# Patient Record
Sex: FEMALE | Race: WHITE | Employment: OTHER | URBAN - METROPOLITAN AREA
[De-identification: names, ages, dates, MRNs, and addresses within clinical notes are randomized per-mention and may not be internally consistent; named-entity substitution may affect disease eponyms.]

---

## 2017-02-06 ENCOUNTER — HOSPITAL ENCOUNTER (EMERGENCY)
Age: 32
Discharge: HOME | End: 2017-02-07
Attending: EMERGENCY MEDICINE
Payer: SELF-PAY

## 2017-02-06 ENCOUNTER — APPOINTMENT (OUTPATIENT)
Dept: GENERAL RADIOLOGY | Age: 32
End: 2017-02-06
Attending: PHYSICIAN ASSISTANT
Payer: SELF-PAY

## 2017-02-06 DIAGNOSIS — R73.9 HYPERGLYCEMIA: Primary | ICD-10-CM

## 2017-02-06 LAB
ALBUMIN SERPL BCP-MCNC: 3.6 G/DL (ref 3.5–5)
ALBUMIN/GLOB SERPL: 1 {RATIO} (ref 1.1–2.2)
ALP SERPL-CCNC: 81 U/L (ref 45–117)
ALT SERPL-CCNC: 39 U/L (ref 12–78)
ANION GAP BLD CALC-SCNC: 9 MMOL/L (ref 5–15)
APPEARANCE UR: CLEAR
AST SERPL W P-5'-P-CCNC: 13 U/L (ref 15–37)
BACTERIA URNS QL MICRO: NEGATIVE /HPF
BASOPHILS # BLD AUTO: 0 K/UL (ref 0–0.1)
BASOPHILS # BLD: 0 % (ref 0–1)
BILIRUB SERPL-MCNC: 0.5 MG/DL (ref 0.2–1)
BILIRUB UR QL: NEGATIVE
BUN SERPL-MCNC: 14 MG/DL (ref 6–20)
BUN/CREAT SERPL: 14 (ref 12–20)
CALCIUM SERPL-MCNC: 9 MG/DL (ref 8.5–10.1)
CHLORIDE SERPL-SCNC: 94 MMOL/L (ref 97–108)
CO2 SERPL-SCNC: 28 MMOL/L (ref 21–32)
COLOR UR: ABNORMAL
CREAT SERPL-MCNC: 0.98 MG/DL (ref 0.55–1.02)
EOSINOPHIL # BLD: 0.3 K/UL (ref 0–0.4)
EOSINOPHIL NFR BLD: 3 % (ref 0–7)
EPITH CASTS URNS QL MICRO: ABNORMAL /LPF
ERYTHROCYTE [DISTWIDTH] IN BLOOD BY AUTOMATED COUNT: 12.9 % (ref 11.5–14.5)
FLUAV AG NPH QL IA: NEGATIVE
FLUBV AG NOSE QL IA: NEGATIVE
GLOBULIN SER CALC-MCNC: 3.7 G/DL (ref 2–4)
GLUCOSE BLD STRIP.AUTO-MCNC: 458 MG/DL (ref 65–100)
GLUCOSE SERPL-MCNC: 564 MG/DL (ref 65–100)
GLUCOSE UR STRIP.AUTO-MCNC: >1000 MG/DL
HCG UR QL: NEGATIVE
HCT VFR BLD AUTO: 38.9 % (ref 35–47)
HGB BLD-MCNC: 13.4 G/DL (ref 11.5–16)
HGB UR QL STRIP: ABNORMAL
KETONES UR QL STRIP.AUTO: NEGATIVE MG/DL
LEUKOCYTE ESTERASE UR QL STRIP.AUTO: NEGATIVE
LIPASE SERPL-CCNC: 226 U/L (ref 73–393)
LYMPHOCYTES # BLD AUTO: 39 % (ref 12–49)
LYMPHOCYTES # BLD: 3.5 K/UL (ref 0.8–3.5)
MCH RBC QN AUTO: 29.3 PG (ref 26–34)
MCHC RBC AUTO-ENTMCNC: 34.4 G/DL (ref 30–36.5)
MCV RBC AUTO: 84.9 FL (ref 80–99)
MONOCYTES # BLD: 0.6 K/UL (ref 0–1)
MONOCYTES NFR BLD AUTO: 6 % (ref 5–13)
NEUTS SEG # BLD: 4.8 K/UL (ref 1.8–8)
NEUTS SEG NFR BLD AUTO: 52 % (ref 32–75)
NITRITE UR QL STRIP.AUTO: NEGATIVE
PH UR STRIP: 6 [PH] (ref 5–8)
PLATELET # BLD AUTO: 315 K/UL (ref 150–400)
POTASSIUM SERPL-SCNC: 4.2 MMOL/L (ref 3.5–5.1)
PROT SERPL-MCNC: 7.3 G/DL (ref 6.4–8.2)
PROT UR STRIP-MCNC: NEGATIVE MG/DL
RBC # BLD AUTO: 4.58 M/UL (ref 3.8–5.2)
RBC #/AREA URNS HPF: ABNORMAL /HPF (ref 0–5)
SERVICE CMNT-IMP: ABNORMAL
SODIUM SERPL-SCNC: 131 MMOL/L (ref 136–145)
SP GR UR REFRACTOMETRY: 1.03 (ref 1–1.03)
UA: UC IF INDICATED,UAUC: ABNORMAL
UROBILINOGEN UR QL STRIP.AUTO: 0.2 EU/DL (ref 0.2–1)
WBC # BLD AUTO: 9.2 K/UL (ref 3.6–11)
WBC URNS QL MICRO: ABNORMAL /HPF (ref 0–4)

## 2017-02-06 PROCEDURE — 74011636637 HC RX REV CODE- 636/637: Performed by: PHYSICIAN ASSISTANT

## 2017-02-06 PROCEDURE — 96375 TX/PRO/DX INJ NEW DRUG ADDON: CPT

## 2017-02-06 PROCEDURE — 85025 COMPLETE CBC W/AUTO DIFF WBC: CPT | Performed by: PHYSICIAN ASSISTANT

## 2017-02-06 PROCEDURE — 96361 HYDRATE IV INFUSION ADD-ON: CPT

## 2017-02-06 PROCEDURE — 81025 URINE PREGNANCY TEST: CPT

## 2017-02-06 PROCEDURE — 83690 ASSAY OF LIPASE: CPT | Performed by: PHYSICIAN ASSISTANT

## 2017-02-06 PROCEDURE — 87804 INFLUENZA ASSAY W/OPTIC: CPT | Performed by: PHYSICIAN ASSISTANT

## 2017-02-06 PROCEDURE — 51798 US URINE CAPACITY MEASURE: CPT

## 2017-02-06 PROCEDURE — 99284 EMERGENCY DEPT VISIT MOD MDM: CPT

## 2017-02-06 PROCEDURE — 81001 URINALYSIS AUTO W/SCOPE: CPT | Performed by: PHYSICIAN ASSISTANT

## 2017-02-06 PROCEDURE — 82962 GLUCOSE BLOOD TEST: CPT

## 2017-02-06 PROCEDURE — 96374 THER/PROPH/DIAG INJ IV PUSH: CPT

## 2017-02-06 PROCEDURE — 36415 COLL VENOUS BLD VENIPUNCTURE: CPT | Performed by: PHYSICIAN ASSISTANT

## 2017-02-06 PROCEDURE — 74011250636 HC RX REV CODE- 250/636: Performed by: PHYSICIAN ASSISTANT

## 2017-02-06 PROCEDURE — 80053 COMPREHEN METABOLIC PANEL: CPT | Performed by: PHYSICIAN ASSISTANT

## 2017-02-06 PROCEDURE — 71020 XR CHEST PA LAT: CPT

## 2017-02-06 RX ORDER — ONDANSETRON 2 MG/ML
4 INJECTION INTRAMUSCULAR; INTRAVENOUS
Status: COMPLETED | OUTPATIENT
Start: 2017-02-06 | End: 2017-02-06

## 2017-02-06 RX ADMIN — SODIUM CHLORIDE 1000 ML: 900 INJECTION, SOLUTION INTRAVENOUS at 22:17

## 2017-02-06 RX ADMIN — ONDANSETRON 4 MG: 2 INJECTION INTRAMUSCULAR; INTRAVENOUS at 23:12

## 2017-02-06 RX ADMIN — HUMAN INSULIN 10 UNITS: 100 INJECTION, SOLUTION SUBCUTANEOUS at 23:12

## 2017-02-06 NOTE — LETTER
[address] 
[facility] EMERGENCY DEPT 
[address] [facility] [place] [address] [id number] 
[phone] Work/School Note Date: 2/6/2017 To Whom It May concern: 
 
Marlon Altamirano was seen and treated today in the emergency room by the following provider(s): 
Attending Provider: Jarde Major DO Physician Assistant: VALENTINO Ponce.   
 
Marlon Altamirano may return to work on 2/8/17.  
 
Sincerely, 
 
 
 
 
VALENTINO Ponce

## 2017-02-07 VITALS
HEART RATE: 89 BPM | TEMPERATURE: 97.7 F | BODY MASS INDEX: 42.53 KG/M2 | OXYGEN SATURATION: 97 % | SYSTOLIC BLOOD PRESSURE: 165 MMHG | HEIGHT: 64 IN | WEIGHT: 249.12 LBS | RESPIRATION RATE: 18 BRPM | DIASTOLIC BLOOD PRESSURE: 99 MMHG

## 2017-02-07 LAB
GLUCOSE BLD STRIP.AUTO-MCNC: 312 MG/DL (ref 65–100)
SERVICE CMNT-IMP: ABNORMAL

## 2017-02-07 PROCEDURE — 82962 GLUCOSE BLOOD TEST: CPT

## 2017-02-07 NOTE — ED PROVIDER NOTES
HPI Comments: Pt states she checked her BS and found it to be 550 associated s/s included frequent urges to void (pt states she cannot void and has not voided since 9am this morning) and nausea/flank pain.     Pt states at 3pm she took 25units of humalog, drank 6 bottles of water  Recheck sugar at 4pm was 520 pt took another 25 units of humalog  Recheck sugar at 5pm was 530 pt took 25 more units of humalog for a Total of 75 units this afternoon/evening  Recheck BS at 6pm was 520     Patient denies fever, chills, vomiting, diarrhea. Denies chest pain, cough or shortness of breath. Patient is a 27 y.o. female presenting with hyperglycemia. The history is provided by the patient. High Blood Sugar    This is a new problem. The current episode started 6 to 12 hours ago. The problem occurs rarely. The problem has not changed since onset. The pain is associated with an unknown factor. The pain is located in the suprapubic region. The quality of the pain is pressure-like. The pain is at a severity of 7/10. The pain is severe. Associated symptoms include nausea and back pain. Pertinent negatives include no fever, no belching, no diarrhea, no flatus, no hematochezia, no melena, no vomiting, no constipation, no dysuria, no frequency, no hematuria, no headaches, no arthralgias, no myalgias, no trauma, no chest pain and no testicular pain. Nothing worsens the pain. The pain is relieved by nothing. Her past medical history is significant for UTI and DM. The patient's surgical history non-contributory. Past Medical History:   Diagnosis Date    Anemia     Diabetes ([address]     Hx MRSA infection     Hypertension     Irregular menstrual cycle     Obesity     Other ill-defined conditions(799.89) hypothyroidism       Past Surgical History:   Procedure Laterality Date    Hx heent      Hx cholecystectomy      Hx tympanostomy      Hx tonsil and adenoidectomy  1992         No family history on file.     Social History Social History    Marital status:      Spouse name: N/A    Number of children: N/A    Years of education: N/A     Occupational History    Not on file. Social History Main Topics    Smoking status: Never Smoker    Smokeless tobacco: Never Used    Alcohol use No    Drug use: No    Sexual activity: Not on file     Other Topics Concern    Not on file     Social History Narrative    No narrative on file         ALLERGIES: Pcn [penicillins]; Vancomycin; and Voltaren [diclofenac sodium]    Review of Systems   Constitutional: Negative. Negative for fever. HENT: Negative. Eyes: Negative. Respiratory: Negative. Cardiovascular: Negative. Negative for chest pain. Gastrointestinal: Positive for abdominal pain and nausea. Negative for constipation, diarrhea, flatus, hematochezia, melena and vomiting. Endocrine: Negative. Genitourinary: Positive for decreased urine volume and urgency. Negative for dysuria, frequency, hematuria and testicular pain. Musculoskeletal: Positive for back pain. Negative for arthralgias and myalgias. Skin: Negative. Allergic/Immunologic: Negative. Neurological: Negative. Negative for headaches. Hematological: Negative. All other systems reviewed and are negative. Vitals:    02/06/17 2019   BP: (!) 214/131   Pulse: 89   Resp: 18   Temp: 97.7 °F (36.5 °C)   SpO2: 98%   Weight: 113 kg (249 lb 1.9 oz)   Height: 5' 4\" (1.626 m)            Physical Exam   Constitutional: She is oriented to person, place, and time. She appears well-developed and well-nourished. HENT:   Head: Normocephalic and atraumatic. Right Ear: External ear normal.   Left Ear: External ear normal.   Mouth/Throat: Oropharynx is clear and moist. No oropharyngeal exudate. Eyes: Conjunctivae and EOM are normal. Pupils are equal, round, and reactive to light. Right eye exhibits no discharge. Left eye exhibits no discharge. No scleral icterus. Neck: Normal range of motion. No tracheal deviation present. No thyromegaly present. Cardiovascular: Normal rate, regular rhythm and normal heart sounds. No murmur heard. Pulmonary/Chest: Effort normal and breath sounds normal. No respiratory distress. She has no wheezes. She has no rales. She exhibits no tenderness. Abdominal: Soft. Bowel sounds are normal. She exhibits no distension. There is no tenderness. There is no rebound and no guarding. Musculoskeletal: Normal range of motion. She exhibits no edema or tenderness. Lymphadenopathy:     She has no cervical adenopathy. Neurological: She is alert and oriented to person, place, and time. No cranial nerve deficit. Coordination normal.   Skin: Skin is warm. No erythema. Psychiatric: She has a normal mood and affect. Her behavior is normal. Judgment and thought content normal.   Nursing note and vitals reviewed. MDM  Number of Diagnoses or Management Options  Hyperglycemia:   Diagnosis management comments: Assesment/Plan- no DKA or signs of infection. Blood sugar improved after fluids and insulin. Discharge with PCP follow up.        Amount and/or Complexity of Data Reviewed  Clinical lab tests: reviewed and ordered  Tests in the radiology section of CPT®: ordered and reviewed  Tests in the medicine section of CPT®: reviewed and ordered      ED Course       Procedures

## 2017-02-07 NOTE — ED TRIAGE NOTES
Pt states she checked her BS and found it to be 550 associated s/s included frequent urges to void (pt states she cannot void and has not voided since 9am this morning) and nausea/flank pain.     Pt states at 3pm she took 25units of humalog, drank 6 bottles of water  Recheck sugar at 4pm was 520 pt took another 25 units of humalog  Recheck sugar at 5pm was 530 pt took 25 more units of humalog for a  Total of 75 units this afternoon/evening  Recheck BS at 6pm was 520

## 2017-02-07 NOTE — DISCHARGE INSTRUCTIONS
Learning About High Blood Sugar  What is high blood sugar? Your body turns the food you eat into glucose (sugar), which it uses for energy. But if your body isn't able to use the sugar right away, it can build up in your blood and lead to high blood sugar. When the amount of sugar in your blood stays too high for too much of the time, you may have diabetes. Diabetes is a disease that can cause serious health problems. The good news is that lifestyle changes may help you get your blood sugar back to normal and avoid or delay diabetes. What causes high blood sugar? Sugar (glucose) can build up in your blood if you:  · Are overweight. · Have a family history of diabetes. · Take certain medicines, such as steroids. What are the symptoms? Having high blood sugar may not cause any symptoms at all. Or it may make you feel very thirsty or very hungry. You may also urinate more often than usual, have blurry vision, or lose weight without trying. How is high blood sugar treated? You can take steps to lower your blood sugar level if you understand what makes it get higher. Your doctor may want you to learn how to test your blood sugar level at home. Then you can see how illness, stress, or different kinds of food or medicine raise or lower your blood sugar level. Other tests may be needed to see if you have diabetes. How can you prevent high blood sugar? · Watch your weight. If you're overweight, losing just a small amount of weight may help. Reducing fat around your waist is most important. · Limit the amount of calories, sweets, and unhealthy fat you eat. Ask your doctor if a dietitian can help you. A registered dietitian can help you create meal plans that fit your lifestyle. · Get at least 30 minutes of exercise on most days of the week. Exercise helps control your blood sugar. It also helps you maintain a healthy weight. Walking is a good choice.  You also may want to do other activities, such as running, swimming, cycling, or playing tennis or team sports. · If your doctor prescribed medicines, take them exactly as prescribed. Call your doctor if you think you are having a problem with your medicine. You will get more details on the specific medicines your doctor prescribes. Follow-up care is a key part of your treatment and safety. Be sure to make and go to all appointments, and call your doctor if you are having problems. It's also a good idea to know your test results and keep a list of the medicines you take. Where can you learn more? Go to http://kongSwivldelma.info/. Enter O108 in the search box to learn more about \"Learning About High Blood Sugar. \"  Current as of: May 23, 2016  Content Version: 11.1  © 0221-6064 [employer]. Care instructions adapted under license by [employer] (which disclaims liability or warranty for this information). If you have questions about a medical condition or this instruction, always ask your healthcare professional. [address] any warranty or liability for your use of this information. We hope that we have addressed all of your medical concerns. The examination and treatment you received in the Emergency Department were for an emergent problem and were not intended as complete care. It is important that you follow up with your healthcare provider(s) for ongoing care. If your symptoms worsen or do not improve as expected, and you are unable to reach your usual health care provider(s), you should return to the Emergency Department. Today's healthcare is undergoing tremendous change, and patient satisfaction surveys are one of the many tools to assess the quality of medical care. You may receive a survey from the [employer] organization regarding your experience in the Emergency Department.   I hope that your experience has been completely positive, particularly the medical care that I provided. As such, please participate in the survey; anything less than excellent does not meet my expectations or intentions. [facility] and [facility] participate in nationally recognized quality of care measures. If your blood pressure is greater than 120/80, as reported below, we urge that you seek medical care to address the potential of high blood pressure, commonly known as hypertension. Hypertension can be hereditary or can be caused by certain medical conditions, pain, stress, or \"white coat syndrome. \"       Please make an appointment with your health care provider(s) for follow up of your Emergency Department visit. VITALS:   Patient Vitals for the past 8 hrs:   Temp Pulse Resp BP SpO2   02/06/17 2330 - - - - 97 %   02/06/17 2315 - - - - 96 %   02/06/17 2230 - - - (!) 165/99 96 %   02/06/17 2215 - - - (!) 171/96 98 %   02/06/17 2019 97.7 °F (36.5 °C) 89 18 (!) 214/131 98 %          Thank you for allowing us to provide you with medical care today. We realize that you have many choices for your emergency care needs. Please choose us in the future for any continued health care needs. [place], [employer].   Office: [phone]            Recent Results (from the past 24 hour(s))   GLUCOSE, POC    Collection Time: 02/06/17  8:24 PM   Result Value Ref Range    Glucose (POC) 458 (H) 65 - 100 mg/dL    Performed by Ferdie Apple    CBC WITH AUTOMATED DIFF    Collection Time: 02/06/17  9:35 PM   Result Value Ref Range    WBC 9.2 3.6 - 11.0 K/uL    RBC 4.58 3.80 - 5.20 M/uL    HGB 13.4 11.5 - 16.0 g/dL    HCT 38.9 35.0 - 47.0 %    MCV 84.9 80.0 - 99.0 FL    MCH 29.3 26.0 - 34.0 PG    MCHC 34.4 30.0 - 36.5 g/dL    RDW 12.9 11.5 - 14.5 %    PLATELET 057 781 - 192 K/uL    NEUTROPHILS 52 32 - 75 %    LYMPHOCYTES 39 12 - 49 %    MONOCYTES 6 5 - 13 %    EOSINOPHILS 3 0 - 7 %    BASOPHILS 0 0 - 1 %    ABS. NEUTROPHILS 4.8 1.8 - 8.0 K/UL    ABS. LYMPHOCYTES 3.5 0.8 - 3.5 K/UL    ABS. MONOCYTES 0.6 0.0 - 1.0 K/UL    ABS. EOSINOPHILS 0.3 0.0 - 0.4 K/UL    ABS. BASOPHILS 0.0 0.0 - 0.1 K/UL   METABOLIC PANEL, COMPREHENSIVE    Collection Time: 02/06/17  9:35 PM   Result Value Ref Range    Sodium 131 (L) 136 - 145 mmol/L    Potassium 4.2 3.5 - 5.1 mmol/L    Chloride 94 (L) 97 - 108 mmol/L    CO2 28 21 - 32 mmol/L    Anion gap 9 5 - 15 mmol/L    Glucose 564 (H) 65 - 100 mg/dL    BUN 14 6 - 20 MG/DL    Creatinine 0.98 0.55 - 1.02 MG/DL    BUN/Creatinine ratio 14 12 - 20      GFR est AA >60 >60 ml/min/1.73m2    GFR est non-AA >60 >60 ml/min/1.73m2    Calcium 9.0 8.5 - 10.1 MG/DL    Bilirubin, total 0.5 0.2 - 1.0 MG/DL    ALT (SGPT) 39 12 - 78 U/L    AST (SGOT) 13 (L) 15 - 37 U/L    Alk.  phosphatase 81 45 - 117 U/L    Protein, total 7.3 6.4 - 8.2 g/dL    Albumin 3.6 3.5 - 5.0 g/dL    Globulin 3.7 2.0 - 4.0 g/dL    A-G Ratio 1.0 (L) 1.1 - 2.2     LIPASE    Collection Time: 02/06/17  9:35 PM   Result Value Ref Range    Lipase 226 73 - 393 U/L   INFLUENZA A & B AG (RAPID TEST)    Collection Time: 02/06/17  9:35 PM   Result Value Ref Range    Influenza A Antigen NEGATIVE  NEG      Influenza B Antigen NEGATIVE  NEG     URINALYSIS W/ REFLEX CULTURE    Collection Time: 02/06/17  9:43 PM   Result Value Ref Range    Color YELLOW/STRAW      Appearance CLEAR CLEAR      Specific gravity 1.028 1.003 - 1.030      pH (UA) 6.0 5.0 - 8.0      Protein NEGATIVE  NEG mg/dL    Glucose >1000 (A) NEG mg/dL    Ketone NEGATIVE  NEG mg/dL    Bilirubin NEGATIVE  NEG      Blood MODERATE (A) NEG      Urobilinogen 0.2 0.2 - 1.0 EU/dL    Nitrites NEGATIVE  NEG      Leukocyte Esterase NEGATIVE  NEG      WBC 0-4 0 - 4 /hpf    RBC 0-5 0 - 5 /hpf    Epithelial cells FEW FEW /lpf    Bacteria NEGATIVE  NEG /hpf    UA:UC IF INDICATED CULTURE NOT INDICATED BY UA RESULT CNI     HCG URINE, QL. - POC    Collection Time: 02/06/17 10:20 PM   Result Value Ref Range Pregnancy test,urine (POC) NEGATIVE  NEG     GLUCOSE, POC    Collection Time: 02/07/17 12:12 AM   Result Value Ref Range    Glucose (POC) 312 (H) 65 - 100 mg/dL    Performed by Mary Stapleton        Xr Chest Pa Lat    Result Date: 2/6/2017  EXAM:  XR CHEST PA LAT INDICATION:   pneumonia COMPARISON: 2016. FINDINGS: PA and lateral radiographs of the chest demonstrate lungs clear of an acute process. There is minor atelectasis/scarring in the lingula. . The cardiac and mediastinal contours and pulmonary vascularity are normal.  The bones and soft tissues are within normal limits.      IMPRESSION: No acute abnormality identified

## 2017-02-20 ENCOUNTER — APPOINTMENT (OUTPATIENT)
Dept: ULTRASOUND IMAGING | Age: 32
End: 2017-02-20
Attending: EMERGENCY MEDICINE
Payer: SELF-PAY

## 2017-02-20 ENCOUNTER — HOSPITAL ENCOUNTER (EMERGENCY)
Age: 32
Discharge: HOME | End: 2017-02-20
Attending: EMERGENCY MEDICINE | Admitting: EMERGENCY MEDICINE
Payer: SELF-PAY

## 2017-02-20 VITALS
HEIGHT: 64 IN | HEART RATE: 79 BPM | SYSTOLIC BLOOD PRESSURE: 181 MMHG | BODY MASS INDEX: 40.97 KG/M2 | WEIGHT: 240 LBS | TEMPERATURE: 97.5 F | RESPIRATION RATE: 22 BRPM | DIASTOLIC BLOOD PRESSURE: 98 MMHG | OXYGEN SATURATION: 98 %

## 2017-02-20 DIAGNOSIS — I10 ESSENTIAL HYPERTENSION: ICD-10-CM

## 2017-02-20 DIAGNOSIS — D25.9 UTERINE LEIOMYOMA, UNSPECIFIED LOCATION: ICD-10-CM

## 2017-02-20 DIAGNOSIS — R73.9 HYPERGLYCEMIA: ICD-10-CM

## 2017-02-20 DIAGNOSIS — N93.9 VAGINAL BLEEDING: Primary | ICD-10-CM

## 2017-02-20 LAB
ALBUMIN SERPL BCP-MCNC: 3.8 G/DL (ref 3.5–5)
ALBUMIN/GLOB SERPL: 1 {RATIO} (ref 1.1–2.2)
ALP SERPL-CCNC: 81 U/L (ref 45–117)
ALT SERPL-CCNC: 41 U/L (ref 12–78)
ANION GAP BLD CALC-SCNC: 12 MMOL/L (ref 5–15)
AST SERPL W P-5'-P-CCNC: 14 U/L (ref 15–37)
BASOPHILS # BLD AUTO: 0 K/UL (ref 0–0.1)
BASOPHILS # BLD: 0 % (ref 0–1)
BILIRUB SERPL-MCNC: 0.6 MG/DL (ref 0.2–1)
BUN SERPL-MCNC: 13 MG/DL (ref 6–20)
BUN/CREAT SERPL: 16 (ref 12–20)
CALCIUM SERPL-MCNC: 9.3 MG/DL (ref 8.5–10.1)
CHLORIDE SERPL-SCNC: 95 MMOL/L (ref 97–108)
CLUE CELLS VAG QL WET PREP: NORMAL
CO2 SERPL-SCNC: 24 MMOL/L (ref 21–32)
CREAT SERPL-MCNC: 0.81 MG/DL (ref 0.55–1.02)
EOSINOPHIL # BLD: 0.3 K/UL (ref 0–0.4)
EOSINOPHIL NFR BLD: 3 % (ref 0–7)
ERYTHROCYTE [DISTWIDTH] IN BLOOD BY AUTOMATED COUNT: 13.2 % (ref 11.5–14.5)
GLOBULIN SER CALC-MCNC: 3.9 G/DL (ref 2–4)
GLUCOSE BLD STRIP.AUTO-MCNC: 271 MG/DL (ref 65–100)
GLUCOSE BLD STRIP.AUTO-MCNC: 468 MG/DL (ref 65–100)
GLUCOSE BLD STRIP.AUTO-MCNC: 481 MG/DL (ref 65–100)
GLUCOSE SERPL-MCNC: 445 MG/DL (ref 65–100)
HCG UR QL: NEGATIVE
HCT VFR BLD AUTO: 41.6 % (ref 35–47)
HGB BLD-MCNC: 14.7 G/DL (ref 11.5–16)
KOH PREP SPEC: NORMAL
LYMPHOCYTES # BLD AUTO: 38 % (ref 12–49)
LYMPHOCYTES # BLD: 3.4 K/UL (ref 0.8–3.5)
MCH RBC QN AUTO: 29.8 PG (ref 26–34)
MCHC RBC AUTO-ENTMCNC: 35.3 G/DL (ref 30–36.5)
MCV RBC AUTO: 84.4 FL (ref 80–99)
MONOCYTES # BLD: 0.5 K/UL (ref 0–1)
MONOCYTES NFR BLD AUTO: 6 % (ref 5–13)
NEUTS SEG # BLD: 4.7 K/UL (ref 1.8–8)
NEUTS SEG NFR BLD AUTO: 53 % (ref 32–75)
PLATELET # BLD AUTO: 321 K/UL (ref 150–400)
POTASSIUM SERPL-SCNC: 4 MMOL/L (ref 3.5–5.1)
PROT SERPL-MCNC: 7.7 G/DL (ref 6.4–8.2)
RBC # BLD AUTO: 4.93 M/UL (ref 3.8–5.2)
SERVICE CMNT-IMP: ABNORMAL
SERVICE CMNT-IMP: NORMAL
SODIUM SERPL-SCNC: 131 MMOL/L (ref 136–145)
T VAGINALIS VAG QL WET PREP: NORMAL
TROPONIN I SERPL-MCNC: <0.04 NG/ML
WBC # BLD AUTO: 9 K/UL (ref 3.6–11)

## 2017-02-20 PROCEDURE — 93005 ELECTROCARDIOGRAM TRACING: CPT

## 2017-02-20 PROCEDURE — 99285 EMERGENCY DEPT VISIT HI MDM: CPT

## 2017-02-20 PROCEDURE — 96361 HYDRATE IV INFUSION ADD-ON: CPT

## 2017-02-20 PROCEDURE — 81025 URINE PREGNANCY TEST: CPT

## 2017-02-20 PROCEDURE — 76856 US EXAM PELVIC COMPLETE: CPT

## 2017-02-20 PROCEDURE — 74011250636 HC RX REV CODE- 250/636: Performed by: EMERGENCY MEDICINE

## 2017-02-20 PROCEDURE — 87491 CHLMYD TRACH DNA AMP PROBE: CPT | Performed by: EMERGENCY MEDICINE

## 2017-02-20 PROCEDURE — 87210 SMEAR WET MOUNT SALINE/INK: CPT | Performed by: EMERGENCY MEDICINE

## 2017-02-20 PROCEDURE — 76830 TRANSVAGINAL US NON-OB: CPT

## 2017-02-20 PROCEDURE — 84484 ASSAY OF TROPONIN QUANT: CPT | Performed by: EMERGENCY MEDICINE

## 2017-02-20 PROCEDURE — 96374 THER/PROPH/DIAG INJ IV PUSH: CPT

## 2017-02-20 PROCEDURE — 36415 COLL VENOUS BLD VENIPUNCTURE: CPT | Performed by: EMERGENCY MEDICINE

## 2017-02-20 PROCEDURE — 82962 GLUCOSE BLOOD TEST: CPT

## 2017-02-20 PROCEDURE — 74011636637 HC RX REV CODE- 636/637: Performed by: EMERGENCY MEDICINE

## 2017-02-20 PROCEDURE — 80053 COMPREHEN METABOLIC PANEL: CPT | Performed by: EMERGENCY MEDICINE

## 2017-02-20 PROCEDURE — 85025 COMPLETE CBC W/AUTO DIFF WBC: CPT | Performed by: EMERGENCY MEDICINE

## 2017-02-20 RX ORDER — ONDANSETRON 2 MG/ML
4 INJECTION INTRAMUSCULAR; INTRAVENOUS
Status: COMPLETED | OUTPATIENT
Start: 2017-02-20 | End: 2017-02-20

## 2017-02-20 RX ORDER — SODIUM CHLORIDE 0.9 % (FLUSH) 0.9 %
5-10 SYRINGE (ML) INJECTION EVERY 8 HOURS
Status: DISCONTINUED | OUTPATIENT
Start: 2017-02-20 | End: 2017-02-20 | Stop reason: HOSPADM

## 2017-02-20 RX ORDER — ONDANSETRON 4 MG/1
4 TABLET, ORALLY DISINTEGRATING ORAL
Qty: 12 TAB | Refills: 0 | Status: SHIPPED | OUTPATIENT
Start: 2017-02-20 | End: 2017-05-25

## 2017-02-20 RX ORDER — SODIUM CHLORIDE 0.9 % (FLUSH) 0.9 %
5-10 SYRINGE (ML) INJECTION AS NEEDED
Status: DISCONTINUED | OUTPATIENT
Start: 2017-02-20 | End: 2017-02-20 | Stop reason: HOSPADM

## 2017-02-20 RX ORDER — HYDROCODONE BITARTRATE AND ACETAMINOPHEN 5; 325 MG/1; MG/1
1 TABLET ORAL
Qty: 12 TAB | Refills: 0 | Status: SHIPPED | OUTPATIENT
Start: 2017-02-20 | End: 2017-05-25

## 2017-02-20 RX ORDER — KETOROLAC TROMETHAMINE 30 MG/ML
30 INJECTION, SOLUTION INTRAMUSCULAR; INTRAVENOUS
Status: DISCONTINUED | OUTPATIENT
Start: 2017-02-20 | End: 2017-02-20

## 2017-02-20 RX ADMIN — Medication 10 ML: at 16:12

## 2017-02-20 RX ADMIN — HUMAN INSULIN 10 UNITS: 100 INJECTION, SOLUTION SUBCUTANEOUS at 18:24

## 2017-02-20 RX ADMIN — ONDANSETRON 4 MG: 2 INJECTION INTRAMUSCULAR; INTRAVENOUS at 19:56

## 2017-02-20 RX ADMIN — SODIUM CHLORIDE 1000 ML: 900 INJECTION, SOLUTION INTRAVENOUS at 18:15

## 2017-02-20 NOTE — ED PROVIDER NOTES
HPI Comments: 27 y.o. female with past medical history significant for irregular menstrual cycles, anemia, DM, HTN, hypothyroidism, MRSA and obesity who presents ambulatory from home with chief complaint of vaginal bleeding. Pt reports 16 days of heavy vaginal bleeding with associated continuous lower abdominal pain. Pt states she is soaking 1 super tampon every 1.5 hours. Pt states her periods are usually light and last 3 days. Pt denies a history of the symptoms. Pt also reports high blood pressure and high glucose readings for the last 2 weeks. Pt states she takes Losartan and insulin and has not missed doses. Pt states she \"doesn't feel right\". Pt lost her insurance at the beginning of the year and is not followed by an OBGYN or PCP. Pt was seen on 2/6/17 for hyperglycemia and HTN. Pt denies change in appetite and weight loss. There are no other acute medical concerns at this time. Social hx: denies tobacco use; denies illicit drug use; denies EtOH use;   Significant FMHx: cancer - mother & maternal aunts, grandmother  PCP: None    Note written by Dieter Ta, as dictated by Megan Cerna MD 5:51 PM       The history is provided by the patient. Past Medical History:   Diagnosis Date    Anemia     Diabetes ([facility] [address]     Hx MRSA infection     Hypertension     Irregular menstrual cycle     Obesity     Other ill-defined conditions(799.89) hypothyroidism       Past Surgical History:   Procedure Laterality Date    Hx heent      Hx cholecystectomy      Hx tympanostomy      Hx tonsil and adenoidectomy  1992         No family history on file. Social History     Social History    Marital status:      Spouse name: N/A    Number of children: N/A    Years of education: N/A     Occupational History    Not on file.      Social History Main Topics    Smoking status: Never Smoker    Smokeless tobacco: Never Used    Alcohol use No    Drug use: No    Sexual activity: Not on file     Other Topics Concern    Not on file     Social History Narrative    No narrative on file         ALLERGIES: Pcn [penicillins]; Vancomycin; and Voltaren [diclofenac sodium]    Review of Systems   Constitutional: Negative for appetite change and unexpected weight change. Gastrointestinal: Positive for abdominal pain. Genitourinary: Positive for vaginal bleeding. All other systems reviewed and are negative. Vitals:    02/20/17 1544 02/20/17 1549 02/20/17 1706   BP: (!) 222/115 (!) 214/116 (!) 166/104   Pulse: 90  95   Resp: 18  17   Temp: 97.2 °F (36.2 °C)     SpO2: 99%     Weight: 108.9 kg (240 lb)     Height: 5' 4\" (1.626 m)              Physical Exam   Constitutional: She is oriented to person, place, and time. She appears well-developed and well-nourished. No distress. Obese   HENT:   Head: Normocephalic and atraumatic. Eyes: Conjunctivae are normal. No scleral icterus. Neck: Neck supple. No tracheal deviation present. Cardiovascular: Normal rate, regular rhythm, normal heart sounds and intact distal pulses. Exam reveals no gallop and no friction rub. No murmur heard. Pulmonary/Chest: Effort normal and breath sounds normal. She has no wheezes. She has no rales. Abdominal: Soft. She exhibits no distension. There is no tenderness. There is no rebound and no guarding. Genitourinary: Right adnexum displays tenderness (mild). Genitourinary Comments: Mild amount of blood. Musculoskeletal: She exhibits no edema. Neurological: She is alert and oriented to person, place, and time. Skin: Skin is warm and dry. No rash noted. Psychiatric: She has a normal mood and affect. Nursing note and vitals reviewed. Note written by Dieter Tello, as dictated by Joi Newton MD 5:51 PM    [facility]  ED Course       Procedures    ED EKG interpretation:  Rhythm: normal sinus rhythm.  Rate (approx.): 80; Axis: normal; ST/T wave: normal.  Note written by Dieter Tello, as dictated by Daria Lee MD 5:51 PM      Progress Note:  Results, treatment, and follow up plan have been discussed with patient. Questions were answered. Daria Lee MD    A/P: persistent vaginal bleeding - VSS; scant bleeding on exam; Hgb stable; US shows fibroid; PCP/Gyn f/u. HTN despite antihypertensive medication - no signs end-organ damage; PCP f/u. Hyperglycemia despite reported compliance with diet/insulin - lowered in ED with insulin/IVF's; PCP f/u.   Daria Lee MD

## 2017-02-20 NOTE — ED TRIAGE NOTES
Pt. Arrives stating she has had a cycle for 16 days. Denies any history of the same. States she does not think she could be pregnant. States has used 9 tampons today. Has not contacted her OBGYN or her PCP. Also reports HTN today. States history of the same but typically runs OK. Also reports \"High\" on her glucometer over the past few weeks. States she was seen here 2 weeks ago for the same.

## 2017-02-20 NOTE — ED NOTES
Unsuccessful attempt for IV access. Dr. Mahendra Rashid made aware. Edgardo Arevalo, ERT in to attempt US guided IV access.

## 2017-02-21 LAB
ATRIAL RATE: 80 BPM
CALCULATED P AXIS, ECG09: 22 DEGREES
CALCULATED R AXIS, ECG10: 15 DEGREES
CALCULATED T AXIS, ECG11: 17 DEGREES
DIAGNOSIS, 93000: NORMAL
P-R INTERVAL, ECG05: 176 MS
Q-T INTERVAL, ECG07: 400 MS
QRS DURATION, ECG06: 96 MS
QTC CALCULATION (BEZET), ECG08: 461 MS
VENTRICULAR RATE, ECG03: 80 BPM

## 2017-02-21 NOTE — ED NOTES
Dr. Vivi Lindsay notified of patients complaints of pain and nausea after ultrasound. Awaiting orders.

## 2017-02-21 NOTE — DISCHARGE INSTRUCTIONS
Abnormal Uterine Bleeding: Care Instructions  Your Care Instructions  Abnormal uterine bleeding (AUB) is irregular bleeding from the uterus that is longer or heavier than usual or does not occur at your regular time. Sometimes it is caused by changes in hormone levels. It can also be caused by growths in the uterus, such as fibroids or polyps. Sometimes a cause cannot be found. You may have heavy bleeding when you are not expecting your period. Your doctor may suggest a pregnancy test, if you think you are pregnant. Follow-up care is a key part of your treatment and safety. Be sure to make and go to all appointments, and call your doctor if you are having problems. It's also a good idea to know your test results and keep a list of the medicines you take. How can you care for yourself at home? · Be safe with medicines. Take pain medicines exactly as directed. ¨ If the doctor gave you a prescription medicine for pain, take it as prescribed. ¨ If you are not taking a prescription pain medicine, ask your doctor if you can take an over-the-counter medicine. · You may be low in iron because of blood loss. Eat a balanced diet that is high in iron and vitamin C. Foods rich in iron include red meat, shellfish, eggs, beans, and leafy green vegetables. Talk to your doctor about whether you need to take iron pills or a multivitamin. When should you call for help? Call 911 anytime you think you may need emergency care. For example, call if:  · You passed out (lost consciousness). Call your doctor now or seek immediate medical care if:  · You have sudden, severe pain in your belly or pelvis. · You have severe vaginal bleeding. You are soaking through your usual pads or tampons every hour for 2 or more hours. · You feel dizzy or lightheaded, or you feel like you may faint. Watch closely for changes in your health, and be sure to contact your doctor if:  · You have new belly or pelvic pain.   · You have a fever.  · Your bleeding gets worse or lasts longer than 1 week. · You think you may be pregnant. Where can you learn more? Go to http://kong-delma.info/. Enter K475 in the search box to learn more about \"Abnormal Uterine Bleeding: Care Instructions. \"  Current as of: February 25, 2016  Content Version: 11.1  © 5658-3795 [employer]. Care instructions adapted under license by [employer] (which disclaims liability or warranty for this information). If you have questions about a medical condition or this instruction, always ask your healthcare professional. [place] [address] any warranty or liability for your use of this information. High Blood Pressure: Care Instructions  Your Care Instructions  If your blood pressure is usually above 140/90, you have high blood pressure, or hypertension. That means the top number is 140 or higher or the bottom number is 90 or higher, or both. Despite what a lot of people think, high blood pressure usually doesn't cause headaches or make you feel dizzy or lightheaded. It usually has no symptoms. But it does increase your risk for heart attack, stroke, and kidney or eye damage. The higher your blood pressure, the more your risk increases. Your doctor will give you a goal for your blood pressure. Your goal will be based on your health and your age. An example of a goal is to keep your blood pressure below 140/90. Lifestyle changes, such as eating healthy and being active, are always important to help lower blood pressure. You might also take medicine to reach your blood pressure goal.  Follow-up care is a key part of your treatment and safety. Be sure to make and go to all appointments, and call your doctor if you are having problems. It's also a good idea to know your test results and keep a list of the medicines you take. How can you care for yourself at home?   Medical treatment  · If you stop taking your medicine, your blood pressure will go back up. You may take one or more types of medicine to lower your blood pressure. Be safe with medicines. Take your medicine exactly as prescribed. Call your doctor if you think you are having a problem with your medicine. · Talk to your doctor before you start taking aspirin every day. Aspirin can help certain people lower their risk of a heart attack or stroke. But taking aspirin isn't right for everyone, because it can cause serious bleeding. · See your doctor regularly. You may need to see the doctor more often at first or until your blood pressure comes down. · If you are taking blood pressure medicine, talk to your doctor before you take decongestants or anti-inflammatory medicine, such as ibuprofen. Some of these medicines can raise blood pressure. · Learn how to check your blood pressure at home. Lifestyle changes  · Stay at a healthy weight. This is especially important if you put on weight around the waist. Losing even 10 pounds can help you lower your blood pressure. · If your doctor recommends it, get more exercise. Walking is a good choice. Bit by bit, increase the amount you walk every day. Try for at least 30 minutes on most days of the week. You also may want to swim, bike, or do other activities. · Avoid or limit alcohol. Talk to your doctor about whether you can drink any alcohol. · Try to limit how much sodium you eat to less than 2,300 milligrams (mg) a day. Your doctor may ask you to try to eat less than 1,500 mg a day. · Eat plenty of fruits (such as bananas and oranges), vegetables, legumes, whole grains, and low-fat dairy products. · Lower the amount of saturated fat in your diet. Saturated fat is found in animal products such as milk, cheese, and meat. Limiting these foods may help you lose weight and also lower your risk for heart disease. · Do not smoke. Smoking increases your risk for heart attack and stroke.  If you need help quitting, talk to your doctor about stop-smoking programs and medicines. These can increase your chances of quitting for good. When should you call for help? Call 911 anytime you think you may need emergency care. This may mean having symptoms that suggest that your blood pressure is causing a serious heart or blood vessel problem. Your blood pressure may be over 180/110. For example, call 911 if:  · You have symptoms of a heart attack. These may include:  ¨ Chest pain or pressure, or a strange feeling in the chest.  ¨ Sweating. ¨ Shortness of breath. ¨ Nausea or vomiting. ¨ Pain, pressure, or a strange feeling in the back, neck, jaw, or upper belly or in one or both shoulders or arms. ¨ Lightheadedness or sudden weakness. ¨ A fast or irregular heartbeat. · You have symptoms of a stroke. These may include:  ¨ Sudden numbness, tingling, weakness, or loss of movement in your face, arm, or leg, especially on only one side of your body. ¨ Sudden vision changes. ¨ Sudden trouble speaking. ¨ Sudden confusion or trouble understanding simple statements. ¨ Sudden problems with walking or balance. ¨ A sudden, severe headache that is different from past headaches. · You have severe back or belly pain. Do not wait until your blood pressure comes down on its own. Get help right away. Call your doctor now or seek immediate care if:  · Your blood pressure is much higher than normal (such as 180/110 or higher), but you don't have symptoms. · You think high blood pressure is causing symptoms, such as:  ¨ Severe headache. ¨ Blurry vision. Watch closely for changes in your health, and be sure to contact your doctor if:  · Your blood pressure measures 140/90 or higher at least 2 times. That means the top number is 140 or higher or the bottom number is 90 or higher, or both. · You think you may be having side effects from your blood pressure medicine.   · Your blood pressure is usually normal, but it goes above normal at least 2 times.  Where can you learn more? Go to http://kong-delma.info/. Enter M735 in the search box to learn more about \"High Blood Pressure: Care Instructions. \"  Current as of: August 8, 2016  Content Version: 11.1  © 0994-3057 [employer]. Care instructions adapted under license by [employer] (which disclaims liability or warranty for this information). If you have questions about a medical condition or this instruction, always ask your healthcare professional. [address] any warranty or liability for your use of this information. Uterine Fibroids: Care Instructions  Your Care Instructions    Uterine fibroids are growths in the uterus. Fibroids aren't cancer. Doctors don't know what causes fibroids. Fibroids are very common in women during their childbearing years. Fibroids can grow on the inside of the uterus, in the muscle wall of the uterus, or near the outside wall of the uterus. In some women, fibroids cause painful cramps and heavy periods. In these cases, taking anti-inflammatory medicines, birth control pills, or using an intrauterine device (IUD) often helps decrease symptoms. Sometimes surgery is needed to treat fibroids. But if you are near menopause, you may want to wait and see if your symptoms get better. Most fibroids shrink and go away after menopause, when your menstrual periods stop completely. Follow-up care is a key part of your treatment and safety. Be sure to make and go to all appointments, and call your doctor if you are having problems. It's also a good idea to know your test results and keep a list of the medicines you take. How can you care for yourself at home? · If your doctor gave you medicine, take it as exactly as prescribed. Call your doctor if you think you are having a problem with your medicine. · Take anti-inflammatory medicines for pain. These include ibuprofen (Advil, Motrin) and naproxen (Aleve). Read and follow all instructions on the label. · Use heat, such as a hot water bottle or a heating pad set on low, or a warm bath to relax tense muscles and relieve cramping. Put a thin cloth between the heating pad and your skin. Never go to sleep with a heating pad on. · Lie down and put a pillow under your knees. Or, lie on your side and bring your knees up to your chest. These positions may help relieve belly pain or pressure. · Keep track of how many sanitary pads or tampons you use each day. · Get at least 30 minutes of exercise on most days of the week. Walking is a good choice. You also may want to do other activities, such as running, swimming, cycling, or playing tennis or team sports. · If you bleed longer than usual or have heavy bleeding, take a daily multivitamin with iron. When should you call for help? Call 911 anytime you think you may need emergency care. For example, call if:  · You passed out (lost consciousness). · You have sudden, severe pain in your belly or pelvis. Call your doctor now or seek immediate medical care if:  · You have severe vaginal bleeding. This means that you are soaking through your usual pads each hour for 2 or more hours. · You have new belly or pelvic pain. · You are dizzy or lightheaded, or you feel like you may faint. · You have new or unexpected vaginal bleeding. Watch closely for changes in your health, and be sure to contact your doctor if:  · You have new vaginal discharge. · You have ongoing heavy or irregular vaginal bleeding. · You have pelvic pain or a heavy feeling in your lower belly that doesn't go away. · You have to urinate often. · You are more constipated than usual.  Where can you learn more? Go to http://kong-delma.info/. Enter B121 in the search box to learn more about \"Uterine Fibroids: Care Instructions. \"  Current as of: February 25, 2016  Content Version: 11.1  © 6847-4218 [employer], [facility].  Care instructions adapted under license by [employer] (which disclaims liability or warranty for this information). If you have questions about a medical condition or this instruction, always ask your healthcare professional. [address] any warranty or liability for your use of this information. We hope that we have addressed all of your medical concerns. The examination and treatment you received in the Emergency Department were for an emergent problem and were not intended as complete care. It is important that you follow up with your healthcare provider(s) for ongoing care. If your symptoms worsen or do not improve as expected, and you are unable to reach your usual health care provider(s), you should return to the Emergency Department. Today's healthcare is undergoing tremendous change, and patient satisfaction surveys are one of the many tools to assess the quality of medical care. You may receive a survey from the [employer] regarding your experience in the Emergency Department. I hope that your experience has been completely positive, particularly the medical care that I provided. As such, please participate in the survey; anything less than excellent does not meet my expectations or intentions. [address] and [address] [place] [address] participate in nationally recognized quality of care measures. If your blood pressure is greater than 120/80, as reported below, we urge that you seek medical care to address the potential of high blood pressure, commonly known as hypertension. Hypertension can be hereditary or can be caused by certain medical conditions, pain, stress, or \"white coat syndrome. \"       Please make an appointment with your health care provider(s) for follow up of your Emergency Department visit.        VITALS:   Patient Vitals for the past 8 hrs:   Temp Pulse Resp BP SpO2   02/20/17 1920 - - - - 98 % 02/20/17 1919 - 91 21 (!) 180/113 98 %   02/20/17 1830 - 91 (!) 34 (!) 178/104 98 %   02/20/17 1819 - 84 18 (!) 187/121 96 %   02/20/17 1706 - 95 17 (!) 166/104 -   02/20/17 1549 - - - (!) 214/116 -   02/20/17 1544 97.2 °F (36.2 °C) 90 18 (!) 222/115 99 %          Thank you for allowing us to provide you with medical care today. We realize that you have many choices for your emergency care needs. Please choose us in the future for any continued health care needs.       Erum Sweet MD    [address] [place] [address].   Office: [phone]            Recent Results (from the past 24 hour(s))   GLUCOSE, POC    Collection Time: 02/20/17  3:51 PM   Result Value Ref Range    Glucose (POC) 468 (H) 65 - 100 mg/dL    Performed by [facility]    EKG, 12 LEAD, INITIAL    Collection Time: 02/20/17  4:04 PM   Result Value Ref Range    Ventricular Rate 80 BPM    Atrial Rate 80 BPM    P-R Interval 176 ms    QRS Duration 96 ms    Q-T Interval 400 ms    QTC Calculation (Bezet) 461 ms    Calculated P Axis 22 degrees    Calculated R Axis 15 degrees    Calculated T Axis 17 degrees    Diagnosis       Normal sinus rhythm  Normal ECG  When compared with ECG of 25-FEB-2016 18:13,  No significant change was found     GLUCOSE, POC    Collection Time: 02/20/17  5:05 PM   Result Value Ref Range    Glucose (POC) 481 (H) 65 - 100 mg/dL    Performed by [facility]    WET PREP    Collection Time: 02/20/17  6:00 PM   Result Value Ref Range    Clue cells CLUE CELLS ABSENT      Wet prep NO TRICHOMONAS SEEN     KOH, OTHER SOURCES    Collection Time: 02/20/17  6:00 PM   Result Value Ref Range    Special Requests: NO SPECIAL REQUESTS      KOH NO YEAST SEEN     HCG URINE, QL. - POC    Collection Time: 02/20/17  6:00 PM   Result Value Ref Range    Pregnancy test,urine (POC) NEGATIVE  NEG     CBC WITH AUTOMATED DIFF    Collection Time: 02/20/17  6:14 PM   Result Value Ref Range    WBC 9.0 3.6 - 11.0 K/uL    RBC 4. 93 3.80 - 5.20 M/uL    HGB 14.7 11.5 - 16.0 g/dL    HCT 41.6 35.0 - 47.0 %    MCV 84.4 80.0 - 99.0 FL    MCH 29.8 26.0 - 34.0 PG    MCHC 35.3 30.0 - 36.5 g/dL    RDW 13.2 11.5 - 14.5 %    PLATELET 985 660 - 977 K/uL    NEUTROPHILS 53 32 - 75 %    LYMPHOCYTES 38 12 - 49 %    MONOCYTES 6 5 - 13 %    EOSINOPHILS 3 0 - 7 %    BASOPHILS 0 0 - 1 %    ABS. NEUTROPHILS 4.7 1.8 - 8.0 K/UL    ABS. LYMPHOCYTES 3.4 0.8 - 3.5 K/UL    ABS. MONOCYTES 0.5 0.0 - 1.0 K/UL    ABS. EOSINOPHILS 0.3 0.0 - 0.4 K/UL    ABS. BASOPHILS 0.0 0.0 - 0.1 K/UL   METABOLIC PANEL, COMPREHENSIVE    Collection Time: 02/20/17  6:14 PM   Result Value Ref Range    Sodium 131 (L) 136 - 145 mmol/L    Potassium 4.0 3.5 - 5.1 mmol/L    Chloride 95 (L) 97 - 108 mmol/L    CO2 24 21 - 32 mmol/L    Anion gap 12 5 - 15 mmol/L    Glucose 445 (H) 65 - 100 mg/dL    BUN 13 6 - 20 MG/DL    Creatinine 0.81 0.55 - 1.02 MG/DL    BUN/Creatinine ratio 16 12 - 20      GFR est AA >60 >60 ml/min/1.73m2    GFR est non-AA >60 >60 ml/min/1.73m2    Calcium 9.3 8.5 - 10.1 MG/DL    Bilirubin, total 0.6 0.2 - 1.0 MG/DL    ALT (SGPT) 41 12 - 78 U/L    AST (SGOT) 14 (L) 15 - 37 U/L    Alk. phosphatase 81 45 - 117 U/L    Protein, total 7.7 6.4 - 8.2 g/dL    Albumin 3.8 3.5 - 5.0 g/dL    Globulin 3.9 2.0 - 4.0 g/dL    A-G Ratio 1.0 (L) 1.1 - 2.2     TROPONIN I    Collection Time: 02/20/17  6:14 PM   Result Value Ref Range    Troponin-I, Qt. <0.04 <0.05 ng/mL   GLUCOSE, POC    Collection Time: 02/20/17  7:18 PM   Result Value Ref Range    Glucose (POC) 271 (H) 65 - 100 mg/dL    Performed by Mirella Pulido Transvaginal    Result Date: 2/20/2017  INDICATION: low abd pain/persistent vaginal bleeding Exam: Transabdominal and transvaginal ultrasound of the pelvis. Transvaginal ultrasound is performed to better evaluate the adnexa and endometrium. Transabdominal: The uterus measures 8.4 cm x 4.2 cm x 5.4 cm.  Echotexture of the uterus is normal. Endometrial stripe is within normal limits for premenopausal female measuring 7 mm in thickness. The ovaries are not visualized secondary to overlying bowel gas. No adnexal mass is seen. There is no free fluid within the pelvis. Transvaginal: The uterus measures 7.8 cm x 4.2 cm x 5.3 cm. There is a 1.6 cm x 1.1 cm x 1.7 cm intramural fibroid within the uterine fundus. Endometrial stripe is within normal limits for premenopausal female measuring 8 mm in thickness. Ovaries are normal in size and echotexture. Right ovary measures 4 cm x 2.7 cm x 3.3 cm. Left ovary measures 2.4 cm x 1.4 cm x 1.4 cm. Vascular flow is present within both ovaries. No adnexal mass is seen. There is no free fluid within the pelvis. Nabothian cysts are noted within the cervix. IMPRESSION: 1.  1.6 cm x 1.1 cm x 1.7 cm fundal fibroid. 2. Nabothian cysts. Us Pelv Non Obs    Result Date: 2/20/2017  INDICATION: low abd pain/persistent vaginal bleeding Exam: Transabdominal and transvaginal ultrasound of the pelvis. Transvaginal ultrasound is performed to better evaluate the adnexa and endometrium. Transabdominal: The uterus measures 8.4 cm x 4.2 cm x 5.4 cm. Echotexture of the uterus is normal. Endometrial stripe is within normal limits for premenopausal female measuring 7 mm in thickness. The ovaries are not visualized secondary to overlying bowel gas. No adnexal mass is seen. There is no free fluid within the pelvis. Transvaginal: The uterus measures 7.8 cm x 4.2 cm x 5.3 cm. There is a 1.6 cm x 1.1 cm x 1.7 cm intramural fibroid within the uterine fundus. Endometrial stripe is within normal limits for premenopausal female measuring 8 mm in thickness. Ovaries are normal in size and echotexture. Right ovary measures 4 cm x 2.7 cm x 3.3 cm. Left ovary measures 2.4 cm x 1.4 cm x 1.4 cm. Vascular flow is present within both ovaries. No adnexal mass is seen. There is no free fluid within the pelvis. Nabothian cysts are noted within the cervix.      IMPRESSION: 1.  1.6 cm x 1.1 cm x 1.7 cm fundal fibroid. 2. Nabothian cysts.

## 2017-02-21 NOTE — ED NOTES
Bedside shift change report given to Courtney Whitfield RN (oncoming nurse) by Rehan Taylor RN (offgoing nurse). Report included the following information SBAR, ED Summary, Intake/Output, MAR and Recent Results.

## 2017-02-21 NOTE — ED NOTES
Waiting for medication verification by pharmacist.  Patient wishes to be discharged at this time. Does not want to wait. Provider at bedside and aware. Discharged by provider at this time.

## 2017-02-22 LAB
C TRACH DNA SPEC QL NAA+PROBE: NEGATIVE
N GONORRHOEA DNA SPEC QL NAA+PROBE: NEGATIVE
SAMPLE TYPE: NORMAL
SERVICE CMNT-IMP: NORMAL
SPECIMEN SOURCE: NORMAL

## 2017-05-25 ENCOUNTER — APPOINTMENT (OUTPATIENT)
Dept: CT IMAGING | Age: 32
End: 2017-05-25
Attending: EMERGENCY MEDICINE
Payer: SELF-PAY

## 2017-05-25 ENCOUNTER — HOSPITAL ENCOUNTER (OUTPATIENT)
Age: 32
Setting detail: OBSERVATION
Discharge: HOME | End: 2017-05-27
Attending: EMERGENCY MEDICINE | Admitting: INTERNAL MEDICINE
Payer: SELF-PAY

## 2017-05-25 ENCOUNTER — APPOINTMENT (OUTPATIENT)
Dept: MRI IMAGING | Age: 32
End: 2017-05-25
Attending: PSYCHIATRY & NEUROLOGY
Payer: SELF-PAY

## 2017-05-25 ENCOUNTER — APPOINTMENT (OUTPATIENT)
Dept: GENERAL RADIOLOGY | Age: 32
End: 2017-05-25
Attending: EMERGENCY MEDICINE
Payer: SELF-PAY

## 2017-05-25 DIAGNOSIS — R20.0 NUMBNESS: Primary | ICD-10-CM

## 2017-05-25 DIAGNOSIS — R51.9 ACUTE NONINTRACTABLE HEADACHE, UNSPECIFIED HEADACHE TYPE: ICD-10-CM

## 2017-05-25 PROBLEM — G43.909 MIGRAINE: Chronic | Status: ACTIVE | Noted: 2017-05-25

## 2017-05-25 PROBLEM — E66.9 OBESITY: Status: ACTIVE | Noted: 2017-05-25

## 2017-05-25 PROBLEM — E11.9 TYPE 2 DIABETES MELLITUS WITHOUT COMPLICATION (HCC): Chronic | Status: ACTIVE | Noted: 2017-05-25

## 2017-05-25 PROBLEM — E11.9 TYPE 2 DIABETES MELLITUS WITHOUT COMPLICATION (HCC): Status: ACTIVE | Noted: 2017-05-25

## 2017-05-25 PROBLEM — I10 HTN (HYPERTENSION): Chronic | Status: ACTIVE | Noted: 2017-05-25

## 2017-05-25 LAB
ALBUMIN SERPL BCP-MCNC: 3.8 G/DL (ref 3.5–5)
ALBUMIN/GLOB SERPL: 1 {RATIO} (ref 1.1–2.2)
ALP SERPL-CCNC: 88 U/L (ref 45–117)
ALT SERPL-CCNC: 41 U/L (ref 12–78)
ANION GAP BLD CALC-SCNC: 10 MMOL/L (ref 5–15)
AST SERPL W P-5'-P-CCNC: 24 U/L (ref 15–37)
ATRIAL RATE: 97 BPM
BASOPHILS # BLD AUTO: 0 K/UL (ref 0–0.1)
BASOPHILS # BLD: 0 % (ref 0–1)
BILIRUB SERPL-MCNC: 0.9 MG/DL (ref 0.2–1)
BUN SERPL-MCNC: 18 MG/DL (ref 6–20)
BUN/CREAT SERPL: 24 (ref 12–20)
CALCIUM SERPL-MCNC: 8.8 MG/DL (ref 8.5–10.1)
CALCULATED P AXIS, ECG09: 18 DEGREES
CALCULATED T AXIS, ECG11: 0 DEGREES
CHLORIDE SERPL-SCNC: 97 MMOL/L (ref 97–108)
CO2 SERPL-SCNC: 24 MMOL/L (ref 21–32)
CREAT SERPL-MCNC: 0.75 MG/DL (ref 0.55–1.02)
DIAGNOSIS, 93000: NORMAL
EOSINOPHIL # BLD: 0.2 K/UL (ref 0–0.4)
EOSINOPHIL NFR BLD: 2 % (ref 0–7)
ERYTHROCYTE [DISTWIDTH] IN BLOOD BY AUTOMATED COUNT: 13 % (ref 11.5–14.5)
EST. AVERAGE GLUCOSE BLD GHB EST-MCNC: 237 MG/DL
GLOBULIN SER CALC-MCNC: 4 G/DL (ref 2–4)
GLUCOSE BLD STRIP.AUTO-MCNC: 318 MG/DL (ref 65–100)
GLUCOSE BLD STRIP.AUTO-MCNC: 342 MG/DL (ref 65–100)
GLUCOSE BLD STRIP.AUTO-MCNC: 361 MG/DL (ref 65–100)
GLUCOSE BLD STRIP.AUTO-MCNC: 396 MG/DL (ref 65–100)
GLUCOSE SERPL-MCNC: 390 MG/DL (ref 65–100)
HBA1C MFR BLD: 9.9 % (ref 4.2–6.3)
HCT VFR BLD AUTO: 42 % (ref 35–47)
HGB BLD-MCNC: 15.3 G/DL (ref 11.5–16)
INR BLD: 0.9 (ref 0.9–1.2)
LYMPHOCYTES # BLD AUTO: 33 % (ref 12–49)
LYMPHOCYTES # BLD: 3.2 K/UL (ref 0.8–3.5)
MCH RBC QN AUTO: 30.5 PG (ref 26–34)
MCHC RBC AUTO-ENTMCNC: 36.4 G/DL (ref 30–36.5)
MCV RBC AUTO: 83.8 FL (ref 80–99)
MONOCYTES # BLD: 0.6 K/UL (ref 0–1)
MONOCYTES NFR BLD AUTO: 6 % (ref 5–13)
NEUTS SEG # BLD: 5.4 K/UL (ref 1.8–8)
NEUTS SEG NFR BLD AUTO: 59 % (ref 32–75)
P-R INTERVAL, ECG05: 166 MS
PLATELET # BLD AUTO: 311 K/UL (ref 150–400)
POTASSIUM SERPL-SCNC: 4.2 MMOL/L (ref 3.5–5.1)
PROT SERPL-MCNC: 7.8 G/DL (ref 6.4–8.2)
Q-T INTERVAL, ECG07: 386 MS
QRS DURATION, ECG06: 90 MS
QTC CALCULATION (BEZET), ECG08: 490 MS
RBC # BLD AUTO: 5.01 M/UL (ref 3.8–5.2)
SERVICE CMNT-IMP: ABNORMAL
SODIUM SERPL-SCNC: 131 MMOL/L (ref 136–145)
VENTRICULAR RATE, ECG03: 97 BPM
WBC # BLD AUTO: 9.5 K/UL (ref 3.6–11)

## 2017-05-25 PROCEDURE — 70450 CT HEAD/BRAIN W/O DYE: CPT

## 2017-05-25 PROCEDURE — 96374 THER/PROPH/DIAG INJ IV PUSH: CPT

## 2017-05-25 PROCEDURE — 85027 COMPLETE CBC AUTOMATED: CPT | Performed by: INTERNAL MEDICINE

## 2017-05-25 PROCEDURE — 71010 XR CHEST PORT: CPT

## 2017-05-25 PROCEDURE — 70551 MRI BRAIN STEM W/O DYE: CPT

## 2017-05-25 PROCEDURE — 85025 COMPLETE CBC W/AUTO DIFF WBC: CPT | Performed by: EMERGENCY MEDICINE

## 2017-05-25 PROCEDURE — 93005 ELECTROCARDIOGRAM TRACING: CPT

## 2017-05-25 PROCEDURE — 84100 ASSAY OF PHOSPHORUS: CPT | Performed by: INTERNAL MEDICINE

## 2017-05-25 PROCEDURE — 99218 HC RM OBSERVATION: CPT

## 2017-05-25 PROCEDURE — 82962 GLUCOSE BLOOD TEST: CPT

## 2017-05-25 PROCEDURE — 65660000000 HC RM CCU STEPDOWN

## 2017-05-25 PROCEDURE — 74011000250 HC RX REV CODE- 250: Performed by: INTERNAL MEDICINE

## 2017-05-25 PROCEDURE — 36415 COLL VENOUS BLD VENIPUNCTURE: CPT | Performed by: INTERNAL MEDICINE

## 2017-05-25 PROCEDURE — 74011250636 HC RX REV CODE- 250/636: Performed by: PSYCHIATRY & NEUROLOGY

## 2017-05-25 PROCEDURE — 96375 TX/PRO/DX INJ NEW DRUG ADDON: CPT

## 2017-05-25 PROCEDURE — 74011636637 HC RX REV CODE- 636/637: Performed by: EMERGENCY MEDICINE

## 2017-05-25 PROCEDURE — 80061 LIPID PANEL: CPT | Performed by: PSYCHIATRY & NEUROLOGY

## 2017-05-25 PROCEDURE — 74011250636 HC RX REV CODE- 250/636: Performed by: INTERNAL MEDICINE

## 2017-05-25 PROCEDURE — 80053 COMPREHEN METABOLIC PANEL: CPT | Performed by: EMERGENCY MEDICINE

## 2017-05-25 PROCEDURE — 80048 BASIC METABOLIC PNL TOTAL CA: CPT | Performed by: INTERNAL MEDICINE

## 2017-05-25 PROCEDURE — 83735 ASSAY OF MAGNESIUM: CPT | Performed by: INTERNAL MEDICINE

## 2017-05-25 PROCEDURE — 74011636637 HC RX REV CODE- 636/637: Performed by: INTERNAL MEDICINE

## 2017-05-25 PROCEDURE — 83036 HEMOGLOBIN GLYCOSYLATED A1C: CPT | Performed by: INTERNAL MEDICINE

## 2017-05-25 PROCEDURE — 74011250636 HC RX REV CODE- 250/636: Performed by: EMERGENCY MEDICINE

## 2017-05-25 PROCEDURE — 99285 EMERGENCY DEPT VISIT HI MDM: CPT

## 2017-05-25 PROCEDURE — 85610 PROTHROMBIN TIME: CPT

## 2017-05-25 RX ORDER — DEXTROSE 50 % IN WATER (D50W) INTRAVENOUS SYRINGE
12.5-25 AS NEEDED
Status: DISCONTINUED | OUTPATIENT
Start: 2017-05-25 | End: 2017-05-27 | Stop reason: HOSPADM

## 2017-05-25 RX ORDER — LABETALOL HYDROCHLORIDE 5 MG/ML
20 INJECTION, SOLUTION INTRAVENOUS
Status: DISCONTINUED | OUTPATIENT
Start: 2017-05-25 | End: 2017-05-27 | Stop reason: HOSPADM

## 2017-05-25 RX ORDER — INSULIN LISPRO 100 [IU]/ML
26 INJECTION, SOLUTION INTRAVENOUS; SUBCUTANEOUS
COMMUNITY
End: 2017-12-25 | Stop reason: ALTCHOICE

## 2017-05-25 RX ORDER — ENOXAPARIN SODIUM 100 MG/ML
40 INJECTION SUBCUTANEOUS EVERY 12 HOURS
Status: DISCONTINUED | OUTPATIENT
Start: 2017-05-25 | End: 2017-05-27 | Stop reason: HOSPADM

## 2017-05-25 RX ORDER — ACETAMINOPHEN 325 MG/1
650 TABLET ORAL
Status: DISCONTINUED | OUTPATIENT
Start: 2017-05-25 | End: 2017-05-27 | Stop reason: HOSPADM

## 2017-05-25 RX ORDER — LANOLIN ALCOHOL/MO/W.PET/CERES
325 CREAM (GRAM) TOPICAL
Status: ON HOLD | COMMUNITY
End: 2017-09-16

## 2017-05-25 RX ORDER — PROCHLORPERAZINE EDISYLATE 5 MG/ML
10 INJECTION INTRAMUSCULAR; INTRAVENOUS
Status: COMPLETED | OUTPATIENT
Start: 2017-05-25 | End: 2017-05-25

## 2017-05-25 RX ORDER — LORAZEPAM 2 MG/ML
2 INJECTION INTRAMUSCULAR ONCE
Status: COMPLETED | OUTPATIENT
Start: 2017-05-25 | End: 2017-05-25

## 2017-05-25 RX ORDER — DIPHENHYDRAMINE HYDROCHLORIDE 50 MG/ML
25 INJECTION, SOLUTION INTRAMUSCULAR; INTRAVENOUS ONCE
Status: COMPLETED | OUTPATIENT
Start: 2017-05-25 | End: 2017-05-25

## 2017-05-25 RX ORDER — MAGNESIUM SULFATE 100 %
4 CRYSTALS MISCELLANEOUS AS NEEDED
Status: DISCONTINUED | OUTPATIENT
Start: 2017-05-25 | End: 2017-05-27 | Stop reason: HOSPADM

## 2017-05-25 RX ORDER — PROCHLORPERAZINE EDISYLATE 5 MG/ML
5 INJECTION INTRAMUSCULAR; INTRAVENOUS
Status: DISCONTINUED | OUTPATIENT
Start: 2017-05-25 | End: 2017-05-26

## 2017-05-25 RX ORDER — SODIUM CHLORIDE 0.9 % (FLUSH) 0.9 %
5-10 SYRINGE (ML) INJECTION AS NEEDED
Status: DISCONTINUED | OUTPATIENT
Start: 2017-05-25 | End: 2017-05-27 | Stop reason: HOSPADM

## 2017-05-25 RX ORDER — HYDRALAZINE HYDROCHLORIDE 25 MG/1
25 TABLET, FILM COATED ORAL 3 TIMES DAILY
COMMUNITY
End: 2017-05-25

## 2017-05-25 RX ORDER — INSULIN GLARGINE 100 [IU]/ML
50 INJECTION, SOLUTION SUBCUTANEOUS
Status: ON HOLD | COMMUNITY
End: 2017-05-26

## 2017-05-25 RX ORDER — HYDRALAZINE HYDROCHLORIDE 25 MG/1
25 TABLET, FILM COATED ORAL
Status: ON HOLD | COMMUNITY
End: 2017-05-26

## 2017-05-25 RX ORDER — HYDROMORPHONE HYDROCHLORIDE 1 MG/ML
0.5 INJECTION, SOLUTION INTRAMUSCULAR; INTRAVENOUS; SUBCUTANEOUS
Status: DISCONTINUED | OUTPATIENT
Start: 2017-05-25 | End: 2017-05-27 | Stop reason: HOSPADM

## 2017-05-25 RX ORDER — LOSARTAN POTASSIUM 50 MG/1
50 TABLET ORAL DAILY
Status: ON HOLD | COMMUNITY
End: 2017-09-14

## 2017-05-25 RX ORDER — SODIUM CHLORIDE 0.9 % (FLUSH) 0.9 %
5-10 SYRINGE (ML) INJECTION EVERY 8 HOURS
Status: DISCONTINUED | OUTPATIENT
Start: 2017-05-25 | End: 2017-05-27 | Stop reason: HOSPADM

## 2017-05-25 RX ORDER — ZOLPIDEM TARTRATE 5 MG/1
5 TABLET ORAL
Status: DISCONTINUED | OUTPATIENT
Start: 2017-05-25 | End: 2017-05-27 | Stop reason: HOSPADM

## 2017-05-25 RX ORDER — OXYCODONE AND ACETAMINOPHEN 5; 325 MG/1; MG/1
1 TABLET ORAL
Status: DISCONTINUED | OUTPATIENT
Start: 2017-05-25 | End: 2017-05-27 | Stop reason: HOSPADM

## 2017-05-25 RX ORDER — SODIUM CHLORIDE 9 MG/ML
75 INJECTION, SOLUTION INTRAVENOUS CONTINUOUS
Status: DISCONTINUED | OUTPATIENT
Start: 2017-05-25 | End: 2017-05-27 | Stop reason: HOSPADM

## 2017-05-25 RX ORDER — INSULIN LISPRO 100 [IU]/ML
INJECTION, SOLUTION INTRAVENOUS; SUBCUTANEOUS
Status: DISCONTINUED | OUTPATIENT
Start: 2017-05-25 | End: 2017-05-27 | Stop reason: HOSPADM

## 2017-05-25 RX ADMIN — DIPHENHYDRAMINE HYDROCHLORIDE 25 MG: 50 INJECTION, SOLUTION INTRAMUSCULAR; INTRAVENOUS at 13:00

## 2017-05-25 RX ADMIN — ENOXAPARIN SODIUM 40 MG: 40 INJECTION SUBCUTANEOUS at 22:49

## 2017-05-25 RX ADMIN — LABETALOL HYDROCHLORIDE 20 MG: 5 INJECTION, SOLUTION INTRAVENOUS at 15:03

## 2017-05-25 RX ADMIN — HUMAN INSULIN 12 UNITS: 100 INJECTION, SOLUTION SUBCUTANEOUS at 13:53

## 2017-05-25 RX ADMIN — LABETALOL HYDROCHLORIDE 20 MG: 5 INJECTION, SOLUTION INTRAVENOUS at 17:55

## 2017-05-25 RX ADMIN — LORAZEPAM 2 MG: 2 INJECTION, SOLUTION INTRAMUSCULAR; INTRAVENOUS at 18:39

## 2017-05-25 RX ADMIN — Medication 10 ML: at 22:50

## 2017-05-25 RX ADMIN — SODIUM CHLORIDE 75 ML/HR: 900 INJECTION, SOLUTION INTRAVENOUS at 15:03

## 2017-05-25 RX ADMIN — INSULIN LISPRO 10 UNITS: 100 INJECTION, SOLUTION INTRAVENOUS; SUBCUTANEOUS at 22:48

## 2017-05-25 RX ADMIN — INSULIN LISPRO 10 UNITS: 100 INJECTION, SOLUTION INTRAVENOUS; SUBCUTANEOUS at 17:03

## 2017-05-25 RX ADMIN — PROCHLORPERAZINE EDISYLATE 10 MG: 5 INJECTION INTRAMUSCULAR; INTRAVENOUS at 13:00

## 2017-05-25 RX ADMIN — HYDROMORPHONE HYDROCHLORIDE 0.5 MG: 1 INJECTION, SOLUTION INTRAMUSCULAR; INTRAVENOUS; SUBCUTANEOUS at 22:48

## 2017-05-25 NOTE — IP AVS SNAPSHOT
Summary of Care Report The Summary of Care report has been created to help improve care coordination. Users with access to [employer]Class or [address] (Web-based application) may access additional patient information including the Discharge Summary. If you are not currently a [address] user and need more information, please call the number listed below in the [place] [address] section and ask to be connected with Medical Records. Facility Information Name Address Phone [address] [id number] [phone] Patient Information Patient Name Sex CARISSA Rosales ([id number]) Female 1985 Discharge Information Admitting Provider Service Area Unit Arminda Graham MD / [facility] [address] [facility]  [phone] Discharge Provider Discharge Date/Time Discharge Disposition Destination (none) 2017 (Pending) AHR (none) Patient Language Language ENGLISH [13] Hospital Problems as of 2017  Never Reviewed Class Noted - Resolved Last Modified POA Active Problems * (Principal)Migraine (Chronic)  2017 - Present 2017 by Arminda Graham MD Yes Entered by Arminda Graham MD  
  Obesity  2017 - Present 2017 by Arminda Graham MD Yes Entered by Arminda Graham MD  
  Type 2 diabetes mellitus without complication ([facility] [address] (Chronic)  2017 - Present 2017 by Arminda Graham MD Yes Entered by Arminda Graham MD  
  HTN (hypertension) (Chronic)  2017 - Present 2017 by Arminda Graham MD Yes Entered by Arminda Graham MD  
  
You are allergic to the following Allergen Reactions Pcn (Penicillins) Rash Vancomycin Other (comments) Kidneys shut down Voltaren (Diclofenac Sodium) Myalgia Other (comments) \"muscle spasms\" Current Discharge Medication List  
  
CONTINUE these medications which have CHANGED Dose & Instructions Dispensing Information Comments  
 hydrALAZINE 25 mg tablet Commonly known as:  APRESOLINE What changed:   
- when to take this 
- reasons to take this Dose:  25 mg Take 1 Tab by mouth three (3) times daily. Quantity:  90 Tab Refills:  0  
   
 insulin glargine 100 unit/mL injection Commonly known as:  LANTUS What changed:  how much to take Dose:  60 Units 60 Units by SubCUTAneous route nightly. Quantity:  1 Vial  
Refills:  0 CONTINUE these medications which have NOT CHANGED Dose & Instructions Dispensing Information Comments  
 ferrous sulfate 325 mg (65 mg iron) tablet Dose:  325 mg Take 325 mg by mouth two (2) times daily (with meals). Refills:  0 HumaLOG 100 unit/mL injection Generic drug:  insulin lispro Dose:  30 Units 30 Units by SubCUTAneous route three (3) times daily (with meals). Refills:  0  
   
 losartan 50 mg tablet Commonly known as:  COZAAR Dose:  50 mg Take 50 mg by mouth daily. Refills:  0 Follow-up Information Follow up With Details Comments Contact Info None   None (395) Patient stated that they have no PCP Discharge Instructions HOSPITALIST DISCHARGE INSTRUCTIONS 
NAME: Timothy Azar :  1985 MRN:  [id number] Date/Time:  2017 1:15 PM 
 
ADMIT DATE: 2017 DISCHARGE DATE: 2017 DISCHARGE DIAGNOSIS: 
Complicated migraine MEDICATIONS: 
· It is important that you take the medication exactly as they are prescribed. · Keep your medication in the bottles provided by the pharmacist and keep a list of the medication names, dosages, and times to be taken in your wallet. · Do not take other medications without consulting your doctor. Pain Management: per above medications What to do at [facility] Recommended diet:  Cardiac Diet and Diabetic Diet Recommended activity: Activity as tolerated If you experience any of the following symptoms then please call your primary care physician or return to the emergency room if you cannot get hold of your doctor: 
Fever, chills, nausea, vomiting, diarrhea, change in mentation, falling, bleeding, shortness of breath Follow Up: You may call [id number] [address] for primary care follow up if you do not have a PCP - [phone] Information obtained by : 
I understand that if any problems occur once I am at home I am to contact my physician. I understand and acknowledge receipt of the instructions indicated above. Physician's or R.N.'s Signature                                                                  Date/Time Patient or Representative Signature                                                          Date/Time Chart Review Routing History No Routing History on File

## 2017-05-25 NOTE — CONSULTS
NEUROLOGY IN-PATIENT NEW CONSULTATION      5/25/2017    RE: Gordy Thompson         1985      REFERRED BY:  None      CHIEF COMPLAINT:  This is Gordy Thompson is a 27 y.o. female right handed  who comes in for severe headache with numbness of the R arm and R leg    HPI:     For the past 4 days, patient has note BP to be high with SBP going up to 200s. Yesterday, patient noted severe occipital headache 10/10, pulsating radiating to the nape area. (+) nausea (-) vomiting (-) photophobia (-) phonophobia    Today, patient noted persistent numbness of the R arm and R leg (but not the face)    Patient does have history of migraine occurring 6/ month usually frontal associated with nausea, photophobia and phonophobia but no sensory symptoms    Review of Systems   Constitutional: Positive for weight loss. Negative for chills and fever. Skin: Negative for rash. Neurological: Negative for weakness.      All other systems reviewed and are negative    Past Medical Hx  Past Medical History:   Diagnosis Date    Anemia     Diabetes ([facility] [address]     Hx MRSA infection     Hypertension     Irregular menstrual cycle     Obesity     Other ill-defined conditions hypothyroidism    Stroke ([facility] 04/2017    tia       Social Hx  Social History     Social History    Marital status:      Spouse name: N/A    Number of children: N/A    Years of education: N/A     Social History Main Topics    Smoking status: Never Smoker    Smokeless tobacco: Never Used    Alcohol use No    Drug use: No    Sexual activity: Not Asked     Other Topics Concern    None     Social History Narrative       Family Hx  Family History   Problem Relation Age of Onset    Hypertension Other      \"all her family\"    Diabetes Other      \"all her family\"       ALLERGIES  Allergies   Allergen Reactions    Pcn [Penicillins] Rash    Vancomycin Other (comments)     Kidneys shut down    Voltaren [Diclofenac Sodium] Myalgia and Other (comments) \"muscle spasms\"       CURRENT MEDS  Current Facility-Administered Medications   Medication Dose Route Frequency Provider Last Rate Last Dose    0.9% sodium chloride infusion  75 mL/hr IntraVENous CONTINUOUS Wiley Sanchez MD 75 mL/hr at 05/25/17 1503 75 mL/hr at 05/25/17 1503    sodium chloride (NS) flush 5-10 mL  5-10 mL IntraVENous Q8H Wiley Sanchez MD        sodium chloride (NS) flush 5-10 mL  5-10 mL IntraVENous PRN Wiley Sanchez MD        acetaminophen (TYLENOL) tablet 650 mg  650 mg Oral Q4H PRN Wiley Sanchez MD        oxyCODONE-acetaminophen (PERCOCET) 5-325 mg per tablet 1 Tab  1 Tab Oral Q4H PRN Wiley Sanchez MD        HYDROmorphone (PF) (DILAUDID) injection 0.5 mg  0.5 mg IntraVENous Q4H PRN Wiley Sanchez MD        zolpidem [facility]) tablet 5 mg  5 mg Oral QHS PRN Wiley Sanchez MD        enoxaparin (LOVENOX) injection 40 mg  40 mg SubCUTAneous Q12H Wiley Sanchez MD        insulin lispro (HUMALOG) injection   SubCUTAneous AC&HS Wiley Sanchez MD   10 Units at 05/25/17 1703    glucose chewable tablet 16 g  4 Tab Oral PRN Wiley Sanchez MD        dextrose (D50W) injection syrg 12.5-25 g  12.5-25 g IntraVENous PRN Wiley Sanchez MD        glucagon [facility]) injection 1 mg  1 mg IntraMUSCular PRN Wiley Sanchez MD        labetalol (NORMODYNE;TRANDATE) injection 20 mg  20 mg IntraVENous Q4H PRN Wiley Sanchez MD   20 mg at 05/25/17 1503    prochlorperazine (COMPAZINE) injection 5 mg  5 mg IntraVENous Q6H PRN Wiley Sanchez MD               PREVIOUS WORKUP: (reviewed)  IMAGING:    CT Results (recent):    Results from [place] encounter on 05/25/17   CT CODE NEURO HEAD WO CONTRAST   Narrative EXAM:  CT CODE NEURO HEAD WO CONTRAST    INDICATION: Right-sided numbness. COMPARISON: CT head 2/25/2016    TECHNIQUE: Noncontrast head CT. Coronal and sagittal reformats.  CT dose  reduction was achieved through use of a standardized protocol tailored for this  examination and automatic exposure control for dose modulation. FINDINGS: The ventricles and sulci are age-appropriate without hydrocephalus. There is no mass effect or midline shift. There is no intracranial hemorrhage or  extra-axial fluid collection. There is no abnormal area of decreased density to  suggest infarct. The gray-white matter differentiation is maintained. The basal  cisterns are patent. The osseous structures are intact. There is mild mucosal thickening in the  posterior right maxillary sinus. The remaining paranasal sinuses and mastoid air  cells are clear. Impression IMPRESSION:     There is no acute intracranial abnormality. MRI Results (recent):  No results found for this or any previous visit. IR Results (recent):  No results found for this or any previous visit. VAS/US Results (recent):  No results found for this or any previous visit. LABS (reviewed)  Results for orders placed or performed during the hospital encounter of 05/25/17   CBC WITH AUTOMATED DIFF   Result Value Ref Range    WBC 9.5 3.6 - 11.0 K/uL    RBC 5.01 3.80 - 5.20 M/uL    HGB 15.3 11.5 - 16.0 g/dL    HCT 42.0 35.0 - 47.0 %    MCV 83.8 80.0 - 99.0 FL    MCH 30.5 26.0 - 34.0 PG    MCHC 36.4 30.0 - 36.5 g/dL    RDW 13.0 11.5 - 14.5 %    PLATELET 403 093 - 030 K/uL    NEUTROPHILS 59 32 - 75 %    LYMPHOCYTES 33 12 - 49 %    MONOCYTES 6 5 - 13 %    EOSINOPHILS 2 0 - 7 %    BASOPHILS 0 0 - 1 %    ABS. NEUTROPHILS 5.4 1.8 - 8.0 K/UL    ABS. LYMPHOCYTES 3.2 0.8 - 3.5 K/UL    ABS. MONOCYTES 0.6 0.0 - 1.0 K/UL    ABS. EOSINOPHILS 0.2 0.0 - 0.4 K/UL    ABS.  BASOPHILS 0.0 0.0 - 0.1 K/UL   METABOLIC PANEL, COMPREHENSIVE   Result Value Ref Range    Sodium 131 (L) 136 - 145 mmol/L    Potassium 4.2 3.5 - 5.1 mmol/L    Chloride 97 97 - 108 mmol/L    CO2 24 21 - 32 mmol/L    Anion gap 10 5 - 15 mmol/L    Glucose 390 (H) 65 - 100 mg/dL    BUN 18 6 - 20 MG/DL    Creatinine 0.75 0.55 - 1.02 MG/DL    BUN/Creatinine ratio 24 (H) 12 - 20      GFR est AA >60 >60 ml/min/1.73m2    GFR est non-AA >60 >60 ml/min/1.73m2    Calcium 8.8 8.5 - 10.1 MG/DL    Bilirubin, total 0.9 0.2 - 1.0 MG/DL    ALT (SGPT) 41 12 - 78 U/L    AST (SGOT) 24 15 - 37 U/L    Alk. phosphatase 88 45 - 117 U/L    Protein, total 7.8 6.4 - 8.2 g/dL    Albumin 3.8 3.5 - 5.0 g/dL    Globulin 4.0 2.0 - 4.0 g/dL    A-G Ratio 1.0 (L) 1.1 - 2.2     GLUCOSE, POC   Result Value Ref Range    Glucose (POC) 396 (H) 65 - 100 mg/dL    Performed by Yaritza Shelley    POC INR   Result Value Ref Range    INR (POC) 0.9 <1.2     GLUCOSE, POC   Result Value Ref Range    Glucose (POC) 318 (H) 65 - 100 mg/dL    Performed by Howard Cervantes    GLUCOSE, POC   Result Value Ref Range    Glucose (POC) 342 (H) 65 - 100 mg/dL    Performed by Sylwia Mendez (PCT)    EKG, 12 LEAD, INITIAL   Result Value Ref Range    Ventricular Rate 97 BPM    Atrial Rate 97 BPM    P-R Interval 166 ms    QRS Duration 90 ms    Q-T Interval 386 ms    QTC Calculation (Bezet) 490 ms    Calculated P Axis 18 degrees    Calculated T Axis 0 degrees    Diagnosis       Normal sinus rhythm  Borderline QT interval  Possible Anterior infarct , age undetermined  Abnormal ECG  When compared with ECG of 20-FEB-2017 16:04,  Slight loss of anterior R wave, possibly from lead placement variation. QT has lengthened  Confirmed by Tomma Rubinstein, M.D., Walter Cruz ([id number]) on 5/25/2017 4:32:01 PM         Physical Exam:     Visit Vitals    BP (!) 166/101 (BP 1 Location: Left arm, BP Patient Position: At rest;Supine)    Pulse 79    Temp 98 °F (36.7 °C)    Resp 26    Wt 106.5 kg (234 lb 12.6 oz)    SpO2 92%    BMI 40.3 kg/m2     General:  Alert, cooperative, no distress. Head:  Normocephalic, without obvious abnormality, atraumatic. Eyes:  Conjunctivae/corneas clear.     Lungs:  Heart:   Non labored breathing  Regular rate and rhythm, no carotid bruits   Abdomen:   Soft, non-distended   Extremities: Extremities normal, atraumatic, no cyanosis or edema.   Pulses: 2+ and symmetric all extremities. Skin: Skin color, texture, turgor normal. No rashes or lesions. Neurologic Exam     Gen: Attention normal             Language: naming, repetition, fluency normal             Memory: intact recent and remote memory  Cranial Nerves:  I: smell Not tested   II: visual fields Full to confrontation   II: pupils Equal, round, reactive to light   II: optic disc No papilledema   III,VII: ptosis none   III,IV,VI: extraocular muscles  Full ROM   V: mastication normal   V: facial light touch sensation  normal   VII: facial muscle function   symmetric   VIII: hearing symmetric   IX: soft palate elevation  normal   XI: trapezius strength  5/5   XI: sternocleidomastoid strength 5/5   XI: neck flexion strength  5/5   XII: tongue  midline     Motor: normal bulk and tone, no tremor              Strength: 5/5 all four extremities  Sensory: decrease LT R arm and R leg but not face  Reflexes: 1+ throughout; Down going toes  Coordination: Good FTN and HTS, Romberg negative  Gait: deferred           Impression:     Sophie Box is a 27 y.o. female who  has a past medical history of Anemia; Diabetes ([facility] [address] MRSA infection; Hypertension; Irregular menstrual cycle; Obesity; Other ill-defined conditions (hypothyroidism); and Stroke ([facility] [address] (04/2017). Migraine who for the past 4 days,  Noted elevated SBP going up to 200s. Patient then developed severe occipital headache 10/10, pulsating radiating to the nape area associated with nausea and persistent numbness of the R arm and R leg (but not the face). Consideration includes complicated migraine. However, because of her vascular risk factors (elevated BP, uncontrolled DM, obesity), patient should be evaluated for stroke. RECOMMENDATIONS  1. MRI brain looking for stroke  2. Will check lipid profile  3.  Optimize medical management of DM and HTN    Please call for questions        Thank you for the consultation      Gwendolyn Uriostegui MD  Diplomate, American Board of Psychiatry and Neurology  Diplomate, Neuromuscular Medicine  Diplomate, American Board of Electrodiagnostic Medicine    Greater than 50% of time spent counseling patient        CC: None  Fax: None

## 2017-05-25 NOTE — PROGRESS NOTES
[facility] Dosing Services: 5/25/17    Automatic dose adjustment of Enoxaparin  Wt Readings from Last 1 Encounters:   05/25/17 106.5 kg (234 lb 12.6 oz)       Ht Readings from Last 1 Encounters:   02/20/17 162.6 cm (64\")      Pharmacist made change to enoxaparin therapy based on:  [    ] Renal function: dose changed to:  [ X ] BMI: dose changed to: 40 mg SQ q12h due to BMI of 40.3       Previous Dose 40 mg SQ daily   Creatinine Clearance CrCl cannot be calculated (Unknown ideal weight.). Creatinine Lab Results   Component Value Date/Time    Creatinine 0.75 05/25/2017 12:59 PM    Creatinine (POC) 0.8 09/11/2012 08:36 PM       Platelet Lab Results   Component Value Date/Time    PLATELET 609 04/00/0166 12:59 PM      H/H Lab Results   Component Value Date/Time    HGB 15.3 05/25/2017 12:59 PM            - Pharmacy to automatically make dose adjustment for renal dysfunction (creatinine clearance less than 30 mL/min)  - Pharmacy to automatically make dose adjustment for obesity (BMI greater than 40)  - Pharmacy to make dose rounding adjustments per [facility] dose adjustment scale. Pharmacy to monitor patients progress. Will make dose adjustment as needed per changing renal function. Will communicate further recommendations regarding patients anticoagulation therapy with prescriber. Signed Redd Medel .  Contact information: [phone]

## 2017-05-25 NOTE — ED PROVIDER NOTES
HPI Comments: 27 y.o. female with past medical history significant for stroke, HTN, diabetes, irregular menstrual cycles, obesity, and anemia who presents with chief complaint of numbness. This morning (10:20), patient developed onset of decreased sensation to right upper and lower extremity, along with right facial numbness. Symptoms lasted ~2 hours. Patient additionally developed HA, which has since then continued to worsen -- \"feels like there's a pounding to the back of my head. \"  Patient was recently seen at [facility] last month for similar presentation of symptoms and dx with TIA. Patient mentions having HA then, but \"not nearly as bad as it is today. \"  Patient mentions h/o migraines, but denies any sensitivity to sound or light. Patient denies any noncompliance with her migraine medications. Patient states that current HA is \"not normal for her migraines. \"  Patient reports elevated BP and BG (499) this morning. She proceeded to administer 4 units of insulin. Patient denies any changes in vision or speech deficits. There are no other acute medical concerns at this time. Social hx: denies tobacco smoking; denies EtOH; denies illicit drug use    Note written by Dieter Bowers, as dictated by Gómez Doan MD 12:36 PM       Past Medical History:   Diagnosis Date    Anemia     Diabetes ([facility] [address]     Hx MRSA infection     Hypertension     Irregular menstrual cycle     Obesity     Other ill-defined conditions(799.89) hypothyroidism       Past Surgical History:   Procedure Laterality Date    HX CHOLECYSTECTOMY      HX HEENT      HX TONSIL AND ADENOIDECTOMY  1992    HX TYMPANOSTOMY           No family history on file. Social History     Social History    Marital status:      Spouse name: N/A    Number of children: N/A    Years of education: N/A     Occupational History    Not on file.      Social History Main Topics    Smoking status: Never Smoker    Smokeless tobacco: Never Used    Alcohol use No    Drug use: No    Sexual activity: Not on file     Other Topics Concern    Not on file     Social History Narrative    No narrative on file         ALLERGIES: Pcn [penicillins]; Vancomycin; and Voltaren [diclofenac sodium]    Review of Systems   Constitutional: Negative for chills and fever. Eyes: Negative for photophobia and visual disturbance. Respiratory: Negative for cough and shortness of breath. Cardiovascular: Negative for chest pain. Gastrointestinal: Negative for nausea and vomiting. Neurological: Positive for numbness and headaches. Negative for facial asymmetry and speech difficulty. All other systems reviewed and are negative. Vitals:    05/25/17 1400 05/25/17 1415 05/25/17 1501 05/25/17 1503   BP: (!) 164/111 (!) 165/106 (!) 156/106 (!) 156/106   Pulse: 88 100 96    Resp: 26 27 25    SpO2: 96% 97% 97%    Weight:                Physical Exam   Constitutional: She is oriented to person, place, and time. She appears well-developed and well-nourished. No distress. Overweight. HENT:   Head: Normocephalic and atraumatic. Eyes: Conjunctivae are normal. No scleral icterus. Neck: Neck supple. No tracheal deviation present. Cardiovascular: Normal rate, regular rhythm, normal heart sounds and intact distal pulses. Exam reveals no gallop and no friction rub. No murmur heard. Pulmonary/Chest: Effort normal and breath sounds normal. She has no wheezes. She has no rales. Abdominal: Soft. She exhibits no distension. There is no tenderness. There is no rebound and no guarding. Musculoskeletal: She exhibits no edema. Neurological: She is alert and oriented to person, place, and time. Normal finger-to-nose test.  Decreased sensation to light touch in RUE, RLE, and right side of face. Mild pronator drift. Skin: Skin is warm and dry. No rash noted. Psychiatric: She has a normal mood and affect. Nursing note and vitals reviewed.      Note written by Dieter Wislon, as dictated by Gonsalo Trujillo MD 12:36 PM    [facility]  ED Course       Procedures    EKG: NSR; rate - 97; prolonged QT; normal ST,T.  Gonsalo Trujillo MD  1:15 PM      CONSULT NOTE:  12:16 PM Gonsalo Trujillo MD spoke with Dr. Ericka Britt, Consult for Teleneurology. Discussed available diagnostic tests and clinical findings. Dr. Ericka Britt will evaluate the patient. CONSULT NOTE:  12:36 PM Gonsalo Trujillo MD spoke with Dr. Ericka Britt, Consult for Teleneurology. Discussed available diagnostic tests and clinical findings. Dr. Ericka Britt has evaluated the patient. He recommends admission to Hospitalist service for stroke w/u.    1:46 PM  Patient is being admitted to the hospital.  The results of their tests and reasons for their admission have been discussed with them and/or available family. They convey agreement and understanding for the need to be admitted and for their admission diagnosis. Consultation will be made now with the inpatient physician for hospitalization. CONSULT NOTE:  1:46 PM Gonsalo Trujillo MD spoke with Dr. Tejas Freire, Consult for Hospitalist.  Discussed available diagnostic tests and clinical findings. She is in agreement with care plans as outlined. Dr. Tejas Freire will see the patient. A/P: TIA vs complex migraine - CT neg; admit for MRI and further evaluation.   Gonsalo Trujillo MD

## 2017-05-25 NOTE — ED NOTES
TRANSFER - OUT REPORT:    Verbal report given to Rocio Rn(name) on [place]  being transferred to 5th floor(unit) for routine progression of care       Report consisted of patients Situation, Background, Assessment and   Recommendations(SBAR). Information from the following report(s) SBAR, ED Summary, [facility] and Recent Results was reviewed with the receiving nurse. Lines:   Peripheral IV 05/25/17 Right;Upper Arm (Active)   Site Assessment Clean, dry, & intact 5/25/2017 12:50 PM   Phlebitis Assessment 0 5/25/2017 12:50 PM   Infiltration Assessment 0 5/25/2017 12:50 PM   Dressing Status Clean, dry, & intact 5/25/2017 12:50 PM   Dressing Type Tape;Transparent 5/25/2017 12:50 PM   Hub Color/Line Status Pink;Flushed;Patent 5/25/2017 12:50 PM        Opportunity for questions and clarification was provided.       Patient transported with:   Monitor  Registered Nurse

## 2017-05-25 NOTE — IP AVS SNAPSHOT
Current Discharge Medication List  
  
CONTINUE these medications which have CHANGED Dose & Instructions Dispensing Information Comments Morning Noon Evening Bedtime  
 hydrALAZINE 25 mg tablet Commonly known as:  APRESOLINE What changed:   
- when to take this 
- reasons to take this Your last dose was: Your next dose is:    
   
   
 Dose:  25 mg Take 1 Tab by mouth three (3) times daily. Quantity:  90 Tab Refills:  0  
     
   
   
   
  
 insulin glargine 100 unit/mL injection Commonly known as:  LANTUS What changed:  how much to take Your last dose was: Your next dose is:    
   
   
 Dose:  60 Units 60 Units by SubCUTAneous route nightly. Quantity:  1 Vial  
Refills:  0 CONTINUE these medications which have NOT CHANGED Dose & Instructions Dispensing Information Comments Morning Noon Evening Bedtime  
 ferrous sulfate 325 mg (65 mg iron) tablet Your last dose was: Your next dose is:    
   
   
 Dose:  325 mg Take 325 mg by mouth two (2) times daily (with meals). Refills:  0 HumaLOG 100 unit/mL injection Generic drug:  insulin lispro Your last dose was: Your next dose is:    
   
   
 Dose:  30 Units 30 Units by SubCUTAneous route three (3) times daily (with meals). Refills:  0  
     
   
   
   
  
 losartan 50 mg tablet Commonly known as:  COZAAR Your last dose was: Your next dose is:    
   
   
 Dose:  50 mg Take 50 mg by mouth daily. Refills:  0 Where to Get Your Medications Information on where to get these meds will be given to you by the nurse or doctor. ! Ask your nurse or doctor about these medications  
  hydrALAZINE 25 mg tablet  
 insulin glargine 100 unit/mL injection

## 2017-05-25 NOTE — IP AVS SNAPSHOT
Lm Demian 
 
 
 [address] [facility] 
[phone] Patient: Jovon Orozco MRN: [id number] :1985 You are allergic to the following Allergen Reactions Pcn (Penicillins) Rash Vancomycin Other (comments) Kidneys shut down Voltaren (Diclofenac Sodium) Myalgia Other (comments) \"muscle spasms\" Recent Documentation Weight BMI OB Status Smoking Status 106.5 kg 40.3 kg/m2 Having regular periods Never Smoker Emergency Contacts Name Discharge Info Relation Home Work Mobile Michael Ascencio  Spouse [3] [phone] About your hospitalization You were admitted on:  May 25, 2017 You last received care in the:  [facility] 3 PRO CARE TELE 1 You were discharged on:  May 27, 2017 Unit phone number:  [phone] Why you were hospitalized Your primary diagnosis was:  Migraine Your diagnoses also included:  Obesity, Type 2 Diabetes Mellitus Without Complication (Hcc), Htn (Hypertension) Providers Seen During Your Hospitalizations Provider Role Specialty Primary office phone Ursula Brush MD Attending Provider Emergency Medicine [phone] Pina Lezama MD Attending Provider Internal Medicine [phone] Your Primary Care Physician (PCP) Primary Care Physician Office Phone Office Fax NONE ** None ** ** None ** Follow-up Information Follow up With Details Comments Contact Info None   None (395) Patient stated that they have no PCP Current Discharge Medication List  
  
CONTINUE these medications which have CHANGED Dose & Instructions Dispensing Information Comments Morning Noon Evening Bedtime  
 hydrALAZINE 25 mg tablet Commonly known as:  APRESOLINE What changed:   
- when to take this 
- reasons to take this Your last dose was: Your next dose is: Dose:  25 mg Take 1 Tab by mouth three (3) times daily. Quantity:  90 Tab Refills:  0  
     
   
   
   
  
 insulin glargine 100 unit/mL injection Commonly known as:  LANTUS What changed:  how much to take Your last dose was: Your next dose is:    
   
   
 Dose:  60 Units 60 Units by SubCUTAneous route nightly. Quantity:  1 Vial  
Refills:  0 CONTINUE these medications which have NOT CHANGED Dose & Instructions Dispensing Information Comments Morning Noon Evening Bedtime  
 ferrous sulfate 325 mg (65 mg iron) tablet Your last dose was: Your next dose is:    
   
   
 Dose:  325 mg Take 325 mg by mouth two (2) times daily (with meals). Refills:  0 HumaLOG 100 unit/mL injection Generic drug:  insulin lispro Your last dose was: Your next dose is:    
   
   
 Dose:  30 Units 30 Units by SubCUTAneous route three (3) times daily (with meals). Refills:  0  
     
   
   
   
  
 losartan 50 mg tablet Commonly known as:  COZAAR Your last dose was: Your next dose is:    
   
   
 Dose:  50 mg Take 50 mg by mouth daily. Refills:  0 Where to Get Your Medications Information on where to get these meds will be given to you by the nurse or doctor. ! Ask your nurse or doctor about these medications  
  hydrALAZINE 25 mg tablet  
 insulin glargine 100 unit/mL injection Discharge Instructions HOSPITALIST DISCHARGE INSTRUCTIONS 
NAME: Will Martines :  1985 MRN:  [id number] Date/Time:  2017 1:15 PM 
 
ADMIT DATE: 2017 DISCHARGE DATE: 2017 DISCHARGE DIAGNOSIS: 
Complicated migraine MEDICATIONS: 
· It is important that you take the medication exactly as they are prescribed.   
· Keep your medication in the bottles provided by the pharmacist and keep a list of the medication names, dosages, and times to be taken in your wallet. · Do not take other medications without consulting your doctor. Pain Management: per above medications What to do at [facility] Recommended diet:  Cardiac Diet and Diabetic Diet Recommended activity: Activity as tolerated If you experience any of the following symptoms then please call your primary care physician or return to the emergency room if you cannot get hold of your doctor: 
Fever, chills, nausea, vomiting, diarrhea, change in mentation, falling, bleeding, shortness of breath Follow Up: You may call [address] [place] [address] for primary care follow up if you do not have a PCP - [phone] Information obtained by : 
I understand that if any problems occur once I am at home I am to contact my physician. I understand and acknowledge receipt of the instructions indicated above. Physician's or R.N.'s Signature                                                                  Date/Time Patient or Representative Signature                                                          Date/Time Discharge Orders None Content [employer] Announcement We are excited to announce that we are making your provider's discharge notes available to you in [employer]. You will see these notes when they are completed and signed by the physician that discharged you from your recent hospital stay. If you have any questions or concerns about any information you see in [employer], please call the Health Information Department where you were seen or reach out to your Primary Care Provider for more information about your plan of care. Introducing [facility]! Merline Richter introduces [employer] patient portal. Now you can access parts of your medical record, email your doctor's office, and request medication refills online. 1. In your internet browser, go to [email or web address] 2. Click on the First Time User? Click Here link in the Sign In box. You will see the New Member Sign Up page. 3. Enter your [employer] Access Code exactly as it appears below. You will not need to use this code after youve completed the sign-up process. If you do not sign up before the expiration date, you must request a new code. · [employer] Access Code: [email or web address] Expires: 8/23/2017 12:42 PM 
 
4. Enter the last four digits of your Social Security Number (xxxx) and Date of Birth (mm/dd/yyyy) as indicated and click Submit. You will be taken to the next sign-up page. 5. Create a [employer] ID. This will be your [employer] login ID and cannot be changed, so think of one that is secure and easy to remember. 6. Create a [employer] password. You can change your password at any time. 7. Enter your Password Reset Question and Answer. This can be used at a later time if you forget your password. 8. Enter your e-mail address. You will receive e-mail notification when new information is available in [address] 9. Click Sign Up. You can now view and download portions of your medical record. 10. Click the Download Summary menu link to download a portable copy of your medical information. If you have questions, please visit the Frequently Asked Questions section of the [employer] website. Remember, [employer] is NOT to be used for urgent needs. For medical emergencies, dial 911. Now available from your iPhone and Android! General Information Please provide this summary of care documentation to your next provider. Patient Signature:  ____________________________________________________________ Date:  ____________________________________________________________  
  
Rody Hettinger Provider Signature:  ____________________________________________________________ Date:  ____________________________________________________________

## 2017-05-25 NOTE — PROGRESS NOTES
[facility]: MED RECONCILIATION    Comments: Talked to patient. Allergies: Pcn [penicillins]; Vancomycin; and Voltaren [diclofenac sodium]    Prior to Admission Medications:     Medication Documentation Review Audit       Reviewed by Lu Jean PHARMD (Pharmacist) on 05/25/17 at 1534         Medication Sig Documenting Provider Last Dose Status Taking?      ferrous sulfate 325 mg (65 mg iron) tablet Take 325 mg by mouth two (2) times daily (with meals). Historical Provider 5/25/2017 AM Active Yes    hydrALAZINE (APRESOLINE) 25 mg tablet Take 25 mg by mouth two (2) times daily as needed (DBP > 115). Historical Provider  Active Yes    insulin glargine (LANTUS) 100 unit/mL injection 50 Units by SubCUTAneous route nightly. Historical Provider  Active Yes    insulin lispro (HUMALOG) 100 unit/mL injection 30 Units by SubCUTAneous route three (3) times daily (with meals). Historical Provider 5/25/2017 AM Active Yes    losartan (COZAAR) 50 mg tablet Take 50 mg by mouth daily.  Historical Provider 5/25/2017 AM Active Yes                  Lu Jean PHARMD   Contact: 1964

## 2017-05-25 NOTE — H&P
[address]  [phone]    Admission History and Physical      NAME:  Joseph Sam   :   1985   MRN:  [id number]     PCP:  None     Date/Time:  2017         Subjective:     CHIEF COMPLAINT: numbness     HISTORY OF PRESENT ILLNESS:     Ms. Tang Mchugh is a 27 y.o.  female who is admitted with migraine. Ms. Tang Mchugh presented to the Emergency Department this PM complaining of numbness: right arm and leg, started several hours ago, constant, moderate, associated with HA which preceded the numbness. She was seen at [facility] in April for headache and numbness and diagnosed with a TIA with a negative neurologic work up. History obtained from chart review and the patient. Previous records reviewed    Past Medical History:   Diagnosis Date    Anemia     Diabetes ([facility]     Hx MRSA infection     Hypertension     Irregular menstrual cycle     Obesity     Other ill-defined conditions hypothyroidism    Stroke ([facility] 2017    tia        Past Surgical History:   Procedure Laterality Date    HX CHOLECYSTECTOMY      HX HEENT      HX TONSIL AND ADENOIDECTOMY      HX TYMPANOSTOMY         Social History   Substance Use Topics    Smoking status: Never Smoker    Smokeless tobacco: Never Used    Alcohol use No        Family History   Problem Relation Age of Onset    Hypertension Other      \"all her family\"    Diabetes Other      \"all her family\"        Allergies   Allergen Reactions    Pcn [Penicillins] Rash    Vancomycin Other (comments)     Kidneys shut down    Voltaren [Diclofenac Sodium] Myalgia and Other (comments)     \"muscle spasms\"        Prior to Admission medications    Medication Sig Start Date End Date Taking? Authorizing Provider   hydrALAZINE (APRESOLINE) 25 mg tablet Take 25 mg by mouth three (3) times daily. Yes Phys Other, MD STOCKTON/ACETAMINOPHEN/CAFFEINE (FIORICET PO) Take  by mouth. Yes Riccardo Mckinney MD   insulin lispro (HUMALOG) 100 unit/mL injection 14 Units by SubCUTAneous route three (3) times daily (with meals). Indications: sliding scale if over blood glucose over 300; 5 units for every 50 over 300   Yes Riccardo Mckinney MD   losartan (COZAAR) 50 mg tablet Take 50 mg by mouth daily. Yes Riccardo Mckinney MD   insulin glargine (LANTUS) 100 unit/mL injection 40 Units by SubCUTAneous route daily. Yes Riccardo Mckinney MD   FERROUS FUMARATE (IRON PO) Take 325 mg by mouth daily.      Yes Riccardo Mckinney MD         Review of Systems:  (bold if positive, if negative)    Gen:  Eyes:  ENT:  CVS:  Pulm:  GI:    :    MS:  Skin:  Psych:  Endo:    Hem:  Renal:    Neuro:  Numbness          Objective:      VITALS:    Vital signs reviewed; most recent are:    Visit Vitals    BP (!) 164/111    Pulse 88    Resp 26    Wt 106.5 kg (234 lb 12.6 oz)    SpO2 96%    BMI 40.3 kg/m2     SpO2 Readings from Last 6 Encounters:   05/25/17 96%   02/20/17 98%   02/07/17 97%   02/25/16 99%   06/16/14 99%   03/18/14 100%        No intake or output data in the 24 hours ending 05/25/17 1418         Exam:     Physical Exam:    Gen: Well-developed, morbidly obese, in no acute distress  HEENT:  Pink conjunctivae, PERRL, hearing intact to voice, moist mucous membranes  Neck: Supple, without masses, thyroid non-tender  Resp: No accessory muscle use, clear breath sounds without wheezes rales or rhonchi  Card: No murmurs, normal S1, S2 without thrills, bruits or peripheral edema, 2+ LE peripheral pulses  Abd:  Soft, non-tender, non-distended, normoactive bowel sounds are present, no palpable organomegaly and no detectable hernias  Lymph:  No cervical or inguinal adenopathy  Musc: No cyanosis or clubbing  Skin: No rashes or ulcers, skin turgor is good, cap refill <2 sec  Neuro:  Cranial nerves are grossly intact, no focal motor weakness, follows commands appropriately  Psych:  Good insight, oriented to person, place and time, alert Labs:    Recent Labs      05/25/17   1259   WBC  9.5   HGB  15.3   HCT  42.0   PLT  311     Recent Labs      05/25/17   1259   NA  131*   K  4.2   CL  97   CO2  24   GLU  390*   BUN  18   CREA  0.75   CA  8.8   ALB  3.8   TBILI  0.9   SGOT  24   ALT  41     Lab Results   Component Value Date/Time    Glucose (POC) 396 05/25/2017 12:09 PM    Glucose (POC) 271 02/20/2017 07:18 PM     No results for input(s): PH, PCO2, PO2, HCO3, FIO2 in the last 72 hours. Recent Labs      05/25/17   1224   INR  0.9       Additional testing:  Chest X-ray: no acute process, visualized by me.   Results not reviewed with Radiologist.       Assessment/Plan:       Principal Problem:    Migraine (5/25/2017)   - I think her paresthesias are due to complicated migraine   - consult Neurology for management   - defer any TIA imaging work up to them    Active Problems:    Obesity (5/25/2017)   - morbidly obese   - counseled on weight loss       Type 2 diabetes mellitus without complication ([facility] (9/09/3188)   - follow BS on home meds with [facility]SI   - check A1c   - BS highly elevated here, suspect poor compliance      HTN (hypertension) (5/25/2017)   - BP elevated here   - PRN labetalol ordered       Code status:   - patient is FULL CODE      Total time spent with patient: [address] discussed with: Patient    Discussed:  Code Status, Care Plan and D/C Planning    Prophylaxis:  Lovenox and SCD's    Probable Disposition:  Home w/Family           ___________________________________________________    Attending Physician: Nat Boas, MD

## 2017-05-25 NOTE — ED NOTES
Quick triage assessment, pt experiencing right arm and right leg change in sensation, symptoms started 2 hours ago. +HTN x 4 days with HA.  strong. Smile symmetrical. FSBS 400s PTA per patient. Pt recently seen at [facility] for TIA. CODE S called.

## 2017-05-25 NOTE — PROGRESS NOTES
Patient Blood pressure 190/108 Md notified and ordered 20 Mg labetalol. Stated if labetalol does not stabilize blood pressure patient is to be transferred to telemetry unit.

## 2017-05-25 NOTE — PROGRESS NOTES
Patient Blood pressure remained elevated after labetalol transfer order placed. Bedside and Verbal shift change report given to Kaye[facility] (oncoming nurse) by Sadie Villegas RN (offgoing nurse). Report included the following information SBAR, Kardex, ED Summary, Intake/Output, MAR and Accordion. TRANSFER - OUT REPORT:    Verbal report given to Lola Cespedes RN (name) on Claudell Level  being transferred to [facility] (unit) for change in patient condition(uncontrolled HTN)       Report consisted of patients Situation, Background, Assessment and   Recommendations(SBAR). Information from the following report(s) SBAR, Kardex, ED Summary, Intake/Output, MAR and Recent Results was reviewed with the receiving nurse. Lines:   Peripheral IV 05/25/17 Right;Upper Arm (Active)   Site Assessment Clean, dry, & intact 5/25/2017  4:42 PM   Phlebitis Assessment 0 5/25/2017  4:42 PM   Infiltration Assessment 0 5/25/2017  4:42 PM   Dressing Status Clean, dry, & intact 5/25/2017  4:42 PM   Dressing Type Transparent;Tape 5/25/2017  4:42 PM   Hub Color/Line Status Pink; Infusing 5/25/2017  4:42 PM   Alcohol Cap Used Yes 5/25/2017  4:42 PM        Opportunity for questions and clarification was provided.       Patient transported with:   Monitor  Tech

## 2017-05-25 NOTE — PROGRESS NOTES
TRANSFER - IN REPORT:    Verbal report received from Margarita Thakur (name) on Brandon Sims  being received from ED (unit) for routine progression of care      Report consisted of patients Situation, Background, Assessment and   Recommendations(SBAR). Information from the following report(s) SBAR, Kardex, ED Summary, Intake/Output, MAR and Recent Results was reviewed with the receiving nurse. Opportunity for questions and clarification was provided. Assessment completed upon patients arrival to unit and care assumed.          Primary Nurse Chris Durbin RN and [facility], RN performed a dual skin assessment on this patient No impairment noted  Kendall score is 23

## 2017-05-25 NOTE — ED NOTES
Dr Keyanna La at the bedside. Patients symptoms started at about 1020 today. Decreased sensation to right arm, and severe headache. She stated she took her migraine medication today but it is not helping.

## 2017-05-26 LAB
ANION GAP BLD CALC-SCNC: 9 MMOL/L (ref 5–15)
BACTERIA SPEC CULT: NORMAL
BACTERIA SPEC CULT: NORMAL
BUN SERPL-MCNC: 15 MG/DL (ref 6–20)
BUN/CREAT SERPL: 19 (ref 12–20)
CALCIUM SERPL-MCNC: 8.3 MG/DL (ref 8.5–10.1)
CHLORIDE SERPL-SCNC: 98 MMOL/L (ref 97–108)
CHOLEST SERPL-MCNC: 171 MG/DL
CO2 SERPL-SCNC: 27 MMOL/L (ref 21–32)
CREAT SERPL-MCNC: 0.81 MG/DL (ref 0.55–1.02)
ERYTHROCYTE [DISTWIDTH] IN BLOOD BY AUTOMATED COUNT: 13.2 % (ref 11.5–14.5)
GLUCOSE BLD STRIP.AUTO-MCNC: 249 MG/DL (ref 65–100)
GLUCOSE BLD STRIP.AUTO-MCNC: 259 MG/DL (ref 65–100)
GLUCOSE BLD STRIP.AUTO-MCNC: 323 MG/DL (ref 65–100)
GLUCOSE BLD STRIP.AUTO-MCNC: 85 MG/DL (ref 65–100)
GLUCOSE SERPL-MCNC: 381 MG/DL (ref 65–100)
HCT VFR BLD AUTO: 38.6 % (ref 35–47)
HDLC SERPL-MCNC: 35 MG/DL
HDLC SERPL: 4.9 {RATIO} (ref 0–5)
HGB BLD-MCNC: 14 G/DL (ref 11.5–16)
LDLC SERPL CALC-MCNC: 70.4 MG/DL (ref 0–100)
LIPID PROFILE,FLP: ABNORMAL
MAGNESIUM SERPL-MCNC: 1.9 MG/DL (ref 1.6–2.4)
MCH RBC QN AUTO: 30 PG (ref 26–34)
MCHC RBC AUTO-ENTMCNC: 36.3 G/DL (ref 30–36.5)
MCV RBC AUTO: 82.8 FL (ref 80–99)
PHOSPHATE SERPL-MCNC: 3 MG/DL (ref 2.6–4.7)
PLATELET # BLD AUTO: 345 K/UL (ref 150–400)
POTASSIUM SERPL-SCNC: 3.8 MMOL/L (ref 3.5–5.1)
RBC # BLD AUTO: 4.66 M/UL (ref 3.8–5.2)
SERVICE CMNT-IMP: ABNORMAL
SERVICE CMNT-IMP: NORMAL
SERVICE CMNT-IMP: NORMAL
SODIUM SERPL-SCNC: 134 MMOL/L (ref 136–145)
TRIGL SERPL-MCNC: 328 MG/DL (ref ?–150)
VLDLC SERPL CALC-MCNC: 65.6 MG/DL
WBC # BLD AUTO: 7.6 K/UL (ref 3.6–11)

## 2017-05-26 PROCEDURE — 74011636637 HC RX REV CODE- 636/637: Performed by: INTERNAL MEDICINE

## 2017-05-26 PROCEDURE — 99218 HC RM OBSERVATION: CPT

## 2017-05-26 PROCEDURE — 74011250637 HC RX REV CODE- 250/637: Performed by: INTERNAL MEDICINE

## 2017-05-26 PROCEDURE — 82962 GLUCOSE BLOOD TEST: CPT

## 2017-05-26 PROCEDURE — 74011250637 HC RX REV CODE- 250/637: Performed by: PSYCHIATRY & NEUROLOGY

## 2017-05-26 PROCEDURE — 74011250636 HC RX REV CODE- 250/636: Performed by: PSYCHIATRY & NEUROLOGY

## 2017-05-26 PROCEDURE — 74011250636 HC RX REV CODE- 250/636: Performed by: INTERNAL MEDICINE

## 2017-05-26 RX ORDER — HYDRALAZINE HYDROCHLORIDE 25 MG/1
25 TABLET, FILM COATED ORAL 3 TIMES DAILY
Qty: 90 TAB | Refills: 0 | Status: ON HOLD | OUTPATIENT
Start: 2017-05-26 | End: 2017-09-14

## 2017-05-26 RX ORDER — HYDRALAZINE HYDROCHLORIDE 25 MG/1
25 TABLET, FILM COATED ORAL
Status: DISCONTINUED | OUTPATIENT
Start: 2017-05-26 | End: 2017-05-26

## 2017-05-26 RX ORDER — INSULIN LISPRO 100 [IU]/ML
30 INJECTION, SOLUTION INTRAVENOUS; SUBCUTANEOUS
Status: DISCONTINUED | OUTPATIENT
Start: 2017-05-26 | End: 2017-05-27 | Stop reason: HOSPADM

## 2017-05-26 RX ORDER — HYDRALAZINE HYDROCHLORIDE 25 MG/1
25 TABLET, FILM COATED ORAL 3 TIMES DAILY
Status: DISCONTINUED | OUTPATIENT
Start: 2017-05-26 | End: 2017-05-27 | Stop reason: HOSPADM

## 2017-05-26 RX ORDER — LANOLIN ALCOHOL/MO/W.PET/CERES
325 CREAM (GRAM) TOPICAL 2 TIMES DAILY WITH MEALS
Status: DISCONTINUED | OUTPATIENT
Start: 2017-05-26 | End: 2017-05-27 | Stop reason: HOSPADM

## 2017-05-26 RX ORDER — HYDRALAZINE HYDROCHLORIDE 25 MG/1
50 TABLET, FILM COATED ORAL 3 TIMES DAILY
Status: DISCONTINUED | OUTPATIENT
Start: 2017-05-26 | End: 2017-05-26

## 2017-05-26 RX ORDER — SUMATRIPTAN 25 MG/1
50 TABLET, FILM COATED ORAL
Status: COMPLETED | OUTPATIENT
Start: 2017-05-26 | End: 2017-05-27

## 2017-05-26 RX ORDER — INSULIN GLARGINE 100 [IU]/ML
30 INJECTION, SOLUTION SUBCUTANEOUS
Status: COMPLETED | OUTPATIENT
Start: 2017-05-26 | End: 2017-05-26

## 2017-05-26 RX ORDER — INSULIN GLARGINE 100 [IU]/ML
60 INJECTION, SOLUTION SUBCUTANEOUS
Qty: 1 VIAL | Refills: 0 | Status: ON HOLD
Start: 2017-05-26 | End: 2017-09-14

## 2017-05-26 RX ORDER — INSULIN GLARGINE 100 [IU]/ML
50 INJECTION, SOLUTION SUBCUTANEOUS
Status: DISCONTINUED | OUTPATIENT
Start: 2017-05-26 | End: 2017-05-27 | Stop reason: HOSPADM

## 2017-05-26 RX ORDER — KETOROLAC TROMETHAMINE 30 MG/ML
30 INJECTION, SOLUTION INTRAMUSCULAR; INTRAVENOUS
Status: COMPLETED | OUTPATIENT
Start: 2017-05-26 | End: 2017-05-27

## 2017-05-26 RX ORDER — LOSARTAN POTASSIUM 50 MG/1
50 TABLET ORAL DAILY
Status: DISCONTINUED | OUTPATIENT
Start: 2017-05-26 | End: 2017-05-27 | Stop reason: HOSPADM

## 2017-05-26 RX ORDER — CHLORPROMAZINE HYDROCHLORIDE 25 MG/ML
25 INJECTION INTRAMUSCULAR ONCE
Status: DISCONTINUED | OUTPATIENT
Start: 2017-05-26 | End: 2017-05-27

## 2017-05-26 RX ADMIN — FERROUS SULFATE TAB 325 MG (65 MG ELEMENTAL FE) 325 MG: 325 (65 FE) TAB at 07:48

## 2017-05-26 RX ADMIN — Medication 10 ML: at 21:56

## 2017-05-26 RX ADMIN — HYDRALAZINE HYDROCHLORIDE 25 MG: 25 TABLET, FILM COATED ORAL at 21:29

## 2017-05-26 RX ADMIN — INSULIN LISPRO 30 UNITS: 100 INJECTION, SOLUTION INTRAVENOUS; SUBCUTANEOUS at 18:30

## 2017-05-26 RX ADMIN — HYDRALAZINE HYDROCHLORIDE 25 MG: 25 TABLET, FILM COATED ORAL at 16:20

## 2017-05-26 RX ADMIN — INSULIN LISPRO 30 UNITS: 100 INJECTION, SOLUTION INTRAVENOUS; SUBCUTANEOUS at 11:51

## 2017-05-26 RX ADMIN — HYDROMORPHONE HYDROCHLORIDE 0.5 MG: 1 INJECTION, SOLUTION INTRAMUSCULAR; INTRAVENOUS; SUBCUTANEOUS at 11:51

## 2017-05-26 RX ADMIN — ENOXAPARIN SODIUM 40 MG: 40 INJECTION SUBCUTANEOUS at 21:55

## 2017-05-26 RX ADMIN — HYDRALAZINE HYDROCHLORIDE 25 MG: 25 TABLET, FILM COATED ORAL at 09:27

## 2017-05-26 RX ADMIN — INSULIN GLARGINE 50 UNITS: 100 INJECTION, SOLUTION SUBCUTANEOUS at 21:28

## 2017-05-26 RX ADMIN — INSULIN LISPRO 7 UNITS: 100 INJECTION, SOLUTION INTRAVENOUS; SUBCUTANEOUS at 11:50

## 2017-05-26 RX ADMIN — LOSARTAN POTASSIUM 50 MG: 50 TABLET, FILM COATED ORAL at 09:28

## 2017-05-26 RX ADMIN — HYDROMORPHONE HYDROCHLORIDE 0.5 MG: 1 INJECTION, SOLUTION INTRAMUSCULAR; INTRAVENOUS; SUBCUTANEOUS at 16:20

## 2017-05-26 RX ADMIN — INSULIN LISPRO 10 UNITS: 100 INJECTION, SOLUTION INTRAVENOUS; SUBCUTANEOUS at 07:49

## 2017-05-26 RX ADMIN — FERROUS SULFATE TAB 325 MG (65 MG ELEMENTAL FE) 325 MG: 325 (65 FE) TAB at 16:20

## 2017-05-26 RX ADMIN — HYDROMORPHONE HYDROCHLORIDE 0.5 MG: 1 INJECTION, SOLUTION INTRAMUSCULAR; INTRAVENOUS; SUBCUTANEOUS at 07:48

## 2017-05-26 RX ADMIN — INSULIN LISPRO 30 UNITS: 100 INJECTION, SOLUTION INTRAVENOUS; SUBCUTANEOUS at 07:49

## 2017-05-26 RX ADMIN — Medication 10 ML: at 15:44

## 2017-05-26 RX ADMIN — HYDROMORPHONE HYDROCHLORIDE 0.5 MG: 1 INJECTION, SOLUTION INTRAMUSCULAR; INTRAVENOUS; SUBCUTANEOUS at 21:46

## 2017-05-26 RX ADMIN — HYDROMORPHONE HYDROCHLORIDE 0.5 MG: 1 INJECTION, SOLUTION INTRAMUSCULAR; INTRAVENOUS; SUBCUTANEOUS at 03:09

## 2017-05-26 RX ADMIN — INSULIN GLARGINE 30 UNITS: 100 INJECTION, SOLUTION SUBCUTANEOUS at 09:28

## 2017-05-26 RX ADMIN — Medication 5 ML: at 06:00

## 2017-05-26 RX ADMIN — ENOXAPARIN SODIUM 40 MG: 40 INJECTION SUBCUTANEOUS at 09:27

## 2017-05-26 RX ADMIN — INSULIN LISPRO 2 UNITS: 100 INJECTION, SOLUTION INTRAVENOUS; SUBCUTANEOUS at 21:46

## 2017-05-26 NOTE — PROGRESS NOTES
TRANSFER - IN REPORT:    Verbal report received from [facility], RN(name) on Tate Lopez  being received from Medical (unit) for change in patient condition(Hypertensive)      Report consisted of patients Situation, Background, Assessment and   Recommendations(SBAR). Information from the following report(s) SBAR, Kardex, ED Summary, Intake/Output, Recent Results, Med Rec Status and Cardiac Rhythm NSR was reviewed with the receiving nurse. Opportunity for questions and clarification was provided. Assessment completed upon patients arrival to unit and care assumed. 2130: Pt arrived vie bed accompanied by 2 nursing techs to room. Pt Alert, oriented c/o headache pain 6/10, baseline 5/10; refusing pain medication at this time. Denies numbness, tingling. Extremities strong bilaterally, able to stand, ambulates without assistance. 2210: pt blood glucose 361 at 2105: notified Dr. Cornel Weaver of pt status. Dr. Zulma Hyatt 10 units of Humalog to be given. Bedside and Verbal shift change report given to Koffi Patel RN (oncoming nurse) by Elidia Treviño RN (offgoing nurse). Report included the following information SBAR, Kardex, ED Summary, Intake/Output, Recent Results, Med Rec Status and Cardiac Rhythm NSR.

## 2017-05-26 NOTE — PROGRESS NOTES
Blood sugar dropped dramatically between lunch and dinner number (259 to 84). Will hold dinner scheduled 30 units humalog until after patient has eaten as she has had a poor appetite so far today. Snack provided. 1853:  Neurologist has ordered for a cocktail of medications to break migraine cycle; this medications are to be given at bedtime, even though they are listed for 1900. Will inform oncoming RN of timing error. 1930:  Bedside and Verbal shift change report given to Otilia Gunter RN (oncoming nurse) by Eliseo Diaz RN (offgoing nurse). Report included the following information SBAR, Kardex, Intake/Output, MAR, Recent Results and Cardiac Rhythm SR. Pt has not received any IV fluids this shift. Pt ambulates in room frequently and has good p.o. Intake. Pt has utilized prn dilaudid for adequate, albeit temporary, lower headache. Pt remains on contact for r/o MRSA with no positive result to date.

## 2017-05-26 NOTE — DISCHARGE INSTRUCTIONS
HOSPITALIST DISCHARGE INSTRUCTIONS  NAME: Ralph Aguirre   :  1985   MRN:  [id number]     Date/Time:  2017 1:15 PM    ADMIT DATE: 2017     DISCHARGE DATE: 2017     DISCHARGE DIAGNOSIS:  Complicated migraine    MEDICATIONS:  · It is important that you take the medication exactly as they are prescribed. · Keep your medication in the bottles provided by the pharmacist and keep a list of the medication names, dosages, and times to be taken in your wallet. · Do not take other medications without consulting your doctor. Pain Management: per above medications    What to do at Home    Recommended diet:  Cardiac Diet and Diabetic Diet    Recommended activity: Activity as tolerated    If you experience any of the following symptoms then please call your primary care physician or return to the emergency room if you cannot get hold of your doctor:  Fever, chills, nausea, vomiting, diarrhea, change in mentation, falling, bleeding, shortness of breath    Follow Up: You may call [address] [place] [address] for primary care follow up if you do not have a PCP - [phone]       Information obtained by :  I understand that if any problems occur once I am at home I am to contact my physician. I understand and acknowledge receipt of the instructions indicated above.                                                                                                                                            Physician's or R.N.'s Signature                                                                  Date/Time                                                                                                                                              Patient or Representative Signature                                                          Date/Time

## 2017-05-26 NOTE — PROGRESS NOTES
Primary Nurse Manasa Wagner RN and Dave Scott RN performed a dual skin assessment on this patient No impairment noted. Pt has many tattoos. No skin breakdown noted.   Kendall score is 21

## 2017-05-26 NOTE — DISCHARGE SUMMARY
Physician Discharge Summary     Patient ID:  Will Martines  [id number]  48 y.o.  1985    Admit date: 5/25/2017    Discharge date: 5/27/2017    Admission Diagnoses: migraine    Discharge Diagnoses:  Principal Diagnosis Migraine                                            Principal Problem:    Migraine (5/25/2017)    Active Problems:    Obesity (5/25/2017)      Type 2 diabetes mellitus without complication ([facility] (9/58/3499)      HTN (hypertension) (5/25/2017)         Resolved Problems:  Problem List as of 5/27/2017  Never Reviewed          Codes Class Noted - Resolved    * (Principal)Migraine (Chronic) ICD-10-CM: G43.909  ICD-9-CM: 346.90  5/25/2017 - Present        Obesity ICD-10-CM: E66.9  ICD-9-CM: 278.00  5/25/2017 - Present        Type 2 diabetes mellitus without complication ([facility] (Chronic) ICD-10-CM: E11.9  ICD-9-CM: 250.00  5/25/2017 - Present        HTN (hypertension) (Chronic) ICD-10-CM: I10  ICD-9-CM: 401.9  5/25/2017 - Present                Hospital Course:   Ms. Carmelita Galo was admitted to the Hospitalist Service on the 3rd floor for treatment of complicated migraine and to rule out TIA. She was evaluated by Neurology and underwent MRI which was normal.  Her paresthesia was attributed to complex migraine. Her BP was persistently elevated and her regimen was adjusted. Her BS were uncontrolled and her insulin regimen was adjusted. She was discharged home on 5/27/2017 in improved condition. PCP: None    Consults: Neurology    Discharge Exam:  See my Progress Note from today. Disposition: home    Patient Instructions:   Current Discharge Medication List      CONTINUE these medications which have CHANGED    Details   hydrALAZINE (APRESOLINE) 25 mg tablet Take 1 Tab by mouth three (3) times daily. Qty: 90 Tab, Refills: 0      insulin glargine (LANTUS) 100 unit/mL injection 60 Units by SubCUTAneous route nightly.   Qty: 1 Vial, Refills: 0         CONTINUE these medications which have NOT CHANGED Details   losartan (COZAAR) 50 mg tablet Take 50 mg by mouth daily. insulin lispro (HUMALOG) 100 unit/mL injection 30 Units by SubCUTAneous route three (3) times daily (with meals). ferrous sulfate 325 mg (65 mg iron) tablet Take 325 mg by mouth two (2) times daily (with meals). Activity: Activity as tolerated  Diet: Cardiac Diet and Diabetic Diet  Wound Care: None needed    Follow-up Information     Follow up With Details Comments Contact Info    None   None (395) Patient stated that they have no PCP            35 minutes were spend on this discharge.     Signed:  Pablo Macedo MD  5/27/2017  1:16 PM

## 2017-05-26 NOTE — PROGRESS NOTES
5- CASE MANAGEMENT NOTE:  I met with the pt to determine potential discharge needs. The pt states she lives with her , Maria Elena Gregory ([phone]), brother, sister-in-law and their 2 young children and her parents in a one level house. She is independent with her ADL's, works full-time and drives. She uses [facility] as her PCP and I gave her a list of Fadia Gordon MD's. I also left a message for [facility] to send her a Care Card application. She uses [facility] in [place] to obtain medications. She does not anticipate any discharge needs and someone in her family will transport her home. Care Management Interventions  PCP Verified by CM:  Yes ([facility]-gave list of Fadia Gordon MD's)  Discharge Durable Medical Equipment: No  Physical Therapy Consult: No  Occupational Therapy Consult: No  Current Support Network:  (Lives with  and extended family in a one level house)  Confirm Follow Up Transport: Family  Plan discussed with Pt/Family/Caregiver: Yes  Discharge Location  Discharge Placement:  (Home with family)    Cade Roman CM

## 2017-05-26 NOTE — PROGRESS NOTES
Neurology Hospital Progress Note    Patient ID:  Caroline Brown  [id number]  33 y.o.  1985      Subjective:   History:  Caroline Brown is a 27 y.o. female who  has a past medical history of Anemia; Diabetes ([facility] [address] MRSA infection; Hypertension; Irregular menstrual cycle; Obesity; Other ill-defined conditions (hypothyroidism); and Stroke ([facility] [address] (04/2017). Migraine who for the past 4 days, Noted elevated SBP going up to 200s. Patient then developed severe occipital headache 10/10, pulsating radiating to the nape area associated with nausea and persistent numbness of the R arm and R leg (but not the face). Patient blood pressure is better (150s) with decrease headache to 6/10. Still has persistent change in sensation of the R arm (\"feels weird\") but MRI brain is negative for stroke.         Objective:   ROS:  Per HPI-  Otherwise 12 point ROS was negative    Meds:  No current facility-administered medications on file prior to encounter. No current outpatient prescriptions on file prior to encounter. Imaging:    CT Results (recent):    Results from Hospital Encounter encounter on 05/25/17   CT CODE NEURO HEAD WO CONTRAST   Narrative EXAM:  CT CODE NEURO HEAD WO CONTRAST    INDICATION: Right-sided numbness. COMPARISON: CT head 2/25/2016    TECHNIQUE: Noncontrast head CT. Coronal and sagittal reformats. CT dose  reduction was achieved through use of a standardized protocol tailored for this  examination and automatic exposure control for dose modulation. FINDINGS: The ventricles and sulci are age-appropriate without hydrocephalus. There is no mass effect or midline shift. There is no intracranial hemorrhage or  extra-axial fluid collection. There is no abnormal area of decreased density to  suggest infarct. The gray-white matter differentiation is maintained. The basal  cisterns are patent. The osseous structures are intact.  There is mild mucosal thickening in the  posterior right maxillary sinus. The remaining paranasal sinuses and mastoid air  cells are clear. Impression IMPRESSION:     There is no acute intracranial abnormality. MRI Results (recent):    Results from Hospital Encounter encounter on 05/25/17   MRI BRAIN WO CONT   Narrative INDICATION:   Headache, acute, severe, thunderclap, worst HA of life; Neuro  deficit, acute single, stable or partly resolved; Stroke     EXAMINATION:  MRI BRAIN WO CONTRAST    COMPARISON:  CT earlier today    TECHNIQUE:  MR imaging of the brain was performed with sagittal T1, axial T1,  T2, FLAIR, GRE, DWI/ADC, coronal T2.    FINDINGS:      Ventricles:  Midline, no hydrocephalus. Intracranial Hemorrhage:  None. Brain Parenchyma/Brainstem:  Few small foci of T2 hyperintense signal in the  subcutaneous cortical white matter of the supratentorial brain, frontal lobes  bilaterally. No acute infarction. Basal Cisterns:  Normal.   Flow Voids:  Normal.  Additional Comments:  N/A. Impression IMPRESSION:  No acute process. IR Results (recent):  No results found for this or any previous visit. VAS/US Results (recent):  No results found for this or any previous visit. Reviewed records in [employer] tab today    Lab Review     Admission on 05/25/2017   Component Date Value Ref Range Status    Glucose (POC) 05/25/2017 396* 65 - 100 mg/dL Final    Comment: (NOTE)  The Accu-Chek Inform II glucometer is not FDA cleared for critically   ill patient use. A study was performed validating the equivalence of   glucometer and clinical laboratory results on this patient   population. Despite the study, use of glucometers with capillary   specimens from critically ill patients, regardless of their location,   makes the test high complexity and requires the performing individual   to comply with CLIA requirements more stringent than those for waived   testing in the hospital setting.  Critical thinking skills are   necessary to determine a potentially critically ill patients status   prior to using a glucometer.  Performed by 05/25/2017 Camilobenjie Rama   Final    WBC 05/25/2017 9.5  3.6 - 11.0 K/uL Final    RBC 05/25/2017 5.01  3.80 - 5.20 M/uL Final    HGB 05/25/2017 15.3  11.5 - 16.0 g/dL Final    HCT 05/25/2017 42.0  35.0 - 47.0 % Final    MCV 05/25/2017 83.8  80.0 - 99.0 FL Final    MCH 05/25/2017 30.5  26.0 - 34.0 PG Final    MCHC 05/25/2017 36.4  30.0 - 36.5 g/dL Final    RDW 05/25/2017 13.0  11.5 - 14.5 % Final    PLATELET 14/86/0533 261  150 - 400 K/uL Final    NEUTROPHILS 05/25/2017 59  32 - 75 % Final    LYMPHOCYTES 05/25/2017 33  12 - 49 % Final    MONOCYTES 05/25/2017 6  5 - 13 % Final    EOSINOPHILS 05/25/2017 2  0 - 7 % Final    BASOPHILS 05/25/2017 0  0 - 1 % Final    ABS. NEUTROPHILS 05/25/2017 5.4  1.8 - 8.0 K/UL Final    ABS. LYMPHOCYTES 05/25/2017 3.2  0.8 - 3.5 K/UL Final    ABS. MONOCYTES 05/25/2017 0.6  0.0 - 1.0 K/UL Final    ABS. EOSINOPHILS 05/25/2017 0.2  0.0 - 0.4 K/UL Final    ABS. BASOPHILS 05/25/2017 0.0  0.0 - 0.1 K/UL Final    Sodium 05/25/2017 131* 136 - 145 mmol/L Final    Potassium 05/25/2017 4.2  3.5 - 5.1 mmol/L Final    Chloride 05/25/2017 97  97 - 108 mmol/L Final    CO2 05/25/2017 24  21 - 32 mmol/L Final    Anion gap 05/25/2017 10  5 - 15 mmol/L Final    Glucose 05/25/2017 390* 65 - 100 mg/dL Final    BUN 05/25/2017 18  6 - 20 MG/DL Final    Creatinine 05/25/2017 0.75  0.55 - 1.02 MG/DL Final    BUN/Creatinine ratio 05/25/2017 24* 12 - 20   Final    GFR est AA 05/25/2017 >60  >60 ml/min/1.73m2 Final    GFR est non-AA 05/25/2017 >60  >60 ml/min/1.73m2 Final    Comment: Estimated GFR is calculated using the IDMS-traceable Modification of Diet in Renal Disease (MDRD) Study equation, reported for both  Americans (GFRAA) and non- Americans (GFRNA), and normalized to 1.73m2 body surface area.  The physician must decide which value applies to the patient. The MDRD study equation should only be used in individuals age 25 or older. It has not been validated for the following: pregnant women, patients with serious comorbid conditions, or on certain medications, or persons with extremes of body size, muscle mass, or nutritional status.  Calcium 05/25/2017 8.8  8.5 - 10.1 MG/DL Final    Bilirubin, total 05/25/2017 0.9  0.2 - 1.0 MG/DL Final    ALT (SGPT) 05/25/2017 41  12 - 78 U/L Final    AST (SGOT) 05/25/2017 24  15 - 37 U/L Final    Alk. phosphatase 05/25/2017 88  45 - 117 U/L Final    Protein, total 05/25/2017 7.8  6.4 - 8.2 g/dL Final    Albumin 05/25/2017 3.8  3.5 - 5.0 g/dL Final    Globulin 05/25/2017 4.0  2.0 - 4.0 g/dL Final    A-G Ratio 05/25/2017 1.0* 1.1 - 2.2   Final    INR (POC) 05/25/2017 0.9  <1.2   Final    The intended use of the i-STAT PT/INR is for monitoring oral anticoagulant therapy and not for evaluating individual factor deficiencies.  Ventricular Rate 05/25/2017 97  BPM Final    Atrial Rate 05/25/2017 97  BPM Final    P-R Interval 05/25/2017 166  ms Final    QRS Duration 05/25/2017 90  ms Final    Q-T Interval 05/25/2017 386  ms Final    QTC Calculation (Bezet) 05/25/2017 490  ms Final    Calculated P Axis 05/25/2017 18  degrees Final    Calculated T Axis 05/25/2017 0  degrees Final    Diagnosis 05/25/2017    Final                    Value:Normal sinus rhythm  Borderline QT interval  Possible Anterior infarct , age undetermined  Abnormal ECG  When compared with ECG of 20-FEB-2017 16:04,  Slight loss of anterior R wave, possibly from lead placement variation. QT has lengthened  Confirmed by Tomma Rubinstein, M.D., Walter Cruz ([id number]) on 5/25/2017 4:32:01 PM      Glucose (POC) 05/25/2017 318* 65 - 100 mg/dL Final    Comment: (NOTE)  The Accu-Chek Inform II glucometer is not FDA cleared for critically   ill patient use.  A study was performed validating the equivalence of   glucometer and clinical laboratory results on this patient   population. Despite the study, use of glucometers with capillary   specimens from critically ill patients, regardless of their location,   makes the test high complexity and requires the performing individual   to comply with CLIA requirements more stringent than those for waived   testing in the hospital setting. Critical thinking skills are   necessary to determine a potentially critically ill patients status   prior to using a glucometer.  Performed by 05/25/2017 Bravo Romannadege   Final    Hemoglobin A1c 05/25/2017 9.9* 4.2 - 6.3 % Final    Est. average glucose 05/25/2017 237  mg/dL Final    Comment: (NOTE)  The eAG should be interpreted with patient characteristics in mind   since ethnicity, interindividual differences, red cell lifespan,   variation in rates of glycation, etc. may affect the validity of the   calculation.  Special Requests: 05/25/2017 NO SPECIAL REQUESTS    Preliminary    Culture result: 05/25/2017     Preliminary                    Value:MRSA NOT PRESENT  AT 19 HOURS      Glucose (POC) 05/25/2017 342* 65 - 100 mg/dL Final    Comment: (NOTE)  The Accu-Chek Inform II glucometer is not FDA cleared for critically   ill patient use. A study was performed validating the equivalence of   glucometer and clinical laboratory results on this patient   population. Despite the study, use of glucometers with capillary   specimens from critically ill patients, regardless of their location,   makes the test high complexity and requires the performing individual   to comply with CLIA requirements more stringent than those for waived   testing in the hospital setting. Critical thinking skills are   necessary to determine a potentially critically ill patients status   prior to using a glucometer.       Performed by 05/25/2017 Darren Malik (PCT)   Final    Glucose (POC) 05/25/2017 361* 65 - 100 mg/dL Final    Comment: (NOTE)  The Accu-Chek Inform II glucometer is not FDA cleared for critically   ill patient use. A study was performed validating the equivalence of   glucometer and clinical laboratory results on this patient   population. Despite the study, use of glucometers with capillary   specimens from critically ill patients, regardless of their location,   makes the test high complexity and requires the performing individual   to comply with CLIA requirements more stringent than those for waived   testing in the hospital setting. Critical thinking skills are   necessary to determine a potentially critically ill patients status   prior to using a glucometer.       Performed by 05/25/2017 Michael Johnson   Final    WBC 05/25/2017 7.6  3.6 - 11.0 K/uL Final    RBC 05/25/2017 4.66  3.80 - 5.20 M/uL Final    HGB 05/25/2017 14.0  11.5 - 16.0 g/dL Final    HCT 05/25/2017 38.6  35.0 - 47.0 % Final    MCV 05/25/2017 82.8  80.0 - 99.0 FL Final    MCH 05/25/2017 30.0  26.0 - 34.0 PG Final    MCHC 05/25/2017 36.3  30.0 - 36.5 g/dL Final    RDW 05/25/2017 13.2  11.5 - 14.5 % Final    PLATELET 00/73/0813 264  150 - 400 K/uL Final    Magnesium 05/25/2017 1.9  1.6 - 2.4 mg/dL Final    Phosphorus 05/25/2017 3.0  2.6 - 4.7 MG/DL Final    Sodium 05/25/2017 134* 136 - 145 mmol/L Final    Potassium 05/25/2017 3.8  3.5 - 5.1 mmol/L Final    Chloride 05/25/2017 98  97 - 108 mmol/L Final    CO2 05/25/2017 27  21 - 32 mmol/L Final    Anion gap 05/25/2017 9  5 - 15 mmol/L Final    Glucose 05/25/2017 381* 65 - 100 mg/dL Final    BUN 05/25/2017 15  6 - 20 MG/DL Final    Creatinine 05/25/2017 0.81  0.55 - 1.02 MG/DL Final    BUN/Creatinine ratio 05/25/2017 19  12 - 20   Final    GFR est AA 05/25/2017 >60  >60 ml/min/1.73m2 Final    GFR est non-AA 05/25/2017 >60  >60 ml/min/1.73m2 Final    Calcium 05/25/2017 8.3* 8.5 - 10.1 MG/DL Final    LIPID PROFILE 05/25/2017        Final    Cholesterol, total 05/25/2017 171  <200 MG/DL Final    Triglyceride 05/25/2017 328* <150 MG/DL Final    Based on NCEP-ATP III:  Triglycerides <150 mg/dL  is considered normal, 150-199 mg/dL  borderline high,  200-499 mg/dL high and  greater than or equal to 500 mg/dL very high.  HDL Cholesterol 05/25/2017 35  MG/DL Final    Based on NCEP ATP III, HDL Cholesterol <40 mg/dL is considered low and >60 mg/dL is elevated.  LDL, calculated 05/25/2017 70.4  0 - 100 MG/DL Final    Comment: Based on the NCEP-ATP: LDL-C concentrations are considered  optimal <100 mg/dL,  near optimal/above Normal 100-129 mg/dL  Borderline High: 130-159, High: 160-189 mg/dL  Very High: Greater than or equal to 190 mg/dL      VLDL, calculated 05/25/2017 65.6  MG/DL Final    CHOL/HDL Ratio 05/25/2017 4.9  0 - 5.0   Final    Glucose (POC) 05/26/2017 323* 65 - 100 mg/dL Final    Comment: (NOTE)  The Accu-Chek Inform II glucometer is not FDA cleared for critically   ill patient use. A study was performed validating the equivalence of   glucometer and clinical laboratory results on this patient   population. Despite the study, use of glucometers with capillary   specimens from critically ill patients, regardless of their location,   makes the test high complexity and requires the performing individual   to comply with CLIA requirements more stringent than those for waived   testing in the hospital setting. Critical thinking skills are   necessary to determine a potentially critically ill patients status   prior to using a glucometer.  Performed by 05/26/2017 Lisette GodinezPCT)   Final    Glucose (POC) 05/26/2017 259* 65 - 100 mg/dL Final    Comment: (NOTE)  The Accu-Chek Inform II glucometer is not FDA cleared for critically   ill patient use. A study was performed validating the equivalence of   glucometer and clinical laboratory results on this patient   population.  Despite the study, use of glucometers with capillary   specimens from critically ill patients, regardless of their location,   makes the test high complexity and requires the performing individual   to comply with CLIA requirements more stringent than those for waived   testing in the hospital setting. Critical thinking skills are   necessary to determine a potentially critically ill patients status   prior to using a glucometer.  Performed by 05/26/2017 Eleazar Mendez (PCT)   Final    Glucose (POC) 05/26/2017 85  65 - 100 mg/dL Final    Comment: (NOTE)  The Accu-Chek Inform II glucometer is not FDA cleared for critically   ill patient use. A study was performed validating the equivalence of   glucometer and clinical laboratory results on this patient   population. Despite the study, use of glucometers with capillary   specimens from critically ill patients, regardless of their location,   makes the test high complexity and requires the performing individual   to comply with CLIA requirements more stringent than those for waived   testing in the hospital setting. Critical thinking skills are   necessary to determine a potentially critically ill patients status   prior to using a glucometer.  Performed by 05/26/2017 Sandra Salcedo   Final         Exam:  Visit Vitals    /88 (BP 1 Location: Left arm, BP Patient Position: At rest)    Pulse 74    Temp 97.5 °F (36.4 °C)    Resp 20    Wt 106.5 kg (234 lb 12.6 oz)    SpO2 94%    BMI 40.3 kg/m2     Gen: Awake, alert, follows commands  Appropriate appearance, normal speech. Oriented to all spheres. No visual field defect on confrontation exam.  Full eyes movement, with no nystagmus, no diplopia, no ptosis. Normal gag and swallow. All remaining cranial nerves were normal  Motor function: 5/5 in all extremities  Sensroy: intact to LT, PP and JPS  Good FTN and HTS   Gait: Normal    Assessment:     1. Numbness    2. Acute nonintractable headache, unspecified headache type          Plan: 1. Discussed with patient MRI brain is negative and headache is probably migraine  2.  Will give combination of Chlorpromazine 25 mg IM, Sumatriptan 50 mg 1 tab and Ketorolac 30 mg IM to break headache cycle  3. Optimize medical management of HTN  4.  Can be discharged to home tomorrow if headache is better    Please call for questions        Andrez Rodríguez MD  Diplomate, American Board of Psychiatry and Neurology  Diplomate, Neuromuscular Medicine  Diplomate, American Board of Electrodiagnostic Medicine

## 2017-05-27 VITALS
SYSTOLIC BLOOD PRESSURE: 148 MMHG | BODY MASS INDEX: 40.3 KG/M2 | OXYGEN SATURATION: 98 % | WEIGHT: 234.79 LBS | RESPIRATION RATE: 16 BRPM | TEMPERATURE: 97.8 F | HEART RATE: 78 BPM | DIASTOLIC BLOOD PRESSURE: 85 MMHG

## 2017-05-27 LAB
GLUCOSE BLD STRIP.AUTO-MCNC: 183 MG/DL (ref 65–100)
SERVICE CMNT-IMP: ABNORMAL

## 2017-05-27 PROCEDURE — 74011636637 HC RX REV CODE- 636/637: Performed by: INTERNAL MEDICINE

## 2017-05-27 PROCEDURE — 74011250637 HC RX REV CODE- 250/637: Performed by: PSYCHIATRY & NEUROLOGY

## 2017-05-27 PROCEDURE — 74011250636 HC RX REV CODE- 250/636: Performed by: PSYCHIATRY & NEUROLOGY

## 2017-05-27 PROCEDURE — 99218 HC RM OBSERVATION: CPT

## 2017-05-27 PROCEDURE — 74011250637 HC RX REV CODE- 250/637: Performed by: INTERNAL MEDICINE

## 2017-05-27 PROCEDURE — 82962 GLUCOSE BLOOD TEST: CPT

## 2017-05-27 PROCEDURE — 74011250636 HC RX REV CODE- 250/636: Performed by: INTERNAL MEDICINE

## 2017-05-27 RX ORDER — PROCHLORPERAZINE EDISYLATE 5 MG/ML
5 INJECTION INTRAMUSCULAR; INTRAVENOUS
Status: COMPLETED | OUTPATIENT
Start: 2017-05-27 | End: 2017-05-27

## 2017-05-27 RX ADMIN — Medication 10 ML: at 05:38

## 2017-05-27 RX ADMIN — INSULIN LISPRO 3 UNITS: 100 INJECTION, SOLUTION INTRAVENOUS; SUBCUTANEOUS at 09:08

## 2017-05-27 RX ADMIN — KETOROLAC TROMETHAMINE 30 MG: 30 INJECTION, SOLUTION INTRAMUSCULAR at 00:52

## 2017-05-27 RX ADMIN — SUMATRIPTAN SUCCINATE 50 MG: 25 TABLET ORAL at 00:52

## 2017-05-27 RX ADMIN — INSULIN LISPRO 30 UNITS: 100 INJECTION, SOLUTION INTRAVENOUS; SUBCUTANEOUS at 09:08

## 2017-05-27 RX ADMIN — FERROUS SULFATE TAB 325 MG (65 MG ELEMENTAL FE) 325 MG: 325 (65 FE) TAB at 09:07

## 2017-05-27 RX ADMIN — ENOXAPARIN SODIUM 40 MG: 40 INJECTION SUBCUTANEOUS at 09:08

## 2017-05-27 RX ADMIN — HYDROMORPHONE HYDROCHLORIDE 0.5 MG: 1 INJECTION, SOLUTION INTRAMUSCULAR; INTRAVENOUS; SUBCUTANEOUS at 09:14

## 2017-05-27 RX ADMIN — PROCHLORPERAZINE EDISYLATE 5 MG: 5 INJECTION INTRAMUSCULAR; INTRAVENOUS at 00:49

## 2017-05-27 RX ADMIN — HYDRALAZINE HYDROCHLORIDE 25 MG: 25 TABLET, FILM COATED ORAL at 09:07

## 2017-05-27 RX ADMIN — LOSARTAN POTASSIUM 50 MG: 50 TABLET, FILM COATED ORAL at 09:07

## 2017-05-27 NOTE — PROGRESS NOTES
Bedside and Verbal shift change report given to Jessica Buchanan RN (oncoming nurse) by Robin Jose RN (offgoing nurse). Report included the following information SBAR, Kardex, MAR and Cardiac Rhythm NSR.     1000 - Completed discharge instruction with patient. IVs removed. Telemetry box removed. Prescription given to patient. Volunteer service called to take patient to discharge lobby.

## 2017-05-27 NOTE — PROGRESS NOTES
[address]  [address]  [phone]      Medical Progress Note      NAME: Yasmeen Vaughan   :  1985  MRM:  [id number]    Date/Time: 2017  7:44 AM          Subjective:     Chief Complaint:  Headache: much better after Neurology cocktail last night. Wants to go home    ROS:  (bold if positive, if negative)                        Tolerating Diet          Objective:       Vitals:          Last 24hrs VS reviewed since prior progress note. Most recent are:    Visit Vitals    BP (!) 151/94 (BP 1 Location: Left arm, BP Patient Position: At rest;Supine; Head of bed elevated (Comment degrees))    Pulse 76    Temp 98.1 °F (36.7 °C)    Resp 16    Wt 106.5 kg (234 lb 12.6 oz)    SpO2 93%    BMI 40.3 kg/m2     SpO2 Readings from Last 6 Encounters:   17 93%   17 98%   17 97%   16 99%   14 99%   14 100%        No intake or output data in the 24 hours ending 17 0744       Exam:     Physical Exam:    Gen:  Well-developed, obese, in no acute distress  HEENT:  Pink conjunctivae, PERRL, hearing intact to voice, moist mucous membranes  Neck:  Supple, without masses, thyroid non-tender  Resp:  No accessory muscle use, clear breath sounds without wheezes rales or rhonchi  Card:  No murmurs, normal S1, S2 without thrills, bruits or peripheral edema, 2+ pedal pulses  Abd:  Soft, non-tender, non-distended, normoactive bowel sounds are present, no palpable organomegaly and no detectable hernias  Lymph:  No cervical or inguinal adenopathy  Musc:  No cyanosis or clubbing  Skin:  No rashes or ulcers, skin turgor is good, cap refill <2 sec  Neuro:  Cranial nerves are grossly intact, no focal motor weakness, follows commands appropriately  Psych:  Good insight, oriented to person, place and time, alert       Telemetry reviewed:   normal sinus rhythm    Medications Reviewed: (see below)    Lab Data Reviewed: (see below)    ______________________________________________________________________    Medications:     Current Facility-Administered Medications   Medication Dose Route Frequency    insulin glargine (LANTUS) injection 50 Units  50 Units SubCUTAneous QHS    insulin lispro (HUMALOG) injection 30 Units  30 Units SubCUTAneous TID WITH MEALS    losartan (COZAAR) tablet 50 mg  50 mg Oral DAILY    ferrous sulfate tablet 325 mg  325 mg Oral BID WITH MEALS    hydrALAZINE (APRESOLINE) tablet 25 mg  25 mg Oral TID    0.9% sodium chloride infusion  75 mL/hr IntraVENous CONTINUOUS    sodium chloride (NS) flush 5-10 mL  5-10 mL IntraVENous Q8H    sodium chloride (NS) flush 5-10 mL  5-10 mL IntraVENous PRN    acetaminophen (TYLENOL) tablet 650 mg  650 mg Oral Q4H PRN    oxyCODONE-acetaminophen (PERCOCET) 5-325 mg per tablet 1 Tab  1 Tab Oral Q4H PRN    HYDROmorphone (PF) (DILAUDID) injection 0.5 mg  0.5 mg IntraVENous Q4H PRN    zolpidem (AMBIEN) tablet 5 mg  5 mg Oral QHS PRN    enoxaparin (LOVENOX) injection 40 mg  40 mg SubCUTAneous Q12H    insulin lispro (HUMALOG) injection   SubCUTAneous AC&HS    glucose chewable tablet 16 g  4 Tab Oral PRN    dextrose (D50W) injection syrg 12.5-25 g  12.5-25 g IntraVENous PRN    glucagon (GLUCAGEN) injection 1 mg  1 mg IntraMUSCular PRN    labetalol (NORMODYNE;TRANDATE) injection 20 mg  20 mg IntraVENous Q4H PRN            Lab Review:     Recent Labs      05/25/17   2331  05/25/17   1259   WBC  7.6  9.5   HGB  14.0  15.3   HCT  38.6  42.0   PLT  345  311     Recent Labs      05/25/17   2331  05/25/17   1259  05/25/17   1224   NA  134*  131*   --    K  3.8  4.2   --    CL  98  97   --    CO2  27  24   --    GLU  381*  390*   --    BUN  15  18   --    CREA  0.81  0.75   --    CA  8.3*  8.8   --    MG  1.9   --    --    PHOS  3.0   --    --    ALB   --   3.8   --    TBILI   --   0.9   --    SGOT   --   24   --    ALT   --   41   --    INR   --    --   0.9     Lab Results Component Value Date/Time    Glucose (POC) 249 05/26/2017 08:56 PM    Glucose (POC) 85 05/26/2017 03:47 PM    Glucose (POC) 259 05/26/2017 10:59 AM    Glucose (POC) 323 05/26/2017 06:51 AM    Glucose (POC) 361 05/25/2017 09:05 PM     No results for input(s): PH, PCO2, PO2, HCO3, FIO2 in the last 72 hours. Recent Labs      05/25/17   1224   INR  0.9     Lab Results   Component Value Date/Time    Specimen Description: URINE 03/18/2014 04:46 PM    Specimen Description: URINE 03/04/2014 06:35 PM     Lab Results   Component Value Date/Time    Culture result: MRSA NOT PRESENT 05/25/2017 05:06 PM    Culture result:  05/25/2017 05:06 PM         Screening of patient nares for MRSA is for surveillance purposes and, if positive, to facilitate isolation considerations in high risk settings. It is not intended for automatic decolonization interventions per se as regimens are not sufficiently effective to warrant routine use.     Culture result: ESCHERICHIA COLI 03/18/2014 04:46 PM            Assessment:     Principal Problem:    Migraine (5/25/2017)    Active Problems:    Obesity (5/25/2017)      Type 2 diabetes mellitus without complication ([facility] [address] (9/00/9138)      HTN (hypertension) (5/25/2017)           Plan:     Principal Problem:    Migraine (5/25/2017)   - Neurology input appreciated   - headache much better   - home today    Active Problems:    Obesity (5/25/2017)   - counseled on weight loss       Type 2 diabetes mellitus without complication ([facility] [address] (8/85/0081)   - A1c elevated at 9.9, poor control at home   - BS persistently >300 here, did not order Lantus last night, will give now and follow BS      HTN (hypertension) (5/25/2017)   - BP better overall   - home on Losartan and scheduled hydralazine      Total time spent with patient: 35 minutes                  Care Plan discussed with: Patient, Care Manager and Nursing Staff    Discussed:  Code Status, Care Plan and D/C Planning    Prophylaxis:  Lovenox and SCD's    Disposition:  Home w/Family           ___________________________________________________    Attending Physician: Carter Saenz MD

## 2017-05-27 NOTE — PROGRESS NOTES
Bedside and Verbal shift change report given to Sherin Lilly, RN (oncoming nurse) by Ale Aguilera, JOANNE (offgoing nurse). Report included the following information SBAR, Kardex, Intake/Output, Recent Results, Med Rec Status and Cardiac Rhythm NSR.     2115: Pt alert, oriented. Patient Vitals for the past 4 hrs:   Temp Pulse Resp BP SpO2   05/26/17 2346 97.8 °F (36.6 °C) 88 18 113/73 99 %   05/26/17 2253 - 83 - - -   05/26/17 2045 98.1 °F (36.7 °C) 87 20 (!) 174/103 97 %       Held Migraine cocktail of Thorazine, Ketorolac, and Imitrex as pt B/P was high. Administered scheduled Apresoline and 0.5 mg dilaudid prn for pain. 2358: Notified Dr. Raphael Ennis of pt status. Alerted Dr. Ralph Don pts refusal for IM injection of Thorazine. Dr. Latoya Yadav to discontinue Thorazine, and instead ordered Compazine 5 mg IV to be given in conjunction with Ketorolac and Imitrex. 0115: Pt sleeping soundly. Patient Vitals for the past 12 hrs:   Temp Pulse Resp BP SpO2   05/27/17 0652 - 76 - - -   05/27/17 0448 98.1 °F (36.7 °C) 77 16 (!) 151/94 93 %   05/26/17 2346 97.8 °F (36.6 °C) 88 18 113/73 99 %   05/26/17 2253 - 83 - - -   05/26/17 2045 98.1 °F (36.7 °C) 87 20 (!) 174/103 97 %     Bedside and Verbal shift change report given to Fabiola Garcia RN (oncoming nurse) by Sherin Lilly RN (offgoing nurse). Report included the following information SBAR, Kardex, ED Summary, Intake/Output, Recent Results, Med Rec Status and Cardiac Rhythm NSR.

## 2017-06-06 ENCOUNTER — HOSPITAL ENCOUNTER (EMERGENCY)
Age: 32
Discharge: HOME | End: 2017-06-06
Attending: EMERGENCY MEDICINE
Payer: SELF-PAY

## 2017-06-06 VITALS
TEMPERATURE: 98.1 F | WEIGHT: 240 LBS | BODY MASS INDEX: 40.97 KG/M2 | HEIGHT: 64 IN | RESPIRATION RATE: 16 BRPM | OXYGEN SATURATION: 98 % | DIASTOLIC BLOOD PRESSURE: 88 MMHG | HEART RATE: 89 BPM | SYSTOLIC BLOOD PRESSURE: 152 MMHG

## 2017-06-06 DIAGNOSIS — G43.009 NONINTRACTABLE MIGRAINE, UNSPECIFIED MIGRAINE TYPE: Primary | ICD-10-CM

## 2017-06-06 PROCEDURE — 99282 EMERGENCY DEPT VISIT SF MDM: CPT

## 2017-06-06 PROCEDURE — 74011250636 HC RX REV CODE- 250/636: Performed by: EMERGENCY MEDICINE

## 2017-06-06 PROCEDURE — 96375 TX/PRO/DX INJ NEW DRUG ADDON: CPT

## 2017-06-06 PROCEDURE — 96361 HYDRATE IV INFUSION ADD-ON: CPT

## 2017-06-06 PROCEDURE — 96374 THER/PROPH/DIAG INJ IV PUSH: CPT

## 2017-06-06 RX ORDER — KETOROLAC TROMETHAMINE 30 MG/ML
30 INJECTION, SOLUTION INTRAMUSCULAR; INTRAVENOUS
Status: COMPLETED | OUTPATIENT
Start: 2017-06-06 | End: 2017-06-06

## 2017-06-06 RX ORDER — DIPHENHYDRAMINE HYDROCHLORIDE 50 MG/ML
50 INJECTION, SOLUTION INTRAMUSCULAR; INTRAVENOUS
Status: DISCONTINUED | OUTPATIENT
Start: 2017-06-06 | End: 2017-06-06 | Stop reason: HOSPADM

## 2017-06-06 RX ORDER — PROCHLORPERAZINE EDISYLATE 5 MG/ML
10 INJECTION INTRAMUSCULAR; INTRAVENOUS
Status: COMPLETED | OUTPATIENT
Start: 2017-06-06 | End: 2017-06-06

## 2017-06-06 RX ORDER — SODIUM CHLORIDE 0.9 % (FLUSH) 0.9 %
5-10 SYRINGE (ML) INJECTION AS NEEDED
Status: DISCONTINUED | OUTPATIENT
Start: 2017-06-06 | End: 2017-06-06 | Stop reason: HOSPADM

## 2017-06-06 RX ORDER — SODIUM CHLORIDE 0.9 % (FLUSH) 0.9 %
5-10 SYRINGE (ML) INJECTION EVERY 8 HOURS
Status: DISCONTINUED | OUTPATIENT
Start: 2017-06-06 | End: 2017-06-06 | Stop reason: HOSPADM

## 2017-06-06 RX ADMIN — PROCHLORPERAZINE EDISYLATE 10 MG: 5 INJECTION INTRAMUSCULAR; INTRAVENOUS at 19:51

## 2017-06-06 RX ADMIN — SODIUM CHLORIDE 1000 ML: 900 INJECTION, SOLUTION INTRAVENOUS at 20:03

## 2017-06-06 RX ADMIN — KETOROLAC TROMETHAMINE 30 MG: 30 INJECTION, SOLUTION INTRAMUSCULAR at 19:51

## 2017-06-06 RX ADMIN — METHYLPREDNISOLONE SODIUM SUCCINATE 125 MG: 125 INJECTION, POWDER, FOR SOLUTION INTRAMUSCULAR; INTRAVENOUS at 19:51

## 2017-06-06 NOTE — ED NOTES
Bedside and Verbal shift change report given to Taz Kapoor (oncoming nurse) by Manasa Paiz (offgoing nurse). Report included the following information SBAR, ED Summary, Intake/Output, MAR and Recent Results.

## 2017-06-06 NOTE — ED PROVIDER NOTES
HPI Comments: 27 y.o. female with past medical history significant for HTN, DM, complicated migraines, hypothyroidism, MRSA, anemia, and obesity who presents to the ED with chief complaint of headache. Pt reports a posterior and left sided headache onset a couple days ago accompanied by nausea, 3 episodes of vomiting, decreased appetite, and leg swelling. Pt states she has hx of complicated migraines with associated intermittent numbness and hypertension and says her current headache is similar to her typical migraines. Pt states she has taken 4 Imitrex for her headache in the past two days without relief. Pt states she also takes hydralazine PRN (when diastolic BP is >843) which she took at about 1430 because her BP was elevated at 220/132. Pt states she was recently admitted for similar sx about 2 weeks ago, says she had an MRI to rule out stroke that was negative and she was told her sx were due to complicated migraines. Pt denies any recent head injury. Pt states she has hx of DM, says her blood sugar was 384 today so she adjusted her dosage of insulin appropriately about 2 hours ago. Pt denies photophobia, sensitivity to sound, urinary frequency, leg pain, numbness, weakness, or visual changes. There are no other acute medical complaints voiced at this time. Chart review: Last imaging on May 25 when pt had a negative CT scan of her head. Pt also had an MRI of her brain during her hospitalization that was negative. Social Hx: Denies smoking tobacco, but says her  smokes inside the house. PCP: None    Note written by Dieter Barba, as dictated by Yesenia Vee MD 5:26 PM     The history is provided by the patient and medical records.         Past Medical History:   Diagnosis Date    Anemia     Complicated migraine     with extremity numbness    Diabetes (HCC)     Hx MRSA infection     Hypertension     Irregular menstrual cycle     Obesity     Other ill-defined conditions hypothyroidism       Past Surgical History:   Procedure Laterality Date    HX CHOLECYSTECTOMY      HX HEENT      HX TONSIL AND ADENOIDECTOMY  11    HX TYMPANOSTOMY           Family History:   Problem Relation Age of Onset    Hypertension Other      \"all her family\"    Diabetes Other      \"all her family\"       Social History     Social History    Marital status:      Spouse name: N/A    Number of children: N/A    Years of education: N/A     Occupational History    Not on file. Social History Main Topics    Smoking status: Never Smoker    Smokeless tobacco: Never Used    Alcohol use No    Drug use: No    Sexual activity: Not on file     Other Topics Concern    Not on file     Social History Narrative         ALLERGIES: Pcn [penicillins]; Vancomycin; and Voltaren [diclofenac sodium]    Review of Systems   Constitutional: Positive for appetite change (decreased). Negative for fever and unexpected weight change. HENT: Negative for ear pain, hearing loss, nosebleeds, rhinorrhea, sore throat and trouble swallowing. Eyes: Negative for photophobia and visual disturbance. Respiratory: Negative. Negative for cough, chest tightness and shortness of breath. Cardiovascular: Positive for leg swelling. Negative for chest pain and palpitations. Gastrointestinal: Positive for nausea and vomiting. Negative for abdominal distention, abdominal pain and blood in stool. Endocrine: Negative. Genitourinary: Negative for dysuria, frequency and hematuria. Musculoskeletal: Negative. Negative for back pain and myalgias. Skin: Negative. Negative for rash. Allergic/Immunologic: Negative. Neurological: Positive for headaches. Negative for dizziness, syncope, weakness and numbness. Hematological: Negative. Psychiatric/Behavioral: Negative. All other systems reviewed and are negative.       Vitals:    06/06/17 1659   BP: (!) 178/114   Pulse: 90   Resp: 15   Temp: 98.1 °F (36.7 °C) SpO2: 98%   Weight: 108.9 kg (240 lb)   Height: 5' 4\" (1.626 m)            Physical Exam   Constitutional: She is oriented to person, place, and time. She appears well-developed and well-nourished. No distress. HENT:   Head: Normocephalic and atraumatic. Right Ear: External ear normal.   Left Ear: External ear normal.   Nose: Nose normal.   Mouth/Throat: Oropharynx is clear and moist.   Eyes: Conjunctivae and EOM are normal. Pupils are equal, round, and reactive to light. Neck: Normal range of motion. Neck supple. No JVD present. No thyromegaly present. Cardiovascular: Normal rate, regular rhythm, normal heart sounds and intact distal pulses. No murmur heard. Pulmonary/Chest: Effort normal and breath sounds normal. No respiratory distress. She has no wheezes. She has no rales. Abdominal: Soft. Bowel sounds are normal. She exhibits no distension. There is no tenderness. Musculoskeletal: Normal range of motion. She exhibits no edema. Neurological: She is alert and oriented to person, place, and time. No cranial nerve deficit. Skin: Skin is warm and dry. No rash noted. Psychiatric: She has a normal mood and affect. Her behavior is normal. Thought content normal.   Nursing note and vitals reviewed. Note written by Dieter Barba, as dictated by Yesenia Vee MD 5:27 PM    [facility]  ED Course       Procedures    PROGRESS NOTE:  8:31 PM   Pt's headache is improved. Neuro exam remains normal. Will discharge. Pt already has prescriptions for her headaches at home.

## 2017-06-07 NOTE — DISCHARGE INSTRUCTIONS
Migraine Headache: Care Instructions  Your Care Instructions  Migraines are painful, throbbing headaches that often start on one side of the head. They may cause nausea and vomiting and make you sensitive to light, sound, or smell. Without treatment, migraines can last from 4 hours to a few days. Medicines can help prevent migraines or stop them after they have started. Your doctor can help you find which ones work best for you. Follow-up care is a key part of your treatment and safety. Be sure to make and go to all appointments, and call your doctor if you are having problems. It's also a good idea to know your test results and keep a list of the medicines you take. How can you care for yourself at home? · Do not drive if you have taken a prescription pain medicine. · Rest in a quiet, dark room until your headache is gone. Close your eyes, and try to relax or go to sleep. Don't watch TV or read. · Put a cold, moist cloth or cold pack on the painful area for 10 to 20 minutes at a time. Put a thin cloth between the cold pack and your skin. · Use a warm, moist towel or a heating pad set on low to relax tight shoulder and neck muscles. · Have someone gently massage your neck and shoulders. · Take your medicines exactly as prescribed. Call your doctor if you think you are having a problem with your medicine. You will get more details on the specific medicines your doctor prescribes. · Be careful not to take pain medicine more often than the instructions allow. You could get worse or more frequent headaches when the medicine wears off. To prevent migraines  · Keep a headache diary so you can figure out what triggers your headaches. Avoiding triggers may help you prevent headaches. Record when each headache began, how long it lasted, and what the pain was like.  (Was it throbbing, aching, stabbing, or dull?) Write down any other symptoms you had with the headache, such as nausea, flashing lights or dark spots, or sensitivity to bright light or loud noise. Note if the headache occurred near your period. List anything that might have triggered the headache. Triggers may include certain foods (chocolate, cheese, wine) or odors, smoke, bright light, stress, or lack of sleep. · If your doctor has prescribed medicine for your migraines, take it as directed. You may have medicine that you take only when you get a migraine and medicine that you take all the time to help prevent migraines. ¨ If your doctor has prescribed medicine for when you get a headache, take it at the first sign of a migraine, unless your doctor has given you other instructions. ¨ If your doctor has prescribed medicine to prevent migraines, take it exactly as prescribed. Call your doctor if you think you are having a problem with your medicine. · Find healthy ways to deal with stress. Migraines are most common during or right after stressful times. Take time to relax before and after you do something that has caused a migraine in the past.  · Try to keep your muscles relaxed by keeping good posture. Check your jaw, face, neck, and shoulder muscles for tension. Try to relax them. When you sit at a desk, change positions often. And make sure to stretch for 30 seconds each hour. · Get plenty of sleep and exercise. · Eat meals on a regular schedule. Avoid foods and drinks that often trigger migraines. These include chocolate, alcohol (especially red wine and port), aspartame, monosodium glutamate (MSG), and some additives found in foods (such as hot dogs, scruggs, cold cuts, aged cheeses, and pickled foods). · Limit caffeine. Don't drink too much coffee, tea, or soda. But don't quit caffeine suddenly. That can also give you migraines. · Do not smoke or allow others to smoke around you. If you need help quitting, talk to your doctor about stop-smoking programs and medicines. These can increase your chances of quitting for good.   · If you are taking birth control pills or hormone therapy, talk to your doctor about whether they are triggering your migraines. When should you call for help? Call 911 anytime you think you may need emergency care. For example, call if:  · You have signs of a stroke. These may include:  ¨ Sudden numbness, paralysis, or weakness in your face, arm, or leg, especially on only one side of your body. ¨ Sudden vision changes. ¨ Sudden trouble speaking. ¨ Sudden confusion or trouble understanding simple statements. ¨ Sudden problems with walking or balance. ¨ A sudden, severe headache that is different from past headaches. Call your doctor now or seek immediate medical care if:  · You have new or worse nausea and vomiting. · You have a new or higher fever. · Your headache gets much worse. Watch closely for changes in your health, and be sure to contact your doctor if:  · You are not getting better after 2 days (48 hours). Where can you learn more? Go to http://kong-delma.info/. Enter R515 in the search box to learn more about \"Migraine Headache: Care Instructions. \"  Current as of: October 14, 2016  Content Version: 11.2  © 9237-8457 [employer]. Care instructions adapted under license by [employer] (which disclaims liability or warranty for this information). If you have questions about a medical condition or this instruction, always ask your healthcare professional. [address] any warranty or liability for your use of this information.

## 2017-06-22 ENCOUNTER — APPOINTMENT (OUTPATIENT)
Dept: CT IMAGING | Age: 32
End: 2017-06-22
Attending: EMERGENCY MEDICINE
Payer: SELF-PAY

## 2017-06-22 ENCOUNTER — HOSPITAL ENCOUNTER (EMERGENCY)
Age: 32
Discharge: HOME | End: 2017-06-22
Attending: EMERGENCY MEDICINE
Payer: SELF-PAY

## 2017-06-22 VITALS
TEMPERATURE: 97.8 F | SYSTOLIC BLOOD PRESSURE: 179 MMHG | OXYGEN SATURATION: 97 % | HEART RATE: 86 BPM | BODY MASS INDEX: 40.97 KG/M2 | HEIGHT: 64 IN | RESPIRATION RATE: 20 BRPM | WEIGHT: 240 LBS | DIASTOLIC BLOOD PRESSURE: 109 MMHG

## 2017-06-22 DIAGNOSIS — G43.009 NONINTRACTABLE MIGRAINE, UNSPECIFIED MIGRAINE TYPE: Primary | ICD-10-CM

## 2017-06-22 LAB — HCG UR QL: NEGATIVE

## 2017-06-22 PROCEDURE — 74011000258 HC RX REV CODE- 258: Performed by: EMERGENCY MEDICINE

## 2017-06-22 PROCEDURE — 96361 HYDRATE IV INFUSION ADD-ON: CPT

## 2017-06-22 PROCEDURE — 96374 THER/PROPH/DIAG INJ IV PUSH: CPT

## 2017-06-22 PROCEDURE — 74011250636 HC RX REV CODE- 250/636: Performed by: EMERGENCY MEDICINE

## 2017-06-22 PROCEDURE — 99283 EMERGENCY DEPT VISIT LOW MDM: CPT

## 2017-06-22 PROCEDURE — 81025 URINE PREGNANCY TEST: CPT

## 2017-06-22 PROCEDURE — 70450 CT HEAD/BRAIN W/O DYE: CPT

## 2017-06-22 PROCEDURE — 96375 TX/PRO/DX INJ NEW DRUG ADDON: CPT

## 2017-06-22 RX ORDER — SODIUM CHLORIDE 0.9 % (FLUSH) 0.9 %
5-10 SYRINGE (ML) INJECTION EVERY 8 HOURS
Status: DISCONTINUED | OUTPATIENT
Start: 2017-06-22 | End: 2017-06-22 | Stop reason: HOSPADM

## 2017-06-22 RX ORDER — SODIUM CHLORIDE 0.9 % (FLUSH) 0.9 %
5-10 SYRINGE (ML) INJECTION AS NEEDED
Status: DISCONTINUED | OUTPATIENT
Start: 2017-06-22 | End: 2017-06-22 | Stop reason: HOSPADM

## 2017-06-22 RX ORDER — PROCHLORPERAZINE EDISYLATE 5 MG/ML
10 INJECTION INTRAMUSCULAR; INTRAVENOUS
Status: COMPLETED | OUTPATIENT
Start: 2017-06-22 | End: 2017-06-22

## 2017-06-22 RX ORDER — DIPHENHYDRAMINE HYDROCHLORIDE 50 MG/ML
50 INJECTION, SOLUTION INTRAMUSCULAR; INTRAVENOUS
Status: DISCONTINUED | OUTPATIENT
Start: 2017-06-22 | End: 2017-06-22 | Stop reason: HOSPADM

## 2017-06-22 RX ORDER — KETOROLAC TROMETHAMINE 30 MG/ML
30 INJECTION, SOLUTION INTRAMUSCULAR; INTRAVENOUS
Status: COMPLETED | OUTPATIENT
Start: 2017-06-22 | End: 2017-06-22

## 2017-06-22 RX ADMIN — KETOROLAC TROMETHAMINE 30 MG: 30 INJECTION, SOLUTION INTRAMUSCULAR at 11:58

## 2017-06-22 RX ADMIN — DIPHENHYDRAMINE HYDROCHLORIDE 50 MG: 50 INJECTION, SOLUTION INTRAMUSCULAR; INTRAVENOUS at 11:58

## 2017-06-22 RX ADMIN — PROCHLORPERAZINE EDISYLATE 10 MG: 5 INJECTION INTRAMUSCULAR; INTRAVENOUS at 11:58

## 2017-06-22 RX ADMIN — SODIUM CHLORIDE 1000 ML: 900 INJECTION, SOLUTION INTRAVENOUS at 11:58

## 2017-06-22 RX ADMIN — METHYLPREDNISOLONE SODIUM SUCCINATE 125 MG: 125 INJECTION, POWDER, FOR SOLUTION INTRAMUSCULAR; INTRAVENOUS at 11:58

## 2017-06-22 RX ADMIN — DIHYDROERGOTAMINE MESYLATE 1 MG: 1 INJECTION, SOLUTION INTRAMUSCULAR; INTRAVENOUS; SUBCUTANEOUS at 13:15

## 2017-06-22 NOTE — ED PROVIDER NOTES
HPI Comments: 27 y.o. female with past medical history significant for complicated migraine, DM, HTN, anemia, obesity, hypothyroidism, irregular menstrual cycle, and MRSA who presents from home with chief complaint of HA. Earlier this morning (08:00), patient awoke to onset of HA with associated nausea and numbness to the right lower extremity. Since then, patient has also complained of her \"right leg giving out. \"  Patient chronically experiences HA's due to h/o migraines, but notes that HA today \"feels very different. \"  Pain presents to the back of the head and radiates toward the sides -- \"it's usually more of a drilling pain, and never goes to the sides. \"  Patient was recently switched from Imitrex to Fioricet with noted improvement. Patient last took Fioricet sometime this morning. Patient also reports elevated BP at 224 today, and proceeded to take Hydralazine. Patient was recently admitted to [facility] on 5/25/17 for acute nonintractable HA. Head CT and MRI of brain revealed no acute findings. Patient's paresthesia was attributed to complex migraine. Patient was also recently seen in the ED on 6/06/17 for similar HA. Patient denies having been seen or followed by Neurology. Patient denies any vomiting. There are no other acute medical concerns at this time. Social hx: +; denies tobacco smoking  PCP: Dr. Cam Pringle MD    Note written by Dieter Wilson, as dictated by Vipin Goel MD 11:36 AM    The history is provided by the patient. No  was used.         Past Medical History:   Diagnosis Date    Anemia     Complicated migraine     with extremity numbness    Diabetes (HCC)     Hx MRSA infection     Hypertension     Irregular menstrual cycle     Obesity     Other ill-defined conditions hypothyroidism       Past Surgical History:   Procedure Laterality Date    HX CHOLECYSTECTOMY      HX HEENT      HX TONSIL AND ADENOIDECTOMY  1992    HX TYMPANOSTOMY Family History:   Problem Relation Age of Onset    Hypertension Other      \"all her family\"    Diabetes Other      \"all her family\"       Social History     Social History    Marital status:      Spouse name: N/A    Number of children: N/A    Years of education: N/A     Occupational History    Not on file. Social History Main Topics    Smoking status: Never Smoker    Smokeless tobacco: Never Used    Alcohol use No    Drug use: No    Sexual activity: Not on file     Other Topics Concern    Not on file     Social History Narrative         ALLERGIES: Pcn [penicillins]; Vancomycin; and Voltaren [diclofenac sodium]    Review of Systems   Constitutional: Negative for chills and fever. HENT: Negative for ear pain and sore throat. Eyes: Negative for pain. Respiratory: Negative for chest tightness and shortness of breath. Cardiovascular: Negative for chest pain and leg swelling. Gastrointestinal: Positive for nausea. Negative for abdominal pain and vomiting. Genitourinary: Negative for dysuria and flank pain. Musculoskeletal: Negative for back pain. Skin: Negative for rash. Neurological: Positive for weakness, numbness (R-leg) and headaches. All other systems reviewed and are negative. Vitals:    06/22/17 1131   BP: (!) 179/109   Pulse: 86   Resp: 20   Temp: 97.8 °F (36.6 °C)   SpO2: 97%   Weight: 108.9 kg (240 lb)   Height: 5' 4\" (1.626 m)            Physical Exam   Constitutional: She is oriented to person, place, and time. She appears well-developed and well-nourished. No distress. HENT:   Head: Normocephalic and atraumatic. Right Ear: External ear normal.   Left Ear: External ear normal.   Nose: Nose normal.   Mouth/Throat: Oropharynx is clear and moist.   Eyes: Conjunctivae and EOM are normal. Pupils are equal, round, and reactive to light. Neck: Normal range of motion. Neck supple. No JVD present. No rigidity. No thyromegaly present. No nuchal rigidity. Cardiovascular: Normal rate, regular rhythm, normal heart sounds and intact distal pulses. No murmur heard. Pulmonary/Chest: Effort normal and breath sounds normal. No respiratory distress. She has no wheezes. She has no rales. Abdominal: Soft. Bowel sounds are normal. She exhibits no distension. There is no tenderness. Musculoskeletal: Normal range of motion. She exhibits no edema. Neurological: She is alert and oriented to person, place, and time. She has normal strength. No cranial nerve deficit or sensory deficit. Subjective paresthesia in the right lower extremity with no weakness. Skin: Skin is warm and dry. No rash noted. Psychiatric: She has a normal mood and affect. Her behavior is normal. Thought content normal.   Nursing note and vitals reviewed. Note written by Dieter German, as dictated by Nabila Jesus MD 11:41 AM    [facility]  ED Course       Procedures      12:50 PM  Patient has hidradenitis suppurativa with multiple abscesses at various stages in both armpits. Each armpit has an abscess that is actively draining after spontaneous rupture. There are no other abscesses that are currently fluctuant or amenable with I&D at this time. 2:29 PM  Head CT negative. HA has substantially improved after DHE. Paresthesia in the right leg has almost resolved completely. At this time, HA is 1/10. No complaint of nausea. Assessment & Plan: migraine HA, will discharge home without prescriptions. Patient has been instructed to continue with home medication regimen. She will f/u with PCP in a few days.

## 2017-06-22 NOTE — ED TRIAGE NOTES
Migraine with hypertension and right leg numbness with nausea that was present when she woke up this am.  History of complicated migraines.

## 2017-06-22 NOTE — DISCHARGE INSTRUCTIONS
Migraine Headache: Care Instructions  Your Care Instructions  Migraines are painful, throbbing headaches that often start on one side of the head. They may cause nausea and vomiting and make you sensitive to light, sound, or smell. Without treatment, migraines can last from 4 hours to a few days. Medicines can help prevent migraines or stop them after they have started. Your doctor can help you find which ones work best for you. Follow-up care is a key part of your treatment and safety. Be sure to make and go to all appointments, and call your doctor if you are having problems. It's also a good idea to know your test results and keep a list of the medicines you take. How can you care for yourself at home? · Do not drive if you have taken a prescription pain medicine. · Rest in a quiet, dark room until your headache is gone. Close your eyes, and try to relax or go to sleep. Don't watch TV or read. · Put a cold, moist cloth or cold pack on the painful area for 10 to 20 minutes at a time. Put a thin cloth between the cold pack and your skin. · Use a warm, moist towel or a heating pad set on low to relax tight shoulder and neck muscles. · Have someone gently massage your neck and shoulders. · Take your medicines exactly as prescribed. Call your doctor if you think you are having a problem with your medicine. You will get more details on the specific medicines your doctor prescribes. · Be careful not to take pain medicine more often than the instructions allow. You could get worse or more frequent headaches when the medicine wears off. To prevent migraines  · Keep a headache diary so you can figure out what triggers your headaches. Avoiding triggers may help you prevent headaches. Record when each headache began, how long it lasted, and what the pain was like.  (Was it throbbing, aching, stabbing, or dull?) Write down any other symptoms you had with the headache, such as nausea, flashing lights or dark spots, or sensitivity to bright light or loud noise. Note if the headache occurred near your period. List anything that might have triggered the headache. Triggers may include certain foods (chocolate, cheese, wine) or odors, smoke, bright light, stress, or lack of sleep. · If your doctor has prescribed medicine for your migraines, take it as directed. You may have medicine that you take only when you get a migraine and medicine that you take all the time to help prevent migraines. ¨ If your doctor has prescribed medicine for when you get a headache, take it at the first sign of a migraine, unless your doctor has given you other instructions. ¨ If your doctor has prescribed medicine to prevent migraines, take it exactly as prescribed. Call your doctor if you think you are having a problem with your medicine. · Find healthy ways to deal with stress. Migraines are most common during or right after stressful times. Take time to relax before and after you do something that has caused a migraine in the past.  · Try to keep your muscles relaxed by keeping good posture. Check your jaw, face, neck, and shoulder muscles for tension. Try to relax them. When you sit at a desk, change positions often. And make sure to stretch for 30 seconds each hour. · Get plenty of sleep and exercise. · Eat meals on a regular schedule. Avoid foods and drinks that often trigger migraines. These include chocolate, alcohol (especially red wine and port), aspartame, monosodium glutamate (MSG), and some additives found in foods (such as hot dogs, scruggs, cold cuts, aged cheeses, and pickled foods). · Limit caffeine. Don't drink too much coffee, tea, or soda. But don't quit caffeine suddenly. That can also give you migraines. · Do not smoke or allow others to smoke around you. If you need help quitting, talk to your doctor about stop-smoking programs and medicines. These can increase your chances of quitting for good.   · If you are taking birth control pills or hormone therapy, talk to your doctor about whether they are triggering your migraines. When should you call for help? Call 911 anytime you think you may need emergency care. For example, call if:  · You have signs of a stroke. These may include:  ¨ Sudden numbness, paralysis, or weakness in your face, arm, or leg, especially on only one side of your body. ¨ Sudden vision changes. ¨ Sudden trouble speaking. ¨ Sudden confusion or trouble understanding simple statements. ¨ Sudden problems with walking or balance. ¨ A sudden, severe headache that is different from past headaches. Call your doctor now or seek immediate medical care if:  · You have new or worse nausea and vomiting. · You have a new or higher fever. · Your headache gets much worse. Watch closely for changes in your health, and be sure to contact your doctor if:  · You are not getting better after 2 days (48 hours). Where can you learn more? Go to http://kong-delma.info/. Enter Y407 in the search box to learn more about \"Migraine Headache: Care Instructions. \"  Current as of: October 14, 2016  Content Version: 11.3  © 3262-2817 [employer]. Care instructions adapted under license by [employer] (which disclaims liability or warranty for this information). If you have questions about a medical condition or this instruction, always ask your healthcare professional. [address] any warranty or liability for your use of this information.

## 2017-09-12 ENCOUNTER — HOSPITAL ENCOUNTER (EMERGENCY)
Age: 32
Discharge: HOME | End: 2017-09-12
Attending: EMERGENCY MEDICINE
Payer: SELF-PAY

## 2017-09-12 VITALS
TEMPERATURE: 98 F | OXYGEN SATURATION: 99 % | HEIGHT: 64 IN | BODY MASS INDEX: 40.97 KG/M2 | SYSTOLIC BLOOD PRESSURE: 160 MMHG | WEIGHT: 240 LBS | HEART RATE: 92 BPM | DIASTOLIC BLOOD PRESSURE: 98 MMHG | RESPIRATION RATE: 15 BRPM

## 2017-09-12 DIAGNOSIS — L02.91 ABSCESS: Primary | ICD-10-CM

## 2017-09-12 LAB
ALBUMIN SERPL-MCNC: 3.4 G/DL (ref 3.5–5)
ALBUMIN/GLOB SERPL: 0.8 {RATIO} (ref 1.1–2.2)
ALP SERPL-CCNC: 94 U/L (ref 45–117)
ALT SERPL-CCNC: 27 U/L (ref 12–78)
ANION GAP SERPL CALC-SCNC: 8 MMOL/L (ref 5–15)
AST SERPL-CCNC: 14 U/L (ref 15–37)
BASOPHILS # BLD: 0 K/UL (ref 0–0.1)
BASOPHILS NFR BLD: 0 % (ref 0–1)
BILIRUB SERPL-MCNC: 0.7 MG/DL (ref 0.2–1)
BUN SERPL-MCNC: 11 MG/DL (ref 6–20)
BUN/CREAT SERPL: 16 (ref 12–20)
CALCIUM SERPL-MCNC: 9.2 MG/DL (ref 8.5–10.1)
CHLORIDE SERPL-SCNC: 99 MMOL/L (ref 97–108)
CO2 SERPL-SCNC: 26 MMOL/L (ref 21–32)
CREAT SERPL-MCNC: 0.7 MG/DL (ref 0.55–1.02)
EOSINOPHIL # BLD: 0.3 K/UL (ref 0–0.4)
EOSINOPHIL NFR BLD: 3 % (ref 0–7)
ERYTHROCYTE [DISTWIDTH] IN BLOOD BY AUTOMATED COUNT: 12.8 % (ref 11.5–14.5)
GLOBULIN SER CALC-MCNC: 4.3 G/DL (ref 2–4)
GLUCOSE BLD STRIP.AUTO-MCNC: 313 MG/DL (ref 65–100)
GLUCOSE SERPL-MCNC: 312 MG/DL (ref 65–100)
HCT VFR BLD AUTO: 37.9 % (ref 35–47)
HGB BLD-MCNC: 13.4 G/DL (ref 11.5–16)
LACTATE SERPL-SCNC: 1.6 MMOL/L (ref 0.4–2)
LYMPHOCYTES # BLD: 3.1 K/UL (ref 0.8–3.5)
LYMPHOCYTES NFR BLD: 27 % (ref 12–49)
MCH RBC QN AUTO: 29.5 PG (ref 26–34)
MCHC RBC AUTO-ENTMCNC: 35.4 G/DL (ref 30–36.5)
MCV RBC AUTO: 83.5 FL (ref 80–99)
MONOCYTES # BLD: 0.7 K/UL (ref 0–1)
MONOCYTES NFR BLD: 6 % (ref 5–13)
NEUTS SEG # BLD: 7.2 K/UL (ref 1.8–8)
NEUTS SEG NFR BLD: 64 % (ref 32–75)
PLATELET # BLD AUTO: 340 K/UL (ref 150–400)
POTASSIUM SERPL-SCNC: 4.2 MMOL/L (ref 3.5–5.1)
PROT SERPL-MCNC: 7.7 G/DL (ref 6.4–8.2)
RBC # BLD AUTO: 4.54 M/UL (ref 3.8–5.2)
SERVICE CMNT-IMP: ABNORMAL
SODIUM SERPL-SCNC: 133 MMOL/L (ref 136–145)
WBC # BLD AUTO: 11.3 K/UL (ref 3.6–11)

## 2017-09-12 PROCEDURE — 85025 COMPLETE CBC W/AUTO DIFF WBC: CPT | Performed by: EMERGENCY MEDICINE

## 2017-09-12 PROCEDURE — 74011000250 HC RX REV CODE- 250: Performed by: EMERGENCY MEDICINE

## 2017-09-12 PROCEDURE — 74011250637 HC RX REV CODE- 250/637: Performed by: EMERGENCY MEDICINE

## 2017-09-12 PROCEDURE — 80053 COMPREHEN METABOLIC PANEL: CPT | Performed by: EMERGENCY MEDICINE

## 2017-09-12 PROCEDURE — 36415 COLL VENOUS BLD VENIPUNCTURE: CPT | Performed by: EMERGENCY MEDICINE

## 2017-09-12 PROCEDURE — 83605 ASSAY OF LACTIC ACID: CPT | Performed by: EMERGENCY MEDICINE

## 2017-09-12 PROCEDURE — 75810000289 HC I&D ABSCESS SIMP/COMP/MULT

## 2017-09-12 PROCEDURE — 99283 EMERGENCY DEPT VISIT LOW MDM: CPT

## 2017-09-12 PROCEDURE — 82962 GLUCOSE BLOOD TEST: CPT

## 2017-09-12 RX ORDER — CLINDAMYCIN HYDROCHLORIDE 300 MG/1
300 CAPSULE ORAL 4 TIMES DAILY
Qty: 40 CAP | Refills: 0 | Status: ON HOLD | OUTPATIENT
Start: 2017-09-12 | End: 2017-09-14

## 2017-09-12 RX ORDER — LIDOCAINE HYDROCHLORIDE 20 MG/ML
10 INJECTION, SOLUTION EPIDURAL; INFILTRATION; INTRACAUDAL; PERINEURAL ONCE
Status: COMPLETED | OUTPATIENT
Start: 2017-09-12 | End: 2017-09-12

## 2017-09-12 RX ORDER — CLINDAMYCIN HYDROCHLORIDE 150 MG/1
300 CAPSULE ORAL
Status: COMPLETED | OUTPATIENT
Start: 2017-09-12 | End: 2017-09-12

## 2017-09-12 RX ADMIN — LIDOCAINE HYDROCHLORIDE 200 MG: 20 INJECTION, SOLUTION EPIDURAL; INFILTRATION; INTRACAUDAL; PERINEURAL at 15:28

## 2017-09-12 RX ADMIN — CLINDAMYCIN HYDROCHLORIDE 300 MG: 150 CAPSULE ORAL at 15:28

## 2017-09-12 NOTE — ED PROVIDER NOTES
HPI Comments: 27 y.o. female with past medical history significant for irregular menstrual cycle, DM, HTN, hypothyroidism, obesity, MRSA infection, anemia, and migraine who presents from home via private vehicle with chief complaint of skin problem. Pt reports an abscess on her right breast that started 2 days ago but \"tripled in size\" over night last night. She notes that the area is tender to the touch and that there was a minimal amount of drainage from the area last night. Pt has a h/o hidradenitis and currently has 4 - 5 lumps. She has been on bactrim for the last 5 days for the other abscesses and states that this is her typical course of treatment when she gets infections. Pt additionally notes having a fever of 102.4 F but that she took tylenol 1 hr prior to arrival. Pt has had MRSA with one of the abscesses. Pt does not have a surgeon. Pt has not been taking medications at home for pain. Lastly, pt expresses concern for her blood sugar levels, stating that she is compliant with her insulin but her BGL has been elevated for the last 3 days - 480 earlier today. There are no other acute medical concerns at this time. Social hx: Never smoker. No alcohol use. Note written by Dieter Ahmadi, as dictated by Alyssa Castellon MD 1:11 PM     The history is provided by the patient. No  was used.         Past Medical History:   Diagnosis Date    Anemia     Complicated migraine     with extremity numbness    Diabetes (HCC)     Hx MRSA infection     Hypertension     Irregular menstrual cycle     Obesity     Other ill-defined conditions hypothyroidism       Past Surgical History:   Procedure Laterality Date    HX CHOLECYSTECTOMY      HX HEENT      HX TONSIL AND ADENOIDECTOMY  1992    HX TYMPANOSTOMY           Family History:   Problem Relation Age of Onset    Hypertension Other      \"all her family\"    Diabetes Other      \"all her family\"       Social History Social History    Marital status:      Spouse name: N/A    Number of children: N/A    Years of education: N/A     Occupational History    Not on file. Social History Main Topics    Smoking status: Never Smoker    Smokeless tobacco: Never Used    Alcohol use No    Drug use: No    Sexual activity: Not on file     Other Topics Concern    Not on file     Social History Narrative         ALLERGIES: Pcn [penicillins]; Vancomycin; and Voltaren [diclofenac sodium]    Review of Systems   Constitutional: Positive for fever. Negative for activity change and chills. HENT: Negative for nosebleeds, sore throat, trouble swallowing and voice change. Eyes: Negative for visual disturbance. Respiratory: Negative for shortness of breath. Cardiovascular: Negative for chest pain and palpitations. Gastrointestinal: Negative for abdominal pain, constipation, diarrhea and nausea. Genitourinary: Negative for difficulty urinating, dysuria, hematuria and urgency. Musculoskeletal: Negative for back pain, neck pain and neck stiffness. Skin: Negative for color change. +Abscess right breast   Allergic/Immunologic: Negative for immunocompromised state. Neurological: Negative for dizziness, seizures, syncope, weakness, light-headedness, numbness and headaches. Psychiatric/Behavioral: Negative for behavioral problems, confusion, hallucinations, self-injury and suicidal ideas. All other systems reviewed and are negative. Vitals:    09/12/17 1242   BP: (!) 163/98   Pulse: 89   Resp: 18   Temp: 98 °F (36.7 °C)   SpO2: 99%   Weight: 108.9 kg (240 lb)   Height: 5' 4\" (1.626 m)            Physical Exam   Constitutional: She is oriented to person, place, and time. She appears well-developed and well-nourished. No distress. HENT:   Head: Normocephalic and atraumatic. Eyes: Pupils are equal, round, and reactive to light. Neck: Normal range of motion. Neck supple.    Cardiovascular: Normal rate, regular rhythm and normal heart sounds. Exam reveals no gallop and no friction rub. No murmur heard. Pulmonary/Chest: Effort normal and breath sounds normal. No respiratory distress. She has no wheezes. Abdominal: Soft. Bowel sounds are normal. She exhibits no distension. There is no tenderness. There is no rebound and no guarding. Musculoskeletal: Normal range of motion. Neurological: She is alert and oriented to person, place, and time. Skin: Skin is warm. No rash noted. She is not diaphoretic. Right breast: medial of nipple line with 4 cm circular indurated hot erythematous wound currently without discharge. Psychiatric: She has a normal mood and affect. Her behavior is normal. Judgment and thought content normal.   Nursing note and vitals reviewed. Note written by Cynthea Goldberg, Scribe, as dictated by Avril King MD 1:12 PM      [facility]  ED Course   This is a 25-year-old female with past medical history of hidradenitis, presenting with complaints of right breast abscess. Patient states abscess increased in size overnight, associated with nausea, and fevers unresponsive to Tylenol Motrin. Physical exam remarkable for approximately 3-4 cm right breast abscess, with 1 cm of death by bedside ultrasound. We'll obtain CMP, CBC, and likely drained with a needle. Patient states she is currently taking Bactrim for frequent abscesses, will consider alternative antibiotic therapy. We will make a disposition based on the patient's diagnostics and response to therapy.     [facility]  Date/Time: 9/12/2017 3:15 PM  Performed by: Pham Dennis  Authorized by: Pham Dennis     Consent:     Consent obtained:  Verbal    Consent given by:  Patient    Risks discussed:  Bleeding, incomplete drainage and pain    Alternatives discussed:  Alternative treatment  Location:     Type:  Abscess  Pre-procedure details:     Skin preparation:  Betadine  Anesthesia (see MAR for exact dosages): Anesthesia method:  Local infiltration    Local anesthetic:  Lidocaine 2% w/o epi  Procedure type:     Complexity:  Simple  Procedure details:     Needle aspiration: yes      Needle size:  18 G    Incision types:  Single straight    Incision depth:  Dermal    Drainage:  Purulent    Drainage amount: Moderate    Packing materials:  None  Post-procedure details:     Patient tolerance of procedure: Tolerated well, no immediate complications         0:68 PM  Patient with mildly elevated WBC count, not septic, tolerated needle drainage well. Will DC with clida for hx or MRSA, PCP follow up and return precautions. 1612: Spoke with pharmacy. Patient states she can not afford clindamycin. After review of chart, clinda switched to doxycycline.  Lois España, KETAN

## 2017-09-12 NOTE — DISCHARGE INSTRUCTIONS
Skin Abscess: Care Instructions  Your Care Instructions    A skin abscess is a bacterial infection that forms a pocket of pus. A boil is a kind of skin abscess. The doctor may have cut an opening in the abscess so that the pus can drain out. You may have gauze in the cut so that the abscess will stay open and keep draining. You may need antibiotics. You will need to follow up with your doctor to make sure the infection has gone away. The doctor has checked you carefully, but problems can develop later. If you notice any problems or new symptoms, get medical treatment right away. Follow-up care is a key part of your treatment and safety. Be sure to make and go to all appointments, and call your doctor if you are having problems. It's also a good idea to know your test results and keep a list of the medicines you take. How can you care for yourself at home? · Apply warm and dry compresses, a heating pad set on low, or a hot water bottle 3 or 4 times a day for pain. Keep a cloth between the heat source and your skin. · If your doctor prescribed antibiotics, take them as directed. Do not stop taking them just because you feel better. You need to take the full course of antibiotics. · Take pain medicines exactly as directed. ¨ If the doctor gave you a prescription medicine for pain, take it as prescribed. ¨ If you are not taking a prescription pain medicine, ask your doctor if you can take an over-the-counter medicine. · Keep your bandage clean and dry. Change the bandage whenever it gets wet or dirty, or at least one time a day. · If the abscess was packed with gauze:  ¨ Keep follow-up appointments to have the gauze changed or removed. If the doctor instructed you to remove the gauze, gently pull out all of the gauze when your doctor tells you to. ¨ After the gauze is removed, soak the area in warm water for 15 to 20 minutes 2 times a day, until the wound closes. When should you call for help?   Call your doctor now or seek immediate medical care if:  · You have signs of worsening infection, such as:  ¨ Increased pain, swelling, warmth, or redness. ¨ Red streaks leading from the infected skin. ¨ Pus draining from the wound. ¨ A fever. Watch closely for changes in your health, and be sure to contact your doctor if:  · You do not get better as expected. Where can you learn more? Go to http://kong-delma.info/. Enter F518 in the search box to learn more about \"Skin Abscess: Care Instructions. \"  Current as of: October 13, 2016  Content Version: 11.3  © 1187-5955 [employer]. Care instructions adapted under license by [employer] (which disclaims liability or warranty for this information). If you have questions about a medical condition or this instruction, always ask your healthcare professional. [place] [address] any warranty or liability for your use of this information. We hope that we have addressed all of your medical concerns. The examination and treatment you received in the Emergency Department were for an emergent problem and were not intended as complete care. It is important that you follow up with your healthcare provider(s) for ongoing care. If your symptoms worsen or do not improve as expected, and you are unable to reach your usual health care provider(s), you should return to the Emergency Department. Today's healthcare is undergoing tremendous change, and patient satisfaction surveys are one of the many tools to assess the quality of medical care. You may receive a survey from the [employer] organization regarding your experience in the Emergency Department. I hope that your experience has been completely positive, particularly the medical care that I provided. As such, please participate in the survey; anything less than excellent does not meet my expectations or intentions.         [facility] and [employer] participate in nationally recognized quality of care measures. If your blood pressure is greater than 120/80, as reported below, we urge that you seek medical care to address the potential of high blood pressure, commonly known as hypertension. Hypertension can be hereditary or can be caused by certain medical conditions, pain, stress, or \"white coat syndrome. \"       Please make an appointment with your health care provider(s) for follow up of your Emergency Department visit. VITALS:   Patient Vitals for the past 8 hrs:   Temp Pulse Resp BP SpO2   09/12/17 1242 98 °F (36.7 °C) 89 18 (!) 163/98 99 %          Thank you for allowing us to provide you with medical care today. We realize that you have many choices for your emergency care needs. Please choose us in the future for any continued health care needs. Latanya Parry [address], [address].   Office: [phone]            Recent Results (from the past 24 hour(s))   GLUCOSE, POC    Collection Time: 09/12/17  1:16 PM   Result Value Ref Range    Glucose (POC) 313 (H) 65 - 100 mg/dL    Performed by Naga 18, COMPREHENSIVE    Collection Time: 09/12/17  1:54 PM   Result Value Ref Range    Sodium 133 (L) 136 - 145 mmol/L    Potassium 4.2 3.5 - 5.1 mmol/L    Chloride 99 97 - 108 mmol/L    CO2 26 21 - 32 mmol/L    Anion gap 8 5 - 15 mmol/L    Glucose 312 (H) 65 - 100 mg/dL    BUN 11 6 - 20 MG/DL    Creatinine 0.70 0.55 - 1.02 MG/DL    BUN/Creatinine ratio 16 12 - 20      GFR est AA >60 >60 ml/min/1.73m2    GFR est non-AA >60 >60 ml/min/1.73m2    Calcium 9.2 8.5 - 10.1 MG/DL    Bilirubin, total 0.7 0.2 - 1.0 MG/DL    ALT (SGPT) 27 12 - 78 U/L    AST (SGOT) 14 (L) 15 - 37 U/L    Alk.  phosphatase 94 45 - 117 U/L    Protein, total 7.7 6.4 - 8.2 g/dL    Albumin 3.4 (L) 3.5 - 5.0 g/dL    Globulin 4.3 (H) 2.0 - 4.0 g/dL    A-G Ratio 0.8 (L) 1.1 - 2.2 CBC WITH AUTOMATED DIFF    Collection Time: 09/12/17  2:33 PM   Result Value Ref Range    WBC 11.3 (H) 3.6 - 11.0 K/uL    RBC 4.54 3.80 - 5.20 M/uL    HGB 13.4 11.5 - 16.0 g/dL    HCT 37.9 35.0 - 47.0 %    MCV 83.5 80.0 - 99.0 FL    MCH 29.5 26.0 - 34.0 PG    MCHC 35.4 30.0 - 36.5 g/dL    RDW 12.8 11.5 - 14.5 %    PLATELET 193 975 - 586 K/uL    NEUTROPHILS 64 32 - 75 %    LYMPHOCYTES 27 12 - 49 %    MONOCYTES 6 5 - 13 %    EOSINOPHILS 3 0 - 7 %    BASOPHILS 0 0 - 1 %    ABS. NEUTROPHILS 7.2 1.8 - 8.0 K/UL    ABS. LYMPHOCYTES 3.1 0.8 - 3.5 K/UL    ABS. MONOCYTES 0.7 0.0 - 1.0 K/UL    ABS. EOSINOPHILS 0.3 0.0 - 0.4 K/UL    ABS. BASOPHILS 0.0 0.0 - 0.1 K/UL   LACTIC ACID    Collection Time: 09/12/17  2:33 PM   Result Value Ref Range    Lactic acid 1.6 0.4 - 2.0 MMOL/L       No results found.

## 2017-09-12 NOTE — ED TRIAGE NOTES
No complaints Pt c/o pain to right breast and states she has an abcess x 2 days. C/o fever and also concerned about hypergycemia.

## 2017-09-14 ENCOUNTER — HOSPITAL ENCOUNTER (INPATIENT)
Age: 32
LOS: 2 days | Discharge: HOME | DRG: 872 | End: 2017-09-16
Attending: EMERGENCY MEDICINE | Admitting: INTERNAL MEDICINE
Payer: SELF-PAY

## 2017-09-14 DIAGNOSIS — E11.9 TYPE 2 DIABETES MELLITUS WITHOUT COMPLICATION, UNSPECIFIED LONG TERM INSULIN USE STATUS: Chronic | ICD-10-CM

## 2017-09-14 DIAGNOSIS — N64.4 BREAST PAIN, RIGHT: ICD-10-CM

## 2017-09-14 DIAGNOSIS — R11.0 NAUSEA: ICD-10-CM

## 2017-09-14 DIAGNOSIS — M54.50 CHRONIC MIDLINE LOW BACK PAIN WITHOUT SCIATICA: ICD-10-CM

## 2017-09-14 DIAGNOSIS — I10 ESSENTIAL HYPERTENSION: ICD-10-CM

## 2017-09-14 DIAGNOSIS — G89.29 CHRONIC MIDLINE LOW BACK PAIN WITHOUT SCIATICA: ICD-10-CM

## 2017-09-14 DIAGNOSIS — N61.1 ABSCESS OF RIGHT BREAST: Primary | ICD-10-CM

## 2017-09-14 DIAGNOSIS — N61.0 CELLULITIS OF RIGHT BREAST: ICD-10-CM

## 2017-09-14 PROBLEM — G43.909 MIGRAINE: Chronic | Status: RESOLVED | Noted: 2017-05-25 | Resolved: 2017-09-14

## 2017-09-14 PROBLEM — A41.9 SEPSIS (HCC): Status: ACTIVE | Noted: 2017-09-14

## 2017-09-14 PROBLEM — R50.9 FEVER CHILLS: Status: ACTIVE | Noted: 2017-09-14

## 2017-09-14 PROBLEM — R00.0 SINUS TACHYCARDIA: Status: ACTIVE | Noted: 2017-09-14

## 2017-09-14 PROBLEM — E87.20 LACTIC ACIDEMIA: Status: ACTIVE | Noted: 2017-09-14

## 2017-09-14 LAB
ALBUMIN SERPL-MCNC: 3.4 G/DL (ref 3.5–5)
ALBUMIN/GLOB SERPL: 0.8 {RATIO} (ref 1.1–2.2)
ALP SERPL-CCNC: 109 U/L (ref 45–117)
ALT SERPL-CCNC: 38 U/L (ref 12–78)
ANION GAP SERPL CALC-SCNC: 9 MMOL/L (ref 5–15)
APPEARANCE UR: ABNORMAL
AST SERPL-CCNC: 17 U/L (ref 15–37)
BACTERIA URNS QL MICRO: ABNORMAL /HPF
BASOPHILS # BLD: 0 K/UL (ref 0–0.1)
BASOPHILS NFR BLD: 0 % (ref 0–1)
BILIRUB SERPL-MCNC: 1 MG/DL (ref 0.2–1)
BILIRUB UR QL: NEGATIVE
BUN SERPL-MCNC: 11 MG/DL (ref 6–20)
BUN/CREAT SERPL: 12 (ref 12–20)
CALCIUM SERPL-MCNC: 8.6 MG/DL (ref 8.5–10.1)
CHLORIDE SERPL-SCNC: 97 MMOL/L (ref 97–108)
CO2 SERPL-SCNC: 28 MMOL/L (ref 21–32)
COLOR UR: ABNORMAL
CREAT SERPL-MCNC: 0.92 MG/DL (ref 0.55–1.02)
EOSINOPHIL # BLD: 0.2 K/UL (ref 0–0.4)
EOSINOPHIL NFR BLD: 2 % (ref 0–7)
EPITH CASTS URNS QL MICRO: ABNORMAL /LPF
ERYTHROCYTE [DISTWIDTH] IN BLOOD BY AUTOMATED COUNT: 12.7 % (ref 11.5–14.5)
EST. AVERAGE GLUCOSE BLD GHB EST-MCNC: 214 MG/DL
GLOBULIN SER CALC-MCNC: 4.5 G/DL (ref 2–4)
GLUCOSE BLD STRIP.AUTO-MCNC: 219 MG/DL (ref 65–100)
GLUCOSE BLD STRIP.AUTO-MCNC: 288 MG/DL (ref 65–100)
GLUCOSE SERPL-MCNC: 403 MG/DL (ref 65–100)
GLUCOSE UR STRIP.AUTO-MCNC: >1000 MG/DL
HBA1C MFR BLD: 9.1 % (ref 4.2–6.3)
HCG UR QL: NEGATIVE
HCT VFR BLD AUTO: 38.7 % (ref 35–47)
HGB BLD-MCNC: 13.6 G/DL (ref 11.5–16)
HGB UR QL STRIP: ABNORMAL
KETONES UR QL STRIP.AUTO: NEGATIVE MG/DL
LACTATE SERPL-SCNC: 2.3 MMOL/L (ref 0.4–2)
LEUKOCYTE ESTERASE UR QL STRIP.AUTO: NEGATIVE
LYMPHOCYTES # BLD: 2.1 K/UL (ref 0.8–3.5)
LYMPHOCYTES NFR BLD: 23 % (ref 12–49)
MCH RBC QN AUTO: 29.6 PG (ref 26–34)
MCHC RBC AUTO-ENTMCNC: 35.1 G/DL (ref 30–36.5)
MCV RBC AUTO: 84.3 FL (ref 80–99)
MONOCYTES # BLD: 0.6 K/UL (ref 0–1)
MONOCYTES NFR BLD: 6 % (ref 5–13)
NEUTS SEG # BLD: 6.5 K/UL (ref 1.8–8)
NEUTS SEG NFR BLD: 69 % (ref 32–75)
NITRITE UR QL STRIP.AUTO: NEGATIVE
PH UR STRIP: 6 [PH] (ref 5–8)
PLATELET # BLD AUTO: 347 K/UL (ref 150–400)
POTASSIUM SERPL-SCNC: 4.2 MMOL/L (ref 3.5–5.1)
PROT SERPL-MCNC: 7.9 G/DL (ref 6.4–8.2)
PROT UR STRIP-MCNC: NEGATIVE MG/DL
RBC # BLD AUTO: 4.59 M/UL (ref 3.8–5.2)
RBC #/AREA URNS HPF: ABNORMAL /HPF (ref 0–5)
SERVICE CMNT-IMP: ABNORMAL
SERVICE CMNT-IMP: ABNORMAL
SODIUM SERPL-SCNC: 134 MMOL/L (ref 136–145)
SP GR UR REFRACTOMETRY: 1.03 (ref 1–1.03)
UROBILINOGEN UR QL STRIP.AUTO: 2 EU/DL (ref 0.2–1)
WBC # BLD AUTO: 9.5 K/UL (ref 3.6–11)
WBC URNS QL MICRO: ABNORMAL /HPF (ref 0–4)

## 2017-09-14 PROCEDURE — 83605 ASSAY OF LACTIC ACID: CPT | Performed by: PHYSICIAN ASSISTANT

## 2017-09-14 PROCEDURE — 81001 URINALYSIS AUTO W/SCOPE: CPT | Performed by: PHYSICIAN ASSISTANT

## 2017-09-14 PROCEDURE — 96365 THER/PROPH/DIAG IV INF INIT: CPT

## 2017-09-14 PROCEDURE — 74011250636 HC RX REV CODE- 250/636: Performed by: INTERNAL MEDICINE

## 2017-09-14 PROCEDURE — 74011000258 HC RX REV CODE- 258: Performed by: INTERNAL MEDICINE

## 2017-09-14 PROCEDURE — 80053 COMPREHEN METABOLIC PANEL: CPT | Performed by: PHYSICIAN ASSISTANT

## 2017-09-14 PROCEDURE — 87040 BLOOD CULTURE FOR BACTERIA: CPT | Performed by: PHYSICIAN ASSISTANT

## 2017-09-14 PROCEDURE — 82962 GLUCOSE BLOOD TEST: CPT

## 2017-09-14 PROCEDURE — 85025 COMPLETE CBC W/AUTO DIFF WBC: CPT | Performed by: PHYSICIAN ASSISTANT

## 2017-09-14 PROCEDURE — 74011000250 HC RX REV CODE- 250: Performed by: INTERNAL MEDICINE

## 2017-09-14 PROCEDURE — 74011636637 HC RX REV CODE- 636/637: Performed by: INTERNAL MEDICINE

## 2017-09-14 PROCEDURE — 96375 TX/PRO/DX INJ NEW DRUG ADDON: CPT

## 2017-09-14 PROCEDURE — 65660000000 HC RM CCU STEPDOWN

## 2017-09-14 PROCEDURE — 83036 HEMOGLOBIN GLYCOSYLATED A1C: CPT | Performed by: INTERNAL MEDICINE

## 2017-09-14 PROCEDURE — 36415 COLL VENOUS BLD VENIPUNCTURE: CPT | Performed by: PHYSICIAN ASSISTANT

## 2017-09-14 PROCEDURE — 99283 EMERGENCY DEPT VISIT LOW MDM: CPT

## 2017-09-14 PROCEDURE — 74011250636 HC RX REV CODE- 250/636: Performed by: PHYSICIAN ASSISTANT

## 2017-09-14 PROCEDURE — 74011250637 HC RX REV CODE- 250/637: Performed by: PHYSICIAN ASSISTANT

## 2017-09-14 PROCEDURE — 74011250637 HC RX REV CODE- 250/637: Performed by: INTERNAL MEDICINE

## 2017-09-14 PROCEDURE — 81025 URINE PREGNANCY TEST: CPT

## 2017-09-14 RX ORDER — INSULIN LISPRO 100 [IU]/ML
30 INJECTION, SOLUTION INTRAVENOUS; SUBCUTANEOUS
Status: DISCONTINUED | OUTPATIENT
Start: 2017-09-14 | End: 2017-09-16 | Stop reason: HOSPADM

## 2017-09-14 RX ORDER — PROCHLORPERAZINE EDISYLATE 5 MG/ML
10 INJECTION INTRAMUSCULAR; INTRAVENOUS
Status: DISCONTINUED | OUTPATIENT
Start: 2017-09-14 | End: 2017-09-14

## 2017-09-14 RX ORDER — MORPHINE SULFATE 2 MG/ML
1 INJECTION, SOLUTION INTRAMUSCULAR; INTRAVENOUS
Status: DISCONTINUED | OUTPATIENT
Start: 2017-09-14 | End: 2017-09-16 | Stop reason: HOSPADM

## 2017-09-14 RX ORDER — SODIUM CHLORIDE AND POTASSIUM CHLORIDE .9; .15 G/100ML; G/100ML
SOLUTION INTRAVENOUS CONTINUOUS
Status: DISCONTINUED | OUTPATIENT
Start: 2017-09-14 | End: 2017-09-15

## 2017-09-14 RX ORDER — INSULIN GLARGINE 100 [IU]/ML
60 INJECTION, SOLUTION SUBCUTANEOUS
Status: DISCONTINUED | OUTPATIENT
Start: 2017-09-14 | End: 2017-09-16 | Stop reason: HOSPADM

## 2017-09-14 RX ORDER — HYDRALAZINE HYDROCHLORIDE 25 MG/1
25 TABLET, FILM COATED ORAL
Status: COMPLETED | OUTPATIENT
Start: 2017-09-14 | End: 2017-09-14

## 2017-09-14 RX ORDER — MORPHINE SULFATE 2 MG/ML
4 INJECTION, SOLUTION INTRAMUSCULAR; INTRAVENOUS
Status: COMPLETED | OUTPATIENT
Start: 2017-09-14 | End: 2017-09-14

## 2017-09-14 RX ORDER — INSULIN LISPRO 100 [IU]/ML
INJECTION, SOLUTION INTRAVENOUS; SUBCUTANEOUS
Status: DISCONTINUED | OUTPATIENT
Start: 2017-09-14 | End: 2017-09-16 | Stop reason: HOSPADM

## 2017-09-14 RX ORDER — LOSARTAN POTASSIUM 100 MG/1
100 TABLET ORAL 2 TIMES DAILY
Status: ON HOLD | COMMUNITY
End: 2017-09-14

## 2017-09-14 RX ORDER — LOSARTAN POTASSIUM 50 MG/1
100 TABLET ORAL 2 TIMES DAILY
Status: ON HOLD | COMMUNITY
End: 2017-09-16

## 2017-09-14 RX ORDER — LOSARTAN POTASSIUM 50 MG/1
50 TABLET ORAL DAILY
Status: DISCONTINUED | OUTPATIENT
Start: 2017-09-15 | End: 2017-09-14

## 2017-09-14 RX ORDER — BISMUTH SUBSALICYLATE 262 MG
1 TABLET,CHEWABLE ORAL DAILY
COMMUNITY
End: 2022-02-03

## 2017-09-14 RX ORDER — MAGNESIUM SULFATE 100 %
4 CRYSTALS MISCELLANEOUS AS NEEDED
Status: DISCONTINUED | OUTPATIENT
Start: 2017-09-14 | End: 2017-09-16 | Stop reason: HOSPADM

## 2017-09-14 RX ORDER — CLINDAMYCIN PHOSPHATE 900 MG/50ML
900 INJECTION INTRAVENOUS
Status: COMPLETED | OUTPATIENT
Start: 2017-09-14 | End: 2017-09-14

## 2017-09-14 RX ORDER — LOSARTAN POTASSIUM 50 MG/1
50 TABLET ORAL
Status: COMPLETED | OUTPATIENT
Start: 2017-09-14 | End: 2017-09-14

## 2017-09-14 RX ORDER — NALOXONE HYDROCHLORIDE 0.4 MG/ML
0.4 INJECTION, SOLUTION INTRAMUSCULAR; INTRAVENOUS; SUBCUTANEOUS AS NEEDED
Status: DISCONTINUED | OUTPATIENT
Start: 2017-09-14 | End: 2017-09-16 | Stop reason: HOSPADM

## 2017-09-14 RX ORDER — LABETALOL HYDROCHLORIDE 5 MG/ML
20 INJECTION, SOLUTION INTRAVENOUS
Status: COMPLETED | OUTPATIENT
Start: 2017-09-14 | End: 2017-09-14

## 2017-09-14 RX ORDER — HYDRALAZINE HYDROCHLORIDE 25 MG/1
25 TABLET, FILM COATED ORAL 3 TIMES DAILY
Status: DISCONTINUED | OUTPATIENT
Start: 2017-09-14 | End: 2017-09-16 | Stop reason: HOSPADM

## 2017-09-14 RX ORDER — METOPROLOL TARTRATE 25 MG/1
25 TABLET, FILM COATED ORAL EVERY 12 HOURS
Status: DISCONTINUED | OUTPATIENT
Start: 2017-09-14 | End: 2017-09-15

## 2017-09-14 RX ORDER — MORPHINE SULFATE 2 MG/ML
2 INJECTION, SOLUTION INTRAMUSCULAR; INTRAVENOUS ONCE
Status: COMPLETED | OUTPATIENT
Start: 2017-09-15 | End: 2017-09-14

## 2017-09-14 RX ORDER — CLONIDINE HYDROCHLORIDE 0.1 MG/1
0.1 TABLET ORAL 2 TIMES DAILY
Status: ON HOLD | COMMUNITY
End: 2017-09-16

## 2017-09-14 RX ORDER — ENOXAPARIN SODIUM 100 MG/ML
40 INJECTION SUBCUTANEOUS EVERY 12 HOURS
Status: DISCONTINUED | OUTPATIENT
Start: 2017-09-14 | End: 2017-09-16 | Stop reason: HOSPADM

## 2017-09-14 RX ORDER — DIPHENHYDRAMINE HCL 25 MG
25 CAPSULE ORAL
Status: DISCONTINUED | OUTPATIENT
Start: 2017-09-14 | End: 2017-09-16 | Stop reason: HOSPADM

## 2017-09-14 RX ORDER — ASPIRIN 81 MG/1
81 TABLET ORAL DAILY
COMMUNITY
End: 2017-12-22 | Stop reason: SDUPTHER

## 2017-09-14 RX ORDER — DOXYCYCLINE 100 MG/1
100 CAPSULE ORAL 2 TIMES DAILY
Status: ON HOLD | COMMUNITY
Start: 2017-09-12 | End: 2017-09-16

## 2017-09-14 RX ORDER — ACETAMINOPHEN 325 MG/1
650 TABLET ORAL
Status: DISCONTINUED | OUTPATIENT
Start: 2017-09-14 | End: 2017-09-15

## 2017-09-14 RX ORDER — HYDRALAZINE HYDROCHLORIDE 25 MG/1
25 TABLET, FILM COATED ORAL
COMMUNITY
End: 2017-12-25 | Stop reason: ALTCHOICE

## 2017-09-14 RX ORDER — ONDANSETRON 2 MG/ML
4 INJECTION INTRAMUSCULAR; INTRAVENOUS
Status: DISCONTINUED | OUTPATIENT
Start: 2017-09-14 | End: 2017-09-16 | Stop reason: HOSPADM

## 2017-09-14 RX ORDER — HYDROCODONE BITARTRATE AND ACETAMINOPHEN 5; 325 MG/1; MG/1
1 TABLET ORAL
Status: DISCONTINUED | OUTPATIENT
Start: 2017-09-14 | End: 2017-09-15

## 2017-09-14 RX ORDER — LOSARTAN POTASSIUM 50 MG/1
100 TABLET ORAL DAILY
Status: DISCONTINUED | OUTPATIENT
Start: 2017-09-15 | End: 2017-09-16 | Stop reason: HOSPADM

## 2017-09-14 RX ORDER — DEXTROSE 50 % IN WATER (D50W) INTRAVENOUS SYRINGE
12.5-25 AS NEEDED
Status: DISCONTINUED | OUTPATIENT
Start: 2017-09-14 | End: 2017-09-16 | Stop reason: HOSPADM

## 2017-09-14 RX ORDER — ONDANSETRON 2 MG/ML
4 INJECTION INTRAMUSCULAR; INTRAVENOUS
Status: COMPLETED | OUTPATIENT
Start: 2017-09-14 | End: 2017-09-14

## 2017-09-14 RX ORDER — CLONIDINE HYDROCHLORIDE 0.1 MG/1
0.1 TABLET ORAL 3 TIMES DAILY
Status: DISCONTINUED | OUTPATIENT
Start: 2017-09-14 | End: 2017-09-16 | Stop reason: HOSPADM

## 2017-09-14 RX ADMIN — CLINDAMYCIN PHOSPHATE 900 MG: 18 INJECTION, SOLUTION INTRAMUSCULAR; INTRAVENOUS at 11:37

## 2017-09-14 RX ADMIN — ONDANSETRON 4 MG: 2 INJECTION INTRAMUSCULAR; INTRAVENOUS at 23:42

## 2017-09-14 RX ADMIN — MORPHINE SULFATE 1 MG: 2 INJECTION, SOLUTION INTRAMUSCULAR; INTRAVENOUS at 13:33

## 2017-09-14 RX ADMIN — SODIUM CHLORIDE 1000 ML: 900 INJECTION, SOLUTION INTRAVENOUS at 11:31

## 2017-09-14 RX ADMIN — METOPROLOL TARTRATE 25 MG: 25 TABLET ORAL at 20:04

## 2017-09-14 RX ADMIN — HYDROCODONE BITARTRATE AND ACETAMINOPHEN 1 TABLET: 5; 325 TABLET ORAL at 20:04

## 2017-09-14 RX ADMIN — LABETALOL HYDROCHLORIDE 20 MG: 5 INJECTION INTRAVENOUS at 12:48

## 2017-09-14 RX ADMIN — HYDRALAZINE HYDROCHLORIDE 25 MG: 25 TABLET, FILM COATED ORAL at 21:40

## 2017-09-14 RX ADMIN — SODIUM CHLORIDE AND POTASSIUM CHLORIDE: 9; 1.49 INJECTION, SOLUTION INTRAVENOUS at 15:11

## 2017-09-14 RX ADMIN — CLONIDINE HYDROCHLORIDE 0.1 MG: 0.1 TABLET ORAL at 21:40

## 2017-09-14 RX ADMIN — INSULIN GLARGINE 60 UNITS: 100 INJECTION, SOLUTION SUBCUTANEOUS at 21:41

## 2017-09-14 RX ADMIN — INSULIN LISPRO 2 UNITS: 100 INJECTION, SOLUTION INTRAVENOUS; SUBCUTANEOUS at 21:39

## 2017-09-14 RX ADMIN — HYDRALAZINE HYDROCHLORIDE 25 MG: 25 TABLET, FILM COATED ORAL at 15:11

## 2017-09-14 RX ADMIN — MORPHINE SULFATE 2 MG: 2 INJECTION, SOLUTION INTRAMUSCULAR; INTRAVENOUS at 23:42

## 2017-09-14 RX ADMIN — MORPHINE SULFATE 1 MG: 2 INJECTION, SOLUTION INTRAMUSCULAR; INTRAVENOUS at 21:38

## 2017-09-14 RX ADMIN — HYDROCODONE BITARTRATE AND ACETAMINOPHEN 1 TABLET: 5; 325 TABLET ORAL at 16:02

## 2017-09-14 RX ADMIN — ONDANSETRON 4 MG: 2 INJECTION INTRAMUSCULAR; INTRAVENOUS at 16:02

## 2017-09-14 RX ADMIN — SODIUM CHLORIDE 600 MG: 900 INJECTION, SOLUTION INTRAVENOUS at 19:41

## 2017-09-14 RX ADMIN — MORPHINE SULFATE 4 MG: 2 INJECTION, SOLUTION INTRAMUSCULAR; INTRAVENOUS at 11:33

## 2017-09-14 RX ADMIN — ONDANSETRON 4 MG: 2 INJECTION INTRAMUSCULAR; INTRAVENOUS at 11:33

## 2017-09-14 RX ADMIN — METOPROLOL TARTRATE 25 MG: 25 TABLET ORAL at 12:49

## 2017-09-14 RX ADMIN — LOSARTAN POTASSIUM 50 MG: 50 TABLET, FILM COATED ORAL at 12:58

## 2017-09-14 RX ADMIN — INSULIN LISPRO 18 UNITS: 100 INJECTION, SOLUTION INTRAVENOUS; SUBCUTANEOUS at 17:48

## 2017-09-14 RX ADMIN — HYDRALAZINE HYDROCHLORIDE 25 MG: 25 TABLET, FILM COATED ORAL at 12:49

## 2017-09-14 RX ADMIN — DOXYCYCLINE 100 MG: 100 INJECTION, POWDER, LYOPHILIZED, FOR SOLUTION INTRAVENOUS at 21:38

## 2017-09-14 RX ADMIN — MORPHINE SULFATE 1 MG: 2 INJECTION, SOLUTION INTRAMUSCULAR; INTRAVENOUS at 17:56

## 2017-09-14 RX ADMIN — ENOXAPARIN SODIUM 40 MG: 40 INJECTION SUBCUTANEOUS at 20:04

## 2017-09-14 RX ADMIN — INSULIN LISPRO 7 UNITS: 100 INJECTION, SOLUTION INTRAVENOUS; SUBCUTANEOUS at 17:48

## 2017-09-14 NOTE — IP AVS SNAPSHOT
[address] 
[phone] Patient: Regla Man MRN: [id number] :1985 Current Discharge Medication List  
  
START taking these medications Dose & Instructions Dispensing Information Comments Morning Noon Evening Bedtime  
 clindamycin 300 mg capsule Commonly known as:  CLEOCIN Your last dose was: Your next dose is:    
   
   
 Dose:  300 mg Take 1 Cap by mouth every six (6) hours for 7 days. Quantity:  28 Cap Refills:  0  
     
   
   
   
  
 oxyCODONE IR 5 mg immediate release tablet Commonly known as:  Holley Scott Your last dose was: Your next dose is:    
   
   
 Dose:  5 mg Take 1 Tab by mouth every three (3) hours as needed for up to 30 days. Max Daily Amount: 40 mg.  
 Quantity:  20 Tab Refills:  0 CONTINUE these medications which have CHANGED Dose & Instructions Dispensing Information Comments Morning Noon Evening Bedtime  
 cloNIDine HCl 0.1 mg tablet Commonly known as:  CATAPRES What changed:  when to take this Your last dose was: Your next dose is:    
   
   
 Dose:  0.1 mg Take 1 Tab by mouth three (3) times daily for 30 days. Hold if SBP < or equal to 130 mm Hg Quantity:  90 Tab Refills:  0  
     
   
   
   
  
 ferrous sulfate 325 mg (65 mg iron) tablet What changed:  additional instructions Your last dose was: Your next dose is:    
   
   
 Dose:  325 mg Take 1 Tab by mouth daily (with lunch). DO NOT RESUME until AFTER COMPLETING DOXYCYCLINE Quantity:  10 Tab Refills:  0  
     
   
   
   
  
 losartan 100 mg tablet Commonly known as:  COZAAR What changed:   
- medication strength - when to take this 
- additional instructions Your last dose was:     
   
Your next dose is:    
   
   
 Dose:  100 mg  
 Take 1 Tab by mouth daily for 30 days. Change to once daily pill  Indications: hypertension Quantity:  30 Tab Refills:  0 CONTINUE these medications which have NOT CHANGED Dose & Instructions Dispensing Information Comments Morning Noon Evening Bedtime  
 aspirin delayed-release 81 mg tablet Your last dose was: Your next dose is:    
   
   
 Dose:  81 mg Take 81 mg by mouth daily. Refills:  0  
     
   
   
   
  
 doxycycline 100 mg capsule Commonly known as:  [place] Em Your last dose was: Your next dose is:    
   
   
 Dose:  100 mg Take 1 Cap by mouth two (2) times a day for 7 days. Started 9/12 for 10 days  Indications: infection in breast  
 Quantity:  14 Cap Refills:  0 HumaLOG 100 unit/mL injection Generic drug:  insulin lispro Your last dose was: Your next dose is:    
   
   
 Dose:  30 Units 30 Units by SubCUTAneous route three (3) times daily (with meals). Refills:  0  
     
   
   
   
  
 hydrALAZINE 25 mg tablet Commonly known as:  APRESOLINE Your last dose was: Your next dose is:    
   
   
 Dose:  25 mg Take 25 mg by mouth two (2) times daily as needed (only if DBP >100). Refills:  0 LEVEMIR 100 unit/mL injection Generic drug:  insulin detemir Your last dose was: Your next dose is:    
   
   
 Dose:  85 Units 85 Units by SubCUTAneous route nightly. Refills:  0  
     
   
   
   
  
 multivitamin tablet Commonly known as:  ONE A DAY Your last dose was: Your next dose is:    
   
   
 Dose:  1 Tab Take 1 Tab by mouth daily. Refills:  0 Where to Get Your Medications Information on where to get these meds will be given to you by the nurse or doctor. ! Ask your nurse or doctor about these medications  
  clindamycin 300 mg capsule cloNIDine HCl 0.1 mg tablet  
 doxycycline 100 mg capsule  
 ferrous sulfate 325 mg (65 mg iron) tablet  
 losartan 100 mg tablet  
 oxyCODONE IR 5 mg immediate release tablet

## 2017-09-14 NOTE — CONSULTS
Consulted for RIGHT breast abscess. Pt was seen in the ED 2 days ago with a RIGHT breast abscess. The abscess was aspirated (5cc) and a bedside ultrasound showed a superficial abscess.  (this is according to the patient who is an excellent historian). She was given Bactrim  She had a fever of 103 last night, and 101 this am and she returned to the ED. WBC 2 days ago was 11.5, now 9.5. She was hypertensive in the ED and blood glucose was 400. She is afebrile. /85  PE   RIGHT breast 3:00 - 2 cm superificial slightly raised erythematous skin lesion with a 6 mm eschar in the center. The surrounding skin is thick, and breast discoloration extends to the upper inner quadrant. No drainage from the lesion. No underlying mass. No axillary lymphadenopathy. LEFT breast scar LOQ from previous skin abscess  Bilateral axilla have scars from hidradenitis. Impr: superficial skin abscess and cellulitis. Pt was probably bacteremic, although fever and WBC are normal now  Plan:  If temperature remains normal and blood cultures are negative she may be discharged tomorrow. I will have her follow up in my office Tuesday Sept 19, 2017    Pt may eat. I ordered her a diabetic diet.

## 2017-09-14 NOTE — ED NOTES
Bedside shift change report given to denisha weinstein (oncoming nurse) by denisha valiente (offgoing nurse). Report included the following information SBAR, Kardex and ED Summary.

## 2017-09-14 NOTE — ED NOTES
Unsuccessful times 1 IV attempt to right forearm. Pt. States she is a difficult IV stick, and they had to obtain IV via U/S machine here 2 days ago. Thad Claudio RN  aware and will attempt.

## 2017-09-14 NOTE — ED NOTES
TRANSFER - OUT REPORT:    Verbal report given to Margarito rios RN (name) on Yesenia Szymanski  being transferred to 426(unit) for routine progression of care       Report consisted of patients Situation, Background, Assessment and   Recommendations(SBAR). Information from the following report(s) SBAR, ED Summary, [facility] and Recent Results was reviewed with the receiving nurse. Lines:   Peripheral IV 09/14/17 Right Antecubital (Active)   Site Assessment Clean, dry, & intact 9/14/2017 11:14 AM   Phlebitis Assessment 0 9/14/2017 11:14 AM   Infiltration Assessment 0 9/14/2017 11:14 AM   Dressing Status Clean, dry, & intact 9/14/2017 11:14 AM   Dressing Type Transparent 9/14/2017 11:14 AM   Hub Color/Line Status Pink;Flushed 9/14/2017 11:14 AM   Action Taken Blood drawn 9/14/2017 11:14 AM        Opportunity for questions and clarification was provided.       Patient transported with:   [employer]

## 2017-09-14 NOTE — PROGRESS NOTES
[facility] Dosing Services: 9/14/17    Consult for Enoxaparin by Dr. Anai Branham made for this 27 y.o. female, for prophylaxis of  DVT. Wt Readings from Last 1 Encounters:   09/14/17 108.9 kg (240 lb)       Ht Readings from Last 1 Encounters:   09/14/17 162.6 cm (64\")      Previous Dose Enoxaparin 40mg daily   Creatinine Clearance Estimated Creatinine Clearance: 105.9 mL/min (based on Cr of 0.92). Creatinine Lab Results   Component Value Date/Time    Creatinine 0.92 09/14/2017 11:13 AM    Creatinine (POC) 0.8 09/11/2012 08:36 PM       Platelet Lab Results   Component Value Date/Time    PLATELET 802 39/02/9744 11:13 AM      H/H Lab Results   Component Value Date/Time    HGB 13.6 09/14/2017 11:13 AM        Other anticoagulants:   Relevant drug interactions:     Pharmacist made change to enoxaparin therapy based on:  Marquise Elliott BMI: dose changed to: 40mg every 12 hours    - Pharmacy to automatically make dose adjustment for renal dysfunction (creatinine clearance less than 30 mL/min)  - Pharmacy to automatically make dose adjustment for obesity (BMI greater than 40)  - Pharmacy to make dose rounding adjustments per [facility] dose adjustment scale. Pharmacy to monitor patients progress. Will make dose adjustment as needed per changing renal function. Will communicate further recommendations regarding patients anticoagulation therapy with prescriber.     John Rios, [place], BCPS  Contact information: [phone]

## 2017-09-14 NOTE — PROGRESS NOTES
Placed a call to Dr. Cheri Watson concerning patients blood pressure of 164/115. Dr. Cheri Watson stated the patient's blood pressure is related to pain. Orders verified. Patient will receive pain medication and continue to be monitored. Patient given 1 mg of morphine for pain 1338  Patient stated she takes medication for blood pressure. She has only taken her schedule clonidine 0.1mg at 0830 9/14/17. Will continue monitor patient. 1427 Call placed to Dr. Cheri Watson concerning patient's blood pressure. Dr. Cheri Watson stated, \"I am not worried about the blood pressure. It will come down. \"  Will continue to monitor patient. Patient is asymptomatic.

## 2017-09-14 NOTE — PROGRESS NOTES
[facility]: MED RECONCILIATION    Comments/Recommendations:    Patient is alert, awake, and oriented   Verified allergies   Pharmacy intern returned to the patient's room for clarification after speaking with the pharmacist at [facility].  Pharmacist at [address] [place] [address] verified her doxycycline 100 mg BID prescription for 10 days (started 9/12/17). Pharmacist at [facility] stated different doses for her medications but these prescriptions were from last year. Patient stated she doesn't have insurance so she is using her stockpile of medications at the new prescribed dose. Patient takes 2 tablets of losartan 50 mg twice daily to make a total dose of 100 mg bid   Patient stated about 5 weeks ago she was at [facility] for 9 days and she stated all of her medications were changed at that visit    Patient monitors her BP at home twice a day and her average reading has been about 140/90 mm Hg   Patient monitors her BG at home before meals and her usual value is around 120-180 mg/dL   No Rx Query    Patient's losartan dose of 200 mg daily is higher than the recommended maximum dose of 100 mg daily and double checked this dosage with patient and she stated this is what she takes     Medications added:     · Multivitamin  · ASA 81 mg    Medications removed:    · Clindamycin 300 mg     Medications adjusted:    · Ferrous sulfate 325 mg 1 tablet daily from BID   · Hydralazine 25 mg BID from TID  · Levemir 85 units at bedtime from Lantus 60 units  · Losartan 100 mg BID from Losartan 50 mg daily     Information obtained from: Patient     Allergies: Pcn [penicillins]; Vancomycin; and Voltaren [diclofenac sodium]    Prior to Admission Medications:   Prior to Admission Medications   Prescriptions Last Dose Informant Patient Reported? Taking?   aspirin delayed-release 81 mg tablet 9/13/2017 at pm Self Yes Yes   Sig: Take 81 mg by mouth daily.    cloNIDine HCl (CATAPRES) 0.1 mg tablet 9/14/2017 at am Self Yes Yes   Sig: Take 0.1 mg by mouth two (2) times a day. Hold if SBP < or equal to 130 mm Hg   doxycycline (MONODOX) 100 mg capsule 2017 at am Self Yes Yes   Sig: Take 100 mg by mouth two (2) times a day. Indications: infection in breast   ferrous sulfate 325 mg (65 mg iron) tablet 2017 at pm Self Yes Yes   Sig: Take 325 mg by mouth daily (with lunch). hydrALAZINE (APRESOLINE) 25 mg tablet 2017 at pm Self Yes Yes   Sig: Take 25 mg by mouth two (2) times daily as needed (only if DBP >100). insulin detemir (LEVEMIR) 100 unit/mL injection 2017 at pm Self Yes Yes   Si Units by SubCUTAneous route nightly. insulin lispro (HUMALOG) 100 unit/mL injection 2017 at pm Self Yes Yes   Si Units by SubCUTAneous route three (3) times daily (with meals). losartan (COZAAR) 100 mg tablet 2017 at pm Self Yes Yes   Sig: Take 100 mg by mouth two (2) times a day. multivitamin (ONE A DAY) tablet 2017 at pm Self Yes Yes   Sig: Take 1 Tab by mouth daily.       Facility-Administered Medications: None     Thank you,  Christa Felix, PharmD Candidate, Class of 2018

## 2017-09-14 NOTE — DIABETES MGMT
DTC Consult Note         Recommendations/ Comments: If appropriate, please consider decreasing Humalog to 15 units before meals as pt is currently on a clear liquid diet. Estimated Creatinine Clearance: 105.9 mL/min (based on Cr of 0.92). POC Glucose last 24hrs:   Lab Results   Component Value Date/Time     (H) 09/14/2017 11:13 AM        Inpatient medications:  1. Correction Scale: Lispro (Humalog) 30 units with meals  2. Lantus 60 units at bedtime   3. Humalog Insulin Resistant Sensitivity scale to cover for glucose > 139 mg/dL before meals and for glucose >199 at bedtime        Consult received from Joana Martin MD for  [x]    Assessment of home management  []    Meal planning  []    Meter / Monitoring  []    New Diagnosis  []    Insulin education  []    Insulin pump notification  []    Medication recommendations  []    Hospital glucose management  []    Sophie Friedman  []    Outpatient Education - Information forwarded to [employer] who will follow-up with pt 1 week after discharge    Chart reviewed and initial evaluation complete on Mariposa Mcadams . Mariposa Mcadams is a 26 yo  female with a past medical history of  DM, per Dr. Joana Martin MD's H&P dated 9/14/2017. Ms. Marques Winter monitors blood glucose at home 4 (times) per day, and denies difficulty obtaining diabetes supplies. Patient reports glucose readings are:  Lately never less than 200, in the upper 400's this morning; otherwise pre-infection,  before meals and 200 1 hour after meals. Outpatient medications per patient   1. Levemir 85 units at bedtime   2.  Humalog 30 units with meals        Lab Results   Component Value Date/Time    Hemoglobin A1c 9.9 05/25/2017 05:16 PM         Assessed and provided patient verbal and/or written information on the following  · Diabetes: disease process   · Blood sugar goals   · Causes of high / low blood glucose and treatment  · Lab results: A1C - target   · Blood sugar monitoring  · Site rotation  · Use of insulin pen  · Self-injection of insulin   · Use of sliding scale / correction formula  · Use of insulin to carbohydrate ratio  · Sharps disposal  · Sick day guidelines  · Meal planning:   · Activity guidelines   · Foot care  · Chronic complications and Standards of Care  · Delayed healing      Encouraged the following:   · increased exercise  · dietary modifications   -Eat 3 meals a day   -Eat a protein at each meal    · regular blood sugar monitorin times daily      Provided patient with the following: [x]    Diabetes Self-Care Guide                [x]    Outpatient DTC contact number               []    Glucometer                []    Insulin Education Guide               []    Carbohydrate Counting Guide               []    Sample meal plan                 []    Chair exercises guide               []    [employer] Product Catalog        Thank you.     Xavier Coates, Certified Diabetes Educator    [phone]

## 2017-09-14 NOTE — PROGRESS NOTES
09/14/17 3:09 PM  CM spoke with patient to complete initial assessment and to begin discharge planning. Patient lives with her , Lana Mishra ([phone]), brother, sister-in-law and their 2 young children and her parents in a one level house. Patient is independent with her ADL's, works part-time and drives herself. She has not established herself with a PCP, but uses the [facility] and was provided a list of Carol Alvarado MD's during her last admission in May and declined a new list.  Patient noted that she applied for the Care Card in March; [facility] notified to follow up with patient on Care Card status. She uses [employer] [facility] in [place] to obtain medications and pays cash. No HH and no DME. She does not anticipate any discharge needs and someone in her family will transport her home.   Care Management Interventions  PCP Verified by CM: No (Has list from last admission, declined new list)  Transition of Care Consult (CM Consult): Discharge Planning  Current Support Network: Lives with Spouse  Confirm Follow Up Transport: Family  Plan discussed with Pt/Family/Caregiver: Yes  Discharge Location  Discharge Placement: [address] [place] [address]

## 2017-09-14 NOTE — H&P
[facility]  [address], [facility]  [phone]    Hospitalist Admission Note      NAME:  Cody Rogers   :   1985   MRN:  [id number]     PCP:  None     Date/Time:  2017 1:54 PM          Subjective:     CHIEF COMPLAINT: Breast infection     HISTORY OF PRESENT ILLNESS:     Ms. Dionte Lugo is a 27 y.o.  female who presented to the Emergency Department complaining of breast infection. Worsening over days. Was in our ER 2 days ago, and local I&D, and placed on doxya nd clinda. She got worse, with fevers, vomiting, spreading redness, more pain. She has a hx of MRSA and hospitalization in past for similar infection. We will admit her for management. Past Medical History:   Diagnosis Date    Anemia     Complicated migraine     with extremity numbness    Diabetes (HCC)     Hx MRSA infection     Hypertension     Hypothyroidism     Irregular menstrual cycle     Obesity         Past Surgical History:   Procedure Laterality Date    HX CHOLECYSTECTOMY      HX HEENT      HX TONSIL AND ADENOIDECTOMY      HX TYMPANOSTOMY         Social History   Substance Use Topics    Smoking status: Never Smoker    Smokeless tobacco: Never Used    Alcohol use No        Family History   Problem Relation Age of Onset    Hypertension Other      \"all her family\"    Diabetes Other      \"all her family\"        Allergies   Allergen Reactions    Pcn [Penicillins] Rash    Vancomycin Other (comments)     Kidneys shut down    Voltaren [Diclofenac Sodium] Myalgia and Other (comments)     \"muscle spasms\"        Prior to Admission medications    Medication Sig Start Date End Date Taking? Authorizing Provider   doxycycline (MONODOX) 100 mg capsule Take 100 mg by mouth two (2) times a day. Yes Phys MD Hank   clindamycin (CLEOCIN) 300 mg capsule Take 1 Cap by mouth four (4) times daily.  17   Shawnee Sanchez MD   hydrALAZINE (APRESOLINE) 25 mg tablet Take 1 Tab by mouth three (3) times daily. 5/26/17   Jennifer Chanel MD   insulin glargine (LANTUS) 100 unit/mL injection 60 Units by SubCUTAneous route nightly. 5/26/17   Jennifer Chanel MD   losartan (COZAAR) 50 mg tablet Take 50 mg by mouth daily. Historical Provider   insulin lispro (HUMALOG) 100 unit/mL injection 30 Units by SubCUTAneous route three (3) times daily (with meals). Historical Provider   ferrous sulfate 325 mg (65 mg iron) tablet Take 325 mg by mouth two (2) times daily (with meals).     Historical Provider       Review of Systems:  (bold if positive, if negative)    Gen:   fever, chills,Eyes:  ENT:  CVS:  Pulm:  GI:  nausea, emesis  :    MS:  Skin:  Rash, erythema, abscess, woundPsych:  Endo:    Hem:  Renal:    Neuro:        Objective:      VITALS:    Vital signs reviewed; most recent are:    Visit Vitals    BP (!) 162/102 (BP 1 Location: Left arm, BP Patient Position: At rest)    Pulse 82    Temp 98.6 °F (37 °C)    Resp 18    Ht 5' 4\" (1.626 m)    Wt 108.9 kg (240 lb)    SpO2 100%    BMI 41.2 kg/m2     SpO2 Readings from Last 6 Encounters:   09/14/17 100%   09/12/17 99%   06/22/17 97%   06/06/17 98%   05/27/17 98%   02/20/17 98%        No intake or output data in the 24 hours ending 09/14/17 1354     Exam:     Physical Exam:    Gen:  Obese, in no acute distress  HEENT:  Pink conjunctivae, PERRL, hearing intact to voice, moist mucous membranes  Neck:  Supple, without masses, thyroid non-tender  Resp:  No accessory muscle use, clear breath sounds without wheezes rales or rhonchi  Card:  No murmurs, tachycardic S1, S2 without thrills, bruits or peripheral edema  Abd:  Soft, non-tender, non-distended, normoactive bowel sounds are present, no mass  Lymph:  No cervical or inguinal adenopathy  Musc:  No cyanosis or clubbing  Skin: Red, swollen tender inner R breast with open wound, skin turgor is good  Neuro:  Cranial nerves are grossly intact, no focal motor weakness, follows commands appropriately  Psych:  Good insight, oriented to person, place and time, alert     Labs:    Recent Labs      09/14/17   1113   WBC  9.5   HGB  13.6   HCT  38.7   PLT  347     Recent Labs      09/14/17   1113   NA  134*   K  4.2   CL  97   CO2  28   GLU  403*   BUN  11   CREA  0.92   CA  8.6   ALB  3.4*   TBILI  1.0   SGOT  17   ALT  38     Lab Results   Component Value Date/Time    Glucose (POC) 313 09/12/2017 01:16 PM    Glucose (POC) 183 05/27/2017 07:19 AM     No results for input(s): PH, PCO2, PO2, HCO3, FIO2 in the last 72 hours. No results for input(s): INR in the last 72 hours. No lab exists for component: INREXT  All Micro Results     Procedure Component Value Units Date/Time    CULTURE, BLOOD [[id number]] Collected:  09/14/17 1113    Order Status:  Completed Specimen:  Blood Updated:  09/14/17 1131    CULTURE, BLOOD [[id number]]     Order Status:  Sent Specimen:  Blood           I have reviewed previous records       Assessment and Plan:      Breast abscess / Hx MRSA infection - POA, R inner breast.  Consult breast surgeon. Monitor cx. Clinda for now. Sepsis / Lactic acidemia / Fever chills / Sinus tachycardia - POA, due to breast infection. NOT severe sepsis. High BP and lack of ARF limited the use of full bolus IVF. Monitor. HTN (hypertension) - Highly elevatd on admit, due to vmoiting usual meds, and pain. I added metoprolol, and she will continue her usual hydralazine and losartan. Pain and nausea control will help. Type 2 diabetes mellitus without complication - Diabetic diet and counseling. SSI per protocol. Continue home Lantus. Check A1c. Obesity - Advise weight loss    Hypothyroidism - Continue synthroid.       Telemetry reviewed:   normal sinus rhythm    Risk of deterioration: high      Total time spent with patient: [phone] Avril discussed with: Patient, Nursing Staff and >50% of time spent in counseling and coordination of care    Discussed:  Care Plan       ___________________________________________________    Attending Physician: Judge Jefe MD

## 2017-09-14 NOTE — ED NOTES
BP stabilized post BP medication administration. 4th floor charge RN (April) accepting of the patient to arrive to their department.

## 2017-09-14 NOTE — ED PROVIDER NOTES
HPI Comments: Elio Pedraza is a 27 y.o. female who presents ambulatory to the ED with a c/o right breast pain, worsening since ER visit 2 days ago 9/12/17. Pt notes she had pus drained off with a needle and was started on clindamycin. She could not afford the clinda, so her rx was changed to doxy. Pt has a hx of hidradenitis and mrsa. She notes she was taking the doxy as directed, but her pain has worsened and the redness has spread. Pt notes she can not even touch her breast without significant pain. She states she had been taking left over hydrocodone from a back injury. Pt reports fevers of 101. She awakened with a fever this am and took tylenol without relief of her sx. Pt had an abscess on her left breast that she was admitted for in the past that was treated with aggressive IV abx. She denies having been seen by a surgeon for her hidradenitis. Pt notes she feels nauseated because of the pain. She specifically denies any vomiting, chest pain, abd pain, urinary sx, shortness of breath, headache, diarrhea, sweating or weight loss. PCP: None  PMHx significant for: Past Medical History:  No date: Anemia  No date: Complicated migraine      Comment: with extremity numbness  No date: Diabetes ([address]  No date: Hx MRSA infection  No date: Hypertension  No date: Irregular menstrual cycle  No date: Obesity  hypothyroidism: Other ill-defined conditions  PSHx significant for: Past Surgical History:  No date: HX CHOLECYSTECTOMY  No date: HX HEENT  1992: HX TONSIL AND ADENOIDECTOMY  No date: HX TYMPANOSTOMY  Social Hx: Tobacco: denies  EtOH: denies Illicit drug use: denies     There are no further complaints or symptoms at this time. The history is provided by the patient.         Past Medical History:   Diagnosis Date    Anemia     Complicated migraine     with extremity numbness    Diabetes (HCC)     Hx MRSA infection     Hypertension     Irregular menstrual cycle     Obesity     Other ill-defined conditions hypothyroidism       Past Surgical History:   Procedure Laterality Date    HX CHOLECYSTECTOMY      HX HEENT      HX TONSIL AND ADENOIDECTOMY  12    HX TYMPANOSTOMY           Family History:   Problem Relation Age of Onset    Hypertension Other      \"all her family\"    Diabetes Other      \"all her family\"       Social History     Social History    Marital status:      Spouse name: N/A    Number of children: N/A    Years of education: N/A     Occupational History    Not on file. Social History Main Topics    Smoking status: Never Smoker    Smokeless tobacco: Never Used    Alcohol use No    Drug use: No    Sexual activity: Not on file     Other Topics Concern    Not on file     Social History Narrative         ALLERGIES: Pcn [penicillins]; Vancomycin; and Voltaren [diclofenac sodium]    Review of Systems   Constitutional: Negative for chills and fever. HENT: Negative for congestion, rhinorrhea, sneezing and sore throat. Eyes: Negative for redness and visual disturbance. Respiratory: Negative for shortness of breath. Cardiovascular: Negative for chest pain and leg swelling. Gastrointestinal: Negative for abdominal pain, nausea and vomiting. Genitourinary: Negative for difficulty urinating and frequency. Musculoskeletal: Negative for back pain, myalgias and neck stiffness. Skin: Positive for color change and rash. Neurological: Negative for dizziness, syncope, weakness and headaches. Hematological: Negative for adenopathy. Vitals:    09/14/17 0938 09/14/17 1140   BP: (!) 185/98 (!) 177/109   Pulse: 93    Resp: 16    Temp: 97.9 °F (36.6 °C)    SpO2: 99% 96%   Weight: 108.9 kg (240 lb)    Height: 5' 4\" (1.626 m)             Physical Exam   Constitutional: She is oriented to person, place, and time. She appears well-developed and well-nourished. No distress. Above average bmi female   HENT:   Head: Normocephalic and atraumatic.    Right Ear: External ear normal. Left Ear: External ear normal.   Eyes: EOM are normal. Pupils are equal, round, and reactive to light. Neck: Neck supple. Cardiovascular: Normal rate, regular rhythm, normal heart sounds and intact distal pulses. Exam reveals no gallop and no friction rub. No murmur heard. Pulmonary/Chest: Effort normal and breath sounds normal. No stridor. No respiratory distress. She has no wheezes. She has no rales. She exhibits no tenderness. Abdominal: Soft. Bowel sounds are normal. She exhibits no distension and no mass. There is no tenderness. There is no rebound and no guarding. Musculoskeletal: Normal range of motion. She exhibits no edema, tenderness or deformity. Neurological: She is alert and oriented to person, place, and time. No cranial nerve deficit. Coordination normal.   Skin: Rash noted. There is erythema. No pallor. Golf ball sized abscess to right breast at the 5 o'clock position with erythema and swelling extending throughout breast. + warm to touch with sts   Psychiatric: She has a normal mood and affect. Her behavior is normal.   Nursing note and vitals reviewed. MDM  Number of Diagnoses or Management Options     Amount and/or Complexity of Data Reviewed  Clinical lab tests: reviewed and ordered  Tests in the medicine section of CPT®: ordered and reviewed  Review and summarize past medical records: yes  Independent visualization of images, tracings, or specimens: yes    Patient Progress  Patient progress: stable    ED Course       Procedures  10:08 AM  Discussed pt, sx, hx and current findings with Dr Jimmy oDminguez. He is in agreement with plan and after seeing the pt agrees that her abscess/ cellulitis is considerably worse. He recommends admitting pt for IV abx   Purvi Colby. DIDI Stauffer    12:00 PM  Discussed with patient at length the need for blood pressure re-evaluation and management with primary care.  Discussed the long term sequelae for elevated blood pressure including blindness, CVA, MI, and renal failure/ dialysis. Pt agrees to follow up as directed. Pt note she has not taken her meds yet today as she normally takes them at noon. The ER has been refilling her meds and she has an appt with a new pcp (as she currently has none) through [facility] 10/19/17  VALENTINO Martinez     12:07 PM  Herlinda Benson. DIDI Stauffer spoke with Dr. Dwight Rebolledo, Consult for Hospitalist. Discussed available diagnostic tests and clinical findings. He is in agreement with care plans as outlined. He will admit pt  VALENTINO Martinez    LABS COMPLETED AND REVIEWED:  Recent Results (from the past 12 hour(s))   URINALYSIS W/ RFLX MICROSCOPIC    Collection Time: 09/14/17 10:05 AM   Result Value Ref Range    Color YELLOW/STRAW      Appearance CLOUDY (A) CLEAR      Specific gravity 1.029 1.003 - 1.030      pH (UA) 6.0 5.0 - 8.0      Protein NEGATIVE  NEG mg/dL    Glucose >1000 (A) NEG mg/dL    Ketone NEGATIVE  NEG mg/dL    Bilirubin NEGATIVE  NEG      Blood SMALL (A) NEG      Urobilinogen 2.0 (H) 0.2 - 1.0 EU/dL    Nitrites NEGATIVE  NEG      Leukocyte Esterase NEGATIVE  NEG      WBC 5-10 0 - 4 /hpf    RBC 0-5 0 - 5 /hpf    Epithelial cells MANY (A) FEW /lpf    Bacteria 1+ (A) NEG /hpf   HCG URINE, QL. - POC    Collection Time: 09/14/17 10:09 AM   Result Value Ref Range    Pregnancy test,urine (POC) NEGATIVE  NEG     CBC WITH AUTOMATED DIFF    Collection Time: 09/14/17 11:13 AM   Result Value Ref Range    WBC 9.5 3.6 - 11.0 K/uL    RBC 4.59 3.80 - 5.20 M/uL    HGB 13.6 11.5 - 16.0 g/dL    HCT 38.7 35.0 - 47.0 %    MCV 84.3 80.0 - 99.0 FL    MCH 29.6 26.0 - 34.0 PG    MCHC 35.1 30.0 - 36.5 g/dL    RDW 12.7 11.5 - 14.5 %    PLATELET 045 004 - 740 K/uL    NEUTROPHILS 69 32 - 75 %    LYMPHOCYTES 23 12 - 49 %    MONOCYTES 6 5 - 13 %    EOSINOPHILS 2 0 - 7 %    BASOPHILS 0 0 - 1 %    ABS. NEUTROPHILS 6.5 1.8 - 8.0 K/UL    ABS. LYMPHOCYTES 2.1 0.8 - 3.5 K/UL    ABS. MONOCYTES 0.6 0.0 - 1.0 K/UL    ABS. EOSINOPHILS 0.2 0.0 - 0.4 K/UL    ABS. BASOPHILS 0.0 0.0 - 0.1 K/UL   METABOLIC PANEL, COMPREHENSIVE    Collection Time: 09/14/17 11:13 AM   Result Value Ref Range    Sodium 134 (L) 136 - 145 mmol/L    Potassium 4.2 3.5 - 5.1 mmol/L    Chloride 97 97 - 108 mmol/L    CO2 28 21 - 32 mmol/L    Anion gap 9 5 - 15 mmol/L    Glucose 403 (H) 65 - 100 mg/dL    BUN 11 6 - 20 MG/DL    Creatinine 0.92 0.55 - 1.02 MG/DL    BUN/Creatinine ratio 12 12 - 20      GFR est AA >60 >60 ml/min/1.73m2    GFR est non-AA >60 >60 ml/min/1.73m2    Calcium 8.6 8.5 - 10.1 MG/DL    Bilirubin, total 1.0 0.2 - 1.0 MG/DL    ALT (SGPT) 38 12 - 78 U/L    AST (SGOT) 17 15 - 37 U/L    Alk. phosphatase 109 45 - 117 U/L    Protein, total 7.9 6.4 - 8.2 g/dL    Albumin 3.4 (L) 3.5 - 5.0 g/dL    Globulin 4.5 (H) 2.0 - 4.0 g/dL    A-G Ratio 0.8 (L) 1.1 - 2.2     LACTIC ACID    Collection Time: 09/14/17 11:13 AM   Result Value Ref Range    Lactic acid 2.3 (HH) 0.4 - 2.0 MMOL/L       IMAGING COMPLETED AND REVIEWED:  The following have been ordered and reviewed:    No results found. MEDICATIONS GIVEN:  Medications   sodium chloride 0.9 % bolus infusion 1,000 mL (1,000 mL IntraVENous New Bag 9/14/17 1131)   losartan (COZAAR) tablet 50 mg (not administered)   hydrALAZINE (APRESOLINE) tablet 25 mg (not administered)   labetalol (NORMODYNE;TRANDATE) 20 mg/4 mL (5 mg/mL) injection 20 mg (not administered)   metoprolol tartrate (LOPRESSOR) tablet 25 mg (not administered)   clindamycin (CLEOCIN) 900mg D5W 50mL IVPB (premix) (900 mg IntraVENous New Bag 9/14/17 1137)   morphine injection 4 mg (4 mg IntraVENous Given 9/14/17 1133)   ondansetron (ZOFRAN) injection 4 mg (4 mg IntraVENous Given 9/14/17 1133)         CLINICAL IMPRESSION:  1. Abscess of right breast    2. Cellulitis of right breast    3. Type 2 diabetes mellitus without complication, unspecified long term insulin use status ([facility]    4. Essential hypertension          Plan  1.  Admission per  hospitalist      12:08 PM  The patient is being admitted to the hospital.  The results of their tests and reasons for their admission have been discussed with them and/or available family. The patient/family has conveyed agreement and understanding for the need to be admitted and for their admission diagnosis. Consultation has been made with the inpatient physician specialist for hospitalization.

## 2017-09-14 NOTE — PROGRESS NOTES
Shift change report given to Fiorella (oncoming nurse) by [facility] (offgoing nurse). Report included the following information SBAR, Kardex, ED Summary, Procedure Summary, Intake/Output, MAR and Recent Results.

## 2017-09-14 NOTE — IP AVS SNAPSHOT
[address] [place] [address] 104 1007 [facility] 
[phone] Patient: Dewey Hernandes MRN: [id number] :1985 You are allergic to the following Allergen Reactions Pcn (Penicillins) Rash Vancomycin Other (comments) Kidneys shut down Voltaren (Diclofenac Sodium) Myalgia Other (comments) \"muscle spasms\" Recent Documentation Height Weight Breastfeeding? BMI OB Status Smoking Status 1.626 m 108.9 kg No 41.2 kg/m2 Having regular periods Never Smoker Unresulted Labs Order Current Status CULTURE, MRSA In process CULTURE, BLOOD Preliminary result CULTURE, BLOOD Preliminary result Emergency Contacts Name Discharge Info Relation Home Work Mobile Michael Ascencio DISCHARGE CAREGIVER [3] Spouse [3] [phone] About your hospitalization You were admitted on:  2017 You last received care in the:  [facility] 4M POST SURG ORT 2 You were discharged on:  2017 Unit phone number:  [phone] Why you were hospitalized Your primary diagnosis was:  Breast Abscess Your diagnoses also included:  Lactic Acidemia, Type 2 Diabetes Mellitus Without Complication (Hcc), Htn (Hypertension), Obesity, Hypothyroidism, Hx Mrsa Infection, Fever Chills, Sinus Tachycardia, Sepsis (Hcc) Providers Seen During Your Hospitalizations Provider Role Specialty Primary office phone Avril King MD Attending Provider Emergency Medicine [phone] Rafaela Correa MD Attending Provider Internal Medicine [phone] Your Primary Care Physician (PCP) Primary Care Physician Office Phone Office Fax NONE ** None ** ** None ** Follow-up Information Follow up With Details Comments Contact Info Grabiel Gerard MD Schedule an appointment as soon as possible for a visit If symptoms worsen [facility] [address] 
[phone] None   None (395) Patient stated that they have no PCP Current Discharge Medication List  
  
START taking these medications Dose & Instructions Dispensing Information Comments Morning Noon Evening Bedtime  
 clindamycin 300 mg capsule Commonly known as:  CLEOCIN Your last dose was: Your next dose is:    
   
   
 Dose:  300 mg Take 1 Cap by mouth every six (6) hours for 7 days. Quantity:  28 Cap Refills:  0  
     
   
   
   
  
 oxyCODONE IR 5 mg immediate release tablet Commonly known as:  Rita Grijalva Your last dose was: Your next dose is:    
   
   
 Dose:  5 mg Take 1 Tab by mouth every three (3) hours as needed for up to 30 days. Max Daily Amount: 40 mg.  
 Quantity:  20 Tab Refills:  0 CONTINUE these medications which have CHANGED Dose & Instructions Dispensing Information Comments Morning Noon Evening Bedtime  
 cloNIDine HCl 0.1 mg tablet Commonly known as:  CATAPRES What changed:  when to take this Your last dose was: Your next dose is:    
   
   
 Dose:  0.1 mg Take 1 Tab by mouth three (3) times daily for 30 days. Hold if SBP < or equal to 130 mm Hg Quantity:  90 Tab Refills:  0  
     
   
   
   
  
 ferrous sulfate 325 mg (65 mg iron) tablet What changed:  additional instructions Your last dose was: Your next dose is:    
   
   
 Dose:  325 mg Take 1 Tab by mouth daily (with lunch). DO NOT RESUME until AFTER COMPLETING DOXYCYCLINE Quantity:  10 Tab Refills:  0  
     
   
   
   
  
 losartan 100 mg tablet Commonly known as:  COZAAR What changed:   
- medication strength - when to take this 
- additional instructions Your last dose was: Your next dose is:    
   
   
 Dose:  100 mg Take 1 Tab by mouth daily for 30 days.  Change to once daily pill Indications: hypertension Quantity:  30 Tab Refills:  0 CONTINUE these medications which have NOT CHANGED Dose & Instructions Dispensing Information Comments Morning Noon Evening Bedtime  
 aspirin delayed-release 81 mg tablet Your last dose was: Your next dose is:    
   
   
 Dose:  81 mg Take 81 mg by mouth daily. Refills:  0  
     
   
   
   
  
 doxycycline 100 mg capsule Commonly known as:  Franchesca Arce Your last dose was: Your next dose is:    
   
   
 Dose:  100 mg Take 1 Cap by mouth two (2) times a day for 7 days. Started 9/12 for 10 days  Indications: infection in breast  
 Quantity:  14 Cap Refills:  0 HumaLOG 100 unit/mL injection Generic drug:  insulin lispro Your last dose was: Your next dose is:    
   
   
 Dose:  30 Units 30 Units by SubCUTAneous route three (3) times daily (with meals). Refills:  0  
     
   
   
   
  
 hydrALAZINE 25 mg tablet Commonly known as:  APRESOLINE Your last dose was: Your next dose is:    
   
   
 Dose:  25 mg Take 25 mg by mouth two (2) times daily as needed (only if DBP >100). Refills:  0 LEVEMIR 100 unit/mL injection Generic drug:  insulin detemir Your last dose was: Your next dose is:    
   
   
 Dose:  85 Units 85 Units by SubCUTAneous route nightly. Refills:  0  
     
   
   
   
  
 multivitamin tablet Commonly known as:  ONE A DAY Your last dose was: Your next dose is:    
   
   
 Dose:  1 Tab Take 1 Tab by mouth daily. Refills:  0 Where to Get Your Medications Information on where to get these meds will be given to you by the nurse or doctor. ! Ask your nurse or doctor about these medications  
  clindamycin 300 mg capsule  
 cloNIDine HCl 0.1 mg tablet  
 doxycycline 100 mg capsule ferrous sulfate 325 mg (65 mg iron) tablet  
 losartan 100 mg tablet  
 oxyCODONE IR 5 mg immediate release tablet Discharge Instructions Patient Discharge Instructions [facility] / [id number] : 1985 Admitted 2017 Discharged: 2017 Primary Diagnoses Problem List as of 2017  Date Reviewed: 2017 Codes Class Noted - Resolved * (Principal)Breast abscess Hypothyroidism Hx MRSA infection Lactic acidemia Fever chills Sinus tachycardia Sepsis ([facility] Obesity Type 2 diabetes mellitus without complication (HCC) (Chronic) HTN (hypertension) (Chronic) Take Home Medications · It is important that you take the medication exactly as they are prescribed. · Keep your medication in the bottles provided by the pharmacist and keep a list of the medication names, dosages, and times to be taken in your wallet. · Do not take other medications without consulting your doctor. What to do at [facility] Recommended diet: Cardiac Diet and Diabetic Diet Recommended activity: Activity as tolerated If you experience worse symptoms, please follow up with Dr Sandy Pardo. Follow-up with your PCP in a few weeks Information obtained by : 
I understand that if any problems occur once I am at home I am to contact my physician. I understand and acknowledge receipt of the instructions indicated above. Physician's or R.N.'s Signature                                                                  Date/Time Patient or Representative Signature                                                          Date/Time Discharge Orders None [employer] Announcement We are excited to announce that we are making your provider's discharge notes available to you in [employer]. You will see these notes when they are completed and signed by the physician that discharged you from your recent hospital stay. If you have any questions or concerns about any information you see in [employer], please call the Health Information Department where you were seen or reach out to your Primary Care Provider for more information about your plan of care. Introducing [facility]! Maykel Reyes introduces [employer] patient portal. Now you can access parts of your medical record, email your doctor's office, and request medication refills online. 1. In your internet browser, go to [email or web address] 2. Click on the First Time User? Click Here link in the Sign In box. You will see the New Member Sign Up page. 3. Enter your [employer] Access Code exactly as it appears below. You will not need to use this code after youve completed the sign-up process. If you do not sign up before the expiration date, you must request a new code. · [employer] Access Code: [email or web address] Expires: 12/11/2017  1:17 PM 
 
4. Enter the last four digits of your Social Security Number (xxxx) and Date of Birth (mm/dd/yyyy) as indicated and click Submit. You will be taken to the next sign-up page. 5. Create a [employer] ID. This will be your [employer] login ID and cannot be changed, so think of one that is secure and easy to remember. 6. Create a [employer] password. You can change your password at any time. 7. Enter your Password Reset Question and Answer. This can be used at a later time if you forget your password. 8. Enter your e-mail address. You will receive e-mail notification when new information is available in [address] 9. Click Sign Up. You can now view and download portions of your medical record. 10. Click the Download Summary menu link to download a portable copy of your medical information. If you have questions, please visit the Frequently Asked Questions section of the [employer] website. Remember, [facility]ita [facility]it[facility] is NOT to be used for urgent needs. For medical emergencies, dial 911. Now available from your iPhone and Android! General Information Please provide this summary of care documentation to your next provider. Patient Signature:  ____________________________________________________________ Date:  ____________________________________________________________  
  
[facility] Provider Signature:  ____________________________________________________________ Date:  ____________________________________________________________

## 2017-09-15 LAB
ANION GAP SERPL CALC-SCNC: 6 MMOL/L (ref 5–15)
BUN SERPL-MCNC: 10 MG/DL (ref 6–20)
BUN/CREAT SERPL: 13 (ref 12–20)
CALCIUM SERPL-MCNC: 8.4 MG/DL (ref 8.5–10.1)
CHLORIDE SERPL-SCNC: 103 MMOL/L (ref 97–108)
CO2 SERPL-SCNC: 28 MMOL/L (ref 21–32)
CREAT SERPL-MCNC: 0.76 MG/DL (ref 0.55–1.02)
ERYTHROCYTE [DISTWIDTH] IN BLOOD BY AUTOMATED COUNT: 12.9 % (ref 11.5–14.5)
GLUCOSE BLD STRIP.AUTO-MCNC: 112 MG/DL (ref 65–100)
GLUCOSE BLD STRIP.AUTO-MCNC: 137 MG/DL (ref 65–100)
GLUCOSE BLD STRIP.AUTO-MCNC: 167 MG/DL (ref 65–100)
GLUCOSE BLD STRIP.AUTO-MCNC: 66 MG/DL (ref 65–100)
GLUCOSE BLD STRIP.AUTO-MCNC: 91 MG/DL (ref 65–100)
GLUCOSE SERPL-MCNC: 136 MG/DL (ref 65–100)
HCT VFR BLD AUTO: 34.9 % (ref 35–47)
HGB BLD-MCNC: 12.2 G/DL (ref 11.5–16)
MAGNESIUM SERPL-MCNC: 1.7 MG/DL (ref 1.6–2.4)
MCH RBC QN AUTO: 29.3 PG (ref 26–34)
MCHC RBC AUTO-ENTMCNC: 35 G/DL (ref 30–36.5)
MCV RBC AUTO: 83.9 FL (ref 80–99)
PLATELET # BLD AUTO: 357 K/UL (ref 150–400)
POTASSIUM SERPL-SCNC: 3.6 MMOL/L (ref 3.5–5.1)
RBC # BLD AUTO: 4.16 M/UL (ref 3.8–5.2)
SERVICE CMNT-IMP: ABNORMAL
SERVICE CMNT-IMP: NORMAL
SERVICE CMNT-IMP: NORMAL
SODIUM SERPL-SCNC: 137 MMOL/L (ref 136–145)
WBC # BLD AUTO: 9 K/UL (ref 3.6–11)

## 2017-09-15 PROCEDURE — 74011250637 HC RX REV CODE- 250/637: Performed by: EMERGENCY MEDICINE

## 2017-09-15 PROCEDURE — 74011250636 HC RX REV CODE- 250/636: Performed by: INTERNAL MEDICINE

## 2017-09-15 PROCEDURE — 36415 COLL VENOUS BLD VENIPUNCTURE: CPT | Performed by: INTERNAL MEDICINE

## 2017-09-15 PROCEDURE — 74011250637 HC RX REV CODE- 250/637: Performed by: INTERNAL MEDICINE

## 2017-09-15 PROCEDURE — 65660000000 HC RM CCU STEPDOWN

## 2017-09-15 PROCEDURE — 74011000250 HC RX REV CODE- 250: Performed by: INTERNAL MEDICINE

## 2017-09-15 PROCEDURE — 85027 COMPLETE CBC AUTOMATED: CPT | Performed by: INTERNAL MEDICINE

## 2017-09-15 PROCEDURE — 82962 GLUCOSE BLOOD TEST: CPT

## 2017-09-15 PROCEDURE — 74011000258 HC RX REV CODE- 258: Performed by: INTERNAL MEDICINE

## 2017-09-15 PROCEDURE — 83735 ASSAY OF MAGNESIUM: CPT | Performed by: INTERNAL MEDICINE

## 2017-09-15 PROCEDURE — 74011636637 HC RX REV CODE- 636/637: Performed by: INTERNAL MEDICINE

## 2017-09-15 PROCEDURE — 80048 BASIC METABOLIC PNL TOTAL CA: CPT | Performed by: INTERNAL MEDICINE

## 2017-09-15 RX ORDER — AMOXICILLIN 250 MG
1 CAPSULE ORAL DAILY
Status: DISCONTINUED | OUTPATIENT
Start: 2017-09-16 | End: 2017-09-16 | Stop reason: HOSPADM

## 2017-09-15 RX ORDER — ACETAMINOPHEN 325 MG/1
650 TABLET ORAL EVERY 6 HOURS
Status: DISCONTINUED | OUTPATIENT
Start: 2017-09-15 | End: 2017-09-16 | Stop reason: HOSPADM

## 2017-09-15 RX ORDER — OXYCODONE HYDROCHLORIDE 5 MG/1
5 TABLET ORAL
Status: DISCONTINUED | OUTPATIENT
Start: 2017-09-15 | End: 2017-09-16 | Stop reason: HOSPADM

## 2017-09-15 RX ORDER — OXYCODONE HYDROCHLORIDE 5 MG/1
5-10 TABLET ORAL
Status: DISCONTINUED | OUTPATIENT
Start: 2017-09-15 | End: 2017-09-15

## 2017-09-15 RX ORDER — NAPROXEN SODIUM 220 MG
220 TABLET ORAL 2 TIMES DAILY WITH MEALS
Status: DISCONTINUED | OUTPATIENT
Start: 2017-09-15 | End: 2017-09-16 | Stop reason: HOSPADM

## 2017-09-15 RX ORDER — OXYCODONE HYDROCHLORIDE 5 MG/1
10 TABLET ORAL
Status: DISCONTINUED | OUTPATIENT
Start: 2017-09-15 | End: 2017-09-16 | Stop reason: HOSPADM

## 2017-09-15 RX ORDER — CLINDAMYCIN HYDROCHLORIDE 150 MG/1
300 CAPSULE ORAL EVERY 6 HOURS
Status: DISCONTINUED | OUTPATIENT
Start: 2017-09-15 | End: 2017-09-16 | Stop reason: HOSPADM

## 2017-09-15 RX ORDER — DOXYCYCLINE HYCLATE 100 MG
100 TABLET ORAL EVERY 12 HOURS
Status: DISCONTINUED | OUTPATIENT
Start: 2017-09-15 | End: 2017-09-16 | Stop reason: HOSPADM

## 2017-09-15 RX ADMIN — OXYCODONE HYDROCHLORIDE 5 MG: 5 TABLET ORAL at 18:44

## 2017-09-15 RX ADMIN — ONDANSETRON 4 MG: 2 INJECTION INTRAMUSCULAR; INTRAVENOUS at 14:55

## 2017-09-15 RX ADMIN — ONDANSETRON 4 MG: 2 INJECTION INTRAMUSCULAR; INTRAVENOUS at 08:27

## 2017-09-15 RX ADMIN — MORPHINE SULFATE 1 MG: 2 INJECTION, SOLUTION INTRAMUSCULAR; INTRAVENOUS at 16:35

## 2017-09-15 RX ADMIN — CLINDAMYCIN HYDROCHLORIDE 300 MG: 150 CAPSULE ORAL at 12:03

## 2017-09-15 RX ADMIN — OXYCODONE HYDROCHLORIDE 5 MG: 5 TABLET ORAL at 12:21

## 2017-09-15 RX ADMIN — CLINDAMYCIN HYDROCHLORIDE 300 MG: 150 CAPSULE ORAL at 17:17

## 2017-09-15 RX ADMIN — INSULIN LISPRO 2 UNITS: 100 INJECTION, SOLUTION INTRAVENOUS; SUBCUTANEOUS at 17:17

## 2017-09-15 RX ADMIN — MORPHINE SULFATE 1 MG: 2 INJECTION, SOLUTION INTRAMUSCULAR; INTRAVENOUS at 08:26

## 2017-09-15 RX ADMIN — MORPHINE SULFATE 1 MG: 2 INJECTION, SOLUTION INTRAMUSCULAR; INTRAVENOUS at 03:48

## 2017-09-15 RX ADMIN — INSULIN LISPRO 30 UNITS: 100 INJECTION, SOLUTION INTRAVENOUS; SUBCUTANEOUS at 17:17

## 2017-09-15 RX ADMIN — ACETAMINOPHEN 650 MG: 325 TABLET ORAL at 17:17

## 2017-09-15 RX ADMIN — HYDRALAZINE HYDROCHLORIDE 25 MG: 25 TABLET, FILM COATED ORAL at 16:28

## 2017-09-15 RX ADMIN — CLONIDINE HYDROCHLORIDE 0.1 MG: 0.1 TABLET ORAL at 08:30

## 2017-09-15 RX ADMIN — LOSARTAN POTASSIUM 100 MG: 50 TABLET, FILM COATED ORAL at 08:30

## 2017-09-15 RX ADMIN — CLONIDINE HYDROCHLORIDE 0.1 MG: 0.1 TABLET ORAL at 22:08

## 2017-09-15 RX ADMIN — SODIUM CHLORIDE 600 MG: 900 INJECTION, SOLUTION INTRAVENOUS at 09:47

## 2017-09-15 RX ADMIN — HYDROCODONE BITARTRATE AND ACETAMINOPHEN 1 TABLET: 5; 325 TABLET ORAL at 06:47

## 2017-09-15 RX ADMIN — SODIUM CHLORIDE 600 MG: 900 INJECTION, SOLUTION INTRAVENOUS at 01:52

## 2017-09-15 RX ADMIN — ENOXAPARIN SODIUM 40 MG: 40 INJECTION SUBCUTANEOUS at 22:07

## 2017-09-15 RX ADMIN — SODIUM CHLORIDE AND POTASSIUM CHLORIDE: 9; 1.49 INJECTION, SOLUTION INTRAVENOUS at 03:47

## 2017-09-15 RX ADMIN — HYDRALAZINE HYDROCHLORIDE 25 MG: 25 TABLET, FILM COATED ORAL at 22:07

## 2017-09-15 RX ADMIN — METOPROLOL TARTRATE 25 MG: 25 TABLET ORAL at 08:30

## 2017-09-15 RX ADMIN — ENOXAPARIN SODIUM 40 MG: 40 INJECTION SUBCUTANEOUS at 08:30

## 2017-09-15 RX ADMIN — OXYCODONE HYDROCHLORIDE 10 MG: 5 TABLET ORAL at 22:06

## 2017-09-15 RX ADMIN — DOXYCYCLINE HYCLATE 100 MG: 100 TABLET, COATED ORAL at 22:07

## 2017-09-15 RX ADMIN — DOXYCYCLINE 100 MG: 100 INJECTION, POWDER, LYOPHILIZED, FOR SOLUTION INTRAVENOUS at 08:30

## 2017-09-15 RX ADMIN — HYDRALAZINE HYDROCHLORIDE 25 MG: 25 TABLET, FILM COATED ORAL at 08:30

## 2017-09-15 RX ADMIN — INSULIN LISPRO 30 UNITS: 100 INJECTION, SOLUTION INTRAVENOUS; SUBCUTANEOUS at 08:31

## 2017-09-15 RX ADMIN — ACETAMINOPHEN 650 MG: 325 TABLET ORAL at 12:21

## 2017-09-15 RX ADMIN — CLONIDINE HYDROCHLORIDE 0.1 MG: 0.1 TABLET ORAL at 16:28

## 2017-09-15 RX ADMIN — NAPROXEN SODIUM 220 MG: 220 TABLET, FILM COATED ORAL at 18:42

## 2017-09-15 RX ADMIN — HYDROCODONE BITARTRATE AND ACETAMINOPHEN 1 TABLET: 5; 325 TABLET ORAL at 01:52

## 2017-09-15 NOTE — CDMP QUERY
1.    The diagnosis of sepsis has been rendered for this patient with breast cellulitis/abscess. Currently the clinical indicators provided do not support this diagnosis. Please provide additional indicators to support this diagnosis or state that it has been ruled out. SIRS may be indicated by 2 or more of the following; however, consider the entire clinical scenario. .. Temp < 96.8°F/36°C or > 100.4°F/38°C   Resps > 20 breaths/min   Pulse > 90 bpm   PaCO2 <32 mmHg   WBC > 12K or < 4K or Bands > 10%         =>Sepsis presumed POA ruled out on further study  =>Sepsis POA treated and resolved  =>Sepsis POA ongoing and still being treated/resolving.   =>Other Explanation of clinical findings  =>Unable to Determine (no explanation of clinical findings)    The medical record reflects the following clinical findings, treatment, and risk factors:    26 y/o female admitted for R breast cellulitis and abscess. On admission the diagnosis of Sepsis is rendered. Current clinical indicators given are tachycardia and lactic acidemia. She does have one pulse of 93 documented, with additional readings in the 70's to 80's. Temp on admit is 97.9 and continues in that range, no tachypnea noted. WBC are 9.5 and 9. She is only bolused with 1 L NS (about 10ml/kg). Lactic acid is 2.3    Please clarify and document your clinical opinion in the progress notes and discharge summary including the definitive and/or presumptive diagnosis, (suspected or probable), related to the above clinical findings. Please include clinical findings supporting your diagnosis. Thanks for your time.     George Billingsley RN, BSN  [phone]

## 2017-09-15 NOTE — PROGRESS NOTES
Bedside and Verbal shift change report given to Ronnie Pierre RN (oncoming nurse) by Chandana Boyd RN (offgoing nurse). Report included the following information SBAR, Kardex, ED Summary, Intake/Output, MAR, Accordion and Recent Results.

## 2017-09-15 NOTE — PROGRESS NOTES
[facility]amil Arevalo [facility] [place] [address]  [address] [facility], [place], [address] [place] [address]  [phone]      Medical Progress Note      NAME: Alfreda Daniel   :  1985  MRM:  [id number]    Date/Time: 9/15/2017  9:56 AM       Assessment and Plan:     Breast abscess / Hx MRSA infection - POA, R inner breast.  Consulted breast surgeon, but no debridement needed. Monitor blood cx. NO wound cx sent from ER during initial drainage of wound. Clinda and Doxy PO, home on doxy alone if improved by AM.       Sepsis / Lactic acidemia / Fever chills / Sinus tachycardia - POA, due to breast infection. Fever recorded at home, and considered reliable. NOT severe sepsis. Improving. High BP and lack of ARF prevented full bolus IVF in ER, despite lactic acid. Monitor.     HTN (hypertension) - Highly elevatd on admit, due to vomiting her usual meds, and pain. I added metoprolol, but will convert to a higher dose of her usual clonidine, and she will continue her usual hydralazine and losartan (100). Pain and nausea control will help.     Type 2 diabetes mellitus without complication - Diabetic diet and counseling. SSI per protocol. Continue home Lantus, can increased dose if eating well. Checking A1c.     Obesity - Advise weight loss     Hypothyroidism - Continue synthroid. Hx anemia - None noted. Hold Iron and B12 due to Abx use. Subjective:     Chief Complaint:  Still severe pain, but otherwise improved. Eating    ROS:  (bold if positive, if negative)      Tolerating PT   Tolerating Diet        Objective:     Last 24hrs VS reviewed since prior progress note.  Most recent are:    Visit Vitals    /80 (BP 1 Location: Left arm, BP Patient Position: At rest)    Pulse 68    Temp 97.9 °F (36.6 °C)    Resp (!) 118    Ht 5' 4\" (1.626 m)    Wt 108.9 kg (240 lb)    SpO2 91%    BMI 41.2 kg/m2     SpO2 Readings from Last 6 Encounters:   09/15/17 91%   17 99%   17 97%   17 98%   17 98% 02/20/17 98%          Intake/Output Summary (Last 24 hours) at 09/15/17 0956  Last data filed at 09/15/17 0932   Gross per 24 hour   Intake             2075 ml   Output                0 ml   Net             2075 ml        Physical Exam:    Gen:  Morbid obese, in no acute distress  HEENT:  Pink conjunctivae, PERRL, hearing intact to voice, moist mucous membranes  Neck:  Supple, without masses, thyroid non-tender  Resp:  No accessory muscle use, clear breath sounds without wheezes rales or rhonchi  Card:  No murmurs, normal S1, S2 without thrills, bruits or peripheral edema  Abd:  Soft, non-tender, non-distended, normoactive bowel sounds are present, no mass  Lymph:  No cervical or inguinal adenopathy  Musc:  No cyanosis or clubbing  Skin:  Erythema and wound to upper inner breast, skin turgor is good  Neuro:  Cranial nerves are grossly intact, no focal motor weakness, follows commands appropriately  Psych:  Good insight, oriented to person, place and time, alert    Telemetry reviewed:   normal sinus rhythm  __________________________________________________________________  Medications Reviewed: (see below)  Medications:     Current Facility-Administered Medications   Medication Dose Route Frequency    clindamycin (CLEOCIN) capsule 300 mg  300 mg Oral Q6H    doxycycline (VIBRA-TABS) tablet 100 mg  100 mg Oral Q12H    insulin lispro (HUMALOG) injection 30 Units  30 Units SubCUTAneous TID WITH MEALS    insulin glargine (LANTUS) injection 60 Units  60 Units SubCUTAneous QHS    hydrALAZINE (APRESOLINE) tablet 25 mg  25 mg Oral TID    naloxone (NARCAN) injection 0.4 mg  0.4 mg IntraVENous PRN    glucose chewable tablet 16 g  4 Tab Oral PRN    dextrose (D50W) injection syrg 12.5-25 g  12.5-25 g IntraVENous PRN    glucagon (GLUCAGEN) injection 1 mg  1 mg IntraMUSCular PRN    acetaminophen (TYLENOL) tablet 650 mg  650 mg Oral Q4H PRN    HYDROcodone-acetaminophen (NORCO) 5-325 mg per tablet 1 Tab  1 Tab Oral Q4H PRN    morphine injection 1 mg  1 mg IntraVENous Q4H PRN    diphenhydrAMINE (BENADRYL) capsule 25 mg  25 mg Oral Q4H PRN    ondansetron (ZOFRAN) injection 4 mg  4 mg IntraVENous Q4H PRN    enoxaparin (LOVENOX) injection 40 mg  40 mg SubCUTAneous Q12H    insulin lispro (HUMALOG) injection   SubCUTAneous AC&HS    metoprolol tartrate (LOPRESSOR) tablet 25 mg  25 mg Oral Q12H    cloNIDine HCl (CATAPRES) tablet 0.1 mg  0.1 mg Oral TID    losartan (COZAAR) tablet 100 mg  100 mg Oral DAILY        Lab Data Reviewed: (see below)  Lab Review:     Recent Labs      09/15/17   0137  09/14/17   1113  09/12/17   1433   WBC  9.0  9.5  11.3*   HGB  12.2  13.6  13.4   HCT  34.9*  38.7  37.9   PLT  357  347  340     Recent Labs      09/15/17   0137  09/14/17   1113  09/12/17   1354   NA  137  134*  133*   K  3.6  4.2  4.2   CL  103  97  99   CO2  28  28  26   GLU  136*  403*  312*   BUN  10  11  11   CREA  0.76  0.92  0.70   CA  8.4*  8.6  9.2   MG  1.7   --    --    ALB   --   3.4*  3.4*   TBILI   --   1.0  0.7   SGOT   --   17  14*   ALT   --   38  27     Lab Results   Component Value Date/Time    Glucose (POC) 137 09/15/2017 08:06 AM    Glucose (POC) 219 09/14/2017 09:16 PM    Glucose (POC) 288 09/14/2017 04:55 PM    Glucose (POC) 313 09/12/2017 01:16 PM    Glucose (POC) 183 05/27/2017 07:19 AM     No results for input(s): PH, PCO2, PO2, HCO3, FIO2 in the last 72 hours. No results for input(s): INR in the last 72 hours.     No lab exists for component: INREXT  All Micro Results     Procedure Component Value Units Date/Time    CULTURE, BLOOD [[id number]] Collected:  09/14/17 1113    Order Status:  Completed Specimen:  Blood from Blood Updated:  09/15/17 0733     Special Requests: NO SPECIAL REQUESTS        Culture result: NO GROWTH AFTER 19 HOURS       CULTURE, BLOOD [[id number]] Collected:  09/14/17 1356    Order Status:  Completed Specimen:  Blood from Blood Updated:  09/15/17 0733     Special Requests: NO SPECIAL REQUESTS Culture result: NO GROWTH AFTER 16 HOURS             I have reviewed notes of prior 24hr.     Other pertinent lab: none    Total time spent with patient: [address] [facility] discussed with: Patient, Nursing Staff, Consultant/Specialist and >50% of time spent in counseling and coordination of care    Discussed:  Care Plan    Prophylaxis:  H2B/PPI    Disposition:  Home w/Family           ___________________________________________________    Attending Physician: Cj Hilliard MD

## 2017-09-15 NOTE — DIABETES MGMT
Progress Note     Chart reviewed for elevated blood glucose ( > 180 mg/dL x 2 in the past 24 hours) . Recommendations/ Comments: Diet advanced. Will continue to follow as needed. Fasting glucose today: 137 mg/dL (per am POCT). Required 9 units of correction since correction started at 1630 on 9/14/2017. Inpatient medications:  1. Correction Scale: Lispro (Humalog) Insulin Resistant Sensitivity scale to cover for glucose > 139 mg/dL before meals and for glucose >199 at bedtime      2. Lantus 60 units at bedtime     POC Glucose last 24hrs:   Lab Results   Component Value Date/Time     (H) 09/15/2017 01:37 AM     (H) 09/14/2017 11:13 AM    GLUCPOC 137 (H) 09/15/2017 08:06 AM    GLUCPOC 219 (H) 09/14/2017 09:16 PM    GLUCPOC 288 (H) 09/14/2017 04:55 PM        Estimated Creatinine Clearance: 128.2 mL/min (based on Cr of 0.76). Diet order:   Active Orders   Diet    DIET DIABETIC CONSISTENT CARB Regular      PO intake: Patient Vitals for the past 72 hrs:   % Diet Eaten   09/15/17 0932 25 %       HPI:   Agustina Marvin is a 26 yo  female with a past medical history of  DM, per Dr. Jihan Jameson MD's H&P dated 9/14/2017. Ms. Giuseppe Levi monitors blood glucose at home 4 (times) per day, and denies difficulty obtaining diabetes supplies. Patient reports glucose readings are:  Lately never less than 200, in the upper 400's this morning; otherwise pre-infection,  before meals and 200 1 hour after meals. Outpatient medications per patient   1. Levemir 85 units at bedtime   2. Humalog 30 units with meals                        A1C:   Lab Results   Component Value Date/Time    Hemoglobin A1c 9.1 09/14/2017 01:56 PM    Hemoglobin A1c 9.9 05/25/2017 05:16 PM       Reference range*:  Increased risk for diabetes: 5.7 - 6.4%  Diabetes: >6.4%  Glycemic control for adults with diabetes: <7.0 %    *PER PARKS (2014). Diagnosis and classification of diabetes mellitus.  Diabetes care, 37, S81.      Thank you. Humberto Villagomez. STEFANY HuiN, MPH  Diabetes [address] [place] [address]  [phone]    -For most hospitalized persons with hyperglycemia in the intensive care unit (ICU), a glucose range of 140 to 180 mg/dL is recommended, provided this target can be safely achieved. *  - For general medicine and surgery patients in non-ICU settings, a premeal glucose target <140 mg/dL and a random blood glucose <180 mg/dL are recommended. *    JING Rahman, Melody Roberto., Yash Mckeon., ... & Marine Wilcox ([id number]). AMERICAN ASSOCIATION OF CLINICAL ENDOCRINOLOGISTS AND AMERICAN COLLEGE OF ENDOCRINOLOGY-CLINICAL PRACTICE GUIDELINES FOR DEVELOPING A DIABETES MELLITUS COMPREHENSIVE CARE PLAN-2015-EXECUTIVE SUMMARY: Complete guidelines are available at https://[email or web address] aace. com/publications/guidelines. Endocrine Practice, 21(4), E8825507.

## 2017-09-15 NOTE — CONSULTS
Palliative Medicine Consult  [place]: [phone] ([phone])    Patient Name: Mariah Kline  YOB: 1985    Date of Initial Consult: 9/15/17  Reason for Consult: overwhelming symptoms  Requesting Provider: Thi Tao   Primary Care Physician: None      SUMMARY:   Mariah Kline is a 27 y.o. with a past history of DM, migraine, MRSA infection left breast, who was admitted on 9/14/2017 from home with a diagnosis of cellulitis / abscess right breast. Current medical issues leading to Palliative Medicine involvement include: right breast pain, chronic low back pain syndrome. PALLIATIVE DIAGNOSES:   1. Right breast pain  2. Chronic midline low back pain, no sciatica  3. Nausea       PLAN:   1. Non-opioid approaches: scheduled tylenol and naprosyn x 3-4 days; cool compress (no ice directly on skin, sling may be helpful, discussed with RN); tylenol and naprosyn may provide opioid-sparing effect, even if they do not completely obviate need for opioid; pt reports she can take naprosyn and does so at home, even with hx of voltaren intolerance  2. I do think she will likely need a few days of PRN opioid for pain control; suggest oxycodone 5-10mg q3h PRN  3. Reviewed pt's , she has had 3 short term Rx for opioid over last year, 1 for hydrocodone, 2 for tramadol; she reports chronic back pain, but does not use opioid for this on regular basis (maybe \"30 tabs per year\"), most weeks, no opioid; we discussed risks of opioids and the need to stop using for this acute pain when possible  4. Bowel regimen while on daily opioid  5. PRN antiemetic, nausea will probably improve with pain control  6. Initial consult note routed to primary continuity provider  7.  Communicated plan of care with: Palliative IDT       GOALS OF CARE / TREATMENT PREFERENCES:   [====Goals of Care====]  GOALS OF CARE:  Patient / health care proxy stated goals: recover from acute illness, resume prior level of function, all restorative measures      TREATMENT PREFERENCES:   Code Status: Full Code    Advance Care Planning:  Advance Care Planning 9/15/2017   Patient's Healthcare Decision Maker is: Legal Next of Moses Low   Primary Decision Maker Name Sheryl De Guzman   Primary Decision Maker Phone Number [phone]   Primary Decision Maker Relationship to Patient Spouse   Confirm Advance Directive None   Patient Would Like to Complete Advance Directive -       Other:    The palliative care team has discussed with patient / health care proxy about goals of care / treatment preferences for patient.  [====Goals of Care====]         HISTORY:     History obtained from: pt    CHIEF COMPLAINT: right breast pain    HPI/SUBJECTIVE:    The patient is:   [x] Verbal and participatory  [] Non-participatory due to:   27 yof with above history and current issues. On assessment, pt reports severe pain in right breast, inner / lower aspect. Stabbing and shooting. X few days. Opioids (hydrocodone / IV morphine) bring pain down to 7 / 10, baseline chronic low back pain is 4 / 10 x years and this is tolerable. Pain associated with nausea. Pt feeling fatigued, not much sleep last night.     Clinical Pain Assessment (nonverbal scale for severity on nonverbal patients):   [++++ Clinical Pain Assessment++++]  [++++Pain Severity++++]: Pain: 8  [++++Pain Character++++]: stabbing, shooting  [++++Pain Duration++++]: days  [++++Pain Effect++++]: reduced sleep  [++++Pain Factors++++]: opioid helped  [++++Pain Frequency++++]: intermittent, multiple times per day  [++++Pain Location++++]: right breast, radiates toward neck  [++++ Clinical Pain Assessment++++]  Duration: for how long has pt been experiencing pain (e.g., 2 days, 1 month, years)  Frequency: how often pain is an issue (e.g., several times per day, once every few days, constant)     FUNCTIONAL ASSESSMENT:     Palliative Performance Scale (PPS):  PPS: 70       PSYCHOSOCIAL/SPIRITUAL SCREENING:     Advance Care Planning:  Advance Care Planning 9/15/2017   Patient's Healthcare Decision Maker is: Legal Next of Moses Low   Primary Decision Maker Name Shazia Hurtado   Primary Decision Maker Phone Number [phone]   Primary Decision Maker Relationship to Patient Spouse   Confirm Advance Directive None   Patient Would Like to Complete Advance Directive -        Any spiritual / Temple concerns:  [] Yes /  [x] No    Caregiver Burnout:  [] Yes /  [] No /  [x] No Caregiver Present      Anticipatory grief assessment:   [x] Normal  / [] Maladaptive       ESAS Anxiety: Anxiety: 0    ESAS Depression: Depression: 0        REVIEW OF SYSTEMS:     Positive and pertinent negative findings in ROS are noted above in HPI. The following systems were [x] reviewed / [] unable to be reviewed as noted in HPI  Other findings are noted below. Systems: constitutional, ears/nose/mouth/throat, respiratory, gastrointestinal, genitourinary, musculoskeletal, integumentary, neurologic, psychiatric, endocrine. Positive findings noted below. Modified ESAS Completed by: provider   Fatigue: 5 Drowsiness: 0   Depression: 0 Pain: 8   Anxiety: 0 Nausea: 2   Anorexia: 0 Dyspnea: 0     Constipation: No              PHYSICAL EXAM:     From RN flowsheet:  Wt Readings from Last 3 Encounters:   09/14/17 240 lb (108.9 kg)   09/12/17 240 lb (108.9 kg)   06/22/17 240 lb (108.9 kg)     Blood pressure 144/89, pulse 73, temperature 97.8 °F (36.6 °C), resp. rate 18, height 5' 4\" (1.626 m), weight 240 lb (108.9 kg), last menstrual period 08/14/2017, SpO2 96 %.     Pain Scale 1: Numeric (0 - 10)  Pain Intensity 1: 5  Pain Onset 1: acute  Pain Location 1: Breast  Pain Orientation 1: Right  Pain Description 1: Shooting  Pain Intervention(s) 1: Medication (see MAR)  Last bowel movement, if known:     Constitutional: aa, nad  Eyes: pupils equal, anicteric  ENMT: no nasal discharge, dry mucous membranes  Cardiovascular: regular rhythm, distal pulses intact  Respiratory: breathing not labored, symmetric  Gastrointestinal: soft non-tender, +bowel sounds  Musculoskeletal: no deformity, +tenderness to palpation right breast  Skin: warm, dry; redness around medial / inferior right breast, some drainage noted  Neurologic: following commands, moving all extremities  Psychiatric: full affect, no hallucinations  Other:       HISTORY:     Principal Problem:    Breast abscess (9/14/2017)    Active Problems:    Obesity (5/25/2017)      Type 2 diabetes mellitus without complication ([address] (7/44/3633)      HTN (hypertension) (5/25/2017)      Hypothyroidism ()      Hx MRSA infection ()      Lactic acidemia (9/14/2017)      Fever chills (9/14/2017)      Sinus tachycardia (9/14/2017)      Sepsis ([address] (9/14/2017)      Past Medical History:   Diagnosis Date    Anemia     Complicated migraine     with extremity numbness    Diabetes ([address]     Hx MRSA infection     Hypertension     Hypothyroidism     Irregular menstrual cycle     Obesity       Past Surgical History:   Procedure Laterality Date    HX CHOLECYSTECTOMY      HX HEENT      HX TONSIL AND ADENOIDECTOMY  1992    HX TYMPANOSTOMY        Family History   Problem Relation Age of Onset    Hypertension Other      \"all her family\"    Diabetes Other      \"all her family\"      History reviewed, no pertinent family history.   Social History   Substance Use Topics    Smoking status: Never Smoker    Smokeless tobacco: Never Used    Alcohol use No     Allergies   Allergen Reactions    Pcn [Penicillins] Rash    Vancomycin Other (comments)     Kidneys shut down    Voltaren [Diclofenac Sodium] Myalgia and Other (comments)     \"muscle spasms\"      Current Facility-Administered Medications   Medication Dose Route Frequency    clindamycin (CLEOCIN) capsule 300 mg  300 mg Oral Q6H    doxycycline (VIBRA-TABS) tablet 100 mg  100 mg Oral Q12H    insulin lispro (HUMALOG) injection 30 Units  30 Units SubCUTAneous TID WITH MEALS    insulin glargine (LANTUS) injection 60 Units  60 Units SubCUTAneous QHS    hydrALAZINE (APRESOLINE) tablet 25 mg  25 mg Oral TID    naloxone (NARCAN) injection 0.4 mg  0.4 mg IntraVENous PRN    glucose chewable tablet 16 g  4 Tab Oral PRN    dextrose (D50W) injection syrg 12.5-25 g  12.5-25 g IntraVENous PRN    glucagon (GLUCAGEN) injection 1 mg  1 mg IntraMUSCular PRN    acetaminophen (TYLENOL) tablet 650 mg  650 mg Oral Q4H PRN    HYDROcodone-acetaminophen (NORCO) 5-325 mg per tablet 1 Tab  1 Tab Oral Q4H PRN    morphine injection 1 mg  1 mg IntraVENous Q4H PRN    diphenhydrAMINE (BENADRYL) capsule 25 mg  25 mg Oral Q4H PRN    ondansetron (ZOFRAN) injection 4 mg  4 mg IntraVENous Q4H PRN    enoxaparin (LOVENOX) injection 40 mg  40 mg SubCUTAneous Q12H    insulin lispro (HUMALOG) injection   SubCUTAneous AC&HS    cloNIDine HCl (CATAPRES) tablet 0.1 mg  0.1 mg Oral TID    losartan (COZAAR) tablet 100 mg  100 mg Oral DAILY          LAB AND IMAGING FINDINGS:     Lab Results   Component Value Date/Time    WBC 9.0 09/15/2017 01:37 AM    HGB 12.2 09/15/2017 01:37 AM    PLATELET 126 19/81/1867 01:37 AM     Lab Results   Component Value Date/Time    Sodium 137 09/15/2017 01:37 AM    Potassium 3.6 09/15/2017 01:37 AM    Chloride 103 09/15/2017 01:37 AM    CO2 28 09/15/2017 01:37 AM    BUN 10 09/15/2017 01:37 AM    Creatinine 0.76 09/15/2017 01:37 AM    Calcium 8.4 09/15/2017 01:37 AM    Magnesium 1.7 09/15/2017 01:37 AM    Phosphorus 3.0 05/25/2017 11:31 PM      Lab Results   Component Value Date/Time    AST (SGOT) 17 09/14/2017 11:13 AM    Alk.  phosphatase 109 09/14/2017 11:13 AM    Protein, total 7.9 09/14/2017 11:13 AM    Albumin 3.4 09/14/2017 11:13 AM    Globulin 4.5 09/14/2017 11:13 AM     Lab Results   Component Value Date/Time    INR (POC) 0.9 05/25/2017 12:24 PM      No results found for: IRON, FE, TIBC, IBCT, PSAT, FERR   No results found for: PH, PCO2, PO2  No components found for: GLPOC   No results found for: CPK, CKMB Total time:   Counseling / coordination time, spent as noted above:   > 50% counseling / coordination?:     Prolonged service was provided for  []30 min   []75 min in face to face time in the presence of the patient, spent as noted above. Time Start:   Time End:   Note: this can only be billed with 99324 (initial) or 00259 (follow up). If multiple start / stop times, list each separately.

## 2017-09-15 NOTE — PROGRESS NOTES
Problem: Falls - Risk of  Goal: *Absence of Falls  Document Herb Fall Risk and appropriate interventions in the flowsheet.    Outcome: Progressing Towards Goal  Fall Risk Interventions:              Medication Interventions: Bed/chair exit alarm, Patient to call before getting OOB, Teach patient to arise slowly

## 2017-09-15 NOTE — PROGRESS NOTES
Problem: Pain  Goal: *Control of Pain  Outcome: Not Progressing Towards Goal  Pain not being controlled by current pain medication. Physician notified.

## 2017-09-16 VITALS
OXYGEN SATURATION: 94 % | HEIGHT: 64 IN | DIASTOLIC BLOOD PRESSURE: 76 MMHG | RESPIRATION RATE: 16 BRPM | HEART RATE: 68 BPM | SYSTOLIC BLOOD PRESSURE: 112 MMHG | TEMPERATURE: 97.8 F | BODY MASS INDEX: 40.97 KG/M2 | WEIGHT: 240 LBS

## 2017-09-16 LAB
ANION GAP SERPL CALC-SCNC: 6 MMOL/L (ref 5–15)
BUN SERPL-MCNC: 11 MG/DL (ref 6–20)
BUN/CREAT SERPL: 14 (ref 12–20)
CALCIUM SERPL-MCNC: 9.4 MG/DL (ref 8.5–10.1)
CHLORIDE SERPL-SCNC: 102 MMOL/L (ref 97–108)
CO2 SERPL-SCNC: 29 MMOL/L (ref 21–32)
CREAT SERPL-MCNC: 0.8 MG/DL (ref 0.55–1.02)
ERYTHROCYTE [DISTWIDTH] IN BLOOD BY AUTOMATED COUNT: 12.8 % (ref 11.5–14.5)
GLUCOSE BLD STRIP.AUTO-MCNC: 133 MG/DL (ref 65–100)
GLUCOSE BLD STRIP.AUTO-MCNC: 72 MG/DL (ref 65–100)
GLUCOSE BLD STRIP.AUTO-MCNC: 89 MG/DL (ref 65–100)
GLUCOSE SERPL-MCNC: 119 MG/DL (ref 65–100)
HCT VFR BLD AUTO: 37.5 % (ref 35–47)
HGB BLD-MCNC: 12.9 G/DL (ref 11.5–16)
MAGNESIUM SERPL-MCNC: 1.7 MG/DL (ref 1.6–2.4)
MCH RBC QN AUTO: 29.5 PG (ref 26–34)
MCHC RBC AUTO-ENTMCNC: 34.4 G/DL (ref 30–36.5)
MCV RBC AUTO: 85.6 FL (ref 80–99)
PLATELET # BLD AUTO: 347 K/UL (ref 150–400)
POTASSIUM SERPL-SCNC: 4.1 MMOL/L (ref 3.5–5.1)
RBC # BLD AUTO: 4.38 M/UL (ref 3.8–5.2)
SERVICE CMNT-IMP: ABNORMAL
SERVICE CMNT-IMP: NORMAL
SERVICE CMNT-IMP: NORMAL
SODIUM SERPL-SCNC: 137 MMOL/L (ref 136–145)
WBC # BLD AUTO: 10.2 K/UL (ref 3.6–11)

## 2017-09-16 PROCEDURE — 74011250636 HC RX REV CODE- 250/636: Performed by: INTERNAL MEDICINE

## 2017-09-16 PROCEDURE — 85027 COMPLETE CBC AUTOMATED: CPT | Performed by: INTERNAL MEDICINE

## 2017-09-16 PROCEDURE — 83735 ASSAY OF MAGNESIUM: CPT | Performed by: INTERNAL MEDICINE

## 2017-09-16 PROCEDURE — 80048 BASIC METABOLIC PNL TOTAL CA: CPT | Performed by: INTERNAL MEDICINE

## 2017-09-16 PROCEDURE — 36415 COLL VENOUS BLD VENIPUNCTURE: CPT | Performed by: INTERNAL MEDICINE

## 2017-09-16 PROCEDURE — 82962 GLUCOSE BLOOD TEST: CPT

## 2017-09-16 PROCEDURE — 74011250637 HC RX REV CODE- 250/637: Performed by: EMERGENCY MEDICINE

## 2017-09-16 PROCEDURE — 74011636637 HC RX REV CODE- 636/637: Performed by: INTERNAL MEDICINE

## 2017-09-16 PROCEDURE — 74011250637 HC RX REV CODE- 250/637: Performed by: INTERNAL MEDICINE

## 2017-09-16 RX ORDER — CLINDAMYCIN HYDROCHLORIDE 300 MG/1
300 CAPSULE ORAL EVERY 6 HOURS
Qty: 28 CAP | Refills: 0 | Status: SHIPPED | OUTPATIENT
Start: 2017-09-16 | End: 2017-09-23

## 2017-09-16 RX ORDER — LOSARTAN POTASSIUM 100 MG/1
100 TABLET ORAL DAILY
Qty: 30 TAB | Refills: 0 | Status: SHIPPED | OUTPATIENT
Start: 2017-09-16 | End: 2017-10-16

## 2017-09-16 RX ORDER — DOXYCYCLINE 100 MG/1
100 CAPSULE ORAL 2 TIMES DAILY
Qty: 14 CAP | Refills: 0 | Status: SHIPPED | OUTPATIENT
Start: 2017-09-16 | End: 2017-09-23

## 2017-09-16 RX ORDER — OXYCODONE HYDROCHLORIDE 5 MG/1
5 TABLET ORAL
Qty: 20 TAB | Refills: 0 | Status: SHIPPED | OUTPATIENT
Start: 2017-09-16 | End: 2017-10-16

## 2017-09-16 RX ORDER — CLONIDINE HYDROCHLORIDE 0.1 MG/1
0.1 TABLET ORAL 3 TIMES DAILY
Qty: 90 TAB | Refills: 0 | Status: SHIPPED
Start: 2017-09-16 | End: 2017-10-16

## 2017-09-16 RX ORDER — LANOLIN ALCOHOL/MO/W.PET/CERES
325 CREAM (GRAM) TOPICAL
Qty: 10 TAB | Refills: 0 | Status: SHIPPED
Start: 2017-09-16 | End: 2017-12-22 | Stop reason: SDUPTHER

## 2017-09-16 RX ADMIN — HYDRALAZINE HYDROCHLORIDE 25 MG: 25 TABLET, FILM COATED ORAL at 10:44

## 2017-09-16 RX ADMIN — ENOXAPARIN SODIUM 40 MG: 40 INJECTION SUBCUTANEOUS at 08:22

## 2017-09-16 RX ADMIN — ACETAMINOPHEN 650 MG: 325 TABLET ORAL at 00:04

## 2017-09-16 RX ADMIN — ACETAMINOPHEN 650 MG: 325 TABLET ORAL at 05:15

## 2017-09-16 RX ADMIN — CLINDAMYCIN HYDROCHLORIDE 300 MG: 150 CAPSULE ORAL at 00:05

## 2017-09-16 RX ADMIN — ACETAMINOPHEN 650 MG: 325 TABLET ORAL at 12:31

## 2017-09-16 RX ADMIN — OXYCODONE HYDROCHLORIDE 10 MG: 5 TABLET ORAL at 12:31

## 2017-09-16 RX ADMIN — CLINDAMYCIN HYDROCHLORIDE 300 MG: 150 CAPSULE ORAL at 12:31

## 2017-09-16 RX ADMIN — INSULIN GLARGINE 60 UNITS: 100 INJECTION, SOLUTION SUBCUTANEOUS at 00:05

## 2017-09-16 RX ADMIN — OXYCODONE HYDROCHLORIDE 10 MG: 5 TABLET ORAL at 08:15

## 2017-09-16 RX ADMIN — CLINDAMYCIN HYDROCHLORIDE 300 MG: 150 CAPSULE ORAL at 05:15

## 2017-09-16 RX ADMIN — CLONIDINE HYDROCHLORIDE 0.1 MG: 0.1 TABLET ORAL at 10:44

## 2017-09-16 RX ADMIN — ONDANSETRON 4 MG: 2 INJECTION INTRAMUSCULAR; INTRAVENOUS at 08:16

## 2017-09-16 RX ADMIN — DOXYCYCLINE HYCLATE 100 MG: 100 TABLET, COATED ORAL at 10:44

## 2017-09-16 RX ADMIN — INSULIN LISPRO 10 UNITS: 100 INJECTION, SOLUTION INTRAVENOUS; SUBCUTANEOUS at 08:23

## 2017-09-16 RX ADMIN — OXYCODONE HYDROCHLORIDE 10 MG: 5 TABLET ORAL at 05:14

## 2017-09-16 RX ADMIN — LOSARTAN POTASSIUM 100 MG: 50 TABLET, FILM COATED ORAL at 10:44

## 2017-09-16 NOTE — DISCHARGE SUMMARY
Physician Discharge Summary     Patient ID:  Rachel Delgado  [id number]  26 y.o.  1985    Admit date: 9/14/2017    Discharge date and time: 9/16/2017    Admission Diagnoses: Breast abscess    Discharge Diagnoses:    Principal Diagnosis   Breast abscess                                             Other Diagnoses    Morbid Obesity (5/25/2017)    Type 2 diabetes mellitus without complication ([facility] (8/02/6013)    HTN (hypertension) (5/25/2017)    Hypothyroidism ()    Hx MRSA infection ()    Lactic acidemia (9/14/2017)    Fever chills (9/14/2017)    Sinus tachycardia (9/14/2017)    Sepsis ([facility] (9/14/2017)    Hospital Course:   Breast abscess / Hx MRSA infection - POA, R inner breast.  Consulted breast surgeon, but no debridement needed.  Negative blood cx. NO wound cx sent from ER during initial drainage of wound.  Clinda and Doxy PO, home on clinda alone, since the doxy caused too much nausea        Sepsis / Lactic acidemia / Fever chills / Sinus tachycardia - POA, due to breast infection, now resolved. Fever recorded at home, and considered reliable. NOT severe sepsis.  Improved. High BP and lack of ARF prevented full bolus IVF in ER, despite elevated lactic acid.  Monitor.      HTN (hypertension) - Highly elevatd on admit, due to vomiting her usual meds, and pain.  I added metoprolol, but then converted to a higher dose of her usual clonidine, and she will continue her usual hydralazine and losartan (100).  Pain and nausea control will help.      Type 2 diabetes mellitus without complication - Diabetic diet and counseling.  SSI per protocol.  Continue home Lantus, can increased dose if eating well. Checking A1c.      Morbid Obesity - Advise weight loss      Hypothyroidism - Continue synthroid.     Hx anemia - None noted. Hold Iron and B12 due to Abx use.     PCP: None    Consults: breast surgery    Significant Diagnostic Studies: See Hospital Course    Discharged home in improved condition.     Discharge Exam:   /83 (BP 1 Location: Left arm, BP Patient Position: Sitting)    Pulse 74    Temp 98.6 °F (37 °C)    Resp 16    Ht 5' 4\" (1.626 m)    Wt 108.9 kg (240 lb)    SpO2 95%    Breastfeeding No    BMI 41.2 kg/m2      Gen: Morbid obese, in no acute distress  HEENT:  Pink conjunctivae, PERRL, hearing intact to voice, moist mucous membranes  Neck:  Supple, without masses, thyroid non-tender  Resp:  No accessory muscle use, clear breath sounds without wheezes rales or rhonchi  Card:  No murmurs, normal S1, S2 without thrills, bruits or peripheral edema  Abd:  Soft, non-tender, non-distended, normoactive bowel sounds are present, no mass  Lymph:  No cervical or inguinal adenopathy  Musc:  No cyanosis or clubbing  Skin:  Erythema and wound to upper inner breast, skin turgor is good  Neuro:  Cranial nerves are grossly intact, no focal motor weakness, follows commands appropriately  Psych:  Good insight, oriented to person, place and time, alert    Patient Instructions:   Current Discharge Medication List      START taking these medications    Details   clindamycin (CLEOCIN) 300 mg capsule Take 1 Cap by mouth every six (6) hours for 7 days. Qty: 28 Cap, Refills: 0      oxyCODONE IR (ROXICODONE) 5 mg immediate release tablet Take 1 Tab by mouth every three (3) hours as needed for up to 30 days. Max Daily Amount: 40 mg.  Qty: 20 Tab, Refills: 0         CONTINUE these medications which have CHANGED    Details   cloNIDine HCl (CATAPRES) 0.1 mg tablet Take 1 Tab by mouth three (3) times daily for 30 days. Hold if SBP < or equal to 130 mm Hg  Qty: 90 Tab, Refills: 0      doxycycline (MONODOX) 100 mg capsule Take 1 Cap by mouth two (2) times a day for 7 days. Started 9/12 for 10 days  Indications: infection in breast  Qty: 14 Cap, Refills: 0      losartan (COZAAR) 100 mg tablet Take 1 Tab by mouth daily for 30 days.  Change to once daily pill  Indications: hypertension  Qty: 30 Tab, Refills: 0      ferrous sulfate 325 mg (65 mg iron) tablet Take 1 Tab by mouth daily (with lunch). DO NOT RESUME until AFTER COMPLETING DOXYCYCLINE  Qty: 10 Tab, Refills: 0         CONTINUE these medications which have NOT CHANGED    Details   hydrALAZINE (APRESOLINE) 25 mg tablet Take 25 mg by mouth two (2) times daily as needed (only if DBP >100). insulin detemir (LEVEMIR) 100 unit/mL injection 85 Units by SubCUTAneous route nightly. multivitamin (ONE A DAY) tablet Take 1 Tab by mouth daily. aspirin delayed-release 81 mg tablet Take 81 mg by mouth daily. insulin lispro (HUMALOG) 100 unit/mL injection 30 Units by SubCUTAneous route three (3) times daily (with meals). Activity: Activity as tolerated  Diet: Cardiac Diet and Diabetic Diet  Wound Care: None needed    Follow-up with your PCP in a few weeks.   Follow-up tests/labs - none    Signed:  Joana Martin MD  9/16/2017  9:32 AM

## 2017-09-16 NOTE — PROGRESS NOTES
Discussed discharge instructions with patient. She verbalized understanding. Copy given. Four prescriptions given. FANY Baez. Discharged ambulatory with Lesly Coughlin, MARK escorting patient to her vehicle.

## 2017-09-16 NOTE — PROGRESS NOTES
Jamil Arevalo [address]  [address] [facility], [address] [place] [address]  [phone]      Medical Progress Note      NAME: Briana Ashraf   :  1985  MRM:  [id number]    Date/Time: 2017  9:20 AM       Assessment and Plan:     Breast abscess / Hx MRSA infection - POA, R inner breast.  Consulted breast surgeon, but no debridement needed. Negative blood cx. NO wound cx sent from ER during initial drainage of wound. Clinda and Doxy PO, home on clinda alone, since the doxy caused too much nausea       Sepsis / Lactic acidemia / Fever chills / Sinus tachycardia - POA, due to breast infection, now resolved. Fever recorded at home, and considered reliable. NOT severe sepsis. Improving. High BP and lack of ARF prevented full bolus IVF in ER, despite lactic acid. Monitor.     HTN (hypertension) - Highly elevatd on admit, due to vomiting her usual meds, and pain. I added metoprolol, but then converted to a higher dose of her usual clonidine, and she will continue her usual hydralazine and losartan (100). Pain and nausea control will help.     Type 2 diabetes mellitus without complication - Diabetic diet and counseling. SSI per protocol. Continue home Lantus, can increased dose if eating well. Checking A1c.     Obesity - Advise weight loss     Hypothyroidism - Continue synthroid. Hx anemia - None noted. Hold Iron and B12 due to Abx use. Subjective:     Chief Complaint:  Less pain, and improved. Eating    ROS:  (bold if positive, if negative)      Tolerating PT  Tolerating Diet        Objective:     Last 24hrs VS reviewed since prior progress note.  Most recent are:    Visit Vitals    /83 (BP 1 Location: Left arm, BP Patient Position: Sitting)    Pulse 74    Temp 98.6 °F (37 °C)    Resp 16    Ht 5' 4\" (1.626 m)    Wt 108.9 kg (240 lb)    SpO2 95%    Breastfeeding No    BMI 41.2 kg/m2     SpO2 Readings from Last 6 Encounters:   17 95%   17 99%   17 97% 06/06/17 98%   05/27/17 98%   02/20/17 98%            Intake/Output Summary (Last 24 hours) at 09/16/17 0920  Last data filed at 09/15/17 0932   Gross per 24 hour   Intake             2075 ml   Output                0 ml   Net             2075 ml        Physical Exam:    Gen:  Morbid obese, in no acute distress  HEENT:  Pink conjunctivae, PERRL, hearing intact to voice, moist mucous membranes  Neck:  Supple, without masses, thyroid non-tender  Resp:  No accessory muscle use, clear breath sounds without wheezes rales or rhonchi  Card:  No murmurs, normal S1, S2 without thrills, bruits or peripheral edema  Abd:  Soft, non-tender, non-distended, normoactive bowel sounds are present, no mass  Lymph:  No cervical or inguinal adenopathy  Musc:  No cyanosis or clubbing  Skin:  Erythema and wound to upper inner breast, skin turgor is good  Neuro:  Cranial nerves are grossly intact, no focal motor weakness, follows commands appropriately  Psych:  Good insight, oriented to person, place and time, alert    Telemetry reviewed:   normal sinus rhythm  __________________________________________________________________  Medications Reviewed: (see below)  Medications:     Current Facility-Administered Medications   Medication Dose Route Frequency    clindamycin (CLEOCIN) capsule 300 mg  300 mg Oral Q6H    doxycycline (VIBRA-TABS) tablet 100 mg  100 mg Oral Q12H    acetaminophen (TYLENOL) tablet 650 mg  650 mg Oral Q6H    naproxen sodium (NAPROSYN) tablet 220 mg  220 mg Oral BID WITH MEALS    senna-docusate (PERICOLACE) 8.6-50 mg per tablet 1 Tab  1 Tab Oral DAILY    oxyCODONE IR (ROXICODONE) tablet 5 mg  5 mg Oral Q3H PRN    oxyCODONE IR (ROXICODONE) tablet 10 mg  10 mg Oral Q3H PRN    insulin lispro (HUMALOG) injection 30 Units  30 Units SubCUTAneous TID WITH MEALS    insulin glargine (LANTUS) injection 60 Units  60 Units SubCUTAneous QHS    hydrALAZINE (APRESOLINE) tablet 25 mg  25 mg Oral TID    naloxone (NARCAN) injection 0.4 mg  0.4 mg IntraVENous PRN    glucose chewable tablet 16 g  4 Tab Oral PRN    dextrose (D50W) injection syrg 12.5-25 g  12.5-25 g IntraVENous PRN    glucagon (GLUCAGEN) injection 1 mg  1 mg IntraMUSCular PRN    morphine injection 1 mg  1 mg IntraVENous Q4H PRN    diphenhydrAMINE (BENADRYL) capsule 25 mg  25 mg Oral Q4H PRN    ondansetron (ZOFRAN) injection 4 mg  4 mg IntraVENous Q4H PRN    enoxaparin (LOVENOX) injection 40 mg  40 mg SubCUTAneous Q12H    insulin lispro (HUMALOG) injection   SubCUTAneous AC&HS    cloNIDine HCl (CATAPRES) tablet 0.1 mg  0.1 mg Oral TID    losartan (COZAAR) tablet 100 mg  100 mg Oral DAILY        Lab Data Reviewed: (see below)  Lab Review:     Recent Labs      09/16/17   0146  09/15/17   0137  09/14/17   1113   WBC  10.2  9.0  9.5   HGB  12.9  12.2  13.6   HCT  37.5  34.9*  38.7   PLT  347  357  347     Recent Labs      09/16/17   0146  09/15/17   0137  09/14/17   1113   NA  137  137  134*   K  4.1  3.6  4.2   CL  102  103  97   CO2  29  28  28   GLU  119*  136*  403*   BUN  11  10  11   CREA  0.80  0.76  0.92   CA  9.4  8.4*  8.6   MG  1.7  1.7   --    ALB   --    --   3.4*   TBILI   --    --   1.0   SGOT   --    --   17   ALT   --    --   38     Lab Results   Component Value Date/Time    Glucose (POC) 133 09/16/2017 07:33 AM    Glucose (POC) 112 09/15/2017 09:34 PM    Glucose (POC) 167 09/15/2017 04:09 PM    Glucose (POC) 91 09/15/2017 11:34 AM    Glucose (POC) 66 09/15/2017 11:15 AM     No results for input(s): PH, PCO2, PO2, HCO3, FIO2 in the last 72 hours. No results for input(s): INR in the last 72 hours.     No lab exists for component: Elissa Curtis  All Micro Results     Procedure Component Value Units Date/Time    CULTURE, BLOOD [[id number]] Collected:  09/14/17 1356    Order Status:  Completed Specimen:  Blood from Blood Updated:  09/16/17 5095     Special Requests: NO SPECIAL REQUESTS        Culture result: NO GROWTH 2 DAYS       CULTURE, BLOOD [[id number]] Collected:  09/14/17 1113    Order Status:  Completed Specimen:  Blood from Blood Updated:  09/16/17 0822     Special Requests: NO SPECIAL REQUESTS        Culture result: NO GROWTH 2 DAYS       MRSA CULTURE [[id number]] Collected:  09/16/17 0755    Order Status:  Completed Specimen:  Nares Updated:  09/16/17 0802          I have reviewed notes of prior 24hr.     Other pertinent lab: none    Total time spent with patient: [address] [place] [address] [facility] discussed with: Patient, Nursing Staff, Consultant/Specialist and >50% of time spent in counseling and coordination of care    Discussed:  Care Plan and D/C Planning    Prophylaxis:  H2B/PPI    Disposition:  Home w/Family           ___________________________________________________    Attending Physician: Octavio Sheldon MD

## 2017-09-16 NOTE — DISCHARGE INSTRUCTIONS
Patient Discharge Instructions    Rachel Delgado / [id number] : 1985    Admitted 2017 Discharged: 2017     Primary Diagnoses  Problem List as of 2017  Date Reviewed: 2017          Codes Class Noted - Resolved   * (Principal)Breast abscess   Hypothyroidism   Hx MRSA infection   Lactic acidemia   Fever chills   Sinus tachycardia   Sepsis ([facility] [address]   Obesity   Type 2 diabetes mellitus without complication ([facility] [address] (Chronic)   HTN (hypertension) (Chronic)          Take Home Medications     · It is important that you take the medication exactly as they are prescribed. · Keep your medication in the bottles provided by the pharmacist and keep a list of the medication names, dosages, and times to be taken in your wallet. · Do not take other medications without consulting your doctor. What to do at Home    Recommended diet: Cardiac Diet and Diabetic Diet    Recommended activity: Activity as tolerated    If you experience worse symptoms, please follow up with Dr Jonnathan Ac. Follow-up with your PCP in a few weeks        Information obtained by :  I understand that if any problems occur once I am at home I am to contact my physician. I understand and acknowledge receipt of the instructions indicated above.                                                                                                                                            Physician's or R.N.'s Signature                                                                  Date/Time                                                                                                                                              Patient or Representative Signature                                                          Date/Time

## 2017-09-17 LAB
BACTERIA SPEC CULT: NORMAL
BACTERIA SPEC CULT: NORMAL
SERVICE CMNT-IMP: NORMAL

## 2017-09-20 LAB
BACTERIA SPEC CULT: NORMAL
BACTERIA SPEC CULT: NORMAL
SERVICE CMNT-IMP: NORMAL
SERVICE CMNT-IMP: NORMAL

## 2017-12-22 ENCOUNTER — TELEPHONE (OUTPATIENT)
Dept: FAMILY MEDICINE CLINIC | Age: 32
End: 2017-12-22

## 2017-12-22 ENCOUNTER — OFFICE VISIT (OUTPATIENT)
Dept: FAMILY MEDICINE CLINIC | Age: 32
End: 2017-12-22

## 2017-12-22 VITALS
RESPIRATION RATE: 20 BRPM | HEART RATE: 77 BPM | BODY MASS INDEX: 40.12 KG/M2 | WEIGHT: 235 LBS | HEIGHT: 64 IN | OXYGEN SATURATION: 98 % | DIASTOLIC BLOOD PRESSURE: 109 MMHG | SYSTOLIC BLOOD PRESSURE: 159 MMHG | TEMPERATURE: 98.8 F

## 2017-12-22 DIAGNOSIS — E66.01 OBESITY, MORBID (HCC): ICD-10-CM

## 2017-12-22 DIAGNOSIS — E11.65 TYPE 2 DIABETES MELLITUS WITH HYPERGLYCEMIA, WITH LONG-TERM CURRENT USE OF INSULIN (HCC): Primary | ICD-10-CM

## 2017-12-22 DIAGNOSIS — E03.9 ACQUIRED HYPOTHYROIDISM: ICD-10-CM

## 2017-12-22 DIAGNOSIS — I10 ESSENTIAL HYPERTENSION: Chronic | ICD-10-CM

## 2017-12-22 DIAGNOSIS — D64.9 ANEMIA, UNSPECIFIED TYPE: ICD-10-CM

## 2017-12-22 DIAGNOSIS — L73.2 HIDRADENITIS: ICD-10-CM

## 2017-12-22 DIAGNOSIS — Z79.4 TYPE 2 DIABETES MELLITUS WITH HYPERGLYCEMIA, WITH LONG-TERM CURRENT USE OF INSULIN (HCC): Primary | ICD-10-CM

## 2017-12-22 DIAGNOSIS — G43.009 MIGRAINE WITHOUT AURA AND WITHOUT STATUS MIGRAINOSUS, NOT INTRACTABLE: ICD-10-CM

## 2017-12-22 RX ORDER — CLONIDINE HYDROCHLORIDE 0.1 MG/1
0.1 TABLET ORAL AS NEEDED
COMMUNITY
End: 2017-12-25 | Stop reason: ALTCHOICE

## 2017-12-22 RX ORDER — METOPROLOL SUCCINATE 50 MG/1
50 TABLET, EXTENDED RELEASE ORAL DAILY
Qty: 30 TAB | Refills: 1 | Status: SHIPPED | OUTPATIENT
Start: 2017-12-22 | End: 2018-03-31

## 2017-12-22 RX ORDER — LANOLIN ALCOHOL/MO/W.PET/CERES
325 CREAM (GRAM) TOPICAL
Qty: 30 TAB | Refills: 3 | Status: SHIPPED | OUTPATIENT
Start: 2017-12-22 | End: 2021-10-12

## 2017-12-22 RX ORDER — HYDRALAZINE HYDROCHLORIDE 25 MG/1
25 TABLET, FILM COATED ORAL
Qty: 30 TAB | Refills: 3 | Status: CANCELLED | OUTPATIENT
Start: 2017-12-22

## 2017-12-22 RX ORDER — LEVOTHYROXINE SODIUM 100 UG/1
100 TABLET ORAL
Qty: 20 TAB | Refills: 3 | Status: SHIPPED | OUTPATIENT
Start: 2017-12-22 | End: 2022-06-26

## 2017-12-22 RX ORDER — INSULIN LISPRO 100 [IU]/ML
26 INJECTION, SOLUTION INTRAVENOUS; SUBCUTANEOUS
Qty: 1 VIAL | Status: CANCELLED
Start: 2017-12-22

## 2017-12-22 RX ORDER — ASPIRIN 81 MG/1
81 TABLET ORAL DAILY
Qty: 30 TAB | Refills: 3 | Status: SHIPPED | OUTPATIENT
Start: 2017-12-22

## 2017-12-22 RX ORDER — METFORMIN HYDROCHLORIDE 750 MG/1
750 TABLET, EXTENDED RELEASE ORAL
Qty: 30 TAB | Refills: 1 | Status: SHIPPED | OUTPATIENT
Start: 2017-12-22 | End: 2021-10-12

## 2017-12-22 RX ORDER — CLONIDINE HYDROCHLORIDE 0.1 MG/1
0.1 TABLET ORAL AS NEEDED
Qty: 30 TAB | Refills: 3 | Status: CANCELLED | OUTPATIENT
Start: 2017-12-22

## 2017-12-22 RX ORDER — LEVOTHYROXINE SODIUM 100 UG/1
TABLET ORAL
COMMUNITY
End: 2017-12-22 | Stop reason: SDUPTHER

## 2017-12-22 RX ORDER — CLINDAMYCIN PHOSPHATE 11.9 MG/ML
SOLUTION TOPICAL
Qty: 120 ML | Refills: 1 | Status: SHIPPED | OUTPATIENT
Start: 2017-12-22 | End: 2018-02-13

## 2017-12-22 RX ORDER — LOSARTAN POTASSIUM 50 MG/1
50 TABLET ORAL DAILY
Qty: 30 TAB | Refills: 1 | Status: SHIPPED | OUTPATIENT
Start: 2017-12-22 | End: 2018-07-02 | Stop reason: ALTCHOICE

## 2017-12-22 NOTE — MR AVS SNAPSHOT
Visit Information Date & Time Provider Department Dept. Phone Encounter #  
 12/22/2017  8:00 AM Vanda Nurse, NP [facility] [id number] Follow-up Instructions Return in about 4 weeks (around 1/19/2018). Upcoming Health Maintenance Date Due  
 FOOT EXAM Q1 10/20/1994 MICROALBUMIN Q1 10/20/1994 EYE EXAM RETINAL OR DILATED Q1 10/20/1994 Pneumococcal 19-64 Medium Risk (1 of 1 - PPSV23) 10/20/2003 PAP AKA CERVICAL CYTOLOGY 10/20/2005 Influenza Age 5 to Adult 8/1/2017 HEMOGLOBIN A1C Q6M 3/14/2018 LIPID PANEL Q1 5/25/2018 DTaP/Tdap/Td series (2 - Td) 1/1/2021 Allergies as of 12/22/2017  Review Complete On: 12/22/2017 By: Ferris Pallas Severity Noted Reaction Type Reactions Pcn [Penicillins]  08/20/2012    Rash Vancomycin  03/04/2014    Other (comments) Kidneys shut down Voltaren [Diclofenac Sodium]  08/20/2012    Myalgia, Other (comments) \"muscle spasms\" Current Immunizations  Reviewed on 12/22/2017 Name Date Tdap 1/1/2011 Reviewed by Ferris Pallas on 12/22/2017 at  8:49 AM  
You Were Diagnosed With   
  
 Codes Comments Type 2 diabetes mellitus with hyperglycemia, with long-term current use of insulin (HCC)    -  Primary ICD-10-CM: E11.65, Z79.4 ICD-9-CM: 250.00, 790.29, V58.67 Obesity, morbid ([facility] [address]     ICD-10-CM: E66.01 
ICD-9-CM: 278.01 Essential hypertension     ICD-10-CM: I10 
ICD-9-CM: 401.9 Migraine without aura and without status migrainosus, not intractable     ICD-10-CM: I09.594 ICD-9-CM: 346.10 Acquired hypothyroidism     ICD-10-CM: E03.9 ICD-9-CM: 244.9 Hidradenitis     ICD-10-CM: L73.2 ICD-9-CM: 705.83 Anemia, unspecified type     ICD-10-CM: D64.9 ICD-9-CM: 041. 9 Vitals BP Pulse Temp Resp Height(growth percentile) Weight(growth percentile)  (!) 159/109 (BP 1 Location: Right arm, BP Patient Position: Sitting) 77 98.8 °F (37.1 °C) (Oral) 20 5' 4\" (1.626 m) 235 lb (106.6 kg) LMP SpO2 BMI OB Status Smoking Status 11/24/2017 98% 40.34 kg/m2 Having regular periods Never Smoker Vitals History BMI and BSA Data Body Mass Index Body Surface Area  
 40.34 kg/m 2 2.19 m 2 Preferred Pharmacy Pharmacy Name Phone [facility] [address], [address] [phone] Your Updated Medication List  
  
   
This list is accurate as of: 12/22/17  8:58 AM.  Always use your most recent med list.  
  
  
  
  
 aspirin delayed-release 81 mg tablet Take 1 Tab by mouth daily. clindamycin 1 % external solution Commonly known as:  CLEOCIN T  
use thin film on affected area  
  
 cloNIDine HCl 0.1 mg tablet Commonly known as:  CATAPRES Take 0.1 mg by mouth as needed. Only if systolic is above 169  
  
 ferrous sulfate 325 mg (65 mg iron) tablet Take 1 Tab by mouth daily (with lunch). HumaLOG 100 unit/mL injection Generic drug:  insulin lispro 26 Units by SubCUTAneous route three (3) times daily (with meals). hydrALAZINE 25 mg tablet Commonly known as:  APRESOLINE Take 25 mg by mouth two (2) times daily as needed (only if DBP >100). insulin detemir 100 unit/mL (3 mL) Inpn Commonly known as:  LEVEMIR FLEXTOUCH  
85 Units by SubCUTAneous route nightly. levothyroxine 100 mcg tablet Commonly known as:  SYNTHROID Take 1 Tab by mouth Daily (before breakfast). losartan 50 mg tablet Commonly known as:  COZAAR Take 1 Tab by mouth daily. metFORMIN  mg tablet Commonly known as:  GLUCOPHAGE XR Take 1 Tab by mouth daily (with dinner). metoprolol succinate 50 mg XL tablet Commonly known as:  TOPROL-XL Take 1 Tab by mouth daily. multivitamin tablet Commonly known as:  ONE A DAY Take 1 Tab by mouth daily. topiramate ER 25 mg capsule Commonly known as:  TROKENDI XR  
 Week 1- 1 tab daily; week 2- 2 tab daily; week 3- 3 tab daily, week 4- 4 tab daily. Prescriptions Sent to Pharmacy Refills  
 levothyroxine (SYNTHROID) 100 mcg tablet 3 Sig: Take 1 Tab by mouth Daily (before breakfast). Class: Normal  
 Pharmacy: [facility] [address] [place] [address] Ph #: [phone] Route: Oral  
 ferrous sulfate 325 mg (65 mg iron) tablet 3 Sig: Take 1 Tab by mouth daily (with lunch). Class: Normal  
 Pharmacy: [facility] [address] [place] [address] Ph #: [phone] Route: Oral  
 aspirin delayed-release 81 mg tablet 3 Sig: Take 1 Tab by mouth daily. Class: Normal  
 Pharmacy: [facility] [address] [place] [address] Ph #: [phone] Route: Oral  
 insulin detemir (LEVEMIR FLEXTOUCH) 100 unit/mL (3 mL) inpn 1 Si Units by SubCUTAneous route nightly. Class: Normal  
 Pharmacy: [facility] [address] [place] [address] Ph #: [phone] Route: SubCUTAneous  
 metFORMIN ER (GLUCOPHAGE XR) 750 mg tablet 1 Sig: Take 1 Tab by mouth daily (with dinner). Class: Normal  
 Pharmacy: [facility] [address] [place] [address] Ph #: [phone] Route: Oral  
 clindamycin (CLEOCIN T) 1 % external solution 1 Sig: use thin film on affected area Class: Normal  
 Pharmacy: [facility] [address] [place] [address] Ph #: [phone]  
 metoprolol succinate (TOPROL-XL) 50 mg XL tablet 1 Sig: Take 1 Tab by mouth daily. Class: Normal  
 Pharmacy: [facility] [address] [place] [address] Ph #: [phone] Route: Oral  
 losartan (COZAAR) 50 mg tablet 1 Sig: Take 1 Tab by mouth daily. Class: Normal  
 Pharmacy: [facility] [address] [place] [address] Ph #: [phone]  Route: Oral  
 topiramate ER (TROKENDI XR) 25 mg capsule 0  
 Sig: Week 1- 1 tab daily; week 2- 2 tab daily; week 3- 3 tab daily, week 4- 4 tab daily. Class: Normal  
 Pharmacy: [facility], [address] [facility] [address] Ph #: [phone] We Performed the Following CBC WITH AUTOMATED DIFF [95209 CPT(R)] HEMOGLOBIN A1C WITH EAG [74974 CPT(R)]  DIABETES EYE EXAM [HM6 Custom]  DIABETES FOOT EXAM [HM7 Custom] LIPID PANEL [13664 CPT(R)] METABOLIC PANEL, COMPREHENSIVE [06901 CPT(R)] MICROALBUMIN, UR, RAND W/ MICROALBUMIN/CREA RATIO [id number] CPT(R)] REFERRAL TO ENDOCRINOLOGY [HRL45 Custom] Comments:  
 eval uncontrolled diabetes REFERRAL TO OPHTHALMOLOGY [REF57 Custom] Comments:  
 Diabetic eye exam  
 TSH 3RD GENERATION [52665 CPT(R)] Follow-up Instructions Return in about 4 weeks (around 1/19/2018). Referral Information Referral ID Referred By Referred To  
  
 [id number] [facility] [address] [place] [address] Visits Status Start Date End Date 1 New Request 12/22/17 12/22/18 If your referral has a status of pending review or denied, additional information will be sent to support the outcome of this decision. Referral ID Referred By Referred To  
 [id number] [facility] [address] Visits Status Start Date End Date 1 New Request 12/22/17 12/22/18 If your referral has a status of pending review or denied, additional information will be sent to support the outcome of this decision. Patient Instructions Anemia: Care Instructions Your Care Instructions Anemia is a low level of red blood cells, which carry oxygen throughout your body. Many things can cause anemia. Lack of iron is one of the most common causes.  Your body needs iron to make hemoglobin, a substance in red blood cells that carries oxygen from the lungs to your body's cells. Without enough iron, the body produces fewer and smaller red blood cells. As a result, your body's cells do not get enough oxygen, and you feel tired and weak. And you may have trouble concentrating. Bleeding is the most common cause of a lack of iron. You may have heavy menstrual bleeding or bleeding caused by conditions such as ulcers, hemorrhoids, or cancer. Regular use of aspirin or other anti-inflammatory medicines (such as ibuprofen) also can cause bleeding in some people. A lack of iron in your diet also can cause anemia, especially at times when the body needs more iron, such as during pregnancy, infancy, and the teen years. Your doctor may have prescribed iron pills. It may take several months of treatment for your iron levels to return to normal. Your doctor also may suggest that you eat foods that are rich in iron, such as meat and beans. There are many other causes of anemia. It is not always due to a lack of iron. Finding the specific cause of your anemia will help your doctor find the right treatment for you. Follow-up care is a key part of your treatment and safety. Be sure to make and go to all appointments, and call your doctor if you are having problems. It's also a good idea to know your test results and keep a list of the medicines you take. How can you care for yourself at home? · Take your medicines exactly as prescribed. Call your doctor if you think you are having a problem with your medicine. · If your doctor recommends iron pills, take them as directed: ¨ Try to take the pills on an empty stomach about 1 hour before or 2 hours after meals. But you may need to take iron with food to avoid an upset stomach.  
¨ Do not take antacids or drink milk or caffeine drinks (such as coffee, tea, or cola) at the same time or within 2 hours of the time that you take your iron. They can make it hard for your body to absorb the iron. ¨ Vitamin C (from food or supplements) helps your body absorb iron. Try taking iron pills with a glass of orange juice or some other food that is high in vitamin C, such as citrus fruits. ¨ Iron pills may cause stomach problems, such as heartburn, nausea, diarrhea, constipation, and cramps. Be sure to drink plenty of fluids, and include fruits, vegetables, and fiber in your diet each day. Iron pills often make your bowel movements dark or green. ¨ If you forget to take an iron pill, do not take a double dose of iron the next time you take a pill. ¨ Keep iron pills out of the reach of small children. An overdose of iron can be very dangerous. · Follow your doctor's advice about eating iron-rich foods. These include red meat, shellfish, poultry, eggs, beans, raisins, whole-grain bread, and leafy green vegetables. · Steam vegetables to help them keep their iron content. When should you call for help? Call 911 anytime you think you may need emergency care. For example, call if: 
? · You have symptoms of a heart attack. These may include: ¨ Chest pain or pressure, or a strange feeling in the chest. 
¨ Sweating. ¨ Shortness of breath. ¨ Nausea or vomiting. ¨ Pain, pressure, or a strange feeling in the back, neck, jaw, or upper belly or in one or both shoulders or arms. ¨ Lightheadedness or sudden weakness. ¨ A fast or irregular heartbeat. After you call 911, the  may tell you to chew 1 adult-strength or 2 to 4 low-dose aspirin. Wait for an ambulance. Do not try to drive yourself. ? · You passed out (lost consciousness). ?Call your doctor now or seek immediate medical care if: 
? · You have new or increased shortness of breath. ? · You are dizzy or lightheaded, or you feel like you may faint. ? · Your fatigue and weakness continue or get worse. ? · You have any abnormal bleeding, such as: 
¨ Nosebleeds. ¨ Vaginal bleeding that is different (heavier, more frequent, at a different time of the month) than what you are used to. ¨ Bloody or black stools, or rectal bleeding. ¨ Bloody or pink urine. ? Watch closely for changes in your health, and be sure to contact your doctor if: 
? · You do not get better as expected. Where can you learn more? Go to http://kong-delma.info/. Enter R301 in the search box to learn more about \"Anemia: Care Instructions. \" Current as of: October 13, 2016 Content Version: 11.4 © 5776-3892 [employer]. Care instructions adapted under license by [employer] (which disclaims liability or warranty for this information). If you have questions about a medical condition or this instruction, always ask your healthcare professional. [address] any warranty or liability for your use of this information. Nutrition Tips for Diabetes: After Your Visit Your Care Instructions A healthy diet is important to manage diabetes. It helps you lose weight (if you need to) and keep it off. It gives you the nutrition and energy your body needs and helps prevent heart disease. But a diet for diabetes does not mean that you have to eat special foods. You can eat what your family eats, including occasional sweets and other favorites. But you do have to pay attention to how often you eat and how much you eat of certain foods. The right plan for you will give you meals that help you keep your blood sugar at healthy levels. Try to eat a variety of foods and to spread carbohydrate throughout the day. Carbohydrate raises blood sugar higher and more quickly than any other nutrient does. Carbohydrate is found in sugar, breads and cereals, fruit, starchy vegetables such as potatoes and corn, and milk and yogurt.  
You may want to work with a dietitian or diabetes educator to help you plan meals and snacks. A dietitian or diabetes educator also can help you lose weight if that is one of your goals. The following tips can help you enjoy your meals and stay healthy. Follow-up care is a key part of your treatment and safety. Be sure to make and go to all appointments, and call your doctor if you are having problems. Its also a good idea to know your test results and keep a list of the medicines you take. How can you care for yourself at home? · Learn which foods have carbohydrate and how much carbohydrate to eat. A dietitian or diabetes educator can help you learn to keep track of how much carbohydrate you eat. · Spread carbohydrate throughout the day. Eat some carbohydrate at all meals, but do not eat too much at any one time. · Plan meals to include food from all the food groups. These are the food groups and some example portion sizes: ¨ Grains: 1 slice of bread (1 ounce), ½ cup of cooked cereal, and 1/3 cup of cooked pasta or rice. These have about 15 grams of carbohydrate in a serving. Choose whole grains such as whole wheat bread or crackers, oatmeal, and brown rice more often than refined grains. ¨ Fruit: 1 small fresh fruit, such as an apple or orange; ½ of a banana; ½ cup of chopped, cooked, or canned fruit; ½ cup of fruit juice; 1 cup of melon or raspberries; and 2 tablespoons of dried fruit. These have about 15 grams of carbohydrate in a serving. ¨ Dairy: 1 cup of nonfat or low-fat milk and 2/3 cup of plain yogurt. These have about 15 grams of carbohydrate in a serving. ¨ Protein foods: Beef, chicken, turkey, fish, eggs, tofu, cheese, cottage cheese, and peanut butter. A serving size of meat is 3 ounces, which is about the size of a deck of cards. Examples of meat substitute serving sizes (equal to 1 ounce of meat) are 1/4 cup of cottage cheese, 1 egg, 1 tablespoon of peanut butter, and ½ cup of tofu. These have very little or no carbohydrate per serving. ¨ Vegetables: Starchy vegetables such as ½ cup of cooked dried beans, peas, potatoes, or corn have about 15 grams of carbohydrate. Nonstarchy vegetables have very little carbohydrate, such as 1 cup of raw leafy vegetables (such as spinach), ½ cup of other vegetables (cooked or chopped), and 3/4 cup of vegetable juice. · Use the plate format to plan meals. It is a good, quick way to make sure that you have a balanced meal. It also helps you spread carbohydrate throughout the day. You divide your plate by types of foods. Put vegetables on half the plate, meat or meat substitutes on one-quarter of the plate, and a grain or starchy vegetable (such as brown rice or a potato) in the final quarter of the plate. To this you can add a small piece of fruit and 1 cup of milk or yogurt, depending on how much carbohydrate you are supposed to eat at a meal. 
· Talk to your dietitian or diabetes educator about ways to add limited amounts of sweets into your meal plan. You can eat these foods now and then, as long as you include the amount of carbohydrate they have in your daily carbohydrate allowance. · If you drink alcohol, limit it to no more than 1 drink a day for women and 2 drinks a day for men. If you are pregnant, no amount of alcohol is known to be safe. · Protein, fat, and fiber do not raise blood sugar as much as carbohydrate does. If you eat a lot of these nutrients in a meal, your blood sugar will rise more slowly than it would otherwise. · Limit saturated fats, such as those from meat and dairy products. Try to replace it with monounsaturated fat, such as olive oil. This is a healthier choice because people who have diabetes are at higher-than-average risk of heart disease. But use a modest amount of olive oil. A tablespoon of olive oil has 14 grams of fat and 120 calories. · Exercise lowers blood sugar.  If you take insulin by shots or pump, you can use less than you would if you were not exercising. Keep in mind that timing matters. If you exercise within 1 hour after a meal, your body may need less insulin for that meal than it would if you exercised 3 hours after the meal. Test your blood sugar to find out how exercise affects your need for insulin. · Exercise on most days of the week. Aim for at least 30 minutes. Exercise helps you stay at a healthy weight and helps your body use insulin. Walking is an easy way to get exercise. Gradually increase the amount you walk every day. You also may want to swim, bike, or do other activities. When you eat out · Learn to estimate the serving sizes of foods that have carbohydrate. If you measure food at home, it will be easier to estimate the amount in a serving of restaurant food. · If the meal you order has too much carbohydrate (such as potatoes, corn, or baked beans), ask to have a low-carbohydrate food instead. Ask for a salad or green vegetables. · If you use insulin, check your blood sugar before and after eating out to help you plan how much to eat in the future. · If you eat more carbohydrate at a meal than you had planned, take a walk or do other exercise. This will help lower your blood sugar. Where can you learn more? Go to [employer] Enter K610 in the search box to learn more about \"Nutrition Tips for Diabetes: After Your Visit. \"  
© 5593-4258 [employer]. Care instructions adapted under license by [employer] (which disclaims liability or warranty for this information). This care instruction is for use with your licensed healthcare professional. If you have questions about a medical condition or this instruction, always ask your healthcare professional. [place] [address] any warranty or liability for your use of this information. Content Version: 10.4.099776; Current as of: June 4, 2014 Migraine Headache: Care Instructions Your Care Instructions Migraines are painful, throbbing headaches that often start on one side of the head. They may cause nausea and vomiting and make you sensitive to light, sound, or smell. Without treatment, migraines can last from 4 hours to a few days. Medicines can help prevent migraines or stop them after they have started. Your doctor can help you find which ones work best for you. Follow-up care is a key part of your treatment and safety. Be sure to make and go to all appointments, and call your doctor if you are having problems. It's also a good idea to know your test results and keep a list of the medicines you take. How can you care for yourself at home? · Do not drive if you have taken a prescription pain medicine. · Rest in a quiet, dark room until your headache is gone. Close your eyes, and try to relax or go to sleep. Don't watch TV or read. · Put a cold, moist cloth or cold pack on the painful area for 10 to 20 minutes at a time. Put a thin cloth between the cold pack and your skin. · Use a warm, moist towel or a heating pad set on low to relax tight shoulder and neck muscles. · Have someone gently massage your neck and shoulders. · Take your medicines exactly as prescribed. Call your doctor if you think you are having a problem with your medicine. You will get more details on the specific medicines your doctor prescribes. · Be careful not to take pain medicine more often than the instructions allow. You could get worse or more frequent headaches when the medicine wears off. To prevent migraines · Keep a headache diary so you can figure out what triggers your headaches. Avoiding triggers may help you prevent headaches. Record when each headache began, how long it lasted, and what the pain was like.  (Was it throbbing, aching, stabbing, or dull?) Write down any other symptoms you had with the headache, such as nausea, flashing lights or dark spots, or sensitivity to bright light or loud noise. Note if the headache occurred near your period. List anything that might have triggered the headache. Triggers may include certain foods (chocolate, cheese, wine) or odors, smoke, bright light, stress, or lack of sleep. · If your doctor has prescribed medicine for your migraines, take it as directed. You may have medicine that you take only when you get a migraine and medicine that you take all the time to help prevent migraines. ¨ If your doctor has prescribed medicine for when you get a headache, take it at the first sign of a migraine, unless your doctor has given you other instructions. ¨ If your doctor has prescribed medicine to prevent migraines, take it exactly as prescribed. Call your doctor if you think you are having a problem with your medicine. · Find healthy ways to deal with stress. Migraines are most common during or right after stressful times. Take time to relax before and after you do something that has caused a migraine in the past. 
· Try to keep your muscles relaxed by keeping good posture. Check your jaw, face, neck, and shoulder muscles for tension. Try to relax them. When you sit at a desk, change positions often. And make sure to stretch for 30 seconds each hour. · Get plenty of sleep and exercise. · Eat meals on a regular schedule. Avoid foods and drinks that often trigger migraines. These include chocolate, alcohol (especially red wine and port), aspartame, monosodium glutamate (MSG), and some additives found in foods (such as hot dogs, scruggs, cold cuts, aged cheeses, and pickled foods). · Limit caffeine. Don't drink too much coffee, tea, or soda. But don't quit caffeine suddenly. That can also give you migraines. · Do not smoke or allow others to smoke around you. If you need help quitting, talk to your doctor about stop-smoking programs and medicines. These can increase your chances of quitting for good. · If you are taking birth control pills or hormone therapy, talk to your doctor about whether they are triggering your migraines. When should you call for help? Call 911 anytime you think you may need emergency care. For example, call if: 
? · You have signs of a stroke. These may include: 
¨ Sudden numbness, paralysis, or weakness in your face, arm, or leg, especially on only one side of your body. ¨ Sudden vision changes. ¨ Sudden trouble speaking. ¨ Sudden confusion or trouble understanding simple statements. ¨ Sudden problems with walking or balance. ¨ A sudden, severe headache that is different from past headaches. ?Call your doctor now or seek immediate medical care if: 
? · You have new or worse nausea and vomiting. ? · You have a new or higher fever. ? · Your headache gets much worse. ? Watch closely for changes in your health, and be sure to contact your doctor if: 
? · You are not getting better after 2 days (48 hours). Where can you learn more? Go to http://kong-delma.info/. Enter K190 in the search box to learn more about \"Migraine Headache: Care Instructions. \" Current as of: October 14, 2016 Content Version: 11.4 © 6601-4779 [employer]. Care instructions adapted under license by [employer] (which disclaims liability or warranty for this information). If you have questions about a medical condition or this instruction, always ask your healthcare professional. [place] [address] any warranty or liability for your use of this information. Hidradenitis Suppurativa: Care Instructions Your Care Instructions Hidradenitis suppurativa (say \"lwv-fwet-oa-NY-tus sup-yur-uh-TY-vuh\") is a skin condition that causes lumps on the skin that look like pimples or boils. The lumps are usually painful and can break open and drain blood and bad-smelling pus. The condition can come and go for many years. Treatment for this condition may includeantibiotics and other medicines. You may need surgeryto remove the lumps. Home care includes wearing loose-fitting clothes and washing the area gently. You can help prevent lumps from coming back by staying at a healthy weight and not smoking. Doctors don't know exactly how this condition starts. But they do know that something irritates and inflames the hair follicles, causing them to swell and form lumps. This skin condition can't be spread from person to person (isn't contagious). Follow-up care is a key part of your treatment and safety. Be sure to make and go to all appointments, and call your doctor if you are having problems. It's also a good idea to know your test results and keep a list of the medicines you take. How can you care for yourself at home? ?Skin care ? · Wash the area every day with mild soap. Use your hands rather than a washcloth or sponge when you wash that part of your body. ? · Leave the affected areas uncovered when you can. If you have lumps that are draining, you can cover them with a bandage or other dressing. Put petroleum jelly (such as Vaseline) on the dressing to help keep it from sticking. ? · Wear-loose fitting clothes that don't rub against the area. Avoid activities that cause skin to rub together. ? · If you have pain, try a warm compress. Soak a towel or washcloth in warm water, wring it out, and place it on the affected skin for about 10 minutes. Medicines ? · Be safe with medicines. Take your medicines exactly as prescribed. Call your doctor if you think you are having a problem with your medicine. You will get more details on the specific medicines your doctor prescribes. ? · If your doctor prescribed antibiotics, take them as directed. Do not stop taking them just because you feel better. You need to take the full course of antibiotics. ? Lifestyle choices ? · If you smoke, think about quitting. Smoking can make the condition worse. If you need help quitting, talk to your doctor about stop-smoking programs and medicines. These can increase your chances of quitting for good. ? · Stay at a healthy weight, or lose weight, by eating healthy foods and being physically active. Being overweight could make this condition worse. When should you call for help? Call your doctor now or seek immediate medical care if: 
? · You have symptoms of infection, such as: 
¨ Increased pain, swelling, warmth, or redness. ¨ Red streaks leading from the area. ¨ Pus draining from the area. ¨ A fever. ? Watch closely for changes in your health, and be sure to contact your doctor if: 
? · You do not get better as expected. Where can you learn more? Go to http://kong-delma.info/. Enter L139 in the search box to learn more about \"Hidradenitis Suppurativa: Care Instructions. \" Current as of: October 13, 2016 Content Version: 11.4 © 3769-4866 [employer]. Care instructions adapted under license by [employer] (which disclaims liability or warranty for this information). If you have questions about a medical condition or this instruction, always ask your healthcare professional. [place] [address] any warranty or liability for your use of this information. Introducing [facility]! Dear Carmelita Salomon: Thank you for requesting a [employer] account. Our records indicate that you already have an active [employer] account. You can access your account anytime at [email or web address] Did you know that you can access your hospital and ER discharge instructions at any time in [employer]? You can also review all of your test results from your hospital stay or ER visit. Additional Information If you have questions, please visit the Frequently Asked Questions section of the [employer] website at [email or web address] Remember, [employer] is NOT to be used for urgent needs. For medical emergencies, dial 911. Now available from your iPhone and Android! Please provide this summary of care documentation to your next provider. Your primary care clinician is listed as Ricky Zamora. If you have any questions after today's visit, please call [phone].

## 2017-12-22 NOTE — PATIENT INSTRUCTIONS
Anemia: Care Instructions  Your Care Instructions    Anemia is a low level of red blood cells, which carry oxygen throughout your body. Many things can cause anemia. Lack of iron is one of the most common causes. Your body needs iron to make hemoglobin, a substance in red blood cells that carries oxygen from the lungs to your body's cells. Without enough iron, the body produces fewer and smaller red blood cells. As a result, your body's cells do not get enough oxygen, and you feel tired and weak. And you may have trouble concentrating. Bleeding is the most common cause of a lack of iron. You may have heavy menstrual bleeding or bleeding caused by conditions such as ulcers, hemorrhoids, or cancer. Regular use of aspirin or other anti-inflammatory medicines (such as ibuprofen) also can cause bleeding in some people. A lack of iron in your diet also can cause anemia, especially at times when the body needs more iron, such as during pregnancy, infancy, and the teen years. Your doctor may have prescribed iron pills. It may take several months of treatment for your iron levels to return to normal. Your doctor also may suggest that you eat foods that are rich in iron, such as meat and beans. There are many other causes of anemia. It is not always due to a lack of iron. Finding the specific cause of your anemia will help your doctor find the right treatment for you. Follow-up care is a key part of your treatment and safety. Be sure to make and go to all appointments, and call your doctor if you are having problems. It's also a good idea to know your test results and keep a list of the medicines you take. How can you care for yourself at home? · Take your medicines exactly as prescribed. Call your doctor if you think you are having a problem with your medicine. · If your doctor recommends iron pills, take them as directed:  ¨ Try to take the pills on an empty stomach about 1 hour before or 2 hours after meals. But you may need to take iron with food to avoid an upset stomach. ¨ Do not take antacids or drink milk or caffeine drinks (such as coffee, tea, or cola) at the same time or within 2 hours of the time that you take your iron. They can make it hard for your body to absorb the iron. ¨ Vitamin C (from food or supplements) helps your body absorb iron. Try taking iron pills with a glass of orange juice or some other food that is high in vitamin C, such as citrus fruits. ¨ Iron pills may cause stomach problems, such as heartburn, nausea, diarrhea, constipation, and cramps. Be sure to drink plenty of fluids, and include fruits, vegetables, and fiber in your diet each day. Iron pills often make your bowel movements dark or green. ¨ If you forget to take an iron pill, do not take a double dose of iron the next time you take a pill. ¨ Keep iron pills out of the reach of small children. An overdose of iron can be very dangerous. · Follow your doctor's advice about eating iron-rich foods. These include red meat, shellfish, poultry, eggs, beans, raisins, whole-grain bread, and leafy green vegetables. · Steam vegetables to help them keep their iron content. When should you call for help? Call 911 anytime you think you may need emergency care. For example, call if:  ? · You have symptoms of a heart attack. These may include:  ¨ Chest pain or pressure, or a strange feeling in the chest.  ¨ Sweating. ¨ Shortness of breath. ¨ Nausea or vomiting. ¨ Pain, pressure, or a strange feeling in the back, neck, jaw, or upper belly or in one or both shoulders or arms. ¨ Lightheadedness or sudden weakness. ¨ A fast or irregular heartbeat. After you call 911, the  may tell you to chew 1 adult-strength or 2 to 4 low-dose aspirin. Wait for an ambulance. Do not try to drive yourself. ? · You passed out (lost consciousness).    ?Call your doctor now or seek immediate medical care if:  ? · You have new or increased shortness of breath. ? · You are dizzy or lightheaded, or you feel like you may faint. ? · Your fatigue and weakness continue or get worse. ? · You have any abnormal bleeding, such as:  ¨ Nosebleeds. ¨ Vaginal bleeding that is different (heavier, more frequent, at a different time of the month) than what you are used to. ¨ Bloody or black stools, or rectal bleeding. ¨ Bloody or pink urine. ? Watch closely for changes in your health, and be sure to contact your doctor if:  ? · You do not get better as expected. Where can you learn more? Go to http://kongEasyQasadelma.info/. Enter R301 in the search box to learn more about \"Anemia: Care Instructions. \"  Current as of: October 13, 2016  Content Version: 11.4  © 2390-2280 [employer]. Care instructions adapted under license by [email or web address]) (which disclaims liability or warranty for this information). If you have questions about a medical condition or this instruction, always ask your healthcare professional. [place] [address] any warranty or liability for your use of this information. Nutrition Tips for Diabetes: After Your Visit  Your Care Instructions  A healthy diet is important to manage diabetes. It helps you lose weight (if you need to) and keep it off. It gives you the nutrition and energy your body needs and helps prevent heart disease. But a diet for diabetes does not mean that you have to eat special foods. You can eat what your family eats, including occasional sweets and other favorites. But you do have to pay attention to how often you eat and how much you eat of certain foods. The right plan for you will give you meals that help you keep your blood sugar at healthy levels. Try to eat a variety of foods and to spread carbohydrate throughout the day. Carbohydrate raises blood sugar higher and more quickly than any other nutrient does.  Carbohydrate is found in sugar, breads and cereals, fruit, starchy vegetables such as potatoes and corn, and milk and yogurt. You may want to work with a dietitian or diabetes educator to help you plan meals and snacks. A dietitian or diabetes educator also can help you lose weight if that is one of your goals. The following tips can help you enjoy your meals and stay healthy. Follow-up care is a key part of your treatment and safety. Be sure to make and go to all appointments, and call your doctor if you are having problems. Its also a good idea to know your test results and keep a list of the medicines you take. How can you care for yourself at home? · Learn which foods have carbohydrate and how much carbohydrate to eat. A dietitian or diabetes educator can help you learn to keep track of how much carbohydrate you eat. · Spread carbohydrate throughout the day. Eat some carbohydrate at all meals, but do not eat too much at any one time. · Plan meals to include food from all the food groups. These are the food groups and some example portion sizes:  ¨ Grains: 1 slice of bread (1 ounce), ½ cup of cooked cereal, and 1/3 cup of cooked pasta or rice. These have about 15 grams of carbohydrate in a serving. Choose whole grains such as whole wheat bread or crackers, oatmeal, and brown rice more often than refined grains. ¨ Fruit: 1 small fresh fruit, such as an apple or orange; ½ of a banana; ½ cup of chopped, cooked, or canned fruit; ½ cup of fruit juice; 1 cup of melon or raspberries; and 2 tablespoons of dried fruit. These have about 15 grams of carbohydrate in a serving. ¨ Dairy: 1 cup of nonfat or low-fat milk and 2/3 cup of plain yogurt. These have about 15 grams of carbohydrate in a serving. ¨ Protein foods: Beef, chicken, turkey, fish, eggs, tofu, cheese, cottage cheese, and peanut butter. A serving size of meat is 3 ounces, which is about the size of a deck of cards.  Examples of meat substitute serving sizes (equal to 1 ounce of meat) are 1/4 cup of cottage cheese, 1 egg, 1 tablespoon of peanut butter, and ½ cup of tofu. These have very little or no carbohydrate per serving. ¨ Vegetables: Starchy vegetables such as ½ cup of cooked dried beans, peas, potatoes, or corn have about 15 grams of carbohydrate. Nonstarchy vegetables have very little carbohydrate, such as 1 cup of raw leafy vegetables (such as spinach), ½ cup of other vegetables (cooked or chopped), and 3/4 cup of vegetable juice. · Use the plate format to plan meals. It is a good, quick way to make sure that you have a balanced meal. It also helps you spread carbohydrate throughout the day. You divide your plate by types of foods. Put vegetables on half the plate, meat or meat substitutes on one-quarter of the plate, and a grain or starchy vegetable (such as brown rice or a potato) in the final quarter of the plate. To this you can add a small piece of fruit and 1 cup of milk or yogurt, depending on how much carbohydrate you are supposed to eat at a meal.  · Talk to your dietitian or diabetes educator about ways to add limited amounts of sweets into your meal plan. You can eat these foods now and then, as long as you include the amount of carbohydrate they have in your daily carbohydrate allowance. · If you drink alcohol, limit it to no more than 1 drink a day for women and 2 drinks a day for men. If you are pregnant, no amount of alcohol is known to be safe. · Protein, fat, and fiber do not raise blood sugar as much as carbohydrate does. If you eat a lot of these nutrients in a meal, your blood sugar will rise more slowly than it would otherwise. · Limit saturated fats, such as those from meat and dairy products. Try to replace it with monounsaturated fat, such as olive oil. This is a healthier choice because people who have diabetes are at higher-than-average risk of heart disease. But use a modest amount of olive oil. A tablespoon of olive oil has 14 grams of fat and 120 calories.   · Exercise lowers blood sugar. If you take insulin by shots or pump, you can use less than you would if you were not exercising. Keep in mind that timing matters. If you exercise within 1 hour after a meal, your body may need less insulin for that meal than it would if you exercised 3 hours after the meal. Test your blood sugar to find out how exercise affects your need for insulin. · Exercise on most days of the week. Aim for at least 30 minutes. Exercise helps you stay at a healthy weight and helps your body use insulin. Walking is an easy way to get exercise. Gradually increase the amount you walk every day. You also may want to swim, bike, or do other activities. When you eat out  · Learn to estimate the serving sizes of foods that have carbohydrate. If you measure food at home, it will be easier to estimate the amount in a serving of restaurant food. · If the meal you order has too much carbohydrate (such as potatoes, corn, or baked beans), ask to have a low-carbohydrate food instead. Ask for a salad or green vegetables. · If you use insulin, check your blood sugar before and after eating out to help you plan how much to eat in the future. · If you eat more carbohydrate at a meal than you had planned, take a walk or do other exercise. This will help lower your blood sugar. Where can you learn more? Go to Tune[employer]be  Enter D337 in the search box to learn more about \"Nutrition Tips for Diabetes: After Your Visit. \"   © 9445-4818 [employer]. Care instructions adapted under license by [employer] (which disclaims liability or warranty for this information). This care instruction is for use with your licensed healthcare professional. If you have questions about a medical condition or this instruction, always ask your healthcare professional. [place] [address] any warranty or liability for your use of this information.   Content Version: 01.8.025757; Current as of: June 4, 2014                 Migraine Headache: Care Instructions  Your Care Instructions  Migraines are painful, throbbing headaches that often start on one side of the head. They may cause nausea and vomiting and make you sensitive to light, sound, or smell. Without treatment, migraines can last from 4 hours to a few days. Medicines can help prevent migraines or stop them after they have started. Your doctor can help you find which ones work best for you. Follow-up care is a key part of your treatment and safety. Be sure to make and go to all appointments, and call your doctor if you are having problems. It's also a good idea to know your test results and keep a list of the medicines you take. How can you care for yourself at home? · Do not drive if you have taken a prescription pain medicine. · Rest in a quiet, dark room until your headache is gone. Close your eyes, and try to relax or go to sleep. Don't watch TV or read. · Put a cold, moist cloth or cold pack on the painful area for 10 to 20 minutes at a time. Put a thin cloth between the cold pack and your skin. · Use a warm, moist towel or a heating pad set on low to relax tight shoulder and neck muscles. · Have someone gently massage your neck and shoulders. · Take your medicines exactly as prescribed. Call your doctor if you think you are having a problem with your medicine. You will get more details on the specific medicines your doctor prescribes. · Be careful not to take pain medicine more often than the instructions allow. You could get worse or more frequent headaches when the medicine wears off. To prevent migraines  · Keep a headache diary so you can figure out what triggers your headaches. Avoiding triggers may help you prevent headaches. Record when each headache began, how long it lasted, and what the pain was like.  (Was it throbbing, aching, stabbing, or dull?) Write down any other symptoms you had with the headache, such as nausea, flashing lights or dark spots, or sensitivity to bright light or loud noise. Note if the headache occurred near your period. List anything that might have triggered the headache. Triggers may include certain foods (chocolate, cheese, wine) or odors, smoke, bright light, stress, or lack of sleep. · If your doctor has prescribed medicine for your migraines, take it as directed. You may have medicine that you take only when you get a migraine and medicine that you take all the time to help prevent migraines. ¨ If your doctor has prescribed medicine for when you get a headache, take it at the first sign of a migraine, unless your doctor has given you other instructions. ¨ If your doctor has prescribed medicine to prevent migraines, take it exactly as prescribed. Call your doctor if you think you are having a problem with your medicine. · Find healthy ways to deal with stress. Migraines are most common during or right after stressful times. Take time to relax before and after you do something that has caused a migraine in the past.  · Try to keep your muscles relaxed by keeping good posture. Check your jaw, face, neck, and shoulder muscles for tension. Try to relax them. When you sit at a desk, change positions often. And make sure to stretch for 30 seconds each hour. · Get plenty of sleep and exercise. · Eat meals on a regular schedule. Avoid foods and drinks that often trigger migraines. These include chocolate, alcohol (especially red wine and port), aspartame, monosodium glutamate (MSG), and some additives found in foods (such as hot dogs, scruggs, cold cuts, aged cheeses, and pickled foods). · Limit caffeine. Don't drink too much coffee, tea, or soda. But don't quit caffeine suddenly. That can also give you migraines. · Do not smoke or allow others to smoke around you. If you need help quitting, talk to your doctor about stop-smoking programs and medicines. These can increase your chances of quitting for good.   · If you are taking birth control pills or hormone therapy, talk to your doctor about whether they are triggering your migraines. When should you call for help? Call 911 anytime you think you may need emergency care. For example, call if:  ? · You have signs of a stroke. These may include:  ¨ Sudden numbness, paralysis, or weakness in your face, arm, or leg, especially on only one side of your body. ¨ Sudden vision changes. ¨ Sudden trouble speaking. ¨ Sudden confusion or trouble understanding simple statements. ¨ Sudden problems with walking or balance. ¨ A sudden, severe headache that is different from past headaches. ?Call your doctor now or seek immediate medical care if:  ? · You have new or worse nausea and vomiting. ? · You have a new or higher fever. ? · Your headache gets much worse. ? Watch closely for changes in your health, and be sure to contact your doctor if:  ? · You are not getting better after 2 days (48 hours). Where can you learn more? Go to http://kong-delma.info/. Enter L206 in the search box to learn more about \"Migraine Headache: Care Instructions. \"  Current as of: October 14, 2016  Content Version: 11.4  © 9600-3074 [employer]. Care instructions adapted under license by [employer] (which disclaims liability or warranty for this information). If you have questions about a medical condition or this instruction, always ask your healthcare professional. [place] [address] any warranty or liability for your use of this information. Hidradenitis Suppurativa: Care Instructions  Your Care Instructions    Hidradenitis suppurativa (say \"mgr-xaod-pn-NY-tus sup-yur-uh-TY-vuh\") is a skin condition that causes lumps on the skin that look like pimples or boils. The lumps are usually painful and can break open and drain blood and bad-smelling pus. The condition can come and go for many years.   Treatment for this condition may includeantibiotics and other medicines. You may need surgeryto remove the lumps. Home care includes wearing loose-fitting clothes and washing the area gently. You can help prevent lumps from coming back by staying at a healthy weight and not smoking. Doctors don't know exactly how this condition starts. But they do know that something irritates and inflames the hair follicles, causing them to swell and form lumps. This skin condition can't be spread from person to person (isn't contagious). Follow-up care is a key part of your treatment and safety. Be sure to make and go to all appointments, and call your doctor if you are having problems. It's also a good idea to know your test results and keep a list of the medicines you take. How can you care for yourself at home? ?Skin care  ? · Wash the area every day with mild soap. Use your hands rather than a washcloth or sponge when you wash that part of your body. ? · Leave the affected areas uncovered when you can. If you have lumps that are draining, you can cover them with a bandage or other dressing. Put petroleum jelly (such as Vaseline) on the dressing to help keep it from sticking. ? · Wear-loose fitting clothes that don't rub against the area. Avoid activities that cause skin to rub together. ? · If you have pain, try a warm compress. Soak a towel or washcloth in warm water, wring it out, and place it on the affected skin for about 10 minutes. Medicines  ? · Be safe with medicines. Take your medicines exactly as prescribed. Call your doctor if you think you are having a problem with your medicine. You will get more details on the specific medicines your doctor prescribes. ? · If your doctor prescribed antibiotics, take them as directed. Do not stop taking them just because you feel better. You need to take the full course of antibiotics. ? Lifestyle choices  ? · If you smoke, think about quitting. Smoking can make the condition worse.  If you need help quitting, talk to your doctor about stop-smoking programs and medicines. These can increase your chances of quitting for good. ? · Stay at a healthy weight, or lose weight, by eating healthy foods and being physically active. Being overweight could make this condition worse. When should you call for help? Call your doctor now or seek immediate medical care if:  ? · You have symptoms of infection, such as:  ¨ Increased pain, swelling, warmth, or redness. ¨ Red streaks leading from the area. ¨ Pus draining from the area. ¨ A fever. ? Watch closely for changes in your health, and be sure to contact your doctor if:  ? · You do not get better as expected. Where can you learn more? Go to http://kong-delma.info/. Enter S614 in the search box to learn more about \"Hidradenitis Suppurativa: Care Instructions. \"  Current as of: October 13, 2016  Content Version: 11.4  © 7657-7215 [employer]. Care instructions adapted under license by [employer] (which disclaims liability or warranty for this information). If you have questions about a medical condition or this instruction, always ask your healthcare professional. [place] [address] any warranty or liability for your use of this information.

## 2017-12-22 NOTE — TELEPHONE ENCOUNTER
Pharmacy is calling needing Clarification on a medication that was just sent over please advise thanks

## 2017-12-23 LAB
ALBUMIN SERPL-MCNC: 4.5 G/DL (ref 3.5–5.5)
ALBUMIN/CREAT UR: 96.6 MG/G CREAT (ref 0–30)
ALBUMIN/GLOB SERPL: 1.6 {RATIO} (ref 1.2–2.2)
ALP SERPL-CCNC: 90 IU/L (ref 39–117)
ALT SERPL-CCNC: 28 IU/L (ref 0–32)
AST SERPL-CCNC: 19 IU/L (ref 0–40)
BASOPHILS # BLD AUTO: 0 X10E3/UL (ref 0–0.2)
BASOPHILS NFR BLD AUTO: 0 %
BILIRUB SERPL-MCNC: 0.7 MG/DL (ref 0–1.2)
BUN SERPL-MCNC: 11 MG/DL (ref 6–20)
BUN/CREAT SERPL: 19 (ref 9–23)
CALCIUM SERPL-MCNC: 9.5 MG/DL (ref 8.7–10.2)
CHLORIDE SERPL-SCNC: 95 MMOL/L (ref 96–106)
CHOLEST SERPL-MCNC: 179 MG/DL (ref 100–199)
CO2 SERPL-SCNC: 24 MMOL/L (ref 18–29)
CREAT SERPL-MCNC: 0.57 MG/DL (ref 0.57–1)
CREAT UR-MCNC: 90.7 MG/DL
EOSINOPHIL # BLD AUTO: 0.3 X10E3/UL (ref 0–0.4)
EOSINOPHIL NFR BLD AUTO: 3 %
ERYTHROCYTE [DISTWIDTH] IN BLOOD BY AUTOMATED COUNT: 13.1 % (ref 12.3–15.4)
EST. AVERAGE GLUCOSE BLD GHB EST-MCNC: 240 MG/DL
GLOBULIN SER CALC-MCNC: 2.9 G/DL (ref 1.5–4.5)
GLUCOSE SERPL-MCNC: 233 MG/DL (ref 65–99)
HBA1C MFR BLD: 10 % (ref 4.8–5.6)
HCT VFR BLD AUTO: 45.1 % (ref 34–46.6)
HDLC SERPL-MCNC: 35 MG/DL
HGB BLD-MCNC: 15.3 G/DL (ref 11.1–15.9)
IMM GRANULOCYTES # BLD: 0 X10E3/UL (ref 0–0.1)
IMM GRANULOCYTES NFR BLD: 0 %
INTERPRETATION, 910389: NORMAL
LDLC SERPL CALC-MCNC: 100 MG/DL (ref 0–99)
LYMPHOCYTES # BLD AUTO: 2.8 X10E3/UL (ref 0.7–3.1)
LYMPHOCYTES NFR BLD AUTO: 29 %
Lab: NORMAL
Lab: NORMAL
MCH RBC QN AUTO: 29.3 PG (ref 26.6–33)
MCHC RBC AUTO-ENTMCNC: 33.9 G/DL (ref 31.5–35.7)
MCV RBC AUTO: 86 FL (ref 79–97)
MICROALBUMIN UR-MCNC: 87.6 UG/ML
MONOCYTES # BLD AUTO: 0.7 X10E3/UL (ref 0.1–0.9)
MONOCYTES NFR BLD AUTO: 7 %
NEUTROPHILS # BLD AUTO: 5.8 X10E3/UL (ref 1.4–7)
NEUTROPHILS NFR BLD AUTO: 61 %
PLATELET # BLD AUTO: 372 X10E3/UL (ref 150–379)
POTASSIUM SERPL-SCNC: 4.4 MMOL/L (ref 3.5–5.2)
PROT SERPL-MCNC: 7.4 G/DL (ref 6–8.5)
RBC # BLD AUTO: 5.23 X10E6/UL (ref 3.77–5.28)
SODIUM SERPL-SCNC: 136 MMOL/L (ref 134–144)
TRIGL SERPL-MCNC: 219 MG/DL (ref 0–149)
TSH SERPL DL<=0.005 MIU/L-ACNC: 27.97 UIU/ML (ref 0.45–4.5)
VLDLC SERPL CALC-MCNC: 44 MG/DL (ref 5–40)
WBC # BLD AUTO: 9.6 X10E3/UL (ref 3.4–10.8)

## 2017-12-25 PROBLEM — E11.21 TYPE 2 DIABETES MELLITUS WITH NEPHROPATHY (HCC): Status: ACTIVE | Noted: 2017-12-25

## 2017-12-26 NOTE — PROGRESS NOTES
Subjective:     Sarah Mon is a 34 y.o. female seen to establish care and for evaluation of diabetes. She reports 65 lb weight loss through improved diet and exercise habits. States she was taken off of losartan as it was suspected to be causing increased migraines, but did not note any improvement on clonidine and aldomet. Has been out of antihypertensives for about 2 weeks. BP has not been well controlled by her report even before running out of meds. Reports headaches had decreased dramatically in frequency after starting trokendi; ran out of medication and has noted return of symptoms since stopping. She also has hypertension, hyperlipidemia and obesity. Diabetic Review of Systems - medication compliance: compliant all of the time, diabetic diet compliance: compliant most of the time, home glucose monitoring: is performed regularly, Patient did not bring glucose log to this visit. , further diabetic ROS: no polyuria or polydipsia, no chest pain, dyspnea or TIA's, no numbness, tingling or pain in extremities, no unusual visual symptoms, no hypoglycemia, no medication side effects noted, last eye exam approximately 1 year ago, acute symptoms are none. Other symptoms and concerns: c/o frequent painful knots which often become abscesses in underarms, under breasts, and in groin area. Was prescribed hibiclens for daily use in the past but noted no improvement/decrease in frequency of symptoms. Currently manages by using antiperspirant under breasts and axilla, changing bra and underwear 2-3 times daily. Covers the lesions with clean gauze when they begin to drain. Currently has a tender/inflamed lesion over the pubic bone. It is tender and getting larger, but has not begun to drain. No fever or chills.      Patient Active Problem List   Diagnosis Code    Obesity E66.9    Type 2 diabetes mellitus without complication (HCC) S38.7    HTN (hypertension) I10    Breast abscess N61.1    Hypothyroidism E03.9  Hx MRSA infection Z86.14    Lactic acidemia E87.2    Fever chills R50.9    Sinus tachycardia R00.0    Sepsis (HCC) A41.9    Obesity, morbid (HCC) E66.01    Type 2 diabetes mellitus with nephropathy ([facility]) E11.21     Allergies   Allergen Reactions    Pcn [Penicillins] Rash    Vancomycin Other (comments)     Kidneys shut down    Voltaren [Diclofenac Sodium] Myalgia and Other (comments)     \"muscle spasms\"     Past Medical History:   Diagnosis Date    Anemia     Complicated migraine     with extremity numbness    Diabetes ([address]     Hx MRSA infection     Hypertension     Hypothyroidism     Irregular menstrual cycle     Obesity      Past Surgical History:   Procedure Laterality Date    HX CHOLECYSTECTOMY      HX HEENT      HX TONSIL AND ADENOIDECTOMY  1992    HX TYMPANOSTOMY       Family History   Problem Relation Age of Onset    Hypertension Other      \"all her family\"    Diabetes Other      \"all her family\"    Cancer Mother     Stroke Mother     Heart Disease Mother    Casey Sprang Bladder Disease Mother     Diabetes Mother     Stroke Father     Heart Disease Father     Diabetes Father    Charleston Sprang Bladder Disease Brother     Diabetes Brother     Migraines Maternal Aunt     Diabetes Paternal Aunt     Stroke Maternal Grandmother     Diabetes Maternal Grandmother     Stroke Maternal Grandfather     Diabetes Maternal Grandfather      Social History   Substance Use Topics    Smoking status: Never Smoker    Smokeless tobacco: Never Used    Alcohol use Yes      Comment: socially 1-2 times a year        Review of Systems  A comprehensive review of systems was negative except for that written in the HPI.     Objective:     Visit Vitals    BP (!) 159/109 (BP 1 Location: Right arm, BP Patient Position: Sitting)    Pulse 77    Temp 98.8 °F (37.1 °C) (Oral)    Resp 20    Ht 5' 4\" (1.626 m)    Wt 235 lb (106.6 kg)    LMP 11/24/2017    SpO2 98%    BMI 40.34 kg/m2     Appearance: alert, well appearing, and in no distress, oriented to person, place, and time and well hydrated. Exam: heart sounds normal rate, regular rhythm, normal S1, S2, no murmurs, rubs, clicks or gallops, normal bilateral carotid upstroke without bruits, no JVD, chest clear, no hepatosplenomegaly, no carotid bruits    Diabetic foot exam:     Left: Reflexes 2+     Filament test normal sensation with micro filament   Pulse DP: 2+ (normal)   Pulse PT: 2+ (normal)   Deformities: None  Right: Reflexes 2+   Filament test normal sensation with micro filament   Pulse DP: 2+ (normal)   Pulse PT: 2+ (normal)   Deformities: None    Assessment/Plan:     diabetes control uncertain, hypertension poorly controlled. Diabetic issues reviewed with her: low cholesterol diet, weight control and daily exercise discussed, home glucose monitoring emphasized, all medications, side effects and compliance discussed carefully, use and side effects of insulin is taught, foot care discussed and Podiatry visits discussed, annual eye examinations at Ophthalmology discussed, glycohemoglobin and other lab monitoring discussed and long term diabetic complications discussed. Diagnoses and all orders for this visit:    1. Type 2 diabetes mellitus with hyperglycemia, with long-term current use of insulin (HCC)  Add ASA  continue levemir  Add metformin   Trial discontiue of mealtime fast-acting insulin  Patient would like to get diabetes under better control in anticipation of trying to conceive- will refer to endocrine for further management  Refer for eye exam  Foot exam completed today  -     aspirin delayed-release 81 mg tablet; Take 1 Tab by mouth daily. -     insulin detemir (LEVEMIR FLEXTOUCH) 100 unit/mL (3 mL) inpn; 85 Units by SubCUTAneous route nightly. -     metFORMIN ER (GLUCOPHAGE XR) 750 mg tablet; Take 1 Tab by mouth daily (with dinner).   -     REFERRAL TO ENDOCRINOLOGY  -     LIPID PANEL  -     HEMOGLOBIN A1C WITH EAG  -      DIABETES EYE EXAM  - HM DIABETES FOOT EXAM  -     REFERRAL TO OPHTHALMOLOGY  -     MICROALBUMIN, UR, RAND W/ MICROALBUMIN/CREA RATIO    2. Obesity, morbid ([address]  I have reviewed/discussed the above normal BMI with the patient. I have recommended the following interventions: dietary management education, guidance, and counseling, encourage exercise and monitor weight . .      3. Essential hypertension  Above goal, but off meds  Discontinue clonidine, aldomet  Add toprol, cozaar  Weight loss  Daily walking  DASH diet  -     metoprolol succinate (TOPROL-XL) 50 mg XL tablet; Take 1 Tab by mouth daily. -     losartan (COZAAR) 50 mg tablet; Take 1 Tab by mouth daily.  -     METABOLIC PANEL, COMPREHENSIVE    4. Migraine without aura and without status migrainosus, not intractable  Restart trokendi, titrate up to therapeutic dose  -     topiramate ER (TROKENDI XR) 25 mg capsule; Week 1- 1 tab daily; week 2- 2 tab daily; week 3- 3 tab daily, week 4- 4 tab daily. 5. Acquired hypothyroidism  No new symptoms   Check TSH   -     levothyroxine (SYNTHROID) 100 mcg tablet; Take 1 Tab by mouth Daily (before breakfast). -     TSH 3RD GENERATION    6. Hidradenitis  Gentle skin care, avoid trauma to skin  Add cleocin solution for application to affected areas after cleansing  -     clindamycin (CLEOCIN T) 1 % external solution; use thin film on affected area    7. Anemia, unspecified type  -     ferrous sulfate 325 mg (65 mg iron) tablet; Take 1 Tab by mouth daily (with lunch). -     CBC WITH AUTOMATED DIFF    Other orders  -     CVD REPORT  -     DIABETES PATIENT EDUCATION  -     DIABETES PATIENT EDUCATION      Follow-up Disposition:  Return in about 4 weeks (around 1/19/2018). I have discussed the diagnosis with the patient and the intended plan as seen in the above orders. The patient has received an after-visit summary and questions were answered concerning future plans.  Patient conveyed understanding of the plan at the time of the visit.     Carol Booth NP

## 2017-12-27 PROBLEM — R80.9 MICROALBUMINURIA DUE TO TYPE 2 DIABETES MELLITUS (HCC): Status: ACTIVE | Noted: 2017-12-27

## 2017-12-27 PROBLEM — E11.29 MICROALBUMINURIA DUE TO TYPE 2 DIABETES MELLITUS (HCC): Status: ACTIVE | Noted: 2017-12-27

## 2017-12-27 NOTE — PROGRESS NOTES
TSH abnormal, but this was expected since you have been out of your medication. Recommend recheck after back on your medication for 8-12 weeks. A1C has worsened significantly to 10. (was 9.1 3 months ago). Recommend making appt with endocrinologist as discussed as soon as possible, follow new treatment plan as discussed in office until then. Please make appt to be seen in 2 weeks to go over your glucose log- we may need to restart your mealtime insulin while waiting to see endocrinologist.  Triglycerides are high and HDL is too low. Improving glucose control and cutting back on sugar, simple carbs, and alcohol will help as well. HDL will improve with weight loss and increased physical activity. Recheck fasting in 3 months.

## 2018-01-12 ENCOUNTER — HOSPITAL ENCOUNTER (EMERGENCY)
Age: 33
Discharge: HOME | End: 2018-01-12
Attending: EMERGENCY MEDICINE | Admitting: EMERGENCY MEDICINE
Payer: COMMERCIAL

## 2018-01-12 ENCOUNTER — TELEPHONE (OUTPATIENT)
Dept: FAMILY MEDICINE CLINIC | Age: 33
End: 2018-01-12

## 2018-01-12 VITALS
WEIGHT: 238.54 LBS | TEMPERATURE: 98.7 F | BODY MASS INDEX: 40.72 KG/M2 | SYSTOLIC BLOOD PRESSURE: 141 MMHG | RESPIRATION RATE: 16 BRPM | OXYGEN SATURATION: 98 % | DIASTOLIC BLOOD PRESSURE: 94 MMHG | HEART RATE: 86 BPM | HEIGHT: 64 IN

## 2018-01-12 DIAGNOSIS — R73.9 HYPERGLYCEMIA: Primary | ICD-10-CM

## 2018-01-12 DIAGNOSIS — L73.2 HYDRADENITIS: ICD-10-CM

## 2018-01-12 LAB
APPEARANCE UR: ABNORMAL
BACTERIA URNS QL MICRO: NEGATIVE /HPF
BILIRUB UR QL: NEGATIVE
COLOR UR: ABNORMAL
EPITH CASTS URNS QL MICRO: ABNORMAL /LPF
GLUCOSE BLD STRIP.AUTO-MCNC: 285 MG/DL (ref 65–100)
GLUCOSE BLD STRIP.AUTO-MCNC: 421 MG/DL (ref 65–100)
GLUCOSE UR STRIP.AUTO-MCNC: >1000 MG/DL
HCG UR QL: NEGATIVE
HGB UR QL STRIP: ABNORMAL
KETONES UR QL STRIP.AUTO: NEGATIVE MG/DL
LEUKOCYTE ESTERASE UR QL STRIP.AUTO: NEGATIVE
NITRITE UR QL STRIP.AUTO: NEGATIVE
PH UR STRIP: 7 [PH] (ref 5–8)
PROT UR STRIP-MCNC: NEGATIVE MG/DL
RBC #/AREA URNS HPF: ABNORMAL /HPF (ref 0–5)
SERVICE CMNT-IMP: ABNORMAL
SERVICE CMNT-IMP: ABNORMAL
SP GR UR REFRACTOMETRY: 1.01 (ref 1–1.03)
UA: UC IF INDICATED,UAUC: ABNORMAL
UROBILINOGEN UR QL STRIP.AUTO: 0.2 EU/DL (ref 0.2–1)
WBC URNS QL MICRO: ABNORMAL /HPF (ref 0–4)

## 2018-01-12 PROCEDURE — 81001 URINALYSIS AUTO W/SCOPE: CPT | Performed by: EMERGENCY MEDICINE

## 2018-01-12 PROCEDURE — 87086 URINE CULTURE/COLONY COUNT: CPT | Performed by: EMERGENCY MEDICINE

## 2018-01-12 PROCEDURE — 74011250636 HC RX REV CODE- 250/636: Performed by: EMERGENCY MEDICINE

## 2018-01-12 PROCEDURE — 74011250637 HC RX REV CODE- 250/637: Performed by: EMERGENCY MEDICINE

## 2018-01-12 PROCEDURE — 82962 GLUCOSE BLOOD TEST: CPT

## 2018-01-12 PROCEDURE — 81025 URINE PREGNANCY TEST: CPT

## 2018-01-12 PROCEDURE — 74011636637 HC RX REV CODE- 636/637: Performed by: EMERGENCY MEDICINE

## 2018-01-12 PROCEDURE — 99284 EMERGENCY DEPT VISIT MOD MDM: CPT

## 2018-01-12 PROCEDURE — 96360 HYDRATION IV INFUSION INIT: CPT

## 2018-01-12 RX ORDER — SULFAMETHOXAZOLE AND TRIMETHOPRIM 800; 160 MG/1; MG/1
1 TABLET ORAL 2 TIMES DAILY
Qty: 14 TAB | Refills: 0 | Status: SHIPPED | OUTPATIENT
Start: 2018-01-12 | End: 2018-01-19

## 2018-01-12 RX ORDER — SULFAMETHOXAZOLE AND TRIMETHOPRIM 800; 160 MG/1; MG/1
1 TABLET ORAL
Status: COMPLETED | OUTPATIENT
Start: 2018-01-12 | End: 2018-01-12

## 2018-01-12 RX ADMIN — SODIUM CHLORIDE 1000 ML: 900 INJECTION, SOLUTION INTRAVENOUS at 22:20

## 2018-01-12 RX ADMIN — HUMAN INSULIN 20 UNITS: 100 INJECTION, SOLUTION SUBCUTANEOUS at 22:23

## 2018-01-12 RX ADMIN — SULFAMETHOXAZOLE AND TRIMETHOPRIM 1 TABLET: 800; 160 TABLET ORAL at 23:45

## 2018-01-12 NOTE — TELEPHONE ENCOUNTER
If the lesion is now open, pain is minimal, and there is no fever, antibiotics are not needed. Recommend washing the area daily and applying vaseline-coated gauze until resolved. If the surrounding area is red or painful, recommend she go to urgent care for assessment.  Cannot call antibiotics in without assessing wound first.

## 2018-01-12 NOTE — TELEPHONE ENCOUNTER
----- Message from Angelbraeden Hudson sent at 1/12/2018  1:59 PM EST -----  Regarding: FW: Ciro DE LUNA / Telephone      ----- Message -----     From: Trey Mishra     Sent: 1/12/2018  12:32 PM       To: [facility]  Subject: Ciro DE LUNA / Telephone                          Pt stated she has a boil under her arm that is infected. Pt is requesting an antibiotic to be called into [facility] [phone]. Pt unsure of the fax.   Pt's best contact: [phone]

## 2018-01-12 NOTE — TELEPHONE ENCOUNTER
Call placed to patient, no answer. Left detailed message as instructed by patient if she did not answer.

## 2018-01-12 NOTE — TELEPHONE ENCOUNTER
Spoke with patient c/o boil that has burst under right arm. C/o some pain, minimal draining & denies fever. Patient indicates the NP is aware of her Hidradenitis condition. Patient is requesting an abx as she would like to initiate treatment. She indicates she will schedule an appt on Monday if need be.  Please advise     Patient is aware Np is out of the office and my not respond ing normal turnaround time

## 2018-01-13 NOTE — ED PROVIDER NOTES
HPI Comments: Rose Mary Russell is a 33 yo F with history of diabetes and hydradenitis with prior MRSA infections who presents to the ED with dysuria and frequency and pain underneath her bilateral arms. She states that she feels like she is getting a flare of her hydradenitis. She denies fever. She states that she drank a Dr. Caterina Estevez and ate cake before coming to the ED to be seen and did not bring her sliding scale insulin to work. She believes that she has a UTI because her urine is cloudy and she has had burning with urination and little urinary output for the past 2 days. Past Medical History:   Diagnosis Date    Anemia     Complicated migraine     with extremity numbness    Diabetes (HCC)     Hx MRSA infection     Hypertension     Hypothyroidism     Irregular menstrual cycle     Obesity        Past Surgical History:   Procedure Laterality Date    HX CHOLECYSTECTOMY      HX HEENT      HX TONSIL AND ADENOIDECTOMY  1992    HX TYMPANOSTOMY           Family History:   Problem Relation Age of Onset    Hypertension Other      \"all her family\"    Diabetes Other      \"all her family\"    Cancer Mother     Stroke Mother     Heart Disease Mother    Derek Pizza Bladder Disease Mother     Diabetes Mother     Stroke Father     Heart Disease Father     Diabetes Father    Derek Pizza Bladder Disease Brother     Diabetes Brother     Migraines Maternal Aunt     Diabetes Paternal Aunt     Stroke Maternal Grandmother     Diabetes Maternal Grandmother     Stroke Maternal Grandfather     Diabetes Maternal Grandfather        Social History     Social History    Marital status:      Spouse name: N/A    Number of children: N/A    Years of education: N/A     Occupational History    Not on file.      Social History Main Topics    Smoking status: Never Smoker    Smokeless tobacco: Never Used    Alcohol use Yes      Comment: socially 1-2 times a year     Drug use: No    Sexual activity: Yes     Other Topics Concern    Not on file     Social History Narrative         ALLERGIES: Pcn [penicillins]; Vancomycin; and Voltaren [diclofenac sodium]    Review of Systems   Constitutional: Negative for fever. HENT: Negative for sore throat. Eyes: Negative for visual disturbance. Respiratory: Negative for cough. Cardiovascular: Negative for chest pain. Gastrointestinal: Negative for abdominal pain. Genitourinary: Negative for dysuria. Musculoskeletal: Negative for back pain. Skin: Negative for rash. Neurological: Negative for headaches. Vitals:    01/12/18 2049 01/12/18 2347   BP: (!) 163/108 (!) 141/94   Pulse: 83 86   Resp: 18 16   Temp: 98.7 °F (37.1 °C)    SpO2: 94% 98%   Weight: 108.2 kg (238 lb 8.6 oz)    Height: 5' 4\" (1.626 m)             Physical Exam   Constitutional: She appears well-developed and well-nourished. No distress. HENT:   Head: Normocephalic and atraumatic. Mouth/Throat: Oropharynx is clear and moist.   Eyes: Conjunctivae and EOM are normal.   Neck: Normal range of motion and phonation normal.   Cardiovascular: Normal rate and intact distal pulses. Pulmonary/Chest: Effort normal. No respiratory distress. Abdominal: Soft. She exhibits no distension. There is no tenderness. Musculoskeletal: Normal range of motion. She exhibits no tenderness. Neurological: She is alert. She is not disoriented. She exhibits normal muscle tone. Skin: Skin is warm and dry. No erythema. Bilateral axilla examined. Mild tenderness but no erythema or flocculent collection to be drained. Nursing note and vitals reviewed.        MDM  ED Course       Procedures

## 2018-01-13 NOTE — DISCHARGE INSTRUCTIONS
Hidradenitis Suppurativa: Care Instructions  Your Care Instructions    Hidradenitis suppurativa (say \"lfj-xkdz-gd-NY-tus sup-guanaco-uh-TY-vuh\") is a skin condition that causes lumps on the skin that look like pimples or boils. The lumps are usually painful and can break open and drain blood and bad-smelling pus. The condition can come and go for many years. Treatment for this condition may includeantibiotics and other medicines. You may need surgeryto remove the lumps. Home care includes wearing loose-fitting clothes and washing the area gently. You can help prevent lumps from coming back by staying at a healthy weight and not smoking. Doctors don't know exactly how this condition starts. But they do know that something irritates and inflames the hair follicles, causing them to swell and form lumps. This skin condition can't be spread from person to person (isn't contagious). Follow-up care is a key part of your treatment and safety. Be sure to make and go to all appointments, and call your doctor if you are having problems. It's also a good idea to know your test results and keep a list of the medicines you take. How can you care for yourself at home? ?Skin care  ? · Wash the area every day with mild soap. Use your hands rather than a washcloth or sponge when you wash that part of your body. ? · Leave the affected areas uncovered when you can. If you have lumps that are draining, you can cover them with a bandage or other dressing. Put petroleum jelly (such as Vaseline) on the dressing to help keep it from sticking. ? · Wear-loose fitting clothes that don't rub against the area. Avoid activities that cause skin to rub together. ? · If you have pain, try a warm compress. Soak a towel or washcloth in warm water, wring it out, and place it on the affected skin for about 10 minutes. Medicines  ? · Be safe with medicines. Take your medicines exactly as prescribed.  Call your doctor if you think you are having a problem with your medicine. You will get more details on the specific medicines your doctor prescribes. ? · If your doctor prescribed antibiotics, take them as directed. Do not stop taking them just because you feel better. You need to take the full course of antibiotics. ? Lifestyle choices  ? · If you smoke, think about quitting. Smoking can make the condition worse. If you need help quitting, talk to your doctor about stop-smoking programs and medicines. These can increase your chances of quitting for good. ? · Stay at a healthy weight, or lose weight, by eating healthy foods and being physically active. Being overweight could make this condition worse. When should you call for help? Call your doctor now or seek immediate medical care if:  ? · You have symptoms of infection, such as:  ¨ Increased pain, swelling, warmth, or redness. ¨ Red streaks leading from the area. ¨ Pus draining from the area. ¨ A fever. ? Watch closely for changes in your health, and be sure to contact your doctor if:  ? · You do not get better as expected. Where can you learn more? Go to http://kong-delma.info/. Enter I048 in the search box to learn more about \"Hidradenitis Suppurativa: Care Instructions. \"  Current as of: October 13, 2016  Content Version: 11.4  © 4384-5672 [employer]. Care instructions adapted under license by [employer] (which disclaims liability or warranty for this information). If you have questions about a medical condition or this instruction, always ask your healthcare professional. [place] [address] any warranty or liability for your use of this information.

## 2018-01-13 NOTE — ED TRIAGE NOTES
I have boils under my arms and UTI symptoms like cloudy urine and it hurts when I pee for a couple days

## 2018-01-13 NOTE — ED NOTES
Pt would rather hold off on blood work d/t multiple sticks. Spoke with Dr. Karine Soni and she agrees with that plan.

## 2018-01-14 LAB
BACTERIA SPEC CULT: NORMAL
CC UR VC: NORMAL
SERVICE CMNT-IMP: NORMAL

## 2018-01-20 ENCOUNTER — HOSPITAL ENCOUNTER (EMERGENCY)
Age: 33
Discharge: HOME | End: 2018-01-20
Attending: EMERGENCY MEDICINE
Payer: COMMERCIAL

## 2018-01-20 VITALS
HEART RATE: 72 BPM | OXYGEN SATURATION: 98 % | BODY MASS INDEX: 50.02 KG/M2 | HEIGHT: 64 IN | DIASTOLIC BLOOD PRESSURE: 112 MMHG | WEIGHT: 293 LBS | TEMPERATURE: 97.9 F | SYSTOLIC BLOOD PRESSURE: 180 MMHG | RESPIRATION RATE: 16 BRPM

## 2018-01-20 DIAGNOSIS — I10 ESSENTIAL HYPERTENSION: ICD-10-CM

## 2018-01-20 DIAGNOSIS — H66.92 ACUTE OTITIS MEDIA OF LEFT EAR WITH PERFORATED TYMPANIC MEMBRANE: Primary | ICD-10-CM

## 2018-01-20 DIAGNOSIS — H72.92 ACUTE OTITIS MEDIA OF LEFT EAR WITH PERFORATED TYMPANIC MEMBRANE: Primary | ICD-10-CM

## 2018-01-20 PROCEDURE — 99282 EMERGENCY DEPT VISIT SF MDM: CPT

## 2018-01-20 RX ORDER — CIPROFLOXACIN AND DEXAMETHASONE 3; 1 MG/ML; MG/ML
4 SUSPENSION/ DROPS AURICULAR (OTIC) 2 TIMES DAILY
Qty: 5 ML | Refills: 0 | Status: SHIPPED | OUTPATIENT
Start: 2018-01-20 | End: 2018-02-13

## 2018-01-20 RX ORDER — AMOXICILLIN AND CLAVULANATE POTASSIUM 875; 125 MG/1; MG/1
1 TABLET, FILM COATED ORAL 2 TIMES DAILY
Qty: 20 TAB | Refills: 0 | Status: SHIPPED | OUTPATIENT
Start: 2018-01-20 | End: 2018-01-30

## 2018-01-20 NOTE — ED NOTES
Patient discharged home after receiving discharge instructions from MD/NP. Patient voiced understanding and doesn't have any questions at this time. Patient in no distress at this time.

## 2018-01-20 NOTE — ED PROVIDER NOTES
Patient is a 34 y.o. female presenting with ear pain. Ear Pain    Associated symptoms include ear discharge. Pertinent negatives include no headaches, no vomiting, no cough and no rash. 33 yo WF presents with bleeding from left ear. Pt woke up this morning with bleeding. Has chronic ear problems. Usually irrigated with water/vinegar solution. Has used ciprodex in the past. Hx of eardrum rupture in right ear. Was followed by ENT, Dr. Lori Morejon, but he recently retired. C/o pain to left ear, 8/10, stabbing with ringing in ears. Had recent bought of bronchitis, those symptoms are resolved. C/o left ear pain for 2-3 days. Hx of DM, elevated glucose due to prednisone to treat bronchitis. Took her BP medication about 30 min ago, toprol XL. Also takes clonidine PRN. Denies cp, sob, weakness, numbness, headache. LMP-ended yesterday, denies chance of pregnancy  Pt reports glucose at home this morning was 160.   Past Medical History:   Diagnosis Date    Anemia     Complicated migraine     with extremity numbness    Diabetes (HCC)     Hx MRSA infection     Hypertension     Hypothyroidism     Irregular menstrual cycle     Obesity        Past Surgical History:   Procedure Laterality Date    HX CHOLECYSTECTOMY      HX HEENT      HX TONSIL AND ADENOIDECTOMY  1992    HX TYMPANOSTOMY           Family History:   Problem Relation Age of Onset    Hypertension Other      \"all her family\"    Diabetes Other      \"all her family\"    Cancer Mother     Stroke Mother     Heart Disease Mother    Lacey Apryl Bladder Disease Mother     Diabetes Mother     Stroke Father     Heart Disease Father     Diabetes Father    Lacey Apryl Bladder Disease Brother     Diabetes Brother     Migraines Maternal Aunt     Diabetes Paternal Aunt     Stroke Maternal Grandmother     Diabetes Maternal Grandmother     Stroke Maternal Grandfather     Diabetes Maternal Grandfather        Social History     Social History    Marital status:  Spouse name: N/A    Number of children: N/A    Years of education: N/A     Occupational History    Not on file. Social History Main Topics    Smoking status: Never Smoker    Smokeless tobacco: Never Used    Alcohol use Yes      Comment: socially 1-2 times a year     Drug use: No    Sexual activity: Yes     Other Topics Concern    Not on file     Social History Narrative         ALLERGIES: Pcn [penicillins]; Vancomycin; and Voltaren [diclofenac sodium]    Review of Systems   Constitutional: Negative for chills and fever. HENT: Positive for ear discharge and ear pain. Negative for sinus pain, sinus pressure and trouble swallowing. Respiratory: Negative for cough. Gastrointestinal: Negative for nausea and vomiting. Skin: Negative for rash and wound. Neurological: Negative for headaches. All other systems reviewed and are negative.       Vitals:    01/20/18 1138   BP: (!) 212/116   Pulse: 74   Resp: 18   Temp: 97.9 °F (36.6 °C)   SpO2: 97%   Weight: (!) 240 kg (529 lb 1.7 oz)   Height: 5' 4\" (1.626 m)            Physical Exam   Physical Examination: General appearance - alert, well appearing, and in no distress, oriented to person, place, and time and normal appearing weight  Eyes - pupils equal and reactive, extraocular eye movements intact  HEENT-right TM with scarring and retraction, no erythema, left TM with erythema and perforation, no drainage, no mastoid swelling  Neck - supple, no significant adenopathy, no nuchal rigidity or meningismus  Chest - clear to auscultation, no wheezes, rales or rhonchi, symmetric air entry  Heart - normal rate, regular rhythm, normal S1, S2, no murmurs, rubs, clicks or gallops  Abdomen - soft, nontender, nondistended, no masses or organomegaly  Back exam - full range of motion, no tenderness, palpable spasm or pain on motion  Neurological - alert, oriented, normal speech, no focal findings or movement disorder noted  Musculoskeletal - no joint tenderness, deformity or swelling  Extremities - peripheral pulses normal, no pedal edema, no clubbing or cyanosis  Skin - normal coloration and turgor, no rashes, no suspicious skin lesions noted  MDM  Number of Diagnoses or Management Options  Acute otitis media of left ear with perforated tympanic membrane:   Essential hypertension:      Amount and/or Complexity of Data Reviewed  Decide to obtain previous medical records or to obtain history from someone other than the patient: yes  Review and summarize past medical records: yes    Patient Progress  Patient progress: stable    ED Course       Procedures  Pt afebrile, nontoxic, VSS. Hx of recurrent ear infections. Will place on augmentin/ciprodex and f/u with ENT. Pt will take her prn clonidine when she gets home.

## 2018-01-20 NOTE — DISCHARGE INSTRUCTIONS
High Blood Pressure: Care Instructions  Your Care Instructions    If your blood pressure is usually above 140/90, you have high blood pressure, or hypertension. That means the top number is 140 or higher or the bottom number is 90 or higher, or both. Despite what a lot of people think, high blood pressure usually doesn't cause headaches or make you feel dizzy or lightheaded. It usually has no symptoms. But it does increase your risk for heart attack, stroke, and kidney or eye damage. The higher your blood pressure, the more your risk increases. Your doctor will give you a goal for your blood pressure. Your goal will be based on your health and your age. An example of a goal is to keep your blood pressure below 140/90. Lifestyle changes, such as eating healthy and being active, are always important to help lower blood pressure. You might also take medicine to reach your blood pressure goal.  Follow-up care is a key part of your treatment and safety. Be sure to make and go to all appointments, and call your doctor if you are having problems. It's also a good idea to know your test results and keep a list of the medicines you take. How can you care for yourself at home? Medical treatment  · If you stop taking your medicine, your blood pressure will go back up. You may take one or more types of medicine to lower your blood pressure. Be safe with medicines. Take your medicine exactly as prescribed. Call your doctor if you think you are having a problem with your medicine. · Talk to your doctor before you start taking aspirin every day. Aspirin can help certain people lower their risk of a heart attack or stroke. But taking aspirin isn't right for everyone, because it can cause serious bleeding. · See your doctor regularly. You may need to see the doctor more often at first or until your blood pressure comes down.   · If you are taking blood pressure medicine, talk to your doctor before you take decongestants or anti-inflammatory medicine, such as ibuprofen. Some of these medicines can raise blood pressure. · Learn how to check your blood pressure at home. Lifestyle changes  · Stay at a healthy weight. This is especially important if you put on weight around the waist. Losing even 10 pounds can help you lower your blood pressure. · If your doctor recommends it, get more exercise. Walking is a good choice. Bit by bit, increase the amount you walk every day. Try for at least 30 minutes on most days of the week. You also may want to swim, bike, or do other activities. · Avoid or limit alcohol. Talk to your doctor about whether you can drink any alcohol. · Try to limit how much sodium you eat to less than 2,300 milligrams (mg) a day. Your doctor may ask you to try to eat less than 1,500 mg a day. · Eat plenty of fruits (such as bananas and oranges), vegetables, legumes, whole grains, and low-fat dairy products. · Lower the amount of saturated fat in your diet. Saturated fat is found in animal products such as milk, cheese, and meat. Limiting these foods may help you lose weight and also lower your risk for heart disease. · Do not smoke. Smoking increases your risk for heart attack and stroke. If you need help quitting, talk to your doctor about stop-smoking programs and medicines. These can increase your chances of quitting for good. When should you call for help? Call 911 anytime you think you may need emergency care. This may mean having symptoms that suggest that your blood pressure is causing a serious heart or blood vessel problem. Your blood pressure may be over 180/110. ? For example, call 911 if:  ? · You have symptoms of a heart attack. These may include:  ¨ Chest pain or pressure, or a strange feeling in the chest.  ¨ Sweating. ¨ Shortness of breath. ¨ Nausea or vomiting.   ¨ Pain, pressure, or a strange feeling in the back, neck, jaw, or upper belly or in one or both shoulders or arms.  ¨ Lightheadedness or sudden weakness. ¨ A fast or irregular heartbeat. ? · You have symptoms of a stroke. These may include:  ¨ Sudden numbness, tingling, weakness, or loss of movement in your face, arm, or leg, especially on only one side of your body. ¨ Sudden vision changes. ¨ Sudden trouble speaking. ¨ Sudden confusion or trouble understanding simple statements. ¨ Sudden problems with walking or balance. ¨ A sudden, severe headache that is different from past headaches. ? · You have severe back or belly pain. ?Do not wait until your blood pressure comes down on its own. Get help right away. ?Call your doctor now or seek immediate care if:  ? · Your blood pressure is much higher than normal (such as 180/110 or higher), but you don't have symptoms. ? · You think high blood pressure is causing symptoms, such as:  ¨ Severe headache. ¨ Blurry vision. ? Watch closely for changes in your health, and be sure to contact your doctor if:  ? · Your blood pressure measures 140/90 or higher at least 2 times. That means the top number is 140 or higher or the bottom number is 90 or higher, or both. ? · You think you may be having side effects from your blood pressure medicine. ? · Your blood pressure is usually normal, but it goes above normal at least 2 times. Where can you learn more? Go to http://kong-delma.info/. Enter C371 in the search box to learn more about \"High Blood Pressure: Care Instructions. \"  Current as of: September 21, 2016  Content Version: 11.4  © 3159-2558 [employer]. Care instructions adapted under license by [employer] (which disclaims liability or warranty for this information). If you have questions about a medical condition or this instruction, always ask your healthcare professional. [place] [address] any warranty or liability for your use of this information.        Ear Infection (Otitis Media): Care Instructions  Your Care Instructions    An ear infection may start with a cold and affect the middle ear (otitis media). It can hurt a lot. Most ear infections clear up on their own in a couple of days. Most often you will not need antibiotics. This is because many ear infections are caused by a virus. Antibiotics don't work against a virus. Regular doses of pain medicines are the best way to reduce your fever and help you feel better. Follow-up care is a key part of your treatment and safety. Be sure to make and go to all appointments, and call your doctor if you are having problems. It's also a good idea to know your test results and keep a list of the medicines you take. How can you care for yourself at home? · Take pain medicines exactly as directed. ¨ If the doctor gave you a prescription medicine for pain, take it as prescribed. ¨ If you are not taking a prescription pain medicine, take an over-the-counter medicine, such as acetaminophen (Tylenol), ibuprofen (Advil, Motrin), or naproxen (Aleve). Read and follow all instructions on the label. ¨ Do not take two or more pain medicines at the same time unless the doctor told you to. Many pain medicines have acetaminophen, which is Tylenol. Too much acetaminophen (Tylenol) can be harmful. · Plan to take a full dose of pain reliever before bedtime. Getting enough sleep will help you get better. · Try a warm, moist washcloth on the ear. It may help relieve pain. · If your doctor prescribed antibiotics, take them as directed. Do not stop taking them just because you feel better. You need to take the full course of antibiotics. When should you call for help? Call your doctor now or seek immediate medical care if:  ? · You have new or increasing ear pain. ? · You have new or increasing pus or blood draining from your ear. ? · You have a fever with a stiff neck or a severe headache. ? Watch closely for changes in your health, and be sure to contact your doctor if:  ? · You have new or worse symptoms. ? · You are not getting better after taking an antibiotic for 2 days. Where can you learn more? Go to http://kong-delma.info/. Enter Z872 in the search box to learn more about \"Ear Infection (Otitis Media): Care Instructions. \"  Current as of: May 12, 2017  Content Version: 11.4  © 9840-7015 [employer]. Care instructions adapted under license by [employer] (which disclaims liability or warranty for this information). If you have questions about a medical condition or this instruction, always ask your healthcare professional. [place] [address] any warranty or liability for your use of this information. Perforated Eardrum: Care Instructions  Your Care Instructions    A tear or hole in the membrane of the middle ear is called a perforated or ruptured eardrum. This can happen if an infection builds up inside the ear or if the eardrum gets injured. You may find it hard to hear out of that ear or may hear a buzzing sound. You may have an earache or have fluids that drain from the ear. Your eardrum should heal on its own in a few weeks, and you should hear normally then. If you have an infection, your doctor may prescribe antibiotics. You may need pain relief medicine for your earache. Your doctor will check to see if your eardrum has healed. If not, you may need surgery to repair the eardrum. Follow-up care is a key part of your treatment and safety. Be sure to make and go to all appointments, and call your doctor if you are having problems. It's also a good idea to know your test results and keep a list of the medicines you take. How can you care for yourself at home? · If your doctor prescribed antibiotics, take them as directed. Do not stop taking them just because you feel better. You need to take the full course of antibiotics.   · Take an over-the-counter pain medicine, such as acetaminophen (Tylenol), ibuprofen (Advil, Motrin), or naproxen (Aleve), as needed. Read and follow all instructions on the label. · Do not take two or more pain medicines at the same time unless the doctor told you to. Many pain medicines have acetaminophen, which is Tylenol. Too much acetaminophen (Tylenol) can be harmful. · To ease pain, put a warm washcloth or a heating pad set on low on your ear. You may have some drainage from the ear. · Be careful when taking over-the-counter cold or flu medicines and Tylenol at the same time. Many of these medicines have acetaminophen, which is Tylenol. Read the labels to make sure that you are not taking more than the recommended dose. Too much Tylenol can be harmful. · Keep your ears dry. ¨ Take baths until your doctor says you can take showers again. ¨ When you wash your hair, use cotton lightly coated with petroleum jelly as an earplug. Do not use plastic earplugs. ¨ Do not swim until your doctor says you can. ¨ If you get water in your ears, turn your head to each side and pull the earlobe in different directions. This will help the water run out. If your ears are still wet, use a hair dryer set on the lowest heat. Hold the dryer several inches from your ear. · Do not put anything into your ear canal. For example, do not use a cotton swab to clean the inside of your ear. It can damage your ear. If you think you have something inside your ear, ask your doctor to check it. When should you call for help? Call your doctor now or seek immediate medical care if:  ? · You have signs of infection, such as:  ¨ Increased pain, swelling, warmth, or redness. ¨ Pus draining from the ear. ¨ A fever. ? Watch closely for changes in your health, and be sure to contact your doctor if:  ? · You have changes in hearing. ? · You do not get better as expected. Where can you learn more? Go to http://kong-delma.info/.   Enter M716 in the search box to learn more about \"Perforated Eardrum: Care Instructions. \"  Current as of: May 12, 2017  Content Version: 11.4  © 9217-0777 [employer]. Care instructions adapted under license by [employer] (which disclaims liability or warranty for this information). If you have questions about a medical condition or this instruction, always ask your healthcare professional. [address] any warranty or liability for your use of this information. We hope that we have addressed all of your medical concerns. The examination and treatment you received in the Emergency Department were for an emergent problem and were not intended as complete care. It is important that you follow up with your healthcare provider(s) for ongoing care. If your symptoms worsen or do not improve as expected, and you are unable to reach your usual health care provider(s), you should return to the Emergency Department. Today's healthcare is undergoing tremendous change, and patient satisfaction surveys are one of the many tools to assess the quality of medical care. You may receive a survey from the [employer] regarding your experience in the Emergency Department. I hope that your experience has been completely positive, particularly the medical care that I provided. As such, please participate in the survey; anything less than excellent does not meet my expectations or intentions. [address] and [facility] participate in nationally recognized quality of care measures. If your blood pressure is greater than 120/80, as reported below, we urge that you seek medical care to address the potential of high blood pressure, commonly known as hypertension. Hypertension can be hereditary or can be caused by certain medical conditions, pain, stress, or \"white coat syndrome. \"       Please make an appointment with your health care provider(s) for follow up of your Emergency Department visit. VITALS:   Patient Vitals for the past 8 hrs:   Temp Pulse Resp BP SpO2   01/20/18 1138 97.9 °F (36.6 °C) 74 18 (!) 212/116 97 %          Thank you for allowing us to provide you with medical care today. We realize that you have many choices for your emergency care needs. Please choose us in the future for any continued health care needs. Luis Carpenter, [address]: [phone]            No results found for this or any previous visit (from the past 24 hour(s)). No results found.

## 2018-02-13 ENCOUNTER — HOSPITAL ENCOUNTER (EMERGENCY)
Age: 33
Discharge: HOME | End: 2018-02-13
Attending: EMERGENCY MEDICINE
Payer: COMMERCIAL

## 2018-02-13 VITALS
OXYGEN SATURATION: 98 % | TEMPERATURE: 97.7 F | DIASTOLIC BLOOD PRESSURE: 107 MMHG | SYSTOLIC BLOOD PRESSURE: 189 MMHG | WEIGHT: 240 LBS | RESPIRATION RATE: 15 BRPM | BODY MASS INDEX: 40.97 KG/M2 | HEIGHT: 64 IN | HEART RATE: 64 BPM

## 2018-02-13 DIAGNOSIS — H66.002 ACUTE SUPPURATIVE OTITIS MEDIA OF LEFT EAR WITHOUT SPONTANEOUS RUPTURE OF TYMPANIC MEMBRANE, RECURRENCE NOT SPECIFIED: Primary | ICD-10-CM

## 2018-02-13 PROCEDURE — 99282 EMERGENCY DEPT VISIT SF MDM: CPT

## 2018-02-13 RX ORDER — AMOXICILLIN AND CLAVULANATE POTASSIUM 875; 125 MG/1; MG/1
1 TABLET, FILM COATED ORAL 2 TIMES DAILY
Qty: 20 TAB | Refills: 0 | Status: SHIPPED | OUTPATIENT
Start: 2018-02-13 | End: 2018-02-23

## 2018-02-13 RX ORDER — AMOXICILLIN AND CLAVULANATE POTASSIUM 875; 125 MG/1; MG/1
1 TABLET, FILM COATED ORAL 2 TIMES DAILY
Qty: 20 TAB | Refills: 0 | Status: SHIPPED | OUTPATIENT
Start: 2018-02-13 | End: 2018-02-13

## 2018-02-13 NOTE — DISCHARGE INSTRUCTIONS
Ear Infection (Otitis Media): Care Instructions  Your Care Instructions    An ear infection may start with a cold and affect the middle ear (otitis media). It can hurt a lot. Most ear infections clear up on their own in a couple of days. Most often you will not need antibiotics. This is because many ear infections are caused by a virus. Antibiotics don't work against a virus. Regular doses of pain medicines are the best way to reduce your fever and help you feel better. Follow-up care is a key part of your treatment and safety. Be sure to make and go to all appointments, and call your doctor if you are having problems. It's also a good idea to know your test results and keep a list of the medicines you take. How can you care for yourself at home? · Take pain medicines exactly as directed. ¨ If the doctor gave you a prescription medicine for pain, take it as prescribed. ¨ If you are not taking a prescription pain medicine, take an over-the-counter medicine, such as acetaminophen (Tylenol), ibuprofen (Advil, Motrin), or naproxen (Aleve). Read and follow all instructions on the label. ¨ Do not take two or more pain medicines at the same time unless the doctor told you to. Many pain medicines have acetaminophen, which is Tylenol. Too much acetaminophen (Tylenol) can be harmful. · Plan to take a full dose of pain reliever before bedtime. Getting enough sleep will help you get better. · Try a warm, moist washcloth on the ear. It may help relieve pain. · If your doctor prescribed antibiotics, take them as directed. Do not stop taking them just because you feel better. You need to take the full course of antibiotics. When should you call for help? Call your doctor now or seek immediate medical care if:  ? · You have new or increasing ear pain. ? · You have new or increasing pus or blood draining from your ear. ? · You have a fever with a stiff neck or a severe headache. ? Watch closely for changes in your health, and be sure to contact your doctor if:  ? · You have new or worse symptoms. ? · You are not getting better after taking an antibiotic for 2 days. Where can you learn more? Go to http://kong-delma.info/. Enter M724 in the search box to learn more about \"Ear Infection (Otitis Media): Care Instructions. \"  Current as of: May 12, 2017  Content Version: 11.4  © 8536-7683 [employer]. Care instructions adapted under license by [employer] (which disclaims liability or warranty for this information). If you have questions about a medical condition or this instruction, always ask your healthcare professional. [place] [address] any warranty or liability for your use of this information.

## 2018-02-13 NOTE — LETTER
[facility] EMERGENCY DEPT 
[address] [address] 
[phone] Work/School Note Date: 2/13/2018 To Whom It May concern: 
 
Chai Dwyer was seen and treated today in the emergency room by the following provider(s): 
Physician Assistant: Dominga Alcocer PA-C. Chai Dwyer may return to work on 2/15/18. Sincerely, Veronica Chowdary RN

## 2018-02-13 NOTE — ED PROVIDER NOTES
HPI Comments: Peña Chin is a 34 y.o. female with hx significant for frequent AOM infections who presents ambulatory to ER with c/o left ear pain and drainage x yx morning. Pt notes she ruptured her left TM 2/2 AOM on 1/20/18. Seen in ER at that time. Placed on augmentin for 10 days and ciprodex. Completed as rx. Saw her ENT after completing abx and was advised that ear infection was completely resolved at that time and TM healing well however small hole still present and not fully healed. Notes remained symptom free up until yx morning when she woke up with L ear pain and green discharge on her pillow. Started her ciprodex she had left at home at that time however today noticed some blood in the drainage and grew concerned thus prompting visit this evening. No other complaints. She specifically denies any fevers, chills, nausea, vomiting, chest pain, shortness of breath, headache, rash, diarrhea, abdominal pain, urinary/bowel changes, sweating or weight loss. PCP: Tu Franklin, NP   PMHx significant for: Past Medical History:  No date: Anemia  No date: Complicated migraine      Comment: with extremity numbness  No date: Diabetes ([facility] [address]  No date: Hx MRSA infection  No date: Hypertension  No date: Hypothyroidism  No date: Irregular menstrual cycle  No date: Obesity    PSHx significant for: Past Surgical History:  No date: HX CHOLECYSTECTOMY  No date: HX HEENT  1992: HX TONSIL AND ADENOIDECTOMY  No date: HX TYMPANOSTOMY        -- Pcn (Penicillins) -- Rash   -- Vancomycin -- Other (comments)    --  Kidneys shut down   -- Voltaren (Diclofenac Sodium) -- Myalgia and Other (comments)    --  \"muscle spasms\"    There are no other complaints, changes or physical findings at this time. The history is provided by the patient and medical records.         Past Medical History:   Diagnosis Date    Anemia     Complicated migraine     with extremity numbness    [facility] ([facility])     Hx MRSA infection     Hypertension     Hypothyroidism     Irregular menstrual cycle     Obesity        Past Surgical History:   Procedure Laterality Date    HX CHOLECYSTECTOMY      HX HEENT      HX TONSIL AND ADENOIDECTOMY  1992    HX TYMPANOSTOMY           Family History:   Problem Relation Age of Onset    Hypertension Other      \"all her family\"    Diabetes Other      \"all her family\"    Cancer Mother     Stroke Mother     Heart Disease Mother    Coy Klein Bladder Disease Mother     Diabetes Mother     Stroke Father     Heart Disease Father     Diabetes Father    Coy Klein Bladder Disease Brother     Diabetes Brother     Migraines Maternal Aunt     Diabetes Paternal Aunt     Stroke Maternal Grandmother     Diabetes Maternal Grandmother     Stroke Maternal Grandfather     Diabetes Maternal Grandfather        Social History     Social History    Marital status:      Spouse name: N/A    Number of children: N/A    Years of education: N/A     Occupational History    Not on file. Social History Main Topics    Smoking status: Never Smoker    Smokeless tobacco: Never Used    Alcohol use Yes      Comment: socially 1-2 times a year     Drug use: No    Sexual activity: Yes     Other Topics Concern    Not on file     Social History Narrative         ALLERGIES: Pcn [penicillins]; Vancomycin; and Voltaren [diclofenac sodium]    Review of Systems   Constitutional: Negative. Negative for appetite change, chills, fatigue and fever. HENT: Positive for ear discharge and ear pain. Negative for congestion and sore throat. Eyes: Negative. Negative for visual disturbance. Respiratory: Negative. Negative for cough and shortness of breath. Cardiovascular: Negative. Negative for chest pain, palpitations and leg swelling. Gastrointestinal: Negative. Negative for abdominal pain, constipation, diarrhea, nausea and vomiting. Genitourinary: Negative. Negative for dysuria, flank pain and hematuria. Musculoskeletal: Negative. Negative for back pain and neck pain. Skin: Negative. Negative for rash. Neurological: Negative. Negative for dizziness, syncope, weakness, numbness and headaches. Hematological: Negative. Psychiatric/Behavioral: Negative. All other systems reviewed and are negative. Vitals:    02/13/18 1653   BP: (!) 189/107   Pulse: 64   Resp: 15   Temp: 97.7 °F (36.5 °C)   SpO2: 98%   Weight: 108.9 kg (240 lb)   Height: 5' 4\" (1.626 m)            Physical Exam   Constitutional: She is oriented to person, place, and time. She appears well-developed and well-nourished. No distress. HENT:   Head: Normocephalic and atraumatic. Right Ear: Hearing, tympanic membrane, external ear and ear canal normal.   Left Ear: Hearing and ear canal normal. There is drainage (purulent yellow/green discharge draining from small opening in TM that remains from rupture 1/20/18) and tenderness. Tympanic membrane is perforated (from previous rupture 1/20/18) and erythematous. Nose: Nose normal. No rhinorrhea. Right sinus exhibits no maxillary sinus tenderness and no frontal sinus tenderness. Left sinus exhibits no maxillary sinus tenderness and no frontal sinus tenderness. Mouth/Throat: Uvula is midline, oropharynx is clear and moist and mucous membranes are normal. No oropharyngeal exudate, posterior oropharyngeal edema, posterior oropharyngeal erythema or tonsillar abscesses. Neck: Normal range of motion. Neck supple. Cardiovascular: Normal rate and normal heart sounds. Exam reveals no gallop and no friction rub. No murmur heard. Pulmonary/Chest: Effort normal and breath sounds normal. No accessory muscle usage. No respiratory distress. She has no decreased breath sounds. She has no wheezes. She has no rhonchi. She has no rales. She exhibits no tenderness. Lymphadenopathy:     She has no cervical adenopathy. Neurological: She is alert and oriented to person, place, and time.    Skin: Skin is warm and dry. No rash noted. She is not diaphoretic. Psychiatric: She has a normal mood and affect. Her behavior is normal.   Nursing note and vitals reviewed. MDM  Number of Diagnoses or Management Options  Acute suppurative otitis media of left ear without spontaneous rupture of tympanic membrane, recurrence not specified:   Diagnosis management comments: DDx: AOM, AOE, rupture TM       Amount and/or Complexity of Data Reviewed  Review and summarize past medical records: yes  Discuss the patient with other providers: yes (Dr. Denise Perry)    Patient Progress  Patient progress: stable        ED Course       Procedures                       5:16 PM   Yani Chowdhury PA-C discussed patient with Kiran Sinha MD who is in agreement with care plan as outlined. No further recommendations. Yani Chowdhury PA-C      5:16 PM  Pt has been reevaluated. There are no new complaints, changes, or physical findings at this time. Medications have been reviewed w/ pt and/or family. Pt and/or family's questions have been answered. Pt and/or family expressed good understanding of the dx/tx/rx and is in agreement with plan of care. Pt instructed and agreed to f/u w/ ENT and to return to ED upon further deterioration. Pt is ready for discharge. LABORATORY TESTS:  No results found for this or any previous visit (from the past 12 hour(s)). IMAGING RESULTS:  No orders to display     No results found. MEDICATIONS GIVEN:  Medications - No data to display    IMPRESSION:  1. Acute suppurative otitis media of left ear without spontaneous rupture of tympanic membrane, recurrence not specified        PLAN:  1. Current Discharge Medication List      START taking these medications    Details   amoxicillin-clavulanate (AUGMENTIN) 875-125 mg per tablet Take 1 Tab by mouth two (2) times a day for 10 days.   Qty: 20 Tab, Refills: 0         CONTINUE these medications which have NOT CHANGED    Details   levothyroxine (SYNTHROID) 100 mcg tablet Take 1 Tab by mouth Daily (before breakfast). Qty: 20 Tab, Refills: 3    Associated Diagnoses: Acquired hypothyroidism      ferrous sulfate 325 mg (65 mg iron) tablet Take 1 Tab by mouth daily (with lunch). Qty: 30 Tab, Refills: 3    Associated Diagnoses: Anemia, unspecified type      aspirin delayed-release 81 mg tablet Take 1 Tab by mouth daily. Qty: 30 Tab, Refills: 3    Associated Diagnoses: Type 2 diabetes mellitus with hyperglycemia, with long-term current use of insulin ([facility])      insulin detemir (LEVEMIR FLEXTOUCH) 100 unit/mL (3 mL) inpn 85 Units by SubCUTAneous route nightly. Qty: 10 Pen, Refills: 1    Associated Diagnoses: Type 2 diabetes mellitus with hyperglycemia, with long-term current use of insulin ([facility])      metFORMIN ER (GLUCOPHAGE XR) 750 mg tablet Take 1 Tab by mouth daily (with dinner). Qty: 30 Tab, Refills: 1    Associated Diagnoses: Type 2 diabetes mellitus with hyperglycemia, with long-term current use of insulin ([facility])      metoprolol succinate (TOPROL-XL) 50 mg XL tablet Take 1 Tab by mouth daily. Qty: 30 Tab, Refills: 1    Associated Diagnoses: Essential hypertension      losartan (COZAAR) 50 mg tablet Take 1 Tab by mouth daily. Qty: 30 Tab, Refills: 1    Associated Diagnoses: Essential hypertension      multivitamin (ONE A DAY) tablet Take 1 Tab by mouth daily.            2.   Follow-up Information     Follow up With Details Comments [address] Call in 1 day call your ENT in the morning and schedule appointment for evaluation as soon as possible [address] [place] [facility] EMERGENCY DEPT  If symptoms worsen [address]  [phone]            Return to ED if worse

## 2018-02-13 NOTE — LETTER
[facility] EMERGENCY DEPT 
[address] [place] [address] [address] [address] 
[phone] Work/School Note Date: 2/13/2018 To Whom It May concern: 
 
Bhupendra Phillips was seen and treated today in the emergency room by the following provider(s): 
Attending Provider: Jesus Pimentel MD 
Physician Assistant: Maritza Cope PA-C. Bhupendra Phillips may return to work on 2/14/2018. Sincerely, Maritza Cope PA-C

## 2018-02-13 NOTE — ED NOTES
The patient was discharged home by Cade Oden in stable condition. The patient is alert and oriented, in no respiratory distress. The patient's diagnosis, condition and treatment were explained. The patient expressed understanding. A discharge plan has been developed. A  was not involved in the process. Aftercare instructions were given. Pt ambulatory out of the ED.

## 2018-03-06 ENCOUNTER — HOSPITAL ENCOUNTER (EMERGENCY)
Age: 33
Discharge: HOME | End: 2018-03-06
Attending: EMERGENCY MEDICINE
Payer: COMMERCIAL

## 2018-03-06 VITALS
WEIGHT: 240.08 LBS | HEIGHT: 64 IN | BODY MASS INDEX: 40.99 KG/M2 | HEART RATE: 78 BPM | TEMPERATURE: 97.8 F | OXYGEN SATURATION: 98 % | RESPIRATION RATE: 15 BRPM | DIASTOLIC BLOOD PRESSURE: 98 MMHG | SYSTOLIC BLOOD PRESSURE: 153 MMHG

## 2018-03-06 DIAGNOSIS — R73.9 HYPERGLYCEMIA: ICD-10-CM

## 2018-03-06 DIAGNOSIS — I10 ESSENTIAL HYPERTENSION: ICD-10-CM

## 2018-03-06 DIAGNOSIS — N76.0 BACTERIAL VAGINITIS: ICD-10-CM

## 2018-03-06 DIAGNOSIS — B96.89 BACTERIAL VAGINITIS: ICD-10-CM

## 2018-03-06 DIAGNOSIS — E03.9 HYPOTHYROIDISM, UNSPECIFIED TYPE: ICD-10-CM

## 2018-03-06 DIAGNOSIS — N93.9 VAGINAL BLEEDING: Primary | ICD-10-CM

## 2018-03-06 LAB
ANION GAP SERPL CALC-SCNC: 7 MMOL/L (ref 5–15)
APPEARANCE UR: CLEAR
BACTERIA URNS QL MICRO: NEGATIVE /HPF
BASOPHILS # BLD: 0 K/UL (ref 0–0.1)
BASOPHILS NFR BLD: 0 % (ref 0–1)
BILIRUB UR QL: NEGATIVE
BUN SERPL-MCNC: 12 MG/DL (ref 6–20)
BUN/CREAT SERPL: 14 (ref 12–20)
CALCIUM SERPL-MCNC: 8.8 MG/DL (ref 8.5–10.1)
CHLORIDE SERPL-SCNC: 97 MMOL/L (ref 97–108)
CLUE CELLS VAG QL WET PREP: NORMAL
CO2 SERPL-SCNC: 28 MMOL/L (ref 21–32)
COLOR UR: ABNORMAL
CREAT SERPL-MCNC: 0.83 MG/DL (ref 0.55–1.02)
DIFFERENTIAL METHOD BLD: ABNORMAL
EOSINOPHIL # BLD: 0.5 K/UL (ref 0–0.4)
EOSINOPHIL NFR BLD: 5 % (ref 0–7)
EPITH CASTS URNS QL MICRO: ABNORMAL /LPF
ERYTHROCYTE [DISTWIDTH] IN BLOOD BY AUTOMATED COUNT: 13.5 % (ref 11.5–14.5)
GLUCOSE BLD STRIP.AUTO-MCNC: 254 MG/DL (ref 65–100)
GLUCOSE SERPL-MCNC: 415 MG/DL (ref 65–100)
GLUCOSE UR STRIP.AUTO-MCNC: >1000 MG/DL
HCG UR QL: NEGATIVE
HCT VFR BLD AUTO: 39.5 % (ref 35–47)
HGB BLD-MCNC: 14.2 G/DL (ref 11.5–16)
HGB UR QL STRIP: ABNORMAL
KETONES UR QL STRIP.AUTO: NEGATIVE MG/DL
KOH PREP SPEC: NORMAL
LEUKOCYTE ESTERASE UR QL STRIP.AUTO: NEGATIVE
LYMPHOCYTES # BLD: 3.1 K/UL (ref 0.8–3.5)
LYMPHOCYTES NFR BLD: 34 % (ref 12–49)
MCH RBC QN AUTO: 29.8 PG (ref 26–34)
MCHC RBC AUTO-ENTMCNC: 35.9 G/DL (ref 30–36.5)
MCV RBC AUTO: 83 FL (ref 80–99)
MONOCYTES # BLD: 0.6 K/UL (ref 0–1)
MONOCYTES NFR BLD: 7 % (ref 5–13)
NEUTS SEG # BLD: 4.8 K/UL (ref 1.8–8)
NEUTS SEG NFR BLD: 54 % (ref 32–75)
NITRITE UR QL STRIP.AUTO: NEGATIVE
PH UR STRIP: 7 [PH] (ref 5–8)
PLATELET # BLD AUTO: 353 K/UL (ref 150–400)
PMV BLD AUTO: 9.4 FL (ref 8.9–12.9)
POTASSIUM SERPL-SCNC: 4 MMOL/L (ref 3.5–5.1)
PROT UR STRIP-MCNC: NEGATIVE MG/DL
RBC # BLD AUTO: 4.76 M/UL (ref 3.8–5.2)
RBC #/AREA URNS HPF: ABNORMAL /HPF (ref 0–5)
SERVICE CMNT-IMP: ABNORMAL
SERVICE CMNT-IMP: NORMAL
SODIUM SERPL-SCNC: 132 MMOL/L (ref 136–145)
SP GR UR REFRACTOMETRY: 1.01 (ref 1–1.03)
T VAGINALIS VAG QL WET PREP: NORMAL
TSH SERPL DL<=0.05 MIU/L-ACNC: 19.35 UIU/ML (ref 0.36–3.74)
UROBILINOGEN UR QL STRIP.AUTO: 0.2 EU/DL (ref 0.2–1)
WBC # BLD AUTO: 9 K/UL (ref 3.6–11)
WBC URNS QL MICRO: ABNORMAL /HPF (ref 0–4)
XXWBCSUS: 0

## 2018-03-06 PROCEDURE — 96374 THER/PROPH/DIAG INJ IV PUSH: CPT

## 2018-03-06 PROCEDURE — 81001 URINALYSIS AUTO W/SCOPE: CPT | Performed by: PHYSICIAN ASSISTANT

## 2018-03-06 PROCEDURE — 87491 CHLMYD TRACH DNA AMP PROBE: CPT | Performed by: PHYSICIAN ASSISTANT

## 2018-03-06 PROCEDURE — 87210 SMEAR WET MOUNT SALINE/INK: CPT | Performed by: PHYSICIAN ASSISTANT

## 2018-03-06 PROCEDURE — 85025 COMPLETE CBC W/AUTO DIFF WBC: CPT | Performed by: PHYSICIAN ASSISTANT

## 2018-03-06 PROCEDURE — 82962 GLUCOSE BLOOD TEST: CPT

## 2018-03-06 PROCEDURE — 81025 URINE PREGNANCY TEST: CPT

## 2018-03-06 PROCEDURE — 74011636637 HC RX REV CODE- 636/637: Performed by: PHYSICIAN ASSISTANT

## 2018-03-06 PROCEDURE — 74011250637 HC RX REV CODE- 250/637: Performed by: PHYSICIAN ASSISTANT

## 2018-03-06 PROCEDURE — 96361 HYDRATE IV INFUSION ADD-ON: CPT

## 2018-03-06 PROCEDURE — 80048 BASIC METABOLIC PNL TOTAL CA: CPT | Performed by: PHYSICIAN ASSISTANT

## 2018-03-06 PROCEDURE — 99285 EMERGENCY DEPT VISIT HI MDM: CPT

## 2018-03-06 PROCEDURE — 96375 TX/PRO/DX INJ NEW DRUG ADDON: CPT

## 2018-03-06 PROCEDURE — 74011250636 HC RX REV CODE- 250/636: Performed by: PHYSICIAN ASSISTANT

## 2018-03-06 PROCEDURE — 36415 COLL VENOUS BLD VENIPUNCTURE: CPT | Performed by: PHYSICIAN ASSISTANT

## 2018-03-06 PROCEDURE — 84443 ASSAY THYROID STIM HORMONE: CPT | Performed by: PHYSICIAN ASSISTANT

## 2018-03-06 RX ORDER — METRONIDAZOLE 250 MG/1
500 TABLET ORAL
Status: COMPLETED | OUTPATIENT
Start: 2018-03-06 | End: 2018-03-06

## 2018-03-06 RX ORDER — IBUPROFEN 800 MG/1
800 TABLET ORAL
Qty: 20 TAB | Refills: 0 | Status: SHIPPED | OUTPATIENT
Start: 2018-03-06 | End: 2018-03-13

## 2018-03-06 RX ORDER — KETOROLAC TROMETHAMINE 30 MG/ML
15 INJECTION, SOLUTION INTRAMUSCULAR; INTRAVENOUS
Status: COMPLETED | OUTPATIENT
Start: 2018-03-06 | End: 2018-03-06

## 2018-03-06 RX ORDER — METRONIDAZOLE 500 MG/1
500 TABLET ORAL 2 TIMES DAILY
Qty: 20 TAB | Refills: 0 | Status: SHIPPED | OUTPATIENT
Start: 2018-03-06 | End: 2018-03-31

## 2018-03-06 RX ORDER — CLONIDINE HYDROCHLORIDE 0.1 MG/1
TABLET ORAL AS NEEDED
Status: ON HOLD | COMMUNITY
End: 2018-06-25

## 2018-03-06 RX ADMIN — METRONIDAZOLE 500 MG: 250 TABLET ORAL at 19:52

## 2018-03-06 RX ADMIN — KETOROLAC TROMETHAMINE 15 MG: 30 INJECTION, SOLUTION INTRAMUSCULAR at 17:12

## 2018-03-06 RX ADMIN — INSULIN HUMAN 10 UNITS: 100 INJECTION, SOLUTION PARENTERAL at 19:52

## 2018-03-06 RX ADMIN — SODIUM CHLORIDE 1000 ML: 900 INJECTION, SOLUTION INTRAVENOUS at 19:47

## 2018-03-06 NOTE — ED PROVIDER NOTES
HPI Comments: Luly De La Fuente is a 34 y.o. female who presents ambulatory to the ED with a c/o vaginal bleeding x 35 days with cramping, passing large clots, and changing pads q1 hour for the last 2 days. Pt notes she tried calling Dr Myrtle Power today to establish care because she can no longer see her previous gyn secondary to insurance. Pt notes she is diabetic, hypothyroid and hypertensive. She has been taking her medication as directed. Pt is M. She denies a hx of std. Pt denies vaginal discharge prior to bleeding. She notes irregular cycles at baseline. She specifically denies any fevers, chills, nausea, vomiting, chest pain, abd pain, urinary sx, shortness of breath, headache, rash, diarrhea, sweating or weight loss. PCP: Jes De Dios NP  PMHx significant for: Past Medical History:  No date: Anemia  No date: Complicated migraine      Comment: with extremity numbness  No date: Diabetes ([facility] [address]  No date: Hx MRSA infection  No date: Hypertension  No date: Hypothyroidism  No date: Irregular menstrual cycle  No date: Obesity  PSHx significant for: Past Surgical History:  No date: HX CHOLECYSTECTOMY  No date: HX HEENT  : HX TONSIL AND ADENOIDECTOMY  No date: HX TYMPANOSTOMY  Social Hx: Tobacco: denies  EtOH: social  Illicit drug use: denies    There are no further complaints or symptoms at this time. The history is provided by the patient.         Past Medical History:   Diagnosis Date    Anemia     Complicated migraine     with extremity numbness    Diabetes (HCC)     Hx MRSA infection     Hypertension     Hypothyroidism     Irregular menstrual cycle     Obesity        Past Surgical History:   Procedure Laterality Date    HX CHOLECYSTECTOMY      HX HEENT      HX TONSIL AND ADENOIDECTOMY  11    HX TYMPANOSTOMY           Family History:   Problem Relation Age of Onset    Hypertension Other      \"all her family\"    Diabetes Other      \"all her family\"    Cancer Mother     Stroke Mother  Heart Disease Mother    Acaciasharad Perez Bladder Disease Mother     Diabetes Mother     Stroke Father     Heart Disease Father     Diabetes Father    Acacia Perez Bladder Disease Brother     Diabetes Brother     Migraines Maternal Aunt     Diabetes Paternal Aunt     Stroke Maternal Grandmother     Diabetes Maternal Grandmother     Stroke Maternal Grandfather     Diabetes Maternal Grandfather        Social History     Social History    Marital status:      Spouse name: N/A    Number of children: N/A    Years of education: N/A     Occupational History    Not on file. Social History Main Topics    Smoking status: Never Smoker    Smokeless tobacco: Never Used    Alcohol use Yes      Comment: socially 1-2 times a year     Drug use: No    Sexual activity: Yes     Other Topics Concern    Not on file     Social History Narrative         ALLERGIES: Pcn [penicillins]; Vancomycin; and Voltaren [diclofenac sodium]    Review of Systems   Constitutional: Negative for chills and fever. HENT: Negative for congestion, rhinorrhea, sneezing and sore throat. Eyes: Negative for redness and visual disturbance. Respiratory: Negative for shortness of breath. Cardiovascular: Negative for chest pain and leg swelling. Gastrointestinal: Negative for abdominal pain, nausea and vomiting. Genitourinary: Positive for pelvic pain and vaginal bleeding. Negative for difficulty urinating and frequency. Musculoskeletal: Negative for back pain, myalgias and neck stiffness. Skin: Negative for rash. Neurological: Negative for dizziness, syncope, weakness and headaches. Hematological: Negative for adenopathy. Vitals:    03/06/18 1642 03/06/18 1704 03/06/18 1800 03/06/18 1938   BP:  (!) 165/104 (!) 173/100 (!) 153/98   Pulse: 82 77 78 78   Resp: 15 14 15 15   Temp:       SpO2: 98% 97% 97% 98%   Weight:       Height:                Physical Exam   Constitutional: She is oriented to person, place, and time.  She appears well-developed and well-nourished. No distress. Above average bmi female   HENT:   Head: Normocephalic and atraumatic. Right Ear: External ear normal.   Left Ear: External ear normal.   Eyes: EOM are normal. Pupils are equal, round, and reactive to light. Neck: Neck supple. Cardiovascular: Normal rate, regular rhythm, normal heart sounds and intact distal pulses. Exam reveals no gallop and no friction rub. No murmur heard. Pulmonary/Chest: Effort normal and breath sounds normal. No stridor. No respiratory distress. She has no wheezes. She has no rales. She exhibits no tenderness. Abdominal: Soft. Bowel sounds are normal. She exhibits no distension and no mass. There is no tenderness. There is no rebound and no guarding. Genitourinary: Vaginal discharge found. Genitourinary Comments: + normal appearing external vagina without masses or lesions. No discoloration. + scant bloody discharge. Os closed. No CMT. No uterine or adnexal TTP no masses or lesions noted. Musculoskeletal: Normal range of motion. She exhibits no edema, tenderness or deformity. Neurological: She is alert and oriented to person, place, and time. No cranial nerve deficit. Coordination normal.   Skin: No rash noted. No erythema. No pallor. Psychiatric: She has a normal mood and affect. Her behavior is normal.   Nursing note and vitals reviewed. MDM  Number of Diagnoses or Management Options  Bacterial vaginitis:   Essential hypertension:   Hyperglycemia:   Vaginal bleeding:      Amount and/or Complexity of Data Reviewed  Clinical lab tests: reviewed and ordered  Tests in the medicine section of CPT®: reviewed and ordered  Review and summarize past medical records: yes  Independent visualization of images, tracings, or specimens: yes    Patient Progress  Patient progress: stable        ED Course       Procedures    Procedure Note - Pelvic Exam:    3:58 PM  Performed by: Sera Stauffer PA-C  Chaperoned by: Carlo Crain Person RN  Pelvic exam was performed using bimanual and speculum. Further findings noted in physical exam.   The procedure took 1-15 minutes, and pt tolerated well.    5:34 PM  Discussed pt, sx, hx and current findings with Dr Jesus Shankar. He is in agreement with plan. Will get pelvic labs and blood on pt. Gaurav Bryant. DIDI Stauffer      5:34 PM  Discussed with patient at length the need for blood pressure and blood glucose re-evaluation and management with primary care. Discussed the long term sequelae for elevated blood pressure and blood glucose including blindness, CVA, MI, and renal failure/ dialysis. Pt agrees to follow up as directed. VALENTINO Gomez       7:30 PM   Updated pt on current findings. Pt given insulin for hyperglycemia. tsh still not sent by  to [facility]. bp improved. Will start tx for bv  Gaurav Bryant. DIDI Stauffer    8:44 PM   Blood glucose improved. Still no thyroid labs will discharge and have pt follow with pcp for same. Gaurav Bryant. DIDI Stauffer    LABORATORY TESTS:  Recent Results (from the past 12 hour(s))   WET PREP    Collection Time: 03/06/18  4:06 PM   Result Value Ref Range    Clue cells CLUE CELLS PRESENT      Wet prep NO TRICHOMONAS SEEN     KOH, OTHER SOURCES    Collection Time: 03/06/18  4:06 PM   Result Value Ref Range    Special Requests: NO SPECIAL REQUESTS      KOH NO FUNGAL ELEMENTS SEEN     CBC WITH AUTOMATED DIFF    Collection Time: 03/06/18  4:06 PM   Result Value Ref Range    WBC 9.0 3.6 - 11.0 K/uL    RBC 4.76 3.80 - 5.20 M/uL    HGB 14.2 11.5 - 16.0 g/dL    HCT 39.5 35.0 - 47.0 %    MCV 83.0 80.0 - 99.0 FL    MCH 29.8 26.0 - 34.0 PG    MCHC 35.9 30.0 - 36.5 g/dL    RDW 13.5 11.5 - 14.5 %    PLATELET 927 934 - 095 K/uL    MPV 9.4 8.9 - 12.9 FL    NEUTROPHILS 54 32 - 75 %    LYMPHOCYTES 34 12 - 49 %    MONOCYTES 7 5 - 13 %    EOSINOPHILS 5 0 - 7 %    BASOPHILS 0 0 - 1 %    ABS. NEUTROPHILS 4.8 1.8 - 8.0 K/UL    ABS. LYMPHOCYTES 3.1 0.8 - 3.5 K/UL    ABS.  MONOCYTES 0.6 0.0 - 1.0 K/UL    ABS. EOSINOPHILS 0.5 (H) 0.0 - 0.4 K/UL    ABS. BASOPHILS 0.0 0.0 - 0.1 K/UL    DF AUTOMATED      XXWBCSUS 0     METABOLIC PANEL, BASIC    Collection Time: 03/06/18  4:06 PM   Result Value Ref Range    Sodium 132 (L) 136 - 145 mmol/L    Potassium 4.0 3.5 - 5.1 mmol/L    Chloride 97 97 - 108 mmol/L    CO2 28 21 - 32 mmol/L    Anion gap 7 5 - 15 mmol/L    Glucose 415 (H) 65 - 100 mg/dL    BUN 12 6 - 20 MG/DL    Creatinine 0.83 0.55 - 1.02 MG/DL    BUN/Creatinine ratio 14 12 - 20      GFR est AA >60 >60 ml/min/1.73m2    GFR est non-AA >60 >60 ml/min/1.73m2    Calcium 8.8 8.5 - 10.1 MG/DL   URINALYSIS W/MICROSCOPIC    Collection Time: 03/06/18  4:18 PM   Result Value Ref Range    Color YELLOW/STRAW      Appearance CLEAR CLEAR      Specific gravity 1.010 1.003 - 1.030      pH (UA) 7.0 5.0 - 8.0      Protein NEGATIVE  NEG mg/dL    Glucose >1000 (A) NEG mg/dL    Ketone NEGATIVE  NEG mg/dL    Bilirubin NEGATIVE  NEG      Blood MODERATE (A) NEG      Urobilinogen 0.2 0.2 - 1.0 EU/dL    Nitrites NEGATIVE  NEG      Leukocyte Esterase NEGATIVE  NEG      WBC 0-4 0 - 4 /hpf    RBC 5-10 0 - 5 /hpf    Epithelial cells FEW FEW /lpf    Bacteria NEGATIVE  NEG /hpf   HCG URINE, QL. - POC    Collection Time: 03/06/18  4:30 PM   Result Value Ref Range    Pregnancy test,urine (POC) NEGATIVE  NEG     TSH 3RD GENERATION    Collection Time: 03/06/18  5:05 PM   Result Value Ref Range    TSH 19.35 (H) 0.36 - 3.74 uIU/mL   GLUCOSE, POC    Collection Time: 03/06/18  8:44 PM   Result Value Ref Range    Glucose (POC) 254 (H) 65 - 100 mg/dL    Performed by [address]:    No results found.     MEDICATIONS GIVEN:  Medications   ketorolac (TORADOL) injection 15 mg (15 mg IntraVENous Given 3/6/18 7072)   insulin regular (NOVOLIN R, HUMULIN R) injection 10 Units (10 Units IntraVENous Given 3/6/18 1952)   sodium chloride 0.9 % bolus infusion 1,000 mL (0 mL IntraVENous IV Completed 3/6/18 2102)   metroNIDAZOLE (FLAGYL) tablet 500 mg (500 mg Oral Given 3/6/18 1952)       IMPRESSION:  1. Vaginal bleeding    2. Bacterial vaginitis    3. Hyperglycemia    4. Essential hypertension    5. Hypothyroidism, unspecified type        PLAN:  1. Discharge Medication List as of 3/6/2018  8:48 PM      START taking these medications    Details   ibuprofen (MOTRIN) 800 mg tablet Take 1 Tab by mouth every eight (8) hours as needed for Pain for up to 7 days. , Print, Disp-20 Tab, R-0      metroNIDAZOLE (FLAGYL) 500 mg tablet Take 1 Tab by mouth two (2) times a day., Print, Disp-20 Tab, R-0         CONTINUE these medications which have NOT CHANGED    Details   cloNIDine HCl (CATAPRES) 0.1 mg tablet Take  by mouth as needed (as needed for elevated BP). , Historical Med      levothyroxine (SYNTHROID) 100 mcg tablet Take 1 Tab by mouth Daily (before breakfast). , Normal, Disp-20 Tab, R-3      ferrous sulfate 325 mg (65 mg iron) tablet Take 1 Tab by mouth daily (with lunch). , Normal, Disp-30 Tab, R-3      aspirin delayed-release 81 mg tablet Take 1 Tab by mouth daily. , Normal, Disp-30 Tab, R-3      insulin detemir (LEVEMIR FLEXTOUCH) 100 unit/mL (3 mL) inpn 85 Units by SubCUTAneous route nightly., Normal, Disp-10 Pen, R-1      metFORMIN ER (GLUCOPHAGE XR) 750 mg tablet Take 1 Tab by mouth daily (with dinner). , Normal, Disp-30 Tab, R-1      metoprolol succinate (TOPROL-XL) 50 mg XL tablet Take 1 Tab by mouth daily. , Normal, Disp-30 Tab, R-1      losartan (COZAAR) 50 mg tablet Take 1 Tab by mouth daily. , Normal, Disp-30 Tab, R-1      multivitamin (ONE A DAY) tablet Take 1 Tab by mouth daily. , Historical Med           2.    Follow-up Information     Follow up With Details Comments [address] [facility], NP Schedule an appointment as soon as possible for a visit 2 days for recheck blood pressure and blood glucose/ further bleeding evaluation [address]  [phone]          Return to ED if worse       8:44 PM  Pt has been reexamined. Pt has no new complaints, changes or physical findings. Care plan outlined and precautions discussed. All available results were reviewed with pt. All medications were reviewed with pt. All of pt's questions and concerns were addressed. Pt agrees to F/U as instructed and agrees to return to ED upon further deterioration. Pt is ready to go home.   VALENTINO Christian

## 2018-03-06 NOTE — LETTER
[facility] EMERGENCY DEPT 
[address] [facility] [address] [address] 
[phone] Work/School Note Date: 3/6/2018 To Whom It May concern: 
 
Stephany Kaur was seen and treated today in the emergency room by the following provider(s): 
Physician Assistant: VALENTINO Andres.   
 
Stephany Kaur may return to work on 3/8/18.  
 
Sincerely, 
 
 
 
 
VALENTINO Andres

## 2018-03-06 NOTE — ED TRIAGE NOTES
Pt reports that she has been bleeding for 30 days with clots,  Heavy at times. Denies other symptoms.

## 2018-03-07 NOTE — ED NOTES
accu-check performed at bedside by nursing student= 254. Reported off to [place], [place]. Patient's pelvic pain has not changed since the \"pain medication\" administration. \"It really hasn't helped. \"

## 2018-03-07 NOTE — ED NOTES
Patient resting on the stretcher. Call bell within reach; will continue to monitor. Patient then ambulated to bathroom, with a steady gait, to void. Not requesting any extra deshaun-pads.

## 2018-03-07 NOTE — ED NOTES
The patient was discharged home by VALENTINO Stauffer in stable condition. The patient is alert and oriented, in no respiratory distress. The patient's diagnosis, condition and treatment were explained. The patient expressed understanding. A discharge plan has been developed. A  was not involved in the process. Aftercare instructions were given. Pt ambulatory out of the ED.

## 2018-03-07 NOTE — DISCHARGE INSTRUCTIONS
Abnormal Uterine Bleeding: Care Instructions  Your Care Instructions    Abnormal uterine bleeding (AUB) is irregular bleeding from the uterus that is longer or heavier than usual or does not occur at your regular time. Sometimes it is caused by changes in hormone levels. It can also be caused by growths in the uterus, such as fibroids or polyps. Sometimes a cause cannot be found. You may have heavy bleeding when you are not expecting your period. Your doctor may suggest a pregnancy test, if you think you are pregnant. Follow-up care is a key part of your treatment and safety. Be sure to make and go to all appointments, and call your doctor if you are having problems. It's also a good idea to know your test results and keep a list of the medicines you take. How can you care for yourself at home? · Be safe with medicines. Take pain medicines exactly as directed. ¨ If the doctor gave you a prescription medicine for pain, take it as prescribed. ¨ If you are not taking a prescription pain medicine, ask your doctor if you can take an over-the-counter medicine. · You may be low in iron because of blood loss. Eat a balanced diet that is high in iron and vitamin C. Foods rich in iron include red meat, shellfish, eggs, beans, and leafy green vegetables. Talk to your doctor about whether you need to take iron pills or a multivitamin. When should you call for help? Call 911 anytime you think you may need emergency care. For example, call if:  ? · You passed out (lost consciousness). ?Call your doctor now or seek immediate medical care if:  ? · You have new or worse belly or pelvic pain. ? · You have severe vaginal bleeding. ? · You feel dizzy or lightheaded, or you feel like you may faint. ? Watch closely for changes in your health, and be sure to contact your doctor if:  ? · You think you may be pregnant. ? · Your bleeding gets worse. ? · You do not get better as expected.    Where can you learn more?  Go to http://kong-delma.info/. Enter S595 in the search box to learn more about \"Abnormal Uterine Bleeding: Care Instructions. \"  Current as of: October 13, 2016  Content Version: 11.4  © 5186-3211 [employer]. Care instructions adapted under license by [employer] (which disclaims liability or warranty for this information). If you have questions about a medical condition or this instruction, always ask your healthcare professional. [address] any warranty or liability for your use of this information. Bacterial Vaginosis: Care Instructions  Your Care Instructions    Bacterial vaginosis is a type of vaginal infection. It is caused by excess growth of certain bacteria that are normally found in the vagina. Symptoms can include itching, swelling, pain when you urinate or have sex, and a gray or yellow discharge with a \"fishy\" odor. It is not considered an infection that is spread through sexual contact. Although symptoms can be annoying and uncomfortable, bacterial vaginosis does not usually cause other health problems. However, if you have it while you are pregnant, it can cause complications. While the infection may go away on its own, most doctors use antibiotics to treat it. You may have been prescribed pills or vaginal cream. With treatment, bacterial vaginosis usually clears up in 5 to 7 days. Follow-up care is a key part of your treatment and safety. Be sure to make and go to all appointments, and call your doctor if you are having problems. It's also a good idea to know your test results and keep a list of the medicines you take. How can you care for yourself at home? · Take your antibiotics as directed. Do not stop taking them just because you feel better. You need to take the full course of antibiotics. · Do not eat or drink anything that contains alcohol if you are taking metronidazole (Flagyl).   · Keep using your medicine if you start your period. Use pads instead of tampons while using a vaginal cream or suppository. Tampons can absorb the medicine. · Wear loose cotton clothing. Do not wear nylon and other materials that hold body heat and moisture close to the skin. · Do not scratch. Relieve itching with a cold pack or a cool bath. · Do not wash your vaginal area more than once a day. Use plain water or a mild, unscented soap. Do not douche. When should you call for help? Watch closely for changes in your health, and be sure to contact your doctor if:  ? · You have unexpected vaginal bleeding. ? · You have a fever. ? · You have new or increased pain in your vagina or pelvis. ? · You are not getting better after 1 week. ? · Your symptoms return after you finish the course of your medicine. Where can you learn more? Go to http://kong-delma.info/. Winter Yin in the search box to learn more about \"Bacterial Vaginosis: Care Instructions. \"  Current as of: October 13, 2016  Content Version: 11.4  © 6187-4649 [employer]. Care instructions adapted under license by [employer] (which disclaims liability or warranty for this information). If you have questions about a medical condition or this instruction, always ask your healthcare professional. [place] [address] any warranty or liability for your use of this information. High Blood Pressure: Care Instructions  Your Care Instructions    If your blood pressure is usually above 140/90, you have high blood pressure, or hypertension. That means the top number is 140 or higher or the bottom number is 90 or higher, or both. Despite what a lot of people think, high blood pressure usually doesn't cause headaches or make you feel dizzy or lightheaded. It usually has no symptoms. But it does increase your risk for heart attack, stroke, and kidney or eye damage.  The higher your blood pressure, the more your risk increases. Your doctor will give you a goal for your blood pressure. Your goal will be based on your health and your age. An example of a goal is to keep your blood pressure below 140/90. Lifestyle changes, such as eating healthy and being active, are always important to help lower blood pressure. You might also take medicine to reach your blood pressure goal.  Follow-up care is a key part of your treatment and safety. Be sure to make and go to all appointments, and call your doctor if you are having problems. It's also a good idea to know your test results and keep a list of the medicines you take. How can you care for yourself at home? Medical treatment  · If you stop taking your medicine, your blood pressure will go back up. You may take one or more types of medicine to lower your blood pressure. Be safe with medicines. Take your medicine exactly as prescribed. Call your doctor if you think you are having a problem with your medicine. · Talk to your doctor before you start taking aspirin every day. Aspirin can help certain people lower their risk of a heart attack or stroke. But taking aspirin isn't right for everyone, because it can cause serious bleeding. · See your doctor regularly. You may need to see the doctor more often at first or until your blood pressure comes down. · If you are taking blood pressure medicine, talk to your doctor before you take decongestants or anti-inflammatory medicine, such as ibuprofen. Some of these medicines can raise blood pressure. · Learn how to check your blood pressure at home. Lifestyle changes  · Stay at a healthy weight. This is especially important if you put on weight around the waist. Losing even 10 pounds can help you lower your blood pressure. · If your doctor recommends it, get more exercise. Walking is a good choice. Bit by bit, increase the amount you walk every day. Try for at least 30 minutes on most days of the week.  You also may want to swim, bike, or do other activities. · Avoid or limit alcohol. Talk to your doctor about whether you can drink any alcohol. · Try to limit how much sodium you eat to less than 2,300 milligrams (mg) a day. Your doctor may ask you to try to eat less than 1,500 mg a day. · Eat plenty of fruits (such as bananas and oranges), vegetables, legumes, whole grains, and low-fat dairy products. · Lower the amount of saturated fat in your diet. Saturated fat is found in animal products such as milk, cheese, and meat. Limiting these foods may help you lose weight and also lower your risk for heart disease. · Do not smoke. Smoking increases your risk for heart attack and stroke. If you need help quitting, talk to your doctor about stop-smoking programs and medicines. These can increase your chances of quitting for good. When should you call for help? Call 911 anytime you think you may need emergency care. This may mean having symptoms that suggest that your blood pressure is causing a serious heart or blood vessel problem. Your blood pressure may be over 180/110. ? For example, call 911 if:  ? · You have symptoms of a heart attack. These may include:  ¨ Chest pain or pressure, or a strange feeling in the chest.  ¨ Sweating. ¨ Shortness of breath. ¨ Nausea or vomiting. ¨ Pain, pressure, or a strange feeling in the back, neck, jaw, or upper belly or in one or both shoulders or arms. ¨ Lightheadedness or sudden weakness. ¨ A fast or irregular heartbeat. ? · You have symptoms of a stroke. These may include:  ¨ Sudden numbness, tingling, weakness, or loss of movement in your face, arm, or leg, especially on only one side of your body. ¨ Sudden vision changes. ¨ Sudden trouble speaking. ¨ Sudden confusion or trouble understanding simple statements. ¨ Sudden problems with walking or balance. ¨ A sudden, severe headache that is different from past headaches. ? · You have severe back or belly pain.    ?Do not wait until your blood pressure comes down on its own. Get help right away. ?Call your doctor now or seek immediate care if:  ? · Your blood pressure is much higher than normal (such as 180/110 or higher), but you don't have symptoms. ? · You think high blood pressure is causing symptoms, such as:  ¨ Severe headache. ¨ Blurry vision. ? Watch closely for changes in your health, and be sure to contact your doctor if:  ? · Your blood pressure measures 140/90 or higher at least 2 times. That means the top number is 140 or higher or the bottom number is 90 or higher, or both. ? · You think you may be having side effects from your blood pressure medicine. ? · Your blood pressure is usually normal, but it goes above normal at least 2 times. Where can you learn more? Go to http://kong-delma.info/. Enter A694 in the search box to learn more about \"High Blood Pressure: Care Instructions. \"  Current as of: September 21, 2016  Content Version: 11.4  © 0231-5216 [employer]. Care instructions adapted under license by [employer] (which disclaims liability or warranty for this information). If you have questions about a medical condition or this instruction, always ask your healthcare professional. [place] [address] any warranty or liability for your use of this information. Learning About High Blood Sugar  What is high blood sugar? Your body turns the food you eat into glucose (sugar), which it uses for energy. But if your body isn't able to use the sugar right away, it can build up in your blood and lead to high blood sugar. When the amount of sugar in your blood stays too high for too much of the time, you may have diabetes. Diabetes is a disease that can cause serious health problems. The good news is that lifestyle changes may help you get your blood sugar back to normal and avoid or delay diabetes. What causes high blood sugar?   Sugar (glucose) can build up in your blood if you:  · Are overweight. · Have a family history of diabetes. · Take certain medicines, such as steroids. What are the symptoms? Having high blood sugar may not cause any symptoms at all. Or it may make you feel very thirsty or very hungry. You may also urinate more often than usual, have blurry vision, or lose weight without trying. How is high blood sugar treated? You can take steps to lower your blood sugar level if you understand what makes it get higher. Your doctor may want you to learn how to test your blood sugar level at home. Then you can see how illness, stress, or different kinds of food or medicine raise or lower your blood sugar level. Other tests may be needed to see if you have diabetes. How can you prevent high blood sugar? · Watch your weight. If you're overweight, losing just a small amount of weight may help. Reducing fat around your waist is most important. · Limit the amount of calories, sweets, and unhealthy fat you eat. Ask your doctor if a dietitian can help you. A registered dietitian can help you create meal plans that fit your lifestyle. · Get at least 30 minutes of exercise on most days of the week. Exercise helps control your blood sugar. It also helps you maintain a healthy weight. Walking is a good choice. You also may want to do other activities, such as running, swimming, cycling, or playing tennis or team sports. · If your doctor prescribed medicines, take them exactly as prescribed. Call your doctor if you think you are having a problem with your medicine. You will get more details on the specific medicines your doctor prescribes. Follow-up care is a key part of your treatment and safety. Be sure to make and go to all appointments, and call your doctor if you are having problems. It's also a good idea to know your test results and keep a list of the medicines you take. Where can you learn more?   Go to http://kong-delma.info/. Enter O108 in the search box to learn more about \"Learning About High Blood Sugar. \"  Current as of: March 13, 2017  Content Version: 11.4  © 1252-5649 [employer]. Care instructions adapted under license by [employer] (which disclaims liability or warranty for this information). If you have questions about a medical condition or this instruction, always ask your healthcare professional. [address] any warranty or liability for your use of this information. We hope that we have addressed all of your medical concerns. The examination and treatment you received in the Emergency Department were for an emergent problem and were not intended as complete care. It is important that you follow up with your healthcare provider(s) for ongoing care. If your symptoms worsen or do not improve as expected, and you are unable to reach your usual health care provider(s), you should return to the Emergency Department. Today's healthcare is undergoing tremendous change, and patient satisfaction surveys are one of the many tools to assess the quality of medical care. You may receive a survey from the [employer] regarding your experience in the Emergency Department. I hope that your experience has been completely positive, particularly the medical care that I provided. As such, please participate in the survey; anything less than excellent does not meet my expectations or intentions. [facility] and [facility] participate in nationally recognized quality of care measures. If your blood pressure is greater than 120/80, as reported below, we urge that you seek medical care to address the potential of high blood pressure, commonly known as hypertension.    Hypertension can be hereditary or can be caused by certain medical conditions, pain, stress, or \"white coat syndrome. \"       Please make an appointment with your health care provider(s) for follow up of your Emergency Department visit. VITALS:   Patient Vitals for the past 8 hrs:   Temp Pulse Resp BP SpO2   03/06/18 1938 - 78 15 (!) 153/98 98 %   03/06/18 1800 - 78 15 (!) 173/100 97 %   03/06/18 1704 - 77 14 (!) 165/104 97 %   03/06/18 1642 - 82 15 - 98 %   03/06/18 1639 - - - (!) 180/111 -   03/06/18 1553 97.8 °F (36.6 °C) 83 16 (!) 224/128 96 %          Thank you for allowing us to provide you with medical care today. We realize that you have many choices for your emergency care needs. Please choose us in the future for any continued health care needs. Derick Stauffer, [address] [place] [address] 20.   Office: [phone]            Recent Results (from the past 24 hour(s))   WET PREP    Collection Time: 03/06/18  4:06 PM   Result Value Ref Range    Clue cells CLUE CELLS PRESENT      Wet prep NO TRICHOMONAS SEEN     KOH, OTHER SOURCES    Collection Time: 03/06/18  4:06 PM   Result Value Ref Range    Special Requests: NO SPECIAL REQUESTS      KOH NO FUNGAL ELEMENTS SEEN     CBC WITH AUTOMATED DIFF    Collection Time: 03/06/18  4:06 PM   Result Value Ref Range    WBC 9.0 3.6 - 11.0 K/uL    RBC 4.76 3.80 - 5.20 M/uL    HGB 14.2 11.5 - 16.0 g/dL    HCT 39.5 35.0 - 47.0 %    MCV 83.0 80.0 - 99.0 FL    MCH 29.8 26.0 - 34.0 PG    MCHC 35.9 30.0 - 36.5 g/dL    RDW 13.5 11.5 - 14.5 %    PLATELET 713 443 - 018 K/uL    MPV 9.4 8.9 - 12.9 FL    NEUTROPHILS 54 32 - 75 %    LYMPHOCYTES 34 12 - 49 %    MONOCYTES 7 5 - 13 %    EOSINOPHILS 5 0 - 7 %    BASOPHILS 0 0 - 1 %    ABS. NEUTROPHILS 4.8 1.8 - 8.0 K/UL    ABS. LYMPHOCYTES 3.1 0.8 - 3.5 K/UL    ABS. MONOCYTES 0.6 0.0 - 1.0 K/UL    ABS. EOSINOPHILS 0.5 (H) 0.0 - 0.4 K/UL    ABS.  BASOPHILS 0.0 0.0 - 0.1 K/UL    DF AUTOMATED      XXWBCSUS 0     METABOLIC PANEL, BASIC    Collection Time: 03/06/18  4:06 PM   Result Value Ref Range    Sodium 132 (L) 136 - 145 mmol/L    Potassium 4.0 3.5 - 5.1 mmol/L    Chloride 97 97 - 108 mmol/L    CO2 28 21 - 32 mmol/L    Anion gap 7 5 - 15 mmol/L    Glucose 415 (H) 65 - 100 mg/dL    BUN 12 6 - 20 MG/DL    Creatinine 0.83 0.55 - 1.02 MG/DL    BUN/Creatinine ratio 14 12 - 20      GFR est AA >60 >60 ml/min/1.73m2    GFR est non-AA >60 >60 ml/min/1.73m2    Calcium 8.8 8.5 - 10.1 MG/DL   URINALYSIS W/MICROSCOPIC    Collection Time: 03/06/18  4:18 PM   Result Value Ref Range    Color YELLOW/STRAW      Appearance CLEAR CLEAR      Specific gravity 1.010 1.003 - 1.030      pH (UA) 7.0 5.0 - 8.0      Protein NEGATIVE  NEG mg/dL    Glucose >1000 (A) NEG mg/dL    Ketone NEGATIVE  NEG mg/dL    Bilirubin NEGATIVE  NEG      Blood MODERATE (A) NEG      Urobilinogen 0.2 0.2 - 1.0 EU/dL    Nitrites NEGATIVE  NEG      Leukocyte Esterase NEGATIVE  NEG      WBC 0-4 0 - 4 /hpf    RBC 5-10 0 - 5 /hpf    Epithelial cells FEW FEW /lpf    Bacteria NEGATIVE  NEG /hpf   HCG URINE, QL. - POC    Collection Time: 03/06/18  4:30 PM   Result Value Ref Range    Pregnancy test,urine (POC) NEGATIVE  NEG         No results found.

## 2018-03-07 NOTE — ED NOTES
Patient resting on the stretcher. Call bell within reach; will continue to monitor. Patient tolerated her last 2 medications well. Will recheck her blood sugar at 2045.

## 2018-03-31 ENCOUNTER — HOSPITAL ENCOUNTER (EMERGENCY)
Age: 33
Discharge: HOME | End: 2018-03-31
Attending: EMERGENCY MEDICINE
Payer: COMMERCIAL

## 2018-03-31 VITALS
TEMPERATURE: 98 F | HEIGHT: 64 IN | OXYGEN SATURATION: 95 % | SYSTOLIC BLOOD PRESSURE: 194 MMHG | BODY MASS INDEX: 40.69 KG/M2 | WEIGHT: 238.32 LBS | HEART RATE: 87 BPM | DIASTOLIC BLOOD PRESSURE: 110 MMHG | RESPIRATION RATE: 17 BRPM

## 2018-03-31 DIAGNOSIS — S16.1XXA STRAIN OF NECK MUSCLE, INITIAL ENCOUNTER: Primary | ICD-10-CM

## 2018-03-31 PROCEDURE — 99282 EMERGENCY DEPT VISIT SF MDM: CPT

## 2018-03-31 NOTE — DISCHARGE INSTRUCTIONS
Neck Strain: Care Instructions  Your Care Instructions    You have strained the muscles and ligaments in your neck. A sudden, awkward movement can strain the neck. This often occurs with falls or car accidents or during certain sports. Everyday activities like working on a computer or sleeping can also cause neck strain if they force you to hold your neck in an awkward position for a long time. It is common for neck pain to get worse for a day or two after an injury, but it should start to feel better after that. You may have more pain and stiffness for several days before it gets better. This is expected. It may take a few weeks or longer for it to heal completely. Good home treatment can help you get better faster and avoid future neck problems. Follow-up care is a key part of your treatment and safety. Be sure to make and go to all appointments, and call your doctor if you are having problems. It's also a good idea to know your test results and keep a list of the medicines you take. How can you care for yourself at home? · If you were given a neck brace (cervical collar) to limit neck motion, wear it as instructed for as many days as your doctor tells you to. Do not wear it longer than you were told to. Wearing a brace for too long can make neck stiffness worse and weaken the neck muscles. · You can try using heat or ice to see if it helps. ¨ Try using a heating pad on a low or medium setting for 15 to 20 minutes every 2 to 3 hours. Try a warm shower in place of one session with the heating pad. You can also buy single-use heat wraps that last up to 8 hours. ¨ You can also try an ice pack for 10 to 15 minutes every 2 to 3 hours. · Take pain medicines exactly as directed. ¨ If the doctor gave you a prescription medicine for pain, take it as prescribed. ¨ If you are not taking a prescription pain medicine, ask your doctor if you can take an over-the-counter medicine.   · Gently rub the area to relieve pain and help with blood flow. Do not massage the area if it hurts to do so. · Do not do anything that makes the pain worse. Take it easy for a couple of days. You can do your usual activities if they do not hurt your neck or put it at risk for more stress or injury. · Try sleeping on a special neck pillow. Place it under your neck, not under your head. Placing a tightly rolled-up towel under your neck while you sleep will also work. If you use a neck pillow or rolled towel, do not use your regular pillow at the same time. · To prevent future neck pain, do exercises to stretch and strengthen your neck and back. Learn how to use good posture, safe lifting techniques, and proper body mechanics. When should you call for help? Call 911 anytime you think you may need emergency care. For example, call if:  ? · You are unable to move an arm or a leg at all. ?Call your doctor now or seek immediate medical care if:  ? · You have new or worse symptoms in your arms, legs, chest, belly, or buttocks. Symptoms may include:  ¨ Numbness or tingling. ¨ Weakness. ¨ Pain. ? · You lose bladder or bowel control. ? Watch closely for changes in your health, and be sure to contact your doctor if:  ? · You are not getting better as expected. Where can you learn more? Go to http://kong-delma.info/. Enter M253 in the search box to learn more about \"Neck Strain: Care Instructions. \"  Current as of: March 21, 2017  Content Version: 11.4  © 7106-5496 [employer]. Care instructions adapted under license by [employer] (which disclaims liability or warranty for this information). If you have questions about a medical condition or this instruction, always ask your healthcare professional. [address] any warranty or liability for your use of this information. We hope that we have addressed all of your medical concerns.  The examination and treatment you received in the Emergency Department were for an emergent problem and were not intended as complete care. It is important that you follow up with your healthcare provider(s) for ongoing care. If your symptoms worsen or do not improve as expected, and you are unable to reach your usual health care provider(s), you should return to the Emergency Department. Today's healthcare is undergoing tremendous change, and patient satisfaction surveys are one of the many tools to assess the quality of medical care. You may receive a survey from the [employer] organization regarding your experience in the Emergency Department. I hope that your experience has been completely positive, particularly the medical care that I provided. As such, please participate in the survey; anything less than excellent does not meet my expectations or intentions. [facility] and [facility] participate in nationally recognized quality of care measures. If your blood pressure is greater than 120/80, as reported below, we urge that you seek medical care to address the potential of high blood pressure, commonly known as hypertension. Hypertension can be hereditary or can be caused by certain medical conditions, pain, stress, or \"white coat syndrome. \"       Please make an appointment with your health care provider(s) for follow up of your Emergency Department visit. VITALS:   Patient Vitals for the past 8 hrs:   Temp Pulse Resp BP SpO2   03/31/18 1225 98 °F (36.7 °C) 87 17 (!) 194/110 95 %          Thank you for allowing us to provide you with medical care today. We realize that you have many choices for your emergency care needs. Please choose us in the future for any continued health care needs. Regards,           Gerber Jensen, [address] [facility]: [phone]            No results found for this or any previous visit (from the past 24 hour(s)). No results found.

## 2018-03-31 NOTE — ED PROVIDER NOTES
HPI Comments: Patient is a 50-year-old white female who presents emergency Department with complaints of right-sided neck pain and trapezius pain since yesterday morning. Patient woke up from sleep and noticed that the right side of her neck was hurting. It was not painful prior to going to sleep. He denies any other injury. She went to work today, and because of neck pain, she was sent home but was told to get a doctor's note. She has no other complaints at this time. She denies paresthesias or weakness. She has no headache. No fever. The history is provided by the patient. Past Medical History:   Diagnosis Date    Anemia     Complicated migraine     with extremity numbness    Diabetes (HCC)     Hx MRSA infection     Hypertension     Hypothyroidism     Irregular menstrual cycle     Obesity        Past Surgical History:   Procedure Laterality Date    HX CHOLECYSTECTOMY      HX HEENT      HX TONSIL AND ADENOIDECTOMY  1992    HX TYMPANOSTOMY           Family History:   Problem Relation Age of Onset    Hypertension Other      \"all her family\"    Diabetes Other      \"all her family\"    Cancer Mother     Stroke Mother     Heart Disease Mother    [place] Bladder Disease Mother     Diabetes Mother     Stroke Father     Heart Disease Father     Diabetes Father    [place] Bladder Disease Brother     Diabetes Brother     Migraines Maternal Aunt     Diabetes Paternal Aunt     Stroke Maternal Grandmother     Diabetes Maternal Grandmother     Stroke Maternal Grandfather     Diabetes Maternal Grandfather        Social History     Social History    Marital status:      Spouse name: N/A    Number of children: N/A    Years of education: N/A     Occupational History    Not on file.      Social History Main Topics    Smoking status: Never Smoker    Smokeless tobacco: Never Used    Alcohol use Yes      Comment: socially 1-2 times a year     Drug use: No    Sexual activity: Yes Other Topics Concern    Not on file     Social History Narrative         ALLERGIES: Pcn [penicillins]; Vancomycin; and Voltaren [diclofenac sodium]    Review of Systems   Constitutional: Negative. Negative for appetite change, fever and unexpected weight change. HENT: Negative. Negative for ear pain, hearing loss, nosebleeds, rhinorrhea, sore throat and trouble swallowing. Respiratory: Negative. Negative for cough, chest tightness and shortness of breath. Cardiovascular: Negative. Negative for chest pain and palpitations. Gastrointestinal: Negative. Negative for abdominal distention, abdominal pain, blood in stool and vomiting. Endocrine: Negative. Genitourinary: Negative for dysuria and hematuria. Musculoskeletal: Positive for neck pain. Negative for back pain and myalgias. Skin: Negative. Negative for rash. Allergic/Immunologic: Negative. Neurological: Negative. Negative for dizziness, syncope, weakness and numbness. Hematological: Negative. Psychiatric/Behavioral: Negative. All other systems reviewed and are negative. Vitals:    03/31/18 1225   BP: (!) 194/110   Pulse: 87   Resp: 17   Temp: 98 °F (36.7 °C)   SpO2: 95%   Weight: 108.1 kg (238 lb 5.1 oz)   Height: 5' 4\" (1.626 m)            Physical Exam   Constitutional: She is oriented to person, place, and time. She appears well-developed and well-nourished. No distress. HENT:   Head: Normocephalic and atraumatic. Right Ear: External ear normal.   Left Ear: External ear normal.   Nose: Nose normal.   Mouth/Throat: Oropharynx is clear and moist.   Eyes: Conjunctivae and EOM are normal. Pupils are equal, round, and reactive to light. Neck: Normal range of motion. Neck supple. No JVD present. No thyromegaly present. Tenderness over her right trapezius, right rhomboid, right paraspinal posterior neck muscles. No midline bony tenderness. Range of motion is somewhat limited because of muscular tightness.  There is no nuchal rigidity. Cardiovascular: Normal rate, regular rhythm, normal heart sounds and intact distal pulses. No murmur heard. Pulmonary/Chest: Effort normal and breath sounds normal. No respiratory distress. She has no wheezes. She has no rales. Abdominal: Soft. Bowel sounds are normal. She exhibits no distension. There is no tenderness. Musculoskeletal: Normal range of motion. She exhibits no edema. Lymphadenopathy:     She has no cervical adenopathy. Neurological: She is alert and oriented to person, place, and time. No cranial nerve deficit. Skin: Skin is warm and dry. No rash noted. Psychiatric: She has a normal mood and affect. Her behavior is normal. Thought content normal.   Vitals reviewed. MDM  Number of Diagnoses or Management Options  Strain of neck muscle, initial encounter:   Diagnosis management comments: Assessment and plan: Muscular cervical strain. We'll treat with conservative management including heat, stretching, massage. Patient may use topical medications for relief as well. Patient understands and agrees with plan.     Risk of Complications, Morbidity, and/or Mortality  Presenting problems: low  Diagnostic procedures: low  Management options: low    Patient Progress  Patient progress: stable        ED Course       Procedures

## 2018-03-31 NOTE — ED TRIAGE NOTES
Pt ambulates to treatment area she states that yesterday morning she woke with right sided neck pain that radiates into the back of the right shoulder, she believes that she slept wrong. 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 She took Tylenol and Motrin but it did not help.   The last dose was Motrin 400 mg at 0800

## 2018-03-31 NOTE — LETTER
NOTIFICATION RETURN TO WORK / SCHOOL 
 
3/31/2018 12:53 PM 
 
Ms. Suzette Hercules [address] [address] To Whom It May Concern: 
 
Suzette Hercules is currently under the care of [facility] EMERGENCY DEPT. She will return to work/school on: 4/1/18 If there are questions or concerns please have the patient contact our office.  
 
 
 
Sincerely, 
 
 
Ashia Alves MD

## 2018-05-18 ENCOUNTER — HOSPITAL ENCOUNTER (EMERGENCY)
Age: 33
Discharge: LEFT BEFORE DISCHARGE | End: 2018-05-18
Attending: EMERGENCY MEDICINE
Payer: COMMERCIAL

## 2018-05-18 VITALS
HEIGHT: 64 IN | DIASTOLIC BLOOD PRESSURE: 116 MMHG | BODY MASS INDEX: 40.27 KG/M2 | TEMPERATURE: 97.7 F | SYSTOLIC BLOOD PRESSURE: 225 MMHG | OXYGEN SATURATION: 97 % | WEIGHT: 235.89 LBS | RESPIRATION RATE: 16 BRPM | HEART RATE: 80 BPM

## 2018-05-18 DIAGNOSIS — Z53.20 PATIENT LEFT BEFORE TREATMENT COMPLETED: Primary | ICD-10-CM

## 2018-05-18 PROCEDURE — 99281 EMR DPT VST MAYX REQ PHY/QHP: CPT

## 2018-05-18 RX ORDER — CLINDAMYCIN HYDROCHLORIDE 150 MG/1
150 CAPSULE ORAL EVERY 6 HOURS
COMMUNITY
End: 2018-06-22

## 2018-05-18 RX ORDER — HYDROCODONE BITARTRATE AND ACETAMINOPHEN 5; 325 MG/1; MG/1
1 TABLET ORAL
COMMUNITY
End: 2018-06-04

## 2018-05-18 NOTE — ED NOTES
Dr Hina Guzámn and VALENTINO Perez made aware of elevated /116. Pt stated she has her BP medication with her.

## 2018-05-18 NOTE — ED TRIAGE NOTES
Triage note:  Pt drove self to ER for work note for today and tomorrow. Pt stated she had an I&D to abscess to right groin and has been seeing her doctor at [facility]. Pt stated she saw the doctor yesterday and packing was removed. Pt stated they gave her a work note for yesterday only.

## 2018-05-18 NOTE — ED PROVIDER NOTES
HPI Comments: 50-year-old female patient who presents to the emergency room today wanting to work note. The patient states that she was seen at [facility] 3 days ago and had an I&D of an abscess. She states that she had the packing removed yesterday. The patient states that she was in a lot of pain, and when she went to work today because of the pain her supervisor sent her home and told her she needed a work note. The patient also has an increased blood pressure and states that she has her medication with her. Radha Bah NP    Past Medical History:  No date: Anemia  No date: Complicated migraine      Comment: with extremity numbness  No date: Diabetes ([address]  No date: Hx MRSA infection  No date: Hypertension  No date: Hypothyroidism  No date: Irregular menstrual cycle  No date: Obesity      The history is provided by the patient. No  was used.         Past Medical History:   Diagnosis Date    Anemia     Complicated migraine     with extremity numbness    Diabetes (HCC)     Hx MRSA infection     Hypertension     Hypothyroidism     Irregular menstrual cycle     Obesity        Past Surgical History:   Procedure Laterality Date    HX CHOLECYSTECTOMY      HX HEENT      HX TONSIL AND ADENOIDECTOMY  1992    HX TYMPANOSTOMY           Family History:   Problem Relation Age of Onset    Hypertension Other      \"all her family\"    Diabetes Other      \"all her family\"    Cancer Mother     Stroke Mother     Heart Disease Mother    [place] Bladder Disease Mother     Diabetes Mother     Stroke Father     Heart Disease Father     Diabetes Father    [place] Bladder Disease Brother     Diabetes Brother     Migraines Maternal Aunt     Diabetes Paternal Aunt     Stroke Maternal Grandmother     Diabetes Maternal Grandmother     Stroke Maternal Grandfather     Diabetes Maternal Grandfather        Social History     Social History    Marital status:      Spouse name: N/A  Number of children: N/A    Years of education: N/A     Occupational History    Not on file. Social History Main Topics    Smoking status: Never Smoker    Smokeless tobacco: Never Used    Alcohol use Yes      Comment: socially 1-2 times a year     Drug use: No    Sexual activity: Yes     Other Topics Concern    Not on file     Social History Narrative         ALLERGIES: Pcn [penicillins]; Vancomycin; and Voltaren [diclofenac sodium]    Review of Systems   Constitutional: Negative. HENT: Negative. Eyes: Negative. Respiratory: Negative. Cardiovascular: Negative. Gastrointestinal: Negative. Endocrine: Negative. Skin: Positive for wound. Allergic/Immunologic: Negative. Neurological: Negative. Hematological: Negative. Psychiatric/Behavioral: Negative. Vitals:    05/18/18 1911   BP: (!) 225/116   Pulse: 80   Resp: 16   Temp: 97.7 °F (36.5 °C)   SpO2: 97%   Weight: 107 kg (235 lb 14.3 oz)   Height: 5' 4\" (1.626 m)            Physical Exam   Nursing note and vitals reviewed. I have offered to examine the patient but she has refused     MDM  Number of Diagnoses or Management Options  Patient left before treatment completed:   Diagnosis management comments: The patient comes in today wanting a doctor's note for work for today and for tomorrow so she can go back to work on Monday. I advised the patient that we do not write work notes for procedures that were done at other institutions. I advised the patient that we needed to get records from [facility] so that we could verify the procedures that were done. I advised herthat I needed to examine her as well. I also stated that I was concerned that her blood pressure was 225/116. The patient states that she has her blood pressure medication she hasn't taken. The patient was visibly upset because we would not write her work note.  I explained situation to her with Ethan Rosas RN in the room and apologized for process but told her we needed to follow the patient stated that she was going to leave she did not want to wait for 5 hours to get the paperwork in for us to follow through with the process. She also stated that she felt that we would not bill her insurance. The patient left the room before I could give her any discharge paperwork or before I could complete my exam.       Amount and/or Complexity of Data Reviewed  Discuss the patient with other providers: yes Nestora Free)    Risk of Complications, Morbidity, and/or Mortality  Presenting problems: moderate  Diagnostic procedures: minimal  Management options: minimal    Patient Progress  Patient progress: other (comment) (Left before physical exam )        ED Course       Procedures    7:25 PM  Discussed with patient at length the need for blood pressure re-evaluation and management with primary care. Discussed the long term sequelae for elevated blood pressure including blindness, CVA, MI, and renal failure/ dialysis. Pt agrees to follow up as directed. Kiarra Dickson NP     LABORATORY TESTS:  No results found for this or any previous visit (from the past 12 hour(s)). IMAGING RESULTS:    MEDICATIONS GIVEN:  Medications - No data to display    IMPRESSION:  1. Patient left before treatment completed        PLAN:  1.  Pt tulio

## 2018-06-04 ENCOUNTER — HOSPITAL ENCOUNTER (EMERGENCY)
Age: 33
Discharge: HOME | End: 2018-06-04
Attending: EMERGENCY MEDICINE
Payer: COMMERCIAL

## 2018-06-04 VITALS
RESPIRATION RATE: 16 BRPM | OXYGEN SATURATION: 97 % | DIASTOLIC BLOOD PRESSURE: 133 MMHG | HEART RATE: 98 BPM | WEIGHT: 236.77 LBS | BODY MASS INDEX: 40.42 KG/M2 | TEMPERATURE: 98 F | HEIGHT: 64 IN | SYSTOLIC BLOOD PRESSURE: 215 MMHG

## 2018-06-04 DIAGNOSIS — N61.1 ABSCESS OF RIGHT BREAST: Primary | ICD-10-CM

## 2018-06-04 LAB
GLUCOSE BLD STRIP.AUTO-MCNC: 347 MG/DL (ref 65–100)
SERVICE CMNT-IMP: ABNORMAL

## 2018-06-04 PROCEDURE — 99283 EMERGENCY DEPT VISIT LOW MDM: CPT

## 2018-06-04 PROCEDURE — 82962 GLUCOSE BLOOD TEST: CPT

## 2018-06-04 RX ORDER — CEPHALEXIN 500 MG/1
500 CAPSULE ORAL 4 TIMES DAILY
Qty: 28 CAP | Refills: 0 | Status: SHIPPED | OUTPATIENT
Start: 2018-06-04 | End: 2018-06-11

## 2018-06-04 NOTE — ED NOTES
The patient was discharged home by April, [place] in stable condition. The patient is alert and oriented, in no respiratory distress. The patient's diagnosis, condition and treatment were explained. The patient expressed understanding. One prescription given. No work/school note given. A discharge plan has been developed. A  was not involved in the process. Aftercare instructions were given. Pt ambulatory out of the ED.

## 2018-06-04 NOTE — ED TRIAGE NOTES
Pt ambulates to treatment area she states that she has an abscess on her right breast that this morning when she woke there was blood all over the sheets. She constantly gets abscesses and the last one she had was in her groin she had it drained was on antibiotics as well.   The abscess is not bleeding at this time but is swollen and red

## 2018-06-04 NOTE — Clinical Note
Please follow-up with breast specialist 
Apply warm compress Tylenol and ibuprofen as needed for pain Keflex four times daily for the next 7 days Return for any new or worsening.  Bobo Lobo

## 2018-06-04 NOTE — DISCHARGE INSTRUCTIONS

## 2018-06-04 NOTE — LETTER
[facility] EMERGENCY DEPT 
[address] [facility] [address] [id number] 
[phone] Work/School Note Date: 6/4/2018 To Whom It May concern: 
 
Agustina Marvin was seen and treated today in the emergency room by the following provider(s): 
Attending Provider: Rodrigue Cuenca MD 
Physician Assistant: Bobo Perry.   
 
Agustina Marvin may return to work on Tuesday June 5, 2018 Sincerely, Bushra Loboma

## 2018-06-04 NOTE — ED NOTES
AIDET communication provided and informed of purposeful rounding to include collaboration of entire care team; patient acknowledged understanding. Pt sitting on stretcher talking to triage nurse. BP elevated. Pt reports having just taken two of her Clonidine. Assessment completed. Awaiting provider. Call bell in reach.

## 2018-06-04 NOTE — ED PROVIDER NOTES
HPI Comments: 35 yo  female with medical hx remarkable for anemia, complicated migraines, hidradenitis, hypertension, and obesity presenting ambulatory to the ED with complaint of recurrent abscess to the right breast with associated bloody, purulent drainage noted on the sheet upon awakening about 3 hrs ago. She reports area was significantly larger and projected from the breast wall. Now is flat. Minimal pain associated. BS's  Mildly elevated. No fever, chills, malaise, fatigue, chest pain, SOB, abdominal pain, nausea, vomiting or urinary complaint. Patient is a 34 y.o. female presenting with skin problem. The history is provided by the patient. Skin Problem           Past Medical History:   Diagnosis Date    Abscess     Anemia     Complicated migraine     with extremity numbness    Diabetes (HCC)     Hx MRSA infection     Hypertension     Hypothyroidism     Irregular menstrual cycle     Obesity        Past Surgical History:   Procedure Laterality Date    HX CHOLECYSTECTOMY      HX HEENT      HX TONSIL AND ADENOIDECTOMY  1992    HX TYMPANOSTOMY           Family History:   Problem Relation Age of Onset    Hypertension Other      \"all her family\"    Diabetes Other      \"all her family\"    Cancer Mother     Stroke Mother     Heart Disease Mother    Herlinda Dopp Bladder Disease Mother     Diabetes Mother     Stroke Father     Heart Disease Father     Diabetes Father    Herlinda Dopp Bladder Disease Brother     Diabetes Brother     Migraines Maternal Aunt     Diabetes Paternal Aunt     Stroke Maternal Grandmother     Diabetes Maternal Grandmother     Stroke Maternal Grandfather     Diabetes Maternal Grandfather        Social History     Social History    Marital status:      Spouse name: N/A    Number of children: N/A    Years of education: N/A     Occupational History    Not on file.      Social History Main Topics    Smoking status: Never Smoker    Smokeless tobacco: Never Used    Alcohol use Yes      Comment: socially 1-2 times a year     Drug use: No    Sexual activity: Yes     Other Topics Concern    Not on file     Social History Narrative         ALLERGIES: Pcn [penicillins]; Vancomycin; and Voltaren [diclofenac sodium]    Review of Systems   Constitutional: Negative. Negative for chills, fatigue and fever. HENT: Negative. Negative for congestion, ear pain, rhinorrhea, sneezing and sore throat. Respiratory: Negative for cough and shortness of breath. Cardiovascular: Negative. Negative for chest pain and leg swelling. Gastrointestinal: Negative. Negative for abdominal pain, nausea and vomiting. Genitourinary: Negative for difficulty urinating, frequency and urgency. Musculoskeletal: Negative for neck pain. Skin: Positive for color change. Neurological: Negative for headaches. All other systems reviewed and are negative. Vitals:    06/04/18 1624   BP: (!) 215/133   Pulse: 98   Resp: 16   Temp: 98 °F (36.7 °C)   SpO2: 97%   Weight: 107.4 kg (236 lb 12.4 oz)   Height: 5' 4\" (1.626 m)            Physical Exam   Constitutional: She is oriented to person, place, and time. She appears well-developed and well-nourished. No distress. Obese  female in NAD   HENT:   Head: Normocephalic and atraumatic. Right Ear: External ear normal.   Left Ear: External ear normal.   Nose: Nose normal.   Mouth/Throat: Oropharynx is clear and moist. No oropharyngeal exudate. Eyes: Conjunctivae and EOM are normal. Pupils are equal, round, and reactive to light. Right eye exhibits no discharge. Left eye exhibits no discharge. No scleral icterus. Neck: Normal range of motion. Neck supple. Cardiovascular: Normal rate and regular rhythm. Exam reveals no gallop and no friction rub. No murmur heard. Pulmonary/Chest: Effort normal and breath sounds normal. She has no wheezes. She has no rales. Abdominal: Soft.  Bowel sounds are normal. She exhibits no distension. There is no tenderness. There is no rebound and no guarding. Neurological: She is alert and oriented to person, place, and time. No cranial nerve deficit. Coordination normal.   Skin: Skin is warm and dry. She is not diaphoretic. Psychiatric: She has a normal mood and affect. Her behavior is normal.   Nursing note and vitals reviewed. MDM  Number of Diagnoses or Management Options  Abscess of right breast:   Diagnosis management comments: 35 yo  female with hx of recurrent breast abscesses presenting with abscess to rt breast. Spontaneously drained. Minimal erythema noted with concern for overlying cellulitis. Afebrile. . BP elevated but just took BP medication 20 minutes ago. No PASTRANA, CP or SOB. [facility]          ED Course       Procedures         Progress note    Patient's results have been reviewed with them. Patient and/or family have verbally conveyed their understanding and agreement of the patient's signs, symptoms, diagnosis, treatment and prognosis and additionally agree to follow up as recommended or return to the Emergency Room should their condition change prior to follow-up. Discharge instructions have also been provided to the patient with some educational information regarding their diagnosis as well a list of reasons why they would want to return to the ER prior to their follow-up appointment should their condition change. Bushra Perryma    . A/P  Breast abscess: Please follow-up with breast specialist  Apply warm compress  Tylenol and ibuprofen as needed for pain  Keflex four times daily for the next 7 days  Return for any new or worsening.  [facility]

## 2018-06-22 ENCOUNTER — HOSPITAL ENCOUNTER (INPATIENT)
Age: 33
LOS: 2 days | Discharge: HOME | DRG: 305 | End: 2018-06-25
Attending: EMERGENCY MEDICINE | Admitting: INTERNAL MEDICINE
Payer: COMMERCIAL

## 2018-06-22 ENCOUNTER — APPOINTMENT (OUTPATIENT)
Dept: GENERAL RADIOLOGY | Age: 33
DRG: 305 | End: 2018-06-22
Attending: EMERGENCY MEDICINE
Payer: COMMERCIAL

## 2018-06-22 DIAGNOSIS — E11.65 TYPE 2 DIABETES MELLITUS WITH HYPERGLYCEMIA, UNSPECIFIED WHETHER LONG TERM INSULIN USE (HCC): ICD-10-CM

## 2018-06-22 DIAGNOSIS — R07.89 CHEST TIGHTNESS: ICD-10-CM

## 2018-06-22 DIAGNOSIS — I16.0 HYPERTENSIVE URGENCY: Primary | ICD-10-CM

## 2018-06-22 DIAGNOSIS — E11.65 TYPE 2 DIABETES MELLITUS WITH HYPERGLYCEMIA, WITH LONG-TERM CURRENT USE OF INSULIN (HCC): ICD-10-CM

## 2018-06-22 DIAGNOSIS — Z79.4 TYPE 2 DIABETES MELLITUS WITH HYPERGLYCEMIA, WITH LONG-TERM CURRENT USE OF INSULIN (HCC): ICD-10-CM

## 2018-06-22 LAB
ALBUMIN SERPL-MCNC: 3.3 G/DL (ref 3.5–5)
ALBUMIN/GLOB SERPL: 0.8 {RATIO} (ref 1.1–2.2)
ALP SERPL-CCNC: 79 U/L (ref 45–117)
ALT SERPL-CCNC: 35 U/L (ref 12–78)
ANION GAP SERPL CALC-SCNC: 7 MMOL/L (ref 5–15)
AST SERPL-CCNC: 15 U/L (ref 15–37)
BASOPHILS # BLD: 0 K/UL (ref 0–0.1)
BASOPHILS NFR BLD: 0 % (ref 0–1)
BILIRUB SERPL-MCNC: 0.5 MG/DL (ref 0.2–1)
BUN SERPL-MCNC: 18 MG/DL (ref 6–20)
BUN/CREAT SERPL: 13 (ref 12–20)
CALCIUM SERPL-MCNC: 8.9 MG/DL (ref 8.5–10.1)
CHLORIDE SERPL-SCNC: 96 MMOL/L (ref 97–108)
CO2 SERPL-SCNC: 31 MMOL/L (ref 21–32)
CREAT SERPL-MCNC: 1.43 MG/DL (ref 0.55–1.02)
DIFFERENTIAL METHOD BLD: ABNORMAL
EOSINOPHIL # BLD: 0.2 K/UL (ref 0–0.4)
EOSINOPHIL NFR BLD: 3 % (ref 0–7)
ERYTHROCYTE [DISTWIDTH] IN BLOOD BY AUTOMATED COUNT: 13.4 % (ref 11.5–14.5)
GLOBULIN SER CALC-MCNC: 4.2 G/DL (ref 2–4)
GLUCOSE BLD STRIP.AUTO-MCNC: 336 MG/DL (ref 65–100)
GLUCOSE SERPL-MCNC: 360 MG/DL (ref 65–100)
HCG UR QL: NEGATIVE
HCT VFR BLD AUTO: 39.5 % (ref 35–47)
HGB BLD-MCNC: 14.3 G/DL (ref 11.5–16)
LYMPHOCYTES # BLD: 3.4 K/UL (ref 0.8–3.5)
LYMPHOCYTES NFR BLD: 41 % (ref 12–49)
MCH RBC QN AUTO: 30.3 PG (ref 26–34)
MCHC RBC AUTO-ENTMCNC: 36.2 G/DL (ref 30–36.5)
MCV RBC AUTO: 83.7 FL (ref 80–99)
MONOCYTES # BLD: 0.6 K/UL (ref 0–1)
MONOCYTES NFR BLD: 7 % (ref 5–13)
NEUTS SEG # BLD: 4.1 K/UL (ref 1.8–8)
NEUTS SEG NFR BLD: 49 % (ref 32–75)
PLATELET # BLD AUTO: 401 K/UL (ref 150–400)
PMV BLD AUTO: 9.6 FL (ref 8.9–12.9)
POTASSIUM SERPL-SCNC: 3.6 MMOL/L (ref 3.5–5.1)
PROT SERPL-MCNC: 7.5 G/DL (ref 6.4–8.2)
RBC # BLD AUTO: 4.72 M/UL (ref 3.8–5.2)
SERVICE CMNT-IMP: ABNORMAL
SODIUM SERPL-SCNC: 134 MMOL/L (ref 136–145)
TROPONIN I SERPL-MCNC: <0.05 NG/ML
UR CULT HOLD, URHOLD: NORMAL
WBC # BLD AUTO: 8.3 K/UL (ref 3.6–11)
XXWBCSUS: 0

## 2018-06-22 PROCEDURE — 96361 HYDRATE IV INFUSION ADD-ON: CPT

## 2018-06-22 PROCEDURE — 80053 COMPREHEN METABOLIC PANEL: CPT | Performed by: EMERGENCY MEDICINE

## 2018-06-22 PROCEDURE — 74011636637 HC RX REV CODE- 636/637: Performed by: EMERGENCY MEDICINE

## 2018-06-22 PROCEDURE — 74011250636 HC RX REV CODE- 250/636: Performed by: EMERGENCY MEDICINE

## 2018-06-22 PROCEDURE — 96375 TX/PRO/DX INJ NEW DRUG ADDON: CPT

## 2018-06-22 PROCEDURE — 82962 GLUCOSE BLOOD TEST: CPT

## 2018-06-22 PROCEDURE — 36415 COLL VENOUS BLD VENIPUNCTURE: CPT | Performed by: EMERGENCY MEDICINE

## 2018-06-22 PROCEDURE — 81001 URINALYSIS AUTO W/SCOPE: CPT | Performed by: EMERGENCY MEDICINE

## 2018-06-22 PROCEDURE — 84484 ASSAY OF TROPONIN QUANT: CPT | Performed by: EMERGENCY MEDICINE

## 2018-06-22 PROCEDURE — 71046 X-RAY EXAM CHEST 2 VIEWS: CPT

## 2018-06-22 PROCEDURE — 85025 COMPLETE CBC W/AUTO DIFF WBC: CPT | Performed by: EMERGENCY MEDICINE

## 2018-06-22 PROCEDURE — 99285 EMERGENCY DEPT VISIT HI MDM: CPT

## 2018-06-22 PROCEDURE — 93005 ELECTROCARDIOGRAM TRACING: CPT

## 2018-06-22 PROCEDURE — 96374 THER/PROPH/DIAG INJ IV PUSH: CPT

## 2018-06-22 PROCEDURE — 74011000250 HC RX REV CODE- 250: Performed by: EMERGENCY MEDICINE

## 2018-06-22 PROCEDURE — 74011250637 HC RX REV CODE- 250/637: Performed by: EMERGENCY MEDICINE

## 2018-06-22 PROCEDURE — 81025 URINE PREGNANCY TEST: CPT

## 2018-06-22 RX ORDER — ONDANSETRON 2 MG/ML
4 INJECTION INTRAMUSCULAR; INTRAVENOUS
Status: COMPLETED | OUTPATIENT
Start: 2018-06-22 | End: 2018-06-22

## 2018-06-22 RX ORDER — METOPROLOL TARTRATE 5 MG/5ML
5 INJECTION INTRAVENOUS
Status: COMPLETED | OUTPATIENT
Start: 2018-06-22 | End: 2018-06-22

## 2018-06-22 RX ORDER — METOPROLOL TARTRATE 25 MG/1
25 TABLET, FILM COATED ORAL
Status: COMPLETED | OUTPATIENT
Start: 2018-06-22 | End: 2018-06-22

## 2018-06-22 RX ADMIN — ONDANSETRON 4 MG: 2 INJECTION INTRAMUSCULAR; INTRAVENOUS at 23:20

## 2018-06-22 RX ADMIN — METOROPROLOL TARTRATE 5 MG: 5 INJECTION, SOLUTION INTRAVENOUS at 23:08

## 2018-06-22 RX ADMIN — HUMAN INSULIN 10 UNITS: 100 INJECTION, SOLUTION SUBCUTANEOUS at 23:51

## 2018-06-22 RX ADMIN — SODIUM CHLORIDE 1000 ML: 900 INJECTION, SOLUTION INTRAVENOUS at 23:15

## 2018-06-22 RX ADMIN — METOPROLOL TARTRATE 25 MG: 25 TABLET ORAL at 23:20

## 2018-06-22 NOTE — IP AVS SNAPSHOT
[address] 
[phone] Patient: Paulette Gray MRN: [id number] :1985 About your hospitalization You were admitted on:  2018 You last received care in the:  [facility] [address] [facility] [address] You were discharged on:  2018 Why you were hospitalized Your primary diagnosis was:  Chest Pain Your diagnoses also included:  Hypertensive Urgency, Hypothyroidism, Type 2 Diabetes Mellitus With Nephropathy (Hcc), Chest Pain, Hypertension, Obesity, Morbid (Hcc) Follow-up Information Follow up With Details Comments Contact Info Josephine Piedra MD On 2018 340 pm [id number] [address] [facility] [address] [phone] 1007 [facility] 
[phone] Heather Jha NP Go on 2018 Hospital PCP f/u appointment on  @ 1:00 p.m. [address] [id number] 
[phone] Your Scheduled Appointments 2018  1:00 PM EDT TRANSITIONAL CARE MA[place] [address] [facility] [address] [address]  
[phone] 2018  3:40 PM EDT  
ESTABLISHED PATIENT with Josephine Piedra MD  
[facility] ([facility]) 320 [facility] [address] [place] [address]  
[phone] Discharge Orders None A check adrian indicates which time of day the medication should be taken. My Medications START taking these medications Instructions Each Dose to Equal  
 Morning Noon Evening Bedtime  
 amLODIPine 10 mg tablet Commonly known as:  Eliel Mcneal Start taking on:  2018 Your last dose was:  1145 am  Your next dose is:   am  
Notes to Patient:  [place] blood pressure medication. (calcium channel blocker) Take 1 Tab by mouth daily.   
 10 mg  
    
  
   
   
 carvedilol 6.25 mg tablet Commonly known as:  Justino Capone Your last dose was:  6 pm 6/24 (held 6/25 am because of your test) Your next dose is: With dinner 6/25 Notes to Patient:  New blood pressure medication (beta blocker) Take 1 Tab by mouth two (2) times daily (with meals). 6.25 mg  
    
  
   
   
  
   
  
 cefdinir 300 mg capsule Commonly known as:  OMNICEF Your last dose was:  Substituted with a different antibiotic while in the hospital - 6/25 am  
Your next dose is:  6/25 in the evening Notes to Patient:  Antibiotic for your ear infections Take 1 Cap by mouth two (2) times a day for 7 days. 300 mg CHANGE how you take these medications Instructions Each Dose to Equal  
 Morning Noon Evening Bedtime  
 cloNIDine HCl 0.1 mg tablet Commonly known as:  CATAPRES What changed:   
- how much to take - when to take this 
- reasons to take this Your last dose was:  1145 am 6/25 Notes to Patient: This dosage has been changed Take 1 Tab by mouth two (2) times a day. 0.1 mg  
    
  
   
   
  
   
  
 insulin detemir U-100 100 unit/mL (3 mL) Inpn Commonly known as:  Jimi Parkinson What changed:  how much to take Your last dose was: Was given Lantus 45 units at 8 am 6/25 Your next dose is:  6/26 at bedtime 45 Units by SubCUTAneous route nightly. 45 Units CONTINUE taking these medications Instructions Each Dose to Equal  
 Morning Noon Evening Bedtime  
 aspirin delayed-release 81 mg tablet Your last dose was:  Not given while in hospital - resume at discharge Take 1 Tab by mouth daily. 81 mg  
    
  
   
   
   
  
 ferrous sulfate 325 mg (65 mg iron) tablet Your last dose was:  Not given while in hospital - resume at discharge Take 1 Tab by mouth daily (with lunch). 325 mg  
    
   
  
   
   
  
 levothyroxine 100 mcg tablet Commonly known as:  SYNTHROID Your last dose was:  630 am  Take 1 Tab by mouth Daily (before breakfast). 100 mcg  
    
  
   
   
   
  
 losartan 50 mg tablet Commonly known as:  COZAAR Your last dose was:  1145 am  Take 1 Tab by mouth daily. 50 mg  
    
  
   
   
   
  
 metFORMIN  mg tablet Commonly known as:  GLUCOPHAGE XR Your last dose was:  Not given while in hospital - resume at discharge Take 1 Tab by mouth daily (with dinner). 750 mg  
    
   
   
  
   
  
 multivitamin tablet Commonly known as:  ONE A DAY Your last dose was:  Not given while in hospital - resume at discharge Take 1 Tab by mouth daily. 1 Tab Where to Get Your Medications These medications were sent to [address] [facility], [address] [facility]. [address].  [address] [facility] [address], [address] [place] [address] Phone:  [phone]  
  amLODIPine 10 mg tablet  
 carvedilol 6.25 mg tablet  
 cefdinir 300 mg capsule  
 cloNIDine HCl 0.1 mg tablet  
 insulin detemir U-100 100 unit/mL (3 mL) Inpn Discharge Instructions HOSPITALIST DISCHARGE INSTRUCTIONS 
NAME: Paulette Gray :  1985 MRN:  [id number] Date/Time:  2018 1:14 PM 
 
ADMIT DATE: 2018 DISCHARGE DATE: 2018 DISCHARGE DIAGNOSIS: 
Chest pain MEDICATIONS: 
· It is important that you take the medication exactly as they are prescribed. · Keep your medication in the bottles provided by the pharmacist and keep a list of the medication names, dosages, and times to be taken in your wallet. · Do not take other medications without consulting your doctor. Pain Management: per above medications What to do at [facility] Recommended diet:  Cardiac Diet Recommended activity: Activity as tolerated If you experience any of the following symptoms then please call your primary care physician or return to the emergency room if you cannot get hold of your doctor: 
Fever, chills, nausea, vomiting, diarrhea, change in mentation, falling, bleeding, shortness of breath Follow Up: Follow-up Information Follow up With Details Comments Contact Info James Maravilla MD On 7/27/2018 340 pm [id number] [facility] [phone] 1007 [facility] 
[phone] Gosia Bingham NP In 2 weeks  [place] [address] [address] [place] [address] 
[phone] Information obtained by : 
I understand that if any problems occur once I am at home I am to contact my physician. I understand and acknowledge receipt of the instructions indicated above. Physician's or R.N.'s Signature                                                                  Date/Time Patient or Representative Signature                                                          Date/Time 
 
 Monitor your blood pressure daily, please bring in blood pressure log when you go to see your cardiologists- Dr Flores Byers High Blood Pressure: Care Instructions Your Care Instructions If your blood pressure is usually above 140/90, you have high blood pressure, or hypertension. That means the top number is 140 or higher or the bottom number is 90 or higher, or both. Despite what a lot of people think, high blood pressure usually doesn't cause headaches or make you feel dizzy or lightheaded. It usually has no symptoms. But it does increase your risk for heart attack, stroke, and kidney or eye damage. The higher your blood pressure, the more your risk increases. Your doctor will give you a goal for your blood pressure.  Your goal will be based on your health and your age. An example of a goal is to keep your blood pressure below 140/90. Lifestyle changes, such as eating healthy and being active, are always important to help lower blood pressure. You might also take medicine to reach your blood pressure goal. 
Follow-up care is a key part of your treatment and safety. Be sure to make and go to all appointments, and call your doctor if you are having problems. It's also a good idea to know your test results and keep a list of the medicines you take. How can you care for yourself at home? Medical treatment · If you stop taking your medicine, your blood pressure will go back up. You may take one or more types of medicine to lower your blood pressure. Be safe with medicines. Take your medicine exactly as prescribed. Call your doctor if you think you are having a problem with your medicine. · Talk to your doctor before you start taking aspirin every day. Aspirin can help certain people lower their risk of a heart attack or stroke. But taking aspirin isn't right for everyone, because it can cause serious bleeding. · See your doctor regularly. You may need to see the doctor more often at first or until your blood pressure comes down. · If you are taking blood pressure medicine, talk to your doctor before you take decongestants or anti-inflammatory medicine, such as ibuprofen. Some of these medicines can raise blood pressure. · Learn how to check your blood pressure at home. Lifestyle changes · Stay at a healthy weight. This is especially important if you put on weight around the waist. Losing even 10 pounds can help you lower your blood pressure. · If your doctor recommends it, get more exercise. Walking is a good choice. Bit by bit, increase the amount you walk every day. Try for at least 30 minutes on most days of the week. You also may want to swim, bike, or do other activities. · Avoid or limit alcohol. Talk to your doctor about whether you can drink any alcohol. · Try to limit how much sodium you eat to less than 2,300 milligrams (mg) a day. Your doctor may ask you to try to eat less than 1,500 mg a day. · Eat plenty of fruits (such as bananas and oranges), vegetables, legumes, whole grains, and low-fat dairy products. · Lower the amount of saturated fat in your diet. Saturated fat is found in animal products such as milk, cheese, and meat. Limiting these foods may help you lose weight and also lower your risk for heart disease. · Do not smoke. Smoking increases your risk for heart attack and stroke. If you need help quitting, talk to your doctor about stop-smoking programs and medicines. These can increase your chances of quitting for good. When should you call for help? Call 911 anytime you think you may need emergency care. This may mean having symptoms that suggest that your blood pressure is causing a serious heart or blood vessel problem. Your blood pressure may be over 180/110. ? For example, call 911 if: 
? · You have symptoms of a heart attack. These may include: ¨ Chest pain or pressure, or a strange feeling in the chest. 
¨ Sweating. ¨ Shortness of breath. ¨ Nausea or vomiting. ¨ Pain, pressure, or a strange feeling in the back, neck, jaw, or upper belly or in one or both shoulders or arms. ¨ Lightheadedness or sudden weakness. ¨ A fast or irregular heartbeat. ? · You have symptoms of a stroke. These may include: 
¨ Sudden numbness, tingling, weakness, or loss of movement in your face, arm, or leg, especially on only one side of your body. ¨ Sudden vision changes. ¨ Sudden trouble speaking. ¨ Sudden confusion or trouble understanding simple statements. ¨ Sudden problems with walking or balance. ¨ A sudden, severe headache that is different from past headaches. ? · You have severe back or belly pain. ?Do not wait until your blood pressure comes down on its own. Get help right away. ?Call your doctor now or seek immediate care if: 
? · Your blood pressure is much higher than normal (such as 180/110 or higher), but you don't have symptoms. ? · You think high blood pressure is causing symptoms, such as: ¨ Severe headache. ¨ Blurry vision. ? Watch closely for changes in your health, and be sure to contact your doctor if: 
? · Your blood pressure measures 140/90 or higher at least 2 times. That means the top number is 140 or higher or the bottom number is 90 or higher, or both. ? · You think you may be having side effects from your blood pressure medicine. ? · Your blood pressure is usually normal, but it goes above normal at least 2 times. Where can you learn more? Go to http://kong-delma.info/. Enter I462 in the search box to learn more about \"High Blood Pressure: Care Instructions. \" Current as of: September 21, 2016 Content Version: 11.4 © 6323-0020 [employer]. Care instructions adapted under license by [employer] (which disclaims liability or warranty for this information). If you have questions about a medical condition or this instruction, always ask your healthcare professional. [address] any warranty or liability for your use of this information. Ugenie Announcement We are excited to announce that we are making your provider's discharge notes available to you in Ugenie. You will see these notes when they are completed and signed by the physician that discharged you from your recent hospital stay. If you have any questions or concerns about any information you see in Ugenie, please call the Health Information Department where you were seen or reach out to your Primary Care Provider for more information about your plan of care. Introducing [facility]! Dear Isabelle Bloch: Thank you for requesting a [employer] account. Our records indicate that you already have an active [employer] account. You can access your account anytime at [email or web address] Did you know that you can access your hospital and ER discharge instructions at any time in [employer]? You can also review all of your test results from your hospital stay or ER visit. Additional Information If you have questions, please visit the Frequently Asked Questions section of the [employer] website at [email or web address] Remember, [employer] is NOT to be used for urgent needs. For medical emergencies, dial 911. Now available from your iPhone and Android! Introducing Chuck Navarrete As a [facility] patient, I wanted to make you aware of our electronic visit tool called Chuck Lindafin. [facility] 24/Yingying Licai allows you to connect within minutes with a medical provider 24 hours a day, seven days a week via a mobile device or tablet or logging into a secure website from your computer. You can access Chuck Lindafin from anywhere in the [place]. A virtual visit might be right for you when you have a simple condition and feel like you just dont want to get out of bed, or cant get away from work for an appointment, when your regular [facility] provider is not available (evenings, weekends or holidays), or when youre out of town and need minor care. Electronic visits cost only $49 and if the [facility] Believe[facility]/7 provider determines a prescription is needed to treat your condition, one can be electronically transmitted to a nearby pharmacy*. Please take a moment to enroll today if you have not already done so. The enrollment process is free and takes just a few minutes. To enroll, please download the [facility] 24/7 koby to your tablet or phone, or visit [email or web address] to enroll on your computer. And, as an [address] [place] [address] patient with a Freeppie account, the results of your visits will be scanned into your electronic medical record and your primary care provider will be able to view the scanned results. We urge you to continue to see your regular [facility] provider for your ongoing medical care. And while your primary care provider may not be the one available when you seek a Chuck Navarrete virtual visit, the peace of mind you get from getting a real diagnosis real time can be priceless. For more information on Chuck Navarrete, view our Frequently Asked Questions (FAQs) at [email or web address]. Sincerely, 
 
Billy Bhatt MD 
Chief Medical Officer [employer] *:  certain medications cannot be prescribed via Chuck Navarrete Unresulted tests-please follow up with your PCP on these results Procedure/Test Authorizing Provider CBC WITH AUTOMATED DIFF Sridhar King MD  
 CBC WITH AUTOMATED DIFF Yen John MD  
 CULTURE, MRSA Yen John MD  
 ECG RHYTHM ANALYSIS ADULT Francois Mcgraw MD  
 ECHO TTE STRESS COMP Juan Diego Escobar MD  
 ECHO TTE Hugo Mai MD  
 ECHO TTE Ron Bradley MD  
 EKG, 12 LEAD, INITIAL Sridhar King MD  
 EKG, 12 LEAD, INITIAL Sridhar King MD  
 HEMOGLOBIN A1C WITH EAG Yen John MD  
 MAGNESIUM Edward Tamayo NP  
 METABOLIC PANEL, Juanpablo Whitten NP  
 METABOLIC PANEL, COMPREHENSIVE Sridhar King MD  
 METABOLIC PANEL, COMPREHENSIVE Yen John MD  
 SAMPLES BEING HELD Sridhar King MD  
 T4 (THYROXINE) Edward Tamayo NP  
 TROPONIN I Sridhar King MD  
 TSH 3RD GENERATION Edward Tamayo NP  
 URINALYSIS W/MICROSCOPIC Sridhar King MD  
 URINE CULTURE HOLD SAMPLE Sridhar King MD  
 XR CHEST PA LAT Sridhar King MD  
  
Providers Seen During Your Hospitalization Provider Specialty Primary office phone Rodrigo Fontaine MD Emergency Medicine [phone] Holly Fishman MD Internal Medicine [phone] Izabela Sotelo MD Internal Medicine [phone] Omayra Araiza MD Internal Medicine [phone] Your Primary Care Physician (PCP) Primary Care Physician Office Phone Office Fax Marcus Chambers [phone] You are allergic to the following Allergen Reactions Pcn (Penicillins) Rash Vancomycin Other (comments) Kidneys shut down Voltaren (Diclofenac Sodium) Myalgia Other (comments) \"muscle spasms\" Recent Documentation Weight Breastfeeding? BMI OB Status Smoking Status 108 kg No 40.87 kg/m2 Having regular periods Never Smoker Emergency Contacts Name Discharge Info Relation Home Work Mobile Michael Ascencio DISCHARGE CAREGIVER [3] Spouse [3] [phone] Patient Belongings The following personal items are in your possession at time of discharge: 
     Visual Aid: None          Jewelry: Ring (3 grey metal rings 1 with cear stone)  Clothing: Undergarments, Shirt, Pants, Slippers (cellphone)    Other Valuables: None  Personal Items Sent to Safe: none Discharge Instructions Attachments/References MEFS - AMLODIPINE (HYPERTENIPINE-2.5, NORVASC) - (BY MOUTH) (ENGLISH) MEFS - CARVEDILOL (COREG, COREG CR, HYPERTENEVIDE-12.5) - (BY MOUTH) (ENGLISH) MEFS - CEFDINIR (OMNICEF) - (BY MOUTH) (ENGLISH) Patient Handouts Amlodipine (Hypertenipine-2.5, Norvasc) - (By mouth) Why this medicine is used:  
Treats high blood pressure and angina (chest pain). Contact a nurse or doctor right away if you have: · Blistering, peeling, red skin rash · Chest pain that may spread to your arms, jaw, back, or neck · Trouble breathing · Swelling in your hands, ankles, legs · Unusual sweating, faintness Common side effects: · Tiredness, drowsiness · Headache © 2017 [employer] [place] [employer] Information is for End User's use only and may not be sold, redistributed or otherwise used for commercial purposes. Carvedilol (Coreg, Coreg CR, Hypertenevide-12.5) - (By mouth) Why this medicine is used:  
Treats high blood pressure and heart failure. Contact a nurse or doctor right away if you have: 
· Change in how much or how often you urinate · Leg pain when you walk, legs and feet that feel cold or numb · Lightheadedness, dizziness, fainting · Rapid weight gain, swelling in your hands, ankles, or feet Common side effects: · Mild dizziness · Tiredness · Trouble having sex · Diarrhea © 2017 [employer] [facility] Information is for End User's use only and may not be sold, redistributed or otherwise used for commercial purposes. Cefdinir (Omnicef) - (By mouth) Why this medicine is used:  
Treats bacterial infections. Contact a nurse or doctor right away if you have: · Blistering, peeling, red skin rash · Severe or bloody diarrhea Common side effects: · Mild diarrhea, nausea © 2017 [employer] [place] [employer] Information is for End User's use only and may not be sold, redistributed or otherwise used for commercial purposes. Please provide this summary of care documentation to your next provider. Signatures-by signing, you are acknowledging that this After Visit Summary has been reviewed with you and you have received a copy. Patient Signature:  ____________________________________________________________ Date:  ____________________________________________________________  
  
Bia Adair Provider Signature:  ____________________________________________________________ Date:  ____________________________________________________________

## 2018-06-22 NOTE — LETTER
NOTIFICATION OF RETURN TO WORK / SCHOOL 
 
6/25/2018 Ms. Dewey Hernandes [address] [address] To Whom It May Concern: 
 
Dewey Hernandes was under the care of Dr Dylan Hurtado at [facility]. She was admitted to [facility] 6/22- 25/18. She will return to work on 6/26/18. If there are questions or concerns please have the patient contact our office at [phone]. Sincerely, Bari Hendrickselor NP

## 2018-06-22 NOTE — IP AVS SNAPSHOT
[place] [address] 
[phone] Patient: Ratna Tirado MRN: [id number] :1985 A check adrian indicates which time of day the medication should be taken. My Medications START taking these medications Instructions Each Dose to Equal  
 Morning Noon Evening Bedtime  
 amLODIPine 10 mg tablet Commonly known as:  Tate Pearl Start taking on:  2018 Your last dose was:  1145 am  Your next dose is:   am  
Notes to Patient:  Alexis Carter blood pressure medication. (calcium channel blocker) Take 1 Tab by mouth daily. 10 mg  
    
  
   
   
   
  
 carvedilol 6.25 mg tablet Commonly known as:  João Hewitt Your last dose was:  6 pm  (held  am because of your test) Your next dose is: With dinner  Notes to Patient:  New blood pressure medication (beta blocker) Take 1 Tab by mouth two (2) times daily (with meals). 6.25 mg  
    
  
   
   
  
   
  
 cefdinir 300 mg capsule Commonly known as:  OMNICEF Your last dose was:  Substituted with a different antibiotic while in the hospital -  am  
Your next dose is:   in the evening Notes to Patient:  Antibiotic for your ear infections Take 1 Cap by mouth two (2) times a day for 7 days. 300 mg CHANGE how you take these medications Instructions Each Dose to Equal  
 Morning Noon Evening Bedtime  
 cloNIDine HCl 0.1 mg tablet Commonly known as:  CATAPRES What changed:   
- how much to take - when to take this 
- reasons to take this Your last dose was:  1145 am  Notes to Patient: This dosage has been changed Take 1 Tab by mouth two (2) times a day. 0.1 mg  
    
  
   
   
  
   
  
 insulin detemir U-100 100 unit/mL (3 mL) Inpn Commonly known as:  Lilia Smyth What changed:  how much to take Your last dose was: Was given Lantus 45 units at 8 am 6/25 Your next dose is:  6/26 at bedtime 45 Units by SubCUTAneous route nightly. 45 Units CONTINUE taking these medications Instructions Each Dose to Equal  
 Morning Noon Evening Bedtime  
 aspirin delayed-release 81 mg tablet Your last dose was:  Not given while in hospital - resume at discharge Take 1 Tab by mouth daily. 81 mg  
    
  
   
   
   
  
 ferrous sulfate 325 mg (65 mg iron) tablet Your last dose was:  Not given while in hospital - resume at discharge Take 1 Tab by mouth daily (with lunch). 325 mg  
    
   
  
   
   
  
 levothyroxine 100 mcg tablet Commonly known as:  SYNTHROID Your last dose was:  630 am 6/25 Take 1 Tab by mouth Daily (before breakfast). 100 mcg  
    
  
   
   
   
  
 losartan 50 mg tablet Commonly known as:  COZAAR Your last dose was:  1145 am 6/25 Take 1 Tab by mouth daily. 50 mg  
    
  
   
   
   
  
 metFORMIN  mg tablet Commonly known as:  GLUCOPHAGE XR Your last dose was:  Not given while in hospital - resume at discharge Take 1 Tab by mouth daily (with dinner). 750 mg  
    
   
   
  
   
  
 multivitamin tablet Commonly known as:  ONE A DAY Your last dose was:  Not given while in hospital - resume at discharge Take 1 Tab by mouth daily. 1 Tab Where to Get Your Medications These medications were sent to [address] [facility], [address] [address]  [address] [facility] [address], [address] [place] [address] Phone:  [phone]  
  amLODIPine 10 mg tablet  
 carvedilol 6.25 mg tablet  
 cefdinir 300 mg capsule  
 cloNIDine HCl 0.1 mg tablet  
 insulin detemir U-100 100 unit/mL (3 mL) Inpn

## 2018-06-23 PROBLEM — I16.0 HYPERTENSIVE URGENCY: Status: ACTIVE | Noted: 2018-06-23

## 2018-06-23 PROBLEM — R07.9 CHEST PAIN: Status: ACTIVE | Noted: 2018-06-23

## 2018-06-23 LAB
APPEARANCE UR: CLEAR
BACTERIA URNS QL MICRO: NEGATIVE /HPF
BILIRUB UR QL: NEGATIVE
COLOR UR: ABNORMAL
EPITH CASTS URNS QL MICRO: ABNORMAL /LPF
EST. AVERAGE GLUCOSE BLD GHB EST-MCNC: 229 MG/DL
GLUCOSE BLD STRIP.AUTO-MCNC: 204 MG/DL (ref 65–100)
GLUCOSE BLD STRIP.AUTO-MCNC: 211 MG/DL (ref 65–100)
GLUCOSE BLD STRIP.AUTO-MCNC: 219 MG/DL (ref 65–100)
GLUCOSE BLD STRIP.AUTO-MCNC: 226 MG/DL (ref 65–100)
GLUCOSE BLD STRIP.AUTO-MCNC: 249 MG/DL (ref 65–100)
GLUCOSE BLD STRIP.AUTO-MCNC: 262 MG/DL (ref 65–100)
GLUCOSE UR STRIP.AUTO-MCNC: >1000 MG/DL
HBA1C MFR BLD: 9.6 % (ref 4.2–6.3)
HGB UR QL STRIP: ABNORMAL
KETONES UR QL STRIP.AUTO: NEGATIVE MG/DL
LEUKOCYTE ESTERASE UR QL STRIP.AUTO: NEGATIVE
NITRITE UR QL STRIP.AUTO: NEGATIVE
PH UR STRIP: 7 [PH] (ref 5–8)
PROT UR STRIP-MCNC: ABNORMAL MG/DL
RBC #/AREA URNS HPF: ABNORMAL /HPF (ref 0–5)
SERVICE CMNT-IMP: ABNORMAL
SP GR UR REFRACTOMETRY: 1.01 (ref 1–1.03)
UROBILINOGEN UR QL STRIP.AUTO: 0.2 EU/DL (ref 0.2–1)
WBC URNS QL MICRO: ABNORMAL /HPF (ref 0–4)

## 2018-06-23 PROCEDURE — 74011250636 HC RX REV CODE- 250/636: Performed by: INTERNAL MEDICINE

## 2018-06-23 PROCEDURE — 74011250636 HC RX REV CODE- 250/636: Performed by: EMERGENCY MEDICINE

## 2018-06-23 PROCEDURE — 74011250637 HC RX REV CODE- 250/637: Performed by: SPECIALIST

## 2018-06-23 PROCEDURE — 82962 GLUCOSE BLOOD TEST: CPT

## 2018-06-23 PROCEDURE — 74011000250 HC RX REV CODE- 250: Performed by: EMERGENCY MEDICINE

## 2018-06-23 PROCEDURE — 74011636637 HC RX REV CODE- 636/637: Performed by: INTERNAL MEDICINE

## 2018-06-23 PROCEDURE — 83036 HEMOGLOBIN GLYCOSYLATED A1C: CPT | Performed by: INTERNAL MEDICINE

## 2018-06-23 PROCEDURE — 36415 COLL VENOUS BLD VENIPUNCTURE: CPT | Performed by: INTERNAL MEDICINE

## 2018-06-23 PROCEDURE — 93041 RHYTHM ECG TRACING: CPT

## 2018-06-23 PROCEDURE — 74011250637 HC RX REV CODE- 250/637: Performed by: INTERNAL MEDICINE

## 2018-06-23 PROCEDURE — 65660000000 HC RM CCU STEPDOWN

## 2018-06-23 RX ORDER — SODIUM CHLORIDE 0.9 % (FLUSH) 0.9 %
5-10 SYRINGE (ML) INJECTION AS NEEDED
Status: DISCONTINUED | OUTPATIENT
Start: 2018-06-23 | End: 2018-06-25 | Stop reason: HOSPADM

## 2018-06-23 RX ORDER — ENOXAPARIN SODIUM 100 MG/ML
40 INJECTION SUBCUTANEOUS EVERY 12 HOURS
Status: DISCONTINUED | OUTPATIENT
Start: 2018-06-23 | End: 2018-06-25 | Stop reason: HOSPADM

## 2018-06-23 RX ORDER — HYDRALAZINE HYDROCHLORIDE 20 MG/ML
10 INJECTION INTRAMUSCULAR; INTRAVENOUS ONCE
Status: COMPLETED | OUTPATIENT
Start: 2018-06-23 | End: 2018-06-23

## 2018-06-23 RX ORDER — LOSARTAN POTASSIUM 50 MG/1
50 TABLET ORAL DAILY
Status: DISCONTINUED | OUTPATIENT
Start: 2018-06-23 | End: 2018-06-25 | Stop reason: HOSPADM

## 2018-06-23 RX ORDER — ACETAMINOPHEN 325 MG/1
650 TABLET ORAL
Status: DISCONTINUED | OUTPATIENT
Start: 2018-06-23 | End: 2018-06-25 | Stop reason: HOSPADM

## 2018-06-23 RX ORDER — INSULIN LISPRO 100 [IU]/ML
INJECTION, SOLUTION INTRAVENOUS; SUBCUTANEOUS
Status: DISCONTINUED | OUTPATIENT
Start: 2018-06-23 | End: 2018-06-25 | Stop reason: HOSPADM

## 2018-06-23 RX ORDER — ENOXAPARIN SODIUM 100 MG/ML
40 INJECTION SUBCUTANEOUS EVERY 24 HOURS
Status: DISCONTINUED | OUTPATIENT
Start: 2018-06-23 | End: 2018-06-23

## 2018-06-23 RX ORDER — DEXTROSE 50 % IN WATER (D50W) INTRAVENOUS SYRINGE
12.5-25 AS NEEDED
Status: DISCONTINUED | OUTPATIENT
Start: 2018-06-23 | End: 2018-06-25 | Stop reason: HOSPADM

## 2018-06-23 RX ORDER — SODIUM CHLORIDE 0.9 % (FLUSH) 0.9 %
5-10 SYRINGE (ML) INJECTION EVERY 8 HOURS
Status: DISCONTINUED | OUTPATIENT
Start: 2018-06-23 | End: 2018-06-25 | Stop reason: HOSPADM

## 2018-06-23 RX ORDER — CARVEDILOL 6.25 MG/1
6.25 TABLET ORAL 2 TIMES DAILY WITH MEALS
Status: DISCONTINUED | OUTPATIENT
Start: 2018-06-23 | End: 2018-06-23

## 2018-06-23 RX ORDER — CLONIDINE HYDROCHLORIDE 0.1 MG/1
0.2 TABLET ORAL EVERY 6 HOURS
Status: DISCONTINUED | OUTPATIENT
Start: 2018-06-23 | End: 2018-06-24

## 2018-06-23 RX ORDER — LEVOTHYROXINE SODIUM 100 UG/1
100 TABLET ORAL
Status: DISCONTINUED | OUTPATIENT
Start: 2018-06-23 | End: 2018-06-25 | Stop reason: HOSPADM

## 2018-06-23 RX ORDER — AMLODIPINE BESYLATE 5 MG/1
10 TABLET ORAL DAILY
Status: DISCONTINUED | OUTPATIENT
Start: 2018-06-23 | End: 2018-06-25 | Stop reason: HOSPADM

## 2018-06-23 RX ORDER — MORPHINE SULFATE 4 MG/ML
2 INJECTION INTRAVENOUS
Status: DISCONTINUED | OUTPATIENT
Start: 2018-06-23 | End: 2018-06-25 | Stop reason: HOSPADM

## 2018-06-23 RX ORDER — ONDANSETRON 2 MG/ML
4 INJECTION INTRAMUSCULAR; INTRAVENOUS
Status: DISCONTINUED | OUTPATIENT
Start: 2018-06-23 | End: 2018-06-25 | Stop reason: HOSPADM

## 2018-06-23 RX ORDER — INSULIN GLARGINE 100 [IU]/ML
85 INJECTION, SOLUTION SUBCUTANEOUS DAILY
Status: DISCONTINUED | OUTPATIENT
Start: 2018-06-23 | End: 2018-06-24

## 2018-06-23 RX ORDER — PROCHLORPERAZINE EDISYLATE 5 MG/ML
5 INJECTION INTRAMUSCULAR; INTRAVENOUS
Status: DISCONTINUED | OUTPATIENT
Start: 2018-06-23 | End: 2018-06-25 | Stop reason: HOSPADM

## 2018-06-23 RX ORDER — CARVEDILOL 6.25 MG/1
6.25 TABLET ORAL 2 TIMES DAILY WITH MEALS
Status: DISCONTINUED | OUTPATIENT
Start: 2018-06-23 | End: 2018-06-24

## 2018-06-23 RX ORDER — MAGNESIUM SULFATE 100 %
4 CRYSTALS MISCELLANEOUS AS NEEDED
Status: DISCONTINUED | OUTPATIENT
Start: 2018-06-23 | End: 2018-06-25 | Stop reason: HOSPADM

## 2018-06-23 RX ADMIN — MORPHINE SULFATE 2 MG: 4 INJECTION INTRAVENOUS at 21:54

## 2018-06-23 RX ADMIN — AMLODIPINE BESYLATE 10 MG: 5 TABLET ORAL at 05:49

## 2018-06-23 RX ADMIN — INSULIN LISPRO 3 UNITS: 100 INJECTION, SOLUTION INTRAVENOUS; SUBCUTANEOUS at 11:28

## 2018-06-23 RX ADMIN — CLONIDINE HYDROCHLORIDE 0.2 MG: 0.1 TABLET ORAL at 12:23

## 2018-06-23 RX ADMIN — ENOXAPARIN SODIUM 40 MG: 40 INJECTION SUBCUTANEOUS at 08:20

## 2018-06-23 RX ADMIN — HYDRALAZINE HYDROCHLORIDE 10 MG: 20 INJECTION INTRAMUSCULAR; INTRAVENOUS at 12:03

## 2018-06-23 RX ADMIN — MORPHINE SULFATE 2 MG: 4 INJECTION INTRAVENOUS at 17:19

## 2018-06-23 RX ADMIN — INSULIN LISPRO 3 UNITS: 100 INJECTION, SOLUTION INTRAVENOUS; SUBCUTANEOUS at 17:18

## 2018-06-23 RX ADMIN — ONDANSETRON 4 MG: 2 INJECTION INTRAMUSCULAR; INTRAVENOUS at 12:46

## 2018-06-23 RX ADMIN — INSULIN GLARGINE 85 UNITS: 100 INJECTION, SOLUTION SUBCUTANEOUS at 06:44

## 2018-06-23 RX ADMIN — INSULIN LISPRO 5 UNITS: 100 INJECTION, SOLUTION INTRAVENOUS; SUBCUTANEOUS at 05:48

## 2018-06-23 RX ADMIN — ENOXAPARIN SODIUM 40 MG: 40 INJECTION SUBCUTANEOUS at 21:53

## 2018-06-23 RX ADMIN — Medication 10 ML: at 06:46

## 2018-06-23 RX ADMIN — LOSARTAN POTASSIUM 50 MG: 50 TABLET ORAL at 08:28

## 2018-06-23 RX ADMIN — CARVEDILOL 6.25 MG: 6.25 TABLET, FILM COATED ORAL at 17:19

## 2018-06-23 RX ADMIN — Medication 10 ML: at 21:54

## 2018-06-23 RX ADMIN — CARVEDILOL 6.25 MG: 6.25 TABLET, FILM COATED ORAL at 12:23

## 2018-06-23 RX ADMIN — INSULIN LISPRO 2 UNITS: 100 INJECTION, SOLUTION INTRAVENOUS; SUBCUTANEOUS at 22:41

## 2018-06-23 RX ADMIN — NICARDIPINE HYDROCHLORIDE 5 MG/HR: 2.5 INJECTION INTRAVENOUS at 02:14

## 2018-06-23 RX ADMIN — LEVOTHYROXINE SODIUM 100 MCG: 100 TABLET ORAL at 08:20

## 2018-06-23 RX ADMIN — INSULIN LISPRO 3 UNITS: 100 INJECTION, SOLUTION INTRAVENOUS; SUBCUTANEOUS at 08:28

## 2018-06-23 RX ADMIN — MORPHINE SULFATE 2 MG: 4 INJECTION INTRAVENOUS at 12:46

## 2018-06-23 RX ADMIN — Medication 10 ML: at 12:50

## 2018-06-23 NOTE — ED NOTES
Transported by [facility] TO [facility] ICU 7 via stretcher with Cardene drip at 2.5 mg/hr. Blood Pressure and blood sugar are lower but patient states \"I really do not feel any better. \"

## 2018-06-23 NOTE — ED NOTES
SC, EDT stuck patient 2 times to try to get blood. \"Very difficult stick. Patient is requesting that we use the ultrasound machine. \" per SC, EDT. Information passed on to Dr Arnol Marquis.

## 2018-06-23 NOTE — H&P
[facility]  [address]  [phone]    Admission History and Physical      NAME:              Satnam Ortiz   :   1985   MRN:  [id number]     PCP:  Marilu Jacobs NP     Date:     2018     Chief  Complaint: Elevated BP    History Of Presenting Illness:       Ms. Henri Ordoñez is a 34 y.o. female who is being admitted for hypertensive urgency. Ms. Henri Ordoñez presented to the Emergency Department at the [facility] today complaining of chest discomfort and concern for an elevated BP. She says she is compliant to her BP medications. She took several doses of her as needed home Clonidine with no much improvement. She was started on Nicardipine gtt and was admitted for further management. Her BP was 212/133 but this is slowly improving. No nausea or vomiting. She denies any headaches or visual changes. Allergies   Allergen Reactions    Pcn [Penicillins] Rash    Vancomycin Other (comments)     Kidneys shut down    Voltaren [Diclofenac Sodium] Myalgia and Other (comments)     \"muscle spasms\"       Prior to Admission medications    Medication Sig Start Date End Date Taking? Authorizing Provider   cloNIDine HCl (CATAPRES) 0.1 mg tablet Take  by mouth as needed (as needed for elevated BP). Yes Riccardo Mckinney MD   levothyroxine (SYNTHROID) 100 mcg tablet Take 1 Tab by mouth Daily (before breakfast). 17  Yes Marilu Jacobs NP   ferrous sulfate 325 mg (65 mg iron) tablet Take 1 Tab by mouth daily (with lunch). 17  Yes Marilu Jacobs NP   aspirin delayed-release 81 mg tablet Take 1 Tab by mouth daily. 17  Yes Brian Wilkerson NP   insulin detemir (LEVEMIR FLEXTOUCH) 100 unit/mL (3 mL) inpn 85 Units by SubCUTAneous route nightly. 17  Yes Marilu Jacobs NP   metFORMIN ER (GLUCOPHAGE XR) 750 mg tablet Take 1 Tab by mouth daily (with dinner).  17  Yes Marilu Jacobs NP   losartan (COZAAR) 50 mg tablet Take 1 Tab by mouth daily. 12/22/17  Yes Deidra Demarco NP   multivitamin (ONE A DAY) tablet Take 1 Tab by mouth daily. Yes Historical Provider       Past Medical History:   Diagnosis Date    Abscess     Anemia     Complicated migraine     with extremity numbness    Diabetes ([address]     Hx MRSA infection     Hypertension     Hypothyroidism     Irregular menstrual cycle     Obesity         Past Surgical History:   Procedure Laterality Date    HX CHOLECYSTECTOMY      HX HEENT      HX TONSIL AND ADENOIDECTOMY  1992    HX TYMPANOSTOMY         Social History   Substance Use Topics    Smoking status: Never Smoker    Smokeless tobacco: Never Used    Alcohol use Yes      Comment: socially 1-2 times a year         Family History   Problem Relation Age of Onset    Hypertension Other      \"all her family\"    Diabetes Other      \"all her family\"    Cancer Mother     Stroke Mother     Heart Disease Mother    Jamir Hoit Bladder Disease Mother     Diabetes Mother     Stroke Father     Heart Disease Father     Diabetes Father    Jamir Hoit Bladder Disease Brother     Diabetes Brother     Migraines Maternal Aunt     Diabetes Paternal Aunt     Stroke Maternal Grandmother     Diabetes Maternal Grandmother     Stroke Maternal Grandfather     Diabetes Maternal Grandfather         Review of Systems:    Constitutional ROS: no fever, chills, rigors or night sweats  Respiratory ROS: no cough, sputum, hemoptysis, dyspnea or pleuritic pain. Cardiovascular ROS: no palpitations, orthopnea, PND or syncope  Endocrine ROS: no polydispsia, polyuria, heat or cold intolerance or major weight change.   Gastrointestinal ROS: no dysphagia, abdominal pain, nausea, vomiting, diarrhea or any bleeding   Genito-Urinary ROS: no dysuria, frequency, hematuria, retention or flank pain  Musculoskeletal ROS: no joint pain, swelling or muscular tenderness  Neurological ROS: no headache, confusion, focal weakness or any other neurological symptoms  Psychiatric ROS: no depression, anxiety, mood swings  Dermatological ROS: no rash, pruritis, or urticaria    Examination:    Vital signs:    Visit Vitals    /77 (BP 1 Location: Left arm, BP Patient Position: Sitting)    Pulse 77    Temp 98.2 °F (36.8 °C)    Resp 26    Wt 109.4 kg (241 lb 2.9 oz)    LMP 06/21/2018    SpO2 96%    BMI 41.4 kg/m2         Physical Examination:    General:  Weak and ill looking patient in no acute distress  Eyes: Pink conjunctivae, PERRLA with no discharge. Ear, Nose & Throat: No ottorrhea, rhinorrhea and has dry mucous membranes  Respiratory:  No accessory muscle use, clear breath sounds without crackles or wheezes  Cardiovascular:  No JVD or murmurs, regular and normal S1, S2 without thrills, bruits or peripheral edema  GI & :  Soft abdomen, non-tender, non-distended, normoactive bowel sounds with no palpable organomegaly  Heme:  No cervical or axillary adenopathy. Musculoskeletal:  No cyanosis, clubbing, atrophy or deformities  Skin:  No rashes, bruising or ulcers   Neurological: Awake and alert, speech is clear, CNs 2-12 are grossly intact and otherwise non focal  Psychiatric:  Has a good insight and is oriented x 3  _____________________________________________________________________    Data Review:    Labs:    Recent Labs      06/22/18   2249   WBC  8.3   HGB  14.3   HCT  39.5   PLT  401*     Recent Labs      06/22/18   2249   NA  134*   K  3.6   CL  96*   CO2  31   GLU  360*   BUN  18   CREA  1.43*   CA  8.9   ALB  3.3*   SGOT  15   ALT  35     No components found for: GLPOC  No results for input(s): PH, PCO2, PO2, HCO3, FIO2 in the last 72 hours. No results for input(s): INR in the last 72 hours. No lab exists for component: INREXT    Radiological Studies:      Chest X-ray: Normal.    Other Medical tests:    Personally reviewed EKG: Normal rate, rhythm, axis and intervals.  and No acute changes suggestive of ischemia    I have reviewed old medical records available. Assessment & Impression:     Ms. Ciaran Porter is a 34 y.o. female being evaluated for:     Principal Problem:    Hypertensive urgency (6/23/2018)    Active Problems:    Hypothyroidism ()      Type 2 diabetes mellitus with nephropathy ([facility] [address] (12/25/2017)      Chest pain (6/23/2018)         Plan of management:    Hypertensive urgency (6/23/2018): trigger unclear. Admit to hospital. Wean down Nicardipine gtt. Resume home Losartan. Start Amlodipine and follow response. Hypothyroidism: check TSH and resume Levothyroxine    Type 2 diabetes mellitus with nephropathy ([facility] (12/25/2017): check A1c. Hold metformin. Resume Lantus. SSI per protocol. Diabetic diet    Chest pain (6/23/2018): likely due to uncontrolled HTN. EKG non ischemic. Check troponin.  Maybe out patent cardiology follow up     Code Status:  Full    Surrogate decision maker: Family    Risk of deterioration: high      Total time spent for the care of the patient: [address] [facility] discussed with: Patient, Nursing Staff and the ED physician    Discussed:  Code Status, Care Plan and D/C Planning    Prophylaxis:  Lovenox    Probable Disposition:  Home w/Family           ___________________________________________________    Attending Physician: Mayito Sheets MD

## 2018-06-23 NOTE — PROGRESS NOTES
1530-Sole care assumed at this time. Shift Summary 7a-3p: The patient is currently resting and without pain or distress. Started the shift normotensive. Approx 3.5 hours after morning cozaar, the patient's blood pressure became elevated and she started to have chest pressure/pain. EKG performed at the time of the pain and was normal. Cardiology consulted; clonidine resumed as per home regimen, and coreg started. Morphine and zofran also administered and symptoms greatly improved. Will continue to monitor. José RUBIO  [id number] - OUT REPORT:    Verbal report given to Stefani Medrano RN(name) on Britney Hernandez  being transferred to telemetry(unit) for routine progression of care       Report consisted of patients Situation, Background, Assessment and   Recommendations(SBAR). Information from the following report(s) SBAR, Kardex, Procedure Summary, Intake/Output, MAR, Recent Results and Cardiac Rhythm NSR was reviewed with the receiving nurse. Lines:   Peripheral IV 06/22/18 Right Forearm (Active)   Site Assessment Clean, dry, & intact 6/23/2018  3:45 PM   Phlebitis Assessment 0 6/23/2018  3:45 PM   Infiltration Assessment 0 6/23/2018  3:45 PM   Dressing Status Clean, dry, & intact 6/23/2018  3:45 PM   Dressing Type Transparent 6/23/2018  3:45 PM   Hub Color/Line Status Pink;Patent 6/23/2018  3:45 PM   Alcohol Cap Used Yes 6/23/2018  3:45 PM        Opportunity for questions and clarification was provided.       Patient transported with:   Monitor  [employer]

## 2018-06-23 NOTE — ED NOTES
Bedside shift change report given to MV, RN (oncoming nurse) by PB RN (offgoing nurse). Report included the following information SBAR.

## 2018-06-23 NOTE — ED PROVIDER NOTES
HPI Comments: The patient presents to the ED with complaint of hypertension and hyperglycemia. Symptoms began today. Her mother had a stroke a few weeks ago and she admits she is concerned about having a stroke. She has long history of hypertension. She is on Losartan and PRN Clonidine. She she has had elevated blood pressure throughout the day today. She contacted her PCP and was told to take extra Clonidine. She has taken a total of 0.4 mg Clonidine today. Last dose of Clonidine was at 8:30 PM. She also has had fluctuating blood sugars today. BS was 89 this AM. She ate and didn't taken her sliding scale insulin because blood sugar was <100. Blood sugar was then 48, then 104, then 477. She took insulin. Then 1.5 hrs later it was 124 then 438. She has also had chest tightness in the right shoulder radiating into the right chest. She has no shortness of breath. Tightness is constant. She has nausea. No diaphoresis. She has no other concerns. Please note, she has changed her PCP from Dr. Racheal Nolasco to an [facility] PCP in [place]. Patient is a 34 y.o. female presenting with chest pain and other event. The history is provided by the patient. Chest Pain (Angina)    Associated symptoms include nausea. Pertinent negatives include no abdominal pain, no cough, no fever, no headaches, no shortness of breath, no vomiting and no weakness. Other   Associated symptoms include chest pain. Pertinent negatives include no abdominal pain, no headaches and no shortness of breath.         Past Medical History:   Diagnosis Date    Abscess     Anemia     Complicated migraine     with extremity numbness    Diabetes (HCC)     Hx MRSA infection     Hypertension     Hypothyroidism     Irregular menstrual cycle     Obesity        Past Surgical History:   Procedure Laterality Date    HX CHOLECYSTECTOMY      HX HEENT      HX TONSIL AND ADENOIDECTOMY  1992    HX TYMPANOSTOMY           Family History:   Problem Relation Age of Onset    Hypertension Other      \"all her family\"    Diabetes Other      \"all her family\"    Cancer Mother     Stroke Mother     Heart Disease Mother    Jamir Hoit Bladder Disease Mother     Diabetes Mother     Stroke Father     Heart Disease Father     Diabetes Father    Jamir Hoit Bladder Disease Brother     Diabetes Brother     Migraines Maternal Aunt     Diabetes Paternal Aunt     Stroke Maternal Grandmother     Diabetes Maternal Grandmother     Stroke Maternal Grandfather     Diabetes Maternal Grandfather        Social History     Social History    Marital status:      Spouse name: N/A    Number of children: N/A    Years of education: N/A     Occupational History    Not on file. Social History Main Topics    Smoking status: Never Smoker    Smokeless tobacco: Never Used    Alcohol use Yes      Comment: socially 1-2 times a year     Drug use: No    Sexual activity: Yes     Other Topics Concern    Not on file     Social History Narrative         ALLERGIES: Pcn [penicillins]; Vancomycin; and Voltaren [diclofenac sodium]    Review of Systems   Constitutional: Negative for appetite change, chills and fever. HENT: Negative for congestion, nosebleeds and sore throat. Eyes: Negative for pain and discharge. Respiratory: Positive for chest tightness. Negative for cough and shortness of breath. Cardiovascular: Positive for chest pain. Gastrointestinal: Positive for nausea. Negative for abdominal pain, diarrhea and vomiting. Genitourinary: Negative for dysuria. Musculoskeletal: Negative. Skin: Negative for rash. Neurological: Negative for weakness and headaches. Hematological: Negative for adenopathy. Psychiatric/Behavioral: Negative. All other systems reviewed and are negative.       Vitals:    06/22/18 2308 06/22/18 2315 06/22/18 2350 06/23/18 0100   BP: (!) 198/115 (!) 199/125 (!) 217/129 (!) 198/120   Pulse: 82 70 71 70   Resp:  20 23 (!) 35   Temp:       SpO2: 98% 98% 96%   Weight:                Physical Exam   Constitutional: She is oriented to person, place, and time. She appears well-developed and well-nourished. HENT:   Head: Normocephalic and atraumatic. Mouth/Throat: Oropharynx is clear and moist.   Eyes: Conjunctivae and EOM are normal. Pupils are equal, round, and reactive to light. Neck: Normal range of motion. Neck supple. Cardiovascular: Normal rate, regular rhythm and normal heart sounds. Pulmonary/Chest: Effort normal and breath sounds normal.   Abdominal: Soft. Bowel sounds are normal. There is no tenderness. obese   Musculoskeletal: Normal range of motion. She exhibits no edema or tenderness. Neurological: She is alert and oriented to person, place, and time. Skin: Skin is warm and dry. Psychiatric: She has a normal mood and affect. Her behavior is normal.   Nursing note and vitals reviewed. MDM  Number of Diagnoses or Management Options  Critical Care  Total time providing critical care: 30-74 minutes        ED Course       Procedures    ED EKG interpretation:  Rhythm: normal sinus rhythm; and regular . Rate (approx.): 85; Axis: normal; P wave: normal; QRS interval: normal ; ST/T wave: normal; This EKG was interpreted by Jaswant Esparza MD,ED Provider. CONSULT:  Dr. Geraldo Becerril - standard discussion. Made aware that patient's PCP is NOT Dr. Richie Faye. He will admit. A/P:  1. HTN urgency - Review of records shows BP has not been controlled for some time. However, now with proteinuria and increasing creatinine. Cardene drip initiated. 2. Chest tightness   3.  Hyperglycemia, type 2 DM

## 2018-06-23 NOTE — CONSULTS
Shanon rBenner MD [facility] [address]  Office [phone]  Mobile [phone]    Date of  Admission: 6/22/2018  9:32 PM  PCP- Rodney James NP    Gregorio Monzon is a 34 y.o. female admitted for Hypertensive urgency. Consult requested by Elroy Allred MD for chest pain, high BP    Assessment  · Accelerated HTN with chest pain syndrome  · Chronic HTN, previously controlled  · T2DM, onset 25s, in the setting of morbid obesity  · Morbid obesity        Discussion/Recommendations:  Suspect chest pain due to HTN, possible microvascular dysfunction. EKG and troponin normal. Will get echo to evaluate for LVH, consider outpatient stress test.   BP wise, utilize clonidine 0.1 mg q 6 hr with parameters + carvedilol. Doubt secondary causes of HTN. Sleep study several years ago apparently normal  Needs to work harder in terms of TLC    Subjective: Morbid obesity complicated by onset of L6BE and HTN in her 25s (2009) coupled with strong family hx of DM (all her siblings have T1DM, both parents with DM), admitted with accelerated BP and chest pain yesterday. No previous cardiac hx. BP suddenly accelerated yesterday (735-546 systolic) coupled with anterior chest tightness. Sx improved in ED with BP control (iv Cardene). EKG and troponin nl. Recurrent chest pain at noon today with high BP, subsequently improved after dose of clonidine and morphine. Repeat EKG again normal to my review. Denies recent exertional sx. Fairly sedentary lifestyle. Tried to adhere to low sodium/carb diet but does not exercise. Takes insulin + metformin. On monotherapy with losartan, prn clonidine. Patient denies any exertional chest pain, dyspnea, palpitations, syncope, orthopnea, edema or paroxysmal nocturnal dyspnea.       Past Medical History:   Diagnosis Date    Abscess     Anemia     Complicated migraine     with extremity numbness    Hx MRSA infection     Hypertension     onset 2009    Hypothyroidism     Irregular menstrual cycle     Obesity     T2DM (type 2 diabetes mellitus) ([facility] [address]     onset 2009      Past Surgical History:   Procedure Laterality Date    HX CHOLECYSTECTOMY      HX HEENT      HX TONSIL AND ADENOIDECTOMY  1992    HX TYMPANOSTOMY       No current facility-administered medications on file prior to encounter. Current Outpatient Prescriptions on File Prior to Encounter   Medication Sig Dispense Refill    cloNIDine HCl (CATAPRES) 0.1 mg tablet Take  by mouth as needed (as needed for elevated BP).  levothyroxine (SYNTHROID) 100 mcg tablet Take 1 Tab by mouth Daily (before breakfast). 20 Tab 3    ferrous sulfate 325 mg (65 mg iron) tablet Take 1 Tab by mouth daily (with lunch). 30 Tab 3    aspirin delayed-release 81 mg tablet Take 1 Tab by mouth daily. 30 Tab 3    insulin detemir (LEVEMIR FLEXTOUCH) 100 unit/mL (3 mL) inpn 85 Units by SubCUTAneous route nightly. 10 Pen 1    metFORMIN ER (GLUCOPHAGE XR) 750 mg tablet Take 1 Tab by mouth daily (with dinner). 30 Tab 1    losartan (COZAAR) 50 mg tablet Take 1 Tab by mouth daily. 30 Tab 1    multivitamin (ONE A DAY) tablet Take 1 Tab by mouth daily. Allergies   Allergen Reactions    Pcn [Penicillins] Rash    Vancomycin Other (comments)     Kidneys shut down    Voltaren [Diclofenac Sodium] Myalgia and Other (comments)     \"muscle spasms\"             Review of Symptoms:  Constitutional: negative for fevers, chills, anorexia and weight loss  Respiratory: negative for cough, sputum, hemoptysis or pneumonia  Gastrointestinal: negative for dysphagia, odynophagia, melena, diarrhea and constipation  Musculoskeletal:negative  Neurological: negative for dizziness, vertigo, seizures and memory problems  Other systems reviewed and negative except as above.     Physical Exam    Visit Vitals    /66    Pulse 79    Temp 98 °F (36.7 °C)    Resp 24    Wt 239 lb 10.2 oz (108.7 kg)    LMP 06/21/2018    SpO2 96%    Breastfeeding No    BMI 41.13 kg/m2     Visit Vitals    /66    Pulse 79    Temp 98 °F (36.7 °C)    Resp 24    Wt 239 lb 10.2 oz (108.7 kg)    LMP 06/21/2018    SpO2 96%    Breastfeeding No    BMI 41.13 kg/m2     General Appearance:  Well developed, well nourished,alert and oriented x 3, and individual in no acute distress. Ears/Nose/Mouth/Throat:   Hearing grossly normal.         Neck: Supple. JVP nl. Carotids without bruits   Chest:   Lungs clear to auscultation bilaterally. Cardiovascular:  Regular rate and rhythm, S1, S2 normal, no murmur. Abdomen:   Soft, non-tender, bowel sounds are active. Extremities: No edema bilaterally. Skin: Warm and dry.                Cardiographics    Telemetry: normal sinus rhythm  ECG: normal EKG, normal sinus rhythm    Echocardiogram: Not done    Recent Results (from the past 12 hour(s))   GLUCOSE, POC    Collection Time: 06/23/18  5:09 AM   Result Value Ref Range    Glucose (POC) 262 (H) 65 - 100 mg/dL    Performed by Jason Pearl    HEMOGLOBIN A1C WITH EAG    Collection Time: 06/23/18  6:01 AM   Result Value Ref Range    Hemoglobin A1c 9.6 (H) 4.2 - 6.3 %    Est. average glucose 229 mg/dL   GLUCOSE, POC    Collection Time: 06/23/18  8:21 AM   Result Value Ref Range    Glucose (POC) 249 (H) 65 - 100 mg/dL    Performed by Shook Kindra    GLUCOSE, POC    Collection Time: 06/23/18 11:14 AM   Result Value Ref Range    Glucose (POC) 226 (H) 65 - 100 mg/dL    Performed by [employer]    ECG RHYTHM ANALYSIS ADULT    Collection Time: 06/23/18 12:19 PM   Result Value Ref Range    Ventricular Rate 83 BPM    Atrial Rate 83 BPM    P-R Interval 174 ms    QRS Duration 94 ms    Q-T Interval 412 ms    QTC Calculation (Bezet) 484 ms    Calculated P Axis 30 degrees    Calculated R Axis 33 degrees    Calculated T Axis 26 degrees    Diagnosis       Normal sinus rhythm  Low voltage QRS  Prolonged QT  Abnormal ECG  When compared with ECG of 22-JUN-2018 21:41,  MANUAL COMPARISON REQUIRED, DATA IS UNCONFIRMED

## 2018-06-23 NOTE — PROGRESS NOTES
Pt admitted overnight by Dr. Aj Quintanilla, case discussed with Dr. Aj Quintanilla and chart reviewed. Pt admitted for HTN urgency and started on nicardipine gtt which was weaned off one hour ago. Will monitor BP with morning meds.

## 2018-06-23 NOTE — ED TRIAGE NOTES
Patient complains of varying BP and blood sugar readings today. Patient complains of chest tightness.

## 2018-06-23 NOTE — ED NOTES
Dr Anthony Wilson notified of /77 and decreased Cardene drip to 2.5 mg/hr. No further orders at this time.

## 2018-06-23 NOTE — ED NOTES
Dr Daniel Siddiqi used a bedside ultrasound to get a PIV in right forearm with a 20 g CA; blood work drawn and sent to the lab

## 2018-06-23 NOTE — ED NOTES
TRANSFER - OUT REPORT:    Verbal report given to Domenica Daquan with [facility](name) on Elio Pedraza  being transferred to [facility] ICU 7(unit) for routine progression of care       Report consisted of patients Situation, Background, Assessment and   Recommendations(SBAR). Information from the following report(s) ED Summary was reviewed with the receiving nurse. Lines:   Peripheral IV 06/22/18 Right Forearm (Active)   Site Assessment Clean, dry, & intact 6/22/2018 10:53 PM   Phlebitis Assessment 0 6/22/2018 10:53 PM   Infiltration Assessment 0 6/22/2018 10:53 PM   Dressing Status Clean, dry, & intact 6/22/2018 10:53 PM   Dressing Type Transparent 6/22/2018 10:53 PM        Opportunity for questions and clarification was provided.       Patient transported with:   Monitor   EKG  EMTALA  20 gauge saline lock RFA

## 2018-06-23 NOTE — PROGRESS NOTES
Spiritual Care Assessment/Progress Note  [address]      NAME: Enid Mi      MRN: [id number]  AGE: 34 y.o.  SEX: female  Baptism Affiliation: Nondenominational   Language: English     6/23/2018     Total Time (in minutes): 15     Spiritual Assessment begun in [facility] INTENSIVE CARE through conversation with:         [x]Patient        [] Family    [] Friend(s)        Reason for Consult: Initial/Spiritual assessment, critical care     Spiritual beliefs: (Please include comment if needed)     [x] Identifies with a judy tradition:         [x] Supported by a judy community:            [] Claims no spiritual orientation:           [] Seeking spiritual identity:                [] Adheres to an individual form of spirituality:           [] Not able to assess:                           Identified resources for coping:      [x] Prayer                               [] Music                  [] Guided Imagery     [x] Family/friends                 [] Pet visits     [] Devotional reading                         [] Unknown     [] Other:                                               Interventions offered during this visit: (See comments for more details)    Patient Interventions: Affirmation of emotions/emotional suffering, Catharsis/review of pertinent events in supportive environment, Affirmation of judy, Coping skills reviewed/reinforced, Iconic (affirming the presence of God/Higher Power), Initial/Spiritual assessment, Critical care, Life review/legacy, Normalization of emotional/spiritual concerns, Prayer (assurance of), Baptism beliefs/image of God discussed           Plan of Care:     [x] Support spiritual and/or cultural needs    [] Support AMD and/or advance care planning process      [] Support grieving process   [] Coordinate Rites and/or Rituals    [] Coordination with community clergy   [] No spiritual needs identified at this time   [] Detailed Plan of Care below (See Comments)  [] Make referral to Music Therapy  [] Make referral to Pet Therapy     [] Make referral to Addiction services  [] Make referral to [facility]  [] Make referral to Spiritual Care Partner  [] No future visits requested        [] Follow up visits as needed     Comments: [facility] [address] is visiting for an initial spiritual assessment with pt in room 3007. Pt notes she is feeling better today. She talked about her medical concerns. Pt notes she is well supported by family and her  has been in touch with her this morning. She attends [employer]. She talked in some detail about how she likes the smallness and intimacy of the [employer].  provided support through active listening, words of affirmation and support, and blessing.      [id number] Becky Martinez M.Div, M.S, [place] 608 available at [phone]

## 2018-06-23 NOTE — ROUTINE PROCESS
TRANSFER - IN REPORT:    Verbal report received from JOANNE Barba on Paulette Credit  being received from ICU (unit) for routine progression of care      Report consisted of patients Situation, Background, Assessment and   Recommendations(SBAR). Information from the following report(s) SBAR, Kardex, Intake/Output, MAR and Recent Results was reviewed with the receiving nurse. Opportunity for questions and clarification was provided. Assessment completed upon patients arrival to unit and care assumed.

## 2018-06-23 NOTE — ROUTINE PROCESS
Patient in new room on PCU. Patient oriented to her new room, and given a call bell. Bedside and Verbal shift change report given to Earnest Ramirez RN (oncoming nurse) by Kuldeep Willingham RN (offgoing nurse). Report included the following information SBAR, Kardex, Intake/Output, MAR and Recent Results.

## 2018-06-23 NOTE — ED NOTES
TRANSFER - OUT REPORT:    Verbal report given to [facility] RN(name) on Mariposa Mcadams  being transferred to ICU 7(unit) for routine progression of care       Report consisted of patients Situation, Background, Assessment and   Recommendations(SBAR). Information from the following report(s) SBAR, ED Summary, Intake/Output, MAR, Recent Results and Cardiac Rhythm NSR was reviewed with the receiving nurse. Lines:   Peripheral IV 06/22/18 Right Forearm (Active)   Site Assessment Clean, dry, & intact 6/22/2018 10:53 PM   Phlebitis Assessment 0 6/22/2018 10:53 PM   Infiltration Assessment 0 6/22/2018 10:53 PM   Dressing Status Clean, dry, & intact 6/22/2018 10:53 PM   Dressing Type Transparent 6/22/2018 10:53 PM        Opportunity for questions and clarification was provided.       Patient transported with:   Monitor   LATONIA  20 gauge saline lock R FA

## 2018-06-23 NOTE — ED NOTES
Patient c/o nausea and is requesting medication for it before she takes any pills. Dr Maria Eugenia Costa made aware.

## 2018-06-23 NOTE — PROGRESS NOTES
Bedside shift change report given to [employer] (oncoming nurse) by Ezekiel Mcconnell RN (offgoing nurse). Report included the following information SBAR, Kardex, Intake/Output, MAR, Recent Results and Cardiac Rhythm NSR.     1155: Patient with complaint of 8/10 tightness and pain to chest and right shoulder. BP now 169/111. Notified Dr. Darius Glass. Orders to follow. EKG complete    1200: Cardiology consult called. Dr. Meseret Mckeon notified    [phone]: Patient BP now 148/89. Patient still with c/o of 8/10 pain to chest and right shoulder and nausea. Patient reports seeing bugs with compazine. Notified Dr. Darius Glass. Orders for morphine 2mg q4 PRN and zofran q 6 PRN    1325: Patient reports improvement in pain, now 3/10. BP improving. Bedside shift change report given to Freda RUBIO (oncoming nurse) by Amna Hood RN (offgoing nurse). Report included the following information SBAR, Kardex, Intake/Output, MAR, Recent Results and Cardiac Rhythm NSR.

## 2018-06-23 NOTE — PROGRESS NOTES
TRANSFER - IN REPORT:    Verbal report received from Carlos Patino RN on Cody Silence  being received from [facility] for routine progression of care      Report consisted of patients Situation, Background, Assessment and   Recommendations(SBAR). Information from the following report(s) SBAR, Kardex, ED Summary, MAR, Accordion, Recent Results, Cardiac Rhythm SR and Alarm Parameters  was reviewed with the receiving nurse. Opportunity for questions and clarification was provided. 0435 Pt arrived at [facility] and is admitted to ICU #7 after transport via ambulance accompanied by paramedics. Assessment completed upon patients arrival to unit and care assumed. Bedside and Verbal shift change report given to Freda Barrow RN(oncoming nurse) by Cristiane Barrow RN (offgoing nurse). Report included the following information SBAR, Kardex, ED Summary, Intake/Output, MAR, Accordion, Recent Results, Cardiac Rhythm SR and Alarm Parameters .

## 2018-06-24 LAB
ALBUMIN SERPL-MCNC: 3.1 G/DL (ref 3.5–5)
ALBUMIN/GLOB SERPL: 0.8 {RATIO} (ref 1.1–2.2)
ALP SERPL-CCNC: 80 U/L (ref 45–117)
ALT SERPL-CCNC: 38 U/L (ref 12–78)
ANION GAP SERPL CALC-SCNC: 8 MMOL/L (ref 5–15)
AST SERPL-CCNC: 33 U/L (ref 15–37)
ATRIAL RATE: 84 BPM
BACTERIA SPEC CULT: NORMAL
BACTERIA SPEC CULT: NORMAL
BASOPHILS # BLD: 0 K/UL (ref 0–0.1)
BASOPHILS NFR BLD: 0 % (ref 0–1)
BILIRUB SERPL-MCNC: 0.5 MG/DL (ref 0.2–1)
BUN SERPL-MCNC: 20 MG/DL (ref 6–20)
BUN/CREAT SERPL: 24 (ref 12–20)
CALCIUM SERPL-MCNC: 8.6 MG/DL (ref 8.5–10.1)
CALCULATED P AXIS, ECG09: 30 DEGREES
CALCULATED R AXIS, ECG10: 12 DEGREES
CALCULATED T AXIS, ECG11: 26 DEGREES
CHLORIDE SERPL-SCNC: 97 MMOL/L (ref 97–108)
CO2 SERPL-SCNC: 26 MMOL/L (ref 21–32)
CREAT SERPL-MCNC: 0.85 MG/DL (ref 0.55–1.02)
DIAGNOSIS, 93000: NORMAL
DIFFERENTIAL METHOD BLD: NORMAL
EOSINOPHIL # BLD: 0.3 K/UL (ref 0–0.4)
EOSINOPHIL NFR BLD: 4 % (ref 0–7)
ERYTHROCYTE [DISTWIDTH] IN BLOOD BY AUTOMATED COUNT: 13 % (ref 11.5–14.5)
GLOBULIN SER CALC-MCNC: 3.8 G/DL (ref 2–4)
GLUCOSE BLD STRIP.AUTO-MCNC: 188 MG/DL (ref 65–100)
GLUCOSE BLD STRIP.AUTO-MCNC: 202 MG/DL (ref 65–100)
GLUCOSE BLD STRIP.AUTO-MCNC: 214 MG/DL (ref 65–100)
GLUCOSE BLD STRIP.AUTO-MCNC: 263 MG/DL (ref 65–100)
GLUCOSE SERPL-MCNC: 249 MG/DL (ref 65–100)
HCT VFR BLD AUTO: 39.8 % (ref 35–47)
HGB BLD-MCNC: 13.7 G/DL (ref 11.5–16)
IMM GRANULOCYTES # BLD: 0 K/UL (ref 0–0.04)
IMM GRANULOCYTES NFR BLD AUTO: 0 % (ref 0–0.5)
LYMPHOCYTES # BLD: 2.9 K/UL (ref 0.8–3.5)
LYMPHOCYTES NFR BLD: 38 % (ref 12–49)
MCH RBC QN AUTO: 29.6 PG (ref 26–34)
MCHC RBC AUTO-ENTMCNC: 34.4 G/DL (ref 30–36.5)
MCV RBC AUTO: 86 FL (ref 80–99)
MONOCYTES # BLD: 0.6 K/UL (ref 0–1)
MONOCYTES NFR BLD: 8 % (ref 5–13)
NEUTS SEG # BLD: 3.7 K/UL (ref 1.8–8)
NEUTS SEG NFR BLD: 49 % (ref 32–75)
NRBC # BLD: 0 K/UL (ref 0–0.01)
NRBC BLD-RTO: 0 PER 100 WBC
P-R INTERVAL, ECG05: 174 MS
PLATELET # BLD AUTO: 346 K/UL (ref 150–400)
PMV BLD AUTO: 9.4 FL (ref 8.9–12.9)
POTASSIUM SERPL-SCNC: 4.3 MMOL/L (ref 3.5–5.1)
PROT SERPL-MCNC: 6.9 G/DL (ref 6.4–8.2)
Q-T INTERVAL, ECG07: 398 MS
QRS DURATION, ECG06: 94 MS
QTC CALCULATION (BEZET), ECG08: 470 MS
RBC # BLD AUTO: 4.63 M/UL (ref 3.8–5.2)
SERVICE CMNT-IMP: ABNORMAL
SERVICE CMNT-IMP: NORMAL
SODIUM SERPL-SCNC: 131 MMOL/L (ref 136–145)
VENTRICULAR RATE, ECG03: 84 BPM
WBC # BLD AUTO: 7.5 K/UL (ref 3.6–11)

## 2018-06-24 PROCEDURE — 85025 COMPLETE CBC W/AUTO DIFF WBC: CPT | Performed by: INTERNAL MEDICINE

## 2018-06-24 PROCEDURE — 74011636637 HC RX REV CODE- 636/637: Performed by: INTERNAL MEDICINE

## 2018-06-24 PROCEDURE — 65660000000 HC RM CCU STEPDOWN

## 2018-06-24 PROCEDURE — 74011250637 HC RX REV CODE- 250/637: Performed by: INTERNAL MEDICINE

## 2018-06-24 PROCEDURE — 74011250636 HC RX REV CODE- 250/636: Performed by: INTERNAL MEDICINE

## 2018-06-24 PROCEDURE — 82962 GLUCOSE BLOOD TEST: CPT

## 2018-06-24 PROCEDURE — 80053 COMPREHEN METABOLIC PANEL: CPT | Performed by: INTERNAL MEDICINE

## 2018-06-24 PROCEDURE — 36415 COLL VENOUS BLD VENIPUNCTURE: CPT | Performed by: INTERNAL MEDICINE

## 2018-06-24 PROCEDURE — 74011250637 HC RX REV CODE- 250/637: Performed by: SPECIALIST

## 2018-06-24 RX ORDER — CLONIDINE HYDROCHLORIDE 0.1 MG/1
0.1 TABLET ORAL 2 TIMES DAILY
Status: DISCONTINUED | OUTPATIENT
Start: 2018-06-24 | End: 2018-06-25 | Stop reason: HOSPADM

## 2018-06-24 RX ORDER — CARVEDILOL 6.25 MG/1
6.25 TABLET ORAL 2 TIMES DAILY WITH MEALS
Status: DISCONTINUED | OUTPATIENT
Start: 2018-06-25 | End: 2018-06-25 | Stop reason: HOSPADM

## 2018-06-24 RX ORDER — AMOXICILLIN AND CLAVULANATE POTASSIUM 875; 125 MG/1; MG/1
1 TABLET, FILM COATED ORAL EVERY 12 HOURS
Status: DISCONTINUED | OUTPATIENT
Start: 2018-06-24 | End: 2018-06-25 | Stop reason: HOSPADM

## 2018-06-24 RX ORDER — INSULIN GLARGINE 100 [IU]/ML
45 INJECTION, SOLUTION SUBCUTANEOUS DAILY
Status: DISCONTINUED | OUTPATIENT
Start: 2018-06-25 | End: 2018-06-25 | Stop reason: HOSPADM

## 2018-06-24 RX ADMIN — INSULIN LISPRO 3 UNITS: 100 INJECTION, SOLUTION INTRAVENOUS; SUBCUTANEOUS at 21:47

## 2018-06-24 RX ADMIN — LEVOTHYROXINE SODIUM 100 MCG: 100 TABLET ORAL at 05:15

## 2018-06-24 RX ADMIN — Medication 10 ML: at 05:17

## 2018-06-24 RX ADMIN — INSULIN LISPRO 3 UNITS: 100 INJECTION, SOLUTION INTRAVENOUS; SUBCUTANEOUS at 11:44

## 2018-06-24 RX ADMIN — ONDANSETRON 4 MG: 2 INJECTION INTRAMUSCULAR; INTRAVENOUS at 08:40

## 2018-06-24 RX ADMIN — MORPHINE SULFATE 2 MG: 4 INJECTION INTRAVENOUS at 21:47

## 2018-06-24 RX ADMIN — ENOXAPARIN SODIUM 40 MG: 40 INJECTION SUBCUTANEOUS at 21:46

## 2018-06-24 RX ADMIN — INSULIN LISPRO 3 UNITS: 100 INJECTION, SOLUTION INTRAVENOUS; SUBCUTANEOUS at 17:49

## 2018-06-24 RX ADMIN — CARVEDILOL 6.25 MG: 6.25 TABLET, FILM COATED ORAL at 08:38

## 2018-06-24 RX ADMIN — AMOXICILLIN AND CLAVULANATE POTASSIUM 1 TABLET: 875; 125 TABLET, FILM COATED ORAL at 21:46

## 2018-06-24 RX ADMIN — MORPHINE SULFATE 2 MG: 4 INJECTION INTRAVENOUS at 01:36

## 2018-06-24 RX ADMIN — CARVEDILOL 6.25 MG: 6.25 TABLET, FILM COATED ORAL at 17:50

## 2018-06-24 RX ADMIN — MORPHINE SULFATE 2 MG: 4 INJECTION INTRAVENOUS at 13:44

## 2018-06-24 RX ADMIN — MORPHINE SULFATE 2 MG: 4 INJECTION INTRAVENOUS at 17:50

## 2018-06-24 RX ADMIN — INSULIN LISPRO 2 UNITS: 100 INJECTION, SOLUTION INTRAVENOUS; SUBCUTANEOUS at 08:38

## 2018-06-24 RX ADMIN — MORPHINE SULFATE 2 MG: 4 INJECTION INTRAVENOUS at 09:52

## 2018-06-24 RX ADMIN — LOSARTAN POTASSIUM 50 MG: 50 TABLET ORAL at 08:38

## 2018-06-24 RX ADMIN — INSULIN GLARGINE 85 UNITS: 100 INJECTION, SOLUTION SUBCUTANEOUS at 08:38

## 2018-06-24 RX ADMIN — AMLODIPINE BESYLATE 10 MG: 5 TABLET ORAL at 08:38

## 2018-06-24 RX ADMIN — AMOXICILLIN AND CLAVULANATE POTASSIUM 1 TABLET: 875; 125 TABLET, FILM COATED ORAL at 11:45

## 2018-06-24 RX ADMIN — CLONIDINE HYDROCHLORIDE 0.1 MG: 0.1 TABLET ORAL at 17:49

## 2018-06-24 RX ADMIN — Medication 10 ML: at 21:55

## 2018-06-24 RX ADMIN — MORPHINE SULFATE 2 MG: 4 INJECTION INTRAVENOUS at 05:15

## 2018-06-24 RX ADMIN — ENOXAPARIN SODIUM 40 MG: 40 INJECTION SUBCUTANEOUS at 08:38

## 2018-06-24 RX ADMIN — ONDANSETRON 4 MG: 2 INJECTION INTRAMUSCULAR; INTRAVENOUS at 01:36

## 2018-06-24 RX ADMIN — Medication 10 ML: at 13:47

## 2018-06-24 NOTE — PROGRESS NOTES
0715  Bedside and Verbal shift change report given to [address] [place] [address] (oncoming nurse) by Giancarlo Perry (offgoing nurse). Report included the following information SBAR, Kardex, Intake/Output, MAR, Accordion and Recent Results. Patient in bed with pain of 6/10 but tolerable. Initial assessment done. Was able to get morning labs. 1000  Called lab, patient was locked out of sunquest. Ordered to just print the order and put patient's label on the tubes. Visit Vitals    BP (!) 160/93 (BP 1 Location: Left arm, BP Patient Position: At rest)    Pulse 73    Temp 98 °F (36.7 °C)    Resp 18    Wt 107.5 kg (236 lb 15.9 oz)    LMP 06/21/2018    SpO2 96%    Breastfeeding No    BMI 40.68 kg/m2     0330  Bedside and Verbal shift change report given to [employer] (oncoming nurse) by Bernadette Coughlin RN (offgoing nurse). Report included the following information SBAR, Kardex, Intake/Output, MAR, Accordion and Recent Results.

## 2018-06-24 NOTE — PROGRESS NOTES
Jamil Arevalo shaan [place] [address]  [address] [facility], [address] [place] [address]  [phone]      Medical Progress Note      NAME: Idania Shankar   :  1985  MRM:  [id number]    Date/Time: 2018  10:16 AM         Subjective:     Chief Complaint:  Ear infection    No acute events. Pt denies cp, sob. Feels she is getting another ear infection. Objective:       Vitals:          Last 24hrs VS reviewed since prior progress note. Most recent are:    Visit Vitals    BP (!) 166/110    Pulse 65    Temp 97.5 °F (36.4 °C)    Resp 17    Wt 107.5 kg (236 lb 15.9 oz)    SpO2 96%    Breastfeeding No    BMI 40.68 kg/m2     SpO2 Readings from Last 6 Encounters:   18 96%   18 97%   18 97%   18 95%   18 98%   18 98%          Intake/Output Summary (Last 24 hours) at 18 1016  Last data filed at 18 0230   Gross per 24 hour   Intake              960 ml   Output             1500 ml   Net             -540 ml          Exam:     Physical Exam:    Gen:  Well-developed, well-nourished, in no acute distress  HEENT:  Pink conjunctivae, PERRL, hearing intact to voice, moist mucous membranes.  Ear canal TTP  Neck:  Supple, without masses, thyroid non-tender  Resp:  No accessory muscle use, clear breath sounds without wheezes rales or rhonchi  Card:  No murmurs, normal S1, S2 without thrills, bruits or peripheral edema  Abd:  Soft, non-tender, non-distended, normoactive bowel sounds are present  Musc:  No cyanosis or clubbing  Skin:  No rashes or ulcers, skin turgor is good  Neuro:  Cranial nerves 3-12 are grossly intact,  strength is 5/5 bilaterally and dorsi / plantarflexion is 5/5 bilaterally, follows commands appropriately  Psych:  Good insight, oriented to person, place and time, alert       Medications Reviewed: (see below)    Lab Data Reviewed: (see below)    ______________________________________________________________________    Medications:     Current Facility-Administered Medications   Medication Dose Route Frequency    cloNIDine HCl (CATAPRES) tablet 0.1 mg  0.1 mg Oral BID    [START ON 6/25/2018] insulin glargine (LANTUS) injection 45 Units  45 Units SubCUTAneous DAILY    sodium chloride (NS) flush 5-10 mL  5-10 mL IntraVENous Q8H    sodium chloride (NS) flush 5-10 mL  5-10 mL IntraVENous PRN    acetaminophen (TYLENOL) tablet 650 mg  650 mg Oral Q4H PRN    prochlorperazine (COMPAZINE) injection 5 mg  5 mg IntraVENous Q8H PRN    insulin lispro (HUMALOG) injection   SubCUTAneous AC&HS    glucose chewable tablet 16 g  4 Tab Oral PRN    dextrose (D50W) injection syrg 12.5-25 g  12.5-25 g IntraVENous PRN    glucagon (GLUCAGEN) injection 1 mg  1 mg IntraMUSCular PRN    amLODIPine (NORVASC) tablet 10 mg  10 mg Oral DAILY    levothyroxine (SYNTHROID) tablet 100 mcg  100 mcg Oral ACB    losartan (COZAAR) tablet 50 mg  50 mg Oral DAILY    enoxaparin (LOVENOX) injection 40 mg  40 mg SubCUTAneous Q12H    carvedilol (COREG) tablet 6.25 mg  6.25 mg Oral BID WITH MEALS    morphine injection 2 mg  2 mg IntraVENous Q4H PRN    ondansetron (ZOFRAN) injection 4 mg  4 mg IntraVENous Q6H PRN            Lab Review:     Recent Labs      06/22/18   2249   WBC  8.3   HGB  14.3   HCT  39.5   PLT  401*     Recent Labs      06/22/18   2249   NA  134*   K  3.6   CL  96*   CO2  31   GLU  360*   BUN  18   CREA  1.43*   CA  8.9   ALB  3.3*   SGOT  15   ALT  35     No components found for: [place]         Assessment / Plan:     Hypertensive urgency (6/23/2018): trigger unclear. Improved on Nicardipine gtt. Continue losartan, coreg, clonidine.       Hypothyroidism: check TSH and resume Levothyroxine     Type 2 diabetes mellitus with nephropathy: uncontrolled A1c 9.6. Hold metformin. decrease Lantus by half as pt will be NPO. SSI per protocol.  Diabetic diet     Chest pain (6/23/2018): plan for stress echo tomorrow     Ear infection: start augmentin    Total time spent with patient: [address] [place] [address] discussed with: Patient    Discussed:  Care Plan    Prophylaxis:  Lovenox    Disposition:  Home w/Family           ___________________________________________________    Attending Physician: Edwardo Nageotte, MD

## 2018-06-24 NOTE — PROGRESS NOTES
Problem: Falls - Risk of  Goal: *Absence of Falls  Document Herb Fall Risk and appropriate interventions in the flowsheet.    Outcome: Progressing Towards Goal  Fall Risk Interventions:            Medication Interventions: Teach patient to arise slowly    Elimination Interventions: Bed/chair exit alarm, Call light in reach, Patient to call for help with toileting needs, Toilet paper/wipes in reach, Toileting schedule/hourly rounds

## 2018-06-24 NOTE — PROGRESS NOTES
Jasmin Ward MD [facility] [phone]  Office [phone]  Mobile [phone]            NAME:  Mariah Kline   :   1985   MRN:   [id number]     Assessment/Plan:   · Accelerated HTN with chest pain syndrome  · Chronic HTN, previously controlled  · T2DM, onset 25s, in the setting of morbid obesity  · Morbid obesity     BP much improved  Reduce clonidine to 0.1 mg bid with parameters, continue coreg  Will proceed with exercise echo tomorrow - evaluate for LVH also, hold coreg in the morning  Up ad ayaz  Subjective:   Cardiac ROS: BP much improved. Recurrent chest tightness this morning at rest although BP normal. Only received one dose of clonidine yesterday based on parameters. Patient denies any exertional  dyspnea, palpitations, syncope, orthopnea, edema or paroxysmal nocturnal dyspnea. Review of Systems: No nausea, indigestion, vomiting, pain, cough, sputum. No bleeding. Taking po. Appetite fair      Objective:     Visit Vitals    /82 (BP 1 Location: Left arm, BP Patient Position: At rest)    Pulse 69    Temp 97.6 °F (36.4 °C)    Resp 18    Wt 236 lb 15.9 oz (107.5 kg)    LMP 2018    SpO2 97%    Breastfeeding No    BMI 40.68 kg/m2      O2 Device: Room air    Temp (24hrs), Av.8 °F (36.6 °C), Min:97.6 °F (36.4 °C), Max:98.2 °F (36.8 °C)            1901 -  0700  In: 1099.6 [P.O.:960; I.V.:139.6]  Out: 2500 [Urine:2500]    TELE: sinus    General: AAOx3 cooperative, no acute distress. HEENT: Atraumatic. Pink and moist.  Anicteric sclerae. Neck : Supple, no thyromegaly. Lungs: CTA bilaterally. No wheezing/rhonchi/rales. Heart: Regular rhythm, no murmur, no rubs, no gallops. No JVD. No carotid bruits. Abdomen: Soft, non-distended, non-tender. + Bowel sounds. No bruits. Extremities: No edema, no clubbing, no cyanosis. No calf tenderness  Neurologic: Grossly intact. Alert and oriented X 3. No acute neurological distress. Psych: Good insight. Not anxious nor agitated. Additional comments: None    Care Plan discussed with:    Comments   Patient x    Family      RN x    Care Manager                    Consultant:          Data Review:     EKG    Echo    No results for input(s): TNIPOC in the last 72 hours. No lab exists for component: ITNL   Recent Labs      06/22/18 2249   TROIQ  <0.05     Recent Labs      06/22/18 2249   NA  134*   K  3.6   CL  96*   CO2  31   BUN  18   CREA  1.43*   GLU  360*   ALB  3.3*   WBC  8.3   HGB  14.3   HCT  39.5   PLT  401*     No results for input(s): INR, PTP, APTT in the last 72 hours.     No lab exists for component: INREXT    Medications reviewed  Current Facility-Administered Medications   Medication Dose Route Frequency    niCARdipine (CARDENE) 25 mg in 0.9% sodium chloride 250 mL infusion  5-15 mg/hr IntraVENous TITRATE    sodium chloride (NS) flush 5-10 mL  5-10 mL IntraVENous Q8H    sodium chloride (NS) flush 5-10 mL  5-10 mL IntraVENous PRN    acetaminophen (TYLENOL) tablet 650 mg  650 mg Oral Q4H PRN    prochlorperazine (COMPAZINE) injection 5 mg  5 mg IntraVENous Q8H PRN    insulin lispro (HUMALOG) injection   SubCUTAneous AC&HS    glucose chewable tablet 16 g  4 Tab Oral PRN    dextrose (D50W) injection syrg 12.5-25 g  12.5-25 g IntraVENous PRN    glucagon (GLUCAGEN) injection 1 mg  1 mg IntraMUSCular PRN    amLODIPine (NORVASC) tablet 10 mg  10 mg Oral DAILY    levothyroxine (SYNTHROID) tablet 100 mcg  100 mcg Oral ACB    losartan (COZAAR) tablet 50 mg  50 mg Oral DAILY    enoxaparin (LOVENOX) injection 40 mg  40 mg SubCUTAneous Q12H    insulin glargine (LANTUS) injection 85 Units  85 Units SubCUTAneous DAILY    cloNIDine HCl (CATAPRES) tablet 0.2 mg  0.2 mg Oral Q6H    carvedilol (COREG) tablet 6.25 mg  6.25 mg Oral BID WITH MEALS    morphine injection 2 mg  2 mg IntraVENous Q4H PRN    ondansetron (ZOFRAN) injection 4 mg  4 mg IntraVENous Q6H PRN         Maikel Luna Corinthia Goodell, MD

## 2018-06-24 NOTE — PROGRESS NOTES
1455: Bedside and Verbal shift change report given to [address] [place] [address] Avenue, RN (oncoming nurse) by Hector Pelayo RN (offgoing nurse). Report included the following information SBAR, Kardex, ED Summary, MAR, Recent Results and Cardiac Rhythm NSR.     1930:  Bedside and Verbal shift change report given to Albino Guerrero RN (oncoming nurse) by [address] [place] [address] Avenue, RN (offgoing nurse). Report included the following information SBAR, Kardex, MAR, Recent Results and Cardiac Rhythm NSR.

## 2018-06-24 NOTE — PROGRESS NOTES
Bedside and Verbal shift change report given to Luisa Arnold RN (oncoming nurse) by Selam Smith RN (offgoing nurse). Report included the following information SBAR, Kardex, ED Summary, OR Summary, Procedure Summary, Recent Results, Med Rec Status and Cardiac Rhythm NSR.     0510: Pt alert, oriented, c/o rt chest pain 7/10. Administered 2 mg Morphine IV.     05:30 Attempted to draw morning labs CBC, CMP; unable to obtain. Pt refused to have further attempts for labs at this time.

## 2018-06-25 VITALS
OXYGEN SATURATION: 97 % | SYSTOLIC BLOOD PRESSURE: 180 MMHG | WEIGHT: 238.1 LBS | BODY MASS INDEX: 40.87 KG/M2 | DIASTOLIC BLOOD PRESSURE: 124 MMHG | RESPIRATION RATE: 22 BRPM | TEMPERATURE: 98.3 F | HEART RATE: 92 BPM

## 2018-06-25 PROBLEM — E11.21 TYPE 2 DIABETES MELLITUS WITH NEPHROPATHY (HCC): Chronic | Status: ACTIVE | Noted: 2017-12-25

## 2018-06-25 PROBLEM — R07.9 CHEST PAIN: Status: ACTIVE | Noted: 2018-06-25

## 2018-06-25 PROBLEM — R07.9 CHEST PAIN: Status: RESOLVED | Noted: 2018-06-23 | Resolved: 2018-06-25

## 2018-06-25 PROBLEM — E66.01 OBESITY, MORBID (HCC): Chronic | Status: ACTIVE | Noted: 2017-12-22

## 2018-06-25 PROBLEM — A41.9 SEPSIS (HCC): Status: RESOLVED | Noted: 2017-09-14 | Resolved: 2018-06-25

## 2018-06-25 PROBLEM — R00.0 SINUS TACHYCARDIA: Status: RESOLVED | Noted: 2017-09-14 | Resolved: 2018-06-25

## 2018-06-25 PROBLEM — I16.0 HYPERTENSIVE URGENCY: Status: RESOLVED | Noted: 2018-06-23 | Resolved: 2018-06-25

## 2018-06-25 PROBLEM — E66.9 OBESITY (BMI 30-39.9): Chronic | Status: ACTIVE | Noted: 2018-06-25

## 2018-06-25 LAB
ATRIAL RATE: 84 BPM
ATTENDING PHYSICIAN, CST07: NORMAL
CALCULATED P AXIS, ECG09: 36 DEGREES
CALCULATED R AXIS, ECG10: 31 DEGREES
CALCULATED T AXIS, ECG11: 22 DEGREES
DIAGNOSIS, 93000: NORMAL
DIAGNOSIS, 93000: NORMAL
DUKE TM SCORE RESULT, CST14: NORMAL
DUKE TREADMILL SCORE, CST13: -4
ECG INTERP BEFORE EX, CST11: NORMAL
ECG INTERP DURING EX, CST12: NORMAL
FUNCTIONAL CAPACITY, CST17: NORMAL
GLUCOSE BLD STRIP.AUTO-MCNC: 137 MG/DL (ref 65–100)
GLUCOSE BLD STRIP.AUTO-MCNC: 174 MG/DL (ref 65–100)
KNOWN CARDIAC CONDITION, CST08: NORMAL
MAX. DIASTOLIC BP, CST04: 98 MMHG
MAX. HEART RATE, CST05: 134 BPM
MAX. SYSTOLIC BP, CST03: 182 MMHG
OVERALL BP RESPONSE TO EXERCISE, CST16: NORMAL
OVERALL HR RESPONSE TO EXERCISE, CST15: NORMAL
P-R INTERVAL, ECG05: 170 MS
PEAK EX METS, CST10: 7 METS
PROTOCOL NAME, CST01: NORMAL
Q-T INTERVAL, ECG07: 410 MS
QRS DURATION, ECG06: 96 MS
QTC CALCULATION (BEZET), ECG08: 484 MS
REASON FOR TERMINATION: 79 BPM
SERVICE CMNT-IMP: ABNORMAL
SERVICE CMNT-IMP: ABNORMAL
TEST INDICATION, CST09: NORMAL
TIME IN EXERCISE PHASE, CST02: NORMAL
VENTRICULAR RATE, ECG03: 84 BPM

## 2018-06-25 PROCEDURE — C8930 TTE W OR W/O CONTR, CONT ECG: HCPCS

## 2018-06-25 PROCEDURE — 74011250636 HC RX REV CODE- 250/636: Performed by: INTERNAL MEDICINE

## 2018-06-25 PROCEDURE — 74011250637 HC RX REV CODE- 250/637: Performed by: INTERNAL MEDICINE

## 2018-06-25 PROCEDURE — 74011250637 HC RX REV CODE- 250/637: Performed by: SPECIALIST

## 2018-06-25 PROCEDURE — 82962 GLUCOSE BLOOD TEST: CPT

## 2018-06-25 PROCEDURE — 74011250636 HC RX REV CODE- 250/636: Performed by: SPECIALIST

## 2018-06-25 PROCEDURE — 74011636637 HC RX REV CODE- 636/637: Performed by: INTERNAL MEDICINE

## 2018-06-25 RX ORDER — AMLODIPINE BESYLATE 10 MG/1
10 TABLET ORAL DAILY
Qty: 60 TAB | Refills: 0 | Status: SHIPPED | OUTPATIENT
Start: 2018-06-26 | End: 2021-10-12

## 2018-06-25 RX ORDER — CLONIDINE HYDROCHLORIDE 0.1 MG/1
0.1 TABLET ORAL 2 TIMES DAILY
Qty: 60 TAB | Refills: 0 | Status: SHIPPED | OUTPATIENT
Start: 2018-06-25 | End: 2021-10-12

## 2018-06-25 RX ORDER — CEFDINIR 300 MG/1
300 CAPSULE ORAL 2 TIMES DAILY
Qty: 14 CAP | Refills: 0 | Status: SHIPPED | OUTPATIENT
Start: 2018-06-25 | End: 2018-07-02 | Stop reason: ALTCHOICE

## 2018-06-25 RX ORDER — CARVEDILOL 6.25 MG/1
6.25 TABLET ORAL 2 TIMES DAILY WITH MEALS
Qty: 60 TAB | Refills: 0 | Status: SHIPPED | OUTPATIENT
Start: 2018-06-25 | End: 2018-07-06

## 2018-06-25 RX ADMIN — PERFLUTREN 2 ML: 6.52 INJECTION, SUSPENSION INTRAVENOUS at 11:11

## 2018-06-25 RX ADMIN — ONDANSETRON 4 MG: 2 INJECTION INTRAMUSCULAR; INTRAVENOUS at 08:18

## 2018-06-25 RX ADMIN — AMLODIPINE BESYLATE 10 MG: 5 TABLET ORAL at 11:47

## 2018-06-25 RX ADMIN — CLONIDINE HYDROCHLORIDE 0.1 MG: 0.1 TABLET ORAL at 11:47

## 2018-06-25 RX ADMIN — INSULIN GLARGINE 45 UNITS: 100 INJECTION, SOLUTION SUBCUTANEOUS at 08:18

## 2018-06-25 RX ADMIN — ENOXAPARIN SODIUM 40 MG: 40 INJECTION SUBCUTANEOUS at 08:19

## 2018-06-25 RX ADMIN — ONDANSETRON 4 MG: 2 INJECTION INTRAMUSCULAR; INTRAVENOUS at 11:47

## 2018-06-25 RX ADMIN — LOSARTAN POTASSIUM 50 MG: 50 TABLET ORAL at 11:47

## 2018-06-25 RX ADMIN — LEVOTHYROXINE SODIUM 100 MCG: 100 TABLET ORAL at 06:31

## 2018-06-25 RX ADMIN — MORPHINE SULFATE 2 MG: 4 INJECTION INTRAVENOUS at 11:35

## 2018-06-25 RX ADMIN — MORPHINE SULFATE 2 MG: 4 INJECTION INTRAVENOUS at 01:51

## 2018-06-25 RX ADMIN — Medication 10 ML: at 06:31

## 2018-06-25 RX ADMIN — INSULIN LISPRO 2 UNITS: 100 INJECTION, SOLUTION INTRAVENOUS; SUBCUTANEOUS at 08:18

## 2018-06-25 RX ADMIN — MORPHINE SULFATE 2 MG: 4 INJECTION INTRAVENOUS at 06:31

## 2018-06-25 RX ADMIN — AMOXICILLIN AND CLAVULANATE POTASSIUM 1 TABLET: 875; 125 TABLET, FILM COATED ORAL at 08:19

## 2018-06-25 NOTE — DISCHARGE INSTRUCTIONS
HOSPITALIST DISCHARGE INSTRUCTIONS  NAME: Briana Ashraf   :  1985   MRN:  [id number]     Date/Time:  2018 1:14 PM    ADMIT DATE: 2018     DISCHARGE DATE: 2018     DISCHARGE DIAGNOSIS:  Chest pain    MEDICATIONS:  · It is important that you take the medication exactly as they are prescribed. · Keep your medication in the bottles provided by the pharmacist and keep a list of the medication names, dosages, and times to be taken in your wallet. · Do not take other medications without consulting your doctor. Pain Management: per above medications    What to do at Home    Recommended diet:  Cardiac Diet    Recommended activity: Activity as tolerated    If you experience any of the following symptoms then please call your primary care physician or return to the emergency room if you cannot get hold of your doctor:  Fever, chills, nausea, vomiting, diarrhea, change in mentation, falling, bleeding, shortness of breath    Follow Up: Follow-up Information     Follow up With Details Comments Giovanni Fitzpatrick MD On 2018 340 pm 540 [address] [place]      Humera Jimenez NP In 2 weeks  Lyndsey [address] [place] [address]  [phone]              Information obtained by :  I understand that if any problems occur once I am at home I am to contact my physician. I understand and acknowledge receipt of the instructions indicated above.                                                                                                                                            Physician's or R.N.'s Signature                                                                  Date/Time                                                                                                                                              Patient or Representative Signature                                                          Date/Time Monitor your blood pressure daily, please bring in blood pressure log when you go to see your cardiologists- Dr Mariah Burgess Blood Pressure: Care Instructions  Your Care Instructions    If your blood pressure is usually above 140/90, you have high blood pressure, or hypertension. That means the top number is 140 or higher or the bottom number is 90 or higher, or both. Despite what a lot of people think, high blood pressure usually doesn't cause headaches or make you feel dizzy or lightheaded. It usually has no symptoms. But it does increase your risk for heart attack, stroke, and kidney or eye damage. The higher your blood pressure, the more your risk increases. Your doctor will give you a goal for your blood pressure. Your goal will be based on your health and your age. An example of a goal is to keep your blood pressure below 140/90. Lifestyle changes, such as eating healthy and being active, are always important to help lower blood pressure. You might also take medicine to reach your blood pressure goal.  Follow-up care is a key part of your treatment and safety. Be sure to make and go to all appointments, and call your doctor if you are having problems. It's also a good idea to know your test results and keep a list of the medicines you take. How can you care for yourself at home? Medical treatment  · If you stop taking your medicine, your blood pressure will go back up. You may take one or more types of medicine to lower your blood pressure. Be safe with medicines. Take your medicine exactly as prescribed. Call your doctor if you think you are having a problem with your medicine. · Talk to your doctor before you start taking aspirin every day. Aspirin can help certain people lower their risk of a heart attack or stroke. But taking aspirin isn't right for everyone, because it can cause serious bleeding. · See your doctor regularly.  You may need to see the doctor more often at first or until your blood pressure comes down. · If you are taking blood pressure medicine, talk to your doctor before you take decongestants or anti-inflammatory medicine, such as ibuprofen. Some of these medicines can raise blood pressure. · Learn how to check your blood pressure at home. Lifestyle changes  · Stay at a healthy weight. This is especially important if you put on weight around the waist. Losing even 10 pounds can help you lower your blood pressure. · If your doctor recommends it, get more exercise. Walking is a good choice. Bit by bit, increase the amount you walk every day. Try for at least 30 minutes on most days of the week. You also may want to swim, bike, or do other activities. · Avoid or limit alcohol. Talk to your doctor about whether you can drink any alcohol. · Try to limit how much sodium you eat to less than 2,300 milligrams (mg) a day. Your doctor may ask you to try to eat less than 1,500 mg a day. · Eat plenty of fruits (such as bananas and oranges), vegetables, legumes, whole grains, and low-fat dairy products. · Lower the amount of saturated fat in your diet. Saturated fat is found in animal products such as milk, cheese, and meat. Limiting these foods may help you lose weight and also lower your risk for heart disease. · Do not smoke. Smoking increases your risk for heart attack and stroke. If you need help quitting, talk to your doctor about stop-smoking programs and medicines. These can increase your chances of quitting for good. When should you call for help? Call 911 anytime you think you may need emergency care. This may mean having symptoms that suggest that your blood pressure is causing a serious heart or blood vessel problem. Your blood pressure may be over 180/110. ? For example, call 911 if:  ? · You have symptoms of a heart attack. These may include:  ¨ Chest pain or pressure, or a strange feeling in the chest.  ¨ Sweating. ¨ Shortness of breath. ¨ Nausea or vomiting.   ¨ Pain, pressure, or a strange feeling in the back, neck, jaw, or upper belly or in one or both shoulders or arms. ¨ Lightheadedness or sudden weakness. ¨ A fast or irregular heartbeat. ? · You have symptoms of a stroke. These may include:  ¨ Sudden numbness, tingling, weakness, or loss of movement in your face, arm, or leg, especially on only one side of your body. ¨ Sudden vision changes. ¨ Sudden trouble speaking. ¨ Sudden confusion or trouble understanding simple statements. ¨ Sudden problems with walking or balance. ¨ A sudden, severe headache that is different from past headaches. ? · You have severe back or belly pain. ?Do not wait until your blood pressure comes down on its own. Get help right away. ?Call your doctor now or seek immediate care if:  ? · Your blood pressure is much higher than normal (such as 180/110 or higher), but you don't have symptoms. ? · You think high blood pressure is causing symptoms, such as:  ¨ Severe headache. ¨ Blurry vision. ? Watch closely for changes in your health, and be sure to contact your doctor if:  ? · Your blood pressure measures 140/90 or higher at least 2 times. That means the top number is 140 or higher or the bottom number is 90 or higher, or both. ? · You think you may be having side effects from your blood pressure medicine. ? · Your blood pressure is usually normal, but it goes above normal at least 2 times. Where can you learn more? Go to http://kong-delma.info/. Enter R448 in the search box to learn more about \"High Blood Pressure: Care Instructions. \"  Current as of: September 21, 2016  Content Version: 11.4  © 1692-0654 [employer]. Care instructions adapted under license by [employer] (which disclaims liability or warranty for this information).  If you have questions about a medical condition or this instruction, always ask your healthcare professional. [address] any warranty or liability for your use of this information.

## 2018-06-25 NOTE — PROGRESS NOTES
Problem: Falls - Risk of  Goal: *Absence of Falls  Document Herb Fall Risk and appropriate interventions in the flowsheet.    Outcome: Progressing Towards Goal  Fall Risk Interventions:            Medication Interventions: Teach patient to arise slowly    Elimination Interventions: Bed/chair exit alarm, Call light in reach

## 2018-06-25 NOTE — PROGRESS NOTES
Cardiology Progress Note                             [address] [facility]. [address], [place], [address] [facility]                                 Phone [phone]; Fax [phone]        2018 8:12 AM     Admit Date:           2018  Admit Diagnosis:  Hypertensive urgency  :          1985   MRN:          [id number]   ASSESSMENT/RECOMMENDATION:   1)Accelerated hypertension with chest pain syndrome: BP controlled  -stress echo this AM, reviewed test, she agrees to proceed     Hypertension, previously controlled     T2DM in the setting of morbid obesity: uncontrolled  -hgbAc1 9.6    Morbid obesity Body mass index is 40.87 kg/(m^2). Hypothyroid: check TSH/t3/t4  -on synthroid    Hyponatremia  yesterday- recheck this AM                       Last 3 Recorded Weights in this Encounter    18 0551 18 0447 18 0434   Weight: 239 lb 10.2 oz (108.7 kg) 236 lb 15.9 oz (107.5 kg) 238 lb 1.6 oz (108 kg)          1901 -  0700  In: 1920 [P.O.:1920]  Out: 1800 [Urine:1800]    SUBJECTIVE               Guadlupe Lowers reports constant chest tightness located in the sternal region, no relieving factors or exacerbating factors. No associated SOB, lightheadedness or dizziness.          Current Facility-Administered Medications   Medication Dose Route Frequency    cloNIDine HCl (CATAPRES) tablet 0.1 mg  0.1 mg Oral BID    insulin glargine (LANTUS) injection 45 Units  45 Units SubCUTAneous DAILY    amoxicillin-clavulanate (AUGMENTIN) 875-125 mg per tablet 1 Tab  1 Tab Oral Q12H    carvedilol (COREG) tablet 6.25 mg  6.25 mg Oral BID WITH MEALS    sodium chloride (NS) flush 5-10 mL  5-10 mL IntraVENous Q8H    sodium chloride (NS) flush 5-10 mL  5-10 mL IntraVENous PRN    acetaminophen (TYLENOL) tablet 650 mg  650 mg Oral Q4H PRN    prochlorperazine (COMPAZINE) injection 5 mg  5 mg IntraVENous Q8H PRN  insulin lispro (HUMALOG) injection   SubCUTAneous AC&HS    glucose chewable tablet 16 g  4 Tab Oral PRN    dextrose (D50W) injection syrg 12.5-25 g  12.5-25 g IntraVENous PRN    glucagon (GLUCAGEN) injection 1 mg  1 mg IntraMUSCular PRN    amLODIPine (NORVASC) tablet 10 mg  10 mg Oral DAILY    levothyroxine (SYNTHROID) tablet 100 mcg  100 mcg Oral ACB    losartan (COZAAR) tablet 50 mg  50 mg Oral DAILY    enoxaparin (LOVENOX) injection 40 mg  40 mg SubCUTAneous Q12H    morphine injection 2 mg  2 mg IntraVENous Q4H PRN    ondansetron (ZOFRAN) injection 4 mg  4 mg IntraVENous Q6H PRN      OBJECTIVE               Intake/Output Summary (Last 24 hours) at 06/25/18 0812  Last data filed at 06/25/18 0355   Gross per 24 hour   Intake             1680 ml   Output             1800 ml   Net             -120 ml       Review of Systems - History obtained from the patient AS PER  HPI    Telemetry NSR    PHYSICAL EXAM        Visit Vitals    /83 (BP 1 Location: Left arm, BP Patient Position: At rest)    Pulse 75    Temp 97.7 °F (36.5 °C)    Resp 18    Wt 238 lb 1.6 oz (108 kg)    LMP 06/21/2018    SpO2 94%    Breastfeeding No    BMI 40.87 kg/m2       Gen: Well-developed, obese, in no acute distress  alert and oriented x 3  HEENT:  Pink conjunctivae, Hearing grossly normal.No scleral icterus or conjunctival, moist mucous membranes  Neck: Supple,No JVD  Resp: No accessory muscle use, Clear breath sounds, No rales or rhonchi  Card: Regular Rate,Rythm, No murmurs, rubs or gallop. No thrills.    GI:          soft, non-tender   MSK: No cyanosis or clubbing, good capillary refill  Skin: No rashes or ulcers, no bruising  Neuro:  Cranial nerves are grossly intact, moving all four extremities, no focal deficit, follows commands appropriately  Psych:  Good insight, oriented to person, place and time, alert, Nml Affect  LE: No edema       DATA REVIEW            Laboratory and Imaging have been reviewed by me and are notable for  Recent Labs      06/22/18 2249   TROIQ  <0.05     Recent Labs      06/24/18   1008  06/22/18 2249   NA  131*  134*   K  4.3  3.6   CO2  26  31   BUN  20  18   CREA  0.85  1.43*   GLU  249*  360*   WBC  7.5  8.3   HGB  13.7  14.3   HCT  39.8  39.5   PLT  346  401*             Meliton Ennis, NP

## 2018-06-25 NOTE — PROGRESS NOTES
VAT called to Cardiac Testing in Radiology for Pt who has an infiltrated PIV in right lower forearm. Line removed and pressure dressing applied. Site pink, painful per patient with some swelling. Patient instructed to elevate arm and apply ice after cardiac testing is completed. RN requested assistance with obtaining piv access for Pt. US guidance used to obtain a 20 G, 1.75 in piv in patient's right forearm. Line flushes easily and has positive blood return.

## 2018-06-25 NOTE — PROGRESS NOTES
[facility] follow-up appointment on Monday July 2,2018 @ 1:00 p.m. with Poonam Estrada.   Added to AVS.  Srini Pablo CM Specialist

## 2018-06-25 NOTE — PROGRESS NOTES
Reason for Admission:   Hypertensive urgency                    RRAT Score:       9              Plan for utilizing home health:      No needs for home health and pt's is not interested. Likelihood of Readmission: Green/low                          Transition of Care Plan:           Pt lives with her  in a one story home with one step to enter. Pt drives and is independent with all of her ADLs. She has prescription coverage under her insurance plan, she gets her prescriptions filled at [facility] in [place]. Pt's NP notified of hospital admission and discharge. Pt has never had home health before and does not have any DME. No other issues or concerns at this time. Thanks Reida Lundborg, MSW   Care Management Interventions  PCP Verified by CM:  Yes  MyChart Signup: No  Discharge Durable Medical Equipment: No  Physical Therapy Consult: No  Occupational Therapy Consult: No  Speech Therapy Consult: No  Current Support Network: Lives with Spouse  Confirm Follow Up Transport: Family  Plan discussed with Pt/Family/Caregiver: Yes  Discharge Location  Discharge Placement: Home

## 2018-06-25 NOTE — PROGRESS NOTES
Bedside and Verbal shift change report given to Robert Santiago RN (oncoming nurse) by [address] [place] [address] Avenue, RN (offgoing nurse). Report included the following information SBAR, Kardex, ED Summary, Intake/Output, Recent Results, Med Rec Status and Cardiac Rhythm NSR. Bedside and Verbal shift change report given to Bear Bacon RN (oncoming nurse) by Robert Santiago RN (offgoing nurse). Report included the following information SBAR, Kardex, ED Summary, Intake/Output, Recent Results, Med Rec Status and Cardiac Rhythm NSR with BBB.

## 2018-06-25 NOTE — ADVANCED PRACTICE NURSE
Normal stress echo  Okay to jean-pierre from a cardiac standpoint  Follow up with Dr Clair Fontaine- Appt on AVS

## 2018-06-25 NOTE — DIABETES MGMT
Progress Note     Chart reviewed for elevated blood glucose ( > 180 mg/dL x 2 in the past 24 hours). Recommendations/ Comments: If appropriate, please consider intensifying correction scale - from normal sensitivity to insulin resistant (obese, steroids). Morning glucose: 174 mg/dL (per am POCT Glucose). Required 13 units of correction in the last 24 hours. Inpatient medications for glucose management:  1. Correction Scale: Lispro (Humalog) Normal Sensitivity scale to cover for glucose > 139 mg/dL before meals and for glucose >199 at bedtime      2. Lantus 45 units daily     POC Glucose last 24hrs:   Lab Results   Component Value Date/Time    GLUCPOC 137 (H) 06/25/2018 11:30 AM    GLUCPOC 174 (H) 06/25/2018 07:25 AM    GLUCPOC 263 (H) 06/24/2018 09:24 PM        Estimated Creatinine Clearance: 112.9 mL/min (based on Cr of 0.85). Diet order:   Active Orders   Diet    DIET NPO        PO intake: Patient Vitals for the past 72 hrs:   % Diet Eaten   06/23/18 1800 50 %   06/23/18 1247 25 %   06/23/18 1017 100 %       History of Diabetes:   Merline Jacob is a 34 y.o. female with a past medical history significant for DM per  Bryant Terry MD's H&P dated 6/23/2018. Prior to admission medications for glucose management: per past medical records  -Metformin  mg with dinner   -Levemir 85 units nightly     A1C:   Lab Results   Component Value Date/Time    Hemoglobin A1c 9.6 (H) 06/23/2018 06:01 AM    Hemoglobin A1c 10.0 (H) 12/22/2017 08:57 AM       Reference range*:  Increased risk for diabetes: 5.7 - 6.4%  Diabetes: >6.4%  Glycemic control for adults with diabetes: <7.0 %    *PER PARKS (2014). Diagnosis and classification of diabetes mellitus. Diabetes care, 37, S81. Thank you. Shaunna Owen.  Lary MPH, RN, BSN, [address]   [phone]    -A target glucose range of 140180 mg/dL (7.810.0 mmol/L) is recommended for the majority of critically ill patients and noncritically ill patients. -More stringent goals, such as 110140 mg/dL (6.17.8 mmol/L), may be appropriate for selected patients, if this can be achieved without significant hypoglycemia. *    *American Diabetes Association. 14. Diabetes care in the hospital:Standards of Medical Care in [id number]. Diabetes Care 2018;41(Suppl.  1):S144S144

## 2018-06-25 NOTE — DISCHARGE SUMMARY
Physician Discharge Summary     Patient ID:  Vish Cintron  [id number]  19 y.o.  1985    Admit date: 6/22/2018    Discharge date: 6/25/2018    Admission Diagnoses: Hypertensive urgency    Discharge Diagnoses:  Principal Diagnosis Chest pain                                            Principal Problem:    Chest pain (6/25/2018)    Active Problems:    Hypothyroidism ()      Obesity, morbid ([facility] (12/22/2017)      Type 2 diabetes mellitus with nephropathy ([facility] (12/25/2017)      Hypertension ()      Overview: onset 2009         Resolved Problems:  Problem List as of 6/25/2018  Date Reviewed: 6/25/2018          Codes Class Noted - Resolved    * (Principal)Chest pain ICD-10-CM: R07.9  ICD-9-CM: 786.50  6/25/2018 - Present        Hypertension (Chronic) ICD-10-CM: I10  ICD-9-CM: 401.9  Unknown - Present    Overview Signed 6/23/2018  2:39 PM by Lesly Reynaga MD     onset 2009             Microalbuminuria due to type 2 diabetes mellitus ([facility] ICD-10-CM: E11.29, R80.9  ICD-9-CM: 250.00, 791.0  12/27/2017 - Present        Type 2 diabetes mellitus with nephropathy (HCC) (Chronic) ICD-10-CM: E11.21  ICD-9-CM: 250.40, 583.81  12/25/2017 - Present        Obesity, morbid ([facility] (Chronic) ICD-10-CM: E66.01  ICD-9-CM: 278.01  12/22/2017 - Present        Breast abscess ICD-10-CM: N61.1  ICD-9-CM: 611.0  9/14/2017 - Present        Hypothyroidism (Chronic) ICD-10-CM: E03.9  ICD-9-CM: 244.9  Unknown - Present        Hx MRSA infection ICD-10-CM: Z86.14  ICD-9-CM: V12.04  Unknown - Present        Lactic acidemia ICD-10-CM: E87.2  ICD-9-CM: 276.2  9/14/2017 - Present        Fever chills ICD-10-CM: R50.9  ICD-9-CM: 780.60  9/14/2017 - Present        Obesity ICD-10-CM: E66.9  ICD-9-CM: 278.00  5/25/2017 - Present        HTN (hypertension) (Chronic) ICD-10-CM: I10  ICD-9-CM: 401.9  5/25/2017 - Present        RESOLVED: Hypertensive urgency ICD-10-CM: I16.0  ICD-9-CM: 401.9  6/23/2018 - 6/25/2018        RESOLVED: Chest pain ICD-10-CM: R07.9  ICD-9-CM: 786.50  6/23/2018 - 6/25/2018        RESOLVED: Hypertension ICD-10-CM: I10  ICD-9-CM: 401.9  Unknown - 9/14/2017        RESOLVED: Diabetes ([facility] ICD-10-CM: E11.9  ICD-9-CM: 250.00  Unknown - 9/14/2017        RESOLVED: Anemia ICD-10-CM: D64.9  ICD-9-CM: 676. 9  Unknown - 9/14/2017        RESOLVED: Sinus tachycardia ICD-10-CM: R00.0  ICD-9-CM: 427.89  9/14/2017 - 6/25/2018        RESOLVED: Sepsis ([facility] ICD-10-CM: A41.9  ICD-9-CM: 038.9, 995.91  9/14/2017 - 6/25/2018        RESOLVED: Migraine (Chronic) ICD-10-CM: Y64.099  ICD-9-CM: 346.90  5/25/2017 - 9/14/2017                Hospital Course:   Ms. Velasquez Daily was admitted to the Hospitalist Service on the 3rd floor for treatment of chest pain. She ruled out for MI with serial enzymes. She was monitored on telemetry throughout the duration of her admission without any adverse arrhythmic events. She was evaluated by Cardiology and underwent stress echo which was negative for inducible ischemia. Theodora Stock She was discharged home on 6/25/2018 in improved condition. PCP: Humera Jimenez NP    Consults: Cardiology    Discharge Exam:  See my Progress Note from today. Disposition: home    Patient Instructions:   Current Discharge Medication List      START taking these medications    Details   amLODIPine (NORVASC) 10 mg tablet Take 1 Tab by mouth daily. Qty: 60 Tab, Refills: 0      carvedilol (COREG) 6.25 mg tablet Take 1 Tab by mouth two (2) times daily (with meals). Qty: 60 Tab, Refills: 0      cefdinir (OMNICEF) 300 mg capsule Take 1 Cap by mouth two (2) times a day for 7 days. Qty: 14 Cap, Refills: 0         CONTINUE these medications which have CHANGED    Details   cloNIDine HCl (CATAPRES) 0.1 mg tablet Take 1 Tab by mouth two (2) times a day. Qty: 60 Tab, Refills: 0      insulin detemir U-100 (LEVEMIR FLEXTOUCH) 100 unit/mL (3 mL) inpn 45 Units by SubCUTAneous route nightly.   Qty: 10 Pen, Refills: 1    Associated Diagnoses: Type 2 diabetes mellitus with hyperglycemia, with long-term current use of insulin ([address]         CONTINUE these medications which have NOT CHANGED    Details   levothyroxine (SYNTHROID) 100 mcg tablet Take 1 Tab by mouth Daily (before breakfast). Qty: 20 Tab, Refills: 3    Associated Diagnoses: Acquired hypothyroidism      ferrous sulfate 325 mg (65 mg iron) tablet Take 1 Tab by mouth daily (with lunch). Qty: 30 Tab, Refills: 3    Associated Diagnoses: Anemia, unspecified type      aspirin delayed-release 81 mg tablet Take 1 Tab by mouth daily. Qty: 30 Tab, Refills: 3    Associated Diagnoses: Type 2 diabetes mellitus with hyperglycemia, with long-term current use of insulin (HCC)      metFORMIN ER (GLUCOPHAGE XR) 750 mg tablet Take 1 Tab by mouth daily (with dinner). Qty: 30 Tab, Refills: 1    Associated Diagnoses: Type 2 diabetes mellitus with hyperglycemia, with long-term current use of insulin (HCC)      losartan (COZAAR) 50 mg tablet Take 1 Tab by mouth daily. Qty: 30 Tab, Refills: 1    Associated Diagnoses: Essential hypertension      multivitamin (ONE A DAY) tablet Take 1 Tab by mouth daily. Activity: Activity as tolerated  Diet: Cardiac Diet  Wound Care: None needed    Follow-up Information     Follow up With Details Comments Giovanni Fitzpatrick MD On 7/27/2018 340 pm 540 [address] [facility] [place] [address] Aranda NP In 2 weeks  [place] [address]  [phone]            35 minutes were spent on this discharge.     Signed:  Jacqui Moore MD  6/25/2018  1:14 PM

## 2018-06-25 NOTE — PROGRESS NOTES
[address]  [address], [place], [address] [facility]  [phone]      Medical Progress Note      NAME: Alfreda Daniel   :  1985  MRM:  [id number]    Date/Time: 2018  7:50 AM         Subjective:     Chief Complaint:  Chest pain: recurrent last night, intermittent, moderate, right sided, radiates from shoulderblade/collarbone down to abdomen    ROS:  (bold if positive, if negative)                   Chest Pain     Tolerating Diet          Objective:       Vitals:          Last 24hrs VS reviewed since prior progress note.  Most recent are:    Visit Vitals    /75 (BP 1 Location: Left arm, BP Patient Position: At rest)    Pulse 73    Temp 98 °F (36.7 °C)    Resp 24    Wt 108 kg (238 lb 1.6 oz)    SpO2 95%    Breastfeeding No    BMI 40.87 kg/m2     SpO2 Readings from Last 6 Encounters:   18 95%   18 97%   18 97%   18 95%   18 98%   18 98%          Intake/Output Summary (Last 24 hours) at 18 0750  Last data filed at 18 0355   Gross per 24 hour   Intake             1680 ml   Output             1800 ml   Net             -120 ml          Exam:     Physical Exam:    Gen:  Well-developed, morbidly obese, in no acute distress  HEENT:  Pink conjunctivae, PERRL, hearing intact to voice, moist mucous membranes  Neck:  Supple, without masses, thyroid non-tender  Resp:  No accessory muscle use, clear breath sounds without wheezes rales or rhonchi  Card:  No murmurs, normal S1, S2 without thrills, bruits or peripheral edema  Abd:  Soft, non-tender, non-distended, normoactive bowel sounds are present, no palpable organomegaly and no detectable hernias  Lymph:  No cervical or inguinal adenopathy  Musc:  No cyanosis or clubbing  Skin:  No rashes or ulcers, skin turgor is good  Neuro:  Cranial nerves are grossly intact, no focal motor weakness, follows commands appropriately  Psych:  Good insight, oriented to person, place and time, alert       Telemetry reviewed:   normal sinus rhythm    Medications Reviewed: (see below)    Lab Data Reviewed: (see below)    ______________________________________________________________________    Medications:     Current Facility-Administered Medications   Medication Dose Route Frequency    cloNIDine HCl (CATAPRES) tablet 0.1 mg  0.1 mg Oral BID    insulin glargine (LANTUS) injection 45 Units  45 Units SubCUTAneous DAILY    amoxicillin-clavulanate (AUGMENTIN) 875-125 mg per tablet 1 Tab  1 Tab Oral Q12H    carvedilol (COREG) tablet 6.25 mg  6.25 mg Oral BID WITH MEALS    sodium chloride (NS) flush 5-10 mL  5-10 mL IntraVENous Q8H    sodium chloride (NS) flush 5-10 mL  5-10 mL IntraVENous PRN    acetaminophen (TYLENOL) tablet 650 mg  650 mg Oral Q4H PRN    prochlorperazine (COMPAZINE) injection 5 mg  5 mg IntraVENous Q8H PRN    insulin lispro (HUMALOG) injection   SubCUTAneous AC&HS    glucose chewable tablet 16 g  4 Tab Oral PRN    dextrose (D50W) injection syrg 12.5-25 g  12.5-25 g IntraVENous PRN    glucagon (GLUCAGEN) injection 1 mg  1 mg IntraMUSCular PRN    amLODIPine (NORVASC) tablet 10 mg  10 mg Oral DAILY    levothyroxine (SYNTHROID) tablet 100 mcg  100 mcg Oral ACB    losartan (COZAAR) tablet 50 mg  50 mg Oral DAILY    enoxaparin (LOVENOX) injection 40 mg  40 mg SubCUTAneous Q12H    morphine injection 2 mg  2 mg IntraVENous Q4H PRN    ondansetron (ZOFRAN) injection 4 mg  4 mg IntraVENous Q6H PRN            Lab Review:     Recent Labs      06/24/18   1008  06/22/18   2249   WBC  7.5  8.3   HGB  13.7  14.3   HCT  39.8  39.5   PLT  346  401*     Recent Labs      06/24/18   1008  06/22/18   2249   NA  131*  134*   K  4.3  3.6   CL  97  96*   CO2  26  31   GLU  249*  360*   BUN  20  18   CREA  0.85  1.43*   CA  8.6  8.9   ALB  3.1*  3.3*   TBILI  0.5  0.5   SGOT  33  15   ALT  38  35     Lab Results   Component Value Date/Time    Glucose (POC) 174 (H) 06/25/2018 07:25 AM    Glucose (POC) 263 (H) 06/24/2018 09:24 PM    Glucose (POC) 214 (H) 06/24/2018 04:31 PM    Glucose (POC) 202 (H) 06/24/2018 11:31 AM    Glucose (POC) 188 (H) 06/24/2018 07:50 AM     No results for input(s): PH, PCO2, PO2, HCO3, FIO2 in the last 72 hours. No results for input(s): INR in the last 72 hours. No lab exists for component: INREXT  Lab Results   Component Value Date/Time    Specimen Description: URINE 03/18/2014 04:46 PM    Specimen Description: URINE 03/04/2014 06:35 PM     Lab Results   Component Value Date/Time    Culture result: MRSA NOT PRESENT 06/23/2018 06:01 AM    Culture result:  06/23/2018 06:01 AM         Screening of patient nares for MRSA is for surveillance purposes and, if positive, to facilitate isolation considerations in high risk settings. It is not intended for automatic decolonization interventions per se as regimens are not sufficiently effective to warrant routine use.     Culture result: MIXED UROGENITAL DARYL ISOLATED 01/12/2018 09:11 PM            Assessment:     Principal Problem:    Chest pain (6/25/2018)    Active Problems:    Hypothyroidism ()      Obesity, morbid ([address] (12/22/2017)      Type 2 diabetes mellitus with nephropathy ([address] (12/25/2017)      Hypertension ()      Overview: onset 2009           Plan:     Principal Problem:    Chest pain (6/25/2018)   - for stress test today    Active Problems:    Hypothyroidism ()   - agree with rechecking numbers given past elevated TSH, ordered for today      Obesity, morbid ([address] (12/22/2017)   - counseled on weight loss       Type 2 diabetes mellitus with nephropathy ([address] (12/25/2017)   - BS okay on meds as above      Hypertension ()   - BP improved with meds adjusted as able   - home later today on current medications if stress is okay      Total time spent in patient care: 35 minutes                  Care Plan discussed with: Patient, Care Manager, Nursing Staff and Peña Bennett NP    Discussed:  Code Status, Care Plan and D/C Planning    Prophylaxis:  Lovenox    Disposition:  Home w/Family           ___________________________________________________    Attending Physician: Jovon Villeda MD

## 2018-06-25 NOTE — PROGRESS NOTES
Discharge instructions, including information on new medications, were reviewed with patient. All questions were answered. IV and heart monitor have been removed. Appointment with Lawyer Michela NP for 7/2 was cancelled at patient's request. She will call her PCP and set up a follow up appoint herself. Work excuse was provided by Cardiology. Patient will be discharged home with her family.

## 2018-07-02 ENCOUNTER — HOSPITAL ENCOUNTER (EMERGENCY)
Age: 33
Discharge: HOME | End: 2018-07-02
Attending: EMERGENCY MEDICINE | Admitting: EMERGENCY MEDICINE
Payer: COMMERCIAL

## 2018-07-02 VITALS
HEART RATE: 88 BPM | OXYGEN SATURATION: 95 % | DIASTOLIC BLOOD PRESSURE: 109 MMHG | BODY MASS INDEX: 40.05 KG/M2 | SYSTOLIC BLOOD PRESSURE: 178 MMHG | RESPIRATION RATE: 18 BRPM | TEMPERATURE: 98.3 F | HEIGHT: 64 IN | WEIGHT: 234.57 LBS

## 2018-07-02 DIAGNOSIS — H65.05 RECURRENT ACUTE SEROUS OTITIS MEDIA OF LEFT EAR: Primary | ICD-10-CM

## 2018-07-02 PROCEDURE — 99282 EMERGENCY DEPT VISIT SF MDM: CPT

## 2018-07-02 RX ORDER — AMOXICILLIN AND CLAVULANATE POTASSIUM 875; 125 MG/1; MG/1
1 TABLET, FILM COATED ORAL 2 TIMES DAILY
Qty: 14 TAB | Refills: 0 | Status: SHIPPED | OUTPATIENT
Start: 2018-07-02 | End: 2018-07-10

## 2018-07-02 NOTE — DISCHARGE INSTRUCTIONS
Middle Ear Fluid: Care Instructions  Your Care Instructions    Fluid often builds up inside the ear during a cold or allergies. Usually the fluid drains away, but sometimes a small tube in the ear, called the eustachian tube, stays blocked for months. Symptoms of fluid buildup may include:  · Popping, ringing, or a feeling of fullness or pressure in the ear. · Trouble hearing. · Balance problems and dizziness. In most cases, you can treat yourself at home. Follow-up care is a key part of your treatment and safety. Be sure to make and go to all appointments, and call your doctor if you are having problems. It's also a good idea to know your test results and keep a list of the medicines you take. How can you care for yourself at home? · In most cases, the fluid clears up within a few months without treatment. You may need more tests if the fluid does not clear up after 3 months. · If your doctor prescribed antibiotics, take them as directed. Do not stop taking them just because you feel better. You need to take the full course of antibiotics. When should you call for help? Call your doctor now or seek immediate medical care if:  ? · You have symptoms of infection, such as:  ¨ Increased pain, swelling, warmth, or redness. ¨ Pus draining from the area. ¨ A fever. ? Watch closely for changes in your health, and be sure to contact your doctor if:  ? · You notice changes in hearing. ? · You do not get better as expected. Where can you learn more? Go to http://kong-delma.info/. Enter Q405 in the search box to learn more about \"Middle Ear Fluid: Care Instructions. \"  Current as of: May 12, 2017  Content Version: 11.4  © 7914-5732 [employer]. Care instructions adapted under license by [employer] (which disclaims liability or warranty for this information).  If you have questions about a medical condition or this instruction, always ask your healthcare professional. [address] any warranty or liability for your use of this information.

## 2018-07-02 NOTE — ED NOTES
The patient was discharged home by Dr. Douglass Members in stable condition. The patient is alert and oriented, in no respiratory distress and discharge vital signs obtained. The patient's diagnosis, condition and treatment were explained. The patient expressed understanding. One prescription given. A work note given. A discharge plan has been developed. A  was not involved in the process. Aftercare instructions were given. Pt ambulatory out of the ED.

## 2018-07-02 NOTE — LETTER
NOTIFICATION RETURN TO WORK / SCHOOL 
 
2018 3:24 PM 
 
Ms. Blake Vargas [address] [address] To Whom It May Concern: 
 
Blake Vargas is currently under the care of [facility] EMERGENCY DEPT. She will return to work/school on: 7/3/18 If there are questions or concerns please have the patient contact our office. Sincerely, Onel Looney.  Ada Apgar, MD

## 2018-07-02 NOTE — ED TRIAGE NOTES
Pt arrives with left ear drainage for about 1 week. She reports her left ear hurting for about 6 weeks prior to arrival. She was in the hospital for HTN about a week ago and something \"popped\" and she was prescribed Omnicef. She just finished antibiotics yesterday.

## 2018-07-03 NOTE — ED PROVIDER NOTES
HPI Comments: 33 yo female with h/o recurrent ear infections. Was admitted to hospital couple of weeks ago for hypertensive emergency. [place] a pop and had ear drainage. Finished course of Omnicef without relief. Patient is a 34 y.o. female presenting with ear drainage. The history is provided by the patient. Ear Drainage    This is a recurrent problem. Episode onset: 10 days. The problem occurs constantly. The problem has been gradually worsening. Patient complains that the left ear is affected. There has been no fever. The pain is at a severity of 6/10. The pain is moderate. Associated symptoms include ear discharge and hearing loss. Pertinent negatives include no headaches, no rhinorrhea, no sore throat, no abdominal pain, no vomiting, no cough and no rash.         Past Medical History:   Diagnosis Date    Abscess     Anemia     Complicated migraine     with extremity numbness    Hx MRSA infection     Hypertension     onset 2009    Hypothyroidism     Irregular menstrual cycle     Obesity     T2DM (type 2 diabetes mellitus) ([facility] [address]     onset 2009       Past Surgical History:   Procedure Laterality Date    HX CHOLECYSTECTOMY      HX HEENT      HX TONSIL AND ADENOIDECTOMY  1992    HX TYMPANOSTOMY           Family History:   Problem Relation Age of Onset    Hypertension Other      \"all her family\"    Diabetes Other      \"all her family\"    Cancer Mother     Stroke Mother     Heart Disease Mother    Dicky Bang Bladder Disease Mother     Diabetes Mother     Stroke Father     Heart Disease Father     Diabetes Father    Dicky Bang Bladder Disease Brother     Diabetes Brother     Migraines Maternal Aunt     Diabetes Paternal Aunt     Stroke Maternal Grandmother     Diabetes Maternal Grandmother     Stroke Maternal Grandfather     Diabetes Maternal Grandfather        Social History     Social History    Marital status:      Spouse name: N/A    Number of children: N/A    Years of education: N/A     Occupational History    Not on file. Social History Main Topics    Smoking status: Never Smoker    Smokeless tobacco: Never Used    Alcohol use Yes      Comment: socially 1-2 times a year     Drug use: No    Sexual activity: Yes     Other Topics Concern    Not on file     Social History Narrative         ALLERGIES: Pcn [penicillins]; Vancomycin; and Voltaren [diclofenac sodium]    Review of Systems   Constitutional: Negative for chills and fever. HENT: Positive for ear discharge and hearing loss. Negative for ear pain, rhinorrhea and sore throat. Eyes: Negative for pain. Respiratory: Negative for cough, chest tightness and shortness of breath. Cardiovascular: Negative for chest pain and leg swelling. Gastrointestinal: Negative for abdominal pain, nausea and vomiting. Genitourinary: Negative for dysuria and flank pain. Musculoskeletal: Negative for back pain. Skin: Negative for rash. Neurological: Negative for headaches. All other systems reviewed and are negative. Vitals:    07/02/18 1440 07/02/18 1441 07/02/18 1524   BP: (!) 211/115 (!) 180/126 (!) 178/109   Pulse: 100  88   Resp: 18  18   Temp: 98.3 °F (36.8 °C)     SpO2: 97%  95%   Weight:  106.4 kg (234 lb 9.1 oz)    Height:  5' 4\" (1.626 m)             Physical Exam   Constitutional: No distress. HENT:   Head: Normocephalic and atraumatic. Bilateral TM perforations. Left serous middle ear effusion with TM erythema   Eyes: No scleral icterus. Neck: Neck supple. No tracheal deviation present. Cardiovascular: Normal rate, regular rhythm and intact distal pulses. Pulmonary/Chest: Effort normal. No respiratory distress. Abdominal: She exhibits no distension. Genitourinary:   Genitourinary Comments: deferred   Musculoskeletal: She exhibits no deformity. Neurological: She is alert. Skin: Skin is warm and dry. Psychiatric: She has a normal mood and affect.    Nursing note and vitals reviewed. MDM  Number of Diagnoses or Management Options  Recurrent acute serous otitis media of left ear:   Diagnosis management comments: Tolerated Augmentin in past.  Will rx. Follow up closely with PCP and ENT. Return to the emergency department for new/ worsening symptoms or other concerns     Bessy Rubin MD      Patient Progress  Patient progress: stable        ED Course       Procedures

## 2018-07-05 ENCOUNTER — APPOINTMENT (OUTPATIENT)
Dept: GENERAL RADIOLOGY | Age: 33
End: 2018-07-05
Attending: EMERGENCY MEDICINE
Payer: COMMERCIAL

## 2018-07-05 ENCOUNTER — HOSPITAL ENCOUNTER (EMERGENCY)
Age: 33
Discharge: HOME | End: 2018-07-05
Attending: EMERGENCY MEDICINE
Payer: COMMERCIAL

## 2018-07-05 VITALS
WEIGHT: 235 LBS | HEART RATE: 82 BPM | OXYGEN SATURATION: 99 % | BODY MASS INDEX: 40.12 KG/M2 | TEMPERATURE: 98.1 F | SYSTOLIC BLOOD PRESSURE: 153 MMHG | DIASTOLIC BLOOD PRESSURE: 87 MMHG | HEIGHT: 64 IN | RESPIRATION RATE: 25 BRPM

## 2018-07-05 DIAGNOSIS — R07.9 CHEST PAIN, UNSPECIFIED TYPE: Primary | ICD-10-CM

## 2018-07-05 DIAGNOSIS — I10 HYPERTENSION, UNSPECIFIED TYPE: Chronic | ICD-10-CM

## 2018-07-05 LAB
ANION GAP SERPL CALC-SCNC: 9 MMOL/L (ref 5–15)
BASOPHILS # BLD: 0.1 K/UL (ref 0–0.1)
BASOPHILS NFR BLD: 1 % (ref 0–1)
BUN SERPL-MCNC: 15 MG/DL (ref 6–20)
BUN/CREAT SERPL: 14 (ref 12–20)
CALCIUM SERPL-MCNC: 9.5 MG/DL (ref 8.5–10.1)
CHLORIDE SERPL-SCNC: 95 MMOL/L (ref 97–108)
CO2 SERPL-SCNC: 26 MMOL/L (ref 21–32)
CREAT SERPL-MCNC: 1.05 MG/DL (ref 0.55–1.02)
D DIMER PPP FEU-MCNC: 0.3 MG/L FEU (ref 0–0.65)
DIFFERENTIAL METHOD BLD: ABNORMAL
EOSINOPHIL # BLD: 0.3 K/UL (ref 0–0.4)
EOSINOPHIL NFR BLD: 3 % (ref 0–7)
ERYTHROCYTE [DISTWIDTH] IN BLOOD BY AUTOMATED COUNT: 12.5 % (ref 11.5–14.5)
GLUCOSE SERPL-MCNC: 420 MG/DL (ref 65–100)
HCG UR QL: NEGATIVE
HCT VFR BLD AUTO: 41.2 % (ref 35–47)
HGB BLD-MCNC: 14.7 G/DL (ref 11.5–16)
IMM GRANULOCYTES # BLD: 0 K/UL (ref 0–0.04)
IMM GRANULOCYTES NFR BLD AUTO: 0 % (ref 0–0.5)
LYMPHOCYTES # BLD: 3.3 K/UL (ref 0.8–3.5)
LYMPHOCYTES NFR BLD: 31 % (ref 12–49)
MCH RBC QN AUTO: 29.7 PG (ref 26–34)
MCHC RBC AUTO-ENTMCNC: 35.7 G/DL (ref 30–36.5)
MCV RBC AUTO: 83.2 FL (ref 80–99)
MONOCYTES # BLD: 0.7 K/UL (ref 0–1)
MONOCYTES NFR BLD: 6 % (ref 5–13)
NEUTS SEG # BLD: 6.3 K/UL (ref 1.8–8)
NEUTS SEG NFR BLD: 59 % (ref 32–75)
NRBC # BLD: 0 K/UL (ref 0–0.01)
NRBC BLD-RTO: 0 PER 100 WBC
PLATELET # BLD AUTO: 441 K/UL (ref 150–400)
PMV BLD AUTO: 9.5 FL (ref 8.9–12.9)
POTASSIUM SERPL-SCNC: 4.4 MMOL/L (ref 3.5–5.1)
RBC # BLD AUTO: 4.95 M/UL (ref 3.8–5.2)
SODIUM SERPL-SCNC: 130 MMOL/L (ref 136–145)
TROPONIN I SERPL-MCNC: <0.05 NG/ML
WBC # BLD AUTO: 10.6 K/UL (ref 3.6–11)

## 2018-07-05 PROCEDURE — 93005 ELECTROCARDIOGRAM TRACING: CPT

## 2018-07-05 PROCEDURE — 96365 THER/PROPH/DIAG IV INF INIT: CPT

## 2018-07-05 PROCEDURE — 74011000250 HC RX REV CODE- 250: Performed by: EMERGENCY MEDICINE

## 2018-07-05 PROCEDURE — 85025 COMPLETE CBC W/AUTO DIFF WBC: CPT | Performed by: EMERGENCY MEDICINE

## 2018-07-05 PROCEDURE — 36415 COLL VENOUS BLD VENIPUNCTURE: CPT | Performed by: EMERGENCY MEDICINE

## 2018-07-05 PROCEDURE — 99285 EMERGENCY DEPT VISIT HI MDM: CPT

## 2018-07-05 PROCEDURE — 96361 HYDRATE IV INFUSION ADD-ON: CPT

## 2018-07-05 PROCEDURE — 80048 BASIC METABOLIC PNL TOTAL CA: CPT | Performed by: EMERGENCY MEDICINE

## 2018-07-05 PROCEDURE — 96375 TX/PRO/DX INJ NEW DRUG ADDON: CPT

## 2018-07-05 PROCEDURE — 85379 FIBRIN DEGRADATION QUANT: CPT | Performed by: EMERGENCY MEDICINE

## 2018-07-05 PROCEDURE — 96366 THER/PROPH/DIAG IV INF ADDON: CPT

## 2018-07-05 PROCEDURE — 84484 ASSAY OF TROPONIN QUANT: CPT | Performed by: EMERGENCY MEDICINE

## 2018-07-05 PROCEDURE — 74011000258 HC RX REV CODE- 258: Performed by: EMERGENCY MEDICINE

## 2018-07-05 PROCEDURE — 71046 X-RAY EXAM CHEST 2 VIEWS: CPT

## 2018-07-05 PROCEDURE — 74011250636 HC RX REV CODE- 250/636: Performed by: EMERGENCY MEDICINE

## 2018-07-05 PROCEDURE — 74011250637 HC RX REV CODE- 250/637: Performed by: EMERGENCY MEDICINE

## 2018-07-05 PROCEDURE — 81025 URINE PREGNANCY TEST: CPT

## 2018-07-05 RX ORDER — HYDRALAZINE HYDROCHLORIDE 20 MG/ML
10 INJECTION INTRAMUSCULAR; INTRAVENOUS
Status: COMPLETED | OUTPATIENT
Start: 2018-07-05 | End: 2018-07-05

## 2018-07-05 RX ORDER — HYDROCODONE BITARTRATE AND ACETAMINOPHEN 5; 325 MG/1; MG/1
1 TABLET ORAL
Status: COMPLETED | OUTPATIENT
Start: 2018-07-05 | End: 2018-07-05

## 2018-07-05 RX ORDER — ONDANSETRON 2 MG/ML
4 INJECTION INTRAMUSCULAR; INTRAVENOUS
Status: COMPLETED | OUTPATIENT
Start: 2018-07-05 | End: 2018-07-05

## 2018-07-05 RX ORDER — LABETALOL HYDROCHLORIDE 5 MG/ML
20 INJECTION, SOLUTION INTRAVENOUS
Status: COMPLETED | OUTPATIENT
Start: 2018-07-05 | End: 2018-07-05

## 2018-07-05 RX ADMIN — HYDROCODONE BITARTRATE AND ACETAMINOPHEN 1 TABLET: 5; 325 TABLET ORAL at 18:11

## 2018-07-05 RX ADMIN — SODIUM CHLORIDE 1000 ML: 900 INJECTION, SOLUTION INTRAVENOUS at 18:33

## 2018-07-05 RX ADMIN — PROMETHAZINE HYDROCHLORIDE 25 MG: 25 INJECTION INTRAMUSCULAR; INTRAVENOUS at 20:06

## 2018-07-05 RX ADMIN — LABETALOL HYDROCHLORIDE 20 MG: 5 INJECTION INTRAVENOUS at 19:46

## 2018-07-05 RX ADMIN — ONDANSETRON 4 MG: 2 INJECTION, SOLUTION INTRAMUSCULAR; INTRAVENOUS at 18:31

## 2018-07-05 RX ADMIN — HYDRALAZINE HYDROCHLORIDE 10 MG: 20 INJECTION INTRAMUSCULAR; INTRAVENOUS at 20:55

## 2018-07-05 NOTE — ED NOTES
Patient reports hx of DVT and was on Coumadin for four years, no longer takes it. She does take ASA 81mg daily. She says she was given anticoagulant injections during her hospital stay last week.

## 2018-07-05 NOTE — LETTER
[address] [place] [address] Dr 
[facility] EMERGENCY DEPT 
[address] [facility] [address] [id number] [phone] Work/School Note Date: 7/5/2018 To Whom It May concern: 
 
Sharan Reynolds was seen and treated today in the emergency room by the following provider(s): 
Attending Provider: Nicole Strauss MD. Sharan Reynolds may return to work on 7/9/18. Sincerely, Nicole Strauss MD

## 2018-07-05 NOTE — ED TRIAGE NOTES
\"I have chest pain and back pain and shortness of breath, nausea, cold sweats. \" Patient was discharged from this hospital last week after being admitted for high blood pressure.

## 2018-07-05 NOTE — PROGRESS NOTES
Date of previous inpatient admission/ ED visit? 6/4 [facility] ED abscess of right breast  6/22 [facility] HTN urgency   7/2 [facility] recurrent acute serous otitis of left ear  What brought the patient back to ED?CSOB Chest ashley    Did patient decline recommended services during last admission/ ED visit (if yes, what)?n  Has patient seen a provider since their last inpatient admission/ED visit (if yes, when)? No    CM Interventions:  From previous inpatient admission/ED visit:  None  From current inpatient admission/ED visit:  CM met with pt in ED, reviewed EMR. Pt been to ED 9 times in past 6 months. Pt reports to be self care, work full time, drives. Pt has CCM [employer]. Pt reports she had to recently change PCP from Whitney Walker as she could not get an appointment. And Is now going to [address], No NN  Pt given dispatch health information    Care Management Interventions  PCP Verified by CM:  Yes  Transition of Care Consult (CM Consult): Discharge Planning  Current Support Network: Lives with Spouse  Confirm Follow Up Transport: Self  Plan discussed with Pt/Family/Caregiver: Yes  Discharge Location  Discharge Placement: Home

## 2018-07-05 NOTE — ED PROVIDER NOTES
HPI Comments: 34 y.o. female with past medical history significant for HTN, MRSA, anemia, hypothyroidism, and DM who presents from home with chief complaint of chest pain. Pt reports 8/10 chest pain radiating to her upper chest, shoulders, and upper back bilaterally since yesterday. She also c/o SOB, nausea, diaphoresis, and R leg swelling. Her SOB and chest pain are worsened w/ exertion. Pt states she was admitted 2 weeks ago for HTN; her medications were changed at that time. She now takes Norvasc, Coreg, and Clonidine. Pt reports prior hx of DVT after a MRSA infection which caused pressure on a blood vessel in 2010. She took warfarin for 4 years and has been taking 1 full-dose ASA daily since then. She has been taking all her medications w/ no missed doses. Her blood glucose has been ~200. Pt notes her BP is usually 140/90. Pt denies similar sx in the past. There are no other acute medical concerns at this time. PCP: Henry Calvin NP    Note written by Dieter Ervin, as dictated by Sharlene Atkinson MD 5:55 PM    The history is provided by the patient.         Past Medical History:   Diagnosis Date    Abscess     Anemia     Complicated migraine     with extremity numbness    Hx MRSA infection     Hypertension     onset 2009    Hypothyroidism     Irregular menstrual cycle     Obesity     T2DM (type 2 diabetes mellitus) ([facility] [address]     onset 2009       Past Surgical History:   Procedure Laterality Date    HX CHOLECYSTECTOMY      HX HEENT      HX TONSIL AND ADENOIDECTOMY  12    HX TYMPANOSTOMY           Family History:   Problem Relation Age of Onset    Hypertension Other      \"all her family\"    Diabetes Other      \"all her family\"    Cancer Mother     Stroke Mother     Heart Disease Mother    Jasmina Marilia Bladder Disease Mother     Diabetes Mother     Stroke Father     Heart Disease Father     Diabetes Father    Jasmina Marilia Bladder Disease Brother     Diabetes Brother     Migraines Maternal Aunt     Diabetes Paternal Aunt     Stroke Maternal Grandmother     Diabetes Maternal Grandmother     Stroke Maternal Grandfather     Diabetes Maternal Grandfather        Social History     Social History    Marital status:      Spouse name: N/A    Number of children: N/A    Years of education: N/A     Occupational History    Not on file. Social History Main Topics    Smoking status: Never Smoker    Smokeless tobacco: Never Used    Alcohol use Yes      Comment: socially 1-2 times a year     Drug use: No    Sexual activity: Yes     Other Topics Concern    Not on file     Social History Narrative         ALLERGIES: Pcn [penicillins]; Vancomycin; and Voltaren [diclofenac sodium]    Review of Systems   Constitutional: Positive for diaphoresis. Negative for chills and fever. Respiratory: Positive for shortness of breath. Cardiovascular: Positive for chest pain and leg swelling. Gastrointestinal: Positive for nausea. Negative for abdominal pain, constipation, diarrhea and vomiting. Musculoskeletal: Positive for back pain. Neurological: Negative for dizziness and light-headedness. All other systems reviewed and are negative. Vitals:    07/05/18 1741   BP: (!) 141/99   Pulse: 98   Resp: 18   Temp: 98.1 °F (36.7 °C)   SpO2: 98%   Weight: 106.6 kg (235 lb)   Height: 5' 4\" (1.626 m)            Physical Exam   Constitutional: She appears well-developed. No distress. HENT:   Head: Normocephalic and atraumatic. Eyes: Pupils are equal, round, and reactive to light. No scleral icterus. Neck: Normal range of motion. Neck supple. Cardiovascular: Normal rate and regular rhythm. Pulmonary/Chest: Effort normal and breath sounds normal.   Abdominal: Soft. She exhibits no distension. There is no tenderness. There is no rebound and no guarding. Musculoskeletal: Normal range of motion. She exhibits tenderness. Tender over trapezius muscles. Neurological: She is alert.    Skin: Skin is warm and dry. She is not diaphoretic. Psychiatric: She has a normal mood and affect. Her behavior is normal. Thought content normal.   Nursing note and vitals reviewed. Note written by Dieter De, as dictated by Leann Pedroza MD 5:55 PM    MDM  Number of Diagnoses or Management Options  Chest pain, unspecified type: established and worsening  Hypertension, unspecified type: established and worsening  Diagnosis management comments: Pt presents with HTN, SOB, and chest pain - she was recently admitted for the same. The patient is resting comfortably and feels better, is alert and in no distress. The repeat examination is unremarkable and benign. The electrocardiogram shows no signs of acute ischemia and the history, exam, diagnostic testing and current condition do not suggest that this patient is having an acute myocardial infarction, significant arrhythmia, unstable angina, esophageal perforation, pulmonary embolism (Dimer negative), aortic dissection, pneumothorax, severe pneumonia, sepsis or other significant pathology that would warrant further testing, continued ED treatment, admission, or cardiology or other specialist consultation at this point. The vital signs have been stable. The patient's condition is stable and appropriate for discharge. The patient will pursue further outpatient evaluation with the primary care physician, other designated physician or cardiologist tomorrow. The patient and/or caregivers have expressed a clear and thorough understanding and agree to follow up as instructed.           ED Course       Procedures    ED EKG interpretation:  Rhythm: normal sinus rhythm; Rate (approx.): 88; Axis: normal; ST/T wave: normal; no ectopy  Note written by Dieter De, as dictated by Leann Pedroza MD 5:55 PM

## 2018-07-06 ENCOUNTER — HOSPITAL ENCOUNTER (EMERGENCY)
Age: 33
Discharge: HOME | End: 2018-07-06
Attending: EMERGENCY MEDICINE | Admitting: EMERGENCY MEDICINE
Payer: COMMERCIAL

## 2018-07-06 ENCOUNTER — TELEPHONE (OUTPATIENT)
Dept: CARDIOLOGY CLINIC | Age: 33
End: 2018-07-06

## 2018-07-06 VITALS
OXYGEN SATURATION: 97 % | HEIGHT: 64 IN | TEMPERATURE: 97.9 F | RESPIRATION RATE: 20 BRPM | DIASTOLIC BLOOD PRESSURE: 94 MMHG | WEIGHT: 235 LBS | BODY MASS INDEX: 40.12 KG/M2 | HEART RATE: 97 BPM | SYSTOLIC BLOOD PRESSURE: 181 MMHG

## 2018-07-06 DIAGNOSIS — I10 HYPERTENSION, UNSPECIFIED TYPE: Primary | ICD-10-CM

## 2018-07-06 LAB
ANION GAP BLD CALC-SCNC: 18 MMOL/L (ref 10–20)
ATRIAL RATE: 88 BPM
BUN BLD-MCNC: 11 MG/DL (ref 9–20)
CA-I BLD-MCNC: 1.11 MMOL/L (ref 1.12–1.32)
CALCULATED P AXIS, ECG09: 26 DEGREES
CALCULATED R AXIS, ECG10: 1 DEGREES
CALCULATED T AXIS, ECG11: 15 DEGREES
CHLORIDE BLD-SCNC: 97 MMOL/L (ref 98–107)
CO2 BLD-SCNC: 25 MMOL/L (ref 21–32)
CREAT BLD-MCNC: 0.6 MG/DL (ref 0.6–1.3)
DIAGNOSIS, 93000: NORMAL
GLUCOSE BLD-MCNC: 366 MG/DL (ref 65–100)
HCT VFR BLD CALC: 42 % (ref 35–47)
P-R INTERVAL, ECG05: 166 MS
POTASSIUM BLD-SCNC: 3.7 MMOL/L (ref 3.5–5.1)
Q-T INTERVAL, ECG07: 392 MS
QRS DURATION, ECG06: 94 MS
QTC CALCULATION (BEZET), ECG08: 474 MS
SERVICE CMNT-IMP: ABNORMAL
SODIUM BLD-SCNC: 135 MMOL/L (ref 136–145)
TROPONIN I BLD-MCNC: <0.04 NG/ML (ref 0–0.08)
VENTRICULAR RATE, ECG03: 88 BPM

## 2018-07-06 PROCEDURE — 80047 BASIC METABLC PNL IONIZED CA: CPT

## 2018-07-06 PROCEDURE — 83835 ASSAY OF METANEPHRINES: CPT | Performed by: EMERGENCY MEDICINE

## 2018-07-06 PROCEDURE — 93005 ELECTROCARDIOGRAM TRACING: CPT

## 2018-07-06 PROCEDURE — 96374 THER/PROPH/DIAG INJ IV PUSH: CPT

## 2018-07-06 PROCEDURE — 74011000258 HC RX REV CODE- 258: Performed by: EMERGENCY MEDICINE

## 2018-07-06 PROCEDURE — 99285 EMERGENCY DEPT VISIT HI MDM: CPT

## 2018-07-06 PROCEDURE — 74011250636 HC RX REV CODE- 250/636: Performed by: EMERGENCY MEDICINE

## 2018-07-06 PROCEDURE — 74011636637 HC RX REV CODE- 636/637: Performed by: EMERGENCY MEDICINE

## 2018-07-06 PROCEDURE — 36415 COLL VENOUS BLD VENIPUNCTURE: CPT | Performed by: EMERGENCY MEDICINE

## 2018-07-06 PROCEDURE — 82088 ASSAY OF ALDOSTERONE: CPT | Performed by: EMERGENCY MEDICINE

## 2018-07-06 PROCEDURE — 84484 ASSAY OF TROPONIN QUANT: CPT

## 2018-07-06 PROCEDURE — 96375 TX/PRO/DX INJ NEW DRUG ADDON: CPT

## 2018-07-06 PROCEDURE — 96376 TX/PRO/DX INJ SAME DRUG ADON: CPT

## 2018-07-06 RX ORDER — CHLORTHALIDONE 25 MG/1
12.5 TABLET ORAL DAILY
Qty: 15 TAB | Refills: 0 | Status: SHIPPED | OUTPATIENT
Start: 2018-07-06 | End: 2018-07-10 | Stop reason: SDUPTHER

## 2018-07-06 RX ORDER — LOSARTAN POTASSIUM 50 MG/1
50 TABLET ORAL DAILY
Qty: 30 TAB | Refills: 0 | Status: SHIPPED | OUTPATIENT
Start: 2018-07-06 | End: 2018-08-05

## 2018-07-06 RX ORDER — HYDRALAZINE HYDROCHLORIDE 20 MG/ML
10 INJECTION INTRAMUSCULAR; INTRAVENOUS
Status: COMPLETED | OUTPATIENT
Start: 2018-07-06 | End: 2018-07-06

## 2018-07-06 RX ORDER — CARVEDILOL 6.25 MG/1
12.5 TABLET ORAL 2 TIMES DAILY WITH MEALS
Qty: 60 TAB | Refills: 0 | Status: SHIPPED | OUTPATIENT
Start: 2018-07-06 | End: 2022-08-24

## 2018-07-06 RX ADMIN — PROMETHAZINE HYDROCHLORIDE 12.5 MG: 25 INJECTION INTRAMUSCULAR; INTRAVENOUS at 18:08

## 2018-07-06 RX ADMIN — HYDRALAZINE HYDROCHLORIDE 10 MG: 20 INJECTION INTRAMUSCULAR; INTRAVENOUS at 19:11

## 2018-07-06 RX ADMIN — HUMAN INSULIN 10 UNITS: 100 INJECTION, SOLUTION SUBCUTANEOUS at 18:08

## 2018-07-06 RX ADMIN — HYDRALAZINE HYDROCHLORIDE 10 MG: 20 INJECTION INTRAMUSCULAR; INTRAVENOUS at 18:10

## 2018-07-06 NOTE — ED PROVIDER NOTES
HPI Comments: 34 y.o. female with past medical history significant for hypertension, obesity, and diabetes who presents from home via a private vehicle with chief complaint of chest pain. Pt reports onset of symptoms was yesterday. Pt reports the pain was moderate and is across her entire chest. Pt reports the chest pain has worsened to an 8/10 since onset and is now radiating down her left arm and into her back. Furthermore, pt reports having increased blood pressure for two weeks. Pt reports she called Dr. Cathy Noyola, her cadiologist, today for a follow up appointment and she was instructed to come to the ED for an evaluation. Of note, pt has been seen in the ED for similar symptoms yesterday. Pt denies fever, chills, cough, congestion, shortness of breath, abdominal pain, nausea, vomiting, diarrhea, difficulty with urination or dysuria. There are no other acute medical concerns at this time. Social hx - Tobacco use: never, Alcohol Use: social drinker    Note written by Dieter Carvajal, as dictated by Earlene Vincent MD 5:24 PM.        The history is provided by the patient. No  was used.         Past Medical History:   Diagnosis Date    Abscess     Anemia     Complicated migraine     with extremity numbness    Hx MRSA infection     Hypertension     onset 2009    Hypothyroidism     Irregular menstrual cycle     Obesity     T2DM (type 2 diabetes mellitus) ([facility] [address]     onset 2009       Past Surgical History:   Procedure Laterality Date    HX CHOLECYSTECTOMY      HX HEENT      HX TONSIL AND ADENOIDECTOMY  12    HX TYMPANOSTOMY           Family History:   Problem Relation Age of Onset    Hypertension Other      \"all her family\"    Diabetes Other      \"all her family\"    Cancer Mother     Stroke Mother     Heart Disease Mother    Susan Rios Bladder Disease Mother     Diabetes Mother     Stroke Father     Heart Disease Father     Diabetes Father    Susan Blanca Bladder Disease Brother     Diabetes Brother     Migraines Maternal Aunt     Diabetes Paternal Aunt     Stroke Maternal Grandmother     Diabetes Maternal Grandmother     Stroke Maternal Grandfather     Diabetes Maternal Grandfather        Social History     Social History    Marital status:      Spouse name: N/A    Number of children: N/A    Years of education: N/A     Occupational History    Not on file. Social History Main Topics    Smoking status: Never Smoker    Smokeless tobacco: Never Used    Alcohol use Yes      Comment: socially 1-2 times a year     Drug use: No    Sexual activity: Yes     Partners: Male     Birth control/ protection: None     Other Topics Concern    Not on file     Social History Narrative         ALLERGIES: Compazine [prochlorperazine]; Pcn [penicillins]; Vancomycin; and Voltaren [diclofenac sodium]    Review of Systems   Constitutional: Negative for chills and fever. HENT: Negative for congestion. Respiratory: Negative for cough and shortness of breath. Cardiovascular: Positive for chest pain. Gastrointestinal: Negative for abdominal pain, constipation, diarrhea and vomiting. Genitourinary: Negative for difficulty urinating and dysuria. Musculoskeletal: Negative for back pain. Neurological: Negative for dizziness and light-headedness. All other systems reviewed and are negative. Vitals:    07/06/18 1900 07/06/18 1911 07/06/18 1915 07/06/18 1930   BP: (!) 160/100 (!) 160/100 127/86 (!) 162/99   Pulse: 94 90 90 94   Resp: 24  22 21   Temp:       SpO2: 97%  97% 98%   Weight:       Height:                Physical Exam   Constitutional: She is oriented to person, place, and time. She appears well-developed and well-nourished. HENT:   Head: Normocephalic and atraumatic. Eyes: No scleral icterus. Neck: Normal range of motion. Cardiovascular: Normal rate. Pulmonary/Chest: Effort normal. No respiratory distress. Abdominal: She exhibits no distension. Neurological: She is alert and oriented to person, place, and time. Skin: No rash noted. No erythema. Nursing note and vitals reviewed. Note written by Marcos Severs, Scribe, as dictated by Vita Hare MD 5:24 PM.    MDM  Number of Diagnoses or Management Options  Hypertension, unspecified type: established and worsening  Diagnosis management comments: The patient is resting comfortably and feels better, is alert and in no distress. The repeat examination is unremarkable and benign. The electrocardiogram shows no signs of acute ischemia and the history, exam, diagnostic testing and current condition do not suggest that this patient is having an acute myocardial infarction, significant arrhythmia, unstable angina, esophageal perforation, pulmonary embolism, aortic dissection, pneumothorax, severe pneumonia, sepsis or other significant pathology that would warrant further testing, continued ED treatment, admission, or cardiology or other specialist consultation at this point. Discussed the recommended changes to her BP medication regimen and she will follow-up with cardiology on Tuesday as scheduled for continued management of her chronic HTN. Patient expressed understanding and agreed with plan. ED Course       Procedures  CONSULT NOTE:  5:17 PM Vita Hare MD spoke with Dr. Xenia Oneal, Consult for Cardiology. Discussed available diagnostic tests and clinical findings. Dr. Xenia Oneal recommends increasing Coreg dose to 12.5 mg, adding Chlorthalidone 12.5 mg daily, restarting Losartan 50 mg, checking her Aldosterone/Renin ratio and encouraging a low salt/high potassium diet at home. Dr. Xenia Oneal thinks this should help her control her hypertension at home. PROGRESS NOTE:  7:47 PM  Went over pt's prescriptions with her. Pt was confused about which medications she needed to be taking at home. 7:48 PM  Patient's results have been reviewed with them.   Patient and/or family have verbally conveyed their understanding and agreement of the patient's signs, symptoms, diagnosis, treatment and prognosis and additionally agree to follow up as recommended or return to the Emergency Room should their condition change prior to follow-up. Discharge instructions have also been provided to the patient with some educational information regarding their diagnosis as well a list of reasons why they would want to return to the ER prior to their follow-up appointment should their condition change.

## 2018-07-06 NOTE — TELEPHONE ENCOUNTER
Pt IDx2 called and stated \" I have chest pain and radiating sown my Left arm. My /110. I was in the ER last night with the same c/o and fixed my BP and sent me home. \"  Pt told to go to ER now. Dr. Nathalie Espinoza made aware of situation.

## 2018-07-06 NOTE — ED TRIAGE NOTES
Continues to have chest pain which started yesterday across chest, down left arm and into back, hypertension x 2 weeks. Called her cardiologist today for follow up and was instructed to return to the ER.  Dr. Soha Sierra is her cardiologist.

## 2018-07-06 NOTE — DISCHARGE INSTRUCTIONS
High Blood Pressure: Care Instructions  Your Care Instructions    If your blood pressure is usually above 140/90, you have high blood pressure, or hypertension. That means the top number is 140 or higher or the bottom number is 90 or higher, or both. Despite what a lot of people think, high blood pressure usually doesn't cause headaches or make you feel dizzy or lightheaded. It usually has no symptoms. But it does increase your risk for heart attack, stroke, and kidney or eye damage. The higher your blood pressure, the more your risk increases. Your doctor will give you a goal for your blood pressure. Your goal will be based on your health and your age. An example of a goal is to keep your blood pressure below 140/90. Lifestyle changes, such as eating healthy and being active, are always important to help lower blood pressure. You might also take medicine to reach your blood pressure goal.  Follow-up care is a key part of your treatment and safety. Be sure to make and go to all appointments, and call your doctor if you are having problems. It's also a good idea to know your test results and keep a list of the medicines you take. How can you care for yourself at home? Medical treatment  · If you stop taking your medicine, your blood pressure will go back up. You may take one or more types of medicine to lower your blood pressure. Be safe with medicines. Take your medicine exactly as prescribed. Call your doctor if you think you are having a problem with your medicine. · Talk to your doctor before you start taking aspirin every day. Aspirin can help certain people lower their risk of a heart attack or stroke. But taking aspirin isn't right for everyone, because it can cause serious bleeding. · See your doctor regularly. You may need to see the doctor more often at first or until your blood pressure comes down.   · If you are taking blood pressure medicine, talk to your doctor before you take decongestants or anti-inflammatory medicine, such as ibuprofen. Some of these medicines can raise blood pressure. · Learn how to check your blood pressure at home. Lifestyle changes  · Stay at a healthy weight. This is especially important if you put on weight around the waist. Losing even 10 pounds can help you lower your blood pressure. · If your doctor recommends it, get more exercise. Walking is a good choice. Bit by bit, increase the amount you walk every day. Try for at least 30 minutes on most days of the week. You also may want to swim, bike, or do other activities. · Avoid or limit alcohol. Talk to your doctor about whether you can drink any alcohol. · Try to limit how much sodium you eat to less than 2,300 milligrams (mg) a day. Your doctor may ask you to try to eat less than 1,500 mg a day. · Eat plenty of fruits (such as bananas and oranges), vegetables, legumes, whole grains, and low-fat dairy products. · Lower the amount of saturated fat in your diet. Saturated fat is found in animal products such as milk, cheese, and meat. Limiting these foods may help you lose weight and also lower your risk for heart disease. · Do not smoke. Smoking increases your risk for heart attack and stroke. If you need help quitting, talk to your doctor about stop-smoking programs and medicines. These can increase your chances of quitting for good. When should you call for help? Call 911 anytime you think you may need emergency care. This may mean having symptoms that suggest that your blood pressure is causing a serious heart or blood vessel problem. Your blood pressure may be over 180/110. ? For example, call 911 if:  ? · You have symptoms of a heart attack. These may include:  ¨ Chest pain or pressure, or a strange feeling in the chest.  ¨ Sweating. ¨ Shortness of breath. ¨ Nausea or vomiting.   ¨ Pain, pressure, or a strange feeling in the back, neck, jaw, or upper belly or in one or both shoulders or arms.  ¨ Lightheadedness or sudden weakness. ¨ A fast or irregular heartbeat. ? · You have symptoms of a stroke. These may include:  ¨ Sudden numbness, tingling, weakness, or loss of movement in your face, arm, or leg, especially on only one side of your body. ¨ Sudden vision changes. ¨ Sudden trouble speaking. ¨ Sudden confusion or trouble understanding simple statements. ¨ Sudden problems with walking or balance. ¨ A sudden, severe headache that is different from past headaches. ? · You have severe back or belly pain. ?Do not wait until your blood pressure comes down on its own. Get help right away. ?Call your doctor now or seek immediate care if:  ? · Your blood pressure is much higher than normal (such as 180/110 or higher), but you don't have symptoms. ? · You think high blood pressure is causing symptoms, such as:  ¨ Severe headache. ¨ Blurry vision. ? Watch closely for changes in your health, and be sure to contact your doctor if:  ? · Your blood pressure measures 140/90 or higher at least 2 times. That means the top number is 140 or higher or the bottom number is 90 or higher, or both. ? · You think you may be having side effects from your blood pressure medicine. ? · Your blood pressure is usually normal, but it goes above normal at least 2 times. Where can you learn more? Go to http://kong-delma.info/. Enter X184 in the search box to learn more about \"High Blood Pressure: Care Instructions. \"  Current as of: September 21, 2016  Content Version: 11.4  © 2985-0822 [employer]. Care instructions adapted under license by [employer] (which disclaims liability or warranty for this information). If you have questions about a medical condition or this instruction, always ask your healthcare professional. [place] [address] any warranty or liability for your use of this information.

## 2018-07-08 LAB
ATRIAL RATE: 95 BPM
CALCULATED P AXIS, ECG09: 31 DEGREES
CALCULATED R AXIS, ECG10: 17 DEGREES
CALCULATED T AXIS, ECG11: 10 DEGREES
DIAGNOSIS, 93000: NORMAL
P-R INTERVAL, ECG05: 172 MS
Q-T INTERVAL, ECG07: 380 MS
QRS DURATION, ECG06: 94 MS
QTC CALCULATION (BEZET), ECG08: 477 MS
VENTRICULAR RATE, ECG03: 95 BPM

## 2018-07-10 ENCOUNTER — OFFICE VISIT (OUTPATIENT)
Dept: CARDIOLOGY CLINIC | Age: 33
End: 2018-07-10

## 2018-07-10 VITALS
OXYGEN SATURATION: 98 % | SYSTOLIC BLOOD PRESSURE: 140 MMHG | BODY MASS INDEX: 39.75 KG/M2 | DIASTOLIC BLOOD PRESSURE: 100 MMHG | HEART RATE: 93 BPM | HEIGHT: 64 IN | WEIGHT: 232.8 LBS

## 2018-07-10 DIAGNOSIS — E66.01 OBESITY, MORBID (HCC): Chronic | ICD-10-CM

## 2018-07-10 DIAGNOSIS — E11.21 TYPE 2 DIABETES MELLITUS WITH NEPHROPATHY (HCC): Chronic | ICD-10-CM

## 2018-07-10 DIAGNOSIS — I16.0 HYPERTENSIVE URGENCY: ICD-10-CM

## 2018-07-10 DIAGNOSIS — E11.29 MICROALBUMINURIA DUE TO TYPE 2 DIABETES MELLITUS (HCC): ICD-10-CM

## 2018-07-10 DIAGNOSIS — R80.9 MICROALBUMINURIA DUE TO TYPE 2 DIABETES MELLITUS (HCC): ICD-10-CM

## 2018-07-10 DIAGNOSIS — I10 ESSENTIAL HYPERTENSION: Primary | Chronic | ICD-10-CM

## 2018-07-10 LAB
METANEPH FREE SERPL-MCNC: 10 PG/ML (ref 0–62)
NORMETANEPHRINE SERPL-MCNC: 42 PG/ML (ref 0–145)

## 2018-07-10 RX ORDER — INSULIN ASPART 100 [IU]/ML
35 INJECTION, SOLUTION INTRAVENOUS; SUBCUTANEOUS
COMMUNITY
End: 2021-10-12 | Stop reason: SDUPTHER

## 2018-07-10 NOTE — PROGRESS NOTES
[address]  [address]  [place], [address] [facility]    Office [phone]  Fax [phone]  Cell [phone]        Jonh Alvarado is a 34 y.o. female here for follow up of hospitalization 6/22/18-6/25/18 with HTN urgency      Assessment  Encounter Diagnoses   Name Primary?  Hypertensive urgency     Essential hypertension Yes    Obesity, morbid ([address]     Type 2 diabetes mellitus with nephropathy ([address]     Microalbuminuria due to type 2 diabetes mellitus ([address]        Recommendations:    Chronic severe HTN with recent hypertensive urgency. BP much improved with intensified medicines. She feels the thiazide diuretic has made a big difference. Will increase Chlorthalidone to 25 mg/D. Continue other medications. Continue Home BP monitoring. Reinforced the importance of low sodium diet, healthy weight loss and diabetes control. Recent chest pain syndrome likely due to HTN. Stress Echo normal.     T2DM. She would benefit from SGL2 inhibitor/GLP1 agonist. She will be making an endocrinology appointment in the near future. Morbid Obesity. Follow-up Disposition:  Return in about 6 weeks (around 8/21/2018). Subjective:    Ms. Merrill Cage was hospitalized 6/22-6/25/18. I saw her in consult when she was hospitalized. Her BP was optimized. Exercise echo was normal. She was then seen again in the ED 7/5/18 with chest pain. EKG and Troponin were normal.     Today, patient states her medications were changed in the ED. She is now adherent with Coreg 6.25 mg BID, Cozaar 50 mg, Chlorthalidone 12.5 mg daily, Clonidine 0.1 mg BID, and Amlodipine 10 mg daily. Since then, she states she has not had any symptoms and she has lost 6 lbs. She has significantly decreased her intake of salt. Patient has been working from home [place]). She has been eating a clean diet. She does not drink soda. She does not exercise regularly.     Patient has been losing weight (states she was previously 350 lbs). She has a history of DM and HTN. She is interested in becoming pregnant in the future. She would like to follow up with an endocrinologist.     Patient denies any palpitations, syncope, orthopnea, edema or paroxysmal nocturnal dyspnea. Cardiac risk factors   HTN yes  DM yes  Smoking no    Cardiac testing  Stress echo 6/25/18: No EKG changes of ischemia at 71% MPHR; Normal stress echo    Past Medical History:   Diagnosis Date    Abscess     Anemia     Complicated migraine     with extremity numbness    Hx MRSA infection     Hypertension     onset 2009    Hypothyroidism     Irregular menstrual cycle     Obesity     T2DM (type 2 diabetes mellitus) ([facility] [address]     onset 2009        Current Outpatient Prescriptions   Medication Sig Dispense Refill    insulin aspart U-100 (NOVOLOG FLEXPEN U-100 INSULIN) 100 unit/mL inpn 35 Units by SubCUTAneous route.  carvedilol (COREG) 6.25 mg tablet Take 2 Tabs by mouth two (2) times daily (with meals). 60 Tab 0    losartan (COZAAR) 50 mg tablet Take 1 Tab by mouth daily for 30 days. 30 Tab 0    chlorthalidone (HYGROTEN) 25 mg tablet Take 0.5 Tabs by mouth daily for 30 days. 15 Tab 0    amoxicillin-clavulanate (AUGMENTIN) 875-125 mg per tablet Take 1 Tab by mouth two (2) times a day for 7 days. 14 Tab 0    cloNIDine HCl (CATAPRES) 0.1 mg tablet Take 1 Tab by mouth two (2) times a day. 60 Tab 0    insulin detemir U-100 (LEVEMIR FLEXTOUCH) 100 unit/mL (3 mL) inpn 45 Units by SubCUTAneous route nightly. (Patient taking differently: 65 Units by SubCUTAneous route nightly.) 10 Pen 1    amLODIPine (NORVASC) 10 mg tablet Take 1 Tab by mouth daily. 60 Tab 0    levothyroxine (SYNTHROID) 100 mcg tablet Take 1 Tab by mouth Daily (before breakfast). (Patient taking differently: Take 125 mcg by mouth Daily (before breakfast). ) 20 Tab 3    ferrous sulfate 325 mg (65 mg iron) tablet Take 1 Tab by mouth daily (with lunch).  30 Tab 3    aspirin delayed-release 81 mg tablet Take 1 Tab by mouth daily. 30 Tab 3    metFORMIN ER (GLUCOPHAGE XR) 750 mg tablet Take 1 Tab by mouth daily (with dinner). (Patient taking differently: Take 750 mg by mouth daily.) 30 Tab 1    multivitamin (ONE A DAY) tablet Take 1 Tab by mouth daily. Allergies   Allergen Reactions    Compazine [Prochlorperazine] Anxiety    Pcn [Penicillins] Rash    Vancomycin Other (comments)     Kidneys shut down    Voltaren [Diclofenac Sodium] Myalgia and Other (comments)     \"muscle spasms\"      Review of Systems  Constitutional: Negative for fever, chills, malaise/fatigue and diaphoresis. Respiratory: Negative for cough, hemoptysis, sputum production, shortness of breath and wheezing. Cardiovascular: Negative for chest pain, palpitations, orthopnea, claudication, leg swelling and PND. Gastrointestinal: Negative for heartburn, nausea, vomiting, blood in stool and melena. Genitourinary: Negative for dysuria and flank pain. Musculoskeletal: Negative for joint pain and back pain. Skin: Negative for rash. Neurological: Negative for focal weakness, seizures, loss of consciousness, weakness and headaches. Endo/Heme/Allergies: Does not bruise/bleed easily. Psychiatric/Behavioral: Negative for memory loss. The patient does not have insomnia. Physical Exam    Visit Vitals    BP (!) 140/100 (BP 1 Location: Left arm, BP Patient Position: Sitting)    Pulse 93    Ht 5' 4\" (1.626 m)    Wt 232 lb 12.8 oz (105.6 kg)    LMP 06/21/2018    SpO2 98%    BMI 39.96 kg/m2     Wt Readings from Last 3 Encounters:   07/10/18 232 lb 12.8 oz (105.6 kg)   07/06/18 235 lb (106.6 kg)   07/05/18 235 lb (106.6 kg)      General - well developed well nourished, obese WF  Neck - JVP normal, thyroid nl  Cardiac - normal S1, S2, no murmurs, rubs or gallops.  No clicks  Vascular - carotids without bruits, radials, femorals and pedal pulses equal bilateral  Lungs - clear to auscultation bilaterals, no rales, wheezing or rhonchi  Abd - soft nontender, no HSM, no abd bruits  Extremities - no edema  Skin - no rash  Neuro - nonfocal  Psych - normal mood and affect      Cardiographics      Written by Tj Lancaster, as dictated by Dr. Erin Vela.    Erin Vela MD

## 2018-07-10 NOTE — MR AVS SNAPSHOT
[address] [facility] [address] [facility] 
[phone] Patient: Sharan Reynolds MRN: [id number] :1985 Visit Information Date & Time Provider Department Dept. Phone Encounter #  
 7/10/2018  2:40 PM Erin Vela MD [facility] [phone] [id number] Follow-up Instructions Return in about 6 weeks (around 2018). Your Appointments 2018  2:40 PM  
New Patient with Erin Vela MD  
[facility] Appt Note: 6 wk fu  
 [address] [facility] [address] [facility]  
[address] [facility] [address] [address] Upcoming Health Maintenance Date Due  
 EYE EXAM RETINAL OR DILATED Q1 10/20/1994 Pneumococcal 19-64 Medium Risk (1 of 1 - PPSV23) 10/20/2003 PAP AKA CERVICAL CYTOLOGY 10/20/2005 Influenza Age 5 to Adult 2018 FOOT EXAM Q1 2018 MICROALBUMIN Q1 2018 LIPID PANEL Q1 2018 HEMOGLOBIN A1C Q6M 2018 DTaP/Tdap/Td series (2 - Td) 2021 Allergies as of 7/10/2018  Review Complete On: 7/10/2018 By: Shabana Steiner  
  
 Severity Noted Reaction Type Reactions Compazine [Prochlorperazine]  2018    Anxiety Pcn [Penicillins]  2012    Rash Vancomycin  2014    Other (comments) Kidneys shut down Voltaren [Diclofenac Sodium]  2012    Myalgia, Other (comments) \"muscle spasms\" Current Immunizations  Reviewed on 2017 Name Date Tdap 2011 Not reviewed this visit You Were Diagnosed With   
  
 Codes Comments Hypertensive urgency    -  Primary ICD-10-CM: I16.0 ICD-9-CM: 401.9 Type 2 diabetes mellitus with hyperglycemia, unspecified whether long term insulin use (HCC)     ICD-10-CM: E11.65 ICD-9-CM: 250.00 Vitals BP Pulse Height(growth percentile) Weight(growth percentile) LMP SpO2  
 (!) 140/100 (BP 1 Location: Left arm, BP Patient Position: Sitting) 93 5' 4\" (1.626 m) 232 lb 12.8 oz (105.6 kg) 06/21/2018 98% BMI OB Status Smoking Status 39.96 kg/m2 Having regular periods Never Smoker Vitals History BMI and BSA Data Body Mass Index Body Surface Area  
 39.96 kg/m 2 2.18 m 2 Preferred Pharmacy Pharmacy Name Phone [facility] [address] [phone] Your Updated Medication List  
  
   
This list is accurate as of 7/10/18  3:08 PM.  Always use your most recent med list. amLODIPine 10 mg tablet Commonly known as:  Gracie Pace Take 1 Tab by mouth daily. amoxicillin-clavulanate 875-125 mg per tablet Commonly known as:  AUGMENTIN Take 1 Tab by mouth two (2) times a day for 7 days. aspirin delayed-release 81 mg tablet Take 1 Tab by mouth daily. carvedilol 6.25 mg tablet Commonly known as:  Phineas Hoar Take 2 Tabs by mouth two (2) times daily (with meals). chlorthalidone 25 mg tablet Commonly known as:  Agueda Juniper Take 0.5 Tabs by mouth daily for 30 days. cloNIDine HCl 0.1 mg tablet Commonly known as:  CATAPRES Take 1 Tab by mouth two (2) times a day. ferrous sulfate 325 mg (65 mg iron) tablet Take 1 Tab by mouth daily (with lunch). insulin detemir U-100 100 unit/mL (3 mL) Inpn Commonly known as:  LEVEMIR FLEXTOUCH  
45 Units by SubCUTAneous route nightly. levothyroxine 100 mcg tablet Commonly known as:  SYNTHROID Take 1 Tab by mouth Daily (before breakfast). losartan 50 mg tablet Commonly known as:  COZAAR Take 1 Tab by mouth daily for 30 days. metFORMIN  mg tablet Commonly known as:  GLUCOPHAGE XR Take 1 Tab by mouth daily (with dinner). multivitamin tablet Commonly known as:  ONE A DAY Take 1 Tab by mouth daily. NovoLOG Flexpen U-100 Insulin 100 unit/mL Inpn Generic drug:  insulin aspart U-100  
35 Units by SubCUTAneous route. Follow-up Instructions Return in about 6 weeks (around 8/21/2018). Patient Instructions Increase Chlorothalidone to 25 mg daily. Try to increase walking to 10,000 steps/day Introducing [facility] SERVICES! Dear Stan Mary: Thank you for requesting a Peonut account. Our records indicate that you already have an active Peonut account. You can access your account anytime at [email or web address] Did you know that you can access your hospital and ER discharge instructions at any time in Peonut? You can also review all of your test results from your hospital stay or ER visit. Additional Information If you have questions, please visit the Frequently Asked Questions section of the Peonut website at [email or web address] Remember, Peonut is NOT to be used for urgent needs. For medical emergencies, dial 911. Now available from your iPhone and Android! Please provide this summary of care documentation to your next provider. Your primary care clinician is listed as Cameron Estrada. If you have any questions after today's visit, please call [phone].

## 2018-07-10 NOTE — PROGRESS NOTES
Visit Vitals    BP (!) 140/100 (BP 1 Location: Left arm, BP Patient Position: Sitting)    Pulse 93    Ht 5' 4\" (1.626 m)    Wt 232 lb 12.8 oz (105.6 kg)    SpO2 98%    BMI 39.96 kg/m2

## 2018-07-16 ENCOUNTER — TELEPHONE (OUTPATIENT)
Dept: CARDIOLOGY CLINIC | Age: 33
End: 2018-07-16

## 2018-07-16 LAB
ALDOST SERPL-MCNC: 4.6 NG/DL (ref 0–30)
RENIN PLAS-CCNC: 1.28 NG/ML/HR (ref 0.17–5.38)
SPECIMEN SOURCE: NORMAL

## 2018-07-16 NOTE — TELEPHONE ENCOUNTER
Pt (IDx2) called in to office concerning leg swelling and chest pain. Pt states that since starting on her [place] she has been experiencing leg swelling and today she has started to have some intermittent sharp chest pain. No other cardiac symptoms at this time. Her BP this AM (before meds) was 154/100. After taking all cardiac meds, it is 140/90. BP averages 145-155/ on a daily basis for the past few weeks. Pt states that she was unable to go to work today d/t the uncomfortable swelling and chest pain and she would like a note excusing her from work. She would also like to know if she needs to come in for another office visit.      Will forward to CALIXTO Mistry

## 2018-07-16 NOTE — TELEPHONE ENCOUNTER
Pt (IDx2) called back in to office concerning a work note. Pt states that she needs to know what to do, so that she can move on with her plans. This nurse spoke with Fanta Reece NP and Dr. Rachelle Puckett whom would like the pt to come into office for a visit this week (not today because it is the end of day and these symptoms are not new). Medication changes made per VO of Fanta Reece NP. Decrease Amlodipine to 5mg daily and increase Clonidine to 0.1mg TID. Discussed changes with pt. Attempted to make appt with pt, but she refused to make appt for later in the week because she needs a work excuse for today. 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 Advised that would make pt an appt to see Dr. Rachelle Puckett and the note for today would be addressed (back-dated) at that time. Pt not satisfied with this answer. She states \"This is ridiculous. I will just go to the hospital [ED] to get a note. \" Pt hung up on this nurse.

## 2018-07-22 PROBLEM — R07.9 CHEST PAIN: Status: RESOLVED | Noted: 2018-06-25 | Resolved: 2018-07-22

## 2018-07-22 PROBLEM — I10 HTN (HYPERTENSION): Chronic | Status: RESOLVED | Noted: 2017-05-25 | Resolved: 2018-07-22

## 2018-07-22 PROBLEM — R50.9 FEVER CHILLS: Status: RESOLVED | Noted: 2017-09-14 | Resolved: 2018-07-22

## 2018-07-22 PROBLEM — N61.1 BREAST ABSCESS: Status: RESOLVED | Noted: 2017-09-14 | Resolved: 2018-07-22

## 2018-07-22 PROBLEM — E11.9 TYPE 2 DIABETES MELLITUS WITHOUT COMPLICATION, WITHOUT LONG-TERM CURRENT USE OF INSULIN (HCC): Status: ACTIVE | Noted: 2018-07-22

## 2018-07-22 PROBLEM — E66.9 OBESITY: Status: RESOLVED | Noted: 2017-05-25 | Resolved: 2018-07-22

## 2018-07-22 PROBLEM — E87.20 LACTIC ACIDEMIA: Status: RESOLVED | Noted: 2017-09-14 | Resolved: 2018-07-22

## 2018-07-22 PROBLEM — E11.9 TYPE 2 DIABETES MELLITUS WITHOUT COMPLICATION, WITHOUT LONG-TERM CURRENT USE OF INSULIN (HCC): Status: RESOLVED | Noted: 2018-07-22 | Resolved: 2018-07-22

## 2018-07-22 RX ORDER — CHLORTHALIDONE 25 MG/1
25 TABLET ORAL DAILY
Qty: 30 TAB | Refills: 0
Start: 2018-07-22 | End: 2018-08-21

## 2018-07-23 ENCOUNTER — TELEPHONE (OUTPATIENT)
Dept: CARDIOLOGY CLINIC | Age: 33
End: 2018-07-23

## 2018-07-23 NOTE — TELEPHONE ENCOUNTER
Patients been having some complications along with palpitations she needs advice   Phone: [phone]

## 2018-07-25 NOTE — TELEPHONE ENCOUNTER
Pt IDX2 pt Stated she called our on call MD on 7/22 and he did not call back and she went to ER for swelling in legs. Questioned about swelling now and she was better without treatment from ER. I encourage her to call her PCP with further concerns.

## 2019-05-25 ENCOUNTER — HOSPITAL ENCOUNTER (EMERGENCY)
Age: 34
Discharge: HOME | End: 2019-05-25
Attending: EMERGENCY MEDICINE | Admitting: EMERGENCY MEDICINE
Payer: COMMERCIAL

## 2019-05-25 VITALS
RESPIRATION RATE: 20 BRPM | BODY MASS INDEX: 39.6 KG/M2 | TEMPERATURE: 98.7 F | DIASTOLIC BLOOD PRESSURE: 89 MMHG | WEIGHT: 231.92 LBS | HEIGHT: 64 IN | HEART RATE: 90 BPM | OXYGEN SATURATION: 96 % | SYSTOLIC BLOOD PRESSURE: 184 MMHG

## 2019-05-25 DIAGNOSIS — H92.02 OTALGIA, LEFT EAR: Primary | ICD-10-CM

## 2019-05-25 DIAGNOSIS — H93.8X2 EAR CONGESTION, LEFT: ICD-10-CM

## 2019-05-25 PROCEDURE — 99282 EMERGENCY DEPT VISIT SF MDM: CPT

## 2019-05-25 RX ORDER — PSEUDOEPHEDRINE HCL 30 MG
30 TABLET ORAL
Qty: 30 TAB | Refills: 0 | Status: SHIPPED | OUTPATIENT
Start: 2019-05-25 | End: 2022-02-03

## 2019-05-25 RX ORDER — IBUPROFEN 800 MG/1
800 TABLET ORAL
Qty: 20 TAB | Refills: 0 | Status: SHIPPED | OUTPATIENT
Start: 2019-05-25 | End: 2019-06-01

## 2019-05-25 RX ORDER — ACETAMINOPHEN 500 MG
1000 TABLET ORAL
Qty: 20 TAB | Refills: 0 | Status: SHIPPED | OUTPATIENT
Start: 2019-05-25 | End: 2022-06-26

## 2019-05-25 NOTE — ED NOTES
The patient was discharged home by Dr Du Jean in stable condition. The patient is alert and oriented, in no respiratory distress. The patient's diagnosis, condition and treatment were explained. The patient expressed understanding. A discharge plan has been developed. A  was not involved in the process. Aftercare instructions were given. Pt ambulatory out of the ED.

## 2019-05-25 NOTE — ED TRIAGE NOTES
Patient ambulatory to ED treatment area with steady gait, for complaint of \"My left ear is bothering me and started about a month ago. \"

## 2019-05-25 NOTE — LETTER
[facility] EMERGENCY DEPT 
[address] [place] [address] [phone] 
[phone] Work/School Note Date: 5/25/2019 To Whom It May concern: 
 
Suzan Ramon was seen and treated today in the emergency room by the following provider(s): 
Attending Provider: Boo Carey MD. Suzan Ramon may return to work on May 26, 2019.  
 
Sincerely, 
 
 
 
 
Janette Stanton RN

## 2019-05-27 NOTE — ED PROVIDER NOTES
The history is provided by the patient. Ear Pain    This is a chronic problem. The current episode started more than 1 week ago. The problem occurs constantly. The problem has not changed since onset. Patient complains that the left ear is affected. There has been no fever. The pain is mild. Pertinent negatives include no ear discharge, no hearing loss, no sore throat, no neck pain and no cough. Her past medical history is significant for chronic ear infection (recurrent).         Past Medical History:   Diagnosis Date    Abscess     Anemia     Complicated migraine     with extremity numbness    Hx MRSA infection     Hypertension     onset 2009    Hypothyroidism     Irregular menstrual cycle     Obesity     T2DM (type 2 diabetes mellitus) ([facility] [address]     onset 2009       Past Surgical History:   Procedure Laterality Date    HX CHOLECYSTECTOMY      HX HEENT      HX TONSIL AND ADENOIDECTOMY  1992    HX TYMPANOSTOMY           Family History:   Problem Relation Age of Onset    Hypertension Other         \"all her family\"    Diabetes Other         \"all her family\"    Cancer Mother     Stroke Mother     Heart Disease Mother    Nayeli Man Bladder Disease Mother     Diabetes Mother     Stroke Father     Heart Disease Father     Diabetes Father    Nayeli Man Bladder Disease Brother     Diabetes Brother     Migraines Maternal Aunt     Diabetes Paternal Aunt     Stroke Maternal Grandmother     Diabetes Maternal Grandmother     Stroke Maternal Grandfather     Diabetes Maternal Grandfather        Social History     Socioeconomic History    Marital status:      Spouse name: Not on file    Number of children: Not on file    Years of education: Not on file    Highest education level: Not on file   Occupational History    Not on file   Social Needs    Financial resource strain: Not on file    Food insecurity:     Worry: Not on file     Inability: Not on file    Transportation needs:     Medical: Not on file     Non-medical: Not on file   Tobacco Use    Smoking status: Never Smoker    Smokeless tobacco: Never Used   Substance and Sexual Activity    Alcohol use: Yes     Comment: socially 1-2 times a year     Drug use: No    Sexual activity: Yes     Partners: Male     Birth control/protection: None   Lifestyle    Physical activity:     Days per week: Not on file     Minutes per session: Not on file    Stress: Not on file   Relationships    Social connections:     Talks on phone: Not on file     Gets together: Not on file     Attends Shinto service: Not on file     Active member of club or organization: Not on file     Attends meetings of clubs or organizations: Not on file     Relationship status: Not on file    Intimate partner violence:     Fear of current or ex partner: Not on file     Emotionally abused: Not on file     Physically abused: Not on file     Forced sexual activity: Not on file   Other Topics Concern    Not on file   Social History Narrative    Not on file         ALLERGIES: Compazine [prochlorperazine]; Pcn [penicillins]; Vancomycin; and Voltaren [diclofenac sodium]    Review of Systems   HENT: Positive for ear pain. Negative for ear discharge, hearing loss and sore throat. Respiratory: Negative for cough. Musculoskeletal: Negative for neck pain. All other systems reviewed and are negative. Vitals:    05/25/19 1156 05/25/19 1300   BP: (!) 204/119 184/89   Pulse: 90    Resp: 20    Temp: 98.7 °F (37.1 °C)    SpO2: 96%    Weight: 105.2 kg (231 lb 14.8 oz)    Height: 5' 4\" (1.626 m)             Physical Exam   Constitutional: She appears well-developed and well-nourished. No distress. HENT:   Head: Normocephalic and atraumatic. Right Ear: Tympanic membrane is scarred. Tympanic membrane is not injected and not bulging. No middle ear effusion. No hemotympanum. Left Ear: Tympanic membrane is injected and scarred. Tympanic membrane is not bulging. No middle ear effusion.  No hemotympanum. Mouth/Throat: Oropharynx is clear and moist.   Eyes: Conjunctivae are normal.   Neck: Neck supple. Cardiovascular: Normal rate and regular rhythm. Pulmonary/Chest: Effort normal. No respiratory distress. Abdominal: She exhibits no distension. Musculoskeletal: Normal range of motion. She exhibits no deformity. Neurological: She is alert. No cranial nerve deficit. Skin: Skin is warm and dry. Psychiatric: Her behavior is normal.   Nursing note and vitals reviewed. MDM     28 y.o. female presents with left ear pain that has been ongoing. Has previous chronic recurrent infections. TM looks injected but no signs of OM or OE. Provided instructions for supportive care measures. Referral to ENT for monitoring of symptoms and further treatment. No indication for ABx currently and provided decongestants for home.      Procedures

## 2021-05-10 ENCOUNTER — APPOINTMENT (OUTPATIENT)
Dept: GENERAL RADIOLOGY | Age: 36
End: 2021-05-10
Attending: EMERGENCY MEDICINE
Payer: MEDICAID

## 2021-05-10 ENCOUNTER — HOSPITAL ENCOUNTER (EMERGENCY)
Age: 36
Discharge: HOME | End: 2021-05-10
Attending: EMERGENCY MEDICINE
Payer: MEDICAID

## 2021-05-10 VITALS
HEIGHT: 64 IN | OXYGEN SATURATION: 98 % | BODY MASS INDEX: 40.12 KG/M2 | SYSTOLIC BLOOD PRESSURE: 138 MMHG | HEART RATE: 88 BPM | TEMPERATURE: 97.8 F | DIASTOLIC BLOOD PRESSURE: 92 MMHG | RESPIRATION RATE: 18 BRPM | WEIGHT: 235 LBS

## 2021-05-10 DIAGNOSIS — R07.9 CHEST PAIN, UNSPECIFIED TYPE: Primary | ICD-10-CM

## 2021-05-10 LAB
ATRIAL RATE: 84 BPM
CALCULATED P AXIS, ECG09: 31 DEGREES
CALCULATED R AXIS, ECG10: -42 DEGREES
CALCULATED T AXIS, ECG11: 16 DEGREES
DIAGNOSIS, 93000: NORMAL
GLUCOSE BLD STRIP.AUTO-MCNC: 427 MG/DL (ref 65–100)
P-R INTERVAL, ECG05: 192 MS
Q-T INTERVAL, ECG07: 400 MS
QRS DURATION, ECG06: 94 MS
QTC CALCULATION (BEZET), ECG08: 472 MS
SERVICE CMNT-IMP: ABNORMAL
VENTRICULAR RATE, ECG03: 84 BPM

## 2021-05-10 PROCEDURE — 82962 GLUCOSE BLOOD TEST: CPT

## 2021-05-10 PROCEDURE — 99284 EMERGENCY DEPT VISIT MOD MDM: CPT

## 2021-05-10 PROCEDURE — 93005 ELECTROCARDIOGRAM TRACING: CPT

## 2021-05-10 PROCEDURE — 71045 X-RAY EXAM CHEST 1 VIEW: CPT

## 2021-05-10 NOTE — ED TRIAGE NOTES
Patient arrives with complaint of left sided chest pain radiating to right side of chest, up right side of neck, and down right arm. States chest pain present x 4 hours. Further reports N/V, and elevated blood sugar. Reports home glucometer reading \"high\"    Reports hx of open heart surgery 2019. States has chronic sternal pain. 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 Reports taking 2 Nitroglycerin tablets and a percocet PTA. States no relief. Denies abdominal pain, SOB, urinary changes, headache.

## 2021-05-10 NOTE — ED PROVIDER NOTES
3:49 PM  I have evaluated the patient as the Provider in Triage. I have reviewed Her vital signs and the triage nurse assessment. I have talked with the patient and any available family and advised that I am the provider in triage and have ordered the appropriate study to initiate their work up based on the clinical presentation during my assessment. I have advised that the patient will be accommodated in the Main ED as soon as possible. I have also requested to contact the triage nurse or myself immediately if the patient experiences any changes in their condition during this brief waiting period. Patient evaluated briefly while she was in the chair way in the hallway. She has a history of a cardiac arrest in 2018 after a CABG. She arrives with pain similar that goes down her right arm. She has had associated nausea, diaphoresis, shortness of breath, leg swelling. Pain started 4 to 5 hours ago. She rates it 9 out of 10. She took 2 nitroglycerin tablets and Percocet prior to arrival with no relief. She denies any abdominal pain, urinary changes, headache. Ashok Young. Edith Juarez MD          Chest Pain (Angina)   Associated symptoms include nausea. Nausea     High Blood Sugar   Associated symptoms include nausea and chest pain.         Past Medical History:   Diagnosis Date    Abscess     Anemia     Complicated migraine     with extremity numbness    Hx MRSA infection     Hypertension     onset 2009    Hypothyroidism     Irregular menstrual cycle     Obesity     T2DM (type 2 diabetes mellitus) ([facility] [address]     onset 2009       Past Surgical History:   Procedure Laterality Date    HX CHOLECYSTECTOMY      HX HEENT      HX TONSIL AND ADENOIDECTOMY  12    HX TYMPANOSTOMY           Family History:   Problem Relation Age of Onset    Hypertension Other         \"all her family\"    Diabetes Other         \"all her family\"    Cancer Mother     Stroke Mother     Heart Disease Mother    Starlett Pain Bladder Disease Mother     Diabetes Mother     Stroke Father     Heart Disease Father     Diabetes Father    Yesenia De Jesus Bladder Disease Brother     Diabetes Brother     Migraines Maternal Aunt     Diabetes Paternal Aunt     Stroke Maternal Grandmother     Diabetes Maternal Grandmother     Stroke Maternal Grandfather     Diabetes Maternal Grandfather        Social History     Socioeconomic History    Marital status:      Spouse name: Not on file    Number of children: Not on file    Years of education: Not on file    Highest education level: Not on file   Occupational History    Not on file   Social Needs    Financial resource strain: Not on file    Food insecurity     Worry: Not on file     Inability: Not on file   [employer] needs     Medical: Not on file     Non-medical: Not on file   Tobacco Use    Smoking status: Never Smoker    Smokeless tobacco: Never Used   Substance and Sexual Activity    Alcohol use: Yes     Comment: socially 1-2 times a year     Drug use: No    Sexual activity: Yes     Partners: Male     Birth control/protection: None   Lifestyle    Physical activity     Days per week: Not on file     Minutes per session: Not on file    Stress: Not on file   Relationships    Social connections     Talks on phone: Not on file     Gets together: Not on file     Attends Moravian service: Not on file     Active member of club or organization: Not on file     Attends meetings of clubs or organizations: Not on file     Relationship status: Not on file    Intimate partner violence     Fear of current or ex partner: Not on file     Emotionally abused: Not on file     Physically abused: Not on file     Forced sexual activity: Not on file   Other Topics Concern    Not on file   Social History Narrative    Not on file         ALLERGIES: Compazine [prochlorperazine], Pcn [penicillins], Vancomycin, and Voltaren [diclofenac sodium]    Review of Systems   Cardiovascular: Positive for chest pain.   Gastrointestinal: Positive for nausea. All other systems reviewed and are negative. Vitals:    05/10/21 1522   BP: (!) 138/92   Pulse: 88   Resp: 18   Temp: 97.8 °F (36.6 °C)   SpO2: 98%   Weight: 106.6 kg (235 lb)   Height: 5' 4\" (1.626 m)            Physical Exam  Vitals signs and nursing note reviewed. Constitutional:       General: She is not in acute distress. HENT:      Head: Normocephalic and atraumatic. Mouth/Throat:      Mouth: Mucous membranes are moist.   Eyes:      General: No scleral icterus. Conjunctiva/sclera: Conjunctivae normal.      Pupils: Pupils are equal, round, and reactive to light. Neck:      Musculoskeletal: Neck supple. Trachea: No tracheal deviation. Cardiovascular:      Rate and Rhythm: Normal rate and regular rhythm. Pulmonary:      Effort: Pulmonary effort is normal. No respiratory distress. Breath sounds: No wheezing or rales. Abdominal:      General: There is no distension. Palpations: Abdomen is soft. Tenderness: There is no abdominal tenderness. Genitourinary:     Comments: deferred  Musculoskeletal:         General: No deformity. Right lower leg: No edema. Left lower leg: No edema. Skin:     General: Skin is warm and dry. Neurological:      General: No focal deficit present. Mental Status: She is alert. Psychiatric:         Mood and Affect: Mood normal.          MDM  Number of Diagnoses or Management Options  Chest pain, unspecified type  Diagnosis management comments: 54-year-old female with a history of cardiac arrest in 2018, CAD status post CABG presents with chest pain similar to her prior episode with cardiac arrest.  Patient was taken to a room and put on the monitor. Nursing staff had difficulty obtaining an IV. I attempted bilateral EJ's. I was unsuccessful. Tried under ultrasound guidance. We ordered respiratory therapy to do an art stick. Patient refused.   I advised her that we would attempt again to obtain an IV but we need to do blood work to see if that she was having a heart attack. Her EKG was nonischemic. Patient refused any further treatment. She understood the risks and was able to verbalize them back to me which include death and permanent disability. She wanted to go to another hospital.  I advised her to go right there if she was going to leave. Also advised her to follow-up with her cardiologist and a primary care doctor. She ambulated out of the emergency department in no distress. ED Course as of May 10 2337   Mon May 10, 2021   137 62-year-old female presents with chest pain that is similar to her cardiac arrest back in 2018. Cardiac monitoring, labs, chest x-ray ordered. [TT]   1555 EKG performed at 1431 shows normal sinus rhythm at rate 84, normal intervals, left axis deviation, prior inferior infarct, prior anterior infarct, nonspecific T wave abnormalities in the inferior and anterior lateral leads.     [TT]      ED Course User Index  [TT] Reyna Vale MD       Procedures

## 2021-10-12 ENCOUNTER — OFFICE VISIT (OUTPATIENT)
Dept: ENDOCRINOLOGY | Age: 36
End: 2021-10-12
Payer: MEDICAID

## 2021-10-12 VITALS
RESPIRATION RATE: 20 BRPM | HEIGHT: 64 IN | HEART RATE: 91 BPM | DIASTOLIC BLOOD PRESSURE: 88 MMHG | WEIGHT: 223 LBS | BODY MASS INDEX: 38.07 KG/M2 | SYSTOLIC BLOOD PRESSURE: 128 MMHG | TEMPERATURE: 97.9 F | OXYGEN SATURATION: 98 %

## 2021-10-12 DIAGNOSIS — G62.9 POLYNEUROPATHY, UNSPECIFIED: ICD-10-CM

## 2021-10-12 DIAGNOSIS — E11.65 TYPE 2 DIABETES MELLITUS WITH HYPERGLYCEMIA, WITH LONG-TERM CURRENT USE OF INSULIN (HCC): ICD-10-CM

## 2021-10-12 DIAGNOSIS — Z79.4 TYPE 2 DIABETES MELLITUS WITH HYPERGLYCEMIA, WITH LONG-TERM CURRENT USE OF INSULIN (HCC): ICD-10-CM

## 2021-10-12 DIAGNOSIS — E11.65 TYPE 2 DIABETES MELLITUS WITH HYPERGLYCEMIA, UNSPECIFIED WHETHER LONG TERM INSULIN USE (HCC): Primary | ICD-10-CM

## 2021-10-12 DIAGNOSIS — E78.2 MIXED HYPERLIPIDEMIA: ICD-10-CM

## 2021-10-12 PROCEDURE — 99205 OFFICE O/P NEW HI 60 MIN: CPT | Performed by: INTERNAL MEDICINE

## 2021-10-12 RX ORDER — SENNOSIDES 8.6 MG/1
1 TABLET ORAL AS NEEDED
COMMUNITY

## 2021-10-12 RX ORDER — DULAGLUTIDE 0.75 MG/.5ML
0.75 INJECTION, SOLUTION SUBCUTANEOUS
Qty: 4 EACH | Refills: 11 | Status: SHIPPED | OUTPATIENT
Start: 2021-10-12 | End: 2022-03-16 | Stop reason: ALTCHOICE

## 2021-10-12 RX ORDER — ATORVASTATIN CALCIUM 20 MG/1
20 TABLET, FILM COATED ORAL
COMMUNITY

## 2021-10-12 RX ORDER — NITROGLYCERIN 0.4 MG/1
0.4 TABLET SUBLINGUAL
COMMUNITY

## 2021-10-12 RX ORDER — METHOCARBAMOL 750 MG/1
750 TABLET, FILM COATED ORAL 2 TIMES DAILY
COMMUNITY

## 2021-10-12 RX ORDER — RANOLAZINE 1000 MG/1
500 TABLET, EXTENDED RELEASE ORAL 2 TIMES DAILY
COMMUNITY
End: 2022-08-24

## 2021-10-12 RX ORDER — LISINOPRIL 2.5 MG/1
2.5 TABLET ORAL DAILY
COMMUNITY

## 2021-10-12 RX ORDER — INSULIN ASPART 100 [IU]/ML
INJECTION, SOLUTION INTRAVENOUS; SUBCUTANEOUS
Qty: 45 ML | Refills: 3 | Status: SHIPPED | OUTPATIENT
Start: 2021-10-12 | End: 2022-03-16 | Stop reason: ALTCHOICE

## 2021-10-12 NOTE — PROGRESS NOTES
[facility]               Zoë Cortez MD          Patient Information Name : Irwin Song 38 y.o.   YOB: 1985         Referred by: Mac York DO         Chief Complaint   Patient presents with    New Patient     referred for DM       History of Present Illness: Irwin Song is a 38 y.o. female here for initial visit of  Diabetes Mellitus. Diabetes mellitus was diagnosed in 2009 . End organ effects of diabetes: peripheral neuropathy, gastroparesis (unsure of the diagnosis),CKD. History of CAD,CABG,CVA  She is on Levemir 25 units, ran out of NovoLog. Could not tolerate metformin  She is on [place]. Has Dexcom G6 which she got it after she had several episodes of hypoglycemia according to the history. She feels that the blood glucose have been higher later in the afternoon. Prior history of severe hypoglycemia. Weight trend: stable  Prior visit with dietician: yes -   Current diet: \"healthy\" diet  in general  Current exercise: no regular exercise    Eye exam current (within one year): yes  ERNESTO: no     No chest pain,blurred vision  No history of pancreatitis    Wt Readings from Last 3 Encounters:   10/12/21 223 lb (101.2 kg)   05/10/21 235 lb (106.6 kg)   05/25/19 231 lb 14.8 oz (105.2 kg)       BP Readings from Last 3 Encounters:   10/12/21 128/88   05/10/21 (!) 138/92   05/25/19 184/89           Past Medical History:   Diagnosis Date    Abscess     Anemia     CAD (coronary artery disease)     Complicated migraine     with extremity numbness    Heart attack ([facility] [address] 11/2019    Hx MRSA infection     Hypertension     onset 2009    Hypothyroidism     Irregular menstrual cycle     Obesity     T2DM (type 2 diabetes mellitus) ([facility] [address]     onset 2009     Current Outpatient Medications   Medication Sig    ticagrelor (Brilinta) 90 mg tablet Take 90 mg by mouth two (2) times a day.     atorvastatin (LIPITOR) 20 mg tablet Take 20 mg by mouth daily.    nitroglycerin (NITROSTAT) 0.4 mg SL tablet 0.4 mg by SubLINGual route every five (5) minutes as needed for Chest Pain. Up to 3 doses.  methocarbamoL (ROBAXIN) 750 mg tablet Take 750 mg by mouth as needed for Muscle Spasm(s).  senna (Senna) 8.6 mg tablet Take 1 Tablet by mouth as needed for Constipation.  lisinopriL (PRINIVIL, ZESTRIL) 2.5 mg tablet Take 2.5 mg by mouth daily.  empagliflozin (Jardiance) 10 mg tablet Take 10 mg by mouth daily.  ranolazine ER (Ranexa) 1,000 mg Take 1,000 mg by mouth two (2) times a day.  blood-glucose sensor (DEXCOM G6 SENSOR) by Does Not Apply route.  acetaminophen (TYLENOL) 500 mg tablet Take 2 Tabs by mouth every six (6) hours as needed for Pain.  carvedilol (COREG) 6.25 mg tablet Take 2 Tabs by mouth two (2) times daily (with meals).  levothyroxine (SYNTHROID) 100 mcg tablet Take 1 Tab by mouth Daily (before breakfast). (Patient taking differently: Take 137 mcg by mouth Daily (before breakfast). )    aspirin delayed-release 81 mg tablet Take 1 Tab by mouth daily.  dulaglutide (Trulicity) 3.27 SL/7.1 mL sub-q pen 0.5 mL by SubCUTAneous route every seven (7) days. Stop Januvia    insulin detemir U-100 (LEVEMIR FLEXTOUCH) 100 unit/mL (3 mL) inpn Inject 30 units in AM and 30  units at bed time    insulin aspart U-100 (NovoLOG Flexpen U-100 Insulin) 100 unit/mL (3 mL) inpn Inject 10- 15 units before breakfast, 10- 15 units before lunch and 10 - 15 units before dinner.  pseudoephedrine (SUDAFED) 30 mg tablet Take 1 Tab by mouth every four (4) hours as needed for Congestion. (Patient not taking: Reported on 10/12/2021)    multivitamin (ONE A DAY) tablet Take 1 Tab by mouth daily. (Patient not taking: Reported on 10/12/2021)     No current facility-administered medications for this visit.      Allergies   Allergen Reactions    Ciprofloxacin Anaphylaxis    Bumetanide Other (comments)     Hearing loss    Compazine [Prochlorperazine] Anxiety  Pcn [Penicillins] Rash    Vancomycin Other (comments)     Kidneys shut down    Voltaren [Diclofenac Sodium] Myalgia and Other (comments)     \"muscle spasms\"       Review of Systems:  Per HPI    Physical Examination:   Blood pressure 128/88, pulse 91, temperature 97.9 °F (36.6 °C), temperature source Temporal, resp. rate 20, height 5' 4\" (1.626 m), weight 223 lb (101.2 kg), SpO2 98 %. Estimated body mass index is 38.28 kg/m² as calculated from the following:    Height as of this encounter: 5' 4\" (1.626 m). -   Weight as of this encounter: 223 lb (101.2 kg). - General: pleasant, no distress, good eye contact  - HEENT: no pallor, no periorbital edema, EOMI  - Neck: supple, no thyromegaly, no nodules  - Cardiovascular: regular,  normal S1 and S2,   - Respiratory: clear to auscultation bilaterally  - Gastrointestinal: soft, nontender, nondistended,  - Musculoskeletal: no edema  - Neurological: alert and oriented  -   - No foot ulcers, no callus          Data Reviewed:        [x] Reviewed labs    Lab Results   Component Value Date/Time    Hemoglobin A1c 9.6 (H) 06/23/2018 06:01 AM    Hemoglobin A1c 10.0 (H) 12/22/2017 08:57 AM    Hemoglobin A1c 9.1 (H) 09/14/2017 01:56 PM    Glucose 420 (H) 07/05/2018 06:22 PM    Glucose (POC) 427 (H) 05/10/2021 03:25 PM    Microalb/Creat ratio (ug/mg creat.) 96.6 (H) 12/22/2017 09:30 AM    LDL, calculated 100 (H) 12/22/2017 08:57 AM    Creatinine (POC) 0.6 07/06/2018 05:23 PM    Creatinine 1.05 (H) 07/05/2018 06:22 PM          Assessment/Plan:       ICD-10-CM ICD-9-CM   1. Type 2 diabetes mellitus with hyperglycemia, unspecified whether long term insulin use (HCC)  E11.65 250.00   2. Mixed hyperlipidemia  E78.2 272.2   3. Polyneuropathy, unspecified  G62.9 356.9        1. Type 2 Diabetes Mellitus   Lab Results   Component Value Date/Time    Hemoglobin A1c 9.6 (H) 06/23/2018 06:01 AM   A1c 9.9, longstanding history of uncontrolled diabetes mellitus with several comorbidities. Tight glycemic control will be a challenge. Cannot tolerate Metformin. Trulicity, side effects discussed, she did not have gastric emptying study, based on the symptoms gastroparesis was diagnosed. Intermittently has nausea already but she is willing to try Trulicity due to the benefits. Levemir 30 units twice daily, as it does not seem to be lasting longer, until she gets Lantus  NovoLog 10 to 15 units before meals  Trulicity 9.82 mg weekly, discontinue Januvia, continue Jardiance    Continuous glucose monitor data downloaded for 2 weeks and reviewed with the patient  Noted postprandial hyperglycemia  No severe hypoglycemia  Insulin adjusted    She is interested in insulin pump, currently insulin requirement is high, this will require frequent changing of the insulin pump site, will treat with Trulicity to see if we can reduce the insulin requirement. Diabetic issues reviewed : glycemic goals , written exchange diet given, low carbohydrate diet, weight control , home glucose monitoring emphasized,  hypoglycemia management and long term diabetic complications discussed. 2. HTN : Continue current therapy     3. Hyperlipidemia : Statin    4. Obesity per medical criteria :Body mass index is 38.28 kg/m². Discussed about the importance of activity and carbohydrate portion control. 5.  CAD/CABG    6. CKD    7. Peripheral neuropathy    8. Diabetic retinopathy    Spent > 60 minutes on the day of the visit reviewing chart, examining, ordering/reviewing labs, counseling, discussing therapeutics and documentation in the medical record    Patient Instructions   Check blood sugars before meals and at bedtime. Low blood glucose is less than 70     Goal of blood glucose before meals 90 - 120 , 2  Hours after meals less than 180     Maintain the log and bring it all your appointments    If the bedtime sugars are less than 100 ,eat a 15 gm snack.      Levemir 30 units in AM and 30  units at bed time    Novolog or Humalog or Apidra insulin (fast acting) 10- 15  units before breakfast, 10- 15  units before lunch and 10 - 15  units before dinner. If sugars before meals are less than 70 then no insulin     Start Trulicity weekly                     Follow-up and Dispositions    · Return in about 2 weeks (around 10/26/2021). Thank you for allowing me to participate in the care of this patient. Donavan Rojo MD      Patient verbalized understanding     Voice-recognition software was used to generate this report, which may result in some phonetic-based errors in the grammar and contents. Even though attempts were made to correct all the mistakes, some may have been missed and remained in the body of the report.

## 2021-10-12 NOTE — LETTER
10/12/2021    Patient: Citlali Cohn   YOB: 1985   Date of Visit: 10/12/2021     Cynthia Barraza DO  [address] [facility] [address]  Via Fax: [phone]    Dear Cynthia Barraza DO,      Thank you for referring Ms. Citlali Cohn to [address] [place] [address] for evaluation. My notes for this consultation are attached. If you have questions, please do not hesitate to call me. I look forward to following your patient along with you.       Sincerely,    Kash Sanchez MD

## 2021-10-12 NOTE — PROGRESS NOTES
Magdalene Gaucher is a 38 y.o. female here for   Chief Complaint   Patient presents with    New Patient     referred for DM       1. Have you been to the ER, urgent care clinic since your last visit? Hospitalized since your last visit? -n/a    2. Have you seen or consulted any other health care providers outside of the [address] [place] [address] since your last visit?   Include any pap smears or colon screening.-n/a

## 2021-10-12 NOTE — PATIENT INSTRUCTIONS
Check blood sugars before meals and at bedtime. Low blood glucose is less than 70     Goal of blood glucose before meals 90 - 120 , 2  Hours after meals less than 180     Maintain the log and bring it all your appointments    If the bedtime sugars are less than 100 ,eat a 15 gm snack. Levemir 30 units in AM and 30  units at bed time    Novolog or Humalog or Apidra insulin (fast acting) 10- 15  units before breakfast, 10- 15  units before lunch and 10 - 15  units before dinner.    If sugars before meals are less than 70 then no insulin     Start Trulicity weekly

## 2021-11-12 PROBLEM — E78.5 HLD (HYPERLIPIDEMIA): Status: ACTIVE | Noted: 2021-03-12

## 2021-11-12 PROBLEM — I25.10 ATHEROSCLEROSIS OF CORONARY ARTERY: Status: ACTIVE | Noted: 2021-03-12

## 2021-11-12 PROBLEM — Z95.1 S/P CABG (CORONARY ARTERY BYPASS GRAFT): Status: ACTIVE | Noted: 2021-03-12

## 2022-01-27 ENCOUNTER — TRANSCRIBE ORDER (OUTPATIENT)
Dept: REGISTRATION | Age: 37
End: 2022-01-27

## 2022-01-27 DIAGNOSIS — Z01.812 PRE-PROCEDURE LAB EXAM: Primary | ICD-10-CM

## 2022-02-03 ENCOUNTER — HOSPITAL ENCOUNTER (OUTPATIENT)
Dept: PREADMISSION TESTING | Age: 37
Discharge: HOME | End: 2022-02-03
Payer: MEDICAID

## 2022-02-03 VITALS
OXYGEN SATURATION: 97 % | TEMPERATURE: 97.9 F | BODY MASS INDEX: 37.37 KG/M2 | DIASTOLIC BLOOD PRESSURE: 74 MMHG | SYSTOLIC BLOOD PRESSURE: 110 MMHG | WEIGHT: 218.92 LBS | HEART RATE: 83 BPM | HEIGHT: 64 IN

## 2022-02-03 LAB
ANION GAP SERPL CALC-SCNC: 7 MMOL/L (ref 5–15)
BASOPHILS # BLD: 0.1 K/UL (ref 0–0.1)
BASOPHILS NFR BLD: 0 % (ref 0–1)
BUN SERPL-MCNC: 19 MG/DL (ref 6–20)
BUN/CREAT SERPL: 16 (ref 12–20)
CALCIUM SERPL-MCNC: 9.9 MG/DL (ref 8.5–10.1)
CHLORIDE SERPL-SCNC: 103 MMOL/L (ref 97–108)
CO2 SERPL-SCNC: 23 MMOL/L (ref 21–32)
CREAT SERPL-MCNC: 1.22 MG/DL (ref 0.55–1.02)
DIFFERENTIAL METHOD BLD: ABNORMAL
EOSINOPHIL # BLD: 0.3 K/UL (ref 0–0.4)
EOSINOPHIL NFR BLD: 2 % (ref 0–7)
ERYTHROCYTE [DISTWIDTH] IN BLOOD BY AUTOMATED COUNT: 15.3 % (ref 11.5–14.5)
EST. AVERAGE GLUCOSE BLD GHB EST-MCNC: 166 MG/DL
GLUCOSE SERPL-MCNC: 181 MG/DL (ref 65–100)
HBA1C MFR BLD: 7.4 % (ref 4–5.6)
HCT VFR BLD AUTO: 43.9 % (ref 35–47)
HGB BLD-MCNC: 14.4 G/DL (ref 11.5–16)
IMM GRANULOCYTES # BLD AUTO: 0.1 K/UL (ref 0–0.04)
IMM GRANULOCYTES NFR BLD AUTO: 0 % (ref 0–0.5)
LYMPHOCYTES # BLD: 2.8 K/UL (ref 0.8–3.5)
LYMPHOCYTES NFR BLD: 24 % (ref 12–49)
MCH RBC QN AUTO: 28.2 PG (ref 26–34)
MCHC RBC AUTO-ENTMCNC: 32.8 G/DL (ref 30–36.5)
MCV RBC AUTO: 85.9 FL (ref 80–99)
MONOCYTES # BLD: 0.9 K/UL (ref 0–1)
MONOCYTES NFR BLD: 8 % (ref 5–13)
NEUTS SEG # BLD: 7.7 K/UL (ref 1.8–8)
NEUTS SEG NFR BLD: 66 % (ref 32–75)
NRBC # BLD: 0 K/UL (ref 0–0.01)
NRBC BLD-RTO: 0 PER 100 WBC
PLATELET # BLD AUTO: 493 K/UL (ref 150–400)
PMV BLD AUTO: 10.5 FL (ref 8.9–12.9)
POTASSIUM SERPL-SCNC: 4.7 MMOL/L (ref 3.5–5.1)
RBC # BLD AUTO: 5.11 M/UL (ref 3.8–5.2)
SODIUM SERPL-SCNC: 133 MMOL/L (ref 136–145)
WBC # BLD AUTO: 11.7 K/UL (ref 3.6–11)

## 2022-02-03 PROCEDURE — 93005 ELECTROCARDIOGRAM TRACING: CPT

## 2022-02-03 PROCEDURE — 80048 BASIC METABOLIC PNL TOTAL CA: CPT

## 2022-02-03 PROCEDURE — 85025 COMPLETE CBC W/AUTO DIFF WBC: CPT

## 2022-02-03 PROCEDURE — 36415 COLL VENOUS BLD VENIPUNCTURE: CPT

## 2022-02-03 PROCEDURE — 83036 HEMOGLOBIN GLYCOSYLATED A1C: CPT

## 2022-02-03 RX ORDER — BISACODYL 5 MG
5 TABLET, DELAYED RELEASE (ENTERIC COATED) ORAL DAILY PRN
COMMUNITY

## 2022-02-03 RX ORDER — ONDANSETRON 4 MG/1
4 TABLET, FILM COATED ORAL
COMMUNITY
End: 2022-06-26

## 2022-02-03 NOTE — PERIOP NOTES
[address] INSTRUCTIONS    Surgery Date:   2-10-22    [facility] staff will call you between 4 PM- 8 PM the day before surgery with your arrival time. If your surgery is on a Monday, we will call you the preceding Friday. Please call [phone] after 8 PM if you did not receive your arrival time. 1. Please report to [facility] Patient Access/Admitting on the 1st floor. Bring your insurance card, photo identification, and any copayment ( if applicable). 2. If you are going home the same day of your surgery, you must have a responsible adult to drive you home. You need to have a responsible adult to stay with you the first 24 hours after surgery and you should not drive a car for 24 hours following your surgery. 3. Nothing to eat or drink after midnight the night before surgery. This includes no water, gum, mints, coffee, juice, etc.  Please note special instructions, if applicable, below for medications. 4. Do NOT drink alcohol or smoke 24 hours before surgery. STOP smoking for 14 days prior as it helps with breathing and healing after surgery. 5. If you are being admitted to the hospital, please leave personal belongings/luggage in your car until you have an assigned hospital room number. 6. Please wear comfortable clothes. Wear your glasses instead of contacts. We ask that all money, jewelry and valuables be left at home. Wear no make up, particularly mascara, the day of surgery. 7.  All body piercings, rings, and jewelry need to be removed and left at home. Please remove any nail polish or artificial nails from your fingernails. Please wear your hair loose or down. Please no pony-tails, buns, or any metal hair accessories. If you shower the morning of surgery, please do not apply any lotions or powders afterwards. You may wear deodorant, unless having breast surgery. Do not shave any body area within 24 hours of your surgery.   8. Please follow all instructions to avoid any potential surgical cancellation. 9. Should your physical condition change, (i.e. fever, cold, flu, etc.) please notify your surgeon as soon as possible. 10. It is important to be on time. If a situation occurs where you may be delayed, please call:  [phone] / [phone] on the day of surgery. 11. The Preadmission Testing staff can be reached at [phone]. 12. Special instructions: NONE      Current Outpatient Medications   Medication Sig    bisacodyL (Dulcolax, bisacodyl,) 5 mg EC tablet Take 5 mg by mouth daily as needed for Constipation.  ondansetron hcl (Zofran) 4 mg tablet Take 4 mg by mouth every eight (8) hours as needed for Nausea or Vomiting.  ticagrelor (Brilinta) 90 mg tablet Take 90 mg by mouth two (2) times a day.  atorvastatin (LIPITOR) 20 mg tablet Take 20 mg by mouth nightly.  nitroglycerin (NITROSTAT) 0.4 mg SL tablet 0.4 mg by SubLINGual route every five (5) minutes as needed for Chest Pain. Up to 3 doses.  methocarbamoL (ROBAXIN) 750 mg tablet Take 750 mg by mouth as needed for Muscle Spasm(s).  senna (Senna) 8.6 mg tablet Take 1 Tablet by mouth as needed for Constipation.  lisinopriL (PRINIVIL, ZESTRIL) 2.5 mg tablet Take 2.5 mg by mouth daily.  empagliflozin (Jardiance) 10 mg tablet Take 10 mg by mouth daily.  ranolazine ER (Ranexa) 1,000 mg Take 500 mg by mouth two (2) times a day.  blood-glucose sensor (DEXCOM G6 SENSOR) by Does Not Apply route.  dulaglutide (Trulicity) 7.89 HW/7.8 mL sub-q pen 0.5 mL by SubCUTAneous route every seven (7) days. Stop Januvia (Patient taking differently: 0.75 mg by SubCUTAneous route every seven (7) days.  TAKES EVERY Monday EVENING)    insulin detemir U-100 (LEVEMIR FLEXTOUCH) 100 unit/mL (3 mL) inpn Inject 30 units in AM and 30  units at bed time    insulin aspart U-100 (NovoLOG Flexpen U-100 Insulin) 100 unit/mL (3 mL) inpn Inject 10- 15 units before breakfast, 10- 15 units before lunch and 10 - 15 units before dinner.  carvedilol (COREG) 6.25 mg tablet Take 2 Tabs by mouth two (2) times daily (with meals).  levothyroxine (SYNTHROID) 100 mcg tablet Take 1 Tab by mouth Daily (before breakfast). (Patient taking differently: Take 137 mcg by mouth Daily (before breakfast). )    aspirin delayed-release 81 mg tablet Take 1 Tab by mouth daily.  acetaminophen (TYLENOL) 500 mg tablet Take 2 Tabs by mouth every six (6) hours as needed for Pain. (Patient not taking: Reported on 2/3/2022)     No current facility-administered medications for this encounter. 1. YOU MUST ONLY TAKE THESE MEDICATIONS THE MORNING OF SURGERY WITH A SIP OF WATER: CARVEDILOL, LEVOTHYROXINE, RANEXA  2. MEDICATIONS TO TAKE THE MORNING OF SURGERY ONLY IF NEEDED: NITROGLYCERIN  3. HOLD these prescription medications BEFORE Surgery: LISINOPRIL, JARDIANCE, INSULIN  4. Ask your surgeon/prescribing physician about when/if to STOP taking these medications: ASPIRIN, BRILINTA  5. Stop all vitamins, herbal medicines and Aspirin containing products 7 days prior to surgery. Stop any non-steroidal anti-inflammatory drugs (i.e. Ibuprofen, Naproxen, Advil, Aleve) 3 days before surgery. You may take Tylenol. 6. If you are currently taking Plavix, Coumadin, or any other blood-thinning/anticoagulant medication contact your prescribing physician for instructions. Preventing Infections Before and After - Your Surgery    IMPORTANT INSTRUCTIONS    Please read and follow these instructions carefully. If you are unable to comply with the below instructions your procedure will be cancelled. You play an important role in your health and preparation for surgery. To reduce the germs on your skin you will need to shower with CHG soap (Chorhexidine gluconate 4%) two times before surgery. CHG soap (Hibiclens, Hex-A-Clens or store brand)   CHG soap will be provided at your Preadmission Testing (PAT) appointment.    If you do not have a PAT appointment before surgery, you may arrange to  CHG soap from our office or purchase CHG soap at a pharmacy, grocery or department store.  You need to purchase TWO 4 ounce bottles to use for your 2 showers. Steps to follow:  1. Wash your hair with your normal shampoo and your body with regular soap and rinse well to remove shampoo and soap from your skin. 2. Wet a clean washcloth and turn off the shower. 3. Put CHG soap on washcloth and apply to your entire body from the neck down. Do not use on your head, face or private parts(genitals). Do not use CHG soap on open sores, wounds or areas of skin irritation. 4. Wash you body gently for 5 minutes. Do not wash your skin too hard. This soap does not create lather. Pay special attention to your underarms and from your belly button to your feet. 5. Turn the shower back on and rinse well to get CHG soap off your body. 6. Pat your skin dry with a clean, dry towel. Do not apply lotions or moisturizer. 7. Put on clean clothes and sleep on fresh bed sheets and do not allow pets to sleep with you. Shower with CHG soap 2 times before your surgery   The evening before your surgery   The morning of your surgery      Tips to help prevent infections after your surgery:  1. Protect your surgical wound from germs:  ? Hand washing is the most important thing you and your caregivers can do to prevent infections. ? Keep your bandage clean and dry! ? Do not touch your surgical wound. 2. Use clean, freshly washed towels and washcloths every time you shower; do not share bath linens with others. 3. Until your surgical wound is healed, wear clothing and sleep on bed linens each day that are clean and freshly washed. 4. Do not allow pets to sleep in your bed with you or touch your surgical wound. 5. Do not smoke - smoking delays wound healing. This may be a good time to stop smoking.   6. If you have diabetes, it is important for you to manage your blood sugar levels properly before your surgery as well as after your surgery. Poorly managed blood sugar levels slow down wound healing and prevent you from healing completely. [facility]   Instructions for Pre-Surgery COVID-19 Testing     Across our ministry we have established standard guidelines to ensure the health and safety of our patients, residents and associates as we resume elective services for patients. All patients presenting for surgery are required to have a COVID-19 test result within 96 hours of their scheduled surgery. [facility] is providing this test free of charge to the patient. Instructions for COVID-19 Testing:     Patients will receive a call from Pre-Admission Testing 4-5 days prior to surgery to schedule a date and time to come to the [address] [place] [address] Drive for their COVID-19 test   Patients are advised to self-quarantine after testing until their scheduled surgery   Once on site, patients will be registered and receive COVID test in their vehicle   If a patient is scheduled for normal Pre Admission Testing 96 hours from date of surgery, the patient will still have their COVID test done at the [address] [place] [address] located at [address] [place] [address] Positive results will be shared with the surgeon and anesthesiologist and may result in cancellation of the elective procedure    Testing Hours and Location:   Address:  [address] Up Pre Admission [address] [facility] in the Discharge Lot on [facility] (Map Attached)  [address] [place] [address], [address]   Hours: Monday- Friday 7a-3p    PAT Phone Number: [phone]            Patient Information Regarding COVID Restrictions    Patients are advised to self-quarantine after COVID testing up to the day of the scheduled procedure. Day of Procedure     Please park in the parking deck or any designated visitor parking lot.    Enter the facility through the [facility] of the [facility].   A temperature check and appropriate symptom/exposure screening will be done prior to entry to the facility.  On the day of surgery, please provide the cell phone number of the person who will be waiting for you to the Patient Access representative at the time of registration.  Please wear a mask on the day of your procedure.  We are now allowing one designated visitor per stay. Pediatric patients may have 2 designated visitors. This one person may come in with you on the day of your procedure.  No visitors under the age of 13.  The designated visitor must also wear a mask.  Once your procedure and the immediate recovery period is completed, a nurse in the recovery area will contact your designated visitor to inform them of your room number or to otherwise review other pertinent information regarding your care.  Social distancing practices are to be adhered to in waiting areas and the cafeteria. The patient was contacted  in person. She verbalized understanding of all instructions does not  need reinforcement.

## 2022-02-04 LAB
ATRIAL RATE: 77 BPM
BACTERIA SPEC CULT: NORMAL
BACTERIA SPEC CULT: NORMAL
CALCULATED P AXIS, ECG09: 34 DEGREES
CALCULATED R AXIS, ECG10: -42 DEGREES
CALCULATED T AXIS, ECG11: 14 DEGREES
DIAGNOSIS, 93000: NORMAL
P-R INTERVAL, ECG05: 188 MS
Q-T INTERVAL, ECG07: 404 MS
QRS DURATION, ECG06: 92 MS
QTC CALCULATION (BEZET), ECG08: 457 MS
SERVICE CMNT-IMP: NORMAL
VENTRICULAR RATE, ECG03: 77 BPM

## 2022-02-04 NOTE — PERIOP NOTES
2/3/22, 1612: VM REC'D FROM ASHLEY ZUÑIGA AT DR HUMMEL'S OFFICE STATING THAT DR HUMMEL DID NOT REQUEST CARDIAC CLEARANCE. ON 2/3, CALL PLACED FROM [facility] TO DR Huber Franklin ([address]) REQUESTING MOST RECENT OFFICE NOTES AND TESTING. ON 2/3, FAX RECEIVED FROM [facility] CARDIOLOGY STATING PT WAS NO NOEL TO APPT ON 1/19/22, AND DID NOT RESCHEDULE. PT WAS A NEW PT TO THEM. PER [facility], INSTRUCTED TO CALL [facility] FOR PRIOR RECORDS. CALL TO [facility] CURRENTLY BEING PLACED.(2/4/22 8719)    CAV STATES NO FURTHER VISITS SINCE 2/10/2018.   NOTE FROM THAT VISIT IS AVAILABLE ON CC.

## 2022-02-07 ENCOUNTER — HOSPITAL ENCOUNTER (OUTPATIENT)
Dept: PREADMISSION TESTING | Age: 37
Discharge: HOME | End: 2022-02-07
Attending: OTOLARYNGOLOGY
Payer: MEDICAID

## 2022-02-07 DIAGNOSIS — Z01.812 PRE-PROCEDURE LAB EXAM: ICD-10-CM

## 2022-02-07 LAB
SARS-COV-2, XPLCVT: NOT DETECTED
SOURCE, COVRS: NORMAL

## 2022-02-07 PROCEDURE — U0005 INFEC AGEN DETEC AMPLI PROBE: HCPCS

## 2022-02-10 ENCOUNTER — HOSPITAL ENCOUNTER (OUTPATIENT)
Age: 37
Setting detail: OUTPATIENT SURGERY
Discharge: HOME | End: 2022-02-10
Attending: OTOLARYNGOLOGY | Admitting: OTOLARYNGOLOGY
Payer: MEDICAID

## 2022-02-10 ENCOUNTER — ANESTHESIA EVENT (OUTPATIENT)
Dept: SURGERY | Age: 37
End: 2022-02-10
Payer: MEDICAID

## 2022-02-10 ENCOUNTER — ANESTHESIA (OUTPATIENT)
Dept: SURGERY | Age: 37
End: 2022-02-10
Payer: MEDICAID

## 2022-02-10 VITALS
BODY MASS INDEX: 37.37 KG/M2 | OXYGEN SATURATION: 98 % | TEMPERATURE: 97.6 F | WEIGHT: 218.92 LBS | HEIGHT: 64 IN | HEART RATE: 83 BPM | RESPIRATION RATE: 22 BRPM | SYSTOLIC BLOOD PRESSURE: 135 MMHG | DIASTOLIC BLOOD PRESSURE: 88 MMHG

## 2022-02-10 DIAGNOSIS — H70.12 CHRONIC LEFT MASTOIDITIS: Primary | ICD-10-CM

## 2022-02-10 LAB
GLUCOSE BLD STRIP.AUTO-MCNC: 138 MG/DL (ref 65–117)
GLUCOSE BLD STRIP.AUTO-MCNC: 150 MG/DL (ref 65–117)
HCG UR QL: NEGATIVE
SERVICE CMNT-IMP: ABNORMAL
SERVICE CMNT-IMP: ABNORMAL

## 2022-02-10 PROCEDURE — 81025 URINE PREGNANCY TEST: CPT

## 2022-02-10 PROCEDURE — 77030006671 HC BLD MYRIN BVR BD -A: Performed by: OTOLARYNGOLOGY

## 2022-02-10 PROCEDURE — 77030006932 HC BLD TYMP BVR BD -B: Performed by: OTOLARYNGOLOGY

## 2022-02-10 PROCEDURE — 74011250636 HC RX REV CODE- 250/636: Performed by: ANESTHESIOLOGY

## 2022-02-10 PROCEDURE — 74011250636 HC RX REV CODE- 250/636: Performed by: OTOLARYNGOLOGY

## 2022-02-10 PROCEDURE — 74011250637 HC RX REV CODE- 250/637: Performed by: ANESTHESIOLOGY

## 2022-02-10 PROCEDURE — 76210000016 HC OR PH I REC 1 TO 1.5 HR: Performed by: OTOLARYNGOLOGY

## 2022-02-10 PROCEDURE — 77030006689 HC BLD OPHTH BVR BD -A: Performed by: OTOLARYNGOLOGY

## 2022-02-10 PROCEDURE — 74011000250 HC RX REV CODE- 250: Performed by: OTOLARYNGOLOGY

## 2022-02-10 PROCEDURE — 77030014006 HC SPNG HEMSTAT J&J -A: Performed by: OTOLARYNGOLOGY

## 2022-02-10 PROCEDURE — 77030030163 HC BN WAX J&J -A: Performed by: OTOLARYNGOLOGY

## 2022-02-10 PROCEDURE — 77030011648 HC PK NSL MEROCL MEDT -B: Performed by: OTOLARYNGOLOGY

## 2022-02-10 PROCEDURE — 76210000020 HC REC RM PH II FIRST 0.5 HR: Performed by: OTOLARYNGOLOGY

## 2022-02-10 PROCEDURE — 77030002996 HC SUT SLK J&J -A: Performed by: OTOLARYNGOLOGY

## 2022-02-10 PROCEDURE — 74011000250 HC RX REV CODE- 250: Performed by: ANESTHESIOLOGY

## 2022-02-10 PROCEDURE — 77030031139 HC SUT VCRL2 J&J -A: Performed by: OTOLARYNGOLOGY

## 2022-02-10 PROCEDURE — 77030002974 HC SUT PLN J&J -A: Performed by: OTOLARYNGOLOGY

## 2022-02-10 PROCEDURE — 74011250636 HC RX REV CODE- 250/636: Performed by: NURSE ANESTHETIST, CERTIFIED REGISTERED

## 2022-02-10 PROCEDURE — 82962 GLUCOSE BLOOD TEST: CPT

## 2022-02-10 PROCEDURE — 77030018687: Performed by: OTOLARYNGOLOGY

## 2022-02-10 PROCEDURE — 2709999900 HC NON-CHARGEABLE SUPPLY: Performed by: OTOLARYNGOLOGY

## 2022-02-10 PROCEDURE — 77030019615 HC ELCTRD EMG NDL MEDT -B: Performed by: OTOLARYNGOLOGY

## 2022-02-10 PROCEDURE — 74011000250 HC RX REV CODE- 250: Performed by: NURSE ANESTHETIST, CERTIFIED REGISTERED

## 2022-02-10 PROCEDURE — 76010000162 HC OR TIME 1.5 TO 2 HR INTENSV-TIER 1: Performed by: OTOLARYNGOLOGY

## 2022-02-10 PROCEDURE — 88304 TISSUE EXAM BY PATHOLOGIST: CPT

## 2022-02-10 PROCEDURE — 77030040922 HC BLNKT HYPOTHRM STRY -A

## 2022-02-10 PROCEDURE — 77030026438 HC STYL ET INTUB CARD -A: Performed by: ANESTHESIOLOGY

## 2022-02-10 PROCEDURE — 77030040361 HC SLV COMPR DVT MDII -B: Performed by: OTOLARYNGOLOGY

## 2022-02-10 PROCEDURE — 77030008656 HC TU EAR GRMMT MEDT -B: Performed by: OTOLARYNGOLOGY

## 2022-02-10 PROCEDURE — 77030008684 HC TU ET CUF COVD -B: Performed by: ANESTHESIOLOGY

## 2022-02-10 PROCEDURE — 76060000035 HC ANESTHESIA 2 TO 2.5 HR: Performed by: OTOLARYNGOLOGY

## 2022-02-10 RX ORDER — CEFAZOLIN SODIUM 1 G/3ML
INJECTION, POWDER, FOR SOLUTION INTRAMUSCULAR; INTRAVENOUS AS NEEDED
Status: DISCONTINUED | OUTPATIENT
Start: 2022-02-10 | End: 2022-02-10 | Stop reason: HOSPADM

## 2022-02-10 RX ORDER — SODIUM CHLORIDE 9 MG/ML
25 INJECTION, SOLUTION INTRAVENOUS CONTINUOUS
Status: DISCONTINUED | OUTPATIENT
Start: 2022-02-10 | End: 2022-02-10 | Stop reason: HOSPADM

## 2022-02-10 RX ORDER — BACITRACIN 500 UNIT/G
PACKET (EA) TOPICAL AS NEEDED
Status: DISCONTINUED | OUTPATIENT
Start: 2022-02-10 | End: 2022-02-10 | Stop reason: HOSPADM

## 2022-02-10 RX ORDER — MORPHINE SULFATE 2 MG/ML
2 INJECTION, SOLUTION INTRAMUSCULAR; INTRAVENOUS
Status: DISCONTINUED | OUTPATIENT
Start: 2022-02-10 | End: 2022-02-10 | Stop reason: HOSPADM

## 2022-02-10 RX ORDER — SODIUM CHLORIDE 0.9 % (FLUSH) 0.9 %
5-40 SYRINGE (ML) INJECTION AS NEEDED
Status: DISCONTINUED | OUTPATIENT
Start: 2022-02-10 | End: 2022-02-10 | Stop reason: HOSPADM

## 2022-02-10 RX ORDER — BUPIVACAINE HYDROCHLORIDE AND EPINEPHRINE 2.5; 5 MG/ML; UG/ML
INJECTION, SOLUTION EPIDURAL; INFILTRATION; INTRACAUDAL; PERINEURAL AS NEEDED
Status: DISCONTINUED | OUTPATIENT
Start: 2022-02-10 | End: 2022-02-10 | Stop reason: HOSPADM

## 2022-02-10 RX ORDER — FENTANYL CITRATE 50 UG/ML
INJECTION, SOLUTION INTRAMUSCULAR; INTRAVENOUS AS NEEDED
Status: DISCONTINUED | OUTPATIENT
Start: 2022-02-10 | End: 2022-02-10 | Stop reason: HOSPADM

## 2022-02-10 RX ORDER — EPINEPHRINE 1 MG/ML
INJECTION, SOLUTION, CONCENTRATE INTRAVENOUS AS NEEDED
Status: DISCONTINUED | OUTPATIENT
Start: 2022-02-10 | End: 2022-02-10 | Stop reason: HOSPADM

## 2022-02-10 RX ORDER — DEXMEDETOMIDINE HYDROCHLORIDE 100 UG/ML
INJECTION, SOLUTION INTRAVENOUS AS NEEDED
Status: DISCONTINUED | OUTPATIENT
Start: 2022-02-10 | End: 2022-02-10 | Stop reason: HOSPADM

## 2022-02-10 RX ORDER — SODIUM CHLORIDE 0.9 % (FLUSH) 0.9 %
5-40 SYRINGE (ML) INJECTION EVERY 8 HOURS
Status: DISCONTINUED | OUTPATIENT
Start: 2022-02-10 | End: 2022-02-10 | Stop reason: HOSPADM

## 2022-02-10 RX ORDER — ONDANSETRON 2 MG/ML
INJECTION INTRAMUSCULAR; INTRAVENOUS AS NEEDED
Status: DISCONTINUED | OUTPATIENT
Start: 2022-02-10 | End: 2022-02-10 | Stop reason: HOSPADM

## 2022-02-10 RX ORDER — SODIUM CHLORIDE, SODIUM LACTATE, POTASSIUM CHLORIDE, CALCIUM CHLORIDE 600; 310; 30; 20 MG/100ML; MG/100ML; MG/100ML; MG/100ML
125 INJECTION, SOLUTION INTRAVENOUS CONTINUOUS
Status: DISCONTINUED | OUTPATIENT
Start: 2022-02-10 | End: 2022-02-10 | Stop reason: HOSPADM

## 2022-02-10 RX ORDER — DIPHENHYDRAMINE HYDROCHLORIDE 50 MG/ML
12.5 INJECTION, SOLUTION INTRAMUSCULAR; INTRAVENOUS AS NEEDED
Status: DISCONTINUED | OUTPATIENT
Start: 2022-02-10 | End: 2022-02-10 | Stop reason: HOSPADM

## 2022-02-10 RX ORDER — SODIUM CHLORIDE, SODIUM LACTATE, POTASSIUM CHLORIDE, CALCIUM CHLORIDE 600; 310; 30; 20 MG/100ML; MG/100ML; MG/100ML; MG/100ML
75 INJECTION, SOLUTION INTRAVENOUS CONTINUOUS
Status: DISCONTINUED | OUTPATIENT
Start: 2022-02-10 | End: 2022-02-10 | Stop reason: HOSPADM

## 2022-02-10 RX ORDER — ROCURONIUM BROMIDE 10 MG/ML
INJECTION, SOLUTION INTRAVENOUS AS NEEDED
Status: DISCONTINUED | OUTPATIENT
Start: 2022-02-10 | End: 2022-02-10 | Stop reason: HOSPADM

## 2022-02-10 RX ORDER — ACETAMINOPHEN 325 MG/1
650 TABLET ORAL ONCE
Status: COMPLETED | OUTPATIENT
Start: 2022-02-10 | End: 2022-02-10

## 2022-02-10 RX ORDER — CEPHALEXIN 500 MG/1
500 CAPSULE ORAL 4 TIMES DAILY
Qty: 40 CAPSULE | Refills: 0 | Status: SHIPPED | OUTPATIENT
Start: 2022-02-10 | End: 2022-02-20

## 2022-02-10 RX ORDER — ROPIVACAINE HYDROCHLORIDE 5 MG/ML
30 INJECTION, SOLUTION EPIDURAL; INFILTRATION; PERINEURAL ONCE
Status: DISCONTINUED | OUTPATIENT
Start: 2022-02-10 | End: 2022-02-10 | Stop reason: HOSPADM

## 2022-02-10 RX ORDER — ONDANSETRON 4 MG/1
4 TABLET, FILM COATED ORAL
Qty: 21 TABLET | Refills: 1 | Status: SHIPPED | OUTPATIENT
Start: 2022-02-10 | End: 2022-08-12

## 2022-02-10 RX ORDER — ONDANSETRON 2 MG/ML
4 INJECTION INTRAMUSCULAR; INTRAVENOUS AS NEEDED
Status: DISCONTINUED | OUTPATIENT
Start: 2022-02-10 | End: 2022-02-10 | Stop reason: HOSPADM

## 2022-02-10 RX ORDER — MIDAZOLAM HYDROCHLORIDE 1 MG/ML
1 INJECTION, SOLUTION INTRAMUSCULAR; INTRAVENOUS AS NEEDED
Status: DISCONTINUED | OUTPATIENT
Start: 2022-02-10 | End: 2022-02-10 | Stop reason: HOSPADM

## 2022-02-10 RX ORDER — MIDAZOLAM HYDROCHLORIDE 1 MG/ML
0.5 INJECTION, SOLUTION INTRAMUSCULAR; INTRAVENOUS
Status: DISCONTINUED | OUTPATIENT
Start: 2022-02-10 | End: 2022-02-10 | Stop reason: HOSPADM

## 2022-02-10 RX ORDER — MIDAZOLAM HYDROCHLORIDE 1 MG/ML
INJECTION, SOLUTION INTRAMUSCULAR; INTRAVENOUS AS NEEDED
Status: DISCONTINUED | OUTPATIENT
Start: 2022-02-10 | End: 2022-02-10 | Stop reason: HOSPADM

## 2022-02-10 RX ORDER — LIDOCAINE HYDROCHLORIDE 10 MG/ML
0.1 INJECTION, SOLUTION EPIDURAL; INFILTRATION; INTRACAUDAL; PERINEURAL AS NEEDED
Status: DISCONTINUED | OUTPATIENT
Start: 2022-02-10 | End: 2022-02-10 | Stop reason: HOSPADM

## 2022-02-10 RX ORDER — FENTANYL CITRATE 50 UG/ML
25 INJECTION, SOLUTION INTRAMUSCULAR; INTRAVENOUS
Status: COMPLETED | OUTPATIENT
Start: 2022-02-10 | End: 2022-02-10

## 2022-02-10 RX ORDER — SUCCINYLCHOLINE CHLORIDE 20 MG/ML
INJECTION INTRAMUSCULAR; INTRAVENOUS AS NEEDED
Status: DISCONTINUED | OUTPATIENT
Start: 2022-02-10 | End: 2022-02-10 | Stop reason: HOSPADM

## 2022-02-10 RX ORDER — HYDROCODONE BITARTRATE AND ACETAMINOPHEN 5; 325 MG/1; MG/1
1 TABLET ORAL
Qty: 28 TABLET | Refills: 0 | Status: SHIPPED | OUTPATIENT
Start: 2022-02-10 | End: 2022-02-17

## 2022-02-10 RX ORDER — DEXAMETHASONE SODIUM PHOSPHATE 4 MG/ML
INJECTION, SOLUTION INTRA-ARTICULAR; INTRALESIONAL; INTRAMUSCULAR; INTRAVENOUS; SOFT TISSUE AS NEEDED
Status: DISCONTINUED | OUTPATIENT
Start: 2022-02-10 | End: 2022-02-10 | Stop reason: HOSPADM

## 2022-02-10 RX ORDER — FENTANYL CITRATE 50 UG/ML
50 INJECTION, SOLUTION INTRAMUSCULAR; INTRAVENOUS AS NEEDED
Status: DISCONTINUED | OUTPATIENT
Start: 2022-02-10 | End: 2022-02-10 | Stop reason: HOSPADM

## 2022-02-10 RX ORDER — LIDOCAINE HYDROCHLORIDE 20 MG/ML
INJECTION, SOLUTION EPIDURAL; INFILTRATION; INTRACAUDAL; PERINEURAL AS NEEDED
Status: DISCONTINUED | OUTPATIENT
Start: 2022-02-10 | End: 2022-02-10 | Stop reason: HOSPADM

## 2022-02-10 RX ORDER — HYDROMORPHONE HYDROCHLORIDE 1 MG/ML
0.2 INJECTION, SOLUTION INTRAMUSCULAR; INTRAVENOUS; SUBCUTANEOUS
Status: DISCONTINUED | OUTPATIENT
Start: 2022-02-10 | End: 2022-02-10 | Stop reason: HOSPADM

## 2022-02-10 RX ORDER — PROPOFOL 10 MG/ML
INJECTION, EMULSION INTRAVENOUS AS NEEDED
Status: DISCONTINUED | OUTPATIENT
Start: 2022-02-10 | End: 2022-02-10 | Stop reason: HOSPADM

## 2022-02-10 RX ADMIN — SODIUM CHLORIDE, POTASSIUM CHLORIDE, SODIUM LACTATE AND CALCIUM CHLORIDE 125 ML/HR: 600; 310; 30; 20 INJECTION, SOLUTION INTRAVENOUS at 11:59

## 2022-02-10 RX ADMIN — ROCURONIUM BROMIDE 5 MG: 10 SOLUTION INTRAVENOUS at 13:36

## 2022-02-10 RX ADMIN — ONDANSETRON HYDROCHLORIDE 4 MG: 2 INJECTION, SOLUTION INTRAMUSCULAR; INTRAVENOUS at 14:04

## 2022-02-10 RX ADMIN — HYDROMORPHONE HYDROCHLORIDE 0.2 MG: 1 INJECTION, SOLUTION INTRAMUSCULAR; INTRAVENOUS; SUBCUTANEOUS at 15:55

## 2022-02-10 RX ADMIN — FENTANYL CITRATE 25 MCG: 50 INJECTION, SOLUTION INTRAMUSCULAR; INTRAVENOUS at 16:00

## 2022-02-10 RX ADMIN — HYDROMORPHONE HYDROCHLORIDE 0.2 MG: 1 INJECTION, SOLUTION INTRAMUSCULAR; INTRAVENOUS; SUBCUTANEOUS at 16:10

## 2022-02-10 RX ADMIN — FENTANYL CITRATE 50 MCG: 50 INJECTION, SOLUTION INTRAMUSCULAR; INTRAVENOUS at 14:04

## 2022-02-10 RX ADMIN — SUCCINYLCHOLINE CHLORIDE 160 MG: 20 INJECTION, SOLUTION INTRAMUSCULAR; INTRAVENOUS at 13:37

## 2022-02-10 RX ADMIN — DEXMEDETOMIDINE HYDROCHLORIDE 8 MCG: 100 INJECTION, SOLUTION, CONCENTRATE INTRAVENOUS at 14:43

## 2022-02-10 RX ADMIN — LIDOCAINE HYDROCHLORIDE 0.1 ML: 10 INJECTION, SOLUTION EPIDURAL; INFILTRATION; INTRACAUDAL; PERINEURAL at 11:58

## 2022-02-10 RX ADMIN — FENTANYL CITRATE 25 MCG: 50 INJECTION, SOLUTION INTRAMUSCULAR; INTRAVENOUS at 16:05

## 2022-02-10 RX ADMIN — ONDANSETRON HYDROCHLORIDE 4 MG: 2 INJECTION, SOLUTION INTRAMUSCULAR; INTRAVENOUS at 15:14

## 2022-02-10 RX ADMIN — MIDAZOLAM 2 MG: 1 INJECTION INTRAMUSCULAR; INTRAVENOUS at 13:25

## 2022-02-10 RX ADMIN — LIDOCAINE HYDROCHLORIDE 80 MG: 20 INJECTION, SOLUTION EPIDURAL; INFILTRATION; INTRACAUDAL; PERINEURAL at 13:36

## 2022-02-10 RX ADMIN — CEFAZOLIN 2 G: 330 INJECTION, POWDER, FOR SOLUTION INTRAMUSCULAR; INTRAVENOUS at 14:04

## 2022-02-10 RX ADMIN — ACETAMINOPHEN 650 MG: 325 TABLET ORAL at 12:06

## 2022-02-10 RX ADMIN — FENTANYL CITRATE 25 MCG: 50 INJECTION, SOLUTION INTRAMUSCULAR; INTRAVENOUS at 16:15

## 2022-02-10 RX ADMIN — DEXAMETHASONE SODIUM PHOSPHATE 10 MG: 4 INJECTION, SOLUTION INTRAMUSCULAR; INTRAVENOUS at 14:04

## 2022-02-10 RX ADMIN — PROPOFOL 200 MG: 10 INJECTION, EMULSION INTRAVENOUS at 13:36

## 2022-02-10 RX ADMIN — FENTANYL CITRATE 50 MCG: 50 INJECTION, SOLUTION INTRAMUSCULAR; INTRAVENOUS at 13:36

## 2022-02-10 RX ADMIN — FENTANYL CITRATE 25 MCG: 50 INJECTION, SOLUTION INTRAMUSCULAR; INTRAVENOUS at 15:50

## 2022-02-10 NOTE — ANESTHESIA PREPROCEDURE EVALUATION
Relevant Problems   No relevant active problems       Anesthetic History     PONV          Review of Systems / Medical History  Patient summary reviewed, nursing notes reviewed and pertinent labs reviewed    Pulmonary  Within defined limits                 Neuro/Psych   Within defined limits           Cardiovascular    Hypertension          CAD and CABG         GI/Hepatic/Renal  Within defined limits              Endo/Other    Diabetes: well controlled, using insulin  Hyperthyroidism  Morbid obesity     Other Findings              Physical Exam    Airway  Mallampati: II  TM Distance: > 6 cm  Neck ROM: normal range of motion   Mouth opening: Normal     Cardiovascular  Regular rate and rhythm,  S1 and S2 normal,  no murmur, click, rub, or gallop             Dental  No notable dental hx       Pulmonary  Breath sounds clear to auscultation               Abdominal  GI exam deferred       Other Findings            Anesthetic Plan    ASA: 3  Anesthesia type: general          Induction: Intravenous  Anesthetic plan and risks discussed with: Patient

## 2022-02-10 NOTE — PROGRESS NOTES
39year old with left chronic mastoiditis, hearing loss for left tympanoplasty, cartilage graft, tube. Risks/benefits/imponderables/alternatives discussed with patient. Patient requests surgery.

## 2022-02-11 NOTE — PROCEDURES
Surgeon Sara Serra MD  Assistant: none  Complications:none  Findings: scar tissue, ossicular fixation  Implants: none  Specimen: middle ear contents sent to path for permanent section  Blood loss <1cc. After informed consent and all questions answered, the patient was taken to the operating room and underwent general anesthesia and was intubated by anesthesia. The patient was prepped and draped in a sterile manner. Facial nerve monitoring was used throughout the case, and the facial nerve function was intact at the conclusion of the case. Attention was directed to the Left ear. A four quadrant and periauricular injection with marcaine with epinephrine was performed. The microscope was draped in a sterile manner and was used throughout the procedure. The ear canal was visualized, and the perforation was freshened with a Garcia needle and a cup forceps. An incision was made in the mid- bony canal, and skin flaps were developed. The anulus was carefully raised, with care to preserve the Chorda tympani. The ossicles were visualized and palpated for assessment of mobility. Scar tissue was removed from the middle ear space. Through a second incision in the conchal bowl a cartilage/perichondrial graft was harvested for repair of the ear drum and reinforcement of the ear bones. This graft was thinned and used in an underlay technique. Anteriorly, an incision was made in the tympanic membrane and a modified t tube was placed. Colby Kohut was placed in the middle ear followed by the grafting material.  Skin flaps were placed lateral to the repair. Colby Kohut was placed in the ear canal to reinforce the repair. Incisions were closed in a multilayer fashion, with 5-O Vicryl for the deeper layers and 6-O plain for the skin. A ursula coated  Mericel heidy pack was shaped and placed in the ear canal. This was sutured in place with a 5-O silk suture.   Floxin drops were placed on the packing material and Bacitracin was applied to the suture lines. The patient was allowed to awaken from anesthesia, was extubated, and stable to the recovery room.

## 2022-02-11 NOTE — ANESTHESIA POSTPROCEDURE EVALUATION
Procedure(s):  LEFT TYMPANOPLASTY GRAFT EAR CARTILAGE, LEFT MYRINGOTOMY WITH T-TUBE.    general    <BSHSIANPOST>    INITIAL Post-op Vital signs:   Vitals Value Taken Time   /88 02/10/22 1636   Temp 36.4 °C (97.6 °F) 02/10/22 1630   Pulse 83 02/10/22 1637   Resp 22 02/10/22 1637   SpO2 98 % 02/10/22 1637

## 2022-03-16 ENCOUNTER — OFFICE VISIT (OUTPATIENT)
Dept: ENDOCRINOLOGY | Age: 37
End: 2022-03-16
Payer: MEDICAID

## 2022-03-16 VITALS
HEIGHT: 64 IN | BODY MASS INDEX: 37.9 KG/M2 | HEART RATE: 75 BPM | TEMPERATURE: 98.1 F | WEIGHT: 222 LBS | OXYGEN SATURATION: 99 % | DIASTOLIC BLOOD PRESSURE: 86 MMHG | SYSTOLIC BLOOD PRESSURE: 140 MMHG | RESPIRATION RATE: 20 BRPM

## 2022-03-16 DIAGNOSIS — E11.65 TYPE 2 DIABETES MELLITUS WITH HYPERGLYCEMIA, WITH LONG-TERM CURRENT USE OF INSULIN (HCC): ICD-10-CM

## 2022-03-16 DIAGNOSIS — E11.21 TYPE 2 DIABETES MELLITUS WITH NEPHROPATHY (HCC): Primary | Chronic | ICD-10-CM

## 2022-03-16 DIAGNOSIS — G62.9 POLYNEUROPATHY, UNSPECIFIED: ICD-10-CM

## 2022-03-16 DIAGNOSIS — N18.30 STAGE 3 CHRONIC KIDNEY DISEASE, UNSPECIFIED WHETHER STAGE 3A OR 3B CKD (HCC): ICD-10-CM

## 2022-03-16 DIAGNOSIS — E03.9 ACQUIRED HYPOTHYROIDISM: Chronic | ICD-10-CM

## 2022-03-16 DIAGNOSIS — Z79.4 TYPE 2 DIABETES MELLITUS WITH HYPERGLYCEMIA, WITH LONG-TERM CURRENT USE OF INSULIN (HCC): ICD-10-CM

## 2022-03-16 PROCEDURE — 3051F HG A1C>EQUAL 7.0%<8.0%: CPT | Performed by: INTERNAL MEDICINE

## 2022-03-16 PROCEDURE — 95251 CONT GLUC MNTR ANALYSIS I&R: CPT | Performed by: INTERNAL MEDICINE

## 2022-03-16 PROCEDURE — 99214 OFFICE O/P EST MOD 30 MIN: CPT | Performed by: INTERNAL MEDICINE

## 2022-03-16 RX ORDER — PEN NEEDLE, DIABETIC 31 GX3/16"
NEEDLE, DISPOSABLE MISCELLANEOUS
Qty: 200 PEN NEEDLE | Refills: 3 | Status: SHIPPED | OUTPATIENT
Start: 2022-03-16 | End: 2022-08-24 | Stop reason: SDUPTHER

## 2022-03-16 RX ORDER — INSULIN ASPART 100 [IU]/ML
INJECTION, SOLUTION INTRAVENOUS; SUBCUTANEOUS
Qty: 50 ML | Refills: 3 | Status: SHIPPED | OUTPATIENT
Start: 2022-03-16 | End: 2022-08-24 | Stop reason: SDUPTHER

## 2022-03-16 RX ORDER — DULAGLUTIDE 1.5 MG/.5ML
1.5 INJECTION, SOLUTION SUBCUTANEOUS
Qty: 4 EACH | Refills: 3 | Status: SHIPPED | OUTPATIENT
Start: 2022-03-16 | End: 2022-08-21

## 2022-03-16 NOTE — PROGRESS NOTES
[facility]               Melany Faye MD          Patient Information Name : Radha Caicedo 39 y.o.   YOB: 1985         Referred by: Chelsea Lezama DO         Chief Complaint   Patient presents with    Diabetes    Thyroid Problem       History of Present Illness: Radha Caicedo is a 39 y.o. female here for follow-up of  Diabetes Mellitus. Diabetes mellitus was diagnosed in 2009 . End organ effects of diabetes: peripheral neuropathy, gastroparesis (unsure of the diagnosis),CKD. History of CAD,CABG,CVA  She is on MDI could not tolerate metformin  Prior medications tried [place]  She is on Dexcom  Changed the diet      Prior history  Has Dexcom G6 which she got it after she had several episodes of hypoglycemia according to the history. Wt Readings from Last 3 Encounters:   03/16/22 222 lb (100.7 kg)   02/10/22 218 lb 14.7 oz (99.3 kg)   02/03/22 218 lb 14.7 oz (99.3 kg)       BP Readings from Last 3 Encounters:   03/16/22 (!) 140/86   02/10/22 135/88   02/03/22 110/74           Past Medical History:   Diagnosis Date    Abscess     Anemia     CAD (coronary artery disease) 2019    CABG X1    Chronic pain     LEFT SCIATIC, STERNAL WOUND    Complicated migraine     with extremity numbness    H/O transfusion of packed red blood cells 07/2021    Heart attack ([address] 11/2019    Hx MRSA infection 2009    Hypertension     onset 2009    Hypothyroidism     Irregular menstrual cycle     Nausea & vomiting     Obesity     Stroke ([address] 2019    LOWER BODY WEAKNESS    T2DM (type 2 diabetes mellitus) ([address]     onset 2009 IDDM    Thromboembolus [facility] 2009    RIGHT LEG - HOSP. [facility]     Current Outpatient Medications   Medication Sig    ondansetron hcl (Zofran) 4 mg tablet Take 1 Tablet by mouth every eight (8) hours as needed for Nausea for up to 21 doses.  ticagrelor (Brilinta) 90 mg tablet Take 90 mg by mouth two (2) times a day.     atorvastatin (LIPITOR) 20 mg tablet Take 20 mg by mouth nightly.  methocarbamoL (ROBAXIN) 750 mg tablet Take 750 mg by mouth two (2) times a day.  senna (Senna) 8.6 mg tablet Take 1 Tablet by mouth as needed for Constipation.  lisinopriL (PRINIVIL, ZESTRIL) 2.5 mg tablet Take 2.5 mg by mouth daily.  empagliflozin (Jardiance) 10 mg tablet Take 10 mg by mouth daily.  ranolazine ER (Ranexa) 1,000 mg Take 500 mg by mouth two (2) times a day.  blood-glucose sensor (DEXCOM G6 SENSOR) by Does Not Apply route.  dulaglutide (Trulicity) 9.38 TW/2.7 mL sub-q pen 0.5 mL by SubCUTAneous route every seven (7) days. Stop Januvia (Patient taking differently: 0.75 mg by SubCUTAneous route every seven (7) days. TAKES EVERY Monday EVENING)    insulin detemir U-100 (LEVEMIR FLEXTOUCH) 100 unit/mL (3 mL) inpn Inject 30 units in AM and 30  units at bed time    insulin aspart U-100 (NovoLOG Flexpen U-100 Insulin) 100 unit/mL (3 mL) inpn Inject 10- 15 units before breakfast, 10- 15 units before lunch and 10 - 15 units before dinner.  carvedilol (COREG) 6.25 mg tablet Take 2 Tabs by mouth two (2) times daily (with meals).  levothyroxine (SYNTHROID) 100 mcg tablet Take 1 Tab by mouth Daily (before breakfast). (Patient taking differently: Take 137 mcg by mouth Daily (before breakfast). )    aspirin delayed-release 81 mg tablet Take 1 Tab by mouth daily.  bisacodyL (Dulcolax, bisacodyl,) 5 mg EC tablet Take 5 mg by mouth daily as needed for Constipation. (Patient not taking: Reported on 2/10/2022)    ondansetron hcl (Zofran) 4 mg tablet Take 4 mg by mouth every eight (8) hours as needed for Nausea or Vomiting. (Patient not taking: Reported on 2/10/2022)    nitroglycerin (NITROSTAT) 0.4 mg SL tablet 0.4 mg by SubLINGual route every five (5) minutes as needed for Chest Pain. Up to 3 doses.  (Patient not taking: Reported on 2/10/2022)    acetaminophen (TYLENOL) 500 mg tablet Take 2 Tabs by mouth every six (6) hours as needed for Pain. (Patient not taking: Reported on 2/3/2022)     No current facility-administered medications for this visit. Allergies   Allergen Reactions    Other Food Swelling     JALIPENO PEPPERS - FACILA SWELLING    Ciprofloxacin Anaphylaxis    Bumetanide Other (comments)     Hearing loss    Compazine [Prochlorperazine] Anxiety    Pcn [Penicillins] Rash     OCCURRED IN INFANCY NO REPORTED SEVERE IgE MEDIATED REACTIONS    Tree Pollen-[place] Rash     HICKORY SMOKE FLAVORING    Vancomycin Other (comments)     Kidneys shut down    Voltaren [Diclofenac Sodium] Myalgia and Other (comments)     \"muscle spasms\"       Review of Systems:  Per HPI    Physical Examination:   Blood pressure (!) 140/86, pulse 75, temperature 98.1 °F (36.7 °C), temperature source Temporal, resp. rate 20, height 5' 4\" (1.626 m), weight 222 lb (100.7 kg), SpO2 99 %. Estimated body mass index is 38.11 kg/m² as calculated from the following:    Height as of this encounter: 5' 4\" (1.626 m). -   Weight as of this encounter: 222 lb (100.7 kg). - General: pleasant, no distress, good eye contact  - HEENT: no pallor, no periorbital edema, EOMI  - Neck: supple,  - Cardiovascular: regular,  normal S1 and S2,   - Respiratory: clear to auscultation bilaterally  - Musculoskeletal: no edema  - Neurological: alert and oriented  -   - No foot ulcers, no callus          Data Reviewed:        [x] Reviewed labs    Lab Results   Component Value Date/Time    Hemoglobin A1c 7.4 (H) 02/03/2022 11:44 AM    Hemoglobin A1c 9.6 (H) 06/23/2018 06:01 AM    Hemoglobin A1c 10.0 (H) 12/22/2017 08:57 AM    Glucose 181 (H) 02/03/2022 11:44 AM    Glucose (POC) 138 (H) 02/10/2022 03:38 PM    Microalb/Creat ratio (ug/mg creat.) 96.6 (H) 12/22/2017 09:30 AM    LDL, calculated 100 (H) 12/22/2017 08:57 AM    Creatinine (POC) 0.6 07/06/2018 05:23 PM    Creatinine 1.22 (H) 02/03/2022 11:44 AM          Assessment/Plan:       ICD-10-CM ICD-9-CM   1.  Type 2 diabetes mellitus with nephropathy (HCC)  E11.21 250.40     583.81   2. Acquired hypothyroidism  E03.9 244.9        1. Type 2 Diabetes Mellitus   Lab Results   Component Value Date/Time    Hemoglobin A1c 7.4 (H) 02/03/2022 11:44 AM   A1c 7.4 which is a reasonable goal given multiple comorbidities. Tight glycemic control will be a challenge. Cannot tolerate Metformin. Tolerating Trulicity  She did not have gastric emptying study, based on the symptoms gastroparesis was diagnosed. Levemir 30 units twice daily, as it does not seem to be lasting longer, until she gets Lantus  NovoLog 10 units before meals  Trulicity 1.5 mg weekly, Jardiance    Continuous glucose monitor data downloaded for 2 weeks and reviewed with the patient  Noted postprandial hyperglycemia  No severe hypoglycemia  Increase Trulicity      2. HTN : Continue current therapy     3. Hyperlipidemia : Statin    4. Obesity per medical criteria :Body mass index is 38.11 kg/m². Discussed about the importance of activity and carbohydrate portion control. 5.  CAD/CABG    6. CKD    7. Peripheral neuropathy    8. Diabetic retinopathy        There are no Patient Instructions on file for this visit. Thank you for allowing me to participate in the care of this patient. Deana James MD      Patient verbalized understanding     Voice-recognition software was used to generate this report, which may result in some phonetic-based errors in the grammar and contents. Even though attempts were made to correct all the mistakes, some may have been missed and remained in the body of the report.

## 2022-03-16 NOTE — LETTER
3/16/2022    Patient: Radha Caicedo   YOB: 1985   Date of Visit: 3/16/2022     Vinnie Wilkins DO  [address] [facility] [address]  Via Fax: [phone]    Dear Vinnie Wilkins DO,      Thank you for referring Ms. Radha Caicedo to [address] [place] [address] for evaluation. My notes for this consultation are attached. If you have questions, please do not hesitate to call me. I look forward to following your patient along with you.       Sincerely,    Denise Jara MD

## 2022-03-16 NOTE — PATIENT INSTRUCTIONS
Check blood sugars before meals and at bedtime. Low blood glucose is less than 70     Goal of blood glucose before meals 90 - 120 , 2  Hours after meals less than 180     Maintain the log and bring it all your appointments    If the bedtime sugars are less than 100 ,eat a 15 gm snack. Levemir 30 units in AM and 30  units at bed time    Novolog or Humalog or Apidra insulin (fast acting) 10- 15  units before breakfast, 10- 15  units before lunch and 10 - 15  units before dinner.    If sugars before meals are less than 70 then no insulin     Trulicity weekly

## 2022-03-16 NOTE — PROGRESS NOTES
Viral Hein is a 39 y.o. female here for   Chief Complaint   Patient presents with    Diabetes    Thyroid Problem       1. Have you been to the ER, urgent care clinic since your last visit? Hospitalized since your last visit? -[facility] in Nov for heart issues and [facility] 12/10/21 for chronic mastoditis    2. Have you seen or consulted any other health care providers outside of the [address] [place] [address] since your last visit?   Include any pap smears or colon screening.-PCP, cardiology , ENT and podiatry

## 2022-03-19 PROBLEM — E66.01 OBESITY, MORBID (HCC): Status: ACTIVE | Noted: 2017-12-22

## 2022-03-19 PROBLEM — E11.21 TYPE 2 DIABETES MELLITUS WITH NEPHROPATHY (HCC): Status: ACTIVE | Noted: 2017-12-25

## 2022-03-19 PROBLEM — Z95.1 S/P CABG (CORONARY ARTERY BYPASS GRAFT): Status: ACTIVE | Noted: 2021-03-12

## 2022-03-19 PROBLEM — I25.10 ATHEROSCLEROSIS OF CORONARY ARTERY: Status: ACTIVE | Noted: 2021-03-12

## 2022-03-19 PROBLEM — E11.29 MICROALBUMINURIA DUE TO TYPE 2 DIABETES MELLITUS (HCC): Status: ACTIVE | Noted: 2017-12-27

## 2022-03-19 PROBLEM — R80.9 MICROALBUMINURIA DUE TO TYPE 2 DIABETES MELLITUS (HCC): Status: ACTIVE | Noted: 2017-12-27

## 2022-03-19 PROBLEM — E78.5 HLD (HYPERLIPIDEMIA): Status: ACTIVE | Noted: 2021-03-12

## 2022-03-20 PROBLEM — H70.12 CHRONIC LEFT MASTOIDITIS: Status: ACTIVE | Noted: 2022-02-10

## 2022-05-26 ENCOUNTER — HOSPITAL ENCOUNTER (OUTPATIENT)
Dept: PREADMISSION TESTING | Age: 37
Discharge: HOME | End: 2022-05-26
Payer: MEDICAID

## 2022-05-26 VITALS
TEMPERATURE: 97.7 F | WEIGHT: 224.43 LBS | RESPIRATION RATE: 18 BRPM | DIASTOLIC BLOOD PRESSURE: 90 MMHG | HEART RATE: 85 BPM | HEIGHT: 64 IN | BODY MASS INDEX: 38.32 KG/M2 | SYSTOLIC BLOOD PRESSURE: 130 MMHG

## 2022-05-26 LAB
ANION GAP SERPL CALC-SCNC: 6 MMOL/L (ref 5–15)
BASOPHILS # BLD: 0.1 K/UL (ref 0–0.1)
BASOPHILS NFR BLD: 0 % (ref 0–1)
BUN SERPL-MCNC: 20 MG/DL (ref 6–20)
BUN/CREAT SERPL: 16 (ref 12–20)
CALCIUM SERPL-MCNC: 9.1 MG/DL (ref 8.5–10.1)
CHLORIDE SERPL-SCNC: 104 MMOL/L (ref 97–108)
CO2 SERPL-SCNC: 24 MMOL/L (ref 21–32)
CREAT SERPL-MCNC: 1.26 MG/DL (ref 0.55–1.02)
DIFFERENTIAL METHOD BLD: ABNORMAL
EOSINOPHIL # BLD: 0.3 K/UL (ref 0–0.4)
EOSINOPHIL NFR BLD: 3 % (ref 0–7)
ERYTHROCYTE [DISTWIDTH] IN BLOOD BY AUTOMATED COUNT: 12.8 % (ref 11.5–14.5)
EST. AVERAGE GLUCOSE BLD GHB EST-MCNC: 174 MG/DL
GLUCOSE SERPL-MCNC: 192 MG/DL (ref 65–100)
HBA1C MFR BLD: 7.7 % (ref 4–5.6)
HCT VFR BLD AUTO: 42.7 % (ref 35–47)
HGB BLD-MCNC: 14.5 G/DL (ref 11.5–16)
IMM GRANULOCYTES # BLD AUTO: 0.1 K/UL (ref 0–0.04)
IMM GRANULOCYTES NFR BLD AUTO: 1 % (ref 0–0.5)
LYMPHOCYTES # BLD: 3.2 K/UL (ref 0.8–3.5)
LYMPHOCYTES NFR BLD: 28 % (ref 12–49)
MCH RBC QN AUTO: 30.7 PG (ref 26–34)
MCHC RBC AUTO-ENTMCNC: 34 G/DL (ref 30–36.5)
MCV RBC AUTO: 90.5 FL (ref 80–99)
MONOCYTES # BLD: 0.9 K/UL (ref 0–1)
MONOCYTES NFR BLD: 8 % (ref 5–13)
NEUTS SEG # BLD: 6.8 K/UL (ref 1.8–8)
NEUTS SEG NFR BLD: 60 % (ref 32–75)
NRBC # BLD: 0 K/UL (ref 0–0.01)
NRBC BLD-RTO: 0 PER 100 WBC
PLATELET # BLD AUTO: 473 K/UL (ref 150–400)
PMV BLD AUTO: 10.2 FL (ref 8.9–12.9)
POTASSIUM SERPL-SCNC: 4.5 MMOL/L (ref 3.5–5.1)
RBC # BLD AUTO: 4.72 M/UL (ref 3.8–5.2)
SODIUM SERPL-SCNC: 134 MMOL/L (ref 136–145)
WBC # BLD AUTO: 11.4 K/UL (ref 3.6–11)

## 2022-05-26 PROCEDURE — 85025 COMPLETE CBC W/AUTO DIFF WBC: CPT

## 2022-05-26 PROCEDURE — 80048 BASIC METABOLIC PNL TOTAL CA: CPT

## 2022-05-26 PROCEDURE — 83036 HEMOGLOBIN GLYCOSYLATED A1C: CPT

## 2022-05-26 PROCEDURE — 36415 COLL VENOUS BLD VENIPUNCTURE: CPT

## 2022-05-26 NOTE — PERIOP NOTES
[address] INSTRUCTIONS    Surgery Date:   6/2/22    [facility] staff will call you between 4 PM- 8 PM the day before surgery with your arrival time. If your surgery is on a Monday, we will call you the preceding Friday. Please call [phone] after 8 PM if you did not receive your arrival time. 1. Please report to [facility] Patient Access/Admitting on the 1st floor. Bring your insurance card, photo identification, and any copayment ( if applicable). 2. If you are going home the same day of your surgery, you must have a responsible adult to drive you home. You need to have a responsible adult to stay with you the first 24 hours after surgery and you should not drive a car for 24 hours following your surgery. 3. Nothing to eat or drink after midnight the night before surgery. This includes no water, gum, mints, coffee, juice, etc.  Please note special instructions, if applicable, below for medications. 4. Do NOT drink alcohol or smoke 24 hours before surgery. STOP smoking for 14 days prior as it helps with breathing and healing after surgery. 5. If you are being admitted to the hospital, please leave personal belongings/luggage in your car until you have an assigned hospital room number. 6. Please wear comfortable clothes. Wear your glasses instead of contacts. We ask that all money, jewelry and valuables be left at home. Wear no make up, particularly mascara, the day of surgery. 7.  All body piercings, rings, and jewelry need to be removed and left at home. Please remove any nail polish or artificial nails from your fingernails. Please wear your hair loose or down. Please no pony-tails, buns, or any metal hair accessories. If you shower the morning of surgery, please do not apply any lotions or powders afterwards. You may wear deodorant, unless having breast surgery. Do not shave any body area within 24 hours of your surgery.   8. Please follow all instructions to avoid any potential surgical cancellation. 9. Should your physical condition change, (i.e. fever, cold, flu, etc.) please notify your surgeon as soon as possible. 10. It is important to be on time. If a situation occurs where you may be delayed, please call:  [phone] / [phone] on the day of surgery. 11. The Preadmission Testing staff can be reached at [phone]. 12. Special instructions: NONE      Current Outpatient Medications   Medication Sig    dulaglutide (Trulicity) 1.5 KP/7.8 mL sub-q pen 0.5 mL by SubCUTAneous route every seven (7) days. Stop 0.75 mg    insulin detemir U-100 (LEVEMIR FLEXTOUCH) 100 unit/mL (3 mL) inpn Inject 30 units in AM and 30  units at bed time    insulin aspart U-100 (NovoLOG U-100 Insulin aspart) 100 unit/mL injection Inject 10- 15 units before breakfast, 10- 15 units before lunch and 10 - 15 units before dinner.  Insulin Needles, Disposable, 32 gauge x 5/32\" ndle Use to inject insulin BID Dx Code: E11.65    insulin syringe,safetyneedle 0.5 mL 31 gauge x 15/64\" syrg 1 Syringe by Does Not Apply route three (3) times daily. Dx Code: E11.65    ondansetron hcl (Zofran) 4 mg tablet Take 1 Tablet by mouth every eight (8) hours as needed for Nausea for up to 21 doses.  bisacodyL (Dulcolax, bisacodyl,) 5 mg EC tablet Take 5 mg by mouth daily as needed for Constipation.  ondansetron hcl (Zofran) 4 mg tablet Take 4 mg by mouth every eight (8) hours as needed for Nausea or Vomiting.  ticagrelor (Brilinta) 90 mg tablet Take 90 mg by mouth two (2) times a day.  atorvastatin (LIPITOR) 20 mg tablet Take 20 mg by mouth nightly.  nitroglycerin (NITROSTAT) 0.4 mg SL tablet 0.4 mg by SubLINGual route every five (5) minutes as needed for Chest Pain. Up to 3 doses.  methocarbamoL (ROBAXIN) 750 mg tablet Take 750 mg by mouth two (2) times a day.  senna (Senna) 8.6 mg tablet Take 1 Tablet by mouth as needed for Constipation.     lisinopriL (PRINIVIL, ZESTRIL) 2.5 mg tablet Take 2.5 mg by mouth daily.  empagliflozin (Jardiance) 10 mg tablet Take 10 mg by mouth daily.  ranolazine ER (Ranexa) 1,000 mg Take 500 mg by mouth two (2) times a day.  blood-glucose sensor (DEXCOM G6 SENSOR) by Does Not Apply route.  acetaminophen (TYLENOL) 500 mg tablet Take 2 Tabs by mouth every six (6) hours as needed for Pain.  carvedilol (COREG) 6.25 mg tablet Take 2 Tabs by mouth two (2) times daily (with meals). (Patient taking differently: Take 12.5 mg by mouth two (2) times daily (with meals). TAKE 2 TABLETS)    levothyroxine (SYNTHROID) 100 mcg tablet Take 1 Tab by mouth Daily (before breakfast). (Patient taking differently: Take 137 mcg by mouth Daily (before breakfast). PT TAKES 137 MCG DAILY)    aspirin delayed-release 81 mg tablet Take 1 Tab by mouth daily. No current facility-administered medications for this encounter. 1. YOU MUST ONLY TAKE THESE MEDICATIONS THE MORNING OF SURGERY WITH A SIP OF WATER: COREG, ROBAXIN, SYNTHROID  2. MEDICATIONS TO TAKE THE MORNING OF SURGERY ONLY IF NEEDED: TYLENOL  3. HOLD these prescription medications BEFORE Surgery: DO NOT TAKE JARDIANCE OR LISINOPRIL MORNING OF SURGERY  4. Ask your surgeon/prescribing physician about when/if to STOP taking these medications: Kerri Samuels  5. Stop all vitamins, herbal medicines and Aspirin containing products 7 days prior to surgery. Stop any non-steroidal anti-inflammatory drugs (i.e. Ibuprofen, Naproxen, Advil, Aleve) 3 days before surgery. You may take Tylenol. 6. If you are currently taking Plavix, Coumadin, or any other blood-thinning/anticoagulant medication contact your prescribing physician for instructions. Preventing Infections Before and After - Your Surgery    IMPORTANT INSTRUCTIONS      You play an important role in your health and preparation for surgery.  To reduce the germs on your skin you will need to shower with CHG soap (Chorhexidine gluconate 4%) two times before surgery. CHG soap (Hibiclens, Hex-A-Clens or store brand)   CHG soap will be provided at your Preadmission Testing (PAT) appointment.  If you do not have a PAT appointment before surgery, you may arrange to  CHG soap from our office or purchase CHG soap at a pharmacy, grocery or department store.  You need to purchase TWO 4 ounce bottles to use for your 2 showers. Steps to follow:  1. Wash your hair with your normal shampoo and your body with regular soap and rinse well to remove shampoo and soap from your skin. 2. Wet a clean washcloth and turn off the shower. 3. Put CHG soap on washcloth and apply to your entire body from the neck down. Do not use on your head, face or private parts(genitals). Do not use CHG soap on open sores, wounds or areas of skin irritation. 4. Wash you body gently for 5 minutes. Do not wash your skin too hard. This soap does not create lather. Pay special attention to your underarms and from your belly button to your feet. 5. Turn the shower back on and rinse well to get CHG soap off your body. 6. Pat your skin dry with a clean, dry towel. Do not apply lotions or moisturizer. 7. Put on clean clothes and sleep on fresh bed sheets and do not allow pets to sleep with you. Shower with CHG soap 2 times before your surgery   The evening before your surgery   The morning of your surgery      Tips to help prevent infections after your surgery:  1. Protect your surgical wound from germs:  ? Hand washing is the most important thing you and your caregivers can do to prevent infections. ? Keep your bandage clean and dry! ? Do not touch your surgical wound. 2. Use clean, freshly washed towels and washcloths every time you shower; do not share bath linens with others. 3. Until your surgical wound is healed, wear clothing and sleep on bed linens each day that are clean and freshly washed.   4. Do not allow pets to sleep in your bed with you or touch your surgical wound.  5. Do not smoke - smoking delays wound healing. This may be a good time to stop smoking. 6. If you have diabetes, it is important for you to manage your blood sugar levels properly before your surgery as well as after your surgery. Poorly managed blood sugar levels slow down wound healing and prevent you from healing completely. Patient Information Regarding COVID Restrictions    Day of Procedure     Please park in the parking deck or any designated visitor parking lot.  Enter the facility through the [facility] of the [facility].   On the day of surgery, please provide the cell phone number of the person who will be waiting for you to the Patient Access representative at the time of registration.  Please wear a mask on the day of your procedure.  We are now allowing two designated visitors per stay. Pediatric patients may have 2 designated visitors. These two people may come in with you on the day of your procedure.  No visitors under the age of 13.  The designated visitor must also wear a mask.  Once your procedure and the immediate recovery period is completed, a nurse in the recovery area will contact your designated visitor to inform them of your room number or to otherwise review other pertinent information regarding your care.  Social distancing practices are to be adhered to in waiting areas and the cafeteria. The patient was contacted  in person. She verbalized understanding of all instructions does not  need reinforcement.

## 2022-05-27 NOTE — PERIOP NOTES
LAB RESULTS ROUTED VIA CC FAX TO PCP, DO PITA.   LABS AND EKG FAXED TO DR. HUMMEL'S OFFICE. CONSULTED [facility] [address] [address], NP ON EKG FROM 2/3/22. SHE CONSULTED EKG OK FOR SURGERY. CALLED DR. Lima Nose OFFICE TO NOTIFY ABOUT ABNORMAL LAB RESULTS. LEFT VOICEMAIL FOR HIS NURSE ASH TO HAVE DR. Bennie Metcalf REVIEW LAB RESULTS (BMP, CBC, HGBA1C).

## 2022-06-01 ENCOUNTER — TELEPHONE (OUTPATIENT)
Dept: ENDOCRINOLOGY | Age: 37
End: 2022-06-01

## 2022-06-01 NOTE — TELEPHONE ENCOUNTER
Pt left VM stating she is having ear surgery on Thursday and was told that her A1C is too high, it needs to be between 4.6-6. Pt stated Dr. Tommy Sadler told her it's ok if her A1C is 7.5-8.

## 2022-06-02 ENCOUNTER — HOSPITAL ENCOUNTER (OUTPATIENT)
Age: 37
Setting detail: OBSERVATION
Discharge: HOME | End: 2022-06-03
Attending: OTOLARYNGOLOGY | Admitting: HOSPITALIST
Payer: MEDICARE

## 2022-06-02 ENCOUNTER — ANESTHESIA (OUTPATIENT)
Dept: SURGERY | Age: 37
End: 2022-06-02
Payer: MEDICARE

## 2022-06-02 ENCOUNTER — APPOINTMENT (OUTPATIENT)
Dept: GENERAL RADIOLOGY | Age: 37
End: 2022-06-02
Attending: HOSPITALIST
Payer: MEDICARE

## 2022-06-02 ENCOUNTER — ANESTHESIA EVENT (OUTPATIENT)
Dept: SURGERY | Age: 37
End: 2022-06-02
Payer: MEDICARE

## 2022-06-02 DIAGNOSIS — L76.82 PAIN AT SURGICAL INCISION: Primary | ICD-10-CM

## 2022-06-02 PROBLEM — H72.91 TYMPANIC MEMBRANE PERFORATION, RIGHT: Status: ACTIVE | Noted: 2022-06-02

## 2022-06-02 PROBLEM — R07.9 CHEST PAIN: Status: ACTIVE | Noted: 2022-06-02

## 2022-06-02 LAB
ALBUMIN SERPL-MCNC: 3.3 G/DL (ref 3.5–5)
ALBUMIN/GLOB SERPL: 0.9 {RATIO} (ref 1.1–2.2)
ALP SERPL-CCNC: 73 U/L (ref 45–117)
ALT SERPL-CCNC: 35 U/L (ref 12–78)
ANION GAP SERPL CALC-SCNC: 7 MMOL/L (ref 5–15)
AST SERPL-CCNC: 17 U/L (ref 15–37)
ATRIAL RATE: 76 BPM
BASOPHILS # BLD: 0 K/UL (ref 0–0.1)
BASOPHILS NFR BLD: 0 % (ref 0–1)
BILIRUB SERPL-MCNC: 0.7 MG/DL (ref 0.2–1)
BUN SERPL-MCNC: 18 MG/DL (ref 6–20)
BUN/CREAT SERPL: 16 (ref 12–20)
CALCIUM SERPL-MCNC: 9.3 MG/DL (ref 8.5–10.1)
CALCULATED P AXIS, ECG09: 26 DEGREES
CALCULATED R AXIS, ECG10: -44 DEGREES
CALCULATED T AXIS, ECG11: 56 DEGREES
CHLORIDE SERPL-SCNC: 108 MMOL/L (ref 97–108)
CO2 SERPL-SCNC: 18 MMOL/L (ref 21–32)
CREAT SERPL-MCNC: 1.12 MG/DL (ref 0.55–1.02)
D DIMER PPP FEU-MCNC: 0.26 MG/L FEU (ref 0–0.65)
DIAGNOSIS, 93000: NORMAL
DIFFERENTIAL METHOD BLD: ABNORMAL
EOSINOPHIL # BLD: 0.1 K/UL (ref 0–0.4)
EOSINOPHIL NFR BLD: 1 % (ref 0–7)
ERYTHROCYTE [DISTWIDTH] IN BLOOD BY AUTOMATED COUNT: 13 % (ref 11.5–14.5)
GLOBULIN SER CALC-MCNC: 3.8 G/DL (ref 2–4)
GLUCOSE BLD STRIP.AUTO-MCNC: 128 MG/DL (ref 65–117)
GLUCOSE BLD STRIP.AUTO-MCNC: 137 MG/DL (ref 65–117)
GLUCOSE BLD STRIP.AUTO-MCNC: 203 MG/DL (ref 65–117)
GLUCOSE SERPL-MCNC: 170 MG/DL (ref 65–100)
HCG UR QL: NEGATIVE
HCT VFR BLD AUTO: 41 % (ref 35–47)
HGB BLD-MCNC: 13.8 G/DL (ref 11.5–16)
IMM GRANULOCYTES # BLD AUTO: 0 K/UL (ref 0–0.04)
IMM GRANULOCYTES NFR BLD AUTO: 0 % (ref 0–0.5)
INR PPP: 1 (ref 0.9–1.1)
LYMPHOCYTES # BLD: 1.5 K/UL (ref 0.8–3.5)
LYMPHOCYTES NFR BLD: 15 % (ref 12–49)
MAGNESIUM SERPL-MCNC: 2.1 MG/DL (ref 1.6–2.4)
MCH RBC QN AUTO: 30.9 PG (ref 26–34)
MCHC RBC AUTO-ENTMCNC: 33.7 G/DL (ref 30–36.5)
MCV RBC AUTO: 91.7 FL (ref 80–99)
MONOCYTES # BLD: 0.3 K/UL (ref 0–1)
MONOCYTES NFR BLD: 2 % (ref 5–13)
NEUTS SEG # BLD: 8.4 K/UL (ref 1.8–8)
NEUTS SEG NFR BLD: 82 % (ref 32–75)
NRBC # BLD: 0 K/UL (ref 0–0.01)
NRBC BLD-RTO: 0 PER 100 WBC
P-R INTERVAL, ECG05: 224 MS
PHOSPHATE SERPL-MCNC: 3.1 MG/DL (ref 2.6–4.7)
PLATELET # BLD AUTO: 397 K/UL (ref 150–400)
PMV BLD AUTO: 9.6 FL (ref 8.9–12.9)
POTASSIUM SERPL-SCNC: 4.2 MMOL/L (ref 3.5–5.1)
PROT SERPL-MCNC: 7.1 G/DL (ref 6.4–8.2)
PROTHROMBIN TIME: 10.4 SEC (ref 9–11.1)
Q-T INTERVAL, ECG07: 454 MS
QRS DURATION, ECG06: 96 MS
QTC CALCULATION (BEZET), ECG08: 510 MS
RBC # BLD AUTO: 4.47 M/UL (ref 3.8–5.2)
SERVICE CMNT-IMP: ABNORMAL
SODIUM SERPL-SCNC: 133 MMOL/L (ref 136–145)
TROPONIN-HIGH SENSITIVITY: 5 NG/L (ref 0–51)
VENTRICULAR RATE, ECG03: 76 BPM
WBC # BLD AUTO: 10.3 K/UL (ref 3.6–11)

## 2022-06-02 PROCEDURE — 77030002996 HC SUT SLK J&J -A: Performed by: OTOLARYNGOLOGY

## 2022-06-02 PROCEDURE — 77030008656 HC TU EAR GRMMT MEDT -B: Performed by: OTOLARYNGOLOGY

## 2022-06-02 PROCEDURE — G0378 HOSPITAL OBSERVATION PER HR: HCPCS

## 2022-06-02 PROCEDURE — 77030006932 HC BLD TYMP BVR BD -B: Performed by: OTOLARYNGOLOGY

## 2022-06-02 PROCEDURE — 77030040361 HC SLV COMPR DVT MDII -B: Performed by: OTOLARYNGOLOGY

## 2022-06-02 PROCEDURE — 2709999900 HC NON-CHARGEABLE SUPPLY: Performed by: OTOLARYNGOLOGY

## 2022-06-02 PROCEDURE — 82962 GLUCOSE BLOOD TEST: CPT

## 2022-06-02 PROCEDURE — 77030026438 HC STYL ET INTUB CARD -A: Performed by: ANESTHESIOLOGY

## 2022-06-02 PROCEDURE — 74011250636 HC RX REV CODE- 250/636: Performed by: HOSPITALIST

## 2022-06-02 PROCEDURE — 74011250636 HC RX REV CODE- 250/636: Performed by: NURSE ANESTHETIST, CERTIFIED REGISTERED

## 2022-06-02 PROCEDURE — 84484 ASSAY OF TROPONIN QUANT: CPT

## 2022-06-02 PROCEDURE — 74011636637 HC RX REV CODE- 636/637: Performed by: HOSPITALIST

## 2022-06-02 PROCEDURE — 74011250636 HC RX REV CODE- 250/636: Performed by: OTOLARYNGOLOGY

## 2022-06-02 PROCEDURE — 36415 COLL VENOUS BLD VENIPUNCTURE: CPT

## 2022-06-02 PROCEDURE — 74011250637 HC RX REV CODE- 250/637: Performed by: ANESTHESIOLOGY

## 2022-06-02 PROCEDURE — 76060000034 HC ANESTHESIA 1.5 TO 2 HR: Performed by: OTOLARYNGOLOGY

## 2022-06-02 PROCEDURE — 74011000250 HC RX REV CODE- 250: Performed by: OTOLARYNGOLOGY

## 2022-06-02 PROCEDURE — 85025 COMPLETE CBC W/AUTO DIFF WBC: CPT

## 2022-06-02 PROCEDURE — 93005 ELECTROCARDIOGRAM TRACING: CPT

## 2022-06-02 PROCEDURE — 77030008684 HC TU ET CUF COVD -B: Performed by: ANESTHESIOLOGY

## 2022-06-02 PROCEDURE — 81025 URINE PREGNANCY TEST: CPT

## 2022-06-02 PROCEDURE — 77030011648 HC PK NSL MEROCL MEDT -B: Performed by: OTOLARYNGOLOGY

## 2022-06-02 PROCEDURE — 77030006671 HC BLD MYRIN BVR BD -A: Performed by: OTOLARYNGOLOGY

## 2022-06-02 PROCEDURE — 77030019615 HC ELCTRD EMG NDL MEDT -B: Performed by: OTOLARYNGOLOGY

## 2022-06-02 PROCEDURE — 74011250637 HC RX REV CODE- 250/637: Performed by: HOSPITALIST

## 2022-06-02 PROCEDURE — 74011250637 HC RX REV CODE- 250/637: Performed by: NURSE ANESTHETIST, CERTIFIED REGISTERED

## 2022-06-02 PROCEDURE — 80053 COMPREHEN METABOLIC PANEL: CPT

## 2022-06-02 PROCEDURE — 74011250636 HC RX REV CODE- 250/636: Performed by: ANESTHESIOLOGY

## 2022-06-02 PROCEDURE — 71045 X-RAY EXAM CHEST 1 VIEW: CPT

## 2022-06-02 PROCEDURE — 83735 ASSAY OF MAGNESIUM: CPT

## 2022-06-02 PROCEDURE — 76010000153 HC OR TIME 1.5 TO 2 HR: Performed by: OTOLARYNGOLOGY

## 2022-06-02 PROCEDURE — 85610 PROTHROMBIN TIME: CPT

## 2022-06-02 PROCEDURE — 76210000000 HC OR PH I REC 2 TO 2.5 HR: Performed by: OTOLARYNGOLOGY

## 2022-06-02 PROCEDURE — 77030031139 HC SUT VCRL2 J&J -A: Performed by: OTOLARYNGOLOGY

## 2022-06-02 PROCEDURE — 77030002974 HC SUT PLN J&J -A: Performed by: OTOLARYNGOLOGY

## 2022-06-02 PROCEDURE — 77030006689 HC BLD OPHTH BVR BD -A: Performed by: OTOLARYNGOLOGY

## 2022-06-02 PROCEDURE — 77030040922 HC BLNKT HYPOTHRM STRY -A

## 2022-06-02 PROCEDURE — 84100 ASSAY OF PHOSPHORUS: CPT

## 2022-06-02 PROCEDURE — 85379 FIBRIN DEGRADATION QUANT: CPT

## 2022-06-02 PROCEDURE — 74011000250 HC RX REV CODE- 250: Performed by: NURSE ANESTHETIST, CERTIFIED REGISTERED

## 2022-06-02 DEVICE — VENT TUBE 1016010 5PK GOODE T 12MM SILIC
Type: IMPLANTABLE DEVICE | Status: FUNCTIONAL
Brand: GOODE T-TUBE®

## 2022-06-02 RX ORDER — MAGNESIUM SULFATE 100 %
4 CRYSTALS MISCELLANEOUS AS NEEDED
Status: DISCONTINUED | OUTPATIENT
Start: 2022-06-02 | End: 2022-06-03 | Stop reason: HOSPADM

## 2022-06-02 RX ORDER — SUCCINYLCHOLINE CHLORIDE 20 MG/ML
INJECTION INTRAMUSCULAR; INTRAVENOUS AS NEEDED
Status: DISCONTINUED | OUTPATIENT
Start: 2022-06-02 | End: 2022-06-02 | Stop reason: HOSPADM

## 2022-06-02 RX ORDER — INSULIN GLARGINE 100 [IU]/ML
30 INJECTION, SOLUTION SUBCUTANEOUS
Status: DISCONTINUED | OUTPATIENT
Start: 2022-06-02 | End: 2022-06-03 | Stop reason: HOSPADM

## 2022-06-02 RX ORDER — MIDAZOLAM HYDROCHLORIDE 1 MG/ML
1 INJECTION, SOLUTION INTRAMUSCULAR; INTRAVENOUS AS NEEDED
Status: DISCONTINUED | OUTPATIENT
Start: 2022-06-02 | End: 2022-06-02 | Stop reason: HOSPADM

## 2022-06-02 RX ORDER — BUPIVACAINE HYDROCHLORIDE AND EPINEPHRINE 5; 5 MG/ML; UG/ML
INJECTION, SOLUTION EPIDURAL; INTRACAUDAL; PERINEURAL AS NEEDED
Status: DISCONTINUED | OUTPATIENT
Start: 2022-06-02 | End: 2022-06-02 | Stop reason: HOSPADM

## 2022-06-02 RX ORDER — INSULIN LISPRO 100 [IU]/ML
INJECTION, SOLUTION INTRAVENOUS; SUBCUTANEOUS
Status: DISCONTINUED | OUTPATIENT
Start: 2022-06-02 | End: 2022-06-03 | Stop reason: HOSPADM

## 2022-06-02 RX ORDER — FENTANYL CITRATE 50 UG/ML
50 INJECTION, SOLUTION INTRAMUSCULAR; INTRAVENOUS AS NEEDED
Status: DISCONTINUED | OUTPATIENT
Start: 2022-06-02 | End: 2022-06-02 | Stop reason: HOSPADM

## 2022-06-02 RX ORDER — SCOLOPAMINE TRANSDERMAL SYSTEM 1 MG/1
PATCH, EXTENDED RELEASE TRANSDERMAL AS NEEDED
Status: DISCONTINUED | OUTPATIENT
Start: 2022-06-02 | End: 2022-06-02 | Stop reason: HOSPADM

## 2022-06-02 RX ORDER — SODIUM CHLORIDE 0.9 % (FLUSH) 0.9 %
5-40 SYRINGE (ML) INJECTION EVERY 8 HOURS
Status: DISCONTINUED | OUTPATIENT
Start: 2022-06-02 | End: 2022-06-02 | Stop reason: HOSPADM

## 2022-06-02 RX ORDER — ONDANSETRON 2 MG/ML
4 INJECTION INTRAMUSCULAR; INTRAVENOUS AS NEEDED
Status: DISCONTINUED | OUTPATIENT
Start: 2022-06-02 | End: 2022-06-02 | Stop reason: HOSPADM

## 2022-06-02 RX ORDER — OXYCODONE AND ACETAMINOPHEN 5; 325 MG/1; MG/1
1 TABLET ORAL AS NEEDED
Status: DISCONTINUED | OUTPATIENT
Start: 2022-06-02 | End: 2022-06-02 | Stop reason: HOSPADM

## 2022-06-02 RX ORDER — OXYCODONE AND ACETAMINOPHEN 10; 325 MG/1; MG/1
1 TABLET ORAL
Status: DISCONTINUED | OUTPATIENT
Start: 2022-06-02 | End: 2022-06-03 | Stop reason: HOSPADM

## 2022-06-02 RX ORDER — MIDAZOLAM HYDROCHLORIDE 1 MG/ML
INJECTION, SOLUTION INTRAMUSCULAR; INTRAVENOUS AS NEEDED
Status: DISCONTINUED | OUTPATIENT
Start: 2022-06-02 | End: 2022-06-02 | Stop reason: HOSPADM

## 2022-06-02 RX ORDER — ASPIRIN 81 MG/1
81 TABLET ORAL DAILY
Status: DISCONTINUED | OUTPATIENT
Start: 2022-06-03 | End: 2022-06-03 | Stop reason: HOSPADM

## 2022-06-02 RX ORDER — SODIUM CHLORIDE 0.9 % (FLUSH) 0.9 %
5-40 SYRINGE (ML) INJECTION AS NEEDED
Status: DISCONTINUED | OUTPATIENT
Start: 2022-06-02 | End: 2022-06-03 | Stop reason: HOSPADM

## 2022-06-02 RX ORDER — SODIUM CHLORIDE 0.9 % (FLUSH) 0.9 %
5-40 SYRINGE (ML) INJECTION AS NEEDED
Status: DISCONTINUED | OUTPATIENT
Start: 2022-06-02 | End: 2022-06-02 | Stop reason: HOSPADM

## 2022-06-02 RX ORDER — ATORVASTATIN CALCIUM 20 MG/1
20 TABLET, FILM COATED ORAL
Status: DISCONTINUED | OUTPATIENT
Start: 2022-06-02 | End: 2022-06-03 | Stop reason: HOSPADM

## 2022-06-02 RX ORDER — SODIUM CHLORIDE 9 MG/ML
50 INJECTION, SOLUTION INTRAVENOUS CONTINUOUS
Status: DISCONTINUED | OUTPATIENT
Start: 2022-06-02 | End: 2022-06-02 | Stop reason: HOSPADM

## 2022-06-02 RX ORDER — ONDANSETRON 2 MG/ML
INJECTION INTRAMUSCULAR; INTRAVENOUS AS NEEDED
Status: DISCONTINUED | OUTPATIENT
Start: 2022-06-02 | End: 2022-06-02 | Stop reason: HOSPADM

## 2022-06-02 RX ORDER — PROPOFOL 10 MG/ML
INJECTION, EMULSION INTRAVENOUS AS NEEDED
Status: DISCONTINUED | OUTPATIENT
Start: 2022-06-02 | End: 2022-06-02 | Stop reason: HOSPADM

## 2022-06-02 RX ORDER — ONDANSETRON 4 MG/1
4 TABLET, FILM COATED ORAL
Qty: 21 TABLET | Refills: 1 | Status: SHIPPED | OUTPATIENT
Start: 2022-06-02 | End: 2022-08-12

## 2022-06-02 RX ORDER — SODIUM CHLORIDE, SODIUM LACTATE, POTASSIUM CHLORIDE, CALCIUM CHLORIDE 600; 310; 30; 20 MG/100ML; MG/100ML; MG/100ML; MG/100ML
125 INJECTION, SOLUTION INTRAVENOUS CONTINUOUS
Status: DISCONTINUED | OUTPATIENT
Start: 2022-06-02 | End: 2022-06-02 | Stop reason: HOSPADM

## 2022-06-02 RX ORDER — MORPHINE SULFATE 2 MG/ML
4 INJECTION, SOLUTION INTRAMUSCULAR; INTRAVENOUS
Status: DISCONTINUED | OUTPATIENT
Start: 2022-06-02 | End: 2022-06-03

## 2022-06-02 RX ORDER — DIPHENHYDRAMINE HYDROCHLORIDE 50 MG/ML
12.5 INJECTION, SOLUTION INTRAMUSCULAR; INTRAVENOUS AS NEEDED
Status: DISCONTINUED | OUTPATIENT
Start: 2022-06-02 | End: 2022-06-02 | Stop reason: HOSPADM

## 2022-06-02 RX ORDER — FENTANYL CITRATE 50 UG/ML
25 INJECTION, SOLUTION INTRAMUSCULAR; INTRAVENOUS
Status: COMPLETED | OUTPATIENT
Start: 2022-06-02 | End: 2022-06-02

## 2022-06-02 RX ORDER — HYDROCODONE BITARTRATE AND ACETAMINOPHEN 5; 325 MG/1; MG/1
1 TABLET ORAL
Qty: 28 TABLET | Refills: 0 | Status: SHIPPED | OUTPATIENT
Start: 2022-06-02 | End: 2022-06-09

## 2022-06-02 RX ORDER — DEXAMETHASONE SODIUM PHOSPHATE 4 MG/ML
INJECTION, SOLUTION INTRA-ARTICULAR; INTRALESIONAL; INTRAMUSCULAR; INTRAVENOUS; SOFT TISSUE AS NEEDED
Status: DISCONTINUED | OUTPATIENT
Start: 2022-06-02 | End: 2022-06-02 | Stop reason: HOSPADM

## 2022-06-02 RX ORDER — MIDAZOLAM HYDROCHLORIDE 1 MG/ML
0.5 INJECTION, SOLUTION INTRAMUSCULAR; INTRAVENOUS
Status: COMPLETED | OUTPATIENT
Start: 2022-06-02 | End: 2022-06-02

## 2022-06-02 RX ORDER — RANOLAZINE 500 MG/1
500 TABLET, EXTENDED RELEASE ORAL 2 TIMES DAILY
Status: DISCONTINUED | OUTPATIENT
Start: 2022-06-02 | End: 2022-06-03 | Stop reason: HOSPADM

## 2022-06-02 RX ORDER — MORPHINE SULFATE 2 MG/ML
2 INJECTION, SOLUTION INTRAMUSCULAR; INTRAVENOUS
Status: DISCONTINUED | OUTPATIENT
Start: 2022-06-02 | End: 2022-06-02 | Stop reason: HOSPADM

## 2022-06-02 RX ORDER — INSULIN LISPRO 100 [IU]/ML
10 INJECTION, SOLUTION INTRAVENOUS; SUBCUTANEOUS
Status: DISCONTINUED | OUTPATIENT
Start: 2022-06-02 | End: 2022-06-03 | Stop reason: HOSPADM

## 2022-06-02 RX ORDER — AZITHROMYCIN 500 MG/1
500 TABLET, FILM COATED ORAL DAILY
Qty: 3 TABLET | Refills: 0 | Status: SHIPPED | OUTPATIENT
Start: 2022-06-02 | End: 2022-06-05

## 2022-06-02 RX ORDER — CARVEDILOL 12.5 MG/1
12.5 TABLET ORAL 2 TIMES DAILY WITH MEALS
Status: DISCONTINUED | OUTPATIENT
Start: 2022-06-02 | End: 2022-06-03 | Stop reason: HOSPADM

## 2022-06-02 RX ORDER — BACITRACIN ZINC 500 UNIT/G
OINTMENT (GRAM) TOPICAL AS NEEDED
Status: DISCONTINUED | OUTPATIENT
Start: 2022-06-02 | End: 2022-06-02 | Stop reason: HOSPADM

## 2022-06-02 RX ORDER — SODIUM CHLORIDE, SODIUM LACTATE, POTASSIUM CHLORIDE, CALCIUM CHLORIDE 600; 310; 30; 20 MG/100ML; MG/100ML; MG/100ML; MG/100ML
INJECTION, SOLUTION INTRAVENOUS
Status: DISCONTINUED | OUTPATIENT
Start: 2022-06-02 | End: 2022-06-02 | Stop reason: HOSPADM

## 2022-06-02 RX ORDER — MORPHINE SULFATE 4 MG/ML
4 INJECTION INTRAVENOUS
Status: DISCONTINUED | OUTPATIENT
Start: 2022-06-02 | End: 2022-06-02

## 2022-06-02 RX ORDER — INSULIN LISPRO 100 [IU]/ML
INJECTION, SOLUTION INTRAVENOUS; SUBCUTANEOUS
Status: DISPENSED
Start: 2022-06-02 | End: 2022-06-03

## 2022-06-02 RX ORDER — ROCURONIUM BROMIDE 10 MG/ML
INJECTION, SOLUTION INTRAVENOUS AS NEEDED
Status: DISCONTINUED | OUTPATIENT
Start: 2022-06-02 | End: 2022-06-02 | Stop reason: HOSPADM

## 2022-06-02 RX ORDER — HYDROMORPHONE HYDROCHLORIDE 1 MG/ML
0.2 INJECTION, SOLUTION INTRAMUSCULAR; INTRAVENOUS; SUBCUTANEOUS
Status: DISCONTINUED | OUTPATIENT
Start: 2022-06-02 | End: 2022-06-02 | Stop reason: HOSPADM

## 2022-06-02 RX ORDER — SODIUM CHLORIDE 0.9 % (FLUSH) 0.9 %
5-40 SYRINGE (ML) INJECTION EVERY 8 HOURS
Status: DISCONTINUED | OUTPATIENT
Start: 2022-06-02 | End: 2022-06-03 | Stop reason: HOSPADM

## 2022-06-02 RX ORDER — ACETAMINOPHEN 325 MG/1
650 TABLET ORAL ONCE
Status: COMPLETED | OUTPATIENT
Start: 2022-06-02 | End: 2022-06-02

## 2022-06-02 RX ORDER — LISINOPRIL 5 MG/1
2.5 TABLET ORAL DAILY
Status: DISCONTINUED | OUTPATIENT
Start: 2022-06-03 | End: 2022-06-03 | Stop reason: HOSPADM

## 2022-06-02 RX ORDER — ENOXAPARIN SODIUM 100 MG/ML
40 INJECTION SUBCUTANEOUS EVERY 24 HOURS
Status: DISCONTINUED | OUTPATIENT
Start: 2022-06-03 | End: 2022-06-02

## 2022-06-02 RX ORDER — LIDOCAINE HYDROCHLORIDE 10 MG/ML
0.1 INJECTION, SOLUTION EPIDURAL; INFILTRATION; INTRACAUDAL; PERINEURAL AS NEEDED
Status: DISCONTINUED | OUTPATIENT
Start: 2022-06-02 | End: 2022-06-02 | Stop reason: HOSPADM

## 2022-06-02 RX ORDER — AZITHROMYCIN 250 MG/1
500 TABLET, FILM COATED ORAL DAILY
Status: DISCONTINUED | OUTPATIENT
Start: 2022-06-03 | End: 2022-06-03 | Stop reason: HOSPADM

## 2022-06-02 RX ORDER — LIDOCAINE HYDROCHLORIDE 20 MG/ML
INJECTION, SOLUTION EPIDURAL; INFILTRATION; INTRACAUDAL; PERINEURAL AS NEEDED
Status: DISCONTINUED | OUTPATIENT
Start: 2022-06-02 | End: 2022-06-02 | Stop reason: HOSPADM

## 2022-06-02 RX ORDER — SODIUM CHLORIDE 9 MG/ML
1000 INJECTION, SOLUTION INTRAVENOUS CONTINUOUS
Status: DISCONTINUED | OUTPATIENT
Start: 2022-06-02 | End: 2022-06-02 | Stop reason: HOSPADM

## 2022-06-02 RX ORDER — AMOXICILLIN 250 MG
1 CAPSULE ORAL
Status: DISCONTINUED | OUTPATIENT
Start: 2022-06-02 | End: 2022-06-03 | Stop reason: HOSPADM

## 2022-06-02 RX ADMIN — RANOLAZINE 500 MG: 500 TABLET, FILM COATED, EXTENDED RELEASE ORAL at 22:17

## 2022-06-02 RX ADMIN — CARVEDILOL 12.5 MG: 12.5 TABLET, FILM COATED ORAL at 17:25

## 2022-06-02 RX ADMIN — MIDAZOLAM HYDROCHLORIDE 0.5 MG: 1 INJECTION, SOLUTION INTRAMUSCULAR; INTRAVENOUS at 11:34

## 2022-06-02 RX ADMIN — MORPHINE SULFATE 2 MG: 2 INJECTION, SOLUTION INTRAMUSCULAR; INTRAVENOUS at 10:22

## 2022-06-02 RX ADMIN — SCOPALAMINE 1 PATCH: 1 PATCH, EXTENDED RELEASE TRANSDERMAL at 07:50

## 2022-06-02 RX ADMIN — MIDAZOLAM HYDROCHLORIDE 0.5 MG: 1 INJECTION, SOLUTION INTRAMUSCULAR; INTRAVENOUS at 12:43

## 2022-06-02 RX ADMIN — FENTANYL CITRATE 25 MCG: 50 INJECTION, SOLUTION INTRAMUSCULAR; INTRAVENOUS at 10:16

## 2022-06-02 RX ADMIN — Medication 10 UNITS: at 13:53

## 2022-06-02 RX ADMIN — SODIUM CHLORIDE, POTASSIUM CHLORIDE, SODIUM LACTATE AND CALCIUM CHLORIDE: 600; 310; 30; 20 INJECTION, SOLUTION INTRAVENOUS at 07:58

## 2022-06-02 RX ADMIN — ONDANSETRON HYDROCHLORIDE 4 MG: 2 INJECTION, SOLUTION INTRAMUSCULAR; INTRAVENOUS at 09:06

## 2022-06-02 RX ADMIN — SUCCINYLCHOLINE CHLORIDE 200 MG: 20 INJECTION, SOLUTION INTRAMUSCULAR; INTRAVENOUS at 08:15

## 2022-06-02 RX ADMIN — INSULIN GLARGINE 30 UNITS: 100 INJECTION, SOLUTION SUBCUTANEOUS at 22:24

## 2022-06-02 RX ADMIN — DEXAMETHASONE SODIUM PHOSPHATE 4 MG: 4 INJECTION, SOLUTION INTRAMUSCULAR; INTRAVENOUS at 09:06

## 2022-06-02 RX ADMIN — SODIUM CHLORIDE, POTASSIUM CHLORIDE, SODIUM LACTATE AND CALCIUM CHLORIDE 125 ML/HR: 600; 310; 30; 20 INJECTION, SOLUTION INTRAVENOUS at 08:02

## 2022-06-02 RX ADMIN — FENTANYL CITRATE 25 MCG: 50 INJECTION, SOLUTION INTRAMUSCULAR; INTRAVENOUS at 10:50

## 2022-06-02 RX ADMIN — MORPHINE SULFATE 2 MG: 2 INJECTION, SOLUTION INTRAMUSCULAR; INTRAVENOUS at 11:31

## 2022-06-02 RX ADMIN — MORPHINE SULFATE 4 MG: 2 INJECTION, SOLUTION INTRAMUSCULAR; INTRAVENOUS at 18:57

## 2022-06-02 RX ADMIN — MORPHINE SULFATE 2 MG: 2 INJECTION, SOLUTION INTRAMUSCULAR; INTRAVENOUS at 13:57

## 2022-06-02 RX ADMIN — AZITHROMYCIN MONOHYDRATE 500 MG: 500 INJECTION, POWDER, LYOPHILIZED, FOR SOLUTION INTRAVENOUS at 08:40

## 2022-06-02 RX ADMIN — Medication 10 UNITS: at 17:24

## 2022-06-02 RX ADMIN — ATORVASTATIN CALCIUM 20 MG: 20 TABLET, FILM COATED ORAL at 22:17

## 2022-06-02 RX ADMIN — LIDOCAINE HYDROCHLORIDE 100 MG: 20 INJECTION, SOLUTION EPIDURAL; INFILTRATION; INTRACAUDAL; PERINEURAL at 08:15

## 2022-06-02 RX ADMIN — MIDAZOLAM HYDROCHLORIDE 0.5 MG: 1 INJECTION, SOLUTION INTRAMUSCULAR; INTRAVENOUS at 11:29

## 2022-06-02 RX ADMIN — TICAGRELOR 90 MG: 90 TABLET ORAL at 17:25

## 2022-06-02 RX ADMIN — MORPHINE SULFATE 4 MG: 2 INJECTION, SOLUTION INTRAMUSCULAR; INTRAVENOUS at 23:11

## 2022-06-02 RX ADMIN — MIDAZOLAM 2 MG: 1 INJECTION INTRAMUSCULAR; INTRAVENOUS at 07:58

## 2022-06-02 RX ADMIN — FENTANYL CITRATE 25 MCG: 50 INJECTION, SOLUTION INTRAMUSCULAR; INTRAVENOUS at 10:41

## 2022-06-02 RX ADMIN — ACETAMINOPHEN 650 MG: 325 TABLET ORAL at 06:38

## 2022-06-02 RX ADMIN — FENTANYL CITRATE 25 MCG: 50 INJECTION, SOLUTION INTRAMUSCULAR; INTRAVENOUS at 10:14

## 2022-06-02 RX ADMIN — Medication 3 UNITS: at 17:23

## 2022-06-02 RX ADMIN — PROPOFOL 200 MG: 10 INJECTION, EMULSION INTRAVENOUS at 08:15

## 2022-06-02 RX ADMIN — ROCURONIUM BROMIDE 5 MG: 10 SOLUTION INTRAVENOUS at 08:15

## 2022-06-02 RX ADMIN — ONDANSETRON HYDROCHLORIDE 4 MG: 2 SOLUTION INTRAMUSCULAR; INTRAVENOUS at 10:14

## 2022-06-02 RX ADMIN — MIDAZOLAM HYDROCHLORIDE 0.5 MG: 1 INJECTION, SOLUTION INTRAMUSCULAR; INTRAVENOUS at 15:04

## 2022-06-02 NOTE — DISCHARGE INSTRUCTIONS
[facility]    Ear Surgery Post Operative Instructions  DO NOT REMOVE PROTECTIVE DRESSING UNTIL FIRST POSTOP VISIT    1. DIET  Start a soft diet and progress to usual diet as tolerated, unless otherwise directed. It is important to remember that good overall diet and health promotes healing. 2.  ACTIVITY  Your activities should be limited as follows until your doctor gives you permission:  A. Avoid lifting heavy objects and any high impact activities  B. Do not blow your nose  C. Do not allow water to enter your ear  D. Do not drive a vehicle or travel long distances (especially to areas of altitude)  E. Do not travel by plane    3. WOUND CARE  A. You may have a Deonte dressing (plastic cup with Velcro straps) that covers the ear. B. The [place] dressing should be left in place until your first postop visit. C. Drainage is expected, so the dressing will probably be bloodied. D. The ear canal will have a sponge type packing that has been stitched right inside the opening of the canal.  This may be removed at your first follow-up appointment. E. Your first follow-up appointment will be about 2 weeks after surgery. You will be given ear drops that you should start after your first postop visit. 4.  THINGS TO BE CONCERNED ABOUT  Please call the office for any of these changes  A. Continuous bleeding from the ear after the [place] dressing is removed  B. Fever of 101 or  higher  C. Pain that doesnt respond to medication  D. Severe dizziness or dizziness that persists after the two weeks from surgery  E. Nausea or vomiting    Office Phone:  [phone]  office hours are 8:00 a.m. to 4:30 p.m. You should be able to reach us after hours by calling the regular office number.   If for some reason you are not able to reach our [address] [place] [address] service through this main number you may call them directly at [phone].    ______________________________________________________________________    Anesthesia Discharge Instructions    After general anesthesia or intervenous sedation, for 24 hours or while taking prescription Narcotics:  · Limit your activities  · Do not drive or operate hazardous machinery  · If you have not urinated within 8 hours after discharge, please contact your surgeon on call. · Do not make important personal or business decisions  · Do not drink alcoholic beverages    Report the following to your surgeon:  · Excessive pain, swelling, redness or odor of or around the surgical area  · Temperature over 100.5 degrees  · Nausea and vomiting lasting longer than 4 hours or if unable to take medication  · Any signs of decreased circulation or nerve impairment to extremity:  Change in color, persistent numbness, tingling, coldness or increased pain.   · Any questions

## 2022-06-02 NOTE — PROGRESS NOTES
39year old with persistent perforation right tympanic membrane, hearing loss for right tympanoplasty. Risks/benefits/imponderables/alternatives discussed with patient. Patient requests surgery.

## 2022-06-02 NOTE — ROUTINE PROCESS
Patient: Mary Arteaga MRN: [id number]  SSN: [id number]   YOB: 1985  Age: 39 y.o. Sex: female     Patient is status post Procedure(s):  RIGHT TYMPANOPLASTY, TYMPANOPLASTY WITH OSSICULOPLASTY, GRAFT EAR CARTILAGE, RIGHT MYRINGOTOMY WITH T-TUBE, PLACEMENT OF FACIAL NERVE MONITORING ELECTRODES. Surgeon(s) and Role:     * Maximino Brooks MD - Primary    Local/Dose/Irrigation: 0.5% MARCAINE W EPINE 10ML RIGHT EAR                Peripheral IV 06/02/22 Right Wrist (Active)   Site Assessment Clean, dry, & intact 06/02/22 0801   Phlebitis Assessment 0 06/02/22 0801   Dressing Status Clean, dry, & intact 06/02/22 0801   Dressing Type Transparent 06/02/22 0801   Hub Color/Line Status Blue; Infusing 06/02/22 0801                           Dressing/Packing:  Incision 06/02/22 Ear Right-Dressing/Treatment: Cotton (bacitracin ointment, macarena) (06/02/22 0900)    Splint/Cast:  ]

## 2022-06-02 NOTE — H&P
[address] [facility] Adult  Hospitalist Group  History and Physical    Date of Service:  6/2/2022  Primary Care Provider: Teri Best DO  Source of information: The patient    Chief Complaint: chest pain     History of Presenting Illness:   Jillian Castanon is a 39 y.o. female who presents with chest pain     8year-old white female history of diabetes, CAD, hypertension, and a has a history of a cardiac arrest in 2018 after a CABG. she is here for outpatient procedure \"RIGHT TYMPANOPLASTY,GRAFT EAR CARTILAGE, RIGHT MYRINGOTOMY WITH T-TUBE, PLACEMENT OF FACIAL NERVE MONITORING ELECTRODES\" by ENT . Postop when patient in PACU, she woke up with complaining of midsternal chest pain, 9/10, pressure/sharp kind radiated to left arm and left jaw. Not related to inspiration, said that this was similar to her previous chest pain but this time is much worse. She was taking aspirin and Brilinta chronically, she did hold her Brilinta this morning because of the procedure. She was seen by her cardiology 2 months ago for preop, but no recent stress test was done. Denied fever, denied cough, denied nauseous vomiting denied shortness of breath.        REVIEW OF SYSTEMS:  General: HPI, no changes of weight  HEENT: no headache, no vision changes, HPI  RES: no wheezing, no cough, no sob  CVS: HPI   Muscular: no joint swelling, no muscle pain, no leg swelling  Skin: no rash, no itching   GI: no vomiting, no diarrhea  : no dysuria, no hematuria  Hemo: no gum bleeding, no petechial   Neuro: no sensation changes, no focal weakness   Endo: no polydipsia   Psych: denied depression     Past Medical History:   Diagnosis Date    Abscess     Anemia     CAD (coronary artery disease) 2019    CABG X1    Chronic pain     LEFT SCIATIC, STERNAL WOUND    Complicated migraine     with extremity numbness    H/O transfusion of packed red blood cells 07/2021    Heart attack ([address] 11/2019    Hx MRSA infection 2009    Hypertension     onset 2009    Hypothyroidism     Irregular menstrual cycle     Nausea & vomiting     Obesity     Stroke ([facility] 2019    LOWER BODY WEAKNESS    T2DM (type 2 diabetes mellitus) ([facility] [address] [facility]     onset 2009 IDDM    Thromboembolus ([facility] [facility] 2009    RIGHT LEG - [place] [place]      Past Surgical History:   Procedure Laterality Date    HX CHOLECYSTECTOMY      HX CORONARY ARTERY BYPASS GRAFT  12/03/2019    HX GYN Bilateral 2021    SALPINGECTOMY    HX GYN  2021    ENDOMETRIAL ABLATION    HX HEENT      T&A    HX OTHER SURGICAL      2 TEETH REMOVED     HX TONSIL AND ADENOIDECTOMY  1992    HX TYMPANOSTOMY Bilateral     MULTIPLE    HX TYMPANOSTOMY      HX WISDOM TEETH EXTRACTION      [place] CARDIAC SURG PROCEDURE UNLIST  2019    CABG X 1, CARDIAC CATH STENT X 3     Prior to Admission medications    Medication Sig Start Date End Date Taking? Authorizing Provider   azithromycin (ZITHROMAX) 500 mg tab Take 1 Tablet by mouth daily for 3 days. 6/2/22 6/5/22 Yes Brandan Medellin MD   ondansetron hcl (Zofran) 4 mg tablet Take 1 Tablet by mouth every eight (8) hours as needed for Nausea for up to 21 doses. 6/2/22  Yes Brandan Medellin MD   HYDROcodone-acetaminophen [facility]) 5-325 mg per tablet Take 1 Tablet by mouth every six (6) hours as needed for Pain for up to 7 days. Max Daily Amount: 4 Tablets. 6/2/22 6/9/22 Yes Brandan Medellin MD   dulaglutide (Trulicity) 1.5 UM/6.1 mL sub-q pen 0.5 mL by SubCUTAneous route every seven (7) days. Stop 0.75 mg 3/16/22  Yes Chris Kim MD   insulin detemir U-100 (LEVEMIR FLEXTOUCH) 100 unit/mL (3 mL) inpn Inject 30 units in AM and 30  units at bed time 3/16/22  Yes Chris Kim MD   insulin aspart U-100 (NovoLOG U-100 Insulin aspart) 100 unit/mL injection Inject 10- 15 units before breakfast, 10- 15 units before lunch and 10 - 15 units before dinner.  3/16/22  Yes Chris Kim MD   Insulin Needles, Disposable, 32 gauge x 5/32\" ndle Use to inject insulin BID Dx Code: E11.65 3/16/22  Yes Bryant Ca MD   insulin syringe,safetyneedle 0.5 mL 31 gauge x 15/64\" syrg 1 Syringe by Does Not Apply route three (3) times daily. Dx Code: E11.65 3/16/22  Yes Bryant Ca MD   ondansetron hcl (Zofran) 4 mg tablet Take 1 Tablet by mouth every eight (8) hours as needed for Nausea for up to 21 doses. 2/10/22  Yes Becky Anthony MD   bisacodyL (Dulcolax, bisacodyl,) 5 mg EC tablet Take 5 mg by mouth daily as needed for Constipation. Yes Provider, Historical   ticagrelor (Brilinta) 90 mg tablet Take 90 mg by mouth two (2) times a day. Yes Provider, Historical   atorvastatin (LIPITOR) 20 mg tablet Take 20 mg by mouth nightly. Yes Provider, Historical   methocarbamoL (ROBAXIN) 750 mg tablet Take 750 mg by mouth two (2) times a day. Yes Provider, Historical   senna (Senna) 8.6 mg tablet Take 1 Tablet by mouth as needed for Constipation. Yes Provider, Historical   lisinopriL (PRINIVIL, ZESTRIL) 2.5 mg tablet Take 2.5 mg by mouth daily. Yes Provider, Historical   empagliflozin (Jardiance) 10 mg tablet Take 10 mg by mouth daily. Yes Provider, Historical   ranolazine ER (Ranexa) 1,000 mg Take 500 mg by mouth two (2) times a day. Yes Provider, Historical   blood-glucose sensor (DEXCOM G6 SENSOR) by Does Not Apply route. Yes Provider, Historical   carvedilol (COREG) 6.25 mg tablet Take 2 Tabs by mouth two (2) times daily (with meals). Patient taking differently: Take 12.5 mg by mouth two (2) times daily (with meals). TAKE 2 TABLETS 7/6/18  Yes Ashley Michael MD   aspirin delayed-release 81 mg tablet Take 1 Tab by mouth daily. 12/22/17  Yes Brian Rivera, NP   ondansetron hcl (Zofran) 4 mg tablet Take 4 mg by mouth every eight (8) hours as needed for Nausea or Vomiting. Provider, Historical   nitroglycerin (NITROSTAT) 0.4 mg SL tablet 0.4 mg by SubLINGual route every five (5) minutes as needed for Chest Pain. Up to 3 doses.    Patient not taking: Reported on 6/2/2022    Provider, Historical   acetaminophen (TYLENOL) 500 mg tablet Take 2 Tabs by mouth every six (6) hours as needed for Pain. 5/25/19   Neeta Lara MD   levothyroxine (SYNTHROID) 100 mcg tablet Take 1 Tab by mouth Daily (before breakfast). Patient taking differently: Take 137 mcg by mouth Daily (before breakfast). PT TAKES 137 MCG DAILY 12/22/17   Coral Jose, NP     Allergies   Allergen Reactions    Other Food Swelling     JALIPENO PEPPERS - FACILA SWELLING    Ciprofloxacin Anaphylaxis    Bumetanide Other (comments)     Hearing loss    Compazine [Prochlorperazine] Anxiety     HALLUCINATIONS    Pcn [Penicillins] Rash     OCCURRED IN INFANCY NO REPORTED SEVERE IgE MEDIATED REACTIONS  Patient screened for any delayed non-IgE-mediated reaction to PCN.         Patient notes the following:    No delayed non-IgE-mediated reaction to PCN          Tree Pollen-Shagbark Abingdon Rash     HICKORY SMOKE FLAVORING    Vancomycin Other (comments)     Kidneys shut down    Voltaren [Diclofenac Sodium] Myalgia and Other (comments)     \"muscle spasms\", LEG SPASMS        Family History   Problem Relation Age of Onset    Hypertension Other         \"all her family\"    Diabetes Other         \"all her family\"    Cancer Mother         cervical    Stroke Mother     Heart Disease Mother    Jennifer [place] Bladder Disease Mother     Diabetes Mother     Hypertension Mother     Elevated Lipids Mother     Stroke Father     Heart Disease Father     Diabetes Father     Heart Attack Father     Elevated Lipids Father     Kidney Disease Father         ESRD    Gall Bladder Disease Brother     Diabetes Brother     Kidney Disease Brother     Cancer Brother         KIDNEY    Hypertension Brother     Migraines Maternal Aunt     Diabetes Paternal Aunt     Stroke Maternal Grandmother     Diabetes Maternal Grandmother     Cancer Maternal Grandmother         cervical    Heart Attack Maternal Grandmother     Elevated Lipids Maternal Grandmother     Stroke Maternal Grandfather     Diabetes Maternal Grandfather     Elevated Lipids Maternal Grandfather     Cancer Maternal Grandfather     Heart Attack Maternal Grandfather     Anesth Problems Neg Hx       Social History:  reports that she is a non-smoker but has been exposed to tobacco smoke. She has never used smokeless tobacco. She reports previous alcohol use. She reports that she does not use drugs. Family and social history were personally reviewed, all pertinent and relevant details are outlined as above. Objective:     Visit Vitals  /87   Pulse 78   Temp 97.6 °F (36.4 °C)   Resp 19   SpO2 98%    O2 Flow Rate (L/min): 3 l/min O2 Device: Nasal cannula    PHYSICAL EXAM:   General: Alert x oriented x 3, awake, no acute distress, r  HEENT: PEERL, right ear covered by dressing. No nose discharge. Neck: Supple, no JVD, no meningeal signs  Chest: Clear to auscultation bilaterally   CVS: RRR, S1 S2 heard, no murmurs/rubs/gallops  Abd: Soft, non-tender, non-distended, +bowel sounds   Ext: No clubbing, no cyanosis, no edema  Neuro/Psych: Pleasant mood and affect, CN 2-12 grossly intact, sensory grossly within normal limit, Strength 5/5 in all extremities, DTR 1+ x 4  Cap refill: Brisk, less than 3 seconds  Pulses: 2+, symmetric in all extremities  Skin: Warm, dry, without rashes or lesions    Data Review: All diagnostic labs and studies have been reviewed. Abnormal Labs Reviewed   CBC WITH AUTOMATED DIFF - Abnormal; Notable for the following components:       Result Value    NEUTROPHILS 82 (*)     MONOCYTES 2 (*)     ABS. NEUTROPHILS 8.4 (*)     All other components within normal limits   GLUCOSE, POC - Abnormal; Notable for the following components:    Glucose (POC) 128 (*)     All other components within normal limits       All Micro Results     None          IMAGING:   XR CHEST PORT   Final Result   No acute process identified.                   ECG/ECHO: Results for orders placed or performed during the hospital encounter of 06/02/22   EKG, 12 LEAD, INITIAL   Result Value Ref Range    Ventricular Rate 76 BPM    Atrial Rate 76 BPM    P-R Interval 224 ms    QRS Duration 96 ms    Q-T Interval 454 ms    QTC Calculation (Bezet) 510 ms    Calculated P Axis 26 degrees    Calculated R Axis -44 degrees    Calculated T Axis 56 degrees    Diagnosis       Sinus rhythm with 1st degree AV block  Left axis deviation  Inferior infarct (cited on or before 10-MAY-2021)  Anterolateral infarct (cited on or before 10-MAY-2021)  Prolonged QT  Abnormal ECG  When compared with ECG of 03-FEB-2022 11:47,  IN interval has increased  ST elevation now present in Inferior leads  QT has lengthened          Assessment:   Given the patient's current clinical presentation, there is a high level of concern for decompensation if discharged from the emergency department. Complex decision making was performed, which includes reviewing the patient's available past medical records, laboratory results, and imaging studies. Active Problems:    Tympanic membrane perforation, right (6/2/2022)      Chest pain (6/2/2022)      Plan:     1. Chest pain, has risk of CAD ACS. EKG reviewed, chest x-ray negative, Will observe overnight continue telemetry, check troponin, will check a D-dimer. Consult cardiology. Continue aspirin Brilinta  2. Diabetes: Takes 30 units twice a day, sliding scale, also Premeal coverage of with Humalog  3. Hypertension: Continue home meds including Coreg and lisinopril  4.   Hypothyroidism continue home meds  5.  persistent perforation right tympanic membrane: s/p right tympanoplastypost op care per ENT, pt was given oral azithromycin 500mg daily x 3 days for post op prophylaxis, will continue         DIET: ADULT DIET Regular; 5 carb choices (75 gm/meal)  DIET ONE TIME MESSAGE   ISOLATION PRECAUTIONS: There are currently no Active Isolations  CODE STATUS: Full Code   DVT PROPHYLAXIS: SCDs  FUNCTIONAL STATUS PRIOR TO HOSPITALIZATION: Fully active and ambulatory; able to carry on all self-care without restriction. EARLY MOBILITY ASSESSMENT: Recommend routine ambulation while hospitalized with the assistance of nursing staff  ANTICIPATED DISCHARGE: less than 24 hours. EMERGENCY CONTACT/SURROGATE DECISION MAKER: pt makes her own decision     CRITICAL CARE WAS PERFORMED FOR THIS ENCOUNTER: NO.      Signed By: Mirza Milian MD     June 2, 2022         Please note that this dictation may have been completed with Micaela Mchugh, the computer voice recognition software. Quite often unanticipated grammatical, syntax, homophones, and other interpretive errors are inadvertently transcribed by the computer software. Please disregard these errors. Please excuse any errors that have escaped final proofreading.

## 2022-06-02 NOTE — PERIOP NOTES
States chest pain is still #8- states nitro does not help. [facility] call to see if a bed was available to get her off the stretcher.

## 2022-06-02 NOTE — PERIOP NOTES
Pt was difficult stick, reports took anesthesia 45 minutes to get iv last surgery.   I attempted twice and Leland Milton attempted 1 time and Dr Gretchen Ortega and CRNA notified

## 2022-06-02 NOTE — CONSULTS
CARDIOLOGY CONSULT                  Subjective:    Date of  Admission:   Admission type:Emergency    Timo Avendaño DO     This is a 39 yof with CAD s/p CABG, [place] arrest here for ENT surgery. She is a cardiac patient of Dr. Ronnie Wright with [facility]. She has a long history of chest pain which is typically a left sided pain which radiates to her shoulder and back. She apparently has this with most surgical procedures. She was evaluated for this in April 2022 at [facility] with the same pain occurring after another procedure. She had a [facility] last year for similar issues. She awoke after her procedure today and mentioned chest pain. It is a similar quality to that in the past. She has had constant pain since that time. It is completely reproducible and exacerbated with light palpation of her chest.    Patient Active Problem List   Diagnosis Code    Hypothyroidism E03.9    Obesity, morbid ([address] E66.01    Type 2 diabetes mellitus with nephropathy ([address] E11.21    Microalbuminuria due to type 2 diabetes mellitus ([address] E11.29, R80.9    Hypertension I10    Atherosclerosis of coronary artery I25.10    HLD (hyperlipidemia) E78.5    S/P CABG (coronary artery bypass graft) Z95.1    Chronic left mastoiditis H70.12    Tympanic membrane perforation, right H72.91    Chest pain R07.9        Past Medical History:   Diagnosis Date    Abscess     Anemia     CAD (coronary artery disease) 2019    CABG X1    Chronic pain     LEFT SCIATIC, STERNAL WOUND    Complicated migraine     with extremity numbness    H/O transfusion of packed red blood cells 07/2021    Heart attack ([address] 11/2019    Hx MRSA infection 2009    Hypertension     onset 2009    Hypothyroidism     Irregular menstrual cycle     Nausea & vomiting     Obesity     Stroke ([address] 2019    LOWER BODY WEAKNESS    T2DM (type 2 diabetes mellitus) ([address]     onset 2009 IDDM    Thromboembolus ([address] 2009    RIGHT LEG - [place]      Past Surgical History:   Procedure Laterality Date    HX CHOLECYSTECTOMY      HX CORONARY ARTERY BYPASS GRAFT  12/03/2019    HX GYN Bilateral 2021    SALPINGECTOMY    HX GYN  2021    ENDOMETRIAL ABLATION    HX HEENT      T&A    HX OTHER SURGICAL      2 TEETH REMOVED     HX TONSIL AND ADENOIDECTOMY  1992    HX TYMPANOSTOMY Bilateral     MULTIPLE    HX TYMPANOSTOMY      HX WISDOM TEETH EXTRACTION      [place] CARDIAC SURG PROCEDURE UNLIST  2019    CABG X 1, CARDIAC CATH STENT X 3      Family History   Problem Relation Age of Onset    Hypertension Other         \"all her family\"    Diabetes Other         \"all her family\"    Cancer Mother         cervical    Stroke Mother     Heart Disease Mother    Gus [place] Bladder Disease Mother     Diabetes Mother     Hypertension Mother     Elevated Lipids Mother     Stroke Father     Heart Disease Father     Diabetes Father     Heart Attack Father     Elevated Lipids Father     Kidney Disease Father         ESRD    Gall Bladder Disease Brother     Diabetes Brother     Kidney Disease Brother     Cancer Brother         KIDNEY    Hypertension Brother     Migraines Maternal Aunt     Diabetes Paternal Aunt     Stroke Maternal Grandmother     Diabetes Maternal Grandmother     Cancer Maternal Grandmother         cervical    Heart Attack Maternal Grandmother     Elevated Lipids Maternal Grandmother     Stroke Maternal Grandfather     Diabetes Maternal Grandfather     Elevated Lipids Maternal Grandfather     Cancer Maternal Grandfather     Heart Attack Maternal Grandfather     Anesth Problems Neg Hx       Social History     Socioeconomic History    Marital status:      Spouse name: Not on file    Number of children: Not on file    Years of education: Not on file    Highest education level: Not on file   Occupational History    Not on file   Tobacco Use    Smoking status: Passive Smoke Exposure - Never Smoker    Smokeless tobacco: Never Used    Tobacco comment:  SMOKES   Vaping Use    Vaping Use: Never used   Substance and Sexual Activity    Alcohol use: Not Currently     Comment: socially 1-2 times a year     Drug use: No    Sexual activity: Yes     Partners: Male     Birth control/protection: None   Other Topics Concern    Not on file   Social History Narrative    Not on file     Social Determinants of Health     Financial Resource Strain:     Difficulty of Paying Living Expenses: Not on file   Food Insecurity:     Worried About Running Out of Food in the Last Year: Not on file    Demar of Food in the Last Year: Not on file   Transportation Needs:     Lack of Transportation (Medical): Not on file    Lack of Transportation (Non-Medical):  Not on file   Physical Activity:     Days of Exercise per Week: Not on file    Minutes of Exercise per Session: Not on file   Stress:     Feeling of Stress : Not on file   Social Connections:     Frequency of Communication with Friends and Family: Not on file    Frequency of Social Gatherings with Friends and Family: Not on file    Attends Orthodoxy Services: Not on file    Active Member of [address] [place] [address] or Organizations: Not on file    Attends Club or Organization Meetings: Not on file    Marital Status: Not on file   Intimate Partner Violence:     Fear of Current or Ex-Partner: Not on file    Emotionally Abused: Not on file    Physically Abused: Not on file    Sexually Abused: Not on file   Housing Stability:     Unable to Pay for Housing in the Last Year: Not on file    Number of [place] in the Last Year: Not on file    Unstable Housing in the Last Year: Not on file        Current Facility-Administered Medications   Medication Dose Route Frequency    lactated Ringers infusion  125 mL/hr IntraVENous CONTINUOUS    0.9% sodium chloride infusion 1,000 mL  1,000 mL IntraVENous CONTINUOUS    sodium chloride (NS) flush 5-40 mL  5-40 mL IntraVENous Q8H    sodium chloride (NS) flush 5-40 mL  5-40 mL IntraVENous PRN    oxyCODONE-acetaminophen (PERCOCET) 5-325 mg per tablet 1 Tablet  1 Tablet Oral PRN    morphine injection 2 mg  2 mg IntraVENous Multiple    HYDROmorphone (DILAUDID) injection 0.2 mg  0.2 mg IntraVENous Multiple    ondansetron (ZOFRAN) injection 4 mg  4 mg IntraVENous PRN    diphenhydrAMINE (BENADRYL) injection 12.5 mg  12.5 mg IntraVENous PRN    [START ON 6/3/2022] levothyroxine (SYNTHROID) tablet 137 mcg  137 mcg Oral ACB    [START ON 6/3/2022] aspirin delayed-release tablet 81 mg  81 mg Oral DAILY    atorvastatin (LIPITOR) tablet 20 mg  20 mg Oral QHS    carvediloL (COREG) tablet 12.5 mg  12.5 mg Oral BID WITH MEALS    [START ON 6/3/2022] lisinopriL (PRINIVIL, ZESTRIL) tablet 2.5 mg  2.5 mg Oral DAILY    . PHARMACY TO SUBSTITUTE PER PROTOCOL (Reordered from: ranolazine ER (Ranexa) 1,000 mg)    Per Protocol    ticagrelor (BRILINTA) tablet 90 mg  90 mg Oral BID    senna-docusate (PERICOLACE) 8.6-50 mg per tablet 1 Tablet  1 Tablet Oral QHS    morphine injection 4 mg  4 mg IntraVENous Q4H PRN    glucose chewable tablet 16 g  4 Tablet Oral PRN    dextrose 10 % infusion 0-250 mL  0-250 mL IntraVENous PRN    glucagon (GLUCAGEN) injection 1 mg  1 mg IntraMUSCular PRN    insulin glargine (LANTUS) injection 30 Units  30 Units SubCUTAneous ACB/HS    insulin lispro (HUMALOG) injection   SubCUTAneous AC&HS    insulin lispro (HUMALOG) injection 10 Units  10 Units SubCUTAneous TIDAC    sodium chloride (NS) flush 5-40 mL  5-40 mL IntraVENous Q8H    sodium chloride (NS) flush 5-40 mL  5-40 mL IntraVENous PRN    [START ON 6/3/2022] azithromycin (ZITHROMAX) tablet 500 mg  500 mg Oral DAILY    oxyCODONE-acetaminophen (PERCOCET 10)  mg per tablet 1 Tablet  1 Tablet Oral Q6H PRN             Review of Symptoms:    Gen - no F/C/S  Eyes - no vision changes  ENT - no sore throat, rhinorrhea, otalgia  CV - + CP, no palpitations, no orthopnea, no PND, no JESSI  Resp no cough, no SOB/KEYS  GI - no AP, no n/v/d/c   - no dysuria, no hematuria  MSK - no abnormal joint pains  Skin - no rashes  Neuro - no HA, no numbness, no weakness, no slurred speech  Psych - no change in mood       Physical Exam    BP (!) 139/96   Pulse 85   Temp 97.6 °F (36.4 °C)   Resp 22   SpO2 96%      NAD  Skin warm and dry  Nl conjunctiva  Oropharynx without exudate. Neck supple  Reproducible chest tenderness  Lungs clear  Normal S1/ S2 with occasional ectopy  No Murmurs, click or Rubs  Abdomen soft and non tender  Pulses 2+ radials  Neuro:  Grossly intact  Appropriate       Cardiographics    Telemetry: normal sinus rhythm      Assessment: This is a 39 yof with CAD s/p CABG here for ENT surgery and consulted for her chronic chest pain. Pain is highly unlikely to be cardiac in origin and has been worked up by her primary cardiologist and also in other facilities. Troponin is barely detectable and would be expected to be elevated if she was having ACS or angina for hours. She is to be monitored overnight and remote telemetry is appropriate. Would continue to monitor her on her home cardiac meds and would withhold further cardiac workup unless clinical conditions change.  No need to check more troponins    Signing off, please call with questions

## 2022-06-02 NOTE — PERIOP NOTES
Dr. Harish Renteria called- pt c/o chest pain. States she has had it after anesthesia before and medication helps; states her 'cardiologist says if she has chest pain after surgery she should stay overnight for monitoring'. Too sleepy for meds yet- Dr. Harish Renteria says let her wake some more; meds and ekg if needed.

## 2022-06-02 NOTE — OP NOTES
Postoperative Note    Patient: Estevan Campos  YOB: 1985  MRN: [id number]    Date of Procedure: 6/2/2022     Pre-Op Diagnosis: EUSTACHIAN TUBE DYSFUNCTION, TYMPANIC MEMBRANE PERFORATION, BILATERAL CONDUCTIVE HEARING LOSS    Post-Op Diagnosis:      Procedure(s):  RIGHT TYMPANOPLASTY,GRAFT EAR CARTILAGE, RIGHT MYRINGOTOMY WITH T-TUBE, PLACEMENT OF FACIAL NERVE MONITORING ELECTRODES    Surgeon(s):  Lulu Lyman MD     After informed consent and all questions answered, the patient was taken to the operating room and underwent general anesthesia and was intubated by anesthesia. The patient was prepped and draped in a sterile manner. Facial nerve monitoring was used throughout the case, and the facial nerve function was intact at the conclusion of the case. Attention was directed to the right ear. A four quadrant and periauricular injection with marcaine with epinephrine was performed. The microscope was draped in a sterile manner and was used throughout the procedure. The ear canal was visualized, and the perforation was freshened with a Garcia needle and a cup forceps. An incision was made in the mid- bony canal, and skin flaps were developed. The anulus was carefully raised, with care to preserve the Chorda tympani. The ossicles were visualized and palpated for assessment of mobility. Scar tissue was removed from the middle ear space. Through a second incision in the conchal bowl a cartilage/perichondrial graft was harvested for repair of the ear drum and reinforcement of the ear bones. This graft was thinned and used in an underlay technique. Gia Gory was placed in the middle ear followed by the grafting material.  Anteriorly, an incision was made and a modified t tube was placed. Skin flaps were placed lateral to the repair. Gia Gory was placed in the ear canal to reinforce the repair.     Incisions were closed in a multilayer fashion, with 5-O Vicryl for the deeper layers and 6-O plain for the skin. A ursula coated  Mericel heidy pack was shaped and placed in the ear canal. This was sutured in place with a 5-O silk suture. saline drops were placed on the packing material.  The patient was allowed to awaken from anesthesia, was extubated, and stable to the recovery room.     Surgical Assistant: None    Anesthesia: General     Estimated Blood Loss (FW):2MI    Complications:none  Specimens: * No specimens in log *     Implants: * No implants in log *    Drains: * No LDAs found *    Findings: perforation, scarring tm, tympanosclerosis    Electronically Signed by Juliana Emery MD on 6/2/2022 at 9:47 AM

## 2022-06-02 NOTE — PROGRESS NOTES
Bedside and Verbal shift change report given to JOANNE Danielle  (oncoming nurse) by Shanon Patton (offgoing nurse). Report included the following information SBAR and Kardex.

## 2022-06-02 NOTE — BRIEF OP NOTE
Brief Postoperative Note    Patient: Felton Angel  YOB: 1985  MRN: [id number]    Date of Procedure: 6/2/2022     Pre-Op Diagnosis: EUSTACHIAN TUBE DYSFUNCTION, TYMPANIC MEMBRANE PERFORATION, BILATERAL CONDUCTIVE HEARING LOSS    Post-Op Diagnosis:      Procedure(s):  RIGHT TYMPANOPLASTY,GRAFT EAR CARTILAGE, RIGHT MYRINGOTOMY WITH T-TUBE, PLACEMENT OF FACIAL NERVE MONITORING ELECTRODES    Surgeon(s):  Agnieszka Hager MD     After informed consent and all questions answered, the patient was taken to the operating room and underwent general anesthesia and was intubated by anesthesia. The patient was prepped and draped in a sterile manner. Facial nerve monitoring was used throughout the case, and the facial nerve function was intact at the conclusion of the case. Attention was directed to the right ear. A four quadrant and periauricular injection with marcaine with epinephrine was performed. The microscope was draped in a sterile manner and was used throughout the procedure. The ear canal was visualized, and the perforation was freshened with a Garcia needle and a cup forceps. An incision was made in the mid- bony canal, and skin flaps were developed. The anulus was carefully raised, with care to preserve the Chorda tympani. The ossicles were visualized and palpated for assessment of mobility. Scar tissue was removed from the middle ear space. Through a second incision in the conchal bowl a cartilage/perichondrial graft was harvested for repair of the ear drum and reinforcement of the ear bones. This graft was thinned and used in an underlay technique. Alma Check was placed in the middle ear followed by the grafting material.  Anteriorly, an incision was made and a modified t tube was placed. Skin flaps were placed lateral to the repair. Alma Check was placed in the ear canal to reinforce the repair.     Incisions were closed in a multilayer fashion, with 5-O Vicryl for the deeper layers and 6-O plain for the skin. A ursula coated  Mericel heidy pack was shaped and placed in the ear canal. This was sutured in place with a 5-O silk suture. saline drops were placed on the packing material.  The patient was allowed to awaken from anesthesia, was extubated, and stable to the recovery room.     Surgical Assistant: None    Anesthesia: General     Estimated Blood Loss (QO):7HD    Complications:none  Specimens: * No specimens in log *     Implants: * No implants in log *    Drains: * No LDAs found *    Findings: perforation, scarring tm, tympanosclerosis    Electronically Signed by Chelsi Linares MD on 6/2/2022 at 9:47 AM

## 2022-06-02 NOTE — PERIOP NOTES
TRANSFER - OUT REPORT:    Verbal report given to Elier Almanza RN on Lorena Null  being transferred to [phone] for routine post - op       Report consisted of patients Situation, Background, Assessment and   Recommendations(SBAR). Time Pre op antibiotic given:0840  Anesthesia Stop time: 8834  Garcia Present on Transfer to floor:no  Order for Garcia on Chart:no  Discharge Prescriptions with Chart:no    Information from the following report(s) SBAR, OR Summary, Procedure Summary, MAR and Recent Results was reviewed with the receiving nurse. Opportunity for questions and clarification was provided. Is the patient on 02? NO       L/Min -       Other -    Is the patient on a monitor? NO    Is the nurse transporting with the patient? NO    Surgical Waiting Area notified of patient's transfer from PACU? NO      The following personal items collected during your admission accompanied patient upon transfer:   Dental Appliance: Dental Appliances:  ( left belongings with patient)  Vision:    Hearing Aid:    Jewelry: Jewelry: None  Clothing: Clothing:  (clothes and shoes to PACU)  Other Valuables:  Other Valuables: Cell Phone (pt declines security, phone turned off with clothes)  Valuables sent to safe:

## 2022-06-02 NOTE — ANESTHESIA PREPROCEDURE EVALUATION
Relevant Problems   CARDIOVASCULAR   (+) Atherosclerosis of coronary artery   (+) Hypertension   (+) S/P CABG (coronary artery bypass graft)      RENAL FAILURE   (+) Microalbuminuria due to type 2 diabetes mellitus (HCC)      ENDOCRINE   (+) Hypothyroidism   (+) Obesity, morbid (HCC)   (+) Type 2 diabetes mellitus with nephropathy (HCC)       Anesthetic History   No history of anesthetic complications  PONV          Review of Systems / Medical History  Patient summary reviewed, nursing notes reviewed and pertinent labs reviewed    Pulmonary  Within defined limits                 Neuro/Psych   Within defined limits           Cardiovascular  Within defined limits  Hypertension          CAD and CABG         GI/Hepatic/Renal  Within defined limits              Endo/Other  Within defined limits  Diabetes  Hypothyroidism  Morbid obesity     Other Findings              Physical Exam    Airway  Mallampati: III  TM Distance: 4 - 6 cm  Neck ROM: normal range of motion   Mouth opening: Normal     Cardiovascular  Regular rate and rhythm,  S1 and S2 normal,  no murmur, click, rub, or gallop             Dental  No notable dental hx       Pulmonary  Breath sounds clear to auscultation               Abdominal  GI exam deferred       Other Findings            Anesthetic Plan    ASA: 3  Anesthesia type: general          Induction: Intravenous  Anesthetic plan and risks discussed with: Patient

## 2022-06-03 VITALS
RESPIRATION RATE: 16 BRPM | TEMPERATURE: 97.4 F | SYSTOLIC BLOOD PRESSURE: 132 MMHG | OXYGEN SATURATION: 96 % | HEART RATE: 88 BPM | DIASTOLIC BLOOD PRESSURE: 88 MMHG

## 2022-06-03 LAB
GLUCOSE BLD STRIP.AUTO-MCNC: 177 MG/DL (ref 65–117)
GLUCOSE BLD STRIP.AUTO-MCNC: 240 MG/DL (ref 65–117)
SERVICE CMNT-IMP: ABNORMAL
SERVICE CMNT-IMP: ABNORMAL

## 2022-06-03 PROCEDURE — G0378 HOSPITAL OBSERVATION PER HR: HCPCS

## 2022-06-03 PROCEDURE — 74011636637 HC RX REV CODE- 636/637: Performed by: HOSPITALIST

## 2022-06-03 PROCEDURE — 74011000250 HC RX REV CODE- 250: Performed by: HOSPITALIST

## 2022-06-03 PROCEDURE — 74011250637 HC RX REV CODE- 250/637: Performed by: HOSPITALIST

## 2022-06-03 PROCEDURE — 74011250636 HC RX REV CODE- 250/636: Performed by: HOSPITALIST

## 2022-06-03 PROCEDURE — 82962 GLUCOSE BLOOD TEST: CPT

## 2022-06-03 RX ADMIN — Medication 10 UNITS: at 07:19

## 2022-06-03 RX ADMIN — AZITHROMYCIN MONOHYDRATE 500 MG: 250 TABLET ORAL at 09:05

## 2022-06-03 RX ADMIN — TICAGRELOR 90 MG: 90 TABLET ORAL at 09:05

## 2022-06-03 RX ADMIN — LISINOPRIL 2.5 MG: 5 TABLET ORAL at 09:05

## 2022-06-03 RX ADMIN — Medication 10 UNITS: at 12:23

## 2022-06-03 RX ADMIN — SODIUM CHLORIDE, PRESERVATIVE FREE 10 ML: 5 INJECTION INTRAVENOUS at 07:30

## 2022-06-03 RX ADMIN — LEVOTHYROXINE SODIUM 137 MCG: 0.11 TABLET ORAL at 07:19

## 2022-06-03 RX ADMIN — Medication 2 UNITS: at 07:20

## 2022-06-03 RX ADMIN — MORPHINE SULFATE 4 MG: 2 INJECTION, SOLUTION INTRAMUSCULAR; INTRAVENOUS at 04:40

## 2022-06-03 RX ADMIN — CARVEDILOL 12.5 MG: 12.5 TABLET, FILM COATED ORAL at 07:26

## 2022-06-03 RX ADMIN — Medication 3 UNITS: at 12:22

## 2022-06-03 RX ADMIN — MORPHINE SULFATE 4 MG: 2 INJECTION, SOLUTION INTRAMUSCULAR; INTRAVENOUS at 09:04

## 2022-06-03 RX ADMIN — RANOLAZINE 500 MG: 500 TABLET, FILM COATED, EXTENDED RELEASE ORAL at 07:21

## 2022-06-03 RX ADMIN — INSULIN GLARGINE 30 UNITS: 100 INJECTION, SOLUTION SUBCUTANEOUS at 07:28

## 2022-06-03 RX ADMIN — ASPIRIN 81 MG: 81 TABLET, COATED ORAL at 09:05

## 2022-06-03 NOTE — PROGRESS NOTES
Medicare Outpatient Observation Notice (MOON) provided to patient/representative with verbal explanation of the notice. Time allotted for questions regarding the notice. Patient /representative provided a completed copy of the MOON notice. Copy placed on bedside chart. Joshua Etienne, Care Management Assistant.

## 2022-06-03 NOTE — PROGRESS NOTES
Bedside and Verbal shift change report given to [facility] (oncoming nurse) by Laura Betancourt (offgoing nurse). Report included the following information SBAR, Kardex, Intake/Output, MAR and Recent Results.

## 2022-06-03 NOTE — DISCHARGE SUMMARY
Discharge Summary       PATIENT ID: Brandy Diane  MRN: [id number]   YOB: 1985    DATE OF ADMISSION: 6/2/2022  5:22 AM    DATE OF DISCHARGE: 06/03/22     PRIMARY CARE PROVIDER: Melanie Rodriguez DO     DISCHARGING PROVIDER: Holly Hollingsworth MD    To contact this individual call [phone] and ask the  to page. If unavailable ask to be transferred the Adult Hospitalist Department. CONSULTATIONS: IP CONSULT TO HOSPITALIST  IP CONSULT TO CARDIOLOGY    PROCEDURES/SURGERIES: Procedure(s):  RIGHT TYMPANOPLASTY, TYMPANOPLASTY WITH OSSICULOPLASTY, GRAFT EAR CARTILAGE, RIGHT MYRINGOTOMY WITH T-TUBE, PLACEMENT OF FACIAL NERVE MONITORING ELECTRODES    DISCHARGE DIAGNOSES:   Chest pain - non cardiac   Eustachian tube dysfunction        ADMISSION SUMMARY AND HOSPITAL COURSE:   25-year-old white female history of diabetes, CAD, hypertension, and a has a history of a cardiac arrest in 2018 after a CABG. she is here for outpatient procedure \"RIGHT TYMPANOPLASTY,GRAFT EAR CARTILAGE, RIGHT MYRINGOTOMY WITH T-TUBE, PLACEMENT OF FACIAL NERVE MONITORING ELECTRODES\" by ENT . Postop when patient in PACU, she woke up with complaining of midsternal chest pain, 9/10, pressure/sharp kind radiated to left arm and left jaw. Not related to inspiration, said that this was similar to her previous chest pain but this time is much worse. She was taking aspirin and Brilinta chronically, she did hold her Brilinta this morning because of the procedure. She was seen by her cardiology 2 months ago for preop, but no recent stress test was done. Patient was monitored, troponin was negative. Cardiology evaluated and did not do any further follow-up. Patient does have chronic chest pain.      DISCHARGE DIAGNOSES / PLAN:      Chest pain  Coronary disease status post CABG  -Troponins negative  -Chronic, no concerning features.  -Cardiology evaluated, stable for discharge home.  -Resume home cardiac meds    Eustachian tube dysfunction status post right tympanoplasty and right myringotomy  -Follow-up with ENT  -Zithromax prophylaxis  -As needed pain medicine      PENDING TEST RESULTS:   At the time of discharge the following test results are still pending: None     ADDITIONAL CARE RECOMMENDATIONS:   - Please take all medications as prescribed. Note changes as below. **Take antibiotics as prescribed for prophylaxis  **Take pain medications as needed  - Please make sure to follow up with your primary care physician within 1-2 weeks of discharge for hospital follow up. You should also follow up with ENT Dr. Shavon Erickson for follow-up  - Please make sure to continue to monitor for: Chest pain, shortness of breath, high fevers,and return to the Emergency Department with any of these symptoms.   - Please get up slowly from a seated or laying position, avoid falls. - Avoid tobacco, alcohol and other illicit drug use. - Diet restrictions: None resume prior  - Activity restrictions: None resume prior  - Wound care: Per ENT recommendations          NOTIFY YOUR PHYSICIAN FOR ANY OF THE FOLLOWING:   Fever over 101 degrees for 24 hours. Chest pain, shortness of breath, fever, chills, nausea, vomiting, diarrhea, change in mentation, falling, weakness, bleeding. Severe pain or pain not relieved by medications, as well as any other signs or symptoms that you may have questions about.     FOLLOW UP APPOINTMENTS:    Follow-up Information     Follow up With Specialties Details Why Melinda Schultz, DO Family Medicine   [address] [place] [id number]  [phone]      Iman Barnard MD Otolaryngology Follow up in 2 week(s) call the office to schedule a follow up visit [address]  [phone]               DIET: Resume previous diet    ACTIVITY: Activity as tolerated    EQUIPMENT needed: None    DISCHARGE MEDICATIONS:  Current Discharge Medication List      START taking these medications    Details   azithromycin (ZITHROMAX) 500 mg tab Take 1 Tablet by mouth daily for 3 days. Qty: 3 Tablet, Refills: 0  Start date: 6/2/2022, End date: 6/5/2022      !! ondansetron hcl (Zofran) 4 mg tablet Take 1 Tablet by mouth every eight (8) hours as needed for Nausea for up to 21 doses. Qty: 21 Tablet, Refills: 1  Start date: 6/2/2022      HYDROcodone-acetaminophen (NORCO) 5-325 mg per tablet Take 1 Tablet by mouth every six (6) hours as needed for Pain for up to 7 days. Max Daily Amount: 4 Tablets. Qty: 28 Tablet, Refills: 0  Start date: 6/2/2022, End date: 6/9/2022    Associated Diagnoses: Pain at surgical incision       !! - Potential duplicate medications found. Please discuss with provider. CONTINUE these medications which have NOT CHANGED    Details   dulaglutide (Trulicity) 1.5 JJ/3.4 mL sub-q pen 0.5 mL by SubCUTAneous route every seven (7) days. Stop 0.75 mg  Qty: 4 Each, Refills: 3    Associated Diagnoses: Type 2 diabetes mellitus with hyperglycemia, with long-term current use of insulin ([facility])      insulin detemir U-100 (LEVEMIR FLEXTOUCH) 100 unit/mL (3 mL) inpn Inject 30 units in AM and 30  units at bed time  Qty: 60 mL, Refills: 3    Associated Diagnoses: Type 2 diabetes mellitus with hyperglycemia, with long-term current use of insulin ([facility])      insulin aspart U-100 (NovoLOG U-100 Insulin aspart) 100 unit/mL injection Inject 10- 15 units before breakfast, 10- 15 units before lunch and 10 - 15 units before dinner.   Qty: 50 mL, Refills: 3    Associated Diagnoses: Type 2 diabetes mellitus with hyperglycemia, with long-term current use of insulin ([facility])      Insulin Needles, Disposable, 32 gauge x 5/32\" ndle Use to inject insulin BID Dx Code: E11.65  Qty: 200 Pen Needle, Refills: 3    Associated Diagnoses: Type 2 diabetes mellitus with hyperglycemia, with long-term current use of insulin ([facility])      insulin syringe,safetyneedle 0.5 mL 31 gauge x 15/64\" syrg 1 Syringe by Does Not Apply route three (3) times daily. Dx Code: E11.65  Qty: 300 Each, Refills: 3    Associated Diagnoses: Type 2 diabetes mellitus with hyperglycemia, with long-term current use of insulin ([address]      ! ! ondansetron hcl (Zofran) 4 mg tablet Take 1 Tablet by mouth every eight (8) hours as needed for Nausea for up to 21 doses. Qty: 21 Tablet, Refills: 1      bisacodyL (Dulcolax, bisacodyl,) 5 mg EC tablet Take 5 mg by mouth daily as needed for Constipation. ticagrelor (Brilinta) 90 mg tablet Take 90 mg by mouth two (2) times a day. atorvastatin (LIPITOR) 20 mg tablet Take 20 mg by mouth nightly. methocarbamoL (ROBAXIN) 750 mg tablet Take 750 mg by mouth two (2) times a day. senna (Senna) 8.6 mg tablet Take 1 Tablet by mouth as needed for Constipation. lisinopriL (PRINIVIL, ZESTRIL) 2.5 mg tablet Take 2.5 mg by mouth daily. empagliflozin (Jardiance) 10 mg tablet Take 10 mg by mouth daily. ranolazine ER (Ranexa) 1,000 mg Take 500 mg by mouth two (2) times a day. blood-glucose sensor (DEXCOM G6 SENSOR) by Does Not Apply route. carvedilol (COREG) 6.25 mg tablet Take 2 Tabs by mouth two (2) times daily (with meals). Qty: 60 Tab, Refills: 0      aspirin delayed-release 81 mg tablet Take 1 Tab by mouth daily. Qty: 30 Tab, Refills: 3    Associated Diagnoses: Type 2 diabetes mellitus with hyperglycemia, with long-term current use of insulin ([address]      ! ! ondansetron hcl (Zofran) 4 mg tablet Take 4 mg by mouth every eight (8) hours as needed for Nausea or Vomiting. nitroglycerin (NITROSTAT) 0.4 mg SL tablet 0.4 mg by SubLINGual route every five (5) minutes as needed for Chest Pain. Up to 3 doses. acetaminophen (TYLENOL) 500 mg tablet Take 2 Tabs by mouth every six (6) hours as needed for Pain. Qty: 20 Tab, Refills: 0      levothyroxine (SYNTHROID) 100 mcg tablet Take 1 Tab by mouth Daily (before breakfast).   Qty: 20 Tab, Refills: 3    Associated Diagnoses: Acquired hypothyroidism       !! - Potential duplicate medications found. Please discuss with provider.           DISPOSITION:  X Home With:   OT  PT  HH  RN       Long term SNF/Inpatient Rehab    Independent/assisted living    Hospice    Other:       PATIENT CONDITION AT DISCHARGE:     Functional status    Poor     Deconditioned    X Independent      Cognition    X Lucid     Forgetful     Dementia      Catheters/lines (plus indication)    Garcia     PICC     PEG    X None      Code status   X  Full code     DNR      PHYSICAL EXAMINATION AT DISCHARGE:  Visit Vitals  /88   Pulse 87   Temp 97.4 °F (36.3 °C)   Resp 16   SpO2 96%     Gen: NAD, awake in bed  HEENT: NC, large dressing over the L ear, sclera anicteric, PERRL, EOMI  CV: RRR no m/r/g, normal S1 and S2, no pedal edema   Resp: CTA b/l no increased work of breathing, no wheezing or rhonchi, speaking in full sentences   Abd: NT/ND, normal bowel sounds, no rebound or guarding  Ext: 2+ pulses, no edema  Skin: No rashes or lesions    CHRONIC MEDICAL DIAGNOSES:  Problem List as of 6/3/2022 Date Reviewed: 6/3/2022          Codes Class Noted - Resolved    Tympanic membrane perforation, right ICD-10-CM: H72.91  ICD-9-CM: 384.20  6/2/2022 - Present        Chest pain ICD-10-CM: R07.9  ICD-9-CM: 786.50  6/2/2022 - Present        Chronic left mastoiditis ICD-10-CM: H70.12  ICD-9-CM: 383.1  2/10/2022 - Present        Atherosclerosis of coronary artery ICD-10-CM: I25.10  ICD-9-CM: 414.00  3/12/2021 - Present    Overview Signed 11/12/2021  4:45 PM by Zakia Louise LPN     Formatting of this note might be different from the original.  S/p resuscitated VF arrest due to Inferior STEMI 11/2019 2/9/2021 POBA distal Cfx    1/2021 POBA to distal Cfx into lower OM4 and LPL branches c/b non flow limiting dissection    1/2019 Primary PCI of RCA with 3 DANG c/b acute cosure             HLD (hyperlipidemia) ICD-10-CM: E78.5  ICD-9-CM: 272.4  3/12/2021 - Present        S/P CABG (coronary artery bypass graft) ICD-10-CM: Z95.1  ICD-9-CM: V45.81  3/12/2021 - Present    Overview Signed 11/12/2021  4:45 PM by Charo Carter LPN     Formatting of this note might be different from the original.  12/3/2019 LIMA to  LAD complicated by non healing sternal wound requiring debridement and closure with muscle flap 2/25/20             Hypertension (Chronic) ICD-10-CM: I10  ICD-9-CM: 401.9  Unknown - Present    Overview Signed 6/23/2018  2:39 PM by Leidy Marin MD     onset 2009             Microalbuminuria due to type 2 diabetes mellitus ([facility] ICD-10-CM: E11.29, R80.9  ICD-9-CM: 250.40, 791.0  12/27/2017 - Present        Type 2 diabetes mellitus with nephropathy (HCC) (Chronic) ICD-10-CM: E11.21  ICD-9-CM: 250.40, 583.81  12/25/2017 - Present        Obesity, morbid ([address] (Chronic) ICD-10-CM: E66.01  ICD-9-CM: 278.01  12/22/2017 - Present        Hypothyroidism (Chronic) ICD-10-CM: E03.9  ICD-9-CM: 244.9  Unknown - Present        RESOLVED: Type 2 diabetes mellitus without complication, without long-term current use of insulin ([facility] ICD-10-CM: E11.9  ICD-9-CM: 250.00  7/22/2018 - 7/22/2018        RESOLVED: Chest pain ICD-10-CM: R07.9  ICD-9-CM: 786.50  6/25/2018 - 7/22/2018        RESOLVED: Hypertensive urgency ICD-10-CM: I16.0  ICD-9-CM: 401.9  6/23/2018 - 6/25/2018        RESOLVED: Chest pain ICD-10-CM: R07.9  ICD-9-CM: 786.50  6/23/2018 - 6/25/2018        RESOLVED: Breast abscess ICD-10-CM: N61.1  ICD-9-CM: 611.0  9/14/2017 - 7/22/2018        RESOLVED: Hypertension ICD-10-CM: I10  ICD-9-CM: 401.9  Unknown - 9/14/2017        RESOLVED: Hx MRSA infection ICD-10-CM: Z86.14  ICD-9-CM: V12.04  Unknown - 7/22/2018        RESOLVED: Diabetes ([facility] ICD-10-CM: E11.9  ICD-9-CM: 250.00  Unknown - 9/14/2017        RESOLVED: Anemia ICD-10-CM: D64.9  ICD-9-CM: 173. 9  Unknown - 9/14/2017        RESOLVED: Lactic acidemia ICD-10-CM: E87.2  ICD-9-CM: 276.2  9/14/2017 - 7/22/2018        RESOLVED: Fever chills ICD-10-CM: R50.9  ICD-9-CM: 780.60  9/14/2017 - 7/22/2018        RESOLVED: Sinus tachycardia ICD-10-CM: R00.0  ICD-9-CM: 427.89  9/14/2017 - 6/25/2018        RESOLVED: Sepsis ([address] ICD-10-CM: A41.9  ICD-9-CM: 038.9, 995.91  9/14/2017 - 6/25/2018        RESOLVED: Migraine (Chronic) ICD-10-CM: L38.072  ICD-9-CM: 346.90  5/25/2017 - 9/14/2017        RESOLVED: Obesity ICD-10-CM: E66.9  ICD-9-CM: 278.00  5/25/2017 - 7/22/2018        RESOLVED: HTN (hypertension) (Chronic) ICD-10-CM: I10  ICD-9-CM: 401.9  5/25/2017 - 7/22/2018              Greater than 30 minutes were spent with the patient on counseling and coordination of care    Signed:   eGno Victor MD  6/3/2022  10:34 AM

## 2022-06-03 NOTE — PROGRESS NOTES
Pt called requesting morphine for pain. Po pain medication was taken to patient and pt refused stating she does not want it. Eduacated on taking po pain medication because she will not  be going home with iv pain medication. Pt states she does not want to take po pain medication and she will not be going home with any pain medication. Pt states she has been having this pain in her chest for 3 yrs and when she is home she does not take anything  For it. Pt shows no sign of distress or discomfort at this time.

## 2022-06-03 NOTE — PROGRESS NOTES
Pt called for pain medication. Pt again encouraged to take po pain medication. Pt still refusing po pain med.

## 2022-06-04 NOTE — PROGRESS NOTES
Late entry - written and verbal instructions provided for pt. Pt aware of when to follow up with MD and things that would be reported if occurred.

## 2022-06-07 NOTE — ANESTHESIA POSTPROCEDURE EVALUATION
Procedure(s):  RIGHT TYMPANOPLASTY, TYMPANOPLASTY WITH OSSICULOPLASTY, GRAFT EAR CARTILAGE, RIGHT MYRINGOTOMY WITH T-TUBE, PLACEMENT OF FACIAL NERVE MONITORING ELECTRODES. general    Anesthesia Post Evaluation      Multimodal analgesia: multimodal analgesia used between 6 hours prior to anesthesia start to PACU discharge  Patient location during evaluation: PACU  Patient participation: complete - patient participated  Level of consciousness: awake  Pain score: 2  Pain management: adequate  Airway patency: patent  Anesthetic complications: no  Cardiovascular status: acceptable  Respiratory status: acceptable  Hydration status: acceptable  Comments: I have evaluated the patient and meets criteria for discharge from PACU.  Rula Conrad MD  Post anesthesia nausea and vomiting:  controlled  Final Post Anesthesia Temperature Assessment:  Normothermia (36.0-37.5 degrees C)      INITIAL Post-op Vital signs:   Vitals Value Taken Time   /96 06/02/22 1500   Temp 36.6 °C (97.8 °F) 06/02/22 1600   Pulse 85 06/02/22 1500   Resp 22 06/02/22 1500   SpO2 96 % 06/02/22 1500

## 2022-06-26 ENCOUNTER — APPOINTMENT (OUTPATIENT)
Dept: CT IMAGING | Age: 37
End: 2022-06-26
Attending: EMERGENCY MEDICINE
Payer: MEDICARE

## 2022-06-26 ENCOUNTER — HOSPITAL ENCOUNTER (OUTPATIENT)
Age: 37
Setting detail: OBSERVATION
Discharge: HOME | End: 2022-06-28
Attending: EMERGENCY MEDICINE | Admitting: INTERNAL MEDICINE
Payer: MEDICARE

## 2022-06-26 ENCOUNTER — APPOINTMENT (OUTPATIENT)
Dept: GENERAL RADIOLOGY | Age: 37
End: 2022-06-26
Attending: EMERGENCY MEDICINE
Payer: MEDICARE

## 2022-06-26 DIAGNOSIS — R73.9 HYPERGLYCEMIA: ICD-10-CM

## 2022-06-26 DIAGNOSIS — R55 SYNCOPE AND COLLAPSE: Primary | ICD-10-CM

## 2022-06-26 DIAGNOSIS — R07.9 ACUTE CHEST PAIN: ICD-10-CM

## 2022-06-26 DIAGNOSIS — N28.9 RENAL INSUFFICIENCY: ICD-10-CM

## 2022-06-26 DIAGNOSIS — I20.0 UNSTABLE ANGINA (HCC): ICD-10-CM

## 2022-06-26 DIAGNOSIS — Z79.01 LONG TERM (CURRENT) USE OF ANTICOAGULANTS: ICD-10-CM

## 2022-06-26 PROBLEM — I24.9 ACS (ACUTE CORONARY SYNDROME) (HCC): Status: ACTIVE | Noted: 2022-06-26

## 2022-06-26 LAB
ALBUMIN SERPL-MCNC: 3.7 G/DL (ref 3.5–5)
ALBUMIN/GLOB SERPL: 0.8 {RATIO} (ref 1.1–2.2)
ALP SERPL-CCNC: 92 U/L (ref 45–117)
ALT SERPL-CCNC: 27 U/L (ref 12–78)
ANION GAP SERPL CALC-SCNC: 9 MMOL/L (ref 5–15)
AST SERPL W P-5'-P-CCNC: ABNORMAL U/L (ref 15–37)
ATRIAL RATE: 73 BPM
BASOPHILS # BLD: 0.1 K/UL (ref 0–0.1)
BASOPHILS NFR BLD: 0 % (ref 0–1)
BILIRUB SERPL-MCNC: 1 MG/DL (ref 0.2–1)
BNP SERPL-MCNC: 422 PG/ML
BUN SERPL-MCNC: 22 MG/DL (ref 6–20)
BUN/CREAT SERPL: 17 (ref 12–20)
CA-I BLD-MCNC: 9.8 MG/DL (ref 8.5–10.1)
CALCULATED P AXIS, ECG09: 29 DEGREES
CALCULATED R AXIS, ECG10: -54 DEGREES
CALCULATED T AXIS, ECG11: 17 DEGREES
CHLORIDE SERPL-SCNC: 101 MMOL/L (ref 97–108)
CO2 SERPL-SCNC: 21 MMOL/L (ref 21–32)
CREAT SERPL-MCNC: 1.31 MG/DL (ref 0.55–1.02)
DIAGNOSIS, 93000: NORMAL
DIFFERENTIAL METHOD BLD: ABNORMAL
EOSINOPHIL # BLD: 0.3 K/UL (ref 0–0.4)
EOSINOPHIL NFR BLD: 2 % (ref 0–7)
ERYTHROCYTE [DISTWIDTH] IN BLOOD BY AUTOMATED COUNT: 12.7 % (ref 11.5–14.5)
GLOBULIN SER CALC-MCNC: 4.4 G/DL (ref 2–4)
GLUCOSE BLD STRIP.AUTO-MCNC: 204 MG/DL (ref 65–117)
GLUCOSE SERPL-MCNC: 216 MG/DL (ref 65–100)
HCG SERPL QL: NEGATIVE
HCT VFR BLD AUTO: 46.6 % (ref 35–47)
HGB BLD-MCNC: 16.1 G/DL (ref 11.5–16)
IMM GRANULOCYTES # BLD AUTO: 0.1 K/UL (ref 0–0.04)
IMM GRANULOCYTES NFR BLD AUTO: 1 % (ref 0–0.5)
LYMPHOCYTES # BLD: 2.5 K/UL (ref 0.8–3.5)
LYMPHOCYTES NFR BLD: 18 % (ref 12–49)
MCH RBC QN AUTO: 31 PG (ref 26–34)
MCHC RBC AUTO-ENTMCNC: 34.5 G/DL (ref 30–36.5)
MCV RBC AUTO: 89.6 FL (ref 80–99)
MONOCYTES # BLD: 1.1 K/UL (ref 0–1)
MONOCYTES NFR BLD: 8 % (ref 5–13)
NEUTS SEG # BLD: 10.1 K/UL (ref 1.8–8)
NEUTS SEG NFR BLD: 71 % (ref 32–75)
NRBC # BLD: 0 K/UL (ref 0–0.01)
NRBC BLD-RTO: 0 PER 100 WBC
P-R INTERVAL, ECG05: 200 MS
PERFORMED BY, TECHID: ABNORMAL
PLATELET # BLD AUTO: 502 K/UL (ref 150–400)
PMV BLD AUTO: 9.9 FL (ref 8.9–12.9)
POTASSIUM SERPL-SCNC: ABNORMAL MMOL/L (ref 3.5–5.1)
PROT SERPL-MCNC: 8.1 G/DL (ref 6.4–8.2)
Q-T INTERVAL, ECG07: 420 MS
QRS DURATION, ECG06: 90 MS
QTC CALCULATION (BEZET), ECG08: 462 MS
RBC # BLD AUTO: 5.2 M/UL (ref 3.8–5.2)
SODIUM SERPL-SCNC: 131 MMOL/L (ref 136–145)
TROPONIN-HIGH SENSITIVITY: 5 NG/L (ref 0–51)
VENTRICULAR RATE, ECG03: 73 BPM
WBC # BLD AUTO: 14.2 K/UL (ref 3.6–11)

## 2022-06-26 PROCEDURE — G0378 HOSPITAL OBSERVATION PER HR: HCPCS

## 2022-06-26 PROCEDURE — 74011636637 HC RX REV CODE- 636/637: Performed by: INTERNAL MEDICINE

## 2022-06-26 PROCEDURE — 93005 ELECTROCARDIOGRAM TRACING: CPT

## 2022-06-26 PROCEDURE — 96374 THER/PROPH/DIAG INJ IV PUSH: CPT

## 2022-06-26 PROCEDURE — 74011250637 HC RX REV CODE- 250/637: Performed by: EMERGENCY MEDICINE

## 2022-06-26 PROCEDURE — 71045 X-RAY EXAM CHEST 1 VIEW: CPT

## 2022-06-26 PROCEDURE — 70450 CT HEAD/BRAIN W/O DYE: CPT

## 2022-06-26 PROCEDURE — 74011250636 HC RX REV CODE- 250/636: Performed by: INTERNAL MEDICINE

## 2022-06-26 PROCEDURE — 99285 EMERGENCY DEPT VISIT HI MDM: CPT

## 2022-06-26 PROCEDURE — 84484 ASSAY OF TROPONIN QUANT: CPT

## 2022-06-26 PROCEDURE — 84703 CHORIONIC GONADOTROPIN ASSAY: CPT

## 2022-06-26 PROCEDURE — 82962 GLUCOSE BLOOD TEST: CPT

## 2022-06-26 PROCEDURE — 36415 COLL VENOUS BLD VENIPUNCTURE: CPT

## 2022-06-26 PROCEDURE — 85025 COMPLETE CBC W/AUTO DIFF WBC: CPT

## 2022-06-26 PROCEDURE — 65270000029 HC RM PRIVATE

## 2022-06-26 PROCEDURE — 96375 TX/PRO/DX INJ NEW DRUG ADDON: CPT

## 2022-06-26 PROCEDURE — 74011000250 HC RX REV CODE- 250: Performed by: INTERNAL MEDICINE

## 2022-06-26 PROCEDURE — 94761 N-INVAS EAR/PLS OXIMETRY MLT: CPT

## 2022-06-26 PROCEDURE — 74011250637 HC RX REV CODE- 250/637: Performed by: INTERNAL MEDICINE

## 2022-06-26 PROCEDURE — 96376 TX/PRO/DX INJ SAME DRUG ADON: CPT

## 2022-06-26 PROCEDURE — 74011250636 HC RX REV CODE- 250/636: Performed by: EMERGENCY MEDICINE

## 2022-06-26 PROCEDURE — 80053 COMPREHEN METABOLIC PANEL: CPT

## 2022-06-26 PROCEDURE — 83880 ASSAY OF NATRIURETIC PEPTIDE: CPT

## 2022-06-26 RX ORDER — SENNOSIDES 8.6 MG/1
1 TABLET ORAL AS NEEDED
Status: DISCONTINUED | OUTPATIENT
Start: 2022-06-26 | End: 2022-06-28 | Stop reason: HOSPADM

## 2022-06-26 RX ORDER — ACETAMINOPHEN 325 MG/1
650 TABLET ORAL
Status: DISCONTINUED | OUTPATIENT
Start: 2022-06-26 | End: 2022-06-28 | Stop reason: HOSPADM

## 2022-06-26 RX ORDER — ONDANSETRON 2 MG/ML
4 INJECTION INTRAMUSCULAR; INTRAVENOUS
Status: COMPLETED | OUTPATIENT
Start: 2022-06-26 | End: 2022-06-26

## 2022-06-26 RX ORDER — INSULIN GLARGINE 100 [IU]/ML
30 INJECTION, SOLUTION SUBCUTANEOUS 2 TIMES DAILY
Status: DISCONTINUED | OUTPATIENT
Start: 2022-06-26 | End: 2022-06-28 | Stop reason: HOSPADM

## 2022-06-26 RX ORDER — POLYETHYLENE GLYCOL 3350 17 G/17G
17 POWDER, FOR SOLUTION ORAL DAILY PRN
Status: DISCONTINUED | OUTPATIENT
Start: 2022-06-26 | End: 2022-06-28 | Stop reason: HOSPADM

## 2022-06-26 RX ORDER — LISINOPRIL 5 MG/1
2.5 TABLET ORAL DAILY
Status: DISCONTINUED | OUTPATIENT
Start: 2022-06-27 | End: 2022-06-28 | Stop reason: HOSPADM

## 2022-06-26 RX ORDER — ACETAMINOPHEN 650 MG/1
650 SUPPOSITORY RECTAL
Status: DISCONTINUED | OUTPATIENT
Start: 2022-06-26 | End: 2022-06-28 | Stop reason: HOSPADM

## 2022-06-26 RX ORDER — HYDROMORPHONE HYDROCHLORIDE 1 MG/ML
0.5 INJECTION, SOLUTION INTRAMUSCULAR; INTRAVENOUS; SUBCUTANEOUS ONCE
Status: COMPLETED | OUTPATIENT
Start: 2022-06-26 | End: 2022-06-26

## 2022-06-26 RX ORDER — RANOLAZINE 500 MG/1
1000 TABLET, EXTENDED RELEASE ORAL 2 TIMES DAILY
Status: DISCONTINUED | OUTPATIENT
Start: 2022-06-26 | End: 2022-06-28 | Stop reason: HOSPADM

## 2022-06-26 RX ORDER — LEVOTHYROXINE SODIUM 137 UG/1
137 TABLET ORAL
COMMUNITY
Start: 2022-02-01

## 2022-06-26 RX ORDER — MAGNESIUM SULFATE 100 %
4 CRYSTALS MISCELLANEOUS AS NEEDED
Status: DISCONTINUED | OUTPATIENT
Start: 2022-06-26 | End: 2022-06-28 | Stop reason: HOSPADM

## 2022-06-26 RX ORDER — ATORVASTATIN CALCIUM 20 MG/1
20 TABLET, FILM COATED ORAL
Status: DISCONTINUED | OUTPATIENT
Start: 2022-06-26 | End: 2022-06-28 | Stop reason: HOSPADM

## 2022-06-26 RX ORDER — CARVEDILOL 12.5 MG/1
12.5 TABLET ORAL 2 TIMES DAILY WITH MEALS
Status: DISCONTINUED | OUTPATIENT
Start: 2022-06-26 | End: 2022-06-27

## 2022-06-26 RX ORDER — ASPIRIN 81 MG/1
81 TABLET ORAL DAILY
Status: DISCONTINUED | OUTPATIENT
Start: 2022-06-27 | End: 2022-06-28 | Stop reason: HOSPADM

## 2022-06-26 RX ORDER — ENOXAPARIN SODIUM 100 MG/ML
40 INJECTION SUBCUTANEOUS DAILY
Status: DISCONTINUED | OUTPATIENT
Start: 2022-06-27 | End: 2022-06-28 | Stop reason: HOSPADM

## 2022-06-26 RX ORDER — SODIUM CHLORIDE 0.9 % (FLUSH) 0.9 %
5-40 SYRINGE (ML) INJECTION EVERY 8 HOURS
Status: DISCONTINUED | OUTPATIENT
Start: 2022-06-26 | End: 2022-06-28 | Stop reason: HOSPADM

## 2022-06-26 RX ORDER — HYDROMORPHONE HYDROCHLORIDE 1 MG/ML
0.5 INJECTION, SOLUTION INTRAMUSCULAR; INTRAVENOUS; SUBCUTANEOUS
Status: DISCONTINUED | OUTPATIENT
Start: 2022-06-26 | End: 2022-06-28 | Stop reason: HOSPADM

## 2022-06-26 RX ORDER — SODIUM CHLORIDE 0.9 % (FLUSH) 0.9 %
5-40 SYRINGE (ML) INJECTION AS NEEDED
Status: DISCONTINUED | OUTPATIENT
Start: 2022-06-26 | End: 2022-06-28 | Stop reason: HOSPADM

## 2022-06-26 RX ORDER — METHOCARBAMOL 750 MG/1
750 TABLET, FILM COATED ORAL 2 TIMES DAILY
Status: DISCONTINUED | OUTPATIENT
Start: 2022-06-26 | End: 2022-06-28 | Stop reason: HOSPADM

## 2022-06-26 RX ORDER — INSULIN LISPRO 100 [IU]/ML
INJECTION, SOLUTION INTRAVENOUS; SUBCUTANEOUS
Status: DISCONTINUED | OUTPATIENT
Start: 2022-06-26 | End: 2022-06-28 | Stop reason: HOSPADM

## 2022-06-26 RX ORDER — DEXTROSE MONOHYDRATE 100 MG/ML
0-250 INJECTION, SOLUTION INTRAVENOUS AS NEEDED
Status: DISCONTINUED | OUTPATIENT
Start: 2022-06-26 | End: 2022-06-28 | Stop reason: HOSPADM

## 2022-06-26 RX ORDER — ONDANSETRON 4 MG/1
4 TABLET, ORALLY DISINTEGRATING ORAL
Status: DISCONTINUED | OUTPATIENT
Start: 2022-06-26 | End: 2022-06-28 | Stop reason: HOSPADM

## 2022-06-26 RX ORDER — CETIRIZINE HYDROCHLORIDE 10 MG/1
10 CAPSULE, LIQUID FILLED ORAL DAILY
COMMUNITY

## 2022-06-26 RX ORDER — ONDANSETRON 2 MG/ML
4 INJECTION INTRAMUSCULAR; INTRAVENOUS
Status: DISCONTINUED | OUTPATIENT
Start: 2022-06-26 | End: 2022-06-28 | Stop reason: HOSPADM

## 2022-06-26 RX ADMIN — HYDROMORPHONE HYDROCHLORIDE 0.5 MG: 1 INJECTION, SOLUTION INTRAMUSCULAR; INTRAVENOUS; SUBCUTANEOUS at 14:43

## 2022-06-26 RX ADMIN — ONDANSETRON 4 MG: 2 INJECTION INTRAMUSCULAR; INTRAVENOUS at 11:19

## 2022-06-26 RX ADMIN — TICAGRELOR 90 MG: 90 TABLET ORAL at 22:03

## 2022-06-26 RX ADMIN — ATORVASTATIN CALCIUM 20 MG: 20 TABLET, FILM COATED ORAL at 22:03

## 2022-06-26 RX ADMIN — INSULIN LISPRO 8 UNITS: 100 INJECTION, SOLUTION INTRAVENOUS; SUBCUTANEOUS at 22:03

## 2022-06-26 RX ADMIN — SODIUM CHLORIDE, PRESERVATIVE FREE 10 ML: 5 INJECTION INTRAVENOUS at 22:04

## 2022-06-26 RX ADMIN — METHOCARBAMOL 750 MG: 750 TABLET ORAL at 22:03

## 2022-06-26 RX ADMIN — RANOLAZINE 1000 MG: 500 TABLET, FILM COATED, EXTENDED RELEASE ORAL at 22:03

## 2022-06-26 RX ADMIN — SODIUM CHLORIDE 1000 ML: 9 INJECTION, SOLUTION INTRAVENOUS at 11:19

## 2022-06-26 RX ADMIN — INSULIN GLARGINE 30 UNITS: 100 INJECTION, SOLUTION SUBCUTANEOUS at 22:03

## 2022-06-26 RX ADMIN — NITROGLYCERIN 1.5 INCH: 20 OINTMENT TOPICAL at 14:43

## 2022-06-26 RX ADMIN — HYDROMORPHONE HYDROCHLORIDE 0.5 MG: 1 INJECTION, SOLUTION INTRAMUSCULAR; INTRAVENOUS; SUBCUTANEOUS at 22:52

## 2022-06-26 RX ADMIN — HYDROMORPHONE HYDROCHLORIDE 0.5 MG: 1 INJECTION, SOLUTION INTRAMUSCULAR; INTRAVENOUS; SUBCUTANEOUS at 18:51

## 2022-06-26 NOTE — ED TRIAGE NOTES
Pt arrives by EMS from home. Pt reports syncopal episode w/CP. Hx of cardiac arrest in 2017. Shaun Valentin MD downgraded trauma chowdhury at triage.

## 2022-06-26 NOTE — ED PROVIDER NOTES
EMERGENCY DEPARTMENT HISTORY AND PHYSICAL EXAM        Date: 6/26/2022  Patient Name: Kishore Delcid    History of Presenting Illness     Chief Complaint   Patient presents with    Syncope    Chest Pain       1:00 PM    History Provided By: Patient    HPI: Kishore Delcid, 39 y.o. female with quite interesting PMH sig for:     CAD with 1 vessel bypass in 2019 after an MI with associated cardiac arrest, diabetes, elevated cholesterol, strong family history of cardiac disease (father with bypass in his 45s)   . presents to the ED with an episode of syncope at home with acute escalation in her chest pain. Patient states that she has chronic anginal chest pain and unstable sternum that is a result of a branch of her LAD and needed bypass but which was unsuccessful at the time of her CABG. Patient states that she gained some control of her discomfort from [address] however today she noted an acute escalation on morning with mild shortness of breath and that when she went to the bathroom to urinate she remembers sitting down that her next memory is of being on the ground with evidence that she had lost control of her bowel and bladder. Patient complains of some soreness to the right side of her head but denies any nausea, vomiting, confusion since the syncopal episode. Patient is followed closely by her cardiologist and there have been no changes in any of her medications since that last visit. Patient denies any prodromal symptoms of fever chills, nausea, cough, sweats, abdominal pain,. Patient states that she does  does at times get some relief from the use of sublingual nitroglycerin. Patient states however that she is rarely completely pain-free. Review of medical records demonstrates failyre of the 2nd cardiac CABG graft and multiple caths with stent placements that have clotted.     Patient readily offered that she is on a pain contract with her cardiology group and does not take any narcotics for management of her chest pain. She is able to delineate the difference between the pain associated with her unstable angina and her unstable sternum and feels that today's discomfort is more consistent with angina. Patient states that she is followed by [facility] cardiology    PCP: Selam Benavides DO    Current Facility-Administered Medications   Medication Dose Route Frequency Provider Last Rate Last Admin    carvediloL (COREG) tablet 25 mg  25 mg Oral BID WITH MEALS Kiara Morillo NP        sodium chloride (NS) flush 5-40 mL  5-40 mL IntraVENous David Fields MD   10 mL at 06/27/22 0513    sodium chloride (NS) flush 5-40 mL  5-40 mL IntraVENous PRN Arnoldo Otero MD        acetaminophen (TYLENOL) tablet 650 mg  650 mg Oral Q6H PRN Arnoldo Otero MD   650 mg at 06/27/22 3708    Or    acetaminophen (TYLENOL) suppository 650 mg  650 mg Rectal Q6H PRN Arnoldo Otero MD        polyethylene glycol [facility]) packet 17 g  17 g Oral DAILY PRN Arnoldo Otero MD        ondansetron (ZOFRAN ODT) tablet 4 mg  4 mg Oral Q8H PRN Arnoldo Otero MD        Or    ondansetron [facility]) injection 4 mg  4 mg IntraVENous Q6H PRN Arnoldo Otero MD   4 mg at 06/27/22 0859    enoxaparin (LOVENOX) injection 40 mg  40 mg SubCUTAneous DAILY Arnoldo Otero MD   40 mg at 06/27/22 0858    HYDROmorphone (DILAUDID) syringe 0.5 mg  0.5 mg IntraVENous Q4H PRN Arnoldo Otero MD   0.5 mg at 06/27/22 1142    aspirin delayed-release tablet 81 mg  81 mg Oral DAILY Arnoldo Otero MD   81 mg at 06/27/22 0855    atorvastatin (LIPITOR) tablet 20 mg  20 mg Oral QHS Arnoldo Otero MD   20 mg at 06/26/22 2203    . PHARMACY TO SUBSTITUTE PER PROTOCOL (Reordered from: dulaglutide (Trulicity) 1.5 XH/4.3 mL sub-q pen)    Per Protocol Pollo Benavides MD        [Held by provider] empagliflozin (JARDIANCE) tablet 10 mg  10 mg Oral DAILY David Benavides MD        insulin glargine (LANTUS) injection 30 Units  30 Units SubCUTAneous BID Michaela Chance MD   30 Units at 06/27/22 1393    lisinopriL (PRINIVIL, ZESTRIL) tablet 2.5 mg  2.5 mg Oral DAILY David Badillo MD   2.5 mg at 06/27/22 5646    methocarbamoL (ROBAXIN) tablet 750 mg  750 mg Oral BID Michaela Chance MD   750 mg at 06/27/22 1548    ranolazine ER (RANEXA) tablet 1,000 mg  1,000 mg Oral BID Michaela Chance MD   1,000 mg at 06/27/22 7918    senna (SENOKOT) tablet 8.6 mg  1 Tablet Oral PRN Michaela Chnace MD        [facility]) tablet 90 mg  90 mg Oral BID Michaela Chance MD   90 mg at 06/27/22 2153    levothyroxine (SYNTHROID) tablet 137 mcg  137 mcg Oral ACB Michaela Chance MD   137 mcg at 06/27/22 8481    glucose chewable tablet 16 g  4 Tablet Oral PRN Michaela Chance MD        dextrose 10% infusion 0-250 mL  0-250 mL IntraVENous PRN Michaela Chance MD        glucagon [facility]) injection 1 mg  1 mg IntraMUSCular PRN Michaela Chance MD        insulin lispro (HUMALOG) injection   SubCUTAneous AC&HS Michaela Chance MD   8 Units at 06/27/22 1142       Past History     Past Medical History:  Past Medical History:   Diagnosis Date    Abscess     Anemia     CAD (coronary artery disease) 2019    CABG X1    Chronic pain     LEFT SCIATIC, STERNAL WOUND    Complicated migraine     with extremity numbness    H/O transfusion of packed red blood cells 07/2021    Heart attack ([address] 11/2019    Hx MRSA infection 2009    Hypertension     onset 2009    Hypothyroidism     Irregular menstrual cycle     Nausea & vomiting     Obesity     Stroke ([address] 2019    LOWER BODY WEAKNESS    T2DM (type 2 diabetes mellitus) ([address]     onset 2009 IDDM    Thromboembolus ([address] 2009    RIGHT LEG - HOSP.  [place]       Past Surgical History:  Past Surgical History:   Procedure Laterality Date    HX CHOLECYSTECTOMY      HX CORONARY ARTERY BYPASS GRAFT  12/03/2019    HX GYN Bilateral 2021    SALPINGECTOMY    HX GYN  2021    ENDOMETRIAL ABLATION    HX HEENT      T&A    HX OTHER SURGICAL      2 TEETH REMOVED     HX TONSIL AND ADENOIDECTOMY  1992    HX TYMPANOSTOMY Bilateral     MULTIPLE    HX TYMPANOSTOMY      HX WISDOM TEETH EXTRACTION      CA CARDIAC SURG PROCEDURE UNLIST  2019    CABG X 1, CARDIAC CATH STENT X 3       Family History:  Family History   Problem Relation Age of Onset    Hypertension Other         \"all her family\"    Diabetes Other         \"all her family\"    Cancer Mother         cervical    Stroke Mother     Heart Disease Mother    [place] Dysart Bladder Disease Mother     Diabetes Mother     Hypertension Mother     Elevated Lipids Mother     Stroke Father     Heart Disease Father     Diabetes Father     Heart Attack Father     Elevated Lipids Father     Kidney Disease Father         ESRD    Gall Bladder Disease Brother     Diabetes Brother     Kidney Disease Brother     Cancer Brother         KIDNEY    Hypertension Brother     Migraines Maternal Aunt     Diabetes Paternal Aunt     Stroke Maternal Grandmother     Diabetes Maternal Grandmother     Cancer Maternal Grandmother         cervical    Heart Attack Maternal Grandmother     Elevated Lipids Maternal Grandmother     Stroke Maternal Grandfather     Diabetes Maternal Grandfather     Elevated Lipids Maternal Grandfather     Cancer Maternal Grandfather     Heart Attack Maternal Grandfather     Anesth Problems Neg Hx        Social History:  Social History     Tobacco Use    Smoking status: Passive Smoke Exposure - Never Smoker    Smokeless tobacco: Never Used    Tobacco comment:  SMOKES   Vaping Use    Vaping Use: Never used   Substance Use Topics    Alcohol use: Not Currently     Comment: socially 1-2 times a year     Drug use: No       Allergies:   Allergies   Allergen Reactions    Other Food Swelling     JALIPENO PEPPERS - FACILA SWELLING    Ciprofloxacin Anaphylaxis    Bumetanide Other (comments)     Hearing loss    Compazine [Prochlorperazine] Anxiety     HALLUCINATIONS    Pcn [Penicillins] Rash     OCCURRED IN INFANCY NO REPORTED SEVERE IgE MEDIATED REACTIONS  Patient screened for any delayed non-IgE-mediated reaction to PCN.         Patient notes the following:    No delayed non-IgE-mediated reaction to PCN          Tree Pollen[employer] [place] Rash     HICKORY SMOKE FLAVORING    Vancomycin Other (comments)     Kidneys shut down    Voltaren [Diclofenac Sodium] Myalgia and Other (comments)     \"muscle spasms\", LEG SPASMS         Review of Systems   Review of Systems   Constitutional: Negative. HENT: Negative. Eyes: Negative. Respiratory: Positive for chest tightness and shortness of breath. Negative for cough. Cardiovascular: Positive for chest pain. Negative for leg swelling. Gastrointestinal: Negative. Endocrine: Negative. Genitourinary: Negative. Musculoskeletal: Negative. Skin: Negative. Neurological: Positive for syncope, light-headedness and headaches. Physical Exam   Physical Exam  Vitals and nursing note reviewed. Constitutional:       Appearance: She is well-developed. Comments: 16-year-old white female moderately obese looks excessively fatigued; very historian complaining of chest pain greater than her baseline angina. HENT:      Head: Normocephalic. Comments: Mild contusion to left parietal area no step-offs no hematomas no crepitus  Eyes:      Pupils: Pupils are equal, round, and reactive to light. Cardiovascular:      Rate and Rhythm: Normal rate and regular rhythm. Pulses:           Carotid pulses are 2+ on the right side and 2+ on the left side. Radial pulses are 2+ on the right side and 2+ on the left side. Dorsalis pedis pulses are 2+ on the right side and 2+ on the left side. Posterior tibial pulses are 2+ on the right side and 2+ on the left side. Pulmonary:      Effort: Pulmonary effort is normal.      Breath sounds: Normal breath sounds. Abdominal:      General: Abdomen is flat. Bowel sounds are normal.      Palpations: Abdomen is soft. Musculoskeletal:         General: Normal range of motion. Cervical back: Normal range of motion. Skin:     General: Skin is warm and dry. Capillary Refill: Capillary refill takes less than 2 seconds. Neurological:      General: No focal deficit present. Mental Status: She is alert and oriented to person, place, and time. Psychiatric:         Mood and Affect: Mood normal.         Behavior: Behavior normal.         Diagnostic Study Results     Labs -     Recent Results (from the past 48 hour(s))   EKG, 12 LEAD, INITIAL    Collection Time: 06/26/22  9:43 AM   Result Value Ref Range    Ventricular Rate 73 BPM    Atrial Rate 73 BPM    P-R Interval 200 ms    QRS Duration 90 ms    Q-T Interval 420 ms    QTC Calculation (Bezet) 462 ms    Calculated P Axis 29 degrees    Calculated R Axis -54 degrees    Calculated T Axis 17 degrees    Diagnosis       Normal sinus rhythm  Left axis deviation  Inferior infarct , age undetermined  Anterolateral infarct , age undetermined  Abnormal ECG  When compared with ECG of 23-MAR-2015 16:48,  Significant changes have occurred  Confirmed by Peña Ward MD, [facility] ([id number]) on 6/26/2022 7:12:21 PM     CBC WITH AUTOMATED DIFF    Collection Time: 06/26/22 10:08 AM   Result Value Ref Range    WBC 14.2 (H) 3.6 - 11.0 K/uL    RBC 5.20 3.80 - 5.20 M/uL    HGB 16.1 (H) 11.5 - 16.0 g/dL    HCT 46.6 35.0 - 47.0 %    MCV 89.6 80.0 - 99.0 FL    MCH 31.0 26.0 - 34.0 PG    MCHC 34.5 30.0 - 36.5 g/dL    RDW 12.7 11.5 - 14.5 %    PLATELET 730 (H) 244 - 400 K/uL    MPV 9.9 8.9 - 12.9 FL    NRBC 0.0 0.0  WBC    ABSOLUTE NRBC 0.00 0.00 - 0.01 K/uL    NEUTROPHILS 71 32 - 75 %    LYMPHOCYTES 18 12 - 49 %    MONOCYTES 8 5 - 13 %    EOSINOPHILS 2 0 - 7 %    BASOPHILS 0 0 - 1 %    IMMATURE GRANULOCYTES 1 (H) 0 - 0.5 %    ABS. NEUTROPHILS 10.1 (H) 1.8 - 8.0 K/UL    ABS. LYMPHOCYTES 2.5 0.8 - 3.5 K/UL    ABS.  MONOCYTES 1.1 (H) 0.0 - 1.0 K/UL    ABS. EOSINOPHILS 0.3 0.0 - 0.4 K/UL    ABS. BASOPHILS 0.1 0.0 - 0.1 K/UL    ABS. IMM. GRANS. 0.1 (H) 0.00 - 0.04 K/UL    DF AUTOMATED     METABOLIC PANEL, COMPREHENSIVE    Collection Time: 06/26/22 10:08 AM   Result Value Ref Range    Sodium 131 (L) 136 - 145 mmol/L    Potassium Hemolyzed, recollect requested 3.5 - 5.1 mmol/L    Chloride 101 97 - 108 mmol/L    CO2 21 21 - 32 mmol/L    Anion gap 9 5 - 15 mmol/L    Glucose 216 (H) 65 - 100 mg/dL    BUN 22 (H) 6 - 20 mg/dL    Creatinine 1.31 (H) 0.55 - 1.02 mg/dL    BUN/Creatinine ratio 17 12 - 20      GFR est AA 55 (L) >60 ml/min/1.73m2    GFR est non-AA 46 (L) >60 ml/min/1.73m2    Calcium 9.8 8.5 - 10.1 mg/dL    Bilirubin, total 1.0 0.2 - 1.0 mg/dL    AST (SGOT) Hemolyzed, recollect requested 15 - 37 U/L    ALT (SGPT) 27 12 - 78 U/L    Alk.  phosphatase 92 45 - 117 U/L    Protein, total 8.1 6.4 - 8.2 g/dL    Albumin 3.7 3.5 - 5.0 g/dL    Globulin 4.4 (H) 2.0 - 4.0 g/dL    A-G Ratio 0.8 (L) 1.1 - 2.2     TROPONIN-HIGH SENSITIVITY    Collection Time: 06/26/22 10:08 AM   Result Value Ref Range    Troponin-High Sensitivity 5 0 - 51 ng/L   NT-PRO BNP    Collection Time: 06/26/22 10:08 AM   Result Value Ref Range    NT pro- (H) <125 pg/mL   HCG QL SERUM    Collection Time: 06/26/22 10:08 AM   Result Value Ref Range    HCG, Ql. Negative Negative     GLUCOSE, POC    Collection Time: 06/26/22  9:46 PM   Result Value Ref Range    Glucose (POC) 204 (H) 65 - 117 mg/dL    Performed by Radha Shahid    TROPONIN-HIGH SENSITIVITY    Collection Time: 06/26/22 11:46 PM   Result Value Ref Range    Troponin-High Sensitivity 7 0 - 51 ng/L   CK    Collection Time: 06/26/22 11:46 PM   Result Value Ref Range    CK 49 26 - 126 U/L   METABOLIC PANEL, COMPREHENSIVE    Collection Time: 06/27/22  4:25 AM   Result Value Ref Range    Sodium 135 (L) 136 - 145 mmol/L    Potassium 4.1 3.5 - 5.1 mmol/L    Chloride 103 97 - 108 mmol/L    CO2 23 21 - 32 mmol/L    Anion gap 9 5 - 15 mmol/L Glucose 178 (H) 65 - 100 mg/dL    BUN 23 (H) 6 - 20 mg/dL    Creatinine 1.21 (H) 0.55 - 1.02 mg/dL    BUN/Creatinine ratio 19 12 - 20      GFR est AA >60 >60 ml/min/1.73m2    GFR est non-AA 50 (L) >60 ml/min/1.73m2    Calcium 8.7 8.5 - 10.1 mg/dL    Bilirubin, total 0.5 0.2 - 1.0 mg/dL    AST (SGOT) 35 15 - 37 U/L    ALT (SGPT) 52 12 - 78 U/L    Alk.  phosphatase 130 (H) 45 - 117 U/L    Protein, total 6.7 6.4 - 8.2 g/dL    Albumin 3.0 (L) 3.5 - 5.0 g/dL    Globulin 3.7 2.0 - 4.0 g/dL    A-G Ratio 0.8 (L) 1.1 - 2.2     MAGNESIUM    Collection Time: 06/27/22  4:25 AM   Result Value Ref Range    Magnesium 2.1 1.6 - 2.4 mg/dL   CBC W/O DIFF    Collection Time: 06/27/22  4:25 AM   Result Value Ref Range    WBC 9.1 3.6 - 11.0 K/uL    RBC 4.22 3.80 - 5.20 M/uL    HGB 12.9 11.5 - 16.0 g/dL    HCT 40.5 35.0 - 47.0 %    MCV 96.0 80.0 - 99.0 FL    MCH 30.6 26.0 - 34.0 PG    MCHC 31.9 30.0 - 36.5 g/dL    RDW 12.9 11.5 - 14.5 %    PLATELET 083 571 - 883 K/uL    MPV 10.1 8.9 - 12.9 FL    NRBC 0.0 0.0  WBC    ABSOLUTE NRBC 0.00 0.00 - 0.01 K/uL   TROPONIN-HIGH SENSITIVITY    Collection Time: 06/27/22  4:25 AM   Result Value Ref Range    Troponin-High Sensitivity 6 0 - 51 ng/L   CK    Collection Time: 06/27/22  4:25 AM   Result Value Ref Range    CK 51 26 - 192 U/L   GLUCOSE, POC    Collection Time: 06/27/22  7:53 AM   Result Value Ref Range    Glucose (POC) 202 (H) 65 - 117 mg/dL    Performed by Merry Church    ECHO ADULT COMPLETE    Collection Time: 06/27/22 10:50 AM   Result Value Ref Range    AV Peak Velocity 1.0 m/s    AV Peak Gradient 4 mmHg    Aortic Root 3.0 cm    IVSd 1.1 (A) 0.6 - 0.9 cm    LVIDd 4.9 3.9 - 5.3 cm    LVIDs 3.0 cm    LVOT Peak Velocity 0.7 m/s    LVOT Peak Gradient 2 mmHg    LVPWd 1.0 (A) 0.6 - 0.9 cm    LV E' Lateral Velocity 8 cm/s    LV E' Septal Velocity 6 cm/s    LA Diameter 3.7 cm    MV E Wave Deceleration Time 201.0 ms    MV A Velocity 0.65 m/s    MV E Velocity 0.94 m/s    FL Max Velocity 0.8 m/s    Pulmonary Artery EDP 2 mmHg    PV Max Velocity 0.8 m/s    PV Peak Gradient 3 mmHg    Est. RA Pressure 3 mmHg    RVIDd 3.3 cm    TR Max Velocity 1.96 m/s    TR Peak Gradient 15 mmHg    Fractional Shortening 2D 39 28 - 44 %    LVIDd Index 2.38 cm/m2    LVIDs Index 1.46 cm/m2    LV RWT Ratio 0.41     LV Mass 2D 188.1 (A) 67 - 162 g    LV Mass 2D Index 91.3 43 - 95 g/m2    MV E/A 1.45     E/E' Ratio (Averaged) 13.71     E/E' Lateral 11.75     E/E' Septal 15.67     LA Size Index 1.80 cm/m2    LA/AO Root Ratio 1.23     Ao Root Index 1.46 cm/m2    AV Velocity Ratio 0.70     RVSP 18 mmHg    EF BP 69 55 - 100 %   GLUCOSE, POC    Collection Time: 06/27/22 11:17 AM   Result Value Ref Range    Glucose (POC) 172 (H) 65 - 117 mg/dL    Performed by Holley Tabares        Radiologic Studies -   XR CHEST PORT   Final Result   No acute cardiopulmonary process. CT HEAD WO CONT   Final Result      Comparison studies:   6/22/2017-CT   5/25/2017-MRI   Ventricles normal size and shape. No midline shift or brain herniation or   hydrocephalus. Negative for intracranial hemorrhage or acute infarction or tumor or extra-axial   fluid collection. There are a few subtle low-density foci in the periventricular and deep white   matter in the frontal regions slightly more prominent than prior studies. Etiology not determined. Repeat MRI recommended. Brainstem and cerebellum and pituitary gland normal.   No bony lesion or fracture. Sinuses and mastoids clear. Orbits unremarkable. CT Results  (Last 48 hours)               06/26/22 1121  CT HEAD WO CONT Final result    Impression:      Comparison studies:   6/22/2017-CT   5/25/2017-MRI   Ventricles normal size and shape. No midline shift or brain herniation or   hydrocephalus. Negative for intracranial hemorrhage or acute infarction or tumor or extra-axial   fluid collection.    There are a few subtle low-density foci in the periventricular and deep white   matter in the frontal regions slightly more prominent than prior studies. Etiology not determined. Repeat MRI recommended. Brainstem and cerebellum and pituitary gland normal.   No bony lesion or fracture. Sinuses and mastoids clear. Orbits unremarkable. Narrative:  CT brain scan unenhanced. CXR Results  (Last 48 hours)               06/26/22 1200  XR CHEST PORT Final result    Impression:  No acute cardiopulmonary process. Narrative:  AP portable chest, 1200 hours. Comparison: 6/2/2022. Findings: Multiple cardiac monitoring leads overlie the chest. The   cardiomediastinal silhouette is within normal limits. The asim and pulmonary   vasculature are unremarkable. The lungs are well expanded without focal   consolidation, pleural effusion or pneumothorax. The osseous structures are   unremarkable. Medical Decision Making and ED Course     I have also reviewed the vital signs, available nursing notes, past medical history, past surgical history, family history and social history as made available. Vital Signs - Reviewed the patient's vital signs. Patient Vitals for the past 12 hrs:   Temp Pulse Resp BP SpO2   06/27/22 1113 97.9 °F (36.6 °C) 71 20 (!) 131/91 98 %   06/27/22 1023 -- -- -- (!) 133/98 --   06/27/22 0746 98.1 °F (36.7 °C) 77 20 -- 99 %   06/27/22 0311 -- -- -- (!) 133/98 --   06/27/22 0310 97.6 °F (36.4 °C) 80 20 (!) 139/99 97 %     . EKG interpretation: (Preliminary): Performed at 0930  Rhythm: normal sinus rhythm; and regular . Rate (approx.): 73; Axis: left axis deviation; Other findings: Old inferior and septal MI.         Medical Decision Making/Diff Dx:  Differential diagnose syncope:, syncope, TIA/CVA, arrhythmia, ACS, vasovagal syncope, PE, electrolyte abnormalities, dehydration, electrolyte abnormalities, hypertensive urgency, hypoglycemia hyperglycemia pneumonia viral illness      MDM:     39-year-old with remarkable cardiac history of MI with CABG as a young woman and persistent unstable angina and unstable sternum syndrome presents with acute escalation of what she feels is her anginal discomfort and syncope. Agree with labs and radiology ordered by nursing staff at triage we will provide medications for pain management including small dose of nitroglycerin paste, pain management and plan admission with serial troponin. ED course/Re-evaluation/Consultations/Other   Patient's work-up finds no significant acute pathology other than mild hyperglycemia mild increase in proBNP. Will facilitate admission. .       Procedures     PROCEDURES:  Procedures  Bolivar Maldonado MD  Disposition     Admitted      Diagnosis     Clinical impression:   1. Syncope and collapse    2. Unstable angina ([address]    3. Acute chest pain    4. Hyperglycemia    5. Renal insufficiency    6.  Long term (current) use of anticoagulants

## 2022-06-26 NOTE — PROGRESS NOTES
Reason for Admission:  Syncopal Episode with Chest Pain                      RUR Score:   8%                  Plan for utilizing home health:  Not at this time        PCP: First and Last name:  Osito Day DO     Name of Practice:    Are you a current patient: Yes/No:    Approximate date of last visit: month ago   Can you participate in a virtual visit with your PCP:                     Current Advanced Directive/Advance Care Plan: Full Code      Healthcare Decision Maker:   Click here to complete [employer] including selection of the Healthcare Decision Maker Relationship (ie \"Primary\")   Katerina Porter, , [phone]                          Transition of Care Plan:      Met f/f with Pt, she stated that the address on the face sheet is where she gets her mail. Pt stated that where she stays is not a good place and she does not want to get her mail there. Pt declined to give CM the physical address of where she stays. Pt stated no HH, she has a walker, shower chair, and wheelchair. Pt stated that her  gets paid to take care of her through Medicaid. Pt stated that her  cuts up her food, and assist her with getting bathed. Pt stated that her  takes care of the house hold chores. Pt stated that she uses [address] in [place]. Pt stated that family will give her a ride or she can take a Medicaid Cab home when D/C.     CM Dispo: Home with

## 2022-06-26 NOTE — ROUTINE PROCESS
TRANSFER - OUT REPORT:    Verbal report given to Maryam(name) sienna Dorman  being transferred to [place], RN(unit) for routine progression of care       Report consisted of patients Situation, Background, Assessment and   Recommendations(SBAR). Information from the following report(s) ED Summary was reviewed with the receiving nurse. Lines:   Peripheral IV 06/26/22 Posterior;Right Forearm (Active)        Opportunity for questions and clarification was provided.       Patient transported with:   [employer]

## 2022-06-26 NOTE — H&P
GENERAL GENERIC H&P/CONSULT    Subjective:    36F with extensive cardiovascular history, diabetes, CAD, hypertension, and a has a history of a cardiac arrest in 2018 after a CABG. She has a history of stroke with residual lower extremity weakness, left greater than right. She uses a walker to ambulate. She follows with Dr Rian Rodriguez at [facility]. 6/2/22 admitted to [facility] [place] for outpatient procedure \"RIGHT TYMPANOPLASTY,GRAFT EAR CARTILAGE, RIGHT MYRINGOTOMY WITH T-TUBE, PLACEMENT OF FACIAL NERVE MONITORING ELECTRODES\" by ENT .  Postop when patient in PACU, she woke up with complaining of midsternal chest pain, 9/10, pressure/sharp kind radiated to left arm and left jaw.  Not related to inspiration, said that this was similar to her previous chest pain but this time is much worse.  She was taking aspirin and Brilinta chronically, she did hold her Brilinta this morning because of the procedure. Patient was monitored, troponin was negative. Cardiology evaluated and did not do any further follow-up. Patient does have chronic chest pain. Discharged home 6/3/22.    6/26/22 presents to [facility] with an episode of syncope and worsening of her chest pains. She reports substernal chest pain that awakened her this morning from sleep, associated with lightheadedness. She went to the toilet and it was there she had a syncopal episode with impact on the back of her head. During the ED course underwent CT, was given dilaudid for management of her chest pains. Initial troponin negative. I have been asked to admit her to hospital for further stabilization and workup of her condition.     Past Medical History:   Diagnosis Date    Abscess     Anemia     CAD (coronary artery disease) 2019    CABG X1    Chronic pain     LEFT SCIATIC, STERNAL WOUND    Complicated migraine     with extremity numbness    H/O transfusion of packed red blood cells 07/2021    Heart attack ([facility] [place] [address] 11/2019    Hx MRSA infection 2009    Hypertension     onset 2009    Hypothyroidism     Irregular menstrual cycle     Nausea & vomiting     Obesity     Stroke ([facility] [address] 2019    LOWER BODY WEAKNESS    T2DM (type 2 diabetes mellitus) ([facility] [address]     onset 2009 IDDM    Thromboembolus ([facility] [address] 2009    RIGHT LEG - HOSP. [place]      Past Surgical History:   Procedure Laterality Date    HX CHOLECYSTECTOMY      HX CORONARY ARTERY BYPASS GRAFT  12/03/2019    HX GYN Bilateral 2021    SALPINGECTOMY    HX GYN  2021    ENDOMETRIAL ABLATION    HX HEENT      T&A    HX OTHER SURGICAL      2 TEETH REMOVED     HX TONSIL AND ADENOIDECTOMY  1992    HX TYMPANOSTOMY Bilateral     MULTIPLE    HX TYMPANOSTOMY      HX WISDOM TEETH EXTRACTION      HI CARDIAC SURG PROCEDURE UNLIST  2019    CABG X 1, CARDIAC CATH STENT X 3      Prior to Admission medications    Medication Sig Start Date End Date Taking? Authorizing Provider   levothyroxine (Synthroid) 137 mcg tablet Take 137 mcg by mouth Daily (before breakfast). 2/1/22  Yes Provider, Historical   ondansetron hcl (Zofran) 4 mg tablet Take 1 Tablet by mouth every eight (8) hours as needed for Nausea for up to 21 doses. 6/2/22   Sisi Peguero MD   dulaglutide (Trulicity) 1.5 PM/6.9 mL sub-q pen 0.5 mL by SubCUTAneous route every seven (7) days. Stop 0.75 mg 3/16/22   Sanjeev Pedroza MD   insulin detemir U-100 (LEVEMIR FLEXTOUCH) 100 unit/mL (3 mL) inpn Inject 30 units in AM and 30  units at bed time 3/16/22   Sanjeev Pedroza MD   insulin aspart U-100 (NovoLOG U-100 Insulin aspart) 100 unit/mL injection Inject 10- 15 units before breakfast, 10- 15 units before lunch and 10 - 15 units before dinner. 3/16/22   Sanjeev Pedroza MD   Insulin Needles, Disposable, 32 gauge x 5/32\" ndle Use to inject insulin BID Dx Code: E11.65 3/16/22   Sanjeev Pedroza MD   insulin syringe,safetyneedle 0.5 mL 31 gauge x 15/64\" syrg 1 Syringe by Does Not Apply route three (3) times daily.  Dx Code: E11.65 3/16/22   Elvin Newton Georgette Chua MD   ondansetron hcl (Zofran) 4 mg tablet Take 1 Tablet by mouth every eight (8) hours as needed for Nausea for up to 21 doses. 2/10/22   Spencer Duckworth MD   bisacodyL (Dulcolax, bisacodyl,) 5 mg EC tablet Take 5 mg by mouth daily as needed for Constipation. Provider, Historical   ticagrelor (Brilinta) 90 mg tablet Take 90 mg by mouth two (2) times a day. Provider, Historical   atorvastatin (LIPITOR) 20 mg tablet Take 20 mg by mouth nightly. Provider, Historical   nitroglycerin (NITROSTAT) 0.4 mg SL tablet 0.4 mg by SubLINGual route every five (5) minutes as needed for Chest Pain. Up to 3 doses. Patient not taking: Reported on 6/2/2022    Provider, Historical   methocarbamoL (ROBAXIN) 750 mg tablet Take 750 mg by mouth two (2) times a day. Provider, Historical   senna (Senna) 8.6 mg tablet Take 1 Tablet by mouth as needed for Constipation. Provider, Historical   lisinopriL (PRINIVIL, ZESTRIL) 2.5 mg tablet Take 2.5 mg by mouth daily. Provider, Historical   empagliflozin (Jardiance) 10 mg tablet Take 10 mg by mouth daily. Provider, Historical   ranolazine ER (Ranexa) 1,000 mg Take 500 mg by mouth two (2) times a day. Provider, Historical   blood-glucose sensor (DEXCOM G6 SENSOR) by Does Not Apply route. Provider, Historical   carvedilol (COREG) 6.25 mg tablet Take 2 Tabs by mouth two (2) times daily (with meals). Patient taking differently: Take 12.5 mg by mouth two (2) times daily (with meals). TAKE 2 TABLETS 7/6/18   Aaron Singh MD   aspirin delayed-release 81 mg tablet Take 1 Tab by mouth daily.  12/22/17   Ron Newton, NP     Allergies   Allergen Reactions    Other Food Swelling     JALIPENO PEPPERS - FACILA SWELLING    Ciprofloxacin Anaphylaxis    Bumetanide Other (comments)     Hearing loss    Compazine [Prochlorperazine] Anxiety     HALLUCINATIONS    Pcn [Penicillins] Rash     OCCURRED IN INFANCY NO REPORTED SEVERE IgE MEDIATED REACTIONS  Patient screened for any delayed non-IgE-mediated reaction to PCN.         Patient notes the following:    No delayed non-IgE-mediated reaction to PCN          Tree Pollen-[place] Rash     HICKORY SMOKE FLAVORING    Vancomycin Other (comments)     Kidneys shut down    Voltaren [Diclofenac Sodium] Myalgia and Other (comments)     \"muscle spasms\", LEG SPASMS        Social History     Tobacco Use    Smoking status: Passive Smoke Exposure - Never Smoker    Smokeless tobacco: Never Used    Tobacco comment:  SMOKES   Substance Use Topics    Alcohol use: Not Currently     Comment: socially 1-2 times a year       Family History   Problem Relation Age of Onset    Hypertension Other         \"all her family\"    Diabetes Other         \"all her family\"    Cancer Mother         cervical    Stroke Mother     Heart Disease Mother    [place] Bladder Disease Mother     Diabetes Mother     Hypertension Mother     Elevated Lipids Mother     Stroke Father     Heart Disease Father     Diabetes Father     Heart Attack Father     Elevated Lipids Father     Kidney Disease Father         ESRD    Gall Bladder Disease Brother     Diabetes Brother     Kidney Disease Brother     Cancer Brother         KIDNEY    Hypertension Brother     Migraines Maternal Aunt     Diabetes Paternal Aunt     Stroke Maternal Grandmother     Diabetes Maternal Grandmother     Cancer Maternal Grandmother         cervical    Heart Attack Maternal Grandmother     Elevated Lipids Maternal Grandmother     Stroke Maternal Grandfather     Diabetes Maternal Grandfather     Elevated Lipids Maternal Grandfather     Cancer Maternal Grandfather     Heart Attack Maternal Grandfather     Anesth Problems Neg Hx       Review of Systems   All other systems reviewed and are negative. Objective:    No intake/output data recorded. No intake/output data recorded.   Patient Vitals for the past 8 hrs:   BP Temp Pulse Resp SpO2 Height Weight   06/26/22 1300 107/84 -- 70 15 100 % -- --   06/26/22 1200 109/78 -- 71 20 99 % -- --   06/26/22 1100 110/80 -- 73 15 99 % -- --   06/26/22 1000 114/79 -- 84 17 98 % -- --   06/26/22 0949 -- -- -- -- -- 5' 4\" (1.626 m) 101.6 kg (224 lb)   06/26/22 0949 (!) 133/94 -- 74 15 100 % -- --   06/26/22 0938 (!) 133/94 97.4 °F (36.3 °C) 76 16 99 % -- --     Physical Exam  Vitals and nursing note reviewed. Constitutional:       Appearance: Normal appearance. She is obese. HENT:      Head: Normocephalic. Nose: Nose normal.      Mouth/Throat:      Mouth: Mucous membranes are dry. Eyes:      Extraocular Movements: Extraocular movements intact. Pupils: Pupils are equal, round, and reactive to light. Cardiovascular:      Rate and Rhythm: Normal rate and regular rhythm. Pulses: Normal pulses. Heart sounds: Normal heart sounds. Pulmonary:      Effort: Pulmonary effort is normal.      Breath sounds: Normal breath sounds. Abdominal:      General: Abdomen is flat. Palpations: Abdomen is soft. Musculoskeletal:         General: Normal range of motion. Cervical back: Normal range of motion and neck supple. Skin:     General: Skin is warm and dry. Neurological:      Mental Status: She is alert and oriented to person, place, and time. Mental status is at baseline.    Psychiatric:         Mood and Affect: Mood normal.          Labs:    Recent Results (from the past 24 hour(s))   CBC WITH AUTOMATED DIFF    Collection Time: 06/26/22 10:08 AM   Result Value Ref Range    WBC 14.2 (H) 3.6 - 11.0 K/uL    RBC 5.20 3.80 - 5.20 M/uL    HGB 16.1 (H) 11.5 - 16.0 g/dL    HCT 46.6 35.0 - 47.0 %    MCV 89.6 80.0 - 99.0 FL    MCH 31.0 26.0 - 34.0 PG    MCHC 34.5 30.0 - 36.5 g/dL    RDW 12.7 11.5 - 14.5 %    PLATELET 687 (H) 283 - 400 K/uL    MPV 9.9 8.9 - 12.9 FL    NRBC 0.0 0.0  WBC    ABSOLUTE NRBC 0.00 0.00 - 0.01 K/uL    NEUTROPHILS 71 32 - 75 %    LYMPHOCYTES 18 12 - 49 %    MONOCYTES 8 5 - 13 % EOSINOPHILS 2 0 - 7 %    BASOPHILS 0 0 - 1 %    IMMATURE GRANULOCYTES 1 (H) 0 - 0.5 %    ABS. NEUTROPHILS 10.1 (H) 1.8 - 8.0 K/UL    ABS. LYMPHOCYTES 2.5 0.8 - 3.5 K/UL    ABS. MONOCYTES 1.1 (H) 0.0 - 1.0 K/UL    ABS. EOSINOPHILS 0.3 0.0 - 0.4 K/UL    ABS. BASOPHILS 0.1 0.0 - 0.1 K/UL    ABS. IMM. GRANS. 0.1 (H) 0.00 - 0.04 K/UL    DF AUTOMATED     METABOLIC PANEL, COMPREHENSIVE    Collection Time: 06/26/22 10:08 AM   Result Value Ref Range    Sodium 131 (L) 136 - 145 mmol/L    Potassium Hemolyzed, recollect requested 3.5 - 5.1 mmol/L    Chloride 101 97 - 108 mmol/L    CO2 21 21 - 32 mmol/L    Anion gap 9 5 - 15 mmol/L    Glucose 216 (H) 65 - 100 mg/dL    BUN 22 (H) 6 - 20 mg/dL    Creatinine 1.31 (H) 0.55 - 1.02 mg/dL    BUN/Creatinine ratio 17 12 - 20      GFR est AA 55 (L) >60 ml/min/1.73m2    GFR est non-AA 46 (L) >60 ml/min/1.73m2    Calcium 9.8 8.5 - 10.1 mg/dL    Bilirubin, total 1.0 0.2 - 1.0 mg/dL    AST (SGOT) Hemolyzed, recollect requested 15 - 37 U/L    ALT (SGPT) 27 12 - 78 U/L    Alk. phosphatase 92 45 - 117 U/L    Protein, total 8.1 6.4 - 8.2 g/dL    Albumin 3.7 3.5 - 5.0 g/dL    Globulin 4.4 (H) 2.0 - 4.0 g/dL    A-G Ratio 0.8 (L) 1.1 - 2.2     TROPONIN-HIGH SENSITIVITY    Collection Time: 06/26/22 10:08 AM   Result Value Ref Range    Troponin-High Sensitivity 5 0 - 51 ng/L   NT-PRO BNP    Collection Time: 06/26/22 10:08 AM   Result Value Ref Range    NT pro- (H) <125 pg/mL   HCG QL SERUM    Collection Time: 06/26/22 10:08 AM   Result Value Ref Range    HCG, Ql. Negative Negative         ECG: telemetry monitor    Chest X-Ray:   AP portable chest, 1200 hours.     Comparison: 6/2/2022.     Findings: Multiple cardiac monitoring leads overlie the chest. The  cardiomediastinal silhouette is within normal limits. The asim and pulmonary  vasculature are unremarkable. The lungs are well expanded without focal  consolidation, pleural effusion or pneumothorax.  The osseous structures are  unremarkable.     IMPRESSION  No acute cardiopulmonary process. CT Results  (Last 48 hours)               06/26/22 1121  CT HEAD WO CONT Final result    Impression:      Comparison studies:   6/22/2017-CT   5/25/2017-MRI   Ventricles normal size and shape. No midline shift or brain herniation or   hydrocephalus. Negative for intracranial hemorrhage or acute infarction or tumor or extra-axial   fluid collection. There are a few subtle low-density foci in the periventricular and deep white   matter in the frontal regions slightly more prominent than prior studies. Etiology not determined. Repeat MRI recommended. Brainstem and cerebellum and pituitary gland normal.   No bony lesion or fracture. Sinuses and mastoids clear. Orbits unremarkable. Narrative:  CT brain scan unenhanced. Assessment:  Active Problems:    ACS (acute coronary syndrome) ([address] (6/26/2022)    36F with extensive cardiovascular history, diabetes, CAD, hypertension, and a has a history of a cardiac arrest in 2018 after a CABG. She has a history of stroke with residual lower extremity weakness, left greater than right. She uses a walker to ambulate. She follows with Dr Afia Barlow at [facility]. 6/2/22 admitted to [facility] for outpatient procedure \"RIGHT TYMPANOPLASTY,GRAFT EAR CARTILAGE, RIGHT MYRINGOTOMY WITH T-TUBE, PLACEMENT OF FACIAL NERVE MONITORING ELECTRODES\" by ENT .  Postop when patient in PACU, she woke up with complaining of midsternal chest pain, 9/10, pressure/sharp kind radiated to left arm and left jaw.  Not related to inspiration, said that this was similar to her previous chest pain but this time is much worse.  She was taking aspirin and Brilinta chronically, she did hold her Brilinta this morning because of the procedure. Patient was monitored, troponin was negative. Cardiology evaluated and did not do any further follow-up. Patient does have chronic chest pain.  Discharged home 6/3/22.    6/26/22 presents to [facility] with an episode of syncope and worsening of her chest pains. She reports substernal chest pain that awakened her this morning from sleep, associated with lightheadedness. She went to the toilet and it was there she had a syncopal episode with impact on the back of her head. During the ED course underwent CT, was given dilaudid for management of her chest pains. Initial troponin negative. I have been asked to admit her to hospital for further stabilization and workup of her condition. Plan:    1) Chest pain with syncope in a patient with extensive cardiovascular history, CABG, coronary stents, prior cardiac arrest.     Admit to hospital   Telemetry monitoring   Neuro checks   Trend troponins and CK   Pain control with dilaudid   Cardiology   Continue home medications    Aspirin    Atorvastatin    Carvedilol    Lisinopril    Ranexa    Ticagrelor    2) Diabetes Mellitus on high dose insulin regimen.  At home she uses Levemir 30u BID and Aspart 10-15u ACHS     Lantus 30 units morning and evening   Sliding scale lispro ACHS    3) Hypothyroidism     Levothyroxine    4) DVT prophylaxis with enoxaparin      Signed:  Aidee Tabor MD 6/26/2022

## 2022-06-27 ENCOUNTER — APPOINTMENT (OUTPATIENT)
Dept: NON INVASIVE DIAGNOSTICS | Age: 37
End: 2022-06-27
Attending: INTERNAL MEDICINE
Payer: MEDICARE

## 2022-06-27 LAB
ALBUMIN SERPL-MCNC: 3 G/DL (ref 3.5–5)
ALBUMIN/GLOB SERPL: 0.8 {RATIO} (ref 1.1–2.2)
ALP SERPL-CCNC: 130 U/L (ref 45–117)
ALT SERPL-CCNC: 52 U/L (ref 12–78)
ANION GAP SERPL CALC-SCNC: 9 MMOL/L (ref 5–15)
AST SERPL W P-5'-P-CCNC: 35 U/L (ref 15–37)
BILIRUB SERPL-MCNC: 0.5 MG/DL (ref 0.2–1)
BUN SERPL-MCNC: 23 MG/DL (ref 6–20)
BUN/CREAT SERPL: 19 (ref 12–20)
CA-I BLD-MCNC: 8.7 MG/DL (ref 8.5–10.1)
CHLORIDE SERPL-SCNC: 103 MMOL/L (ref 97–108)
CHOLEST SERPL-MCNC: 88 MG/DL
CK SERPL-CCNC: 49 U/L (ref 26–192)
CK SERPL-CCNC: 51 U/L (ref 26–192)
CO2 SERPL-SCNC: 23 MMOL/L (ref 21–32)
CREAT SERPL-MCNC: 1.21 MG/DL (ref 0.55–1.02)
ECHO AO ROOT DIAM: 3 CM
ECHO AO ROOT INDEX: 1.46 CM/M2
ECHO AV PEAK GRADIENT: 4 MMHG
ECHO AV PEAK VELOCITY: 1 M/S
ECHO AV VELOCITY RATIO: 0.7
ECHO EST RA PRESSURE: 3 MMHG
ECHO LA DIAMETER INDEX: 1.8 CM/M2
ECHO LA DIAMETER: 3.7 CM
ECHO LA TO AORTIC ROOT RATIO: 1.23
ECHO LV E' LATERAL VELOCITY: 8 CM/S
ECHO LV E' SEPTAL VELOCITY: 6 CM/S
ECHO LV EJECTION FRACTION BIPLANE: 69 % (ref 55–100)
ECHO LV FRACTIONAL SHORTENING: 39 % (ref 28–44)
ECHO LV INTERNAL DIMENSION DIASTOLE INDEX: 2.38 CM/M2
ECHO LV INTERNAL DIMENSION DIASTOLIC: 4.9 CM (ref 3.9–5.3)
ECHO LV INTERNAL DIMENSION SYSTOLIC INDEX: 1.46 CM/M2
ECHO LV INTERNAL DIMENSION SYSTOLIC: 3 CM
ECHO LV IVSD: 1.1 CM (ref 0.6–0.9)
ECHO LV MASS 2D: 188.1 G (ref 67–162)
ECHO LV MASS INDEX 2D: 91.3 G/M2 (ref 43–95)
ECHO LV POSTERIOR WALL DIASTOLIC: 1 CM (ref 0.6–0.9)
ECHO LV RELATIVE WALL THICKNESS RATIO: 0.41
ECHO LVOT PEAK GRADIENT: 2 MMHG
ECHO LVOT PEAK VELOCITY: 0.7 M/S
ECHO MV A VELOCITY: 0.65 M/S
ECHO MV E DECELERATION TIME (DT): 201 MS
ECHO MV E VELOCITY: 0.94 M/S
ECHO MV E/A RATIO: 1.45
ECHO MV E/E' LATERAL: 11.75
ECHO MV E/E' RATIO (AVERAGED): 13.71
ECHO MV E/E' SEPTAL: 15.67
ECHO PULMONARY ARTERY END DIASTOLIC PRESSURE: 2 MMHG
ECHO PV MAX VELOCITY: 0.8 M/S
ECHO PV PEAK GRADIENT: 3 MMHG
ECHO PV REGURGITANT MAX VELOCITY: 0.8 M/S
ECHO RIGHT VENTRICULAR SYSTOLIC PRESSURE (RVSP): 18 MMHG
ECHO RV INTERNAL DIMENSION: 3.3 CM
ECHO TV REGURGITANT MAX VELOCITY: 1.96 M/S
ECHO TV REGURGITANT PEAK GRADIENT: 15 MMHG
ERYTHROCYTE [DISTWIDTH] IN BLOOD BY AUTOMATED COUNT: 12.9 % (ref 11.5–14.5)
GLOBULIN SER CALC-MCNC: 3.7 G/DL (ref 2–4)
GLUCOSE BLD STRIP.AUTO-MCNC: 113 MG/DL (ref 65–117)
GLUCOSE BLD STRIP.AUTO-MCNC: 172 MG/DL (ref 65–117)
GLUCOSE BLD STRIP.AUTO-MCNC: 202 MG/DL (ref 65–117)
GLUCOSE BLD STRIP.AUTO-MCNC: 226 MG/DL (ref 65–117)
GLUCOSE SERPL-MCNC: 178 MG/DL (ref 65–100)
HCT VFR BLD AUTO: 40.5 % (ref 35–47)
HDLC SERPL-MCNC: 38 MG/DL
HDLC SERPL: 2.3 {RATIO} (ref 0–5)
HGB BLD-MCNC: 12.9 G/DL (ref 11.5–16)
LDLC SERPL CALC-MCNC: 3.8 MG/DL (ref 0–100)
LIPID PROFILE,FLP: ABNORMAL
MAGNESIUM SERPL-MCNC: 2.1 MG/DL (ref 1.6–2.4)
MCH RBC QN AUTO: 30.6 PG (ref 26–34)
MCHC RBC AUTO-ENTMCNC: 31.9 G/DL (ref 30–36.5)
MCV RBC AUTO: 96 FL (ref 80–99)
NRBC # BLD: 0 K/UL (ref 0–0.01)
NRBC BLD-RTO: 0 PER 100 WBC
PERFORMED BY, TECHID: ABNORMAL
PERFORMED BY, TECHID: NORMAL
PLATELET # BLD AUTO: 337 K/UL (ref 150–400)
PMV BLD AUTO: 10.1 FL (ref 8.9–12.9)
POTASSIUM SERPL-SCNC: 4.1 MMOL/L (ref 3.5–5.1)
PROT SERPL-MCNC: 6.7 G/DL (ref 6.4–8.2)
RBC # BLD AUTO: 4.22 M/UL (ref 3.8–5.2)
SODIUM SERPL-SCNC: 135 MMOL/L (ref 136–145)
TRIGL SERPL-MCNC: 231 MG/DL (ref ?–150)
TROPONIN-HIGH SENSITIVITY: 6 NG/L (ref 0–51)
TROPONIN-HIGH SENSITIVITY: 7 NG/L (ref 0–51)
VLDLC SERPL CALC-MCNC: 46.2 MG/DL
WBC # BLD AUTO: 9.1 K/UL (ref 3.6–11)

## 2022-06-27 PROCEDURE — 74011250637 HC RX REV CODE- 250/637: Performed by: INTERNAL MEDICINE

## 2022-06-27 PROCEDURE — 82550 ASSAY OF CK (CPK): CPT

## 2022-06-27 PROCEDURE — 74011250637 HC RX REV CODE- 250/637: Performed by: NURSE PRACTITIONER

## 2022-06-27 PROCEDURE — 96376 TX/PRO/DX INJ SAME DRUG ADON: CPT

## 2022-06-27 PROCEDURE — 74011250636 HC RX REV CODE- 250/636: Performed by: INTERNAL MEDICINE

## 2022-06-27 PROCEDURE — 97530 THERAPEUTIC ACTIVITIES: CPT

## 2022-06-27 PROCEDURE — G0378 HOSPITAL OBSERVATION PER HR: HCPCS

## 2022-06-27 PROCEDURE — 80053 COMPREHEN METABOLIC PANEL: CPT

## 2022-06-27 PROCEDURE — 82962 GLUCOSE BLOOD TEST: CPT

## 2022-06-27 PROCEDURE — 96372 THER/PROPH/DIAG INJ SC/IM: CPT

## 2022-06-27 PROCEDURE — 74011636637 HC RX REV CODE- 636/637: Performed by: INTERNAL MEDICINE

## 2022-06-27 PROCEDURE — 74011000250 HC RX REV CODE- 250: Performed by: INTERNAL MEDICINE

## 2022-06-27 PROCEDURE — 93306 TTE W/DOPPLER COMPLETE: CPT

## 2022-06-27 PROCEDURE — 65270000029 HC RM PRIVATE

## 2022-06-27 PROCEDURE — 36415 COLL VENOUS BLD VENIPUNCTURE: CPT

## 2022-06-27 PROCEDURE — 80061 LIPID PANEL: CPT

## 2022-06-27 PROCEDURE — 97165 OT EVAL LOW COMPLEX 30 MIN: CPT

## 2022-06-27 PROCEDURE — 84484 ASSAY OF TROPONIN QUANT: CPT

## 2022-06-27 PROCEDURE — 85027 COMPLETE CBC AUTOMATED: CPT

## 2022-06-27 PROCEDURE — 83735 ASSAY OF MAGNESIUM: CPT

## 2022-06-27 RX ORDER — CARVEDILOL 12.5 MG/1
25 TABLET ORAL 2 TIMES DAILY WITH MEALS
Status: DISCONTINUED | OUTPATIENT
Start: 2022-06-27 | End: 2022-06-27

## 2022-06-27 RX ORDER — CARVEDILOL 12.5 MG/1
12.5 TABLET ORAL 2 TIMES DAILY WITH MEALS
Status: DISCONTINUED | OUTPATIENT
Start: 2022-06-27 | End: 2022-06-28 | Stop reason: HOSPADM

## 2022-06-27 RX ADMIN — SODIUM CHLORIDE, PRESERVATIVE FREE 10 ML: 5 INJECTION INTRAVENOUS at 05:13

## 2022-06-27 RX ADMIN — ONDANSETRON 4 MG: 2 INJECTION INTRAMUSCULAR; INTRAVENOUS at 08:59

## 2022-06-27 RX ADMIN — LEVOTHYROXINE SODIUM 137 MCG: 0.03 TABLET ORAL at 09:14

## 2022-06-27 RX ADMIN — METHOCARBAMOL 750 MG: 750 TABLET ORAL at 20:29

## 2022-06-27 RX ADMIN — HYDROMORPHONE HYDROCHLORIDE 0.5 MG: 1 INJECTION, SOLUTION INTRAMUSCULAR; INTRAVENOUS; SUBCUTANEOUS at 15:46

## 2022-06-27 RX ADMIN — INSULIN LISPRO 10 UNITS: 100 INJECTION, SOLUTION INTRAVENOUS; SUBCUTANEOUS at 08:59

## 2022-06-27 RX ADMIN — CARVEDILOL 12.5 MG: 12.5 TABLET, FILM COATED ORAL at 17:19

## 2022-06-27 RX ADMIN — SODIUM CHLORIDE, PRESERVATIVE FREE 5 ML: 5 INJECTION INTRAVENOUS at 14:00

## 2022-06-27 RX ADMIN — RANOLAZINE 1000 MG: 500 TABLET, FILM COATED, EXTENDED RELEASE ORAL at 20:28

## 2022-06-27 RX ADMIN — ACETAMINOPHEN 650 MG: 325 TABLET ORAL at 08:56

## 2022-06-27 RX ADMIN — INSULIN LISPRO 8 UNITS: 100 INJECTION, SOLUTION INTRAVENOUS; SUBCUTANEOUS at 20:29

## 2022-06-27 RX ADMIN — METHOCARBAMOL 750 MG: 750 TABLET ORAL at 08:56

## 2022-06-27 RX ADMIN — INSULIN LISPRO 8 UNITS: 100 INJECTION, SOLUTION INTRAVENOUS; SUBCUTANEOUS at 11:42

## 2022-06-27 RX ADMIN — LISINOPRIL 2.5 MG: 5 TABLET ORAL at 08:54

## 2022-06-27 RX ADMIN — SODIUM CHLORIDE, PRESERVATIVE FREE 10 ML: 5 INJECTION INTRAVENOUS at 20:30

## 2022-06-27 RX ADMIN — CARVEDILOL 12.5 MG: 12.5 TABLET, FILM COATED ORAL at 08:58

## 2022-06-27 RX ADMIN — HYDROMORPHONE HYDROCHLORIDE 0.5 MG: 1 INJECTION, SOLUTION INTRAMUSCULAR; INTRAVENOUS; SUBCUTANEOUS at 02:56

## 2022-06-27 RX ADMIN — ONDANSETRON 4 MG: 2 INJECTION INTRAMUSCULAR; INTRAVENOUS at 20:05

## 2022-06-27 RX ADMIN — ENOXAPARIN SODIUM 40 MG: 100 INJECTION SUBCUTANEOUS at 08:58

## 2022-06-27 RX ADMIN — TICAGRELOR 90 MG: 90 TABLET ORAL at 08:56

## 2022-06-27 RX ADMIN — HYDROMORPHONE HYDROCHLORIDE 0.5 MG: 1 INJECTION, SOLUTION INTRAMUSCULAR; INTRAVENOUS; SUBCUTANEOUS at 07:12

## 2022-06-27 RX ADMIN — INSULIN GLARGINE 30 UNITS: 100 INJECTION, SOLUTION SUBCUTANEOUS at 20:29

## 2022-06-27 RX ADMIN — ACETAMINOPHEN 650 MG: 325 TABLET ORAL at 03:03

## 2022-06-27 RX ADMIN — RANOLAZINE 1000 MG: 500 TABLET, FILM COATED, EXTENDED RELEASE ORAL at 08:55

## 2022-06-27 RX ADMIN — HYDROMORPHONE HYDROCHLORIDE 0.5 MG: 1 INJECTION, SOLUTION INTRAMUSCULAR; INTRAVENOUS; SUBCUTANEOUS at 11:42

## 2022-06-27 RX ADMIN — TICAGRELOR 90 MG: 90 TABLET ORAL at 20:29

## 2022-06-27 RX ADMIN — ATORVASTATIN CALCIUM 20 MG: 20 TABLET, FILM COATED ORAL at 20:29

## 2022-06-27 RX ADMIN — ASPIRIN 81 MG: 81 TABLET, COATED ORAL at 08:55

## 2022-06-27 RX ADMIN — HYDROMORPHONE HYDROCHLORIDE 0.5 MG: 1 INJECTION, SOLUTION INTRAMUSCULAR; INTRAVENOUS; SUBCUTANEOUS at 20:05

## 2022-06-27 RX ADMIN — INSULIN GLARGINE 30 UNITS: 100 INJECTION, SOLUTION SUBCUTANEOUS at 08:59

## 2022-06-27 NOTE — PROGRESS NOTES
Problem: Falls - Risk of  Goal: *Absence of Falls  Description: Document Saurabh Carpenter Fall Risk and appropriate interventions in the flowsheet.   Outcome: Progressing Towards Goal  Note: Fall Risk Interventions:  Mobility Interventions: Utilize walker, cane, or other assistive device         Medication Interventions: Patient to call before getting OOB         History of Falls Interventions: Bed/chair exit alarm         Problem: Patient Education: Go to Patient Education Activity  Goal: Patient/Family Education  Outcome: Progressing Towards Goal

## 2022-06-27 NOTE — PROGRESS NOTES
OCCUPATIONAL THERAPY EVALUATION  Patient: Ana M Zarate (24 y.o. female)  Date: 6/27/2022  Primary Diagnosis: ACS (acute coronary syndrome) [facility]) [I24.9]        Precautions: fall risk       ASSESSMENT  Pt is a 38 y/o F with PMH of cardiac arrest following CABG in 2018 and CVA presenting to [facility] with c/o episodes of syncope and chest pains, admitted 6/26  and being treated for ACS. Pt received semi-supine in bed upon arrival, AXO x4, and agreeable to OT evaluation at this time. Per pt report, pt lives with  in a one-story home with 3 PRISCILLA and R HR (her  stands to the L of her). She reports since her CVA her  is her full-time caregiver (he is paid to A her). She reports he completes IADLs for her and if her  leaves her niece A her. She is able to complete bathing and dressing mostly INDly but her  SUP or helps gather her items for her. Pt reports she ambulates using a RW and uses a w/c for community mobility. Pt has outpatient therapy set up and will resume in 6 wks; she recently had R Tympanoplasy and Myringotomy surgery. Other DME owned includes: grab bars, transfer tub bench, walker, w/c     Based on current observations, pt presents with deficits in generalized strength/AROM, static/dynamic sitting balance, static/dynamic standing balance, functional activity tolerance, and pain impacting overall performance of ADLs and functional transfers/mobility. Pt currently requires mod I bed mobility and sup>sit transfer. She demo'd good sitting balance while donning B socks sitting at EOB via cross leg method. She req'd CGA for STS using RW and ambulated to bathroom for bathroom commode transfer using R grab bar; pt demo'd steady gait and no LOB noted. She declined standing ADLs because she recently completed them and returned to supine. Overall, pt tolerates session fair with c/o nausea following pain medication.  She reported slight chest pain but did not rate and no reports of dizziness or SOB during session. Pt would benefit from continued skilled OT services to address current impairments and improve IND and safety with self cares and functional transfers/mobility. Current OT d/c recommendation return home w/ prior level of care once medically appropriate. Other factors to consider for discharge: family/social support, DME, time since onset, severity of deficits, decline from functional baseline     Patient will benefit from skilled therapy intervention to address the above noted impairments. PLAN :  Recommendations and Planned Interventions: self care training, functional mobility training, therapeutic exercise, balance training, therapeutic activities, endurance activities, patient education and home safety training    Frequency/Duration: Patient will be followed by occupational therapy:  3-5x/week to address goals. Recommendation for discharge: (in order for the patient to meet his/her long term goals)  Return home w/ prior level of care    This discharge recommendation:  Has been made in collaboration with the attending provider and/or case management    IF patient discharges home will need the following DME: none at this time       SUBJECTIVE:   Patient stated It took me 2 years to learn how to walk again.     OBJECTIVE DATA SUMMARY:   HISTORY:   Past Medical History:   Diagnosis Date    Abscess     Anemia     CAD (coronary artery disease) 2019    CABG X1    Chronic pain     LEFT SCIATIC, STERNAL WOUND    Complicated migraine     with extremity numbness    H/O transfusion of packed red blood cells 07/2021    Heart attack ([facility] [address] 11/2019    Hx MRSA infection 2009    Hypertension     onset 2009    Hypothyroidism     Irregular menstrual cycle     Nausea & vomiting     Obesity     Stroke ([facility] [address] 2019    LOWER BODY WEAKNESS    T2DM (type 2 diabetes mellitus) ([facility] [address]     onset 2009 IDDM    Thromboembolus ([facility] [address] 2009    RIGHT LEG - HOSP.  [place]     Past Surgical History: Procedure Laterality Date    HX CHOLECYSTECTOMY      HX CORONARY ARTERY BYPASS GRAFT  12/03/2019    HX GYN Bilateral 2021    SALPINGECTOMY    HX GYN  2021    ENDOMETRIAL ABLATION    HX HEENT      T&A    HX OTHER SURGICAL      2 TEETH REMOVED     HX TONSIL AND ADENOIDECTOMY  1992    HX TYMPANOSTOMY Bilateral     MULTIPLE    HX TYMPANOSTOMY      HX WISDOM TEETH EXTRACTION      [place] CARDIAC SURG PROCEDURE UNLIST  2019    CABG X 1, CARDIAC CATH STENT X 3       Expanded or extensive additional review of patient history:     Home Situation  Home Environment: Private residence  # Steps to Enter: 3  Rails to Enter: Yes  Hand Rails : Right  One/Two Story Residence: One story  Living Alone: No  Support Systems: Spouse/Significant Other,Other Family Member(s)  Patient Expects to be Discharged to[de-identified] Home  Current DME Used/Available at Home: Grab bars,Tub transfer bench,Wheelchair,Walker  Tub or Shower Type: Tub/Shower combination      EXAMINATION OF PERFORMANCE DEFICITS:  Cognitive/Behavioral Status:  Neurologic State: Alert  Orientation Level: Oriented X4  Cognition: Appropriate decision making; Appropriate safety awareness; Follows commands             Hearing: Auditory  Auditory Impairment: None    Range of Motion:  AROM: Generally decreased, functional                         Strength:  Strength: Generally decreased, functional                Coordination:     Fine Motor Skills-Upper: Left Intact; Right Intact    Gross Motor Skills-Upper: Left Intact; Right Intact    Tone & Sensation:     Sensation: Intact                      Balance:  Sitting: Intact; Without support  Standing: Impaired; With support  Standing - Static: Fair;Occasional  Standing - Dynamic : Fair;Constant support    Functional Mobility and Transfers for ADLs:  Bed Mobility:  Rolling: Modified independent  Supine to Sit: Modified independent  Sit to Supine: Modified independent  Scooting: Modified independent    Transfers:  Sit to Stand: Contact guard assistance  Stand to Sit: Contact guard assistance  Bathroom Mobility: Contact guard assistance  Toilet Transfer : Contact guard assistance; Other (comment) (L grab bar)  Assistive Device : Walker, rolling    ADL Assessment:                                            ADL Intervention and task modifications:                           Lower Body Dressing Assistance  Socks: Independent  Leg Crossed Method Used: Yes  Position Performed: Seated edge of bed              Therapeutic Exercise:  Pt will benefit from BUE HEP to improve participation in ADLs and mobility. Plan will be initiated at next session. Functional Measure:    [facility] AM-PACTM \"6 Clicks\"                                                       Daily Activity Inpatient Short Form  How much help from another person does the patient currently need. .. Total; A Lot A Little None   1. Putting on and taking off regular lower body clothing? []  1 []  2 []  3 [x]  4   2. Bathing (including washing, rinsing, drying)? []  1 []  2 [x]  3 []  4   3. Toileting, which includes using toilet, bedpan or urinal? [] 1 []  2 [x]  3 []  4   4. Putting on and taking off regular upper body clothing? []  1 []  2 [x]  3 []  4   5. Taking care of personal grooming such as brushing teeth? []  1 []  2 [x]  3 []  4   6. Eating meals? []  1 []  2 []  3 [x]  4   © 2007, Trustees of [facility] [employer], under license to [email or web address]. All rights reserved     Score: 20/24     Interpretation of Tool:  Represents clinically-significant functional categories (i.e. Activities of daily living).   Percentage of Impairment CH    0%   CI    1-19% CJ    20-39% CK    40-59% CL    60-79% CM    80-99% CN     100%   [facility]  Score 6-24 24 23 20-22 15-19 10-14 7-9 6         Occupational Therapy Evaluation Charge Determination   History Examination Decision-Making   LOW Complexity : Brief history review  LOW Complexity : 1-3 performance deficits relating to physical, cognitive , or psychosocial skils that result in activity limitations and / or participation restrictions  MEDIUM Complexity : Patient may present with comorbidities that affect occupational performnce. Miniml to moderate modification of tasks or assistance (eg, physical or verbal ) with assesment(s) is necessary to enable patient to complete evaluation       Based on the above components, the patient evaluation is determined to be of the following complexity level: LOW   Pain Rating:  Report slight chest pain; did not rate    Activity Tolerance:   Fair    After treatment patient left in no apparent distress:    Supine in bed, Call bell within reach and Side rails x 3    COMMUNICATION/EDUCATION:   The patients plan of care was discussed with: Registered nurse. Patient/family have participated as able in goal setting and plan of care. and Patient/family agree to work toward stated goals and plan of care. This patients plan of care is appropriate for delegation to [facility]. Thank you for this referral.  Severo Freed, OT  Time Calculation: 23 mins   Problem: Self Care Deficits Care Plan (Adult)  Goal: *Acute Goals and Plan of Care (Insert Text)  Description: Pt stated goal \"I want to go home\".      Pt will be IND sup <>sit in prep for EOB ADLs  Pt will be Mod I grooming standing at sinktop LRAD  Pt will be IND LE dressing sitting EOB/long sit  Pt will be Mod I sit <>  prep for toileting LRAD  Pt will be Mod I toileting/toilet transfer/cloth mgmt LRAD  Pt will be IND following UE HEP in prep for self care tasks   Outcome: Not Met

## 2022-06-27 NOTE — PROGRESS NOTES
Problem: Falls - Risk of  Goal: *Absence of Falls  Description: Document Nelda Stewart Fall Risk and appropriate interventions in the flowsheet.   Outcome: Progressing Towards Goal  Note: Fall Risk Interventions:  Mobility Interventions: Utilize walker, cane, or other assistive device      Medication Interventions: Patient to call before getting OOB       History of Falls Interventions: Bed/chair exit alarm     Problem: Patient Education: Go to Patient Education Activity  Goal: Patient/Family Education  Outcome: Progressing Towards Goal

## 2022-06-27 NOTE — PROGRESS NOTES
[facility] Hospitalist Progress Note  David Duckworth MD    Date:6/27/2022       Room:[facility]  Patient Dillon Arevalo     YOB: 1985     Age:36 y.o.    37F with extensive cardiovascular history, diabetes, CAD, hypertension, and a has a history of a cardiac arrest in 2018 after a CABG. She has a history of stroke with residual lower extremity weakness, left greater than right. She uses a walker to ambulate. [facility].     6/2/22 admitted to [facility] for outpatient procedure \"RIGHT TYMPANOPLASTY,GRAFT EAR CARTILAGE, RIGHT MYRINGOTOMY WITH T-TUBE, PLACEMENT OF FACIAL NERVE MONITORING ELECTRODES\" by ENT .  Postop when patient in PACU, she woke up with complaining of midsternal chest pain, 9/10, pressure/sharp kind radiated to left arm and left jaw.  Not related to inspiration, said that this was similar to her previous chest pain but this time is much worse.  She was taking aspirin and Brilinta chronically, she did hold her Brilinta this morning because of the procedure. Patient was monitored, troponin was negative.  Cardiology evaluated and did not do any further follow-up.  Patient does have chronic chest pain. Discharged home 6/3/22.     6/26/22 presents to [facility] with an episode of syncope and worsening of her chest pains. She reports substernal chest pain that awakened her this morning from sleep, associated with lightheadedness. She went to the toilet and it was there she had a syncopal episode with impact on the back of her head. During the ED course underwent CT, was given dilaudid for management of her chest pains. Initial troponin negative. I have been asked to admit her to hospital for further stabilization and workup of her condition. 6/27/22 reports doing well overnight but this morning reports dizziness, nausea, and vomiting upon standing up. Cardiology to see    Subjective    Subjective:  Symptoms:  Stable.        Review of Systems   All other systems reviewed and are negative. Objective         Vitals Last 24 Hours:  TEMPERATURE:  Temp  Av.8 °F (36.6 °C)  Min: 97.6 °F (36.4 °C)  Max: 98.1 °F (36.7 °C)  RESPIRATIONS RANGE: Resp  Av.4  Min: 14  Max: 27  PULSE OXIMETRY RANGE: SpO2  Av.8 %  Min: 97 %  Max: 100 %  PULSE RANGE: Pulse  Av.6  Min: 68  Max: 85  BLOOD PRESSURE RANGE: Systolic (84VIE), RGECIA:233 , Min:100 , ANAND:202   ; Diastolic (91GRH), GXY:63, Min:69, Max:99    I/O (24Hr): No intake or output data in the 24 hours ending 22 1235  Objective:  General Appearance:  Comfortable. Vital signs: (most recent): Blood pressure (!) 131/91, pulse 71, temperature 97.9 °F (36.6 °C), resp. rate 20, height 5' 4.17\" (1.63 m), weight 101.6 kg (223 lb 15.8 oz), SpO2 98 %, not currently breastfeeding. Vital signs are normal.    HEENT: Normal HEENT exam.    Lungs:  Normal effort. Heart: Normal rate. Regular rhythm. Abdomen: Abdomen is soft. Bowel sounds are normal.   There is no abdominal tenderness. Extremities: Normal range of motion. Pulses: Distal pulses are intact. Neurological: Patient is alert and oriented to person, place and time. Pupils:  Pupils are equal, round, and reactive to light. Skin:  Warm and dry. Labs/Imaging/Diagnostics    Labs:  CBC:Recent Labs     22  0425 22  1008   WBC 9.1 14.2*   RBC 4.22 5.20   HGB 12.9 16.1*   HCT 40.5 46.6   MCV 96.0 89.6   RDW 12.9 12.7    502*     CHEMISTRIES:  Recent Labs     22  0425 22  1008   * 131*   K 4.1 Hemolyzed, recollect requested    101   CO2 23 21   BUN 23* 22*   CA 8.7 9.8   MG 2.1  --    PT/INR:No results for input(s): INR, INREXT in the last 72 hours. No lab exists for component: PROTIME  APTT:No results for input(s): APTT in the last 72 hours.   LIVER PROFILE:  Recent Labs     22  0425 22  1008   AST 35 Hemolyzed, recollect requested   ALT 52 27     Lab Results   Component Value Date/Time ALT (SGPT) 52 06/27/2022 04:25 AM    AST (SGOT) 35 06/27/2022 04:25 AM    Alk. phosphatase 130 (H) 06/27/2022 04:25 AM    Bilirubin, direct 0.1 11/04/2010 09:30 AM    Bilirubin, total 0.5 06/27/2022 04:25 AM       Imaging Last 24 Hours:  No results found. Assessment//Plan   Active Problems:    ACS (acute coronary syndrome) ([address] (6/26/2022)      CT Results  (Last 48 hours)               06/26/22 1121  CT HEAD WO CONT Final result    Impression:      Comparison studies:   6/22/2017-CT   5/25/2017-MRI   Ventricles normal size and shape. No midline shift or brain herniation or   hydrocephalus. Negative for intracranial hemorrhage or acute infarction or tumor or extra-axial   fluid collection. There are a few subtle low-density foci in the periventricular and deep white   matter in the frontal regions slightly more prominent than prior studies. Etiology not determined. Repeat MRI recommended. Brainstem and cerebellum and pituitary gland normal.   No bony lesion or fracture. Sinuses and mastoids clear. Orbits unremarkable. Narrative:  CT brain scan unenhanced. Assessment & Plan   36F with extensive cardiovascular history, diabetes, CAD, hypertension, and a has a history of a cardiac arrest in 2018 after a CABG. She has a history of stroke with residual lower extremity weakness, left greater than right. She uses a walker to ambulate.  [facility]     6/2/22 admitted to [facility] for outpatient procedure \"RIGHT TYMPANOPLASTY,GRAFT EAR CARTILAGE, RIGHT MYRINGOTOMY WITH T-TUBE, PLACEMENT OF FACIAL NERVE MONITORING ELECTRODES\" by ENT .  Postop when patient in PACU, she woke up with complaining of midsternal chest pain, 9/10, pressure/sharp kind radiated to left arm and left jaw.  Not related to inspiration, said that this was similar to her previous chest pain but this time is much worse.  She was taking aspirin and Brilinta chronically, she did hold her Brilinta this morning because of the procedure. Patient was monitored, troponin was negative.  Cardiology evaluated and did not do any further follow-up.  Patient does have chronic chest pain. Discharged home 6/3/22.     6/26/22 presents to [facility] with an episode of syncope and worsening of her chest pains. She reports substernal chest pain that awakened her this morning from sleep, associated with lightheadedness. She went to the toilet and it was there she had a syncopal episode with impact on the back of her head. During the ED course underwent CT, was given dilaudid for management of her chest pains. Initial troponin negative. I have been asked to admit her to hospital for further stabilization and workup of her condition. 6/27/22 reports doing well overnight but this morning reports dizziness, nausea, and vomiting upon standing up. Cardiology to see    ASSESSMENT AND RECOMMENDATIONS    1) Chest pain with syncope in a patient with extensive cardiovascular history, CABG, coronary stents, prior cardiac arrest.                 Admit to hospital              Telemetry monitoring              Neuro checks              Pain control with dilaudid              Continue home medications with adjustments as needed per cardiology                          Aspirin                          Atorvastatin                          Carvedilol                          Lisinopril                          Ranexa                          Ticagrelor     2) Diabetes Mellitus on high dose insulin regimen.  At home she uses Levemir 30u BID and Aspart 10-15u ACHS                 Lantus 30 units morning and evening              Sliding scale lispro ACHS     3) Hypothyroidism                 Levothyroxine     4) DVT prophylaxis with enoxaparin      Electronically signed by Aspen Condon MD on 6/27/2022 at 12:35 PM

## 2022-06-27 NOTE — CONSULTS
CONSULTATION    REASON FOR CONSULT:  Chest pain    REQUESTING PROVIDER:  Dr. Tex Chapin:    Chief Complaint   Patient presents with    Syncope    Chest Pain         HISTORY OF PRESENT ILLNESS:  Chacorta Green is a 39y.o. year-old female with past medical history significant for coronary artery disease s/p multiple PCI and 1-V CABG, hypertension, hypothyroidism, and type 2 diabetes mellitus who was admitted to the hospital for further evaluation of syncope and chest pain. Patient was recently admitted to [facility] on 06/02/22 for an elective procedure and discharged on 6/3/22. Patient was last seen in the office on 06/08/22 as a hospital follow-up. On examination, the patient is awake and alert. Does not appear to be in any acute distress. She denies active chest pain at this time. She describes chest pain associated with lightheadedness that woke her from sleep on 6/26/22. She then proceeded to the bathroom where she had a syncopal episode. CT of the head was negative for acute bleed. HS troponin are negative x 3. Pro-. EKG: NSR: HR 73, ST changes. Records from hospital admission course thus far reviewed. Telemetry Review: No acute events noted since admission. Remains in NSR: HR 70s. PAST MEDICAL HISTORY:    Past Medical History:   Diagnosis Date    Abscess     Anemia     CAD (coronary artery disease) 2019    CABG X1    Chronic pain     LEFT SCIATIC, STERNAL WOUND    Complicated migraine     with extremity numbness    H/O transfusion of packed red blood cells 07/2021    Heart attack ([address] 11/2019    Hx MRSA infection 2009    Hypertension     onset 2009    Hypothyroidism     Irregular menstrual cycle     Nausea & vomiting     Obesity     Stroke ([address] 2019    LOWER BODY WEAKNESS    T2DM (type 2 diabetes mellitus) ([address]     onset 2009 IDDM    Thromboembolus ([address] 2009    RIGHT LEG - HOSP.  [place]       PAST SURGICAL HISTORY:   Past Surgical History: Procedure Laterality Date    HX CHOLECYSTECTOMY      HX CORONARY ARTERY BYPASS GRAFT  12/03/2019    HX GYN Bilateral 2021    SALPINGECTOMY    HX GYN  2021    ENDOMETRIAL ABLATION    HX HEENT      T&A    HX OTHER SURGICAL      2 TEETH REMOVED     HX TONSIL AND ADENOIDECTOMY  1992    HX TYMPANOSTOMY Bilateral     MULTIPLE    HX TYMPANOSTOMY      HX WISDOM TEETH EXTRACTION      [place] CARDIAC SURG PROCEDURE UNLIST  2019    CABG X 1, CARDIAC CATH STENT X 3       ALLERGIES:    Allergies   Allergen Reactions    Other Food Swelling     JALIPENO PEPPERS - FACILA SWELLING    Ciprofloxacin Anaphylaxis    Bumetanide Other (comments)     Hearing loss    Compazine [Prochlorperazine] Anxiety     HALLUCINATIONS    Pcn [Penicillins] Rash     OCCURRED IN INFANCY NO REPORTED SEVERE IgE MEDIATED REACTIONS  Patient screened for any delayed non-IgE-mediated reaction to PCN.         Patient notes the following:    No delayed non-IgE-mediated reaction to PCN          Tree Pollen-Shag[place] Rash     HICKORY SMOKE FLAVORING    Vancomycin Other (comments)     Kidneys shut down    Voltaren [Diclofenac Sodium] Myalgia and Other (comments)     \"muscle spasms\", LEG SPASMS         FAMILY HISTORY:    Family History   Problem Relation Age of Onset    Hypertension Other         \"all her family\"    Diabetes Other         \"all her family\"    Cancer Mother         cervical    Stroke Mother     Heart Disease Mother    Betsy Goodell Bladder Disease Mother     Diabetes Mother     Hypertension Mother     Elevated Lipids Mother     Stroke Father     Heart Disease Father     Diabetes Father     Heart Attack Father     Elevated Lipids Father     Kidney Disease Father         ESRD    Gall Bladder Disease Brother     Diabetes Brother     Kidney Disease Brother     Cancer Brother         KIDNEY    Hypertension Brother     Migraines Maternal Aunt     Diabetes Paternal Aunt     Stroke Maternal Grandmother     Diabetes Maternal Grandmother     Cancer Maternal Grandmother         cervical    Heart Attack Maternal Grandmother     Elevated Lipids Maternal Grandmother     Stroke Maternal Grandfather     Diabetes Maternal Grandfather     Elevated Lipids Maternal Grandfather     Cancer Maternal Grandfather     Heart Attack Maternal Grandfather     Anesth Problems Neg Hx        SOCIAL HISTORY:    Tobacco: non-smoker  Drugs: not documented  ETOH: not documented    HOME MEDICATIONS:    Prior to Admission Medications   Prescriptions Last Dose Informant Patient Reported? Taking? Cetirizine (ZyrTEC) 10 mg cap   Yes Yes   Sig: Take 10 mg by mouth daily. Insulin Needles, Disposable, 32 gauge x \" ndle   No No   Sig: Use to inject insulin BID Dx Code: E11.65   aspirin delayed-release 81 mg tablet   No Yes   Sig: Take 1 Tab by mouth daily. atorvastatin (LIPITOR) 20 mg tablet   Yes Yes   Sig: Take 20 mg by mouth nightly. bisacodyL (Dulcolax, bisacodyl,) 5 mg EC tablet   Yes Yes   Sig: Take 5 mg by mouth daily as needed for Constipation. blood-glucose sensor (DEXCOM G6 SENSOR)   Yes No   Sig: by Does Not Apply route. carvedilol (COREG) 6.25 mg tablet   No Yes   Sig: Take 2 Tabs by mouth two (2) times daily (with meals). Patient taking differently: Take 12.5 mg by mouth two (2) times a day. dulaglutide (Trulicity) 1.5 AE/3.6 mL sub-q pen   No Yes   Si.5 mL by SubCUTAneous route every seven (7) days. Stop 0.75 mg   Patient taking differently: 1.5 mg by SubCUTAneous route every seven (7) days.    empagliflozin (Jardiance) 10 mg tablet   Yes Yes   Sig: Take 10 mg by mouth daily. insulin aspart U-100 (NovoLOG U-100 Insulin aspart) 100 unit/mL injection   No Yes   Sig: Inject 10- 15 units before breakfast, 10- 15 units before lunch and 10 - 15 units before dinner.    Patient taking differently: 10 units with every meal   insulin detemir U-100 (LEVEMIR FLEXTOUCH) 100 unit/mL (3 mL) inpn   No Yes   Sig: Inject 30 units in AM and 30  units at bed time   insulin syringe,safetyneedle 0.5 mL 31 gauge x 15/64\" syrg   No No   Si Syringe by Does Not Apply route three (3) times daily. Dx Code: E11.65   levothyroxine (Synthroid) 137 mcg tablet   Yes Yes   Sig: Take 137 mcg by mouth Daily (before breakfast). lisinopriL (PRINIVIL, ZESTRIL) 2.5 mg tablet   Yes Yes   Sig: Take 2.5 mg by mouth daily. methocarbamoL (ROBAXIN) 750 mg tablet   Yes Yes   Sig: Take 750 mg by mouth two (2) times a day. nitroglycerin (NITROSTAT) 0.4 mg SL tablet   Yes Yes   Si.4 mg by SubLINGual route every five (5) minutes as needed for Chest Pain. Up to 3 doses. ondansetron hcl (Zofran) 4 mg tablet Not Taking at Unknown time  No No   Sig: Take 1 Tablet by mouth every eight (8) hours as needed for Nausea for up to 21 doses. Patient not taking: Reported on 2022   ondansetron hcl (Zofran) 4 mg tablet Not Taking at Unknown time  No No   Sig: Take 1 Tablet by mouth every eight (8) hours as needed for Nausea for up to 21 doses. Patient not taking: Reported on 2022   ranolazine ER (Ranexa) 1,000 mg   Yes Yes   Sig: Take 500 mg by mouth two (2) times a day. senna (Senna) 8.6 mg tablet   Yes Yes   Sig: Take 1 Tablet by mouth as needed for Constipation. ticagrelor (Brilinta) 90 mg tablet   Yes Yes   Sig: Take 90 mg by mouth two (2) times a day. Facility-Administered Medications: None       REVIEW OF SYSTEMS:  Complete review of systems performed, pertinents noted above, all other systems are negative.     Patient Vitals for the past 24 hrs:   Temp Pulse Resp BP SpO2   22 1500 97.9 °F (36.6 °C) 84 20 118/80 (!) 85 %   22 1113 97.9 °F (36.6 °C) 71 20 (!) 131/91 98 %   22 1023 -- -- -- (!) 133/98 --   22 0746 98.1 °F (36.7 °C) 77 20 -- 99 %   22 031 -- -- -- (!) 133/98 --   22 0310 97.6 °F (36.4 °C) 80 20 (!) 139/99 97 %   227 -- -- -- -- 97 %   22 97.9 °F (36.6 °C) 81 18 119/78 98 % 06/26/22 2012 97.7 °F (36.5 °C) 68 18 116/73 97 %   06/26/22 1900 -- 85 19 101/73 --   06/26/22 1830 -- 82 27 105/73 --   06/26/22 1817 -- 79 14 106/81 98 %   06/26/22 1721 -- 76 17 112/79 97 %   06/26/22 1630 -- 75 19 106/69 97 %       PHYSICAL EXAMINATION:    General: Well nourished well developed, NAD, A&O  HEENT: Normocephalic  Neck: Supple, Trachea midline, No JVD  RESP: CTA bilaterally. + Symmetrical chest movement. No SOB or distress. On RA  Cardiovascular: RRR no MRG  PVS: No rubor, cyanosis, no edema  ABD: soft, NT, Normoactive BS  Derm: Warm/Dry/Intact with no lesions,   Neuro: A&O PPTS,  No focal deficits  PSYCH: No anxiety or agitation    Recent labs results and imaging reviewed. Recent Results (from the past 24 hour(s))   GLUCOSE, POC    Collection Time: 06/26/22  9:46 PM   Result Value Ref Range    Glucose (POC) 204 (H) 65 - 117 mg/dL    Performed by Eleazar Nevarez    TROPONIN-HIGH SENSITIVITY    Collection Time: 06/26/22 11:46 PM   Result Value Ref Range    Troponin-High Sensitivity 7 0 - 51 ng/L   CK    Collection Time: 06/26/22 11:46 PM   Result Value Ref Range    CK 49 26 - 048 U/L   METABOLIC PANEL, COMPREHENSIVE    Collection Time: 06/27/22  4:25 AM   Result Value Ref Range    Sodium 135 (L) 136 - 145 mmol/L    Potassium 4.1 3.5 - 5.1 mmol/L    Chloride 103 97 - 108 mmol/L    CO2 23 21 - 32 mmol/L    Anion gap 9 5 - 15 mmol/L    Glucose 178 (H) 65 - 100 mg/dL    BUN 23 (H) 6 - 20 mg/dL    Creatinine 1.21 (H) 0.55 - 1.02 mg/dL    BUN/Creatinine ratio 19 12 - 20      GFR est AA >60 >60 ml/min/1.73m2    GFR est non-AA 50 (L) >60 ml/min/1.73m2    Calcium 8.7 8.5 - 10.1 mg/dL    Bilirubin, total 0.5 0.2 - 1.0 mg/dL    AST (SGOT) 35 15 - 37 U/L    ALT (SGPT) 52 12 - 78 U/L    Alk.  phosphatase 130 (H) 45 - 117 U/L    Protein, total 6.7 6.4 - 8.2 g/dL    Albumin 3.0 (L) 3.5 - 5.0 g/dL    Globulin 3.7 2.0 - 4.0 g/dL    A-G Ratio 0.8 (L) 1.1 - 2.2     MAGNESIUM    Collection Time: 06/27/22  4:25 AM Result Value Ref Range    Magnesium 2.1 1.6 - 2.4 mg/dL   CBC W/O DIFF    Collection Time: 06/27/22  4:25 AM   Result Value Ref Range    WBC 9.1 3.6 - 11.0 K/uL    RBC 4.22 3.80 - 5.20 M/uL    HGB 12.9 11.5 - 16.0 g/dL    HCT 40.5 35.0 - 47.0 %    MCV 96.0 80.0 - 99.0 FL    MCH 30.6 26.0 - 34.0 PG    MCHC 31.9 30.0 - 36.5 g/dL    RDW 12.9 11.5 - 14.5 %    PLATELET 603 052 - 207 K/uL    MPV 10.1 8.9 - 12.9 FL    NRBC 0.0 0.0  WBC    ABSOLUTE NRBC 0.00 0.00 - 0.01 K/uL   TROPONIN-HIGH SENSITIVITY    Collection Time: 06/27/22  4:25 AM   Result Value Ref Range    Troponin-High Sensitivity 6 0 - 51 ng/L   CK    Collection Time: 06/27/22  4:25 AM   Result Value Ref Range    CK 51 26 - 192 U/L   LIPID PANEL    Collection Time: 06/27/22  5:14 AM   Result Value Ref Range    LIPID PROFILE        Cholesterol, total 88 <200 mg/dL    Triglyceride 231 (H) <150 mg/dL    HDL Cholesterol 38 mg/dL    LDL, calculated 3.8 0 - 100 mg/dL    VLDL, calculated 46.2 mg/dL    CHOL/HDL Ratio 2.3 0.0 - 5.0     GLUCOSE, POC    Collection Time: 06/27/22  7:53 AM   Result Value Ref Range    Glucose (POC) 202 (H) 65 - 117 mg/dL    Performed by Kinjal Delaney    ECHO ADULT COMPLETE    Collection Time: 06/27/22 10:50 AM   Result Value Ref Range    AV Peak Velocity 1.0 m/s    AV Peak Gradient 4 mmHg    Aortic Root 3.0 cm    IVSd 1.1 (A) 0.6 - 0.9 cm    LVIDd 4.9 3.9 - 5.3 cm    LVIDs 3.0 cm    LVOT Peak Velocity 0.7 m/s    LVOT Peak Gradient 2 mmHg    LVPWd 1.0 (A) 0.6 - 0.9 cm    LV E' Lateral Velocity 8 cm/s    LV E' Septal Velocity 6 cm/s    LA Diameter 3.7 cm    MV E Wave Deceleration Time 201.0 ms    MV A Velocity 0.65 m/s    MV E Velocity 0.94 m/s    CA Max Velocity 0.8 m/s    Pulmonary Artery EDP 2 mmHg    PV Max Velocity 0.8 m/s    PV Peak Gradient 3 mmHg    Est. RA Pressure 3 mmHg    RVIDd 3.3 cm    TR Max Velocity 1.96 m/s    TR Peak Gradient 15 mmHg    Fractional Shortening 2D 39 28 - 44 %    LVIDd Index 2.38 cm/m2    LVIDs Index 1.46 cm/m2    LV RWT Ratio 0.41     LV Mass 2D 188.1 (A) 67 - 162 g    LV Mass 2D Index 91.3 43 - 95 g/m2    MV E/A 1.45     E/E' Ratio (Averaged) 13.71     E/E' Lateral 11.75     E/E' Septal 15.67     LA Size Index 1.80 cm/m2    LA/AO Root Ratio 1.23     Ao Root Index 1.46 cm/m2    AV Velocity Ratio 0.70     RVSP 18 mmHg    EF BP 69 55 - 100 %   GLUCOSE, POC    Collection Time: 06/27/22 11:17 AM   Result Value Ref Range    Glucose (POC) 172 (H) 65 - 117 mg/dL    Performed by Cindy Lopez        XR Results (maximum last 3): Results from Hospital Encounter encounter on 06/26/22    XR CHEST PORT    Impression  No acute cardiopulmonary process. Results from Hospital Encounter encounter on 06/02/22    XR CHEST PORT    Impression  No acute process identified. Results from [place] encounter on 05/10/21    XR CHEST PORT    Impression  No acute cardiopulmonary process. CT Results (maximum last 3): Results from [place] encounter on 06/26/22    CT HEAD WO CONT    Impression  Comparison studies:  6/22/2017-CT  5/25/2017-MRI  Ventricles normal size and shape. No midline shift or brain herniation or  hydrocephalus. Negative for intracranial hemorrhage or acute infarction or tumor or extra-axial  fluid collection. There are a few subtle low-density foci in the periventricular and deep white  matter in the frontal regions slightly more prominent than prior studies. Etiology not determined. Repeat MRI recommended. Brainstem and cerebellum and pituitary gland normal.  No bony lesion or fracture. Sinuses and mastoids clear. Orbits unremarkable. Results from [place] encounter on 06/22/17    CT HEAD WO CONT    Impression  IMPRESSION:    There is no acute intracranial abnormality. Results from [place] encounter on 05/25/17    CT CODE NEURO HEAD WO CONTRAST    Impression  IMPRESSION:    There is no acute intracranial abnormality.       MRI Results (maximum last 3):  Results from Hospital Encounter encounter on 05/25/17    MRI BRAIN WO CONT    Impression  IMPRESSION:  No acute process. Nuclear Medicine Results (maximum last 3): No results found for this or any previous visit. US Results (maximum last 3): Results from [place] encounter on 02/20/17    US PELV NON OBS    Impression  IMPRESSION:  1.  1.6 cm x 1.1 cm x 1.7 cm fundal fibroid. 2. Nabothian cysts. US TRANSVAGINAL    Impression  IMPRESSION:  1.  1.6 cm x 1.1 cm x 1.7 cm fundal fibroid. 2. Nabothian cysts. VAS/US Results (maximum last 3): No results found for this or any previous visit. Current Facility-Administered Medications:     carvediloL (COREG) tablet 25 mg, 25 mg, Oral, BID WITH MEALS, Susy Cooler, NP    sodium chloride (NS) flush 5-40 mL, 5-40 mL, IntraVENous, Q8H, Tressa Huang MD, 10 mL at 06/27/22 0513    sodium chloride (NS) flush 5-40 mL, 5-40 mL, IntraVENous, PRN, Kavitha Mcclain MD    acetaminophen (TYLENOL) tablet 650 mg, 650 mg, Oral, Q6H PRN, 650 mg at 06/27/22 0856 **OR** acetaminophen (TYLENOL) suppository 650 mg, 650 mg, Rectal, Q6H PRN, David Huang MD    polyethylene glycol [facility]) packet 17 g, 17 g, Oral, DAILY PRN, David Huang MD    ondansetron (ZOFRAN ODT) tablet 4 mg, 4 mg, Oral, Q8H PRN **OR** ondansetron (ZOFRAN) injection 4 mg, 4 mg, IntraVENous, Q6H PRN, Kavitha Mcclain MD, 4 mg at 06/27/22 0859    enoxaparin (LOVENOX) injection 40 mg, 40 mg, SubCUTAneous, DAILY, Kavitha Mcclain MD, 40 mg at 06/27/22 0858    HYDROmorphone (DILAUDID) syringe 0.5 mg, 0.5 mg, IntraVENous, Q4H PRN, Kavitha Mcclain MD, 0.5 mg at 06/27/22 1546    aspirin delayed-release tablet 81 mg, 81 mg, Oral, DAILY, Kavitha Mcclain MD, 81 mg at 06/27/22 0855    atorvastatin (LIPITOR) tablet 20 mg, 20 mg, Oral, QHS, David Huang MD, 20 mg at 06/26/22 2203    . PHARMACY TO SUBSTITUTE PER PROTOCOL (Reordered from: dulaglutide (Trulicity) 1.5 OM/9.1 mL sub-q pen), , , Per Protocol, Festus Brownlee MD    [Held by provider] empagliflozin (JARDIANCE) tablet 10 mg, 10 mg, Oral, DAILY, David Bolden MD    insulin glargine (LANTUS) injection 30 Units, 30 Units, SubCUTAneous, BID, Festus Brownlee MD, 30 Units at 06/27/22 0859    lisinopriL (PRINIVIL, ZESTRIL) tablet 2.5 mg, 2.5 mg, Oral, DAILY, David Bolden MD, 2.5 mg at 06/27/22 3402    methocarbamoL (ROBAXIN) tablet 750 mg, 750 mg, Oral, BID, Festus Brownlee MD, 750 mg at 06/27/22 6832    ranolazine ER (RANEXA) tablet 1,000 mg, 1,000 mg, Oral, BID, Festus Brownlee MD, 1,000 mg at 06/27/22 8970    senna (SENOKOT) tablet 8.6 mg, 1 Tablet, Oral, PRN, Festus Brownlee MD    [facility]) tablet 90 mg, 90 mg, Oral, BID, Festus Brownlee MD, 90 mg at 06/27/22 0887    levothyroxine (SYNTHROID) tablet 137 mcg, 137 mcg, Oral, ACB, Festus Brownlee MD, 137 mcg at 06/27/22 0914    glucose chewable tablet 16 g, 4 Tablet, Oral, PRN, Festus Brownlee MD    dextrose 10% infusion 0-250 mL, 0-250 mL, IntraVENous, PRN, Festus Brownlee MD    glucagon [facility]) injection 1 mg, 1 mg, IntraMUSCular, PRN, Festus Brownlee MD    insulin lispro (HUMALOG) injection, , SubCUTAneous, AC&HS, Festus Brownlee MD, 8 Units at 06/27/22 1142    Case discussed with collaborating physician Dr. Parvin Lezama and our impression and recommendations are as follows:     1. Chest pain:   - no active chest pain  - HS troponin negative x 3 , EKG non-ischemic  - receiving dilaudid for CP, history of chronic CP  - continue asa, Ranexa 1000 mg BID  - echo: EEF 65-70%, normal wall motion and function. 2. Hypertension:   - blood pressure at goal  - continue to monitor, patient on dilaudid    3. Hyperlipididemia:   - continue statin therapy    4.  Syncope:   - echo noted above  - patient was scheduled for outpatient ILR, insurance would not cover  - recommend holter monitor for further evaluation in the OP setting  - orthostatic vital signs  - maintain fall precautions. Thank you for involving us in the care of this patient. Please do not hesitate to call if additional questions arise. If after hours please call [phone].

## 2022-06-27 NOTE — PROGRESS NOTES
Pt with emesis 2x today. Both times with undigested food. Follows dilaudid being given. Pt denies that this happens with dilaudid. Pt states it has just been happening today. Pt states it comes on suddenly and she feels better after emesis.

## 2022-06-28 VITALS
OXYGEN SATURATION: 99 % | HEART RATE: 76 BPM | DIASTOLIC BLOOD PRESSURE: 94 MMHG | SYSTOLIC BLOOD PRESSURE: 144 MMHG | RESPIRATION RATE: 18 BRPM | WEIGHT: 230.6 LBS | TEMPERATURE: 97.9 F | HEIGHT: 64 IN | BODY MASS INDEX: 39.37 KG/M2

## 2022-06-28 LAB
ALBUMIN SERPL-MCNC: 3.1 G/DL (ref 3.5–5)
ALBUMIN/GLOB SERPL: 0.8 {RATIO} (ref 1.1–2.2)
ALP SERPL-CCNC: 114 U/L (ref 45–117)
ALT SERPL-CCNC: 38 U/L (ref 12–78)
ANION GAP SERPL CALC-SCNC: 6 MMOL/L (ref 5–15)
AST SERPL W P-5'-P-CCNC: 21 U/L (ref 15–37)
BILIRUB SERPL-MCNC: 0.6 MG/DL (ref 0.2–1)
BUN SERPL-MCNC: 19 MG/DL (ref 6–20)
BUN/CREAT SERPL: 16 (ref 12–20)
CA-I BLD-MCNC: 8.7 MG/DL (ref 8.5–10.1)
CHLORIDE SERPL-SCNC: 101 MMOL/L (ref 97–108)
CO2 SERPL-SCNC: 22 MMOL/L (ref 21–32)
CREAT SERPL-MCNC: 1.22 MG/DL (ref 0.55–1.02)
ERYTHROCYTE [DISTWIDTH] IN BLOOD BY AUTOMATED COUNT: 12.8 % (ref 11.5–14.5)
GLOBULIN SER CALC-MCNC: 4.1 G/DL (ref 2–4)
GLUCOSE BLD STRIP.AUTO-MCNC: 152 MG/DL (ref 65–117)
GLUCOSE BLD STRIP.AUTO-MCNC: 99 MG/DL (ref 65–117)
GLUCOSE SERPL-MCNC: 153 MG/DL (ref 65–100)
HCT VFR BLD AUTO: 41.6 % (ref 35–47)
HGB BLD-MCNC: 13.9 G/DL (ref 11.5–16)
MCH RBC QN AUTO: 30.3 PG (ref 26–34)
MCHC RBC AUTO-ENTMCNC: 33.4 G/DL (ref 30–36.5)
MCV RBC AUTO: 90.6 FL (ref 80–99)
NRBC # BLD: 0 K/UL (ref 0–0.01)
NRBC BLD-RTO: 0 PER 100 WBC
PERFORMED BY, TECHID: ABNORMAL
PERFORMED BY, TECHID: NORMAL
PLATELET # BLD AUTO: 476 K/UL (ref 150–400)
PMV BLD AUTO: 9.5 FL (ref 8.9–12.9)
POTASSIUM SERPL-SCNC: 4.3 MMOL/L (ref 3.5–5.1)
PROT SERPL-MCNC: 7.2 G/DL (ref 6.4–8.2)
RBC # BLD AUTO: 4.59 M/UL (ref 3.8–5.2)
SODIUM SERPL-SCNC: 129 MMOL/L (ref 136–145)
WBC # BLD AUTO: 10.6 K/UL (ref 3.6–11)

## 2022-06-28 PROCEDURE — 82962 GLUCOSE BLOOD TEST: CPT

## 2022-06-28 PROCEDURE — 80053 COMPREHEN METABOLIC PANEL: CPT

## 2022-06-28 PROCEDURE — 74011000250 HC RX REV CODE- 250: Performed by: HOSPITALIST

## 2022-06-28 PROCEDURE — 74011250637 HC RX REV CODE- 250/637: Performed by: INTERNAL MEDICINE

## 2022-06-28 PROCEDURE — 74011250636 HC RX REV CODE- 250/636: Performed by: INTERNAL MEDICINE

## 2022-06-28 PROCEDURE — 96376 TX/PRO/DX INJ SAME DRUG ADON: CPT

## 2022-06-28 PROCEDURE — 74011250637 HC RX REV CODE- 250/637: Performed by: NURSE PRACTITIONER

## 2022-06-28 PROCEDURE — 74011636637 HC RX REV CODE- 636/637: Performed by: INTERNAL MEDICINE

## 2022-06-28 PROCEDURE — 94762 N-INVAS EAR/PLS OXIMTRY CONT: CPT

## 2022-06-28 PROCEDURE — 85027 COMPLETE CBC AUTOMATED: CPT

## 2022-06-28 PROCEDURE — 97530 THERAPEUTIC ACTIVITIES: CPT

## 2022-06-28 PROCEDURE — 97161 PT EVAL LOW COMPLEX 20 MIN: CPT

## 2022-06-28 PROCEDURE — 36415 COLL VENOUS BLD VENIPUNCTURE: CPT

## 2022-06-28 PROCEDURE — G0378 HOSPITAL OBSERVATION PER HR: HCPCS

## 2022-06-28 PROCEDURE — 96372 THER/PROPH/DIAG INJ SC/IM: CPT

## 2022-06-28 PROCEDURE — 74011250637 HC RX REV CODE- 250/637: Performed by: HOSPITALIST

## 2022-06-28 PROCEDURE — 74011000250 HC RX REV CODE- 250: Performed by: INTERNAL MEDICINE

## 2022-06-28 RX ORDER — PANTOPRAZOLE SODIUM 40 MG/1
40 TABLET, DELAYED RELEASE ORAL DAILY
Qty: 30 TABLET | Refills: 0 | Status: SHIPPED | OUTPATIENT
Start: 2022-06-28

## 2022-06-28 RX ORDER — PANTOPRAZOLE SODIUM 40 MG/1
40 TABLET, DELAYED RELEASE ORAL
Status: DISCONTINUED | OUTPATIENT
Start: 2022-06-28 | End: 2022-06-28 | Stop reason: HOSPADM

## 2022-06-28 RX ADMIN — LISINOPRIL 2.5 MG: 5 TABLET ORAL at 08:11

## 2022-06-28 RX ADMIN — LEVOTHYROXINE SODIUM 137 MCG: 0.03 TABLET ORAL at 08:11

## 2022-06-28 RX ADMIN — ONDANSETRON 4 MG: 2 INJECTION INTRAMUSCULAR; INTRAVENOUS at 03:57

## 2022-06-28 RX ADMIN — TICAGRELOR 90 MG: 90 TABLET ORAL at 08:11

## 2022-06-28 RX ADMIN — METHOCARBAMOL 750 MG: 750 TABLET ORAL at 08:11

## 2022-06-28 RX ADMIN — ASPIRIN 81 MG: 81 TABLET, COATED ORAL at 08:11

## 2022-06-28 RX ADMIN — PANTOPRAZOLE SODIUM 40 MG: 40 TABLET, DELAYED RELEASE ORAL at 12:26

## 2022-06-28 RX ADMIN — SODIUM CHLORIDE, PRESERVATIVE FREE 10 ML: 5 INJECTION INTRAVENOUS at 05:48

## 2022-06-28 RX ADMIN — HYDROMORPHONE HYDROCHLORIDE 0.5 MG: 1 INJECTION, SOLUTION INTRAMUSCULAR; INTRAVENOUS; SUBCUTANEOUS at 08:10

## 2022-06-28 RX ADMIN — HYDROMORPHONE HYDROCHLORIDE 0.5 MG: 1 INJECTION, SOLUTION INTRAMUSCULAR; INTRAVENOUS; SUBCUTANEOUS at 00:09

## 2022-06-28 RX ADMIN — CARVEDILOL 12.5 MG: 12.5 TABLET, FILM COATED ORAL at 08:11

## 2022-06-28 RX ADMIN — INSULIN LISPRO 8 UNITS: 100 INJECTION, SOLUTION INTRAVENOUS; SUBCUTANEOUS at 12:26

## 2022-06-28 RX ADMIN — RANOLAZINE 1000 MG: 500 TABLET, FILM COATED, EXTENDED RELEASE ORAL at 08:10

## 2022-06-28 RX ADMIN — LIDOCAINE HYDROCHLORIDE 30 ML: 20 SOLUTION ORAL; TOPICAL at 12:26

## 2022-06-28 RX ADMIN — INSULIN GLARGINE 30 UNITS: 100 INJECTION, SOLUTION SUBCUTANEOUS at 08:11

## 2022-06-28 RX ADMIN — HYDROMORPHONE HYDROCHLORIDE 0.5 MG: 1 INJECTION, SOLUTION INTRAMUSCULAR; INTRAVENOUS; SUBCUTANEOUS at 03:57

## 2022-06-28 RX ADMIN — ENOXAPARIN SODIUM 40 MG: 100 INJECTION SUBCUTANEOUS at 08:11

## 2022-06-28 NOTE — DISCHARGE INSTRUCTIONS
INSTRUCTIONS ON DISCHARGE    (1) please take PROTONIX 40 mg daily- for a month    (2) please follow-up with cardiology and PCP in 1 week

## 2022-06-28 NOTE — PROGRESS NOTES
PHYSICAL THERAPY EVALUATION  Patient: Jaelyn Milligan (71 y.o. female)  Date: 6/28/2022  Primary Diagnosis: ACS (acute coronary syndrome) [facility]) [I24.9]        Precautions: fall  ASSESSMENT  Pt is a 38 yo female  admitted on 6/26/2022 for syncope and worsening CP. PMH: as pe H&P patient with extensive cardiac HX with CVA ,DM,hypothyroidism. Pt A&O x 4. Please refer to flow sheet for home info. Per patient report  does all for her ,patient can walk to bathroom and in the kitchen. She is not too active at home at this time due to recent ear SX. Patient is not allowed to move her head too fast or bend over. Patient is limited in her mobilities due to meidcal limitations. Reviewed her precautions. Based on the objective data described below, the patient presents with generalized weakness, impaired functional mobility, impaired amb, impaired balance, and safety. Pt semi supine upon PT arrival, agreeable to evaluation. Pt indep for bed mobility, supine <> sit,  sit <> stand transfers. Pt amb 35 feet indep demonstrating slow gt pattern with No LOB noted. Pt did good with session today with PT. Pt  Is at baseline for mobility. Current Level of Function Impacting Discharge (mobility/balance): patient is a little off balance at baseline but that is her norm and no LOB  Other factors to consider for discharge: home with family     PLAN :  Recommendations and Planned Interventions: DC PT after eval      Frequency/Duration: Patient will be followed by physical therapy:     Recommendation for discharge: (in order for the patient to meet his/her long term goals)  Home with Family Care    This discharge recommendation:  Has been made in collaboration with the attending provider and/or case management    IF patient discharges home will need the following DME: none         SUBJECTIVE:   Patient stated I am better.     OBJECTIVE DATA SUMMARY:   HISTORY:    Past Medical History:   Diagnosis Date    Abscess     Anemia     CAD (coronary artery disease) 2019    CABG X1    Chronic pain     LEFT SCIATIC, STERNAL WOUND    Complicated migraine     with extremity numbness    H/O transfusion of packed red blood cells 07/2021    Heart attack ([facility] 11/2019    Hx MRSA infection 2009    Hypertension     onset 2009    Hypothyroidism     Irregular menstrual cycle     Nausea & vomiting     Obesity     Stroke ([facility] 2019    LOWER BODY WEAKNESS    T2DM (type 2 diabetes mellitus) ([facility]     onset 2009 IDDM    Thromboembolus ([facility] 2009    RIGHT LEG - HOSP. [place]     Past Surgical History:   Procedure Laterality Date    HX CHOLECYSTECTOMY      HX CORONARY ARTERY BYPASS GRAFT  12/03/2019    HX GYN Bilateral 2021    SALPINGECTOMY    HX GYN  2021    ENDOMETRIAL ABLATION    HX HEENT      T&A    HX OTHER SURGICAL      2 TEETH REMOVED     HX TONSIL AND ADENOIDECTOMY  1992    HX TYMPANOSTOMY Bilateral     MULTIPLE    HX TYMPANOSTOMY      HX WISDOM TEETH EXTRACTION      [place] CARDIAC SURG PROCEDURE UNLIST  2019    CABG X 1, CARDIAC CATH STENT X 3       Home Situation  Home Environment: Private residence  # Steps to Enter: 3  Rails to Enter: Yes  Hand Rails : Right  One/Two Story Residence: One story  Living Alone: No  Support Systems: Spouse/Significant Other  Patient Expects to be Discharged to[de-identified] Home  Current DME Used/Available at Home: None  Tub or Shower Type: Tub/Shower combination    EXAMINATION/PRESENTATION/DECISION MAKING:   Critical Behavior:  Neurologic State: Alert  Orientation Level: Oriented X4  Cognition: Appropriate decision making     Hearing:   Auditory  Auditory Impairment: None  Range Of Motion:  AROM: Generally decreased, functional                       Strength:    Strength: Generally decreased, functional                    Tone & Sensation:                  Sensation: Impaired               Coordination:     Vision:      Functional Mobility:  Bed Mobility:  Rolling: Independent  Supine to Sit: Independent  Sit to Supine: Independent  Scooting: Independent  Transfers:  Sit to Stand: Modified independent  Stand to Sit: Modified independent                       Balance:   Sitting: Intact; Without support  Standing: Intact; Without support  Ambulation/Gait Training:  Distance (ft): 35 Feet (ft)  Assistive Device: Other (comment) (none)  Ambulation - Level of Assistance: Independent     Gait Description (WDL): Exceptions to [facility]                                      Functional Measure:  [facility] Mobility Inpatient Short Form  How much difficulty does the patient currently have. .. Unable A Lot A Little None   1. Turning over in bed (including adjusting bedclothes, sheets and blankets)? [] 1   [] 2   [] 3   [x] 4   2. Sitting down on and standing up from a chair with arms ( e.g., wheelchair, bedside commode, etc.)   [] 1   [] 2   [] 3   [x] 4   3. Moving from lying on back to sitting on the side of the bed? [] 1   [] 2   [] 3   [x] 4          How much help from another person does the patient currently need. .. Total A Lot A Little None   4. Moving to and from a bed to a chair (including a wheelchair)? [] 1   [] 2   [] 3   [x] 4   5. Need to walk in hospital room? [] 1   [] 2   [] 3   [x] 4   6. Climbing 3-5 steps with a railing? [] 1   [] 2   [] 3   [x] 4   © 2007, Trustees of [address] [place] [address], under license to [employer]. All rights reserved     Score:  Initial: 24/24 Most Recent: X (Date: 06/28/2022 )   Interpretation of Tool:  Represents activities that are increasingly more difficult (i.e. Bed mobility, Transfers, Gait).   Score 24 23 22-20 19-15 14-10 9-7 6   Modifier CH CI CJ CK CL CM CN         Physical Therapy Evaluation Charge Determination   History Examination Presentation Decision-Making   LOW Complexity : Zero comorbidities / personal factors that will impact the outcome / POC LOW Complexity : 1-2 Standardized tests and measures addressing body structure, function, activity limitation and / or participation in recreation  LOW Complexity : Stable, uncomplicated  Other outcome measures [facility] 6        Based on the above components, the patient evaluation is determined to be of the following complexity level: LOW     Pain Ratin/10     Activity Tolerance:   Good    After treatment patient left in no apparent distress:   Supine in bed and Call bell within reach . GOALS:  No goals, patient is at baseline    COMMUNICATION/EDUCATION:   The patients plan of care was discussed with: Occupational therapist, Registered nurse and Case management. Fall prevention education was provided and the patient/caregiver indicated understanding.          Thank you for this referral.  Nacho Marion, PT   Time Calculation: 20 mins

## 2022-06-28 NOTE — DISCHARGE SUMMARY
Physician Discharge Summary     Patient ID:    aJnes Slater  [id number]  55 y.o.  1985    Admit date: 6/26/2022    Discharge date : 6/28/2022    Chronic Diagnoses:    Problem List as of 6/28/2022 Date Reviewed: 6/3/2022          Codes Class Noted - Resolved    ACS (acute coronary syndrome) ([facility] ICD-10-CM: I24.9  ICD-9-CM: 411.1  6/26/2022 - Present        Tympanic membrane perforation, right ICD-10-CM: H72.91  ICD-9-CM: 384.20  6/2/2022 - Present        Chest pain ICD-10-CM: R07.9  ICD-9-CM: 786.50  6/2/2022 - Present        Chronic left mastoiditis ICD-10-CM: H70.12  ICD-9-CM: 383.1  2/10/2022 - Present        Atherosclerosis of coronary artery ICD-10-CM: I25.10  ICD-9-CM: 414.00  3/12/2021 - Present    Overview Signed 11/12/2021  4:45 PM by Valerio Jensen LPN     Formatting of this note might be different from the original.  S/p resuscitated VF arrest due to Inferior STEMI 11/2019 2/9/2021 POBA distal Cfx    1/2021 POBA to distal Cfx into lower OM4 and LPL branches c/b non flow limiting dissection    1/2019 Primary PCI of RCA with 3 DANG c/b acute cosure             HLD (hyperlipidemia) ICD-10-CM: E78.5  ICD-9-CM: 272.4  3/12/2021 - Present        S/P CABG (coronary artery bypass graft) ICD-10-CM: Z95.1  ICD-9-CM: V45.81  3/12/2021 - Present    Overview Signed 11/12/2021  4:45 PM by Valerio Jensen LPN     Formatting of this note might be different from the original.  12/3/2019 LIMA to  LAD complicated by non healing sternal wound requiring debridement and closure with muscle flap 2/25/20             Hypertension (Chronic) ICD-10-CM: I10  ICD-9-CM: 401.9  Unknown - Present    Overview Signed 6/23/2018  2:39 PM by Sherryle Citizen, MD     onset 2009             Microalbuminuria due to type 2 diabetes mellitus ([facility] ICD-10-CM: E11.29, R80.9  ICD-9-CM: 250.40, 791.0  12/27/2017 - Present        Type 2 diabetes mellitus with nephropathy (HCC) (Chronic) ICD-10-CM: E11.21  ICD-9-CM: 250.40, 583.81  12/25/2017 - Present        Obesity, morbid ([facility] (Chronic) ICD-10-CM: E66.01  ICD-9-CM: 278.01  12/22/2017 - Present        Hypothyroidism (Chronic) ICD-10-CM: E03.9  ICD-9-CM: 244.9  Unknown - Present        RESOLVED: Type 2 diabetes mellitus without complication, without long-term current use of insulin ([facility] ICD-10-CM: E11.9  ICD-9-CM: 250.00  7/22/2018 - 7/22/2018        RESOLVED: Chest pain ICD-10-CM: R07.9  ICD-9-CM: 786.50  6/25/2018 - 7/22/2018        RESOLVED: Hypertensive urgency ICD-10-CM: I16.0  ICD-9-CM: 401.9  6/23/2018 - 6/25/2018        RESOLVED: Chest pain ICD-10-CM: R07.9  ICD-9-CM: 786.50  6/23/2018 - 6/25/2018        RESOLVED: Breast abscess ICD-10-CM: N61.1  ICD-9-CM: 611.0  9/14/2017 - 7/22/2018        RESOLVED: Hypertension ICD-10-CM: I10  ICD-9-CM: 401.9  Unknown - 9/14/2017        RESOLVED: Hx MRSA infection ICD-10-CM: Z86.14  ICD-9-CM: V12.04  Unknown - 7/22/2018        RESOLVED: Diabetes ([facility] ICD-10-CM: E11.9  ICD-9-CM: 250.00  Unknown - 9/14/2017        RESOLVED: Anemia ICD-10-CM: D64.9  ICD-9-CM: 510. 9  Unknown - 9/14/2017        RESOLVED: Lactic acidemia ICD-10-CM: E87.2  ICD-9-CM: 276.2  9/14/2017 - 7/22/2018        RESOLVED: Fever chills ICD-10-CM: R50.9  ICD-9-CM: 780.60  9/14/2017 - 7/22/2018        RESOLVED: Sinus tachycardia ICD-10-CM: R00.0  ICD-9-CM: 427.89  9/14/2017 - 6/25/2018        RESOLVED: Sepsis ([facility] ICD-10-CM: A41.9  ICD-9-CM: 038.9, 995.91  9/14/2017 - 6/25/2018        RESOLVED: Migraine (Chronic) ICD-10-CM: G40.658  ICD-9-CM: 346.90  5/25/2017 - 9/14/2017        RESOLVED: Obesity ICD-10-CM: E66.9  ICD-9-CM: 278.00  5/25/2017 - 7/22/2018        RESOLVED: HTN (hypertension) (Chronic) ICD-10-CM: I10  ICD-9-CM: 401.9  5/25/2017 - 7/22/2018          22    Final Diagnoses:   ACS (acute coronary syndrome) ([facility] [I24.9]    Reason for Hospitalization:    sycope  Chest pain  HTN  HLD    Hospital Course:     Kishore Delcid is a 39y.o. year-old female with past medical history significant for coronary artery disease s/p multiple PCI and 1-V CABG, hypertension, hypothyroidism, and type 2 diabetes mellitus who was admitted to the hospital for further evaluation of syncope and chest pain. Patient was recently admitted to [facility] on 06/02/22 for an elective procedure and discharged on 6/3/22. Patient was last seen in the office on 06/08/22 as a hospital follow-up. On examination, the patient is awake and alert. Does not appear to be in any acute distress. She denies active chest pain at this time. She describes chest pain associated with lightheadedness that woke her from sleep on 6/26/22. She then proceeded to the bathroom where she had a syncopal episode. CT of the head was negative for acute bleed. HS troponin are negative x 3. Pro-. EKG: NSR: HR 73, ST changes. Seen by cardiology-, echo: EEF 65-70%, normal wall motion and function. , plan for holter monitor as an out patient         INSTRUCTIONS ON DISCHARGE    (1) please take PROTONIX 40 mg daily- for a month    (2) please follow-up with cardiology and PCP in 1 week        Discharge Medications:   Current Discharge Medication List      CONTINUE these medications which have NOT CHANGED    Details   levothyroxine (Synthroid) 137 mcg tablet Take 137 mcg by mouth Daily (before breakfast). Cetirizine (ZyrTEC) 10 mg cap Take 10 mg by mouth daily. dulaglutide (Trulicity) 1.5 BD/0.3 mL sub-q pen 0.5 mL by SubCUTAneous route every seven (7) days.  Stop 0.75 mg  Qty: 4 Each, Refills: 3    Associated Diagnoses: Type 2 diabetes mellitus with hyperglycemia, with long-term current use of insulin ([facility])      insulin detemir U-100 (LEVEMIR FLEXTOUCH) 100 unit/mL (3 mL) inpn Inject 30 units in AM and 30  units at bed time  Qty: 60 mL, Refills: 3    Associated Diagnoses: Type 2 diabetes mellitus with hyperglycemia, with long-term current use of insulin ([facility])      insulin aspart U-100 (NovoLOG U-100 Insulin aspart) 100 unit/mL injection Inject 10- 15 units before breakfast, 10- 15 units before lunch and 10 - 15 units before dinner. Qty: 50 mL, Refills: 3    Associated Diagnoses: Type 2 diabetes mellitus with hyperglycemia, with long-term current use of insulin ([facility])      bisacodyL (Dulcolax, bisacodyl,) 5 mg EC tablet Take 5 mg by mouth daily as needed for Constipation. ticagrelor (Brilinta) 90 mg tablet Take 90 mg by mouth two (2) times a day. atorvastatin (LIPITOR) 20 mg tablet Take 20 mg by mouth nightly. nitroglycerin (NITROSTAT) 0.4 mg SL tablet 0.4 mg by SubLINGual route every five (5) minutes as needed for Chest Pain. Up to 3 doses. methocarbamoL (ROBAXIN) 750 mg tablet Take 750 mg by mouth two (2) times a day. senna (Senna) 8.6 mg tablet Take 1 Tablet by mouth as needed for Constipation. lisinopriL (PRINIVIL, ZESTRIL) 2.5 mg tablet Take 2.5 mg by mouth daily. empagliflozin (Jardiance) 10 mg tablet Take 10 mg by mouth daily. ranolazine ER (Ranexa) 1,000 mg Take 500 mg by mouth two (2) times a day. carvedilol (COREG) 6.25 mg tablet Take 2 Tabs by mouth two (2) times daily (with meals). Qty: 60 Tab, Refills: 0      aspirin delayed-release 81 mg tablet Take 1 Tab by mouth daily. Qty: 30 Tab, Refills: 3    Associated Diagnoses: Type 2 diabetes mellitus with hyperglycemia, with long-term current use of insulin ([email or web address]      ! ! ondansetron hcl (Zofran) 4 mg tablet Take 1 Tablet by mouth every eight (8) hours as needed for Nausea for up to 21 doses. Qty: 21 Tablet, Refills: 1      Insulin Needles, Disposable, 32 gauge x 5/32\" ndle Use to inject insulin BID Dx Code: E11.65  Qty: 200 Pen Needle, Refills: 3    Associated Diagnoses: Type 2 diabetes mellitus with hyperglycemia, with long-term current use of insulin ([facility])      insulin syringe,safetyneedle 0.5 mL 31 gauge x 15/64\" syrg 1 Syringe by Does Not Apply route three (3) times daily.  Dx Code: E11.65  Qty: 300 Each, Refills: 3    Associated Diagnoses: Type 2 diabetes mellitus with hyperglycemia, with long-term current use of insulin ([address]      ! ! ondansetron hcl (Zofran) 4 mg tablet Take 1 Tablet by mouth every eight (8) hours as needed for Nausea for up to 21 doses. Qty: 21 Tablet, Refills: 1      blood-glucose sensor (DEXCOM G6 SENSOR) by Does Not Apply route. !! - Potential duplicate medications found. Please discuss with provider. Follow up Care:    1. Val COREAS, DO in 1-2 weeks. Please call to set up an appointment shortly after discharge. Diet: diabetic    Disposition:  Home   Advanced Directive:   FULL x   DNR      Discharge Exam:  PHYSICAL EXAMINATION:    General: Well nourished well developed, NAD, A&O  HEENT: Normocephalic  Neck: Supple, Trachea midline, No JVD  RESP: CTA bilaterally. + Symmetrical chest movement. No SOB or distress. On RA  Cardiovascular: RRR no MRG  PVS: No rubor, cyanosis, no edema  ABD: soft, NT, Normoactive BS  Derm: Warm/Dry/Intact with no lesions,   Neuro: A&O PPTS,  No focal deficits  PSYCH: No anxiety or agitation    CONSULTATIONS: cards    Significant Diagnostic Studies:     Radiologic Studies -   Results from Hospital Encounter encounter on 06/26/22    XR CHEST PORT    Narrative  AP portable chest, 1200 hours. Comparison: 6/2/2022. Findings: Multiple cardiac monitoring leads overlie the chest. The  cardiomediastinal silhouette is within normal limits. The asim and pulmonary  vasculature are unremarkable. The lungs are well expanded without focal  consolidation, pleural effusion or pneumothorax. The osseous structures are  unremarkable. Impression  No acute cardiopulmonary process. CT Results  (Last 48 hours)               06/26/22 1121  CT HEAD WO CONT Final result    Impression:      Comparison studies:   6/22/2017-CT   5/25/2017-MRI   Ventricles normal size and shape.  No midline shift or brain herniation or hydrocephalus. Negative for intracranial hemorrhage or acute infarction or tumor or extra-axial   fluid collection. There are a few subtle low-density foci in the periventricular and deep white   matter in the frontal regions slightly more prominent than prior studies. Etiology not determined. Repeat MRI recommended. Brainstem and cerebellum and pituitary gland normal.   No bony lesion or fracture. Sinuses and mastoids clear. Orbits unremarkable. Narrative:  CT brain scan unenhanced.                      Discharge time spent 35 minutes    Signed:  Lima Brink MD  6/28/2022  10:13 AM

## 2022-06-28 NOTE — CONSULTS
CONSULTATION    REASON FOR CONSULT:  Chest pain    REQUESTING PROVIDER:  Dr. Wade Moreno:    Chief Complaint   Patient presents with    Syncope    Chest Pain         HISTORY OF PRESENT ILLNESS:  Janes Slater is a 39y.o. year-old female with past medical history significant for coronary artery disease s/p multiple PCI and 1-V CABG, hypertension, hypothyroidism, and type 2 diabetes mellitus who was admitted to the hospital for further evaluation of syncope and chest pain. Patient was recently admitted to [facility] on 06/02/22 for an elective procedure and discharged on 6/3/22. Patient was last seen in the office on 06/08/22 as a hospital follow-up. On examination, the patient is awake and alert. Does not appear to be in any acute distress. She denies active chest pain at this time. She describes chest pain associated with lightheadedness that woke her from sleep on 6/26/22. She then proceeded to the bathroom where she had a syncopal episode. CT of the head was negative for acute bleed. HS troponin are negative x 3. Pro-. EKG: NSR: HR 73, ST changes. On examination, resting comfortably in bed. No acute chest pain or shortness of breath. Records from hospital admission course thus far reviewed. Telemetry Review: No acute events noted since admission. Remains in NSR: HR 60-70s.       PAST MEDICAL HISTORY:    Past Medical History:   Diagnosis Date    Abscess     Anemia     CAD (coronary artery disease) 2019    CABG X1    Chronic pain     LEFT SCIATIC, STERNAL WOUND    Complicated migraine     with extremity numbness    H/O transfusion of packed red blood cells 07/2021    Heart attack ([address] 11/2019    Hx MRSA infection 2009    Hypertension     onset 2009    Hypothyroidism     Irregular menstrual cycle     Nausea & vomiting     Obesity     Stroke ([address] 2019    LOWER BODY WEAKNESS    T2DM (type 2 diabetes mellitus) ([address]     onset 2009 IDDM    Thromboembol[address] [facility] 2009    RIGHT LEG - HOSP. [place]       PAST SURGICAL HISTORY:   Past Surgical History:   Procedure Laterality Date    HX CHOLECYSTECTOMY      HX CORONARY ARTERY BYPASS GRAFT  12/03/2019    HX GYN Bilateral 2021    SALPINGECTOMY    HX GYN  2021    ENDOMETRIAL ABLATION    HX HEENT      T&A    HX OTHER SURGICAL      2 TEETH REMOVED     HX TONSIL AND ADENOIDECTOMY  1992    HX TYMPANOSTOMY Bilateral     MULTIPLE    HX TYMPANOSTOMY      HX WISDOM TEETH EXTRACTION      [place] CARDIAC SURG PROCEDURE UNLIST  2019    CABG X 1, CARDIAC CATH STENT X 3       ALLERGIES:    Allergies   Allergen Reactions    Other Food Swelling     JALIPENO PEPPERS - FACILA SWELLING    Ciprofloxacin Anaphylaxis    Bumetanide Other (comments)     Hearing loss    Compazine [Prochlorperazine] Anxiety     HALLUCINATIONS    Pcn [Penicillins] Rash     OCCURRED IN INFANCY NO REPORTED SEVERE IgE MEDIATED REACTIONS  Patient screened for any delayed non-IgE-mediated reaction to PCN.         Patient notes the following:    No delayed non-IgE-mediated reaction to PCN          Tree Pollen-Shag[place]ark [place] Rash     HICKORY SMOKE FLAVORING    Vancomycin Other (comments)     Kidneys shut down    Voltaren [Diclofenac Sodium] Myalgia and Other (comments)     \"muscle spasms\", LEG SPASMS         FAMILY HISTORY:    Family History   Problem Relation Age of Onset    Hypertension Other         \"all her family\"    Diabetes Other         \"all her family\"    Cancer Mother         cervical    Stroke Mother     Heart Disease Mother    [place] Bladder Disease Mother     Diabetes Mother     Hypertension Mother     Elevated Lipids Mother     Stroke Father     Heart Disease Father     Diabetes Father     Heart Attack Father     Elevated Lipids Father     Kidney Disease Father         ESRD    Gall Bladder Disease Brother     Diabetes Brother     Kidney Disease Brother     Cancer Brother         KIDNEY    Hypertension Brother     Migraines Maternal Aunt     Diabetes Paternal Aunt     Stroke Maternal Grandmother     Diabetes Maternal Grandmother     Cancer Maternal Grandmother         cervical    Heart Attack Maternal Grandmother     Elevated Lipids Maternal Grandmother     Stroke Maternal Grandfather     Diabetes Maternal Grandfather     Elevated Lipids Maternal Grandfather     Cancer Maternal Grandfather     Heart Attack Maternal Grandfather     Anesth Problems Neg Hx        SOCIAL HISTORY:    Tobacco: non-smoker  Drugs: not documented  ETOH: not documented    HOME MEDICATIONS:    Prior to Admission Medications   Prescriptions Last Dose Informant Patient Reported? Taking? Cetirizine (ZyrTEC) 10 mg cap   Yes Yes   Sig: Take 10 mg by mouth daily. Insulin Needles, Disposable, 32 gauge x \" ndle   No No   Sig: Use to inject insulin BID Dx Code: E11.65   aspirin delayed-release 81 mg tablet   No Yes   Sig: Take 1 Tab by mouth daily. atorvastatin (LIPITOR) 20 mg tablet   Yes Yes   Sig: Take 20 mg by mouth nightly. bisacodyL (Dulcolax, bisacodyl,) 5 mg EC tablet   Yes Yes   Sig: Take 5 mg by mouth daily as needed for Constipation. blood-glucose sensor (DEXCOM G6 SENSOR)   Yes No   Sig: by Does Not Apply route. carvedilol (COREG) 6.25 mg tablet   No Yes   Sig: Take 2 Tabs by mouth two (2) times daily (with meals). Patient taking differently: Take 12.5 mg by mouth two (2) times a day. dulaglutide (Trulicity) 1.5 GT/3.9 mL sub-q pen   No Yes   Si.5 mL by SubCUTAneous route every seven (7) days. Stop 0.75 mg   Patient taking differently: 1.5 mg by SubCUTAneous route every seven (7) days.    empagliflozin (Jardiance) 10 mg tablet   Yes Yes   Sig: Take 10 mg by mouth daily. insulin aspart U-100 (NovoLOG U-100 Insulin aspart) 100 unit/mL injection   No Yes   Sig: Inject 10- 15 units before breakfast, 10- 15 units before lunch and 10 - 15 units before dinner.    Patient taking differently: 10 units with every meal insulin detemir U-100 (LEVEMIR FLEXTOUCH) 100 unit/mL (3 mL) inpn   No Yes   Sig: Inject 30 units in AM and 30  units at bed time   insulin syringe,safetyneedle 0.5 mL 31 gauge x 15/64\" syrg   No No   Si Syringe by Does Not Apply route three (3) times daily. Dx Code: E11.65   levothyroxine (Synthroid) 137 mcg tablet   Yes Yes   Sig: Take 137 mcg by mouth Daily (before breakfast). lisinopriL (PRINIVIL, ZESTRIL) 2.5 mg tablet   Yes Yes   Sig: Take 2.5 mg by mouth daily. methocarbamoL (ROBAXIN) 750 mg tablet   Yes Yes   Sig: Take 750 mg by mouth two (2) times a day. nitroglycerin (NITROSTAT) 0.4 mg SL tablet   Yes Yes   Si.4 mg by SubLINGual route every five (5) minutes as needed for Chest Pain. Up to 3 doses. ondansetron hcl (Zofran) 4 mg tablet Not Taking at Unknown time  No No   Sig: Take 1 Tablet by mouth every eight (8) hours as needed for Nausea for up to 21 doses. Patient not taking: Reported on 2022   ondansetron hcl (Zofran) 4 mg tablet Not Taking at Unknown time  No No   Sig: Take 1 Tablet by mouth every eight (8) hours as needed for Nausea for up to 21 doses. Patient not taking: Reported on 2022   ranolazine ER (Ranexa) 1,000 mg   Yes Yes   Sig: Take 500 mg by mouth two (2) times a day. senna (Senna) 8.6 mg tablet   Yes Yes   Sig: Take 1 Tablet by mouth as needed for Constipation. ticagrelor (Brilinta) 90 mg tablet   Yes Yes   Sig: Take 90 mg by mouth two (2) times a day. Facility-Administered Medications: None       REVIEW OF SYSTEMS:  Complete review of systems performed, pertinents noted above, all other systems are negative.     Patient Vitals for the past 24 hrs:   Temp Pulse Resp BP SpO2   22 1114 97.9 °F (36.6 °C) 76 18 (!) 144/94 99 %   22 0718 97.9 °F (36.6 °C) 74 18 123/84 99 %   22 0400 -- 69 -- -- --   22 0056 -- -- -- -- 97 %   22 -- 76 -- -- --   22 2347 97.8 °F (36.6 °C) 76 18 114/76 97 %   22 --  -- -- --   06/27/22 1807 97.8 °F (36.6 °C) 74 20 119/85 97 %   06/27/22 1600 -- 100 -- -- --       PHYSICAL EXAMINATION:    General: Well nourished well developed, NAD, A&O  HEENT: Normocephalic  Neck: Supple, Trachea midline, No JVD  RESP: CTA bilaterally. + Symmetrical chest movement. No SOB or distress. On RA  Cardiovascular: RRR   PVS: No rubor, cyanosis, no edema  ABD: soft, NT, Normoactive BS  Derm: Warm/Dry/Intact with no lesions,   Neuro: A&O PPTS,  No focal deficits  PSYCH: No anxiety or agitation    Recent labs results and imaging reviewed. Recent Results (from the past 24 hour(s))   GLUCOSE, POC    Collection Time: 06/27/22  5:09 PM   Result Value Ref Range    Glucose (POC) 113 65 - 117 mg/dL    Performed by [id number], POC    Collection Time: 06/27/22  8:04 PM   Result Value Ref Range    Glucose (POC) 226 (H) 65 - 117 mg/dL    Performed by [id number], POC    Collection Time: 06/28/22  7:11 AM   Result Value Ref Range    Glucose (POC) 99 65 - 117 mg/dL    Performed by [facility], COMPREHENSIVE    Collection Time: 06/28/22 11:05 AM   Result Value Ref Range    Sodium 129 (L) 136 - 145 mmol/L    Potassium 4.3 3.5 - 5.1 mmol/L    Chloride 101 97 - 108 mmol/L    CO2 22 21 - 32 mmol/L    Anion gap 6 5 - 15 mmol/L    Glucose 153 (H) 65 - 100 mg/dL    BUN 19 6 - 20 mg/dL    Creatinine 1.22 (H) 0.55 - 1.02 mg/dL    BUN/Creatinine ratio 16 12 - 20      GFR est AA >60 >60 ml/min/1.73m2    GFR est non-AA 50 (L) >60 ml/min/1.73m2    Calcium 8.7 8.5 - 10.1 mg/dL    Bilirubin, total 0.6 0.2 - 1.0 mg/dL    AST (SGOT) 21 15 - 37 U/L    ALT (SGPT) 38 12 - 78 U/L    Alk.  phosphatase 114 45 - 117 U/L    Protein, total 7.2 6.4 - 8.2 g/dL    Albumin 3.1 (L) 3.5 - 5.0 g/dL    Globulin 4.1 (H) 2.0 - 4.0 g/dL    A-G Ratio 0.8 (L) 1.1 - 2.2     CBC W/O DIFF    Collection Time: 06/28/22 11:05 AM   Result Value Ref Range    WBC 10.6 3.6 - 11.0 K/uL    RBC 4.59 3.80 - 5.20 M/uL    HGB 13.9 11.5 - 16.0 g/dL    HCT 41.6 35.0 - 47.0 %    MCV 90.6 80.0 - 99.0 FL    MCH 30.3 26.0 - 34.0 PG    MCHC 33.4 30.0 - 36.5 g/dL    RDW 12.8 11.5 - 14.5 %    PLATELET 915 (H) 093 - 400 K/uL    MPV 9.5 8.9 - 12.9 FL    NRBC 0.0 0.0  WBC    ABSOLUTE NRBC 0.00 0.00 - 0.01 K/uL   GLUCOSE, POC    Collection Time: 06/28/22 11:23 AM   Result Value Ref Range    Glucose (POC) 152 (H) 65 - 117 mg/dL    Performed by Tiara Saba        XR Results (maximum last 3): Results from Hospital Encounter encounter on 06/26/22    XR CHEST PORT    Impression  No acute cardiopulmonary process. Results from Hospital Encounter encounter on 06/02/22    XR CHEST PORT    Impression  No acute process identified. Results from [place] encounter on 05/10/21    XR CHEST PORT    Impression  No acute cardiopulmonary process. CT Results (maximum last 3): Results from [place] encounter on 06/26/22    CT HEAD WO CONT    Impression  Comparison studies:  6/22/2017-CT  5/25/2017-MRI  Ventricles normal size and shape. No midline shift or brain herniation or  hydrocephalus. Negative for intracranial hemorrhage or acute infarction or tumor or extra-axial  fluid collection. There are a few subtle low-density foci in the periventricular and deep white  matter in the frontal regions slightly more prominent than prior studies. Etiology not determined. Repeat MRI recommended. Brainstem and cerebellum and pituitary gland normal.  No bony lesion or fracture. Sinuses and mastoids clear. Orbits unremarkable. Results from [place] encounter on 06/22/17    CT HEAD WO CONT    Impression  IMPRESSION:    There is no acute intracranial abnormality. Results from [place] encounter on 05/25/17    CT CODE NEURO HEAD WO CONTRAST    Impression  IMPRESSION:    There is no acute intracranial abnormality. MRI Results (maximum last 3):   Results from [place] encounter on 05/25/17    MRI BRAIN WO CONT    Impression  IMPRESSION:  No acute process. Nuclear Medicine Results (maximum last 3): No results found for this or any previous visit. US Results (maximum last 3): Results from [facility] encounter on 02[facility]17    US PELV NON OBS    Impression  IMPRESSION:  1.  1.6 cm x 1.1 cm x 1.7 cm fundal fibroid. 2. Nabothian cysts. US TRANSVAGINAL    Impression  IMPRESSION:  1.  1.6 cm x 1.1 cm x 1.7 cm fundal fibroid. 2. Nabothian cysts. VAS/US Results (maximum last 3): No results found for this or any previous visit. Current Facility-Administered Medications:     pantoprazole (PROTONIX) tablet 40 mg, 40 mg, Oral, ACBLavern Red Gauze, MD, 40 mg at 06/28/22 1226    carvediloL (COREG) tablet 12.5 mg, 12.5 mg, Oral, BID WITH MEALS, Leonardo Dyer NP, 12.5 mg at 06/28/22 2467    sodium chloride (NS) flush 5-40 mL, 5-40 mL, IntraVENous, Q8H, David Amador MD, 10 mL at 06/28/22 0548    sodium chloride (NS) flush 5-40 mL, 5-40 mL, IntraVENous, PRN, Colin Blue MD    acetaminophen (TYLENOL) tablet 650 mg, 650 mg, Oral, Q6H PRN, 650 mg at 06/27/22 0856 **OR** acetaminophen (TYLENOL) suppository 650 mg, 650 mg, Rectal, Q6H PRN, David Amador MD    polyethylene glycol [facility]) packet 17 g, 17 g, Oral, DAILY PRN, David Amador MD    ondansetron (ZOFRAN ODT) tablet 4 mg, 4 mg, Oral, Q8H PRN **OR** ondansetron (ZOFRAN) injection 4 mg, 4 mg, IntraVENous, Q6H PRN, Colin Blue MD, 4 mg at 06/28/22 0357    enoxaparin (LOVENOX) injection 40 mg, 40 mg, SubCUTAneous, DAILY, Colin Blue MD, 40 mg at 06/28/22 5345    HYDROmorphone (DILAUDID) syringe 0.5 mg, 0.5 mg, IntraVENous, Q4H PRN, Colin Blue MD, 0.5 mg at 06/28/22 0802    aspirin delayed-release tablet 81 mg, 81 mg, Oral, DAILY, Colin Blue MD, 81 mg at 06/28/22 0637    atorvastatin (LIPITOR) tablet 20 mg, 20 mg, Oral, QHS, David Amador MD, 20 mg at 06/27/22 2029    . PHARMACY TO SUBSTITUTE PER PROTOCOL (Reordered from: dulaglutide (Trulicity) 1.5 DZ/4.4 mL sub-q pen), , , Per Protocol, Tressa Badillo MD    [Held by provider] empagliflozin (JARDIANCE) tablet 10 mg, 10 mg, Oral, DAILY, David Badillo MD    insulin glargine (LANTUS) injection 30 Units, 30 Units, SubCUTAneous, BID, Michaela Chance MD, 30 Units at 06/28/22 8000    lisinopriL (PRINIVIL, ZESTRIL) tablet 2.5 mg, 2.5 mg, Oral, DAILY, David Badillo MD, 2.5 mg at 06/28/22 1326    methocarbamoL (ROBAXIN) tablet 750 mg, 750 mg, Oral, BID, Michaela Chance MD, 750 mg at 06/28/22 3340    ranolazine ER (RANEXA) tablet 1,000 mg, 1,000 mg, Oral, BID, Michaela Chance MD, 1,000 mg at 06/28/22 2510    senna (SENOKOT) tablet 8.6 mg, 1 Tablet, Oral, PRN, Michaela Chance MD    [facility]) tablet 90 mg, 90 mg, Oral, BID, Michaela Chance MD, 90 mg at 06/28/22 9499    levothyroxine (SYNTHROID) tablet 137 mcg, 137 mcg, Oral, ACB, Michaela Chance MD, 137 mcg at 06/28/22 3400    glucose chewable tablet 16 g, 4 Tablet, Oral, PRN, Michaela Chance MD    dextrose 10% infusion 0-250 mL, 0-250 mL, IntraVENous, PRN, Michaela Chance MD    glucagon [facility]) injection 1 mg, 1 mg, IntraMUSCular, PRN, Michaela Chance MD    insulin lispro (HUMALOG) injection, , SubCUTAneous, AC&HS, Michaela Chance MD, 8 Units at 06/28/22 1226    Case discussed with collaborating physician Jeronimo Martinez and our impression and recommendations are as follows:     1. Chest pain:   - no active chest pain  - HS troponin negative x 3 , EKG non-ischemic  - receiving dilaudid for CP, history of chronic CP  - continue asa, Ranexa 1000 mg BID, on colchicine 0.6 mg daily at home,  consider adding NSAID. - echo: EF 65-70%, normal wall motion and function. 2. Hypertension:   - blood pressure at goal  - continue to monitor, patient on dilaudid    3. Hyperlipididemia:   - continue statin therapy    4.  Syncope:   - echo noted above  - patient was scheduled for outpatient ILR, insurance would not cover  - recommend holter monitor for further evaluation in the OP setting  - orthostatic vital signs  - maintain fall precautions. Patient is cleared for discharge from a cardiac standpoint. Thank you for involving us in the care of this patient. Please do not hesitate to call if additional questions arise. If after hours please call [phone].

## 2022-06-28 NOTE — PROGRESS NOTES
CM reviewed chart. Patient's discharge disposition is to return home self care. CM team will continue to follow.

## 2022-08-11 ENCOUNTER — HOSPITAL ENCOUNTER (OUTPATIENT)
Age: 37
Setting detail: OBSERVATION
Discharge: HOME | End: 2022-08-12
Attending: EMERGENCY MEDICINE | Admitting: HOSPITALIST
Payer: MEDICARE

## 2022-08-11 ENCOUNTER — APPOINTMENT (OUTPATIENT)
Dept: GENERAL RADIOLOGY | Age: 37
End: 2022-08-11
Attending: EMERGENCY MEDICINE
Payer: MEDICARE

## 2022-08-11 DIAGNOSIS — R55 SYNCOPE AND COLLAPSE: ICD-10-CM

## 2022-08-11 DIAGNOSIS — R07.9 CHEST PAIN, UNSPECIFIED TYPE: Primary | ICD-10-CM

## 2022-08-11 DIAGNOSIS — E78.5 HYPERLIPIDEMIA, UNSPECIFIED HYPERLIPIDEMIA TYPE: ICD-10-CM

## 2022-08-11 LAB
ALBUMIN SERPL-MCNC: 3.8 G/DL (ref 3.5–5.2)
ALBUMIN/GLOB SERPL: 1.2 {RATIO} (ref 1.1–2.2)
ALP SERPL-CCNC: 84 U/L (ref 35–104)
ALT SERPL-CCNC: 28 U/L (ref 10–35)
ANION GAP SERPL CALC-SCNC: 12 MMOL/L (ref 5–15)
AST SERPL-CCNC: 21 U/L (ref 10–35)
BASOPHILS # BLD: 0.1 K/UL (ref 0–0.1)
BASOPHILS NFR BLD: 1 % (ref 0–1)
BILIRUB SERPL-MCNC: 0.6 MG/DL (ref 0.2–1)
BUN SERPL-MCNC: 20 MG/DL (ref 6–20)
BUN/CREAT SERPL: 21 (ref 12–20)
CALCIUM SERPL-MCNC: 9.4 MG/DL (ref 8.6–10)
CHLORIDE SERPL-SCNC: 102 MMOL/L
CO2 SERPL-SCNC: 20 MMOL/L
COMMENT, HOLDF: NORMAL
CREAT SERPL-MCNC: 0.94 MG/DL (ref 0.5–0.9)
D DIMER PPP FEU-MCNC: 0.27 MG/L FEU (ref 0–0.65)
DIFFERENTIAL METHOD BLD: ABNORMAL
EOSINOPHIL # BLD: 0.4 K/UL (ref 0–0.4)
EOSINOPHIL NFR BLD: 3 % (ref 0–7)
ERYTHROCYTE [DISTWIDTH] IN BLOOD BY AUTOMATED COUNT: 12.7 % (ref 11.5–14.5)
EST. AVERAGE GLUCOSE BLD GHB EST-MCNC: 171 MG/DL
GLOBULIN SER CALC-MCNC: 3.1 G/DL (ref 2–4)
GLUCOSE BLD STRIP.AUTO-MCNC: 131 MG/DL (ref 65–117)
GLUCOSE BLD STRIP.AUTO-MCNC: 156 MG/DL (ref 65–117)
GLUCOSE SERPL-MCNC: 209 MG/DL (ref 65–100)
HBA1C MFR BLD: 7.6 % (ref 4–5.6)
HCT VFR BLD AUTO: 40 % (ref 35–47)
HGB BLD-MCNC: 13.6 G/DL (ref 11.5–16)
IMM GRANULOCYTES # BLD AUTO: 0.1 K/UL (ref 0–0.04)
IMM GRANULOCYTES NFR BLD AUTO: 1 % (ref 0–0.5)
LYMPHOCYTES # BLD: 2.5 K/UL (ref 0.8–3.5)
LYMPHOCYTES NFR BLD: 23 % (ref 12–49)
MCH RBC QN AUTO: 30.9 PG (ref 26–34)
MCHC RBC AUTO-ENTMCNC: 34 G/DL (ref 30–36.5)
MCV RBC AUTO: 90.9 FL (ref 80–99)
MONOCYTES # BLD: 0.9 K/UL (ref 0–1)
MONOCYTES NFR BLD: 8 % (ref 5–13)
NEUTS SEG # BLD: 6.9 K/UL (ref 1.8–8)
NEUTS SEG NFR BLD: 64 % (ref 32–75)
NRBC # BLD: 0 K/UL (ref 0–0.01)
NRBC BLD-RTO: 0 PER 100 WBC
PLATELET # BLD AUTO: 401 K/UL (ref 150–400)
PMV BLD AUTO: 9.6 FL (ref 8.9–12.9)
POTASSIUM SERPL-SCNC: 4.4 MMOL/L
PROT SERPL-MCNC: 6.9 G/DL (ref 6.4–8.3)
RBC # BLD AUTO: 4.4 M/UL (ref 3.8–5.2)
SAMPLES BEING HELD,HOLD: NORMAL
SERVICE CMNT-IMP: ABNORMAL
SERVICE CMNT-IMP: ABNORMAL
SODIUM SERPL-SCNC: 134 MMOL/L
TROPONIN I BLD-MCNC: 0.11 NG/ML (ref 0–0.08)
TROPONIN-HIGH SENSITIVITY: 6 NG/L (ref 0–51)
TROPONIN-HIGH SENSITIVITY: 6 NG/L (ref 0–51)
WBC # BLD AUTO: 10.8 K/UL (ref 3.6–11)

## 2022-08-11 PROCEDURE — 96372 THER/PROPH/DIAG INJ SC/IM: CPT

## 2022-08-11 PROCEDURE — 71046 X-RAY EXAM CHEST 2 VIEWS: CPT

## 2022-08-11 PROCEDURE — 82962 GLUCOSE BLOOD TEST: CPT

## 2022-08-11 PROCEDURE — 93005 ELECTROCARDIOGRAM TRACING: CPT

## 2022-08-11 PROCEDURE — 74011000250 HC RX REV CODE- 250: Performed by: HOSPITALIST

## 2022-08-11 PROCEDURE — 74011250636 HC RX REV CODE- 250/636: Performed by: EMERGENCY MEDICINE

## 2022-08-11 PROCEDURE — G0378 HOSPITAL OBSERVATION PER HR: HCPCS

## 2022-08-11 PROCEDURE — 84484 ASSAY OF TROPONIN QUANT: CPT

## 2022-08-11 PROCEDURE — 96374 THER/PROPH/DIAG INJ IV PUSH: CPT

## 2022-08-11 PROCEDURE — 83036 HEMOGLOBIN GLYCOSYLATED A1C: CPT

## 2022-08-11 PROCEDURE — 99285 EMERGENCY DEPT VISIT HI MDM: CPT

## 2022-08-11 PROCEDURE — 80053 COMPREHEN METABOLIC PANEL: CPT

## 2022-08-11 PROCEDURE — 36415 COLL VENOUS BLD VENIPUNCTURE: CPT

## 2022-08-11 PROCEDURE — 96376 TX/PRO/DX INJ SAME DRUG ADON: CPT

## 2022-08-11 PROCEDURE — 85379 FIBRIN DEGRADATION QUANT: CPT

## 2022-08-11 PROCEDURE — 96375 TX/PRO/DX INJ NEW DRUG ADDON: CPT

## 2022-08-11 PROCEDURE — 85025 COMPLETE CBC W/AUTO DIFF WBC: CPT

## 2022-08-11 PROCEDURE — 74011636637 HC RX REV CODE- 636/637: Performed by: HOSPITALIST

## 2022-08-11 PROCEDURE — 74011250636 HC RX REV CODE- 250/636: Performed by: HOSPITALIST

## 2022-08-11 RX ORDER — ENOXAPARIN SODIUM 100 MG/ML
40 INJECTION SUBCUTANEOUS DAILY
Status: DISCONTINUED | OUTPATIENT
Start: 2022-08-12 | End: 2022-08-11 | Stop reason: DRUGHIGH

## 2022-08-11 RX ORDER — HYDRALAZINE HYDROCHLORIDE 20 MG/ML
20 INJECTION INTRAMUSCULAR; INTRAVENOUS ONCE
Status: COMPLETED | OUTPATIENT
Start: 2022-08-11 | End: 2022-08-11

## 2022-08-11 RX ORDER — GUAIFENESIN 100 MG/5ML
81 LIQUID (ML) ORAL DAILY
Status: DISCONTINUED | OUTPATIENT
Start: 2022-08-12 | End: 2022-08-12 | Stop reason: HOSPADM

## 2022-08-11 RX ORDER — MAGNESIUM SULFATE 100 %
4 CRYSTALS MISCELLANEOUS AS NEEDED
Status: DISCONTINUED | OUTPATIENT
Start: 2022-08-11 | End: 2022-08-12 | Stop reason: HOSPADM

## 2022-08-11 RX ORDER — ONDANSETRON 2 MG/ML
4 INJECTION INTRAMUSCULAR; INTRAVENOUS ONCE
Status: COMPLETED | OUTPATIENT
Start: 2022-08-11 | End: 2022-08-11

## 2022-08-11 RX ORDER — HYDRALAZINE HYDROCHLORIDE 20 MG/ML
20 INJECTION INTRAMUSCULAR; INTRAVENOUS
Status: DISCONTINUED | OUTPATIENT
Start: 2022-08-11 | End: 2022-08-12 | Stop reason: HOSPADM

## 2022-08-11 RX ORDER — NITROGLYCERIN 0.4 MG/1
0.4 TABLET SUBLINGUAL AS NEEDED
Status: DISCONTINUED | OUTPATIENT
Start: 2022-08-11 | End: 2022-08-12 | Stop reason: HOSPADM

## 2022-08-11 RX ORDER — INSULIN LISPRO 100 [IU]/ML
INJECTION, SOLUTION INTRAVENOUS; SUBCUTANEOUS
Status: DISCONTINUED | OUTPATIENT
Start: 2022-08-11 | End: 2022-08-12 | Stop reason: HOSPADM

## 2022-08-11 RX ORDER — DEXTROSE MONOHYDRATE 100 MG/ML
0-250 INJECTION, SOLUTION INTRAVENOUS AS NEEDED
Status: DISCONTINUED | OUTPATIENT
Start: 2022-08-11 | End: 2022-08-12 | Stop reason: HOSPADM

## 2022-08-11 RX ORDER — MORPHINE SULFATE 2 MG/ML
2 INJECTION, SOLUTION INTRAMUSCULAR; INTRAVENOUS
Status: COMPLETED | OUTPATIENT
Start: 2022-08-11 | End: 2022-08-11

## 2022-08-11 RX ORDER — INSULIN GLARGINE 100 [IU]/ML
15 INJECTION, SOLUTION SUBCUTANEOUS 2 TIMES DAILY
Status: DISCONTINUED | OUTPATIENT
Start: 2022-08-11 | End: 2022-08-12 | Stop reason: HOSPADM

## 2022-08-11 RX ORDER — MORPHINE SULFATE 2 MG/ML
2 INJECTION, SOLUTION INTRAMUSCULAR; INTRAVENOUS ONCE
Status: COMPLETED | OUTPATIENT
Start: 2022-08-11 | End: 2022-08-11

## 2022-08-11 RX ORDER — OXYCODONE AND ACETAMINOPHEN 5; 325 MG/1; MG/1
1 TABLET ORAL
Status: DISCONTINUED | OUTPATIENT
Start: 2022-08-11 | End: 2022-08-12 | Stop reason: HOSPADM

## 2022-08-11 RX ORDER — NALOXONE HYDROCHLORIDE 0.4 MG/ML
0.4 INJECTION, SOLUTION INTRAMUSCULAR; INTRAVENOUS; SUBCUTANEOUS AS NEEDED
Status: DISCONTINUED | OUTPATIENT
Start: 2022-08-11 | End: 2022-08-12 | Stop reason: HOSPADM

## 2022-08-11 RX ORDER — SODIUM CHLORIDE 9 MG/ML
125 INJECTION, SOLUTION INTRAVENOUS CONTINUOUS
Status: DISCONTINUED | OUTPATIENT
Start: 2022-08-11 | End: 2022-08-12 | Stop reason: HOSPADM

## 2022-08-11 RX ORDER — ENOXAPARIN SODIUM 100 MG/ML
30 INJECTION SUBCUTANEOUS EVERY 12 HOURS
Status: DISCONTINUED | OUTPATIENT
Start: 2022-08-11 | End: 2022-08-11

## 2022-08-11 RX ORDER — SODIUM CHLORIDE 0.9 % (FLUSH) 0.9 %
5-40 SYRINGE (ML) INJECTION EVERY 8 HOURS
Status: DISCONTINUED | OUTPATIENT
Start: 2022-08-11 | End: 2022-08-12 | Stop reason: HOSPADM

## 2022-08-11 RX ORDER — MORPHINE SULFATE 4 MG/ML
4 INJECTION INTRAVENOUS
Status: DISCONTINUED | OUTPATIENT
Start: 2022-08-11 | End: 2022-08-12 | Stop reason: HOSPADM

## 2022-08-11 RX ORDER — ENOXAPARIN SODIUM 100 MG/ML
40 INJECTION SUBCUTANEOUS EVERY 24 HOURS
Status: DISCONTINUED | OUTPATIENT
Start: 2022-08-11 | End: 2022-08-12 | Stop reason: HOSPADM

## 2022-08-11 RX ORDER — LISINOPRIL 5 MG/1
2.5 TABLET ORAL DAILY
Status: DISCONTINUED | OUTPATIENT
Start: 2022-08-12 | End: 2022-08-12 | Stop reason: HOSPADM

## 2022-08-11 RX ORDER — SODIUM CHLORIDE 0.9 % (FLUSH) 0.9 %
5-40 SYRINGE (ML) INJECTION AS NEEDED
Status: DISCONTINUED | OUTPATIENT
Start: 2022-08-11 | End: 2022-08-12 | Stop reason: HOSPADM

## 2022-08-11 RX ORDER — ONDANSETRON 2 MG/ML
4 INJECTION INTRAMUSCULAR; INTRAVENOUS
Status: DISCONTINUED | OUTPATIENT
Start: 2022-08-11 | End: 2022-08-12 | Stop reason: HOSPADM

## 2022-08-11 RX ORDER — CARVEDILOL 12.5 MG/1
12.5 TABLET ORAL 2 TIMES DAILY WITH MEALS
Status: DISCONTINUED | OUTPATIENT
Start: 2022-08-12 | End: 2022-08-12 | Stop reason: HOSPADM

## 2022-08-11 RX ORDER — MORPHINE SULFATE 4 MG/ML
4 INJECTION INTRAVENOUS
Status: COMPLETED | OUTPATIENT
Start: 2022-08-11 | End: 2022-08-11

## 2022-08-11 RX ADMIN — MORPHINE SULFATE 4 MG: 4 INJECTION INTRAVENOUS at 14:17

## 2022-08-11 RX ADMIN — INSULIN GLARGINE 15 UNITS: 100 INJECTION, SOLUTION SUBCUTANEOUS at 20:47

## 2022-08-11 RX ADMIN — ONDANSETRON 4 MG: 2 INJECTION INTRAMUSCULAR; INTRAVENOUS at 15:31

## 2022-08-11 RX ADMIN — ENOXAPARIN SODIUM 40 MG: 100 INJECTION SUBCUTANEOUS at 18:12

## 2022-08-11 RX ADMIN — SODIUM CHLORIDE 125 ML/HR: 9 INJECTION, SOLUTION INTRAVENOUS at 13:44

## 2022-08-11 RX ADMIN — Medication 5 ML: at 22:00

## 2022-08-11 RX ADMIN — SODIUM CHLORIDE 1000 ML: 9 INJECTION, SOLUTION INTRAVENOUS at 12:24

## 2022-08-11 RX ADMIN — ONDANSETRON 4 MG: 2 INJECTION INTRAMUSCULAR; INTRAVENOUS at 20:05

## 2022-08-11 RX ADMIN — ONDANSETRON 4 MG: 2 INJECTION INTRAMUSCULAR; INTRAVENOUS at 12:24

## 2022-08-11 RX ADMIN — MORPHINE SULFATE 2 MG: 2 INJECTION, SOLUTION INTRAMUSCULAR; INTRAVENOUS at 16:59

## 2022-08-11 RX ADMIN — HYDRALAZINE HYDROCHLORIDE 20 MG: 20 INJECTION INTRAMUSCULAR; INTRAVENOUS at 18:50

## 2022-08-11 RX ADMIN — MORPHINE SULFATE 4 MG: 4 INJECTION INTRAVENOUS at 20:47

## 2022-08-11 RX ADMIN — Medication 10 ML: at 18:13

## 2022-08-11 RX ADMIN — MORPHINE SULFATE 2 MG: 2 INJECTION, SOLUTION INTRAMUSCULAR; INTRAVENOUS at 12:24

## 2022-08-11 NOTE — H&P
[facility]  [address] [facility], [facility]  [phone]     Hospitalist Admission Note      NAME: Jasmin Diaz   :  1985   MRN:  [id number]     Date/Time:  2022     Patient PCP: Bessie Kowalski DO    Emergency Contact:    Extended Emergency Contact Information  Primary Emergency Contact: [address] Phone: [phone]  Relation: Spouse      Code: Full Code     Isolation Precautions: There are currently no Active Isolations        Subjective:     CHIEF COMPLAINT: Chest pain     HISTORY OF PRESENT ILLNESS:     Ms. Alejandro Major is a 39 y.o. female with history that includes CAD s/p MI and CABG with 3 stents, DM type II, HTN, chronic pain from presents with a sudden onset of retrosternal area chest pain described as sharp which radiates to the left arm and left side of neck. Pain started this morning,  has been severe, constant, and associated with dizziness and syncope. She reports that she woke up very dizzy which has persisted throughout the day. She reports that she went to the bathroom and on the way back she had a syncopal episode where she fell onto the ground but did not hit her head according to her  who witnessed it. She was out only for several seconds and came back. There was no seizure-like activity no incontinence of stool or bowel and no tongue biting. She was not confused afterwards. In ER she had an unremarkable initial work-up with normal labs and troponin however her glucose was elevated as she is diabetic and uses insulin at home. Her blood pressure was elevated even during the time of my visit with systolic ranging in the 376K and diastolic into the 656P which she says is much higher than her normal 120s to 130s over 70s to 80s.       Allergies   Allergen Reactions    Other Food Swelling     JALIPENO PEPPERS - FACILA SWELLING    Ciprofloxacin Anaphylaxis    Bumetanide Other (comments) Hearing loss    Compazine [Prochlorperazine] Anxiety     HALLUCINATIONS    Pcn [Penicillins] Rash     OCCURRED IN INFANCY NO REPORTED SEVERE IgE MEDIATED REACTIONS  Patient screened for any delayed non-IgE-mediated reaction to PCN. Patient notes the following:    No delayed non-IgE-mediated reaction to PCN          Tree Pollen-Shagbark [place] Rash     HICKORY SMOKE FLAVORING    Vancomycin Other (comments)     Kidneys shut down    Voltaren [Diclofenac Sodium] Myalgia and Other (comments)     \"muscle spasms\", LEG SPASMS         Prior to Admission medications    Medication Sig Start Date End Date Taking? Authorizing Provider   levothyroxine (SYNTHROID) 137 mcg tablet Take 137 mcg by mouth Daily (before breakfast). 2/1/22  Yes Provider, Historical   Cetirizine (ZyrTEC) 10 mg cap Take 10 mg by mouth daily. Yes Provider, Historical   dulaglutide (Trulicity) 1.5 IY/1.2 mL sub-q pen 0.5 mL by SubCUTAneous route every seven (7) days. Stop 0.75 mg 3/16/22  Yes Carolina Devine MD   insulin detemir U-100 (LEVEMIR FLEXTOUCH) 100 unit/mL (3 mL) inpn Inject 30 units in AM and 30  units at bed time 3/16/22  Yes Carolina Devine MD   insulin aspart U-100 (NovoLOG U-100 Insulin aspart) 100 unit/mL injection Inject 10- 15 units before breakfast, 10- 15 units before lunch and 10 - 15 units before dinner. Patient taking differently: 10 units with every meal 3/16/22  Yes Carolina Devine MD   bisacodyL (DULCOLAX) 5 mg EC tablet Take 5 mg by mouth daily as needed for Constipation. Yes Provider, Historical   ticagrelor (BRILINTA) 90 mg tablet Take 90 mg by mouth two (2) times a day. Yes Provider, Historical   atorvastatin (LIPITOR) 20 mg tablet Take 20 mg by mouth nightly. Yes Provider, Historical   nitroglycerin (NITROSTAT) 0.4 mg SL tablet 0.4 mg by SubLINGual route every five (5) minutes as needed for Chest Pain. Up to 3 doses.    Yes Provider, Historical   methocarbamoL (ROBAXIN) 750 mg tablet Take 750 mg by mouth two (2) times a day. Yes Provider, Historical   senna (SENOKOT) 8.6 mg tablet Take 1 Tablet by mouth as needed for Constipation. Yes Provider, Historical   lisinopriL (PRINIVIL, ZESTRIL) 2.5 mg tablet Take 2.5 mg by mouth daily. Yes Provider, Historical   empagliflozin (JARDIANCE) 10 mg tablet Take 10 mg by mouth daily. Yes Provider, Historical   ranolazine ER (RANEXA) 1,000 mg Take 500 mg by mouth two (2) times a day. Yes Provider, Historical   carvedilol (COREG) 6.25 mg tablet Take 2 Tabs by mouth two (2) times daily (with meals). Patient taking differently: Take 12.5 mg by mouth two (2) times a day. 7/6/18  Yes Cheyenne ANDRADE MD   aspirin delayed-release 81 mg tablet Take 1 Tab by mouth daily. 12/22/17  Yes Brian Rivera, NP   pantoprazole (Protonix) 40 mg tablet Take 1 Tablet by mouth daily. Patient not taking: Reported on 8/11/2022 6/28/22   Aneesh Cameron MD   ondansetron hcl (Zofran) 4 mg tablet Take 1 Tablet by mouth every eight (8) hours as needed for Nausea for up to 21 doses. Patient not taking: No sig reported 6/2/22   Gee Castaneda MD   Insulin Needles, Disposable, 32 gauge x 5/32\" ndle Use to inject insulin BID Dx Code: E11.65 3/16/22   Carolina Devine MD   insulin syringe,safetyneedle 0.5 mL 31 gauge x 15/64\" syrg 1 Syringe by Does Not Apply route three (3) times daily. Dx Code: E11.65 3/16/22   Carolina Devine MD   ondansetron hcl (Zofran) 4 mg tablet Take 1 Tablet by mouth every eight (8) hours as needed for Nausea for up to 21 doses. Patient not taking: No sig reported 2/10/22   Gee Castaneda MD   blood-glucose sensor (DEXCOM G6 SENSOR) by Does Not Apply route.     Provider, Historical       Past Medical History:   Diagnosis Date    Abscess     Anemia     CAD (coronary artery disease) 2019    CABG X1    Chronic pain     LEFT SCIATIC, STERNAL WOUND    Complicated migraine     with extremity numbness    H/O transfusion of packed red blood cells 07/2021 Heart attack ([facility] [address] 11/2019    Hx MRSA infection 2009    Hypertension     onset 2009    Hypothyroidism     Irregular menstrual cycle     Nausea & vomiting     Obesity     Stroke ([facility] 2019    LOWER BODY WEAKNESS    T2DM (type 2 diabetes mellitus) ([facility] [address]     onset 2009 IDDM    [facility] 2009    RIGHT LEG - HOSP.  [place] [place]        Past Surgical History:   Procedure Laterality Date    HX CHOLECYSTECTOMY      HX CORONARY ARTERY BYPASS GRAFT  12/03/2019    HX GYN Bilateral 2021    SALPINGECTOMY    HX GYN  2021    ENDOMETRIAL ABLATION    HX HEENT      T&A    HX OTHER SURGICAL      2 TEETH REMOVED     HX TONSIL AND ADENOIDECTOMY  1992    HX TYMPANOSTOMY Bilateral     MULTIPLE    HX TYMPANOSTOMY      HX WISDOM TEETH EXTRACTION      [place] CARDIAC SURG PROCEDURE UNLIST  2019    CABG X 1, CARDIAC CATH STENT X 3       Social History     Tobacco Use    Smoking status: Passive Smoke Exposure - Never Smoker    Smokeless tobacco: Never    Tobacco comments:      SMOKES   Substance Use Topics    Alcohol use: Not Currently     Comment: socially 1-2 times a year         Family History   Problem Relation Age of Onset    Hypertension Other         \"all her family\"    Diabetes Other         \"all her family\"    Cancer Mother         cervical    Stroke Mother     Heart Disease Mother     Gall Bladder Disease Mother     Diabetes Mother     Hypertension Mother     Elevated Lipids Mother     Stroke Father     Heart Disease Father     Diabetes Father     Heart Attack Father     Elevated Lipids Father     Kidney Disease Father         ESRD    Gall Bladder Disease Brother     Diabetes Brother     Kidney Disease Brother     Cancer Brother         KIDNEY    Hypertension Brother     Migraines Maternal Aunt     Diabetes Paternal Aunt     Stroke Maternal Grandmother     Diabetes Maternal Grandmother     Cancer Maternal Grandmother         cervical    Heart Attack Maternal Grandmother     Elevated Lipids Maternal Grandmother     Stroke Maternal Grandfather     Diabetes Maternal Grandfather     Elevated Lipids Maternal Grandfather     Cancer Maternal Grandfather     Heart Attack Maternal Grandfather     Anesth Problems Neg Hx           Review of Systems (14 point ROS):  Gen:   Negative, fatigue, malaise, denies chills, and denies fevers  Eyes:  negative  ENT:    negative  Resp:  denies cough, denies sputum, denies SOB, denies hemoptysis, denies pleuritis, and denies wheezing  CVS:   chest pain, dyspnea, dizziness, and syncope  GI:       nausea, denies vomiting, and denies abdominal pain  :     negative  Jhonny:    negative except for dizziness     Remainder of 14 point ROS was reviewed and was negative         Objective:      Visit Vitals  BP (!) 149/106   Pulse 83   Temp 97.6 °F (36.4 °C)   Resp 18   Ht 5' 4\" (1.626 m)   Wt 104.3 kg (230 lb)   SpO2 97%   BMI 39.48 kg/m²       Exam:     General: alert, well appearing, and no distress  Head: Normocephalic, without obvious abnormality, atraumatic  Eyes: PERRL, EOMI, anicteric sclerae, and conjuntiva clear  ENT: Lips, mucosa, and tongue normal.   Neck: normal, supple, and no tenderness  Lungs: clear to auscultation with good breath sounds and normal respiratory effort  Heart: S1, S2 normal, regular rate, and regular rhythm  Abd: not distended, soft, nontender, BS present and normactive  Ext: no cyanosis and no edema  Skin: normal skin color, no rashes, and texture normal  Neuro:  alert, oriented x 3, no defects noted in general exam.  Psych: not anxious, cooperative, appropriate affect      Labs:  Recent Labs     08/11/22  1135   WBC 10.8   HGB 13.6   HCT 40.0   *     Recent Labs     08/11/22  1135      K 4.4      CO2 20   *   BUN 20   CREA 0.94*   CA 9.4   ALB 3.8   TBILI 0.6   ALT 28       Radiology:  XR CHEST PA LAT    Result Date: 8/11/2022  No acute cardiopulmonary disease.     I personally reviewed and interpreted the imaging studies and agree with official reading      The chart, ER course, PMSFH, labs, imaging studies, and medications was reviewed by me on: August 11, 2022         Assessment/Plan:      Active Problems:    Chest pain (6/2/2022)      Syncope (8/11/2022)       Chest pain  Admit for further workup and treatment of possible ACS. CP admit protocol. ASA, Statin, NTG, BB, Morphine IV prn, Continue home meds Coreg, aspirin, lisinopril, and Lantus, Supplemental oxygen per protocol, Supportive care,  Labs: Chem panel/troponin/BNP, Follow CXR, Echo, Elevate HOB, I&Os, and Cardiology consult    Syncope and collapse  Gentle IV fluid hydration  Echocardiogram  Orthostatic vitals    Hypertensive urgency in patient with history of hypertension  Restart her Coreg and lisinopril  I have added hydralazine as needed    DM type 2 insulin-dependent  Monitor blood glucose levels with insulin sliding scale coverage along with basal insulin. Monitor for complications. Hypoglycemic protocol. A1C. Diabetic diet.     Body mass index is 39.48 kg/m².:  30.0 - 39.9:  Obese      Risk of deterioration: high      Discussed:  Pt's condition, Imaging findings, Lab findings, Assessment, Care Plan, and Code Status discussed with: Patient, ED Provider, and RN    Prophylaxis:  Lovenox SQ    Probable disposition:  Home with family        Date of service:    8/11/2022                ___________________________________________________    Admitting Physician: Devon Cage MD           CC: Elizabeth Keenan DO

## 2022-08-11 NOTE — ED NOTES
TRANSFER - OUT REPORT:    Verbal report given to Tracee Ramos RN(name) on Irwin Song  being transferred to [facility] ED(unit) for routine progression of care       Report consisted of patients Situation, Background, Assessment and   Recommendations(SBAR). Information from the following report(s) SBAR, ED Summary, Recent Results, and Cardiac Rhythm NSR  was reviewed with the receiving nurse. Lines:   Peripheral IV 08/11/22 Right Wrist (Active)        Opportunity for questions and clarification was provided.       Patient transported with:   Wilhelmena Canavan

## 2022-08-11 NOTE — PROGRESS NOTES
Bedside and Verbal shift change report given to [facility] (oncoming nurse) by Monserrat Horton (offgoing nurse). Report included the following information SBAR, Kardex, MAR, Accordion, and Recent Results.

## 2022-08-11 NOTE — ED TRIAGE NOTES
Patient reports being currently worked up for PERERA disease, Heart attach, open heart surgery, and stroke reported in 2019. Chest pain started this morning.

## 2022-08-11 NOTE — PROGRESS NOTES
Enoxaparin 40 mg SQ Q24H adjusted to 30 mg SQ Q12H (weight 101-150 kg, CrCl > 30 mL/min) per protocol    Thank you,  [facility]

## 2022-08-11 NOTE — ED PROVIDER NOTES
Date of Service:  8/11/2022    Patient:  Renee Jones    Chief Complaint:  Chest Pain, Vomiting, Dizziness, and Nausea       HPI:  Renee Jones is a 39 y.o.  female who presents for evaluation of multiple complaints. History of MI with CABG x1 with 3 stents. Patient states that she woke up this morning feeling lightheaded and dizzy. She had a syncopal episode at 9:30 AM.  She also complains of 8 out of 10 midsternal nonradiating chest discomfort. Currently being evaluated for possibility of POTS. She states she has nausea without vomiting and denies other acute complaints. Past Medical History:   Diagnosis Date    Abscess     Anemia     CAD (coronary artery disease) 2019    CABG X1    Chronic pain     LEFT SCIATIC, STERNAL WOUND    Complicated migraine     with extremity numbness    H/O transfusion of packed red blood cells 07/2021    Heart attack ([address] 11/2019    Hx MRSA infection 2009    Hypertension     onset 2009    Hypothyroidism     Irregular menstrual cycle     Nausea & vomiting     Obesity     Stroke ([address] 2019    LOWER BODY WEAKNESS    T2DM (type 2 diabetes mellitus) ([address]     onset 2009 IDDM    Thromboembolus ([address] 2009    [place]       Past Surgical History:   Procedure Laterality Date    HX CHOLECYSTECTOMY      HX CORONARY ARTERY BYPASS GRAFT  12/03/2019    HX GYN Bilateral 2021    SALPINGECTOMY    HX GYN  2021    ENDOMETRIAL ABLATION    HX HEENT      T&A    HX OTHER SURGICAL      2 TEETH REMOVED     HX TONSIL AND ADENOIDECTOMY  1992    HX TYMPANOSTOMY Bilateral     MULTIPLE    HX TYMPANOSTOMY      HX WISDOM TEETH EXTRACTION      [place] CARDIAC SURG PROCEDURE UNLIST  2019    CABG X 1, CARDIAC CATH STENT X 3         Family History:   Problem Relation Age of Onset    Hypertension Other         \"all her family\"    Diabetes Other         \"all her family\"    Cancer Mother         cervical    Stroke Mother     Heart Disease Mother     Gall Bladder Disease Mother     Diabetes Mother     Hypertension Mother     Elevated Lipids Mother     Stroke Father     Heart Disease Father     Diabetes Father     Heart Attack Father     Elevated Lipids Father     Kidney Disease Father         ESRD    Gall Bladder Disease Brother     Diabetes Brother     Kidney Disease Brother     Cancer Brother         KIDNEY    Hypertension Brother     Migraines Maternal Aunt     Diabetes Paternal Aunt     Stroke Maternal Grandmother     Diabetes Maternal Grandmother     Cancer Maternal Grandmother         cervical    Heart Attack Maternal Grandmother     Elevated Lipids Maternal Grandmother     Stroke Maternal Grandfather     Diabetes Maternal Grandfather     Elevated Lipids Maternal Grandfather     Cancer Maternal Grandfather     Heart Attack Maternal Grandfather     Anesth Problems Neg Hx        Social History     Socioeconomic History    Marital status:      Spouse name: Not on file    Number of children: Not on file    Years of education: Not on file    Highest education level: Not on file   Occupational History    Not on file   Tobacco Use    Smoking status: Passive Smoke Exposure - Never Smoker    Smokeless tobacco: Never    Tobacco comments:      SMOKES   Vaping Use    Vaping Use: Never used   Substance and Sexual Activity    Alcohol use: Not Currently     Comment: socially 1-2 times a year     Drug use: No    Sexual activity: Yes     Partners: Male     Birth control/protection: None   Other Topics Concern    Not on file   Social History Narrative    Not on file     Social Determinants of Health     Financial Resource Strain: Not on file   Food Insecurity: Not on file   Transportation Needs: Not on file   Physical Activity: Not on file   Stress: Not on file   Social Connections: Not on file   Intimate Partner Violence: Not on file   Housing Stability: Not on file         ALLERGIES: Other food, Ciprofloxacin, Bumetanide, Compazine [prochlorperazine], Pcn [penicillins], Tree pollen-shagbark [place], Vancomycin, and Voltaren [diclofenac sodium]    Review of Systems   Cardiovascular:  Positive for chest pain. Gastrointestinal:  Positive for nausea. Neurological:  Positive for dizziness, syncope and light-headedness. All other systems reviewed and are negative. Vitals:    08/11/22 1038 08/11/22 1045   BP: (!) 156/95    Pulse: 84    Resp: 18    Temp: 97.5 °F (36.4 °C)    SpO2: 97% 97%   Weight: 104.3 kg (230 lb)    Height: 5' 4\" (1.626 m)             Physical Exam  Vitals and nursing note reviewed. Constitutional:       General: She is not in acute distress. Appearance: She is well-developed. She is not ill-appearing. HENT:      Head: Normocephalic and atraumatic. Eyes:      Extraocular Movements: Extraocular movements intact. Cardiovascular:      Rate and Rhythm: Normal rate and regular rhythm. Heart sounds: Normal heart sounds. Pulmonary:      Effort: Pulmonary effort is normal.      Breath sounds: Normal breath sounds. Chest:      Chest wall: No mass. Abdominal:      Palpations: Abdomen is soft. Musculoskeletal:      Right lower leg: No edema. Left lower leg: No edema. Skin:     General: Skin is warm. Capillary Refill: Capillary refill takes less than 2 seconds. Neurological:      General: No focal deficit present. Mental Status: She is alert and oriented to person, place, and time. Motor: No weakness.    Psychiatric:         Mood and Affect: Mood normal.        MDM     Amount and/or Complexity of Data Reviewed  Decide to obtain previous medical records or to obtain history from someone other than the patient: yes      ED Course as of 08/11/22 1522   u Aug 11, 2022   1048 EKG 1034  SR 85 with first degree block  Normal axis and intervals otherwise  No STEMI [GG]      ED Course User Index  [GG] [place] DO Jason     VITAL SIGNS:  Patient Vitals for the past 4 hrs:   Temp Pulse Resp BP SpO2   08/11/22 1216 -- -- -- (!) 165/126 --   08/11/22 1213 -- -- -- (!) 151/90 --   08/11/22 1212 -- -- -- (!) 159/99 --   08/11/22 1045 -- -- -- -- 97 %   08/11/22 1038 97.5 °F (36.4 °C) 84 18 (!) 156/95 97 %         LABS:  Recent Results (from the past 6 hour(s))   CBC WITH AUTOMATED DIFF    Collection Time: 08/11/22 11:35 AM   Result Value Ref Range    WBC 10.8 3.6 - 11.0 K/uL    RBC 4.40 3.80 - 5.20 M/uL    HGB 13.6 11.5 - 16.0 g/dL    HCT 40.0 35.0 - 47.0 %    MCV 90.9 80.0 - 99.0 FL    MCH 30.9 26.0 - 34.0 PG    MCHC 34.0 30.0 - 36.5 g/dL    RDW 12.7 11.5 - 14.5 %    PLATELET 582 (H) 950 - 400 K/uL    MPV 9.6 8.9 - 12.9 FL    NRBC 0.0 0  WBC    ABSOLUTE NRBC 0.00 0.00 - 0.01 K/uL    NEUTROPHILS 64 32 - 75 %    LYMPHOCYTES 23 12 - 49 %    MONOCYTES 8 5 - 13 %    EOSINOPHILS 3 0 - 7 %    BASOPHILS 1 0 - 1 %    IMMATURE GRANULOCYTES 1 (H) 0.0 - 0.5 %    ABS. NEUTROPHILS 6.9 1.8 - 8.0 K/UL    ABS. LYMPHOCYTES 2.5 0.8 - 3.5 K/UL    ABS. MONOCYTES 0.9 0.0 - 1.0 K/UL    ABS. EOSINOPHILS 0.4 0.0 - 0.4 K/UL    ABS. BASOPHILS 0.1 0.0 - 0.1 K/UL    ABS. IMM. GRANS. 0.1 (H) 0.00 - 0.04 K/UL    DF AUTOMATED     METABOLIC PANEL, COMPREHENSIVE    Collection Time: 08/11/22 11:35 AM   Result Value Ref Range    Sodium 134 mmol/L    Potassium 4.4 mmol/L    Chloride 102 mmol/L    CO2 20 mmol/L    Anion gap 12 5 - 15 mmol/L    Glucose 209 (H) 65 - 100 mg/dL    BUN 20 6 - 20 MG/DL    Creatinine 0.94 (H) 0.50 - 0.90 MG/DL    BUN/Creatinine ratio 21 (H) 12 - 20      GFR est AA >60 >60 ml/min/1.73m2    GFR est non-AA >60 >60 ml/min/1.73m2    Calcium 9.4 8.6 - 10.0 MG/DL    Bilirubin, total 0.6 0.2 - 1.0 MG/DL    ALT (SGPT) 28 10 - 35 U/L    AST (SGOT) 21 10 - 35 U/L    Alk.  phosphatase 84 35 - 104 U/L    Protein, total 6.9 6.4 - 8.3 g/dL    Albumin 3.8 3.5 - 5.2 g/dL    Globulin 3.1 2.0 - 4.0 g/dL    A-G Ratio 1.2 1.1 - 2.2     POC TROPONIN-I    Collection Time: 08/11/22 11:36 AM   Result Value Ref Range    Troponin-I (POC) 0.11 (H) 0.00 - 0.08 ng/mL        IMAGING:  XR CHEST PA LAT   Final Result   No acute cardiopulmonary disease. Medications During Visit:  Medications   ondansetron (ZOFRAN) injection 4 mg (4 mg IntraVENous Given 8/11/22 1224)   morphine injection 2 mg (2 mg IntraVENous Given 8/11/22 1224)   sodium chloride 0.9 % bolus infusion 1,000 mL (1,000 mL IntraVENous New Bag 8/11/22 1224)         DECISION MAKING:  Ailyn Kang is a 39 y.o. female who comes in as above. Vital signs here are unremarkable. She appears well. History of stent x3 with single bypass surgery. Troponin elevated to 0.11 here. Last troponin was undetectable. Will admit for chest pain and syncope. Patient will require PE but due to poor vascular access and the line here will not handle contrast, will most likely need midline placed at receiving facility for additional testing      IMPRESSION:  1. Chest pain, unspecified type    2. Syncope and collapse        DISPOSITION:  Admitted        Procedures      Perfect Serve Consult for Admission  1:00 PM    ED Room Number: C02/C02  Patient Name and age:  Ailyn Kang 39 y.o.  female  Working Diagnosis:   1. Chest pain, unspecified type    2. Syncope and collapse        COVID-19 Suspicion:  no  Sepsis present:  no  Reassessment needed: no  Code Status:  Full Code  Readmission: no  Isolation Requirements:  no  Recommended Level of Care:  telemetry  Department:[place]  Other:  [facility]/[place]. Vital signs here are unremarkable. She appears well. History of stent x3 with single bypass surgery. Troponin elevated to 0.11 here. Last troponin was undetectable. Will admit for chest pain and syncope.   Patient will require PE but due to poor vascular access and the line here will not handle contrast, will most likely need midline placed at receiving facility for additional testing

## 2022-08-12 ENCOUNTER — APPOINTMENT (OUTPATIENT)
Dept: NON INVASIVE DIAGNOSTICS | Age: 37
End: 2022-08-12
Attending: HOSPITALIST
Payer: MEDICARE

## 2022-08-12 VITALS
OXYGEN SATURATION: 99 % | TEMPERATURE: 98 F | RESPIRATION RATE: 16 BRPM | HEIGHT: 64 IN | SYSTOLIC BLOOD PRESSURE: 138 MMHG | DIASTOLIC BLOOD PRESSURE: 95 MMHG | BODY MASS INDEX: 39.27 KG/M2 | WEIGHT: 230 LBS | HEART RATE: 87 BPM

## 2022-08-12 LAB
ALBUMIN SERPL-MCNC: 3.4 G/DL (ref 3.5–5)
ALBUMIN/GLOB SERPL: 0.9 {RATIO} (ref 1.1–2.2)
ALP SERPL-CCNC: 87 U/L (ref 45–117)
ALT SERPL-CCNC: 47 U/L (ref 12–78)
ANION GAP SERPL CALC-SCNC: 10 MMOL/L (ref 5–15)
AST SERPL-CCNC: 30 U/L (ref 15–37)
ATRIAL RATE: 85 BPM
BILIRUB SERPL-MCNC: 0.8 MG/DL (ref 0.2–1)
BUN SERPL-MCNC: 20 MG/DL (ref 6–20)
BUN/CREAT SERPL: 17 (ref 12–20)
CALCIUM SERPL-MCNC: 8.8 MG/DL (ref 8.5–10.1)
CALCULATED P AXIS, ECG09: 24 DEGREES
CALCULATED R AXIS, ECG10: -36 DEGREES
CALCULATED T AXIS, ECG11: 35 DEGREES
CHLORIDE SERPL-SCNC: 105 MMOL/L (ref 97–108)
CHOLEST SERPL-MCNC: 107 MG/DL
CO2 SERPL-SCNC: 23 MMOL/L (ref 21–32)
CREAT SERPL-MCNC: 1.21 MG/DL (ref 0.55–1.02)
DIAGNOSIS, 93000: NORMAL
ECHO AO ASC DIAM: 2.7 CM
ECHO AO ASCENDING AORTA INDEX: 1.3 CM/M2
ECHO AV AREA PEAK VELOCITY: 2.5 CM2
ECHO AV AREA VTI: 2.4 CM2
ECHO AV AREA/BSA PEAK VELOCITY: 1.2 CM2/M2
ECHO AV AREA/BSA VTI: 1.2 CM2/M2
ECHO AV MEAN GRADIENT: 4 MMHG
ECHO AV MEAN VELOCITY: 0.9 M/S
ECHO AV PEAK GRADIENT: 6 MMHG
ECHO AV PEAK VELOCITY: 1.3 M/S
ECHO AV VELOCITY RATIO: 0.69
ECHO AV VTI: 25.4 CM
ECHO LA DIAMETER INDEX: 1.54 CM/M2
ECHO LA DIAMETER: 3.2 CM
ECHO LV FRACTIONAL SHORTENING: 17 % (ref 28–44)
ECHO LV INTERNAL DIMENSION DIASTOLE INDEX: 2.31 CM/M2
ECHO LV INTERNAL DIMENSION DIASTOLIC: 4.8 CM (ref 3.9–5.3)
ECHO LV INTERNAL DIMENSION SYSTOLIC INDEX: 1.92 CM/M2
ECHO LV INTERNAL DIMENSION SYSTOLIC: 4 CM
ECHO LV IVSD: 0.8 CM (ref 0.6–0.9)
ECHO LV MASS 2D: 137.1 G (ref 67–162)
ECHO LV MASS INDEX 2D: 65.9 G/M2 (ref 43–95)
ECHO LV POSTERIOR WALL DIASTOLIC: 0.9 CM (ref 0.6–0.9)
ECHO LV RELATIVE WALL THICKNESS RATIO: 0.38
ECHO LVOT AREA: 3.8 CM2
ECHO LVOT AV VTI INDEX: 0.65
ECHO LVOT DIAM: 2.2 CM
ECHO LVOT MEAN GRADIENT: 2 MMHG
ECHO LVOT PEAK GRADIENT: 3 MMHG
ECHO LVOT PEAK VELOCITY: 0.9 M/S
ECHO LVOT STROKE VOLUME INDEX: 30.3 ML/M2
ECHO LVOT SV: 63.1 ML
ECHO LVOT VTI: 16.6 CM
ECHO MV A VELOCITY: 0.72 M/S
ECHO MV AREA VTI: 3.3 CM2
ECHO MV E DECELERATION TIME (DT): 200 MS
ECHO MV E VELOCITY: 0.82 M/S
ECHO MV E/A RATIO: 1.14
ECHO MV LVOT VTI INDEX: 1.14
ECHO MV MAX VELOCITY: 0.8 M/S
ECHO MV MEAN GRADIENT: 1 MMHG
ECHO MV MEAN VELOCITY: 0.5 M/S
ECHO MV PEAK GRADIENT: 2 MMHG
ECHO MV VTI: 18.9 CM
ECHO PV MAX VELOCITY: 0.8 M/S
ECHO PV PEAK GRADIENT: 2 MMHG
ECHO TV REGURGITANT MAX VELOCITY: 2.19 M/S
ECHO TV REGURGITANT PEAK GRADIENT: 19 MMHG
GLOBULIN SER CALC-MCNC: 3.7 G/DL (ref 2–4)
GLUCOSE BLD STRIP.AUTO-MCNC: 105 MG/DL (ref 65–117)
GLUCOSE BLD STRIP.AUTO-MCNC: 88 MG/DL (ref 65–117)
GLUCOSE SERPL-MCNC: 152 MG/DL (ref 65–100)
HDLC SERPL-MCNC: 33 MG/DL
HDLC SERPL: 3.2 {RATIO} (ref 0–5)
LDLC SERPL CALC-MCNC: ABNORMAL MG/DL (ref 0–100)
LDLC SERPL DIRECT ASSAY-MCNC: 44 MG/DL (ref 0–100)
P-R INTERVAL, ECG05: 212 MS
POTASSIUM SERPL-SCNC: 4 MMOL/L (ref 3.5–5.1)
PROT SERPL-MCNC: 7.1 G/DL (ref 6.4–8.2)
Q-T INTERVAL, ECG07: 406 MS
QRS DURATION, ECG06: 92 MS
QTC CALCULATION (BEZET), ECG08: 483 MS
SERVICE CMNT-IMP: NORMAL
SERVICE CMNT-IMP: NORMAL
SODIUM SERPL-SCNC: 138 MMOL/L (ref 136–145)
TRIGL SERPL-MCNC: 408 MG/DL (ref ?–150)
TROPONIN-HIGH SENSITIVITY: 6 NG/L (ref 0–51)
TSH SERPL DL<=0.05 MIU/L-ACNC: 3.45 UIU/ML (ref 0.36–3.74)
VENTRICULAR RATE, ECG03: 85 BPM
VLDLC SERPL CALC-MCNC: ABNORMAL MG/DL

## 2022-08-12 PROCEDURE — 96376 TX/PRO/DX INJ SAME DRUG ADON: CPT

## 2022-08-12 PROCEDURE — 74011250636 HC RX REV CODE- 250/636: Performed by: HOSPITALIST

## 2022-08-12 PROCEDURE — 93306 TTE W/DOPPLER COMPLETE: CPT

## 2022-08-12 PROCEDURE — 74011250637 HC RX REV CODE- 250/637: Performed by: HOSPITALIST

## 2022-08-12 PROCEDURE — G0378 HOSPITAL OBSERVATION PER HR: HCPCS

## 2022-08-12 PROCEDURE — 36415 COLL VENOUS BLD VENIPUNCTURE: CPT

## 2022-08-12 PROCEDURE — 83721 ASSAY OF BLOOD LIPOPROTEIN: CPT

## 2022-08-12 PROCEDURE — 84484 ASSAY OF TROPONIN QUANT: CPT

## 2022-08-12 PROCEDURE — 74011000250 HC RX REV CODE- 250: Performed by: HOSPITALIST

## 2022-08-12 PROCEDURE — 82962 GLUCOSE BLOOD TEST: CPT

## 2022-08-12 PROCEDURE — 99215 OFFICE O/P EST HI 40 MIN: CPT | Performed by: INTERNAL MEDICINE

## 2022-08-12 PROCEDURE — 84443 ASSAY THYROID STIM HORMONE: CPT

## 2022-08-12 PROCEDURE — 80053 COMPREHEN METABOLIC PANEL: CPT

## 2022-08-12 PROCEDURE — 93306 TTE W/DOPPLER COMPLETE: CPT | Performed by: INTERNAL MEDICINE

## 2022-08-12 PROCEDURE — 80061 LIPID PANEL: CPT

## 2022-08-12 RX ORDER — METHOCARBAMOL 750 MG/1
750 TABLET, FILM COATED ORAL 2 TIMES DAILY
Status: DISCONTINUED | OUTPATIENT
Start: 2022-08-12 | End: 2022-08-12 | Stop reason: HOSPADM

## 2022-08-12 RX ADMIN — ONDANSETRON 4 MG: 2 INJECTION INTRAMUSCULAR; INTRAVENOUS at 01:04

## 2022-08-12 RX ADMIN — ONDANSETRON 4 MG: 2 INJECTION INTRAMUSCULAR; INTRAVENOUS at 07:31

## 2022-08-12 RX ADMIN — CARVEDILOL 12.5 MG: 12.5 TABLET, FILM COATED ORAL at 09:04

## 2022-08-12 RX ADMIN — ONDANSETRON 4 MG: 2 INJECTION INTRAMUSCULAR; INTRAVENOUS at 13:34

## 2022-08-12 RX ADMIN — MORPHINE SULFATE 4 MG: 4 INJECTION INTRAVENOUS at 05:39

## 2022-08-12 RX ADMIN — MORPHINE SULFATE 4 MG: 4 INJECTION INTRAVENOUS at 09:16

## 2022-08-12 RX ADMIN — MORPHINE SULFATE 4 MG: 4 INJECTION INTRAVENOUS at 01:06

## 2022-08-12 RX ADMIN — Medication 10 ML: at 05:41

## 2022-08-12 RX ADMIN — MORPHINE SULFATE 4 MG: 4 INJECTION INTRAVENOUS at 13:34

## 2022-08-12 RX ADMIN — LISINOPRIL 2.5 MG: 5 TABLET ORAL at 09:03

## 2022-08-12 RX ADMIN — MORPHINE SULFATE 4 MG: 4 INJECTION INTRAVENOUS at 17:37

## 2022-08-12 RX ADMIN — SODIUM CHLORIDE 125 ML/HR: 9 INJECTION, SOLUTION INTRAVENOUS at 07:41

## 2022-08-12 RX ADMIN — ASPIRIN 81 MG: 81 TABLET, CHEWABLE ORAL at 09:03

## 2022-08-12 NOTE — PROGRESS NOTES
Michael Shannon notified about patient being abusive to nurses and threats of legal action if some thing happened to her upon discharge as she is not happy about not getting heart catheterization during this hospital visit.

## 2022-08-12 NOTE — ED NOTES
Patient seen by hospitalist, who states patient does not want to be discharged and is asking for more testing. Hospitalist is communicating with cardiologist. Will update nurses on decision/dispo.

## 2022-08-12 NOTE — PROGRESS NOTES
Reason to see patient: Chest pain      HPI: Viky Dorman is a 39 y.o. female with past medical history significant for CAD, CABG x1, history of hypertension, stroke, type 2 diabetes, right leg thromboembolism who presented with symptoms of chest pain and syncope. She was at home and had sudden onset of chest pain left-sided anterior chest wall sharp in nature 9 out of 10 intensity radiating to her left neck into the center of the sternum but no associated symptoms of shortness of breath, palpitations, lightheadedness, dizziness, presyncope or syncope. The pain was reproducible and no relationship to exertion or resting. She went to get up and go to the bathroom and felt lightheaded and passed out per her 's account noted in the chart. She was passed out for few seconds. There was no jerking movement or urinary or bowel incontinence. On presentation she was noted to have normal sinus rhythm with left axis deviation with normal ST segment. Troponins are normal.    Plan:    1. Chest pain/CAD/CABG x1: Symptoms are atypical and highly reproducible. Significant tenderness to the touch on the left anterior chest wall as well as left costochondral joints. Also her troponins are negative and her EKG does not demonstrate any signs of ischemia. Also she recently had a left heart catheterization just about 1 year ago at [facility].  She has history of unstable sternum post CABG surgery. Likely this chest pain is musculoskeletal however given her known history of CAD and CABG it is worthwhile to review her previous records. I have asked [facility] to send me her last cath report which was just about 1 year ago and no interventions were performed at that time. Apparently her LIMA graft was open. She usually follows with [facility] with Dr. Jelani Steen. She is well treated with aspirin, Brilinta, ranolazine, carvedilol, lisinopril from cardiac standpoint. 2.  Syncope:  She has known history of pots however she is not orthostatic on presentation. Continue home medication. She will need outpatient Holter monitoring. Addendum:    [facility] 9/10/21: Performed by Dr. Bob Sullivan    1) JUAN to LAD is patent  2) Mild %   3) Mid LCX 30% small caliber MELYSSA 3 flow  4)  of RCA      Based on the recent heart cath and atypical chest pain which is very reproducible. I do not think she has active ischemia. She has baseline chest pain from severe native vessel disease. LVEF is normal on Echo today. No further cardiac evaluation needed at this time. She can be dced home from cardiac standpoint. She can get Holter monitor at Dr. Bob Sullivan office. ATTENTION:   This medical record was transcribed using an electronic medical records/speech recognition system. Although proofread, it may and can contain electronic, spelling and other errors. Corrections may be executed at a later time. Please feel free to contact us for any clarifications as needed. Past Medical History:   Diagnosis Date    Abscess     Anemia     CAD (coronary artery disease) 2019    CABG X1    Chronic pain     LEFT SCIATIC, STERNAL WOUND    Complicated migraine     with extremity numbness    H/O transfusion of packed red blood cells 07/2021    Heart attack ([address] 11/2019    Hx MRSA infection 2009    Hypertension     onset 2009    Hypothyroidism     Irregular menstrual cycle     Nausea & vomiting     Obesity     Stroke ([address] 2019    LOWER BODY WEAKNESS    T2DM (type 2 diabetes mellitus) ([address]     onset 2009 IDDM    Thromboembolus ([address] 2009    RIGHT LEG - HOSP.  [place]            Past Surgical History:   Procedure Laterality Date    HX CHOLECYSTECTOMY      HX CORONARY ARTERY BYPASS GRAFT  12/03/2019    HX GYN Bilateral 2021    SALPINGECTOMY    HX GYN  2021    ENDOMETRIAL ABLATION    HX HEENT      T&A    HX OTHER SURGICAL      2 TEETH REMOVED     HX TONSIL AND ADENOIDECTOMY  1992    HX TYMPANOSTOMY Bilateral     MULTIPLE    HX TYMPANOSTOMY HX WISDOM TEETH EXTRACTION      DC CARDIAC SURG PROCEDURE UNLIST  2019    CABG X 1, CARDIAC CATH STENT X 3             Family History   Problem Relation Age of Onset    Hypertension Other         \"all her family\"    Diabetes Other         \"all her family\"    Cancer Mother         cervical    Stroke Mother     Heart Disease Mother     Gall Bladder Disease Mother     Diabetes Mother     Hypertension Mother     Elevated Lipids Mother     Stroke Father     Heart Disease Father     Diabetes Father     Heart Attack Father     Elevated Lipids Father     Kidney Disease Father         ESRD    Gall Bladder Disease Brother     Diabetes Brother     Kidney Disease Brother     Cancer Brother         KIDNEY    Hypertension Brother     Migraines Maternal Aunt     Diabetes Paternal Aunt     Stroke Maternal Grandmother     Diabetes Maternal Grandmother     Cancer Maternal Grandmother         cervical    Heart Attack Maternal Grandmother     Elevated Lipids Maternal Grandmother     Stroke Maternal Grandfather     Diabetes Maternal Grandfather     Elevated Lipids Maternal Grandfather     Cancer Maternal Grandfather     Heart Attack Maternal Grandfather     Anesth Problems Neg Hx            Social History     Socioeconomic History    Marital status:      Spouse name: Not on file    Number of children: Not on file    Years of education: Not on file    Highest education level: Not on file   Occupational History    Not on file   Tobacco Use    Smoking status: Passive Smoke Exposure - Never Smoker    Smokeless tobacco: Never    Tobacco comments:      SMOKES   Vaping Use    Vaping Use: Never used   Substance and Sexual Activity    Alcohol use: Not Currently     Comment: socially 1-2 times a year     Drug use: No    Sexual activity: Yes     Partners: Male     Birth control/protection: None   Other Topics Concern    Not on file   Social History Narrative    Not on file     Social Determinants of Health     Financial Resource Strain: Not on file   Food Insecurity: Not on file   Transportation Needs: Not on file   Physical Activity: Not on file   Stress: Not on file   Social Connections: Not on file   Intimate Partner Violence: Not on file   Housing Stability: Not on file         Allergies   Allergen Reactions    Other Food Swelling     JALIPENO PEPPERS - FACILA SWELLING    Ciprofloxacin Anaphylaxis    Bumetanide Other (comments)     Hearing loss    Coconut Other (comments)     Facial swelling    Compazine [Prochlorperazine] Anxiety     HALLUCINATIONS    Pcn [Penicillins] Rash     OCCURRED IN INFANCY NO REPORTED SEVERE IgE MEDIATED REACTIONS  Patient screened for any delayed non-IgE-mediated reaction to PCN.         Patient notes the following:    No delayed non-IgE-mediated reaction to PCN          Tree Pollen-Shagbark Stephenson Rash     HICKORY SMOKE FLAVORING    Vancomycin Other (comments)     Kidneys shut down    Voltaren [Diclofenac Sodium] Myalgia and Other (comments)     \"muscle spasms\", LEG SPASMS              Current Facility-Administered Medications   Medication Dose Route Frequency Provider Last Rate Last Admin    ondansetron (ZOFRAN) injection 4 mg  4 mg IntraVENous Q6H PRN Jaime Molina MD   4 mg at 08/12/22 0731    0.9% sodium chloride infusion  125 mL/hr IntraVENous CONTINUOUS Jaime Molina  mL/hr at 08/12/22 0741 125 mL/hr at 08/12/22 0741    sodium chloride (NS) flush 5-40 mL  5-40 mL IntraVENous Q8H Jaime Molina MD   10 mL at 08/12/22 0541    sodium chloride (NS) flush 5-40 mL  5-40 mL IntraVENous PRN Jaime Molina MD        aspirin chewable tablet 81 mg  81 mg Oral DAILY Jaime Molina MD   81 mg at 08/12/22 0903    oxyCODONE-acetaminophen (PERCOCET) 5-325 mg per tablet 1 Tablet  1 Tablet Oral Q4H PRN Jaime Molina MD        morphine injection 4 mg  4 mg IntraVENous Q4H PRN Jaime Molina MD   4 mg at 08/12/22 0916    naloxone [facility]) injection 0.4 mg  0.4 mg IntraVENous PRN Veronica Felipe MD        enoxaparin (LOVENOX) injection 40 mg  40 mg SubCUTAneous Q24H Veronica Felipe MD   40 mg at 08/11/22 1812    nitroglycerin (NITROSTAT) tablet 0.4 mg  0.4 mg SubLINGual PRN Veronica Felipe MD        carvediloL (COREG) tablet 12.5 mg  12.5 mg Oral BID WITH MEALS Veronica Felipe MD   12.5 mg at 08/12/22 0904    lisinopriL (PRINIVIL, ZESTRIL) tablet 2.5 mg  2.5 mg Oral DAILY Veronica Felipe MD   2.5 mg at 08/12/22 7156    glucose chewable tablet 16 g  4 Tablet Oral PRN Veronica Felipe MD        glucagon [facility]) injection 1 mg  1 mg IntraMUSCular PRN Veronica Felipe MD        dextrose 10% infusion 0-250 mL  0-250 mL IntraVENous PRN Veronica Felipe MD        insulin glargine (LANTUS) injection 15 Units  15 Units SubCUTAneous BID Veronica Felipe MD   15 Units at 08/11/22 2047    insulin lispro (HUMALOG) injection   SubCUTAneous QID WITH MEALS Veronica Felipe MD        hydrALAZINE (APRESOLINE) 20 mg/mL injection 20 mg  20 mg IntraVENous Q6H PRN Veronica Felipe MD         Current Outpatient Medications   Medication Sig Dispense Refill    levothyroxine (SYNTHROID) 137 mcg tablet Take 137 mcg by mouth Daily (before breakfast). Cetirizine (ZyrTEC) 10 mg cap Take 10 mg by mouth daily. dulaglutide (Trulicity) 1.5 WN/6.5 mL sub-q pen 0.5 mL by SubCUTAneous route every seven (7) days. Stop 0.75 mg 4 Each 3    insulin detemir U-100 (LEVEMIR FLEXTOUCH) 100 unit/mL (3 mL) inpn Inject 30 units in AM and 30  units at bed time 60 mL 3    insulin aspart U-100 (NovoLOG U-100 Insulin aspart) 100 unit/mL injection Inject 10- 15 units before breakfast, 10- 15 units before lunch and 10 - 15 units before dinner. (Patient taking differently: 10 units with every meal) 50 mL 3    bisacodyL (DULCOLAX) 5 mg EC tablet Take 5 mg by mouth daily as needed for Constipation. ticagrelor (BRILINTA) 90 mg tablet Take 90 mg by mouth two (2) times a day.       atorvastatin (LIPITOR) 20 mg tablet Take 20 mg by mouth nightly. nitroglycerin (NITROSTAT) 0.4 mg SL tablet 0.4 mg by SubLINGual route every five (5) minutes as needed for Chest Pain. Up to 3 doses. methocarbamoL (ROBAXIN) 750 mg tablet Take 750 mg by mouth two (2) times a day. senna (SENOKOT) 8.6 mg tablet Take 1 Tablet by mouth as needed for Constipation. lisinopriL (PRINIVIL, ZESTRIL) 2.5 mg tablet Take 2.5 mg by mouth daily. empagliflozin (JARDIANCE) 10 mg tablet Take 10 mg by mouth daily. ranolazine ER (RANEXA) 1,000 mg Take 500 mg by mouth two (2) times a day. carvedilol (COREG) 6.25 mg tablet Take 2 Tabs by mouth two (2) times daily (with meals). (Patient taking differently: Take 12.5 mg by mouth two (2) times a day.) 60 Tab 0    aspirin delayed-release 81 mg tablet Take 1 Tab by mouth daily. 30 Tab 3    pantoprazole (Protonix) 40 mg tablet Take 1 Tablet by mouth daily. (Patient not taking: Reported on 8/11/2022) 30 Tablet 0    ondansetron hcl (Zofran) 4 mg tablet Take 1 Tablet by mouth every eight (8) hours as needed for Nausea for up to 21 doses. (Patient not taking: No sig reported) 21 Tablet 1    Insulin Needles, Disposable, 32 gauge x 5/32\" ndle Use to inject insulin BID Dx Code: E11.65 200 Pen Needle 3    insulin syringe,safetyneedle 0.5 mL 31 gauge x 15/64\" syrg 1 Syringe by Does Not Apply route three (3) times daily. Dx Code: E11.65 300 Each 3    ondansetron hcl (Zofran) 4 mg tablet Take 1 Tablet by mouth every eight (8) hours as needed for Nausea for up to 21 doses. (Patient not taking: No sig reported) 21 Tablet 1    blood-glucose sensor (DEXCOM G6 SENSOR) by Does Not Apply route. ROS:  12 point review of systems was performed.  All negative except for HPI     Physical Exam:  Visit Vitals  /86 (BP 1 Location: Left upper arm, BP Patient Position: At rest;Lying left side)   Pulse 86   Temp 98 °F (36.7 °C)   Resp 18   Ht 5' 4\" (1.626 m)   Wt 230 lb (104.3 kg)   SpO2 94%   BMI 39.48 kg/m²       Gen:  Well-developed, well-nourished, in no acute distress  HEENT:  Pink conjunctivae, PERRL, hearing intact to voice, moist mucous membranes  Neck:  Supple, without masses, thyroid non-tender  Resp:  No accessory muscle use, clear breath sounds without wheezes rales or rhonchi  Card:  No murmurs, normal S1, S2 without thrills, bruits or peripheral edema  Abd:  Soft, non-tender, non-distended, normoactive bowel sounds are present, no palpable organomegaly and no detectable hernias  Lymph:  No cervical or inguinal adenopathy  Musc:  No cyanosis or clubbing  Skin:  No rashes or ulcers, skin turgor is good  Neuro:  Cranial nerves are grossly intact, no focal motor weakness, follows commands appropriately  Psych:  Good insight, oriented to person, place and time, alert     Labs:     Lab Results   Component Value Date/Time    WBC 10.8 08/11/2022 11:35 AM    HGB 13.6 08/11/2022 11:35 AM    Hemoglobin (POC) 14.6 09/11/2012 08:36 PM    HCT 40.0 08/11/2022 11:35 AM    Hematocrit (POC) 42 07/06/2018 05:23 PM    PLATELET 762 (H) 46/13/1311 11:35 AM    MCV 90.9 08/11/2022 11:35 AM     Lab Results   Component Value Date/Time    Hemoglobin A1c 7.6 (H) 08/11/2022 08:03 PM    Hemoglobin A1c 7.7 (H) 05/26/2022 01:58 PM    Hemoglobin A1c 7.4 (H) 02/03/2022 11:44 AM    Glucose 152 (H) 08/12/2022 01:31 AM    Glucose (POC) 105 08/12/2022 07:29 AM    Microalb/Creat ratio (ug/mg creat.) 96.6 (H) 12/22/2017 09:30 AM    LDL, calculated Not calculated due to elevated triglyceride level 08/12/2022 01:31 AM    Creatinine (POC) 0.6 07/06/2018 05:23 PM    Creatinine 1.21 (H) 08/12/2022 01:31 AM      Lab Results   Component Value Date/Time    Cholesterol, total 107 08/12/2022 01:31 AM    HDL Cholesterol 33 08/12/2022 01:31 AM    LDL, calculated Not calculated due to elevated triglyceride level 08/12/2022 01:31 AM    Triglyceride 408 (H) 08/12/2022 01:31 AM    CHOL/HDL Ratio 3.2 08/12/2022 01:31 AM     Lab Results   Component Value Date/Time    ALT (SGPT) 47 08/12/2022 01:31 AM    Alk.  phosphatase 87 08/12/2022 01:31 AM    Bilirubin, direct 0.1 11/04/2010 09:30 AM    Bilirubin, total 0.8 08/12/2022 01:31 AM    Albumin 3.4 (L) 08/12/2022 01:31 AM    Protein, total 7.1 08/12/2022 01:31 AM    INR 1.0 06/02/2022 12:56 PM    Prothrombin time 10.4 06/02/2022 12:56 PM    PLATELET 600 (H) 58/93/9937 11:35 AM     Lab Results   Component Value Date/Time    INR 1.0 06/02/2022 12:56 PM    INR (POC) 0.9 05/25/2017 12:24 PM    Prothrombin time 10.4 06/02/2022 12:56 PM      Lab Results   Component Value Date/Time    GFR est non-AA 50 (L) 08/12/2022 01:31 AM    GFRNA, POC >60 07/06/2018 05:23 PM    GFR est AA >60 08/12/2022 01:31 AM    GFRAA, POC >60 07/06/2018 05:23 PM    Creatinine 1.21 (H) 08/12/2022 01:31 AM    Creatinine (POC) 0.6 07/06/2018 05:23 PM    BUN 20 08/12/2022 01:31 AM    BUN (POC) 11 07/06/2018 05:23 PM    Sodium 138 08/12/2022 01:31 AM    Sodium (POC) 135 (L) 07/06/2018 05:23 PM    Potassium 4.0 08/12/2022 01:31 AM    Potassium (POC) 3.7 07/06/2018 05:23 PM    Chloride 105 08/12/2022 01:31 AM    Chloride (POC) 97 (L) 07/06/2018 05:23 PM    CO2 23 08/12/2022 01:31 AM    Magnesium 2.1 06/27/2022 04:25 AM    Phosphorus 3.1 06/02/2022 11:10 AM     No results found for: PSA, PSA2, PSAR1, [place], [id number], [id number]  Lab Results   Component Value Date/Time    TSH 3.45 08/12/2022 01:31 AM      Lab Results   Component Value Date/Time    Glucose 152 (H) 08/12/2022 01:31 AM    Glucose (POC) 105 08/12/2022 07:29 AM      Lab Results   Component Value Date/Time    CK 51 06/27/2022 04:25 AM    Troponin-I, Qt. <0.05 07/05/2018 06:22 PM      Lab Results   Component Value Date/Time    NT pro- (H) 06/26/2022 10:08 AM      Lab Results   Component Value Date/Time    Sodium 138 08/12/2022 01:31 AM    Potassium 4.0 08/12/2022 01:31 AM    Chloride 105 08/12/2022 01:31 AM    CO2 23 08/12/2022 01:31 AM    Anion gap 10 08/12/2022 01:31 AM    Glucose 152 (H) 08/12/2022 01:31 AM    BUN 20 08/12/2022 01:31 AM    Creatinine 1.21 (H) 08/12/2022 01:31 AM    BUN/Creatinine ratio 17 08/12/2022 01:31 AM    GFR est AA >60 08/12/2022 01:31 AM    GFR est non-AA 50 (L) 08/12/2022 01:31 AM    Calcium 8.8 08/12/2022 01:31 AM      Lab Results   Component Value Date/Time    Sodium 138 08/12/2022 01:31 AM    Potassium 4.0 08/12/2022 01:31 AM    Chloride 105 08/12/2022 01:31 AM    CO2 23 08/12/2022 01:31 AM    Anion gap 10 08/12/2022 01:31 AM    Glucose 152 (H) 08/12/2022 01:31 AM    BUN 20 08/12/2022 01:31 AM    Creatinine 1.21 (H) 08/12/2022 01:31 AM    BUN/Creatinine ratio 17 08/12/2022 01:31 AM    GFR est AA >60 08/12/2022 01:31 AM    GFR est non-AA 50 (L) 08/12/2022 01:31 AM    Calcium 8.8 08/12/2022 01:31 AM    Bilirubin, total 0.8 08/12/2022 01:31 AM    ALT (SGPT) 47 08/12/2022 01:31 AM    Alk. phosphatase 87 08/12/2022 01:31 AM    Protein, total 7.1 08/12/2022 01:31 AM    Albumin 3.4 (L) 08/12/2022 01:31 AM    Globulin 3.7 08/12/2022 01:31 AM    A-G Ratio 0.9 (L) 08/12/2022 01:31 AM      Lab Results   Component Value Date/Time    Hemoglobin A1c 7.6 (H) 08/11/2022 08:03 PM         No results for input(s): CPK, CKMB, TROIQ in the last 72 hours.     No lab exists for component: CKQMB, CPKMB        Problem List:     Problem List  Date Reviewed: 6/3/2022            Codes Class Noted    Syncope ICD-10-CM: R55  ICD-9-CM: 780.2  8/11/2022        ACS (acute coronary syndrome) ([address] ICD-10-CM: I24.9  ICD-9-CM: 411.1  6/26/2022        Tympanic membrane perforation, right ICD-10-CM: H72.91  ICD-9-CM: 384.20  6/2/2022        Chest pain ICD-10-CM: R07.9  ICD-9-CM: 786.50  6/2/2022        Chronic left mastoiditis ICD-10-CM: H70.12  ICD-9-CM: 383.1  2/10/2022        Atherosclerosis of coronary artery ICD-10-CM: I25.10  ICD-9-CM: 414.00  3/12/2021    Overview Signed 11/12/2021  4:45 PM by Robbin Reyna LPN     Formatting of this note might be different from the original.  S/p resuscitated VF arrest due to Inferior STEMI 11/2019 2/9/2021 POBA distal Cfx    1/2021 POBA to distal Cfx into lower OM4 and LPL branches c/b non flow limiting dissection    1/2019 Primary PCI of RCA with 3 DANG c/b acute cosure             HLD (hyperlipidemia) ICD-10-CM: E78.5  ICD-9-CM: 272.4  3/12/2021        S/P CABG (coronary artery bypass graft) ICD-10-CM: Z95.1  ICD-9-CM: V45.81  3/12/2021    Overview Signed 11/12/2021  4:45 PM by Robbie Xavier LPN     Formatting of this note might be different from the original.  12/3/2019 LIMA to  LAD complicated by non healing sternal wound requiring debridement and closure with muscle flap 2/25/20             Hypertension (Chronic) ICD-10-CM: I10  ICD-9-CM: 401.9  Unknown    Overview Signed 6/23/2018  2:39 PM by Donna Valencia MD     onset 2009             Microalbuminuria due to type 2 diabetes mellitus ([facility] ICD-10-CM: E11.29, R80.9  ICD-9-CM: 250.40, 791.0  12/27/2017        Type 2 diabetes mellitus with nephropathy (HCC) (Chronic) ICD-10-CM: E11.21  ICD-9-CM: 250.40, 583.81  12/25/2017        Obesity, morbid ([facility] (Chronic) ICD-10-CM: E66.01  ICD-9-CM: 278.01  12/22/2017        Hypothyroidism (Chronic) ICD-10-CM: E03.9  ICD-9-CM: 244.9  Unknown             Sam Mcintosh MD, [facility]

## 2022-08-12 NOTE — ED NOTES
Notified MD Nilda Felipe stated Holter monitor will be delivered to pt's home in 1-2 business days. MD stated that is fine & that he will place d/c order shortly. Pt has been updated on plan of care by MD Nilda Felipe, & orientee JOANNE Klein. Pt stated she is willing to be d/c after she receives a tuna sandwich, IV Morphine & Medicaid cab. CM to set up Medicaid cab. MD Tanisha Douglas aware.

## 2022-08-12 NOTE — PROGRESS NOTES
8/12/2022  5:29 PM  Case management note    Dr. Rachel Washington called us to cancel holter monitor. Left message for cardiac services to cancel them as well. Patient told Dr. Rachel Washington she had appointment with her cardiologist on Monday.   [address]

## 2022-08-12 NOTE — ED NOTES
Myriam Rabago RN reviewed discharge instructions with the patient. The patient verbalized understanding. Discharge medications reviewed with patient and appropriate educational materials and side effects teaching were provided.

## 2022-08-12 NOTE — DISCHARGE INSTRUCTIONS
Jamil Arevalo [facility]  [address] [facility] [address]  [phone]       Patient Discharge Instructions        Name:  Brandi Mckeon, 39 y.o.  :    1985  MRN:    [id number]  PCP:    Kat Lowry DO    Code:  Full    Date of Admission: 2022  Date of Discharge:   2022 5:50 PM  Discharged to: Home with family    Discharge Diagnoses: Active Problems:    Chest pain (2022)      Syncope (2022)        Discharge Medications: It is important that you take the medication exactly as they are prescribed. Keep your medication in the bottles provided by the pharmacist and keep a list of the medication names, dosages, and times to be taken in your wallet. Do not take other medications without consulting your doctor. Continue same home medications      Diet: Cardiac Diet and Diabetic Diet    Activity: Activity as tolerated and no driving for today    Wound care: None    Indwelling devices:  None    Supplemental Oxygen: None    Required Lab work:  None    Glucose management:  None    Outpatient Follow-up(s): Follow-up Information       Follow up With Specialties Details Why Contact Info    Kat Lowry DO Family Medicine Follow up  [address] [facility] [phone]      Elieser Almonte MD Cardiovascular Disease Physician, Internal Medicine Physician, Interventional Cardiology Physician Follow up in 3 day(s) As scheduled [address]   [phone]             RETURN: To this or any other ER if needed for Chest pain or any other emergency or concerns otherwise follow up with your cardiologist on Monday as you scheduled.     Discharge Plan D/W:  Patient, RN, Specialist, and Care Manager      ADDITIONAL INFORMATION: If you experience any of the following symptoms then please call your primary care physician or return to the emergency room if you cannot get hold of your doctor: Fever, chills, nausea, vomiting, diarrhea, change in mentation, falling, bleeding, shortness of breath. Information obtained by :  I understand that if any problems occur after discharge I am to contact my primary care physician or Facility provider if applicable. I understand and acknowledge receipt of the instructions indicated above.                                                                                                                                                Patient or Representative                                                                          Date/Time                                                                                                                                            Physician's or R.N.'s Signature                                                                  Date/Time

## 2022-08-12 NOTE — ED NOTES
Pt moved onto hospital bed per her request. Pt assisted into bed w/ call light in reach. Also sent Perfect Serve to MD Franchesca Pascual regarding pt's request for Synthroid & Robaxin.

## 2022-08-12 NOTE — DISCHARGE SUMMARY
Jamil Arevalo [facility] 79  [address] [facility], [facility] 19  [phone]       [address] [place] [address] SUMMARY        Name:  Richard Oliver, 39 y.o.  :    1985  MRN:    [id number]  PCP:    Mansi Betancourt DO    Code: Full Code    Date of Admission: 2022  Date of Discharge:   2022 5:32 PM  Disposition:  Home with family  Condition:  stable    Consultations:  IP CONSULT TO CARDIOLOGY    Reason for Admission:   Chest pain [R07.9]  Syncope [R55]    Discharge Diagnoses: Active Problems:    Chest pain (2022)      Syncope (2022)        Procedures:  None    Excerpted HPI from H&P of Janette Barrera MD:  Ms. Adria Spatz is a 39 y.o. female with history that includes CAD s/p MI and CABG with 3 stents, DM type II, HTN, chronic pain from presents with a sudden onset of retrosternal area chest pain described as sharp which radiates to the left arm and left side of neck. Pain started this morning,  has been severe, constant, and associated with dizziness and syncope. She reports that she woke up very dizzy which has persisted throughout the day. She reports that she went to the bathroom and on the way back she had a syncopal episode where she fell onto the ground but did not hit her head according to her  who witnessed it. She was out only for several seconds and came back. There was no seizure-like activity no incontinence of stool or bowel and no tongue biting. She was not confused afterwards. In ER she had an unremarkable initial work-up with normal labs and troponin however her glucose was elevated as she is diabetic and uses insulin at home. Her blood pressure was elevated even during the time of my visit with systolic ranging in the 926G and diastolic into the 861S which she says is much higher than her normal 120s to 130s over 70s to [id number] Course: The patient was admitted and was worked up. Her troponins remained within normal limits. EKG did not show evidence of ischemia. Also underwent echocardiogram showing an EF of 60 to 65% with normal ventricular size wall thickness and wall motion. Normal diastolic dysfunction although the echo was somewhat difficult due to the patient's body habitus. She was evaluated by cardiology who felt that at this point there is no further work-up and can be discharged home and to follow-up with her cardiologist Dr Len Sun for follow-up and Holter monitor. However, the patient was hesitant to leave and wanted to have medical cardiac cath or other cardiac work-up. I touch base with cardiology again who said that once again given her work-up, history, and past cardiac cath he did not feel that she needed a repeat cardiac cath. He did however suggest for us to set up a Holter monitor and send her home. However, the patient was able to secure an appointment with her primary cardiologist on Monday and she turned down the Holter monitor. She is agreeable to go home and follow-up with her cardiologist but she wanted to be known and documented that she felt she needs further work-up. Advised to follow-up with cardiologist and go to the ER if she feels that she needs to. At this point patient is going to be discharged home. She is to continue with her same home medications no new medications added. Physical Exam:  Visit Vitals  BP (!) 138/95   Pulse 87   Temp 98 °F (36.7 °C)   Resp 16   Ht 5' 4\" (1.626 m)   Wt 104.3 kg (230 lb)   SpO2 99%   BMI 39.48 kg/m²     Gen:  Alert and in NAD  Lungs: clear to auscultation  Heart:  S1, S2 normal, regular rate, and regular rhythm  Abd:  obese, soft, nontender, BS present and normactive  Ext:  no cyanosis and no edema  Skin:  normal skin color  Neuro: alert, oriented, no defects noted in general exam.  Psych: Cooperative but somewhat apprehensive given her concerns.     Labs:  Recent Labs     08/11/22  1135   WBC 10.8   HGB 13.6   HCT 40.0   *     Recent Labs 08/12/22  0131      K 4.0      CO2 23   *   BUN 20   CREA 1.21*   CA 8.8   ALB 3.4*   TBILI 0.8   ALT 47       Immunizations Given During Admission:   None        PATIENT INSTRUCTIONS       Activity: Activity as tolerated and no driving for today  Diet: Cardiac Diet and Diabetic Diet  Wound Care:  None  Follow-up(s): Follow-up Information       Follow up With Specialties Details Why Contact Info    Elizabeth Keenan DO Family Medicine Follow up  [address] [facility] [id number] [phone]      Edward Ortiz MD Cardiovascular Disease Physician, Internal Medicine Physician, Interventional Cardiology Physician Follow up in 3 day(s) As scheduled [address] [facility] [address] [facility]  [phone]             Current Discharge Medication List        CONTINUE these medications which have NOT CHANGED    Details   levothyroxine (SYNTHROID) 137 mcg tablet Take 137 mcg by mouth Daily (before breakfast). Cetirizine (ZyrTEC) 10 mg cap Take 10 mg by mouth daily. dulaglutide (Trulicity) 1.5 NE/9.0 mL sub-q pen 0.5 mL by SubCUTAneous route every seven (7) days. Stop 0.75 mg  Qty: 4 Each, Refills: 3    Associated Diagnoses: Type 2 diabetes mellitus with hyperglycemia, with long-term current use of insulin ([facility])      insulin detemir U-100 (LEVEMIR FLEXTOUCH) 100 unit/mL (3 mL) inpn Inject 30 units in AM and 30  units at bed time  Qty: 60 mL, Refills: 3    Associated Diagnoses: Type 2 diabetes mellitus with hyperglycemia, with long-term current use of insulin ([facility])      insulin aspart U-100 (NovoLOG U-100 Insulin aspart) 100 unit/mL injection Inject 10- 15 units before breakfast, 10- 15 units before lunch and 10 - 15 units before dinner.   Qty: 50 mL, Refills: 3    Associated Diagnoses: Type 2 diabetes mellitus with hyperglycemia, with long-term current use of insulin ([facility])      bisacodyL (DULCOLAX) 5 mg EC tablet Take 5 mg by mouth daily as needed for Constipation. ticagrelor (BRILINTA) 90 mg tablet Take 90 mg by mouth two (2) times a day. atorvastatin (LIPITOR) 20 mg tablet Take 20 mg by mouth nightly. nitroglycerin (NITROSTAT) 0.4 mg SL tablet 0.4 mg by SubLINGual route every five (5) minutes as needed for Chest Pain. Up to 3 doses. methocarbamoL (ROBAXIN) 750 mg tablet Take 750 mg by mouth two (2) times a day. senna (SENOKOT) 8.6 mg tablet Take 1 Tablet by mouth as needed for Constipation. lisinopriL (PRINIVIL, ZESTRIL) 2.5 mg tablet Take 2.5 mg by mouth daily. empagliflozin (JARDIANCE) 10 mg tablet Take 10 mg by mouth daily. ranolazine ER (RANEXA) 1,000 mg Take 500 mg by mouth two (2) times a day. carvedilol (COREG) 6.25 mg tablet Take 2 Tabs by mouth two (2) times daily (with meals). Qty: 60 Tab, Refills: 0      aspirin delayed-release 81 mg tablet Take 1 Tab by mouth daily. Qty: 30 Tab, Refills: 3    Associated Diagnoses: Type 2 diabetes mellitus with hyperglycemia, with long-term current use of insulin ([facility])      pantoprazole (Protonix) 40 mg tablet Take 1 Tablet by mouth daily. Qty: 30 Tablet, Refills: 0      Insulin Needles, Disposable, 32 gauge x 5/32\" ndle Use to inject insulin BID Dx Code: E11.65  Qty: 200 Pen Needle, Refills: 3    Associated Diagnoses: Type 2 diabetes mellitus with hyperglycemia, with long-term current use of insulin ([facility])      insulin syringe,safetyneedle 0.5 mL 31 gauge x 15/64\" syrg 1 Syringe by Does Not Apply route three (3) times daily. Dx Code: E11.65  Qty: 300 Each, Refills: 3    Associated Diagnoses: Type 2 diabetes mellitus with hyperglycemia, with long-term current use of insulin ([facility])      blood-glucose sensor (DEXCOM G6 SENSOR) by Does Not Apply route.            STOP taking these medications       ondansetron hcl (Zofran) 4 mg tablet Comments:   Reason for Stopping:         ondansetron hcl (Zofran) 4 mg tablet Comments:   Reason for Stopping:               Discharge Plan D/W:  Patient, RN, Specialist, and Care Manager          **Total time spent coordinating discharge (preparing & going over instructions, follow-ups, prescriptions, and documentation):  55 minutes                 ___________________________________________________    Admitting Physician: Montrell Gray MD   Date of service:    8/12/2022         CC: Nedra Polanco DO   .

## 2022-08-12 NOTE — PROGRESS NOTES
8/12/2022  4:04 PM  Case management note    Reason for Admission:  chest pain    Patient came to hospital for chest pain with a syncopal episode. Patient has history of CAD, stents, DM, HTn and CVA. Patient is , has assistance from spouse for ADL's and drives when needed. Patient has a walker  Patient spouse is her paid caregiver  [employer] [facility] [employer]  OBS educated, documented                     RUR Score:          low           Plan for utilizing home health:      PT/OT to eval    PCP: First and Last name:  Keeley Spivey,      Name of Practice:    Are you a current patient: Yes/No: yes   Approximate date of last visit: 1 month   Can you participate in a virtual visit with your PCP:                     Current Advanced Directive/Advance Care Plan: Full Code NO AD      Healthcare Decision Maker:                Primary Decision Maker: Mona Diaz - [phone]                  Transition of Care Plan:               Home with family assistance  PCP follow up  AD planning  Card follow up  CM to follow for discharge needs  Care Management Interventions  PCP Verified by CM:  Yes (Dr. Urbano Lizarraga)  Support Systems: Spouse/Significant Other  Confirm Follow Up Transport: Family  The Plan for Transition of Care is Related to the Following Treatment Goals : chest pain  Discharge Location  Patient Expects to be Discharged to[de-identified] Home with family assistance  Elvin Anthony

## 2022-08-21 DIAGNOSIS — Z79.4 TYPE 2 DIABETES MELLITUS WITH HYPERGLYCEMIA, WITH LONG-TERM CURRENT USE OF INSULIN (HCC): ICD-10-CM

## 2022-08-21 DIAGNOSIS — E11.65 TYPE 2 DIABETES MELLITUS WITH HYPERGLYCEMIA, WITH LONG-TERM CURRENT USE OF INSULIN (HCC): ICD-10-CM

## 2022-08-21 RX ORDER — DULAGLUTIDE 1.5 MG/.5ML
INJECTION, SOLUTION SUBCUTANEOUS
Qty: 4 ML | Refills: 0 | Status: SHIPPED | OUTPATIENT
Start: 2022-08-21 | End: 2022-09-22

## 2022-08-24 ENCOUNTER — VIRTUAL VISIT (OUTPATIENT)
Dept: ENDOCRINOLOGY | Age: 37
End: 2022-08-24
Payer: MEDICARE

## 2022-08-24 DIAGNOSIS — E11.21 TYPE 2 DIABETES MELLITUS WITH NEPHROPATHY (HCC): Primary | Chronic | ICD-10-CM

## 2022-08-24 DIAGNOSIS — N18.30 STAGE 3 CHRONIC KIDNEY DISEASE, UNSPECIFIED WHETHER STAGE 3A OR 3B CKD (HCC): ICD-10-CM

## 2022-08-24 DIAGNOSIS — Z79.4 TYPE 2 DIABETES MELLITUS WITH HYPERGLYCEMIA, WITH LONG-TERM CURRENT USE OF INSULIN (HCC): ICD-10-CM

## 2022-08-24 DIAGNOSIS — E11.65 TYPE 2 DIABETES MELLITUS WITH HYPERGLYCEMIA, WITH LONG-TERM CURRENT USE OF INSULIN (HCC): ICD-10-CM

## 2022-08-24 DIAGNOSIS — E78.2 MIXED HYPERLIPIDEMIA: ICD-10-CM

## 2022-08-24 DIAGNOSIS — I10 PRIMARY HYPERTENSION: Chronic | ICD-10-CM

## 2022-08-24 DIAGNOSIS — E03.9 ACQUIRED HYPOTHYROIDISM: Chronic | ICD-10-CM

## 2022-08-24 PROCEDURE — G8427 DOCREV CUR MEDS BY ELIG CLIN: HCPCS | Performed by: INTERNAL MEDICINE

## 2022-08-24 PROCEDURE — G8756 NO BP MEASURE DOC: HCPCS | Performed by: INTERNAL MEDICINE

## 2022-08-24 PROCEDURE — 3051F HG A1C>EQUAL 7.0%<8.0%: CPT | Performed by: INTERNAL MEDICINE

## 2022-08-24 PROCEDURE — 2022F DILAT RTA XM EVC RTNOPTHY: CPT | Performed by: INTERNAL MEDICINE

## 2022-08-24 PROCEDURE — 99214 OFFICE O/P EST MOD 30 MIN: CPT | Performed by: INTERNAL MEDICINE

## 2022-08-24 PROCEDURE — G8432 DEP SCR NOT DOC, RNG: HCPCS | Performed by: INTERNAL MEDICINE

## 2022-08-24 RX ORDER — PEN NEEDLE, DIABETIC 31 GX3/16"
NEEDLE, DISPOSABLE MISCELLANEOUS
Qty: 200 PEN NEEDLE | Refills: 3 | Status: SHIPPED | OUTPATIENT
Start: 2022-08-24

## 2022-08-24 RX ORDER — CARVEDILOL 12.5 MG/1
12.5 TABLET ORAL 2 TIMES DAILY
COMMUNITY
Start: 2022-06-19

## 2022-08-24 RX ORDER — RANOLAZINE 500 MG/1
500 TABLET, EXTENDED RELEASE ORAL 2 TIMES DAILY
COMMUNITY
Start: 2022-08-01

## 2022-08-24 RX ORDER — INSULIN ASPART 100 [IU]/ML
INJECTION, SOLUTION INTRAVENOUS; SUBCUTANEOUS
Qty: 50 ML | Refills: 3 | Status: SHIPPED | OUTPATIENT
Start: 2022-08-24 | End: 2022-08-29 | Stop reason: ALTCHOICE

## 2022-08-24 NOTE — PROGRESS NOTES
[facility]               Halley Gaona MD          Patient Information Name : Eris Del Toro 39 y.o.   YOB: 1985         Referred by: Osito Day DO         Chief Complaint   Patient presents with    Follow-up    Thyroid Problem    Diabetes   Pursuant to the emergency declaration under the [facility], [facility] authority and the [employer] Act, this Virtual  Visit was conducted, with patient's consent, to reduce the patient's risk of exposure to COVID-19 . Patient  is aware that this is a billable encounter and is responsible for copays/deductibles       Services were provided through a video synchronous discussion virtually to substitute for in-person clinic visit. Place of service: Provider : Office  Patient: Home      History of Present Illness: Eris Del Toro is a 39 y.o. female here for follow-up of  Diabetes Mellitus. Diabetes mellitus was diagnosed in 2009 . End organ effects of diabetes: peripheral neuropathy, gastroparesis (unsure of the diagnosis),CKD. History of CAD,CABG,CVA  She is on MDI could not tolerate metformin  Prior medications tried Bard Deshawn  She is on Dexcom  She is on MDI  No severe hypoglycemia  Insurance did not approve Lantus      Prior history  Has Dexcom G6 which she got it after she had several episodes of hypoglycemia according to the history.       Wt Readings from Last 3 Encounters:   08/12/22 230 lb (104.3 kg)   06/28/22 230 lb 9.6 oz (104.6 kg)   05/26/22 224 lb 6.9 oz (101.8 kg)       BP Readings from Last 3 Encounters:   08/12/22 (!) 138/95   06/28/22 (!) 144/94   06/03/22 132/88           Past Medical History:   Diagnosis Date    Abscess     Anemia     CAD (coronary artery disease) 2019    CABG X1    Chronic pain     LEFT SCIATIC, STERNAL WOUND    Complicated migraine     with extremity numbness    H/O transfusion of packed red blood cells 07/2021    Heart attack ([facility] 11/2019    Hx MRSA infection 2009    Hypertension     onset 2009    Hypothyroidism     Irregular menstrual cycle     Nausea & vomiting     Obesity     Stroke ([facility] 2019    LOWER BODY WEAKNESS    T2DM (type 2 diabetes mellitus) ([place] [address].)     onset 2009 IDDM    Thromboembolus ([place] [address] 2009    RIGHT LEG - HOSP. HEPARIN [place]     Current Outpatient Medications   Medication Sig    carvediloL (COREG) 12.5 mg tablet Take 12.5 mg by mouth two (2) times a day. ranolazine ER (RANEXA) 500 mg SR tablet Take 500 mg by mouth two (2) times a day. Trulicity 1.5 UY/3.9 mL sub-q pen INJECT 0.5 ML UNDER SKIN ONCE DAILY EVERY 7 DAYS    pantoprazole (Protonix) 40 mg tablet Take 1 Tablet by mouth daily. levothyroxine (SYNTHROID) 137 mcg tablet Take 137 mcg by mouth Daily (before breakfast). Cetirizine (ZyrTEC) 10 mg cap Take 10 mg by mouth daily. insulin detemir U-100 (LEVEMIR FLEXTOUCH) 100 unit/mL (3 mL) inpn Inject 30 units in AM and 30  units at bed time    insulin aspart U-100 (NovoLOG U-100 Insulin aspart) 100 unit/mL injection Inject 10- 15 units before breakfast, 10- 15 units before lunch and 10 - 15 units before dinner. Insulin Needles, Disposable, 32 gauge x 5/32\" ndle Use to inject insulin BID Dx Code: E11.65    insulin syringe,safetyneedle 0.5 mL 31 gauge x 15/64\" syrg 1 Syringe by Does Not Apply route three (3) times daily. Dx Code: E11.65    bisacodyL (DULCOLAX) 5 mg EC tablet Take 5 mg by mouth daily as needed for Constipation. ticagrelor (BRILINTA) 90 mg tablet Take 90 mg by mouth two (2) times a day. atorvastatin (LIPITOR) 20 mg tablet Take 20 mg by mouth nightly. nitroglycerin (NITROSTAT) 0.4 mg SL tablet 0.4 mg by SubLINGual route every five (5) minutes as needed for Chest Pain. Up to 3 doses. methocarbamoL (ROBAXIN) 750 mg tablet Take 750 mg by mouth two (2) times a day. senna (SENOKOT) 8.6 mg tablet Take 1 Tablet by mouth as needed for Constipation. lisinopriL (PRINIVIL, ZESTRIL) 2.5 mg tablet Take 2.5 mg by mouth daily. empagliflozin (JARDIANCE) 10 mg tablet Take 10 mg by mouth daily. blood-glucose sensor (DEXCOM G6 SENSOR) by Does Not Apply route. aspirin delayed-release 81 mg tablet Take 1 Tab by mouth daily. No current facility-administered medications for this visit. Allergies   Allergen Reactions    Other Food Swelling     JALIPENO PEPPERS - FACILA SWELLING    Ciprofloxacin Anaphylaxis, Rash and Unknown (comments)    Bumetanide Other (comments) and Unknown (comments)     Hearing loss  Other reaction(s): Other (see comments)    Coconut Other (comments)     Facial swelling    Tree Pollen-[place]ag[place] Rash     HICKORY SMOKE FLAVORING    Voltaren [Diclofenac Sodium] Myalgia and Other (comments)     \"muscle spasms\", LEG SPASMS      Diclofenac Myalgia and Rash     \"muscle spasms\"  Other reaction(s): Myalgias  \"muscle spasms\"      Penicillins Rash, Hives and Unknown (comments)     OCCURRED IN INFANCY NO REPORTED SEVERE IgE MEDIATED REACTIONS  Patient screened for any delayed non-IgE-mediated reaction to PCN. Patient notes the following:    No delayed non-IgE-mediated reaction to PCN          Prochlorperazine Anxiety and Unknown (comments)     HALLUCINATIONS    Vancomycin Other (comments), Rash and Unknown (comments)     Kidneys shut down  Other reaction(s): Other (see comments)  Kidneys shut down  Other reaction(s): Other (comments)  Kidneys shut down  Kidneys shut down         Review of Systems:  Per HPI    Physical Examination:   There were no vitals taken for this visit. Estimated body mass index is 39.48 kg/m² as calculated from the following:    Height as of 8/12/22: 5' 4\" (1.626 m). Weight as of 8/12/22: 230 lb (104.3 kg).   General: pleasant, no distress, good eye contact  HEENT: no pallor, no periorbital edema, EOMI  Neck: supple,  Cardiovascular: regular,  normal S1 and S2,   Respiratory: clear to auscultation bilaterally  Musculoskeletal: no edema  Neurological: alert and oriented    No foot ulcers, no callus          Data Reviewed:        [x] Reviewed labs    Lab Results   Component Value Date/Time    Hemoglobin A1c 7.6 (H) 08/11/2022 08:03 PM    Hemoglobin A1c 7.7 (H) 05/26/2022 01:58 PM    Hemoglobin A1c 7.4 (H) 02/03/2022 11:44 AM    Glucose 152 (H) 08/12/2022 01:31 AM    Glucose (POC) 88 08/12/2022 01:32 PM    Microalb/Creat ratio (ug/mg creat.) 96.6 (H) 12/22/2017 09:30 AM    LDL,Direct 44 08/12/2022 01:31 AM    LDL, calculated Not calculated due to elevated triglyceride level 08/12/2022 01:31 AM    Creatinine (POC) 0.6 07/06/2018 05:23 PM    Creatinine 1.21 (H) 08/12/2022 01:31 AM          Assessment/Plan:       ICD-10-CM ICD-9-CM   1. Type 2 diabetes mellitus with nephropathy (HCC)  E11.21 250.40     583.81   2. Acquired hypothyroidism  E03.9 244.9   3. Mixed hyperlipidemia  E78.2 272.2   4. Primary hypertension  I10 401.9   5. Type 2 diabetes mellitus with hyperglycemia, with long-term current use of insulin (HCC)  E11.65 250.00    Z79.4 790.29     V58.67        1. Type 2 Diabetes Mellitus   Lab Results   Component Value Date/Time    Hemoglobin A1c 7.6 (H) 08/11/2022 08:03 PM   Controlled stable, tight glycemic control will be a challenge. Could not tolerate Metformin. Tolerating Trulicity  She did not have gastric emptying study, based on the symptoms gastroparesis was diagnosed. Levemir 30 units twice daily, as it does not seem to be lasting longer, until she gets Lantus  NovoLog 10 units before meals  Trulicity 1.5 mg weekly, Jardiance        2. HTN : Continue current therapy     3. Hyperlipidemia : Statin    4. Obesity per medical criteria :There is no height or weight on file to calculate BMI. Discussed about the importance of activity and carbohydrate portion control. 5.  CAD/CABG    6. CKD    7. Peripheral neuropathy    8. Diabetic retinopathy    9.   Hypothyroid-on levothyroxine  Managed by PCP    There are no Patient Instructions on file for this visit. Thank you for allowing me to participate in the care of this patient. Yen Espino MD      Patient verbalized understanding     Voice-recognition software was used to generate this report, which may result in some phonetic-based errors in the grammar and contents. Even though attempts were made to correct all the mistakes, some may have been missed and remained in the body of the report.

## 2022-08-24 NOTE — PATIENT INSTRUCTIONS
Check blood sugars before meals and at bedtime. Low blood glucose is less than 70     Goal of blood glucose before meals 90 - 120 , 2  Hours after meals less than 180     Maintain the log and bring it all your appointments    If the bedtime sugars are less than 100 ,eat a 15 gm snack. Levemir 30 units in AM and 30  units at bed time    Novolog or Humalog or Apidra insulin (fast acting) 10- 15  units before breakfast, 10- 15  units before lunch and 10 - 15  units before dinner. If sugars before meals are less than 70 then no insulin     Trulicity weekly     Novolog or Humalog for high sugars     Correction Scale:  3 units for every 50 above 150    Blood sugar  Fast acting insulin          150-200  Extra 3 Units       201-250  Extra 6 Units       251-300  Extra 9 Units       301-350  Extra 12  Units        351-400  Extra 15  Units     Example: My planned insulin dose:    ____ Units for meals    +    ____ Extra Correction Units  if high =  ____ total units to take together as one injection.

## 2022-08-29 RX ORDER — INSULIN LISPRO 100 [IU]/ML
10-15 INJECTION, SOLUTION INTRAVENOUS; SUBCUTANEOUS
Qty: 50 ML | Refills: 3 | Status: SHIPPED | OUTPATIENT
Start: 2022-08-29

## 2022-09-22 DIAGNOSIS — E11.65 TYPE 2 DIABETES MELLITUS WITH HYPERGLYCEMIA, WITH LONG-TERM CURRENT USE OF INSULIN (HCC): ICD-10-CM

## 2022-09-22 DIAGNOSIS — Z79.4 TYPE 2 DIABETES MELLITUS WITH HYPERGLYCEMIA, WITH LONG-TERM CURRENT USE OF INSULIN (HCC): ICD-10-CM

## 2022-09-22 RX ORDER — DULAGLUTIDE 1.5 MG/.5ML
INJECTION, SOLUTION SUBCUTANEOUS
Qty: 4 ML | Refills: 5 | Status: SHIPPED | OUTPATIENT
Start: 2022-09-22

## 2022-09-23 ENCOUNTER — APPOINTMENT (OUTPATIENT)
Dept: GENERAL RADIOLOGY | Age: 37
End: 2022-09-23
Attending: NURSE PRACTITIONER
Payer: MEDICARE

## 2022-09-23 ENCOUNTER — HOSPITAL ENCOUNTER (EMERGENCY)
Age: 37
Discharge: HOME | End: 2022-09-23
Attending: STUDENT IN AN ORGANIZED HEALTH CARE EDUCATION/TRAINING PROGRAM
Payer: MEDICARE

## 2022-09-23 VITALS
HEART RATE: 79 BPM | WEIGHT: 230 LBS | OXYGEN SATURATION: 100 % | TEMPERATURE: 97.6 F | SYSTOLIC BLOOD PRESSURE: 130 MMHG | DIASTOLIC BLOOD PRESSURE: 81 MMHG | BODY MASS INDEX: 39.27 KG/M2 | RESPIRATION RATE: 17 BRPM | HEIGHT: 64 IN

## 2022-09-23 DIAGNOSIS — K59.00 CONSTIPATION, UNSPECIFIED CONSTIPATION TYPE: Primary | ICD-10-CM

## 2022-09-23 DIAGNOSIS — R07.9 CHEST PAIN, UNSPECIFIED TYPE: ICD-10-CM

## 2022-09-23 LAB
ALBUMIN SERPL-MCNC: 3.8 G/DL (ref 3.5–5.2)
ALBUMIN/GLOB SERPL: 1.2 {RATIO} (ref 1.1–2.2)
ALP SERPL-CCNC: 75 U/L (ref 35–104)
ALT SERPL-CCNC: 25 U/L (ref 10–35)
ANION GAP SERPL CALC-SCNC: 9 MMOL/L (ref 5–15)
APPEARANCE UR: ABNORMAL
AST SERPL-CCNC: 33 U/L (ref 10–35)
BACTERIA URNS QL MICRO: NEGATIVE /HPF
BASOPHILS # BLD: 0.1 K/UL (ref 0–0.1)
BASOPHILS NFR BLD: 0 % (ref 0–1)
BILIRUB SERPL-MCNC: 0.6 MG/DL (ref 0.2–1)
BILIRUB UR QL: NEGATIVE
BUN SERPL-MCNC: 17 MG/DL (ref 6–20)
BUN/CREAT SERPL: 16 (ref 12–20)
CALCIUM SERPL-MCNC: 8.9 MG/DL (ref 8.6–10)
CHLORIDE SERPL-SCNC: 103 MMOL/L (ref 98–107)
CO2 SERPL-SCNC: 24 MMOL/L (ref 22–29)
COLOR UR: ABNORMAL
CREAT SERPL-MCNC: 1.05 MG/DL (ref 0.5–0.9)
DIFFERENTIAL METHOD BLD: ABNORMAL
EOSINOPHIL # BLD: 0.4 K/UL (ref 0–0.4)
EOSINOPHIL NFR BLD: 4 % (ref 0–7)
EPITH CASTS URNS QL MICRO: ABNORMAL /LPF
ERYTHROCYTE [DISTWIDTH] IN BLOOD BY AUTOMATED COUNT: 12.5 % (ref 11.5–14.5)
GLOBULIN SER CALC-MCNC: 3.2 G/DL (ref 2–4)
GLUCOSE SERPL-MCNC: 134 MG/DL (ref 65–100)
GLUCOSE UR STRIP.AUTO-MCNC: >1000 MG/DL
HCG UR QL: NEGATIVE
HCT VFR BLD AUTO: 39.6 % (ref 35–47)
HGB BLD-MCNC: 13.7 G/DL (ref 11.5–16)
HGB UR QL STRIP: NEGATIVE
IMM GRANULOCYTES # BLD AUTO: 0.1 K/UL (ref 0–0.04)
IMM GRANULOCYTES NFR BLD AUTO: 1 % (ref 0–0.5)
KETONES UR QL STRIP.AUTO: NEGATIVE MG/DL
LEUKOCYTE ESTERASE UR QL STRIP.AUTO: NEGATIVE
LIPASE SERPL-CCNC: 33 U/L (ref 13–60)
LYMPHOCYTES # BLD: 3.3 K/UL (ref 0.8–3.5)
LYMPHOCYTES NFR BLD: 28 % (ref 12–49)
MCH RBC QN AUTO: 31.4 PG (ref 26–34)
MCHC RBC AUTO-ENTMCNC: 34.6 G/DL (ref 30–36.5)
MCV RBC AUTO: 90.6 FL (ref 80–99)
MONOCYTES # BLD: 1.2 K/UL (ref 0–1)
MONOCYTES NFR BLD: 10 % (ref 5–13)
NEUTS SEG # BLD: 7 K/UL (ref 1.8–8)
NEUTS SEG NFR BLD: 58 % (ref 32–75)
NITRITE UR QL STRIP.AUTO: NEGATIVE
NRBC # BLD: 0 K/UL (ref 0–0.01)
NRBC BLD-RTO: 0 PER 100 WBC
PH UR STRIP: 6.5 [PH] (ref 5–8)
PLATELET # BLD AUTO: 420 K/UL (ref 150–400)
PMV BLD AUTO: 9.5 FL (ref 8.9–12.9)
POTASSIUM SERPL-SCNC: 4.5 MMOL/L (ref 3.5–5.1)
PROT SERPL-MCNC: 7 G/DL (ref 6.4–8.3)
PROT UR STRIP-MCNC: ABNORMAL MG/DL
RBC # BLD AUTO: 4.37 M/UL (ref 3.8–5.2)
RBC #/AREA URNS HPF: ABNORMAL /HPF
SODIUM SERPL-SCNC: 136 MMOL/L (ref 136–145)
SP GR UR REFRACTOMETRY: 1.01 (ref 1–1.03)
TROPONIN I BLD-MCNC: <0.04 NG/ML (ref 0–0.08)
TROPONIN I BLD-MCNC: <0.04 NG/ML (ref 0–0.08)
UA: UC IF INDICATED,UAUC: ABNORMAL
UR CULT HOLD, URHOLD: NORMAL
UROBILINOGEN UR QL STRIP.AUTO: 0.2 EU/DL (ref 0.2–1)
WBC # BLD AUTO: 12 K/UL (ref 3.6–11)
WBC URNS QL MICRO: ABNORMAL /HPF (ref 0–4)

## 2022-09-23 PROCEDURE — 81001 URINALYSIS AUTO W/SCOPE: CPT

## 2022-09-23 PROCEDURE — 71046 X-RAY EXAM CHEST 2 VIEWS: CPT

## 2022-09-23 PROCEDURE — 74011250636 HC RX REV CODE- 250/636: Performed by: NURSE PRACTITIONER

## 2022-09-23 PROCEDURE — 74011250637 HC RX REV CODE- 250/637: Performed by: NURSE PRACTITIONER

## 2022-09-23 PROCEDURE — 99285 EMERGENCY DEPT VISIT HI MDM: CPT

## 2022-09-23 PROCEDURE — 96375 TX/PRO/DX INJ NEW DRUG ADDON: CPT

## 2022-09-23 PROCEDURE — 85025 COMPLETE CBC W/AUTO DIFF WBC: CPT

## 2022-09-23 PROCEDURE — 36415 COLL VENOUS BLD VENIPUNCTURE: CPT

## 2022-09-23 PROCEDURE — 96361 HYDRATE IV INFUSION ADD-ON: CPT

## 2022-09-23 PROCEDURE — 93005 ELECTROCARDIOGRAM TRACING: CPT

## 2022-09-23 PROCEDURE — 96374 THER/PROPH/DIAG INJ IV PUSH: CPT

## 2022-09-23 PROCEDURE — 81025 URINE PREGNANCY TEST: CPT

## 2022-09-23 PROCEDURE — 83690 ASSAY OF LIPASE: CPT

## 2022-09-23 PROCEDURE — 74018 RADEX ABDOMEN 1 VIEW: CPT

## 2022-09-23 PROCEDURE — 84484 ASSAY OF TROPONIN QUANT: CPT

## 2022-09-23 PROCEDURE — 80053 COMPREHEN METABOLIC PANEL: CPT

## 2022-09-23 RX ORDER — ONDANSETRON 2 MG/ML
4 INJECTION INTRAMUSCULAR; INTRAVENOUS
Status: COMPLETED | OUTPATIENT
Start: 2022-09-23 | End: 2022-09-23

## 2022-09-23 RX ORDER — DICYCLOMINE HYDROCHLORIDE 10 MG/ML
20 INJECTION INTRAMUSCULAR
Status: DISCONTINUED | OUTPATIENT
Start: 2022-09-23 | End: 2022-09-23

## 2022-09-23 RX ORDER — SODIUM CHLORIDE 9 MG/ML
1000 INJECTION, SOLUTION INTRAVENOUS ONCE
Status: COMPLETED | OUTPATIENT
Start: 2022-09-23 | End: 2022-09-23

## 2022-09-23 RX ORDER — MORPHINE SULFATE 2 MG/ML
2 INJECTION, SOLUTION INTRAMUSCULAR; INTRAVENOUS
Status: COMPLETED | OUTPATIENT
Start: 2022-09-23 | End: 2022-09-23

## 2022-09-23 RX ORDER — POLYETHYLENE GLYCOL 3350, SODIUM SULFATE ANHYDROUS, SODIUM BICARBONATE, SODIUM CHLORIDE, POTASSIUM CHLORIDE 236; 22.74; 6.74; 5.86; 2.97 G/4L; G/4L; G/4L; G/4L; G/4L
4 POWDER, FOR SOLUTION ORAL
Qty: 4000 ML | Refills: 0 | Status: SHIPPED | OUTPATIENT
Start: 2022-09-23 | End: 2022-09-23

## 2022-09-23 RX ORDER — NITROGLYCERIN 0.4 MG/1
0.4 TABLET SUBLINGUAL
Status: COMPLETED | OUTPATIENT
Start: 2022-09-23 | End: 2022-09-23

## 2022-09-23 RX ORDER — POLYETHYLENE GLYCOL 3350 17 G/17G
17 POWDER, FOR SOLUTION ORAL DAILY
Qty: 510 G | Refills: 0 | Status: SHIPPED | OUTPATIENT
Start: 2022-09-23 | End: 2022-10-23

## 2022-09-23 RX ADMIN — ONDANSETRON 4 MG: 2 INJECTION INTRAMUSCULAR; INTRAVENOUS at 17:56

## 2022-09-23 RX ADMIN — SODIUM CHLORIDE 1000 ML: 9 INJECTION, SOLUTION INTRAVENOUS at 17:49

## 2022-09-23 RX ADMIN — MORPHINE SULFATE 2 MG: 2 INJECTION, SOLUTION INTRAMUSCULAR; INTRAVENOUS at 17:55

## 2022-09-23 RX ADMIN — NITROGLYCERIN 0.4 MG: 0.4 TABLET, ORALLY DISINTEGRATING SUBLINGUAL at 17:54

## 2022-09-23 NOTE — ED TRIAGE NOTES
PT reports coffee ground diarrhea for 14 days. Pt reports not having a solid stool in 25 days. PT also reports chest pain pt has taken 2 nitro with no relief. PT also has abdominal pain. PT has a hx of 2 heart attacks and a stroke.

## 2022-09-23 NOTE — DISCHARGE INSTRUCTIONS
Your lab work today was reassuring for no dehydration. Increase your water intake,walk more, increase your fresh fruits and vegetables, and consider taking MiraLAX over-the-counter to help with your constipation. If your pain continues you may follow-up with GI or discuss with your primary care doctor about adding Linzess to help with your constipation.   Call Monday morning and schedule a follow-up appointment with your cardiologist.

## 2022-09-23 NOTE — ED PROVIDER NOTES
42-year-old female with past medical history of abscesses, anemia, CAD-CABG x1, chronic pain, complicated migraines history of transfusions, heart attack, hypertension, hypothyroidism, obesity, stroke, type 2 diabetes and DVT to the right leg presents to the emergency center with complaints of chest pain and diarrhea. Chest pain is described as 7 out of 10, started at rest, substernal radiating slightly to the right. Patient denies association with nausea, vomiting, but states she took 2 nitro tabs at home prior to arrival with no relief. Also states that she has not had a solid stool in the last 25 days, brown liquid only. Denies fever,chills, cough, congestion, SOB,nausea, vomiting, dysuria. Past Medical History:   Diagnosis Date    Abscess     Anemia     CAD (coronary artery disease) 2019    CABG X1    Chronic pain     LEFT SCIATIC, STERNAL WOUND    Complicated migraine     with extremity numbness    H/O transfusion of packed red blood cells 07/2021    Heart attack ([address] 11/2019    Hx MRSA infection 2009    Hypertension     onset 2009    Hypothyroidism     Irregular menstrual cycle     Nausea & vomiting     Obesity     Stroke ([address] 2019    LOWER BODY WEAKNESS    T2DM (type 2 diabetes mellitus) ([address]     onset 2009 IDDM    Thromboembolus ([address] 2009    RIGHT LEG - HOSP.  [facility] [place]       Past Surgical History:   Procedure Laterality Date    HX CHOLECYSTECTOMY      HX CORONARY ARTERY BYPASS GRAFT  12/03/2019    HX GYN Bilateral 2021    SALPINGECTOMY    HX GYN  2021    ENDOMETRIAL ABLATION    HX HEENT      T&A    HX OTHER SURGICAL      2 TEETH REMOVED     HX TONSIL AND ADENOIDECTOMY  1992    HX TYMPANOSTOMY Bilateral     MULTIPLE    HX TYMPANOSTOMY      HX WISDOM TEETH EXTRACTION      [place] CARDIAC SURG PROCEDURE UNLIST  2019    CABG X 1, CARDIAC CATH STENT X 3         Family History:   Problem Relation Age of Onset    Hypertension Other         \"all her family\"    Diabetes Other         \"all her family\" Cancer Mother         cervical    Stroke Mother     Heart Disease Mother     Gall Bladder Disease Mother     Diabetes Mother     Hypertension Mother     Elevated Lipids Mother     Stroke Father     Heart Disease Father     Diabetes Father     Heart Attack Father     Elevated Lipids Father     Kidney Disease Father         ESRD    Gall Bladder Disease Brother     Diabetes Brother     Kidney Disease Brother     Cancer Brother         KIDNEY    Hypertension Brother     Migraines Maternal Aunt     Diabetes Paternal Aunt     Stroke Maternal Grandmother     Diabetes Maternal Grandmother     Cancer Maternal Grandmother         cervical    Heart Attack Maternal Grandmother     Elevated Lipids Maternal Grandmother     Stroke Maternal Grandfather     Diabetes Maternal Grandfather     Elevated Lipids Maternal Grandfather     Cancer Maternal Grandfather     Heart Attack Maternal Grandfather     Anesth Problems Neg Hx        Social History     Socioeconomic History    Marital status:      Spouse name: Not on file    Number of children: Not on file    Years of education: Not on file    Highest education level: Not on file   Occupational History    Not on file   Tobacco Use    Smoking status: Passive Smoke Exposure - Never Smoker    Smokeless tobacco: Never    Tobacco comments:      SMOKES   Vaping Use    Vaping Use: Never used   Substance and Sexual Activity    Alcohol use: Not Currently     Comment: socially 1-2 times a year     Drug use: No    Sexual activity: Yes     Partners: Male     Birth control/protection: None   Other Topics Concern    Not on file   Social History Narrative    Not on file     Social Determinants of Health     Financial Resource Strain: Not on file   Food Insecurity: Not on file   Transportation Needs: Not on file   Physical Activity: Not on file   Stress: Not on file   Social Connections: Not on file   Intimate Partner Violence: Not on file   Housing Stability: Not on file ALLERGIES: Other food, Ciprofloxacin, Bumetanide, Coconut, Tree pollen-shagbark hickory, Voltaren [diclofenac sodium], Diclofenac, Penicillins, Prochlorperazine, and Vancomycin    Review of Systems   Constitutional:  Negative for chills and fever. HENT: Negative. Respiratory:  Negative for cough and shortness of breath. Cardiovascular:  Positive for chest pain. Negative for leg swelling. Substernal radiating slightly to the right. Gastrointestinal:  Positive for abdominal pain and diarrhea. Negative for blood in stool, constipation, nausea and vomiting. Genitourinary:  Negative for difficulty urinating, dysuria and flank pain. Musculoskeletal: Negative. Neurological: Negative. Psychiatric/Behavioral: Negative. Vitals:    09/23/22 1439 09/23/22 1448   BP: 128/87    Pulse: 79    Resp: 18    Temp: 97.6 °F (36.4 °C)    SpO2: 97% 97%   Weight: 104.3 kg (230 lb)    Height: 5' 4\" (1.626 m)             Physical Exam  Vitals and nursing note reviewed. Constitutional:       General: She is not in acute distress. Appearance: Normal appearance. She is well-developed. She is obese. She is not ill-appearing. HENT:      Head: Normocephalic. Nose: Nose normal.   Cardiovascular:      Rate and Rhythm: Normal rate. Pulses: Normal pulses. Heart sounds: Normal heart sounds. Pulmonary:      Effort: Pulmonary effort is normal. No respiratory distress. Breath sounds: Normal breath sounds. No wheezing. Abdominal:      General: There is no distension. Palpations: Abdomen is soft. Tenderness: There is abdominal tenderness in the periumbilical area, left upper quadrant and left lower quadrant. There is no right CVA tenderness, left CVA tenderness or guarding. Negative signs include Vela's sign and McBurney's sign. Musculoskeletal:         General: Normal range of motion. Cervical back: Normal range of motion. Right lower leg: No edema.       Left lower leg: No edema. Skin:     General: Skin is dry. Neurological:      Mental Status: She is alert and oriented to person, place, and time. Psychiatric:         Mood and Affect: Mood normal. Affect is flat. Affect is not angry.          Speech: Speech normal.         Behavior: Behavior normal.        MDM  Number of Diagnoses or Management Options  Chest pain, unspecified type: established and improving  Constipation, unspecified constipation type: established and improving  Diagnosis management comments: Differential DX: constipation vs SBO vs uti vs dehydration vs ACS       Amount and/or Complexity of Data Reviewed  Clinical lab tests: ordered and reviewed  Tests in the radiology section of CPT®: ordered and reviewed    Risk of Complications, Morbidity, and/or Mortality  Presenting problems: moderate  Diagnostic procedures: moderate  Management options: low    Patient Progress  Patient progress: improved    ED Course as of 09/23/22 1952   Fri Sep 23, 2022   1858 ECG performed 1435 shows normal sinus rhythm, normal intervals, QTC of 498, [WG]      ED Course User Index  [WG] Angelica Hopkins DO       Procedures  VITAL SIGNS:  Patient Vitals for the past 4 hrs:   Pulse Resp BP SpO2   09/23/22 1931 79 17 130/81 99 %         LABS:  Recent Results (from the past 6 hour(s))   HCG URINE, QL. - POC    Collection Time: 09/23/22  4:39 PM   Result Value Ref Range    Pregnancy test,urine (POC) Negative NEG     CBC WITH AUTOMATED DIFF    Collection Time: 09/23/22  5:13 PM   Result Value Ref Range    WBC 12.0 (H) 3.6 - 11.0 K/uL    RBC 4.37 3.80 - 5.20 M/uL    HGB 13.7 11.5 - 16.0 g/dL    HCT 39.6 35.0 - 47.0 %    MCV 90.6 80.0 - 99.0 FL    MCH 31.4 26.0 - 34.0 PG    MCHC 34.6 30.0 - 36.5 g/dL    RDW 12.5 11.5 - 14.5 %    PLATELET 893 (H) 945 - 400 K/uL    MPV 9.5 8.9 - 12.9 FL    NRBC 0.0 0  WBC    ABSOLUTE NRBC 0.00 0.00 - 0.01 K/uL    NEUTROPHILS 58 32 - 75 %    LYMPHOCYTES 28 12 - 49 %    MONOCYTES 10 5 - 13 %    EOSINOPHILS 4 0 - 7 %    BASOPHILS 0 0 - 1 %    IMMATURE GRANULOCYTES 1 (H) 0.0 - 0.5 %    ABS. NEUTROPHILS 7.0 1.8 - 8.0 K/UL    ABS. LYMPHOCYTES 3.3 0.8 - 3.5 K/UL    ABS. MONOCYTES 1.2 (H) 0.0 - 1.0 K/UL    ABS. EOSINOPHILS 0.4 0.0 - 0.4 K/UL    ABS. BASOPHILS 0.1 0.0 - 0.1 K/UL    ABS. IMM. GRANS. 0.1 (H) 0.00 - 0.04 K/UL    DF AUTOMATED     METABOLIC PANEL, COMPREHENSIVE    Collection Time: 09/23/22  5:13 PM   Result Value Ref Range    Sodium 136 136 - 145 mmol/L    Potassium 4.5 3.5 - 5.1 mmol/L    Chloride 103 98 - 107 mmol/L    CO2 24 22 - 29 mmol/L    Anion gap 9 5 - 15 mmol/L    Glucose 134 (H) 65 - 100 mg/dL    BUN 17 6 - 20 MG/DL    Creatinine 1.05 (H) 0.50 - 0.90 MG/DL    BUN/Creatinine ratio 16 12 - 20      GFR est AA >60 >60 ml/min/1.73m2    GFR est non-AA 59 (L) >60 ml/min/1.73m2    Calcium 8.9 8.6 - 10.0 MG/DL    Bilirubin, total 0.6 0.2 - 1.0 MG/DL    ALT (SGPT) 25 10 - 35 U/L    AST (SGOT) 33 10 - 35 U/L    Alk.  phosphatase 75 35 - 104 U/L    Protein, total 7.0 6.4 - 8.3 g/dL    Albumin 3.8 3.5 - 5.2 g/dL    Globulin 3.2 2.0 - 4.0 g/dL    A-G Ratio 1.2 1.1 - 2.2     LIPASE    Collection Time: 09/23/22  5:13 PM   Result Value Ref Range    Lipase 33 13 - 60 U/L   URINALYSIS W/ REFLEX CULTURE    Collection Time: 09/23/22  6:01 PM    Specimen: Urine   Result Value Ref Range    Color YELLOW/STRAW      Appearance HAZY (A) CLEAR      Specific gravity 1.015 1.003 - 1.030      pH (UA) 6.5 5.0 - 8.0      Protein TRACE (A) NEG mg/dL    Glucose >1,000 (A) NEG mg/dL    Ketone Negative NEG mg/dL    Bilirubin Negative NEG      Blood Negative NEG      Urobilinogen 0.2 0.2 - 1.0 EU/dL    Nitrites Negative NEG      Leukocyte Esterase Negative NEG      WBC 0-4 0 - 4 /hpf    RBC 5-10 /hpf    Epithelial cells FEW FEW /lpf    Bacteria Negative NEG /hpf    UA:UC IF INDICATED CULTURE NOT INDICATED BY UA RESULT     URINE CULTURE HOLD SAMPLE    Collection Time: 09/23/22  6:01 PM    Specimen: Serum   Result Value Ref Range    Urine culture hold        Urine on hold in Microbiology dept for 2 days. If unpreserved urine is submitted, it cannot be used for addtional testing after 24 hours, recollection will be required. POC TROPONIN-I    Collection Time: 09/23/22  6:21 PM   Result Value Ref Range    Troponin-I (POC) <0.04 0.00 - 0.08 ng/mL        IMAGING:  XR ABD (KUB)   Final Result   Mild generalized constipation. XR CHEST PA LAT   Final Result   No acute process               Medications During Visit:  Medications   0.9% sodium chloride infusion 1,000 mL (1,000 mL IntraVENous New Bag 9/23/22 1749)   ondansetron (ZOFRAN) injection 4 mg (4 mg IntraVENous Given 9/23/22 1756)   nitroglycerin (NITROSTAT) tablet 0.4 mg (0.4 mg SubLINGual Given 9/23/22 1754)   morphine injection 2 mg (2 mg IntraVENous Given 9/23/22 1755)         DECISION MAKING:  Jasmin Diaz is a 39 y.o. female who comes in as above. Well appeared 41-year-old female with past medical history of abscesses, anemia, CAD-CABG x1, chronic pain, complicated migraines history of transfusions, heart attack, hypertension, hypothyroidism, obesity, stroke, type 2 diabetes and DVT to the right leg presents to the emergency center with complaints of chest pain and diarrhea. Chest pain is described as 7 out of 10, started at rest, substernal radiating slightly to the right. Patient denies association with nausea, vomiting, but states she took 2 nitro tabs at home prior to arrival with no relief. Also states that she has not had a solid stool in the last 25 days, brown liquid only. Denies fever,chills, cough, congestion, SOB,nausea, vomiting, dysuria. Patient re-evaluated, states that the chest pain has improved, VSS. Discussed all labs, urine-trace protein and Glucose > 1000 reassuring showing no acute dehydration, DKA, ALPHONSE, anemia, or acute ACS event given HX. X-ray ABD/ KUB showed mild generalized constipation which correlates with patients description. Attempted to explain why patient was having the diarrhea and the need for either 1 an enema, MiraLAX or GoLytely, patient states that there is no possible way that she could be constipated, refused the enema, MiraLAX and GoLytely. Patient given follow-up information for PCP, cardiologist and GI specialist as this is not a new finding. After conversation of reviewing labs, imaging and urine results patient stated that she was ready to go home and did not want to wait for the second cardiac troponin. Patient remains hemodynamically stable and for neurovascularly intact, ambulatory out of department independently with steady gait. Patient rcvd approximately 500 ml NS bolus. IMPRESSION:  1. Constipation, unspecified constipation type    2. Chest pain, unspecified type        DISPOSITION:  Discharged      Current Discharge Medication List        START taking these medications    Details   PEG 3350-Electrolytes (GO-LYTELY) 236-22.74-6.74 -5.86 gram suspension Take 4,000 mL by mouth now for 1 dose. Qty: 4000 mL, Refills: 0  Start date: 9/23/2022, End date: 9/23/2022      polyethylene glycol (Miralax) 17 gram/dose powder Take 17 g by mouth daily for 30 days.  1 tablespoon with 8 oz of water daily  Qty: 510 g, Refills: 0  Start date: 9/23/2022, End date: 10/23/2022              Follow-up Information       Follow up With Specialties Details Why Contact Joan Cortez, DO Family Medicine Schedule an appointment as soon as possible for a visit in 1 week  [address] [facility] [address]  [phone]      Follow-up with your established Cardiologist.        [facility] EMERGENCY DEPT Emergency Medicine  If symptoms worsen [address] [place] [address] Danelle Evans Gastroenterology, Physician Assistant Schedule an appointment as soon as possible for a visit  GI specialist Katia   [phone]                The patient is asked to follow-up with their primary care provider in the next several days. They are to call tomorrow for an appointment. The patient is asked to return promptly for any increased concerns or worsening of symptoms. They can return to this emergency department or any other emergency department.     Yudelka Vicente NP  8:02 PM

## 2022-09-24 NOTE — ED NOTES
Provider spoke to patient about constipation and treatment.  Patient states \"this does not make sense and I am leaving\"

## 2022-09-25 LAB
ATRIAL RATE: 80 BPM
CALCULATED P AXIS, ECG09: 51 DEGREES
CALCULATED R AXIS, ECG10: -54 DEGREES
CALCULATED T AXIS, ECG11: 31 DEGREES
DIAGNOSIS, 93000: NORMAL
P-R INTERVAL, ECG05: 198 MS
Q-T INTERVAL, ECG07: 432 MS
QRS DURATION, ECG06: 102 MS
QTC CALCULATION (BEZET), ECG08: 498 MS
VENTRICULAR RATE, ECG03: 80 BPM

## 2022-09-27 ENCOUNTER — PATIENT MESSAGE (OUTPATIENT)
Dept: ENDOCRINOLOGY | Age: 37
End: 2022-09-27

## 2022-09-27 DIAGNOSIS — Z79.4 TYPE 2 DIABETES MELLITUS WITH HYPERGLYCEMIA, WITH LONG-TERM CURRENT USE OF INSULIN (HCC): Primary | ICD-10-CM

## 2022-09-27 DIAGNOSIS — E11.65 TYPE 2 DIABETES MELLITUS WITH HYPERGLYCEMIA, WITH LONG-TERM CURRENT USE OF INSULIN (HCC): Primary | ICD-10-CM

## 2022-09-28 RX ORDER — BLOOD-GLUCOSE SENSOR
EACH MISCELLANEOUS
Qty: 9 EACH | Refills: 3 | Status: SHIPPED | OUTPATIENT
Start: 2022-09-28

## 2022-09-28 RX ORDER — BLOOD-GLUCOSE TRANSMITTER
EACH MISCELLANEOUS
Qty: 1 EACH | Refills: 3 | Status: SHIPPED | OUTPATIENT
Start: 2022-09-28

## 2022-11-04 ENCOUNTER — HOSPITAL ENCOUNTER (EMERGENCY)
Age: 37
Discharge: HOME | End: 2022-11-04
Attending: EMERGENCY MEDICINE | Admitting: EMERGENCY MEDICINE
Payer: MEDICARE

## 2022-11-04 ENCOUNTER — TRANSCRIBE ORDER (OUTPATIENT)
Dept: SCHEDULING | Age: 37
End: 2022-11-04

## 2022-11-04 ENCOUNTER — APPOINTMENT (OUTPATIENT)
Dept: GENERAL RADIOLOGY | Age: 37
End: 2022-11-04
Attending: EMERGENCY MEDICINE
Payer: MEDICARE

## 2022-11-04 VITALS
HEIGHT: 64 IN | BODY MASS INDEX: 39.27 KG/M2 | HEART RATE: 77 BPM | WEIGHT: 230 LBS | TEMPERATURE: 98.1 F | OXYGEN SATURATION: 100 % | RESPIRATION RATE: 14 BRPM | DIASTOLIC BLOOD PRESSURE: 105 MMHG | SYSTOLIC BLOOD PRESSURE: 146 MMHG

## 2022-11-04 DIAGNOSIS — E66.01 MORBID OBESITY (HCC): ICD-10-CM

## 2022-11-04 DIAGNOSIS — R07.9 CHEST PAIN, UNSPECIFIED TYPE: Primary | ICD-10-CM

## 2022-11-04 DIAGNOSIS — F41.9 ANXIETY: ICD-10-CM

## 2022-11-04 DIAGNOSIS — R07.9 CHEST PAIN: Primary | ICD-10-CM

## 2022-11-04 LAB
ANION GAP SERPL CALC-SCNC: 8 MMOL/L (ref 5–15)
BASOPHILS # BLD: 0.1 K/UL (ref 0–0.1)
BASOPHILS NFR BLD: 1 % (ref 0–1)
BUN SERPL-MCNC: 22 MG/DL (ref 6–20)
BUN/CREAT SERPL: 18 (ref 12–20)
CA-I BLD-MCNC: 9.1 MG/DL (ref 8.5–10.1)
CHLORIDE SERPL-SCNC: 104 MMOL/L (ref 97–108)
CO2 SERPL-SCNC: 22 MMOL/L (ref 21–32)
CREAT SERPL-MCNC: 1.24 MG/DL (ref 0.55–1.02)
DIFFERENTIAL METHOD BLD: ABNORMAL
EOSINOPHIL # BLD: 0.5 K/UL (ref 0–0.4)
EOSINOPHIL NFR BLD: 5 % (ref 0–7)
ERYTHROCYTE [DISTWIDTH] IN BLOOD BY AUTOMATED COUNT: 12.1 % (ref 11.5–14.5)
GLUCOSE SERPL-MCNC: 187 MG/DL (ref 65–100)
HCT VFR BLD AUTO: 41.6 % (ref 35–47)
HGB BLD-MCNC: 14.2 G/DL (ref 11.5–16)
IMM GRANULOCYTES # BLD AUTO: 0.1 K/UL (ref 0–0.04)
IMM GRANULOCYTES NFR BLD AUTO: 0 % (ref 0–0.5)
LYMPHOCYTES # BLD: 3.5 K/UL (ref 0.8–3.5)
LYMPHOCYTES NFR BLD: 30 % (ref 12–49)
MAGNESIUM SERPL-MCNC: 2.4 MG/DL (ref 1.6–2.4)
MCH RBC QN AUTO: 30.8 PG (ref 26–34)
MCHC RBC AUTO-ENTMCNC: 34.1 G/DL (ref 30–36.5)
MCV RBC AUTO: 90.2 FL (ref 80–99)
MONOCYTES # BLD: 1 K/UL (ref 0–1)
MONOCYTES NFR BLD: 9 % (ref 5–13)
NEUTS SEG # BLD: 6.5 K/UL (ref 1.8–8)
NEUTS SEG NFR BLD: 55 % (ref 32–75)
NRBC # BLD: 0 K/UL (ref 0–0.01)
NRBC BLD-RTO: 0 PER 100 WBC
PLATELET # BLD AUTO: 468 K/UL (ref 150–400)
PMV BLD AUTO: 9.6 FL (ref 8.9–12.9)
POTASSIUM SERPL-SCNC: 4.4 MMOL/L (ref 3.5–5.1)
RBC # BLD AUTO: 4.61 M/UL (ref 3.8–5.2)
SODIUM SERPL-SCNC: 134 MMOL/L (ref 136–145)
TROPONIN-HIGH SENSITIVITY: 6 NG/L (ref 0–51)
WBC # BLD AUTO: 11.7 K/UL (ref 3.6–11)

## 2022-11-04 PROCEDURE — 36415 COLL VENOUS BLD VENIPUNCTURE: CPT

## 2022-11-04 PROCEDURE — 71045 X-RAY EXAM CHEST 1 VIEW: CPT

## 2022-11-04 PROCEDURE — 99285 EMERGENCY DEPT VISIT HI MDM: CPT

## 2022-11-04 PROCEDURE — 80048 BASIC METABOLIC PNL TOTAL CA: CPT

## 2022-11-04 PROCEDURE — 83735 ASSAY OF MAGNESIUM: CPT

## 2022-11-04 PROCEDURE — 74011000250 HC RX REV CODE- 250: Performed by: EMERGENCY MEDICINE

## 2022-11-04 PROCEDURE — 96374 THER/PROPH/DIAG INJ IV PUSH: CPT

## 2022-11-04 PROCEDURE — 93005 ELECTROCARDIOGRAM TRACING: CPT

## 2022-11-04 PROCEDURE — 74011250636 HC RX REV CODE- 250/636: Performed by: EMERGENCY MEDICINE

## 2022-11-04 PROCEDURE — 85025 COMPLETE CBC W/AUTO DIFF WBC: CPT

## 2022-11-04 PROCEDURE — 84484 ASSAY OF TROPONIN QUANT: CPT

## 2022-11-04 PROCEDURE — 74011250637 HC RX REV CODE- 250/637: Performed by: EMERGENCY MEDICINE

## 2022-11-04 RX ORDER — METHOCARBAMOL 750 MG/1
750 TABLET, FILM COATED ORAL
Status: COMPLETED | OUTPATIENT
Start: 2022-11-04 | End: 2022-11-04

## 2022-11-04 RX ORDER — LIDOCAINE 4 G/100G
1 PATCH TOPICAL
Status: DISCONTINUED | OUTPATIENT
Start: 2022-11-04 | End: 2022-11-04 | Stop reason: HOSPADM

## 2022-11-04 RX ORDER — ASPIRIN 325 MG
325 TABLET ORAL
Status: COMPLETED | OUTPATIENT
Start: 2022-11-04 | End: 2022-11-04

## 2022-11-04 RX ORDER — ONDANSETRON 2 MG/ML
4 INJECTION INTRAMUSCULAR; INTRAVENOUS
Status: COMPLETED | OUTPATIENT
Start: 2022-11-04 | End: 2022-11-04

## 2022-11-04 RX ADMIN — METHOCARBAMOL 750 MG: 750 TABLET ORAL at 17:21

## 2022-11-04 RX ADMIN — ONDANSETRON 4 MG: 2 INJECTION INTRAMUSCULAR; INTRAVENOUS at 17:18

## 2022-11-04 RX ADMIN — ASPIRIN 325 MG ORAL TABLET 325 MG: 325 PILL ORAL at 15:18

## 2022-11-04 NOTE — Clinical Note
[address] [facility] EMERGENCY DEPT  [facility] [place] [address]  [phone]    Work/School Note    Date: 11/4/2022    To Whom It May concern:    Meng East was seen and treated today in the emergency room by the following provider(s):  Attending Provider: Jr Dennis MD.      Meng East is excused from work/school on 11/4/2022 through 11/6/2022. She is medically clear to return to work/school on 11/7/2022.          Sincerely,          Naya Rodriguez MD

## 2022-11-04 NOTE — ED PROVIDER NOTES
EMERGENCY DEPARTMENT HISTORY AND PHYSICAL EXAM      Date: 11/4/2022  Patient Name: Meng East    History of Presenting Illness     Chief Complaint   Patient presents with    Chest Pain    Shortness of Breath    Dizziness    Epistaxis       History Provided By: Patient    HPI: Meng East, 44 y.o. female presents to the emergency department with complaint of 1 week history of chest pain, shortness of breath and dizziness. Patient reports associated intermittent nosebleeds. Patient reports the symptoms are substernal in location, intermittent in nature without noted aggravating relieving factors. Patient reports the symptoms have been occurring since onset, are moderate to severe in severity. Patient denies associated fevers or chills. There are no other complaints, changes, or physical findings at this time. PCP: Иван Tarango DO    Current Facility-Administered Medications   Medication Dose Route Frequency Provider Last Rate Last Admin    lidocaine 4 % patch 1 Patch  1 Patch TransDERmal NOW Jr Dennis MD   1 Patch at 11/04/22 1721     Current Outpatient Medications   Medication Sig Dispense Refill    Blood-Glucose Sensor (Dexcom G6 Sensor) ryan Use as directed every 10 days. Dx code E11.65 9 Each 3    Blood-Glucose Transmitter (Dexcom G6 Transmitter) ryan Use as directed every 90 days. Dx code E11.65 1 Each 3    dulaglutide (Trulicity) 1.5 FO/8.4 mL sub-q pen INJECT 0.5 ML UNDER SKIN ONCE EVERY 7 DAYS 4 mL 5    HumaLOG KwikPen Insulin 100 unit/mL kwikpen 10-15 Units by SubCUTAneous route Before breakfast, lunch, and dinner. Stop Novolog 50 mL 3    carvediloL (COREG) 12.5 mg tablet Take 12.5 mg by mouth two (2) times a day. ranolazine ER (RANEXA) 500 mg SR tablet Take 500 mg by mouth two (2) times a day. insulin syringe,safetyneedle 0.5 mL 31 gauge x 15/64\" syrg 1 Syringe by Does Not Apply route three (3) times daily.  Dx Code: E11.65 300 Each 3    Insulin Needles, Disposable, 32 gauge x 5/32\" ndle Use to inject insulin BID Dx Code: E11.65 200 Pen Needle 3    insulin detemir U-100 (LEVEMIR FLEXTOUCH) 100 unit/mL (3 mL) inpn Inject 30 units in AM and 30  units at bed time 60 mL 3    pantoprazole (Protonix) 40 mg tablet Take 1 Tablet by mouth daily. (Patient not taking: Reported on 9/23/2022) 30 Tablet 0    levothyroxine (SYNTHROID) 137 mcg tablet Take 137 mcg by mouth Daily (before breakfast). Cetirizine (ZyrTEC) 10 mg cap Take 10 mg by mouth daily. bisacodyL (DULCOLAX) 5 mg EC tablet Take 5 mg by mouth daily as needed for Constipation. ticagrelor (BRILINTA) 90 mg tablet Take 90 mg by mouth two (2) times a day. atorvastatin (LIPITOR) 20 mg tablet Take 20 mg by mouth nightly. nitroglycerin (NITROSTAT) 0.4 mg SL tablet 0.4 mg by SubLINGual route every five (5) minutes as needed for Chest Pain. Up to 3 doses. methocarbamoL (ROBAXIN) 750 mg tablet Take 750 mg by mouth two (2) times a day. senna (SENOKOT) 8.6 mg tablet Take 1 Tablet by mouth as needed for Constipation. lisinopriL (PRINIVIL, ZESTRIL) 2.5 mg tablet Take 2.5 mg by mouth daily. empagliflozin (JARDIANCE) 10 mg tablet Take 10 mg by mouth daily. blood-glucose sensor (DEXCOM G6 SENSOR) by Does Not Apply route. aspirin delayed-release 81 mg tablet Take 1 Tab by mouth daily.  30 Tab 3       Past History   Past Medical History:  Past Medical History:   Diagnosis Date    Abscess     Anemia     CAD (coronary artery disease) 2019    CABG X1    Chronic pain     LEFT SCIATIC, STERNAL WOUND    Complicated migraine     with extremity numbness    H/O transfusion of packed red blood cells 07/2021    Heart attack ([facility] 11/2019    Hx MRSA infection 2009    Hypertension     onset 2009    Hypothyroidism     Irregular menstrual cycle     Nausea & vomiting     Obesity     Stroke ([facility] 2019    LOWER BODY WEAKNESS    T2DM (type 2 diabetes mellitus) [facility]     onset 2009 IDDM Thromboembolus ([facility] [facility] 2009    RIGHT LEG - HOSP.  [place]       Past Surgical History:  Past Surgical History:   Procedure Laterality Date    HX CHOLECYSTECTOMY      HX CORONARY ARTERY BYPASS GRAFT  12/03/2019    HX GYN Bilateral 2021    SALPINGECTOMY    HX GYN  2021    ENDOMETRIAL ABLATION    HX HEENT      T&A    HX OTHER SURGICAL      2 TEETH REMOVED     HX TONSIL AND ADENOIDECTOMY  1992    HX TYMPANOSTOMY Bilateral     MULTIPLE    HX TYMPANOSTOMY      HX WISDOM TEETH EXTRACTION      PA CARDIAC SURG PROCEDURE UNLIST  2019    CABG X 1, CARDIAC CATH STENT X 3       Family History:  Family History   Problem Relation Age of Onset    Hypertension Other         \"all her family\"    Diabetes Other         \"all her family\"    Cancer Mother         cervical    Stroke Mother     Heart Disease Mother     Gall Bladder Disease Mother     Diabetes Mother     Hypertension Mother     Elevated Lipids Mother     Stroke Father     Heart Disease Father     Diabetes Father     Heart Attack Father     Elevated Lipids Father     Kidney Disease Father         ESRD    Gall Bladder Disease Brother     Diabetes Brother     Kidney Disease Brother     Cancer Brother         KIDNEY    Hypertension Brother     Migraines Maternal Aunt     Diabetes Paternal Aunt     Stroke Maternal Grandmother     Diabetes Maternal Grandmother     Cancer Maternal Grandmother         cervical    Heart Attack Maternal Grandmother     Elevated Lipids Maternal Grandmother     Stroke Maternal Grandfather     Diabetes Maternal Grandfather     Elevated Lipids Maternal Grandfather     Cancer Maternal Grandfather     Heart Attack Maternal Grandfather     Anesth Problems Neg Hx        Social History:  Social History     Tobacco Use    Smoking status: Passive Smoke Exposure - Never Smoker    Smokeless tobacco: Never    Tobacco comments:      SMOKES   Vaping Use    Vaping Use: Never used   Substance Use Topics    Alcohol use: Not Currently     Comment: socially 1-2 times a year     Drug use: No       Allergies: Allergies   Allergen Reactions    Other Food Swelling     JALIPENO PEPPERS - FACILA SWELLING    Ciprofloxacin Anaphylaxis, Rash and Unknown (comments)    Bumetanide Other (comments) and Unknown (comments)     Hearing loss  Other reaction(s): Other (see comments)    Coconut Other (comments)     Facial swelling    Tree [employer] Rash     HICKORY SMOKE FLAVORING    Voltaren [Diclofenac Sodium] Myalgia and Other (comments)     \"muscle spasms\", LEG SPASMS      Diclofenac Myalgia and Rash     \"muscle spasms\"  Other reaction(s): Myalgias  \"muscle spasms\"      Penicillins Rash, Hives and Unknown (comments)     OCCURRED IN INFANCY NO REPORTED SEVERE IgE MEDIATED REACTIONS  Patient screened for any delayed non-IgE-mediated reaction to PCN. Patient notes the following:    No delayed non-IgE-mediated reaction to PCN          Prochlorperazine Anxiety and Unknown (comments)     HALLUCINATIONS    Vancomycin Other (comments), Rash and Unknown (comments)     Kidneys shut down  Other reaction(s): Other (see comments)  Kidneys shut down  Other reaction(s): Other (comments)  Kidneys shut down  Kidneys shut down       Review of Systems   Review of Systems  Review of Systems   Constitutional: Negative for chills and fever. HENT: Negative for sinus pressure and sinus pain. Eyes: Negative for photophobia and redness. Respiratory: Negative for shortness of breath and wheezing. Cardiovascular: Positive for chest pain and palpitations. Gastrointestinal: Negative for abdominal pain and nausea. Genitourinary: Negative for flank pain and hematuria. Musculoskeletal: Negative for arthralgias and gait problem. Skin: Negative for color change and pallor. Neurological: Positive for dizziness and weakness. Physical Exam   Physical Exam  Physical Exam  Constitutional:       General: No acute distress. Appearance: Normal appearance. Not toxic-appearing. HENT:      Head: Normocephalic and atraumatic. Nose: Nose normal.      Mouth/Throat:      Mouth: Mucous membranes are moist.   Eyes:      Extraocular Movements: Extraocular movements intact. Pupils: Pupils are equal, round, and reactive to light. Cardiovascular:      Rate and Rhythm: Normal rate. Pulses: Normal pulses. Pulmonary:      Effort: Pulmonary effort is normal.      Breath sounds: No stridor, clear to auscultation bilaterally  Abdominal:      General: Abdomen is flat. There is no distension. Musculoskeletal:         General: Normal range of motion. Cervical back: Normal range of motion and neck supple. Skin:     General: Skin is warm and dry. Capillary Refill: Capillary refill takes less than 2 seconds. Neurological:      General: No focal deficit present. Mental Status: Alert and oriented to person, place, and time. Psychiatric:         Mood and Affect: Anxious appearing         Behavior: Behavior abnormal.     Lab and Diagnostic Study Results   Labs -     Recent Results (from the past 12 hour(s))   MAGNESIUM    Collection Time: 11/04/22  5:50 PM   Result Value Ref Range    Magnesium 2.4 1.6 - 2.4 mg/dL   CBC WITH AUTOMATED DIFF    Collection Time: 11/04/22  5:50 PM   Result Value Ref Range    WBC 11.7 (H) 3.6 - 11.0 K/uL    RBC 4.61 3.80 - 5.20 M/uL    HGB 14.2 11.5 - 16.0 g/dL    HCT 41.6 35.0 - 47.0 %    MCV 90.2 80.0 - 99.0 FL    MCH 30.8 26.0 - 34.0 PG    MCHC 34.1 30.0 - 36.5 g/dL    RDW 12.1 11.5 - 14.5 %    PLATELET 466 (H) 727 - 400 K/uL    MPV 9.6 8.9 - 12.9 FL    NRBC 0.0 0.0  WBC    ABSOLUTE NRBC 0.00 0.00 - 0.01 K/uL    NEUTROPHILS 55 32 - 75 %    LYMPHOCYTES 30 12 - 49 %    MONOCYTES 9 5 - 13 %    EOSINOPHILS 5 0 - 7 %    BASOPHILS 1 0 - 1 %    IMMATURE GRANULOCYTES 0 0 - 0.5 %    ABS. NEUTROPHILS 6.5 1.8 - 8.0 K/UL    ABS. LYMPHOCYTES 3.5 0.8 - 3.5 K/UL    ABS. MONOCYTES 1.0 0.0 - 1.0 K/UL    ABS. EOSINOPHILS 0.5 (H) 0.0 - 0.4 K/UL    ABS. BASOPHILS 0.1 0.0 - 0.1 K/UL    ABS. IMM. GRANS. 0.1 (H) 0.00 - 0.04 K/UL    DF AUTOMATED     METABOLIC PANEL, BASIC    Collection Time: 11/04/22  5:50 PM   Result Value Ref Range    Sodium 134 (L) 136 - 145 mmol/L    Potassium 4.4 3.5 - 5.1 mmol/L    Chloride 104 97 - 108 mmol/L    CO2 22 21 - 32 mmol/L    Anion gap 8 5 - 15 mmol/L    Glucose 187 (H) 65 - 100 mg/dL    BUN 22 (H) 6 - 20 mg/dL    Creatinine 1.24 (H) 0.55 - 1.02 mg/dL    BUN/Creatinine ratio 18 12 - 20      eGFR 57 (L) >60 ml/min/1.73m2    Calcium 9.1 8.5 - 10.1 mg/dL   TROPONIN-HIGH SENSITIVITY    Collection Time: 11/04/22  5:50 PM   Result Value Ref Range    Troponin-High Sensitivity 6 0 - 51 ng/L       Radiologic Studies -   [unfilled]  CT Results  (Last 48 hours)      None          CXR Results  (Last 48 hours)                 11/04/22 1524  XR CHEST PORT Final result    Impression:      No acute findings. Narrative:  EXAM:  XR CHEST PORT       INDICATION: Substernal chest pain       COMPARISON: 9/23/2022       TECHNIQUE: 1521 hours portable chest AP view       FINDINGS: Mediastinal and hilar contours are normal. The cardiac silhouette is   within normal limits. The pulmonary vasculature is within normal limits. The lungs and pleural spaces are clear. The visualized bones and upper abdomen   are age-appropriate. Medical Decision Making and ED Course   Differential Diagnosis & Medical Decision Making Provider Note:   Patient with reproducible chest pain. Patient did state that she has had multiple pain prescriptions of opioids but she has not filled them because the pills do not work, states she needs IV pain medicine. We discussed at length the findings on the  concerning for her overdose risk score, and I do not feel comfortable giving IV or prescription opioids at this time.   Patient states she is trying to follow-up with pain management but was denied by her insurance, and states her primary care would not write her any additional pain medicine prescriptions. Discussed the need for close follow-up as well as return precautions. - I am the first and primary provider for this patient. I reviewed the vital signs, available nursing notes, past medical history, past surgical history, family history and social history. The patient's presenting problems have been discussed, and the staff are in agreement with the care plan formulated and outlined with them. I have encouraged them to ask questions as they arise throughout their visit. Vital Signs-Reviewed the patient's vital signs. Patient Vitals for the past 12 hrs:   Temp Pulse Resp BP SpO2   11/04/22 1912 98.1 °F (36.7 °C) 77 14 (!) 146/105 100 %   11/04/22 1814 -- 76 26 (!) 138/104 97 %   11/04/22 1744 -- 77 18 (!) 121/101 98 %   11/04/22 1514 -- 83 18 (!) 158/102 98 %   11/04/22 1429 97.9 °F (36.6 °C) 78 16 (!) 163/103 99 %   11/04/22 1415 -- -- -- -- 98 %       EKG interpretation: (Preliminary): EKG Interpreted by me. Shows normal sinus rhythm with left axis deviation. Ventricular rate 77 bpm.  Age undetermined anterior lateral infarct. Normal intervals. Abnormal EKG. ED Course:              Disposition   Disposition: DC- Adult Discharges: All of the diagnostic tests were reviewed and questions answered. Diagnosis, care plan and treatment options were discussed. The patient understands the instructions and will follow up as directed. The patients results have been reviewed with them. They have been counseled regarding their diagnosis. The patient verbally convey understanding and agreement of the signs, symptoms, diagnosis, treatment and prognosis and additionally agrees to follow up as recommended with their PCP in 24 - 48 hours. They also agree with the care-plan and convey that all of their questions have been answered.   I have also put together some discharge instructions for them that include: 1) educational information regarding their diagnosis, 2) how to care for their diagnosis at home, as well a 3) list of reasons why they would want to return to the ED prior to their follow-up appointment, should their condition change. DC-The patient was given verbal chest pain warning signs and instructions    DISCHARGE PLAN:  1. Current Discharge Medication List        CONTINUE these medications which have NOT CHANGED    Details   !! Blood-Glucose Sensor (Dexcom G6 Sensor) ryan Use as directed every 10 days. Dx code E11.65  Qty: 9 Each, Refills: 3    Associated Diagnoses: Type 2 diabetes mellitus with hyperglycemia, with long-term current use of insulin ([facility])      Blood-Glucose Transmitter (Dexcom G6 Transmitter) ryan Use as directed every 90 days. Dx code E11.65  Qty: 1 Each, Refills: 3    Associated Diagnoses: Type 2 diabetes mellitus with hyperglycemia, with long-term current use of insulin ([facility])      dulaglutide (Trulicity) 1.5 KR/7.4 mL sub-q pen INJECT 0.5 ML UNDER SKIN ONCE EVERY 7 DAYS  Qty: 4 mL, Refills: 5    Associated Diagnoses: Type 2 diabetes mellitus with hyperglycemia, with long-term current use of insulin ([facility])      HumaLOG KwikPen Insulin 100 unit/mL kwikpen 10-15 Units by SubCUTAneous route Before breakfast, lunch, and dinner. Stop Novolog  Qty: 50 mL, Refills: 3      carvediloL (COREG) 12.5 mg tablet Take 12.5 mg by mouth two (2) times a day. ranolazine ER (RANEXA) 500 mg SR tablet Take 500 mg by mouth two (2) times a day. insulin syringe,safetyneedle 0.5 mL 31 gauge x 15/64\" syrg 1 Syringe by Does Not Apply route three (3) times daily.  Dx Code: E11.65  Qty: 300 Each, Refills: 3    Associated Diagnoses: Type 2 diabetes mellitus with hyperglycemia, with long-term current use of insulin ([facility])      Insulin Needles, Disposable, 32 gauge x 5/32\" ndle Use to inject insulin BID Dx Code: E11.65  Qty: 200 Pen Needle, Refills: 3    Associated Diagnoses: Type 2 diabetes mellitus with hyperglycemia, with long-term current use of insulin ([facility])      insulin detemir U-100 (LEVEMIR FLEXTOUCH) 100 unit/mL (3 mL) inpn Inject 30 units in AM and 30  units at bed time  Qty: 60 mL, Refills: 3    Associated Diagnoses: Type 2 diabetes mellitus with hyperglycemia, with long-term current use of insulin ([facility])      pantoprazole (Protonix) 40 mg tablet Take 1 Tablet by mouth daily. Qty: 30 Tablet, Refills: 0      levothyroxine (SYNTHROID) 137 mcg tablet Take 137 mcg by mouth Daily (before breakfast). Cetirizine (ZyrTEC) 10 mg cap Take 10 mg by mouth daily. bisacodyL (DULCOLAX) 5 mg EC tablet Take 5 mg by mouth daily as needed for Constipation. ticagrelor (BRILINTA) 90 mg tablet Take 90 mg by mouth two (2) times a day. atorvastatin (LIPITOR) 20 mg tablet Take 20 mg by mouth nightly. nitroglycerin (NITROSTAT) 0.4 mg SL tablet 0.4 mg by SubLINGual route every five (5) minutes as needed for Chest Pain. Up to 3 doses. methocarbamoL (ROBAXIN) 750 mg tablet Take 750 mg by mouth two (2) times a day. senna (SENOKOT) 8.6 mg tablet Take 1 Tablet by mouth as needed for Constipation. lisinopriL (PRINIVIL, ZESTRIL) 2.5 mg tablet Take 2.5 mg by mouth daily. empagliflozin (JARDIANCE) 10 mg tablet Take 10 mg by mouth daily. !! blood-glucose sensor (DEXCOM G6 SENSOR) by Does Not Apply route. aspirin delayed-release 81 mg tablet Take 1 Tab by mouth daily. Qty: 30 Tab, Refills: 3    Associated Diagnoses: Type 2 diabetes mellitus with hyperglycemia, with long-term current use of insulin ([address]       ! ! - Potential duplicate medications found. Please discuss with provider.         2.   Follow-up Information       Follow up With Specialties Details Why Case Wilson MD Cardiovascular Disease Physician, Internal Medicine Physician, Interventional Cardiology Physician Schedule an appointment as soon as possible for a visit   [address] [place] [address]  [phone] 3.  Return to ED if worse   4. Current Discharge Medication List            Diagnosis/Clinical Impression     Clinical Impression:   1. Chest pain, unspecified type    2. Morbid obesity ([address]    3. Anxiety        Attestations: Frank Mccann MD, am the primary clinician of record. Please note that this dictation was completed with [employer], the computer voice recognition software. Quite often unanticipated grammatical, syntax, homophones, and other interpretive errors are inadvertently transcribed by the computer software. Please disregard these errors. Please excuse any errors that have escaped final proofreading. Thank you.

## 2022-11-04 NOTE — DISCHARGE INSTRUCTIONS
Thank you! Thank you for allowing me to care for you in the emergency department. It is my goal to provide you with excellent care. If you have not received excellent quality care, please ask to speak to the nurse manager. Please fill out the survey that will come to you by mail or email since we listen to your feedback! Below you will find a list of your tests from today's visit. Should you have any questions, please do not hesitate to call the emergency department. Labs  Recent Results (from the past 12 hour(s))   MAGNESIUM    Collection Time: 11/04/22  5:50 PM   Result Value Ref Range    Magnesium 2.4 1.6 - 2.4 mg/dL   CBC WITH AUTOMATED DIFF    Collection Time: 11/04/22  5:50 PM   Result Value Ref Range    WBC 11.7 (H) 3.6 - 11.0 K/uL    RBC 4.61 3.80 - 5.20 M/uL    HGB 14.2 11.5 - 16.0 g/dL    HCT 41.6 35.0 - 47.0 %    MCV 90.2 80.0 - 99.0 FL    MCH 30.8 26.0 - 34.0 PG    MCHC 34.1 30.0 - 36.5 g/dL    RDW 12.1 11.5 - 14.5 %    PLATELET 197 (H) 611 - 400 K/uL    MPV 9.6 8.9 - 12.9 FL    NRBC 0.0 0.0  WBC    ABSOLUTE NRBC 0.00 0.00 - 0.01 K/uL    NEUTROPHILS 55 32 - 75 %    LYMPHOCYTES 30 12 - 49 %    MONOCYTES 9 5 - 13 %    EOSINOPHILS 5 0 - 7 %    BASOPHILS 1 0 - 1 %    IMMATURE GRANULOCYTES 0 0 - 0.5 %    ABS. NEUTROPHILS 6.5 1.8 - 8.0 K/UL    ABS. LYMPHOCYTES 3.5 0.8 - 3.5 K/UL    ABS. MONOCYTES 1.0 0.0 - 1.0 K/UL    ABS. EOSINOPHILS 0.5 (H) 0.0 - 0.4 K/UL    ABS. BASOPHILS 0.1 0.0 - 0.1 K/UL    ABS. IMM.  GRANS. 0.1 (H) 0.00 - 0.04 K/UL    DF AUTOMATED     METABOLIC PANEL, BASIC    Collection Time: 11/04/22  5:50 PM   Result Value Ref Range    Sodium 134 (L) 136 - 145 mmol/L    Potassium 4.4 3.5 - 5.1 mmol/L    Chloride 104 97 - 108 mmol/L    CO2 22 21 - 32 mmol/L    Anion gap 8 5 - 15 mmol/L    Glucose 187 (H) 65 - 100 mg/dL    BUN 22 (H) 6 - 20 mg/dL    Creatinine 1.24 (H) 0.55 - 1.02 mg/dL    BUN/Creatinine ratio 18 12 - 20      eGFR 57 (L) >60 ml/min/1.73m2    Calcium 9.1 8.5 - 10.1 mg/dL TROPONIN-HIGH SENSITIVITY    Collection Time: 11/04/22  5:50 PM   Result Value Ref Range    Troponin-High Sensitivity 6 0 - 51 ng/L       Radiologic Studies  XR CHEST PORT   Final Result      No acute findings. CT Results  (Last 48 hours)      None          CXR Results  (Last 48 hours)                 11/04/22 1524  XR CHEST PORT Final result    Impression:      No acute findings. Narrative:  EXAM:  XR CHEST PORT       INDICATION: Substernal chest pain       COMPARISON: 9/23/2022       TECHNIQUE: 1521 hours portable chest AP view       FINDINGS: Mediastinal and hilar contours are normal. The cardiac silhouette is   within normal limits. The pulmonary vasculature is within normal limits. The lungs and pleural spaces are clear. The visualized bones and upper abdomen   are age-appropriate.                 ------------------------------------------------------------------------------------------------------------  The exam and treatment you received in the Emergency Department were for an urgent problem and are not intended as complete care. It is important that you follow-up with a doctor, nurse practitioner, or physician assistant to:  (1) confirm your diagnosis,  (2) re-evaluation of changes in your illness and treatment, and  (3) for ongoing care. Please take your discharge instructions with you when you go to your follow-up appointment. If you have any problem arranging a follow-up appointment, contact the Emergency Department. If your symptoms become worse or you do not improve as expected and you are unable to reach your health care provider, please return to the Emergency Department. We are available 24 hours a day. If a prescription has been provided, please have it filled as soon as possible to prevent a delay in treatment.  If you have any questions or reservations about taking the medication due to side effects or interactions with other medications, please call your primary care provider or contact the ER.

## 2022-11-05 LAB
ATRIAL RATE: 77 BPM
CALCULATED P AXIS, ECG09: 31 DEGREES
CALCULATED R AXIS, ECG10: -50 DEGREES
CALCULATED T AXIS, ECG11: 24 DEGREES
DIAGNOSIS, 93000: NORMAL
P-R INTERVAL, ECG05: 194 MS
Q-T INTERVAL, ECG07: 438 MS
QRS DURATION, ECG06: 94 MS
QTC CALCULATION (BEZET), ECG08: 495 MS
VENTRICULAR RATE, ECG03: 77 BPM

## 2022-11-11 ENCOUNTER — TRANSCRIBE ORDER (OUTPATIENT)
Dept: SCHEDULING | Age: 37
End: 2022-11-11

## 2022-11-11 DIAGNOSIS — R07.9 CHEST PAIN: Primary | ICD-10-CM

## 2023-01-23 ENCOUNTER — OFFICE VISIT (OUTPATIENT)
Dept: OBGYN CLINIC | Age: 38
End: 2023-01-23
Payer: MEDICARE

## 2023-01-23 VITALS
WEIGHT: 227.4 LBS | DIASTOLIC BLOOD PRESSURE: 78 MMHG | HEIGHT: 64 IN | BODY MASS INDEX: 38.82 KG/M2 | SYSTOLIC BLOOD PRESSURE: 114 MMHG

## 2023-01-23 DIAGNOSIS — Z01.419 ROUTINE GYNECOLOGICAL EXAMINATION: Primary | ICD-10-CM

## 2023-01-23 DIAGNOSIS — N92.0 MENORRHAGIA WITH REGULAR CYCLE: ICD-10-CM

## 2023-01-23 PROCEDURE — G8417 CALC BMI ABV UP PARAM F/U: HCPCS | Performed by: OBSTETRICS & GYNECOLOGY

## 2023-01-23 PROCEDURE — 3078F DIAST BP <80 MM HG: CPT | Performed by: OBSTETRICS & GYNECOLOGY

## 2023-01-23 PROCEDURE — 3074F SYST BP LT 130 MM HG: CPT | Performed by: OBSTETRICS & GYNECOLOGY

## 2023-01-23 PROCEDURE — G0101 CA SCREEN;PELVIC/BREAST EXAM: HCPCS | Performed by: OBSTETRICS & GYNECOLOGY

## 2023-01-23 PROCEDURE — G8432 DEP SCR NOT DOC, RNG: HCPCS | Performed by: OBSTETRICS & GYNECOLOGY

## 2023-01-23 RX ORDER — SYRING-NEEDL,DISP,INSUL,0.3 ML 31GX15/64"
SYRINGE, EMPTY DISPOSABLE MISCELLANEOUS
COMMUNITY
Start: 2022-12-29

## 2023-01-23 RX ORDER — ONDANSETRON 4 MG/1
TABLET, ORALLY DISINTEGRATING ORAL
COMMUNITY
Start: 2023-01-12

## 2023-01-23 RX ORDER — OXYCODONE AND ACETAMINOPHEN 5; 325 MG/1; MG/1
TABLET ORAL
COMMUNITY
Start: 2023-01-12

## 2023-01-23 RX ORDER — COLCHICINE 0.6 MG/1
TABLET ORAL
COMMUNITY
Start: 2022-11-18

## 2023-01-23 RX ORDER — SUCRALFATE 1 G/10ML
SUSPENSION ORAL
COMMUNITY
Start: 2023-01-12

## 2023-01-23 RX ORDER — IBUPROFEN 600 MG/1
600 TABLET ORAL 3 TIMES DAILY
COMMUNITY
Start: 2022-11-18

## 2023-01-23 RX ORDER — LANSOPRAZOLE 30 MG/1
30 CAPSULE, DELAYED RELEASE ORAL DAILY
COMMUNITY
Start: 2022-11-18

## 2023-01-23 NOTE — PROGRESS NOTES
Dominguez Sanchez is a 44 y.o. female returns for an annual exam     Chief Complaint   Patient presents with    Well Woman       No LMP recorded. Patient had ablation 4 months ago started bleeding cycles irregular. She has dysmenorrhea. Problems: bleeding. Birth Control: tubal ligation. Last Pap: unsatisfactory HPV negative 7/19/2021. EMB negative 7/19/2021. She does not have a history of RONI 2, 3 or cervical cancer. With regard to the Gardisil vaccine, she has not received it yet. 1. Have you been to the ER, urgent care clinic, or hospitalized since your last visit? 3 mos ago ER in [place] fro blood transfusion. 1 week ago ER [facility] cyst right side. 2. Have you seen or consulted any other health care providers outside of the [address] [place] [address] since your last visit?  No    Examination chaperoned by Enedina Crockett MA.

## 2023-01-23 NOTE — PROGRESS NOTES
ANNUAL EXAM AGES 18-39    Arlene Gould is a 44y.o. year old  WHITE/NON- female   No LMP recorded. She presents for her annual checkup. Had a novasure   Was doing well with no cycle until 4 months ago. Had a regular period. November had super heavy period with hgb 6.7 got transfusion. Got a period in  but was only 2 days. Had CT in ER that said she had a right ovarian cyst.  Had h&h 2 weeks ago hgb was 11.5.       Medical History:  Patient Active Problem List   Diagnosis Code    Hypothyroidism E03.9    Obesity, morbid (HCC) E66.01    Type 2 diabetes mellitus with nephropathy ([facility]) E11.21    Microalbuminuria due to type 2 diabetes mellitus ([address] E11.29, R80.9    Hypertension I10    Atherosclerosis of coronary artery I25.10    HLD (hyperlipidemia) E78.5    S/P CABG (coronary artery bypass graft) Z95.1    Chronic left mastoiditis H70.12    Tympanic membrane perforation, right H72.91    Chest pain R07.9    ACS (acute coronary syndrome) ([address] I24.9    Syncope R55       Past Medical History:   Diagnosis Date    Abscess     Anemia     Arrhythmia     CAD (coronary artery disease) 2019    CABG X1    CHF (congestive heart failure) ([facility])     Chronic pain     LEFT SCIATIC, STERNAL WOUND    Complicated migraine     with extremity numbness    Diabetes ([address]  out of control   hga1c 7.4    DVT (deep venous thrombosis) ([address]     right leg    H/O transfusion of packed red blood cells 2021 menorrhagia    Heart attack ([address] 2019    Hx MRSA infection     Hypertension     Hypothyroidism     Irregular menstrual cycle     Obesity (BMI 35.0-39.9 without comorbidity)     Ovarian cyst     Gets frequently    Paraplegia ([address]     Partial -- \"stroke in spinal cord\" during open heart surgery    PCOS (polycystic ovarian syndrome)     Stroke ([address] 2019    LOWER BODY WEAKNESS    Uterine fibroid 2017    1.7 cm  Noted at tubal surgery   Anterior fundal 3.0 UTI (urinary tract infection) Child     Past Surgical History:   Procedure Laterality Date    HX APPENDECTOMY      HX BILATERAL SALPINGECTOMY Bilateral 2021    HX CHOLECYSTECTOMY      HX CORONARY ARTERY BYPASS GRAFT  2019    HX DILATION AND CURETTAGE      HX HYSTEROSCOPY WITH ENDOMETRIAL ABLATION  2021    Novasure    HX OTHER SURGICAL      2 TEETH REMOVED     HX TONSIL AND ADENOIDECTOMY      HX TYMPANOSTOMY Bilateral     MULTIPLE    HX WISDOM TEETH EXTRACTION      [facility] UNLISTED PROCEDURE CARDIAC SURGERY  2019    CABG X 1, CARDIAC CATH STENT X 3     OB History    Para Term  AB Living   2 0 0 0 2 0   SAB IAB Ectopic Molar Multiple Live Births   2 0 0 0 0 0      # Outcome Date GA Lbr Juan Carlos/2nd Weight Sex Delivery Anes PTL Lv   2 SAB  19w0d    SAB      1 SAB              Social History     Socioeconomic History    Marital status:     Number of children: 0   Tobacco Use    Smoking status: Never     Passive exposure: Yes    Smokeless tobacco: Never    Tobacco comments:     Second hand  smokes   Vaping Use    Vaping Use: Never used   Substance and Sexual Activity    Alcohol use: Not Currently     Comment: socially 1-2 times a year     Drug use: No    Sexual activity: Yes     Partners: Male     Birth control/protection: None     Comment: Tubes removed      Family History   Problem Relation Age of Onset    Hypertension Other         \"all her family\"    Diabetes Other         \"all her family\"    Cancer Mother         cervical    Stroke Mother     Heart Disease Mother     Gall Bladder Disease Mother     Diabetes Mother     Hypertension Mother     Elevated Lipids Mother     Stroke Father     Heart Disease Father     Diabetes Father     Heart Attack Father     Elevated Lipids Father     Kidney Disease Father         ESRD    Gall Bladder Disease Father     Gall Bladder Disease Brother     Diabetes Brother     Kidney Disease Brother     Cancer Brother         KIDNEY    Hypertension Brother     Migraines Maternal Aunt     Diabetes Paternal Aunt     Stroke Maternal Grandmother     Diabetes Maternal Grandmother     Cancer Maternal Grandmother         cervical    Heart Attack Maternal Grandmother     Elevated Lipids Maternal Grandmother     Stroke Maternal Grandfather     Diabetes Maternal Grandfather     Elevated Lipids Maternal Grandfather     Cancer Maternal Grandfather     Heart Attack Maternal Grandfather     Anesth Problems Neg Hx      Current Outpatient Medications on File Prior to Visit   Medication Sig Dispense Refill    Blood-Glucose Sensor (Dexcom G6 Sensor) ryan Use as directed every 10 days. Dx code E11.65 9 Each 3    Blood-Glucose Transmitter (Dexcom G6 Transmitter) ryan Use as directed every 90 days. Dx code E11.65 1 Each 3    dulaglutide (Trulicity) 1.5 AY/5.7 mL sub-q pen INJECT 0.5 ML UNDER SKIN ONCE EVERY 7 DAYS 4 mL 5    HumaLOG KwikPen Insulin 100 unit/mL kwikpen 10-15 Units by SubCUTAneous route Before breakfast, lunch, and dinner. Stop Novolog 50 mL 3    carvediloL (COREG) 12.5 mg tablet Take 12.5 mg by mouth two (2) times a day. ranolazine ER (RANEXA) 500 mg SR tablet Take 500 mg by mouth two (2) times a day. insulin syringe,safetyneedle 0.5 mL 31 gauge x 15/64\" syrg 1 Syringe by Does Not Apply route three (3) times daily. Dx Code: E11.65 300 Each 3    Insulin Needles, Disposable, 32 gauge x 5/32\" ndle Use to inject insulin BID Dx Code: E11.65 200 Pen Needle 3    insulin detemir U-100 (LEVEMIR FLEXTOUCH) 100 unit/mL (3 mL) inpn Inject 30 units in AM and 30  units at bed time 60 mL 3    pantoprazole (Protonix) 40 mg tablet Take 1 Tablet by mouth daily. 30 Tablet 0    levothyroxine (SYNTHROID) 137 mcg tablet Take 137 mcg by mouth Daily (before breakfast). Cetirizine (ZyrTEC) 10 mg cap Take 10 mg by mouth daily. bisacodyL (DULCOLAX) 5 mg EC tablet Take 5 mg by mouth daily as needed for Constipation.       ticagrelor (BRILINTA) 90 mg tablet Take 90 mg by mouth two (2) times a day. atorvastatin (LIPITOR) 20 mg tablet Take 20 mg by mouth nightly. nitroglycerin (NITROSTAT) 0.4 mg SL tablet 0.4 mg by SubLINGual route every five (5) minutes as needed for Chest Pain. Up to 3 doses. methocarbamoL (ROBAXIN) 750 mg tablet Take 750 mg by mouth two (2) times a day. lisinopriL (PRINIVIL, ZESTRIL) 2.5 mg tablet Take 2.5 mg by mouth daily. empagliflozin (JARDIANCE) 10 mg tablet Take 10 mg by mouth daily. aspirin delayed-release 81 mg tablet Take 1 Tab by mouth daily. 30 Tab 3    colchicine 0.6 mg tablet TAKE 1/2 (ONE-HALF) TABLET BY MOUTH ONCE DAILY      ibuprofen (MOTRIN) 600 mg tablet Take 600 mg by mouth three (3) times daily. lansoprazole (PREVACID) 30 mg capsule Take 30 mg by mouth daily. ondansetron (ZOFRAN ODT) 4 mg disintegrating tablet DISSOLVE 1 TABLET IN MOUTH TWICE DAILY      oxyCODONE-acetaminophen (PERCOCET) 5-325 mg per tablet TAKE 1 TABLET BY MOUTH EVERY 4 HOURS AS NEEDED FOR PAIN      sucralfate (CARAFATE) 100 mg/mL suspension TAKE 20 ML (CC) BY MOUTH TWICE DAILY      BD Veo Insulin Syringe UF 1/2 mL 31 gauge x 15/64\" syrg USE TO INJECT INSULIN THREE TIMES DAILY      senna (SENOKOT) 8.6 mg tablet Take 1 Tablet by mouth as needed for Constipation. blood-glucose sensor (DEXCOM G6 SENSOR) by Does Not Apply route. No current facility-administered medications on file prior to visit. Allergies   Allergen Reactions    Other Food Swelling     JALIPENO PEPPERS - FACILA SWELLING    Ciprofloxacin Anaphylaxis, Rash and Unknown (comments)    Bumetanide Other (comments) and Unknown (comments)     Hearing loss  Other reaction(s):  Other (see comments)    Coconut Other (comments)     Facial swelling    Tree Pollen-Shagbark Amorita Rash     HICKORY SMOKE FLAVORING    Voltaren [Diclofenac Sodium] Myalgia and Other (comments)     \"muscle spasms\", LEG SPASMS      Diclofenac Myalgia and Rash     \"muscle spasms\"  Other reaction(s): Myalgias  \"muscle spasms\"      Penicillins Rash, Hives and Unknown (comments)     OCCURRED IN INFANCY NO REPORTED SEVERE IgE MEDIATED REACTIONS  Patient screened for any delayed non-IgE-mediated reaction to PCN. Patient notes the following:    No delayed non-IgE-mediated reaction to PCN          Prochlorperazine Anxiety and Unknown (comments)     HALLUCINATIONS    Vancomycin Other (comments), Rash and Unknown (comments)     Kidneys shut down  Other reaction(s): Other (see comments)  Kidneys shut down  Other reaction(s): Other (comments)  Kidneys shut down  Kidneys shut down         Review of Systems:  History obtained from the patient-negative for:  Constitutional: weight loss, fever, night sweats  HEENT: hearing loss, earache, congestion, snoring, sorethroat  CV: chest pain, palpitations, edema  Resp: cough, shortness of breath, wheezing  Breast: breast lumps, nipple discharge, galactorrhea  GI: change in bowel habits, abdominal pain, black or bloody stools  : frequency, dysuria, hematuria, vaginal discharge  MSK: back pain, joint pain, muscle pain  Skin: itching, rash, hives  Neuro: dizziness, headache, confusion, weakness  Psych: anxiety, depression, change in mood  Heme/lymph: bleeding, bruising, pallor    Physical Exam  Visit Vitals  /78   Ht 5' 4\" (1.626 m)   Wt 227 lb 6.4 oz (103.1 kg)   BMI 39.03 kg/m²       Constitutional  Appearance: well-nourished, well developed, alert, in no acute distress    HENT  Head and Face: appears normal    Neck  Inspection/Palpation: normal appearance, no masses or tenderness  Lymph Nodes: no lymphadenopathy present  Thyroid: gland size normal, nontender, no nodules or masses present on palpation    Chest  Respiratory Effort: breathing unlabored  Auscultation: normal breath sounds    Cardiovascular  Heart:   Auscultation: regular rate and rhythm without murmur    Breasts  Inspection of Breasts: breasts symmetrical, no skin changes, no discharge present, nipple appearance normal, no skin retraction present  Palpation of Breasts and Axillae: no masses present on palpation, no breast tenderness  Axillary Lymph Nodes: no lymphadenopathy present    Gastrointestinal  Abdominal Examination: abdomen non-tender to palpation, normal bowel sounds, no masses present  Liver and spleen: no hepatomegaly present, spleen not palpable  Hernias: no hernias identified    Genitourinary  External Genitalia: normal appearance for age, no discharge present, no tenderness present, no inflammatory lesions present, no masses present, no atrophy present  Vagina: normal vaginal vault without central or paravaginal defects, no discharge present, no inflammatory lesions present, no masses present  Bladder: non-tender to palpation  Urethra: appears normal  Cervix: normal   Uterus: normal size, shape and consistency  Adnexa: no adnexal tenderness present, no adnexal masses present  Perineum: perineum within normal limits, no evidence of trauma, no rashes or skin lesions present  Anus: anus within normal limits, no hemorrhoids present  Inguinal Lymph Nodes: no lymphadenopathy present    Skin  General Inspection: no rash, no lesions identified    Neurologic/Psychiatric  Mental Status:  Orientation: grossly oriented to person, place and time  Mood and Affect: mood normal, affect appropriate    Labs:  No results found for this or any previous visit (from the past 12 hour(s)). Assessment:    ICD-10-CM ICD-9-CM    1. Routine gynecological examination  Z01.419 V72.31 PAP IG, APTIMA HPV AND RFX 16/18,45 ([id number])      2. Menorrhagia with regular cycle  N92.0 626.2           Plan:  Counseled re: diet, exercise, healthy lifestyle  Rec screening mammo at 39  Return in about 1 year (around 1/23/2024) for Annual, [address] and FU.

## 2023-01-26 LAB
CYTOLOGIST CVX/VAG CYTO: NORMAL
CYTOLOGY CVX/VAG DOC CYTO: NORMAL
CYTOLOGY CVX/VAG DOC THIN PREP: NORMAL
DX ICD CODE: NORMAL
HPV GENOTYPE REFLEX: NORMAL
HPV I/H RISK 4 DNA CVX QL PROBE+SIG AMP: NEGATIVE
Lab: NORMAL
OTHER STN SPEC: NORMAL
STAT OF ADQ CVX/VAG CYTO-IMP: NORMAL

## 2023-01-31 ENCOUNTER — OFFICE VISIT (OUTPATIENT)
Dept: ENDOCRINOLOGY | Age: 38
End: 2023-01-31
Payer: MEDICARE

## 2023-01-31 VITALS
TEMPERATURE: 97.2 F | OXYGEN SATURATION: 99 % | HEART RATE: 74 BPM | WEIGHT: 252.13 LBS | BODY MASS INDEX: 43.04 KG/M2 | HEIGHT: 64 IN | SYSTOLIC BLOOD PRESSURE: 136 MMHG | DIASTOLIC BLOOD PRESSURE: 82 MMHG

## 2023-01-31 DIAGNOSIS — N18.30 STAGE 3 CHRONIC KIDNEY DISEASE, UNSPECIFIED WHETHER STAGE 3A OR 3B CKD (HCC): ICD-10-CM

## 2023-01-31 DIAGNOSIS — G62.9 POLYNEUROPATHY, UNSPECIFIED: ICD-10-CM

## 2023-01-31 DIAGNOSIS — E03.9 ACQUIRED HYPOTHYROIDISM: ICD-10-CM

## 2023-01-31 DIAGNOSIS — E78.2 MIXED HYPERLIPIDEMIA: ICD-10-CM

## 2023-01-31 DIAGNOSIS — Z79.4 TYPE 2 DIABETES MELLITUS WITH HYPERGLYCEMIA, WITH LONG-TERM CURRENT USE OF INSULIN (HCC): Primary | ICD-10-CM

## 2023-01-31 DIAGNOSIS — I10 PRIMARY HYPERTENSION: ICD-10-CM

## 2023-01-31 DIAGNOSIS — E66.9 NON MORBID OBESITY: ICD-10-CM

## 2023-01-31 DIAGNOSIS — E11.65 TYPE 2 DIABETES MELLITUS WITH HYPERGLYCEMIA, WITH LONG-TERM CURRENT USE OF INSULIN (HCC): Primary | ICD-10-CM

## 2023-01-31 PROCEDURE — 99215 OFFICE O/P EST HI 40 MIN: CPT | Performed by: INTERNAL MEDICINE

## 2023-01-31 PROCEDURE — G8417 CALC BMI ABV UP PARAM F/U: HCPCS | Performed by: INTERNAL MEDICINE

## 2023-01-31 PROCEDURE — G8510 SCR DEP NEG, NO PLAN REQD: HCPCS | Performed by: INTERNAL MEDICINE

## 2023-01-31 PROCEDURE — 3046F HEMOGLOBIN A1C LEVEL >9.0%: CPT | Performed by: INTERNAL MEDICINE

## 2023-01-31 PROCEDURE — 3079F DIAST BP 80-89 MM HG: CPT | Performed by: INTERNAL MEDICINE

## 2023-01-31 PROCEDURE — G8427 DOCREV CUR MEDS BY ELIG CLIN: HCPCS | Performed by: INTERNAL MEDICINE

## 2023-01-31 PROCEDURE — 3075F SYST BP GE 130 - 139MM HG: CPT | Performed by: INTERNAL MEDICINE

## 2023-01-31 PROCEDURE — 2022F DILAT RTA XM EVC RTNOPTHY: CPT | Performed by: INTERNAL MEDICINE

## 2023-01-31 RX ORDER — LEVOTHYROXINE SODIUM 137 UG/1
137 TABLET ORAL
Qty: 90 TABLET | Refills: 3 | Status: SHIPPED | OUTPATIENT
Start: 2023-01-31

## 2023-01-31 RX ORDER — DULAGLUTIDE 1.5 MG/.5ML
1.5 INJECTION, SOLUTION SUBCUTANEOUS
Qty: 6 ML | Refills: 3 | Status: SHIPPED | OUTPATIENT
Start: 2023-01-31

## 2023-01-31 NOTE — PATIENT INSTRUCTIONS
Check blood sugars before meals and at bedtime. Low blood glucose is less than 70     Goal of blood glucose before meals 90 - 120 , 2  Hours after meals less than 180     Maintain the log and bring it all your appointments    If the bedtime sugars are less than 100 ,eat a 15 gm snack. Levemir 30 units in AM and 30  units at bed time    Novolog or Humalog or Apidra insulin (fast acting) 6-10 units before breakfast, 6-10 units before lunch and 6-10  units before dinner. If sugars before meals are less than 70 then no insulin     Trulicity weekly     Novolog or Humalog for high sugars     Correction Scale:  3 units for every 50 above 150    Blood sugar  Fast acting insulin          150-200  Extra 3 Units       201-250  Extra 6 Units       251-300  Extra 9 Units       301-350  Extra 12  Units        351-400  Extra 15  Units     Example: My planned insulin dose:    ____ Units for meals    +    ____ Extra Correction Units  if high =  ____ total units to take together as one injection.

## 2023-01-31 NOTE — PROGRESS NOTES
Shelly Leyva is a 44 y.o. female here for   Chief Complaint   Patient presents with    Thyroid Problem    Diabetes       1. Have you been to the ER, urgent care clinic since your last visit? Hospitalized since your last visit? - [facility]     2. Have you seen or consulted any other health care providers outside of the [address] [place] [address] since your last visit?   Include any pap smears or colon screening.- No

## 2023-01-31 NOTE — LETTER
1/31/2023    Patient: Jemal Garcias   YOB: 1985   Date of Visit: 1/31/2023     Madi Mae DO  [address] [id number]  Via Fax: [phone]    Dear Madi Mae DO,      Thank you for referring Ms. Ian Redmond to [address] [place] [address] for evaluation. My notes for this consultation are attached. If you have questions, please do not hesitate to call me. I look forward to following your patient along with you.       Sincerely,    Holly Shepherd MD

## 2023-01-31 NOTE — PROGRESS NOTES
[facility] ENDOCRINOLOGY               Alejandro Bah MD          Patient Information Name : Homer Cortes 44 y.o.   YOB: 1985         Referred by: Dary Story DO         Chief Complaint   Patient presents with    Thyroid Problem    Diabetes     History of Present Illness: Homer Cortes is a 44 y.o. female here for follow-up of  Diabetes Mellitus. 1/31/23  She is on insulin, forgot to get the Dexcom reader  On Dexcom - AM -120, before meals 120-160, 200 after eating per recall  Hypoglycemia - decreased after reducing the dose of Novolog to 6-10 units  GI does not want pt to go up on Trulicity  Has to have Hysterectomy in next 2 mo  A1C 7.1% at [facility] Cardiology  Limited activity due to comorbidities, she has gained some weight    Prior history  Diabetes mellitus was diagnosed in 2009 . End organ effects of diabetes: peripheral neuropathy, gastroparesis (unsure of the diagnosis),CKD. History of CAD,CABG,CVA  She is on MDI could not tolerate metformin  Prior medications tried Calvin Revel  She is on Dexcom  She is on MDI  No severe hypoglycemia  Insurance did not approve Lantus      Prior history  Has Dexcom G6 which she got it after she had several episodes of hypoglycemia according to the history.       Wt Readings from Last 3 Encounters:   01/31/23 252 lb 2 oz (114.4 kg)   01/23/23 227 lb 6.4 oz (103.1 kg)   11/04/22 230 lb (104.3 kg)       BP Readings from Last 3 Encounters:   01/31/23 136/82   01/23/23 114/78   11/04/22 (!) 146/105           Past Medical History:   Diagnosis Date    Abscess     Anemia     Arrhythmia     CAD (coronary artery disease) 2019    CABG X1    CHF (congestive heart failure) (HCC)     Chronic pain     LEFT SCIATIC, STERNAL WOUND    Complicated migraine     with extremity numbness    Diabetes ([address] 2009 7/21 out of control  1/23 hga1c 7.4    DVT (deep venous thrombosis) ([address] 2009    right leg    H/O transfusion of packed red blood cells 07/2021 11/22 menorrhagia    Heart attack ([facility] 11/2019    Hx MRSA infection 2009    Hypertension 2009    Hypothyroidism     Irregular menstrual cycle     Obesity (BMI 35.0-39.9 without comorbidity)     Ovarian cyst     Gets frequently    Paraplegia (HCC)     Partial -- \"stroke in spinal cord\" during open heart surgery    PCOS (polycystic ovarian syndrome) 1996    Stroke ([facility] 2019    LOWER BODY WEAKNESS    Uterine fibroid 01/2017    1.7 cm 9/21 Noted at tubal surgery   Anterior fundal 3.0    UTI (urinary tract infection) Child     Current Outpatient Medications   Medication Sig    dulaglutide (Trulicity) 1.5 GN/8.2 mL sub-q pen 0.5 mL by SubCUTAneous route every seven (7) days. levothyroxine (SYNTHROID) 137 mcg tablet Take 1 Tablet by mouth Daily (before breakfast). ibuprofen (MOTRIN) 600 mg tablet Take 600 mg by mouth as needed. ondansetron (ZOFRAN ODT) 4 mg disintegrating tablet Take 4 mg by mouth as needed. BD Veo Insulin Syringe UF 1/2 mL 31 gauge x 15/64\" syrg USE TO INJECT INSULIN THREE TIMES DAILY    Blood-Glucose Sensor (Dexcom G6 Sensor) ryan Use as directed every 10 days. Dx code E11.65    Blood-Glucose Transmitter (Dexcom G6 Transmitter) ryan Use as directed every 90 days. Dx code E11.65    HumaLOG KwikPen Insulin 100 unit/mL kwikpen 10-15 Units by SubCUTAneous route Before breakfast, lunch, and dinner. Stop Novolog    carvediloL (COREG) 12.5 mg tablet Take 12.5 mg by mouth two (2) times a day. ranolazine ER (RANEXA) 500 mg SR tablet Take 500 mg by mouth two (2) times a day. insulin syringe,safetyneedle 0.5 mL 31 gauge x 15/64\" syrg 1 Syringe by Does Not Apply route three (3) times daily.  Dx Code: E11.65    Insulin Needles, Disposable, 32 gauge x 5/32\" ndle Use to inject insulin BID Dx Code: E11.65    insulin detemir U-100 (LEVEMIR FLEXTOUCH) 100 unit/mL (3 mL) inpn Inject 30 units in AM and 30  units at bed time    pantoprazole (Protonix) 40 mg tablet Take 1 Tablet by mouth daily. Cetirizine (ZyrTEC) 10 mg cap Take 10 mg by mouth daily. ticagrelor (BRILINTA) 90 mg tablet Take 90 mg by mouth two (2) times a day. atorvastatin (LIPITOR) 20 mg tablet Take 20 mg by mouth nightly. nitroglycerin (NITROSTAT) 0.4 mg SL tablet 0.4 mg by SubLINGual route every five (5) minutes as needed for Chest Pain. Up to 3 doses. methocarbamoL (ROBAXIN) 750 mg tablet Take 750 mg by mouth two (2) times a day. lisinopriL (PRINIVIL, ZESTRIL) 2.5 mg tablet Take 2.5 mg by mouth daily. empagliflozin (JARDIANCE) 10 mg tablet Take 10 mg by mouth daily. aspirin delayed-release 81 mg tablet Take 1 Tab by mouth daily. colchicine 0.6 mg tablet TAKE 1/2 (ONE-HALF) TABLET BY MOUTH ONCE DAILY (Patient not taking: Reported on 1/31/2023)    lansoprazole (PREVACID) 30 mg capsule Take 30 mg by mouth daily. (Patient not taking: Reported on 1/31/2023)    oxyCODONE-acetaminophen (PERCOCET) 5-325 mg per tablet TAKE 1 TABLET BY MOUTH EVERY 4 HOURS AS NEEDED FOR PAIN (Patient not taking: Reported on 1/31/2023)    sucralfate (CARAFATE) 100 mg/mL suspension TAKE 20 ML (CC) BY MOUTH TWICE DAILY (Patient not taking: Reported on 1/31/2023)    bisacodyL (DULCOLAX) 5 mg EC tablet Take 5 mg by mouth daily as needed for Constipation. (Patient not taking: Reported on 1/31/2023)    senna (SENOKOT) 8.6 mg tablet Take 1 Tablet by mouth as needed for Constipation. blood-glucose sensor (DEXCOM G6 SENSOR) by Does Not Apply route. No current facility-administered medications for this visit. Allergies   Allergen Reactions    Other Food Swelling     JALIPENO PEPPERS - FACILA SWELLING    Ciprofloxacin Anaphylaxis, Rash and Unknown (comments)    Bumetanide Other (comments) and Unknown (comments)     Hearing loss  Other reaction(s):  Other (see comments)    Coconut Other (comments)     Facial swelling    Tree Pollen-Shagbark [place] Rash     HICKORY SMOKE FLAVORING    Voltaren [Diclofenac Sodium] Myalgia and Other (comments)     \"muscle spasms\", LEG SPASMS      Diclofenac Myalgia and Rash     \"muscle spasms\"  Other reaction(s): Myalgias  \"muscle spasms\"      Penicillins Rash, Hives and Unknown (comments)     OCCURRED IN INFANCY NO REPORTED SEVERE IgE MEDIATED REACTIONS  Patient screened for any delayed non-IgE-mediated reaction to PCN. Patient notes the following:    No delayed non-IgE-mediated reaction to PCN          Prochlorperazine Anxiety and Unknown (comments)     HALLUCINATIONS    Vancomycin Other (comments), Rash and Unknown (comments)     Kidneys shut down  Other reaction(s): Other (see comments)  Kidneys shut down  Other reaction(s): Other (comments)  Kidneys shut down  Kidneys shut down         Review of Systems:  Per HPI    Physical Examination:   Blood pressure 136/82, pulse 74, temperature 97.2 °F (36.2 °C), temperature source Temporal, height 5' 4\" (1.626 m), weight 252 lb 2 oz (114.4 kg), SpO2 99 %. Estimated body mass index is 43.28 kg/m² as calculated from the following:    Height as of this encounter: 5' 4\" (1.626 m). Weight as of this encounter: 252 lb 2 oz (114.4 kg).   General: pleasant, no distress, good eye contact  HEENT: no pallor, no periorbital edema, EOMI  Neck: supple,  Cardiovascular: regular,  normal S1 and S2,   Respiratory: clear to auscultation bilaterally  Musculoskeletal: no edema  Neurological: alert and oriented            Data Reviewed:        [x] Reviewed labs    Lab Results   Component Value Date/Time    Hemoglobin A1c 7.6 (H) 08/11/2022 08:03 PM    Hemoglobin A1c 7.7 (H) 05/26/2022 01:58 PM    Hemoglobin A1c 7.4 (H) 02/03/2022 11:44 AM    Glucose 187 (H) 11/04/2022 05:50 PM    Glucose (POC) 88 08/12/2022 01:32 PM    Microalb/Creat ratio (ug/mg creat.) 96.6 (H) 12/22/2017 09:30 AM    LDL,Direct 44 08/12/2022 01:31 AM    LDL, calculated Not calculated due to elevated triglyceride level 08/12/2022 01:31 AM    Creatinine (POC) 0.6 07/06/2018 05:23 PM    Creatinine 1.24 (H) 11/04/2022 05:50 PM          Assessment/Plan:       ICD-10-CM ICD-9-CM   1. Type 2 diabetes mellitus with hyperglycemia, with long-term current use of insulin ([facility])  E11.65 250.00    Z79.4 790.29     V58.67   2. Acquired hypothyroidism  E03.9 244.9   3. Mixed hyperlipidemia  E78.2 272.2   4. Stage 3 chronic kidney disease, unspecified whether stage 3a or 3b CKD ([facility])  N18.30 585.3   5. Primary hypertension  I10 401.9   6. Polyneuropathy, unspecified  G62.9 356.9   7. Non morbid obesity  E66.9 278.00          1. Type 2 Diabetes Mellitus   Lab Results   Component Value Date/Time    Hemoglobin A1c 7.6 (H) 08/11/2022 08:03 PM   Controlled stable, tight glycemic control will be a challenge given multiple comorbidities  A1c January 2023 7.1 at [facility] cardiology, we do not have reports  Could not tolerate Metformin. Tolerating Trulicity, due to a lot of abdominal symptoms GI as recommended not to increase the dose    Levemir 30 units twice daily, as it does not seem to be lasting longer, until she gets Lantus  NovoLog 6-10 units before meals  Trulicity 1.5 mg weekly, Jardiance    Has Dexcom    2. HTN : Stable, continue current therapy     3. Hyperlipidemia : Statin    4. Obesity per medical criteria :Body mass index is 43.28 kg/m². Discussed about the importance of activity and carbohydrate portion control. 5.  CAD/CABG    6.  CKD: Stable    7. Peripheral neuropathy    8. Diabetic retinopathy: Followed by ophthalmology     9. Hypothyroid-on levothyroxine  Clinically no goiter  Lab Results   Component Value Date/Time    TSH 3.45 08/12/2022 01:31 AM       Spent > 40 minutes on the day of the visit reviewing chart,  ordering/reviewing labs, counseling, discussing therapeutics and documentation in the medical record        Patient Instructions   Check blood sugars before meals and at bedtime.     Low blood glucose is less than 70     Goal of blood glucose before meals 90 - 120 , 2  Hours after meals less than 180 Maintain the log and bring it all your appointments    If the bedtime sugars are less than 100 ,eat a 15 gm snack. Levemir 30 units in AM and 30  units at bed time    Novolog or Humalog or Apidra insulin (fast acting) 6-10 units before breakfast, 6-10 units before lunch and 6-10  units before dinner. If sugars before meals are less than 70 then no insulin     Trulicity weekly     Novolog or Humalog for high sugars     Correction Scale:  3 units for every 50 above 150    Blood sugar  Fast acting insulin          150-200  Extra 3 Units       201-250  Extra 6 Units       251-300  Extra 9 Units       301-350  Extra 12  Units        351-400  Extra 15  Units     Example: My planned insulin dose:    ____ Units for meals    +    ____ Extra Correction Units  if high =  ____ total units to take together as one injection. Follow-up and Dispositions    Return in about 5 months (around 6/30/2023) for labs before next visit and follow up. Thank you for allowing me to participate in the care of this patient. Erica Kwok MD      Patient verbalized understanding     Voice-recognition software was used to generate this report, which may result in some phonetic-based errors in the grammar and contents. Even though attempts were made to correct all the mistakes, some may have been missed and remained in the body of the report.

## 2023-02-09 ENCOUNTER — TRANSCRIBE ORDER (OUTPATIENT)
Dept: SCHEDULING | Age: 38
End: 2023-02-09

## 2023-02-09 DIAGNOSIS — K31.84 GASTROPARESIS: ICD-10-CM

## 2023-02-09 DIAGNOSIS — R10.10 UPPER ABDOMINAL PAIN: Primary | ICD-10-CM

## 2023-02-09 DIAGNOSIS — R15.9 FULL INCONTINENCE OF FECES: ICD-10-CM

## 2023-02-09 NOTE — PROGRESS NOTES
Rafa Medel is a 44 y.o. female presents for a problem visit. Chief Complaint   Patient presents with    Ultrasound     Patient's last menstrual period was 01/25/2023. Birth Control: tubal ligation. Last Pap: normal obtained 1/25/2023. The patient is reporting having: Ultrasound Follow Up for menorrhagia. Ultrasound report:  TV ULTRASOUND PERFORMED. UTERUS IS ANTEVERTED, NORMAL IN SIZE AND HETEROGENEOUS IN ECHOGENICITY. THERE APPEARS TO BE MULTIPLE FIBROIDS. FIBROID 1- RIGHT ANTERIOR SUBMUCOSAL MEASURING 10 X 8 X 11MM. FIBROID 2-LEFT ANTERIOR SUBSEROSAL MEASURING 21 X 22 X 20MM. Curt Parisian ENDOMETRIUM MEASURES 6-7MM IN THICKNESS. NO EVIDENCE OF MASSES OR ABNORMALITIES ARE SEEN. RIGHT OVARY APPEARS WITHIN NORMAL LIMITS. LEFT OVARY APPEARS WITHIN NORMAL LIMITS. NO FREE FLUID SEEN IN THE CDS. 1. Have you been to the ER, urgent care clinic, or hospitalized since your last visit? No    2. Have you seen or consulted any other health care providers outside of the [address] [place] [address] since your last visit?  No    Examination chaperoned by Kadi Tenorio MA.

## 2023-02-10 ENCOUNTER — OFFICE VISIT (OUTPATIENT)
Dept: OBGYN CLINIC | Age: 38
End: 2023-02-10

## 2023-02-10 VITALS
BODY MASS INDEX: 37.97 KG/M2 | HEIGHT: 64 IN | WEIGHT: 222.4 LBS | SYSTOLIC BLOOD PRESSURE: 113 MMHG | DIASTOLIC BLOOD PRESSURE: 81 MMHG

## 2023-02-10 DIAGNOSIS — D25.0 SUBMUCOUS LEIOMYOMA OF UTERUS: ICD-10-CM

## 2023-02-10 DIAGNOSIS — N93.8 DUB (DYSFUNCTIONAL UTERINE BLEEDING): Primary | ICD-10-CM

## 2023-02-10 DIAGNOSIS — D25.1 FIBROIDS, INTRAMURAL: ICD-10-CM

## 2023-02-10 PROBLEM — K31.84 GASTROPARESIS: Status: ACTIVE | Noted: 2023-02-10

## 2023-02-10 PROBLEM — I25.9 ISCHEMIC HEART DISEASE: Status: ACTIVE | Noted: 2023-02-10

## 2023-02-10 PROBLEM — A49.02 MRSA (METHICILLIN RESISTANT STAPHYLOCOCCUS AUREUS): Status: ACTIVE | Noted: 2023-02-10

## 2023-02-10 PROBLEM — R15.9 FULL INCONTINENCE OF FECES: Status: ACTIVE | Noted: 2023-02-10

## 2023-02-10 RX ORDER — PANTOPRAZOLE SODIUM 20 MG/1
20 TABLET, DELAYED RELEASE ORAL DAILY
COMMUNITY
Start: 2023-01-12

## 2023-02-10 NOTE — PROGRESS NOTES
ABNORMAL BLEEDING NOTE    Steven Morales is a 44 y.o. female who complains of vaginal bleeding problems. She is here to discuss heavy bleeding issues that she has had. She was recently in the ER for hemorrhage and hemoglobin of 6.7 and was transfused. She is status post endometrial ablation last year. The patient is requesting hysterectomy. We evaluated these problems with ultrasound today and the ultrasound shows:  TV ULTRASOUND PERFORMED. UTERUS IS ANTEVERTED, NORMAL IN SIZE AND HETEROGENEOUS IN ECHOGENICITY. THERE APPEARS TO BE MULTIPLE FIBROIDS. FIBROID 1- RIGHT ANTERIOR SUBMUCOSAL MEASURING 10 X 8 X 11MM. FIBROID 2-LEFT ANTERIOR SUBSEROSAL MEASURING 21 X 22 X 20MM. Annelle Castles ENDOMETRIUM MEASURES 6-7MM IN THICKNESS. NO EVIDENCE OF MASSES OR ABNORMALITIES ARE SEEN. RIGHT OVARY APPEARS WITHIN NORMAL LIMITS. LEFT OVARY APPEARS WITHIN NORMAL LIMITS. NO FREE FLUID SEEN IN THE CDS. Fibroids demonstrated growth of an additional fibroid now although both are small. One is submucous which sis likely the issue.       Her relevant past medical history:     Patient Active Problem List   Diagnosis Code    Hypothyroidism E03.9    Anemia D64.9    Obesity, morbid ([facility]) E66.01    Type 2 diabetes mellitus with nephropathy ([facility]) E11.21    Microalbuminuria due to type 2 diabetes mellitus ([facility]) E11.29, R80.9    Hypertension I10    Atherosclerosis of coronary artery I25.10    HLD (hyperlipidemia) E78.5    S/P CABG (coronary artery bypass graft) Z95.1    Chronic left mastoiditis H70.12    Tympanic membrane perforation, right H72.91    Chest pain R07.9    ACS (acute coronary syndrome) ([facility]) I24.9    Syncope R55    MRSA (methicillin resistant Staphylococcus aureus) A49.02    Full incontinence of feces R15.9    Gastroparesis K31.84    Ischemic heart disease I25.9    Submucous leiomyoma of uterus D25.0    Fibroids, intramural D25.1    DUB (dysfunctional uterine bleeding) N93.8     Past Medical History:   Diagnosis Date Abscess     Anemia     Arrhythmia     CAD (coronary artery disease) 2019    CABG X1    CHF (congestive heart failure) ([facility])     Chronic pain     LEFT SCIATIC, STERNAL WOUND    Complicated migraine     with extremity numbness    Diabetes ([facility]  out of control   hga1c 7.4    DVT (deep venous thrombosis) ([address]     right leg    H/O transfusion of packed red blood cells 2021 menorrhagia    Heart attack ([address] 2019    Hx MRSA infection     Hypertension     Hypothyroidism     Irregular menstrual cycle     Obesity (BMI 35.0-39.9 without comorbidity)     Ovarian cyst     Gets frequently    Paraplegia ([address]     Partial -- \"stroke in spinal cord\" during open heart surgery    PCOS (polycystic ovarian syndrome)     Stroke ([facility]     LOWER BODY WEAKNESS    Uterine fibroid 2017    1.7 cm  Noted at tubal surgery   Anterior fundal 3.0    UTI (urinary tract infection) Child     Past Surgical History:   Procedure Laterality Date    HX APPENDECTOMY      HX BILATERAL SALPINGECTOMY Bilateral 2021    HX CHOLECYSTECTOMY      HX CORONARY ARTERY BYPASS GRAFT  2019    HX DILATION AND CURETTAGE      HX HYSTEROSCOPY WITH ENDOMETRIAL ABLATION  2021    Novasure    HX OTHER SURGICAL      2 TEETH REMOVED     HX TONSIL AND ADENOIDECTOMY      HX TYMPANOSTOMY Bilateral     MULTIPLE    HX WISDOM TEETH EXTRACTION      IL UNLISTED PROCEDURE CARDIAC SURGERY      CABG X 1, CARDIAC CATH STENT X 3     OB History    Para Term  AB Living   2 0 0 0 2 0   SAB IAB Ectopic Molar Multiple Live Births   2 0 0 0 0 0      # Outcome Date GA Lbr Juan Carlos/2nd Weight Sex Delivery Anes PTL Lv   2 SAB  19w0d    SAB      1 SAB              Gyn Flowsheet:  No flowsheet data found.   Social History     Socioeconomic History    Marital status:     Number of children: 0   Tobacco Use    Smoking status: Never     Passive exposure: Yes    Smokeless tobacco: Never    Tobacco comments: Second hand  smokes   Vaping Use    Vaping Use: Never used   Substance and Sexual Activity    Alcohol use: Not Currently     Comment: socially 1-2 times a year     Drug use: No    Sexual activity: Yes     Partners: Male     Birth control/protection: None     Comment: Tubes removed      Family History   Problem Relation Age of Onset    Hypertension Other         \"all her family\"    Diabetes Other         \"all her family\"    Cancer Mother         cervical    Stroke Mother     Heart Disease Mother     Gall Bladder Disease Mother     Diabetes Mother     Hypertension Mother     Elevated Lipids Mother     Stroke Father     Heart Disease Father     Diabetes Father     Heart Attack Father     Elevated Lipids Father     Kidney Disease Father         ESRD    Gall Bladder Disease Father     Gall Bladder Disease Brother     Diabetes Brother     Kidney Disease Brother     Cancer Brother         KIDNEY    Hypertension Brother     Migraines Maternal Aunt     Diabetes Paternal Aunt     Stroke Maternal Grandmother     Diabetes Maternal Grandmother     Cancer Maternal Grandmother         cervical    Heart Attack Maternal Grandmother     Elevated Lipids Maternal Grandmother     Stroke Maternal Grandfather     Diabetes Maternal Grandfather     Elevated Lipids Maternal Grandfather     Cancer Maternal Grandfather     Heart Attack Maternal Grandfather     Anesth Problems Neg Hx      Current Outpatient Medications on File Prior to Visit   Medication Sig Dispense Refill    pantoprazole (PROTONIX) 20 mg tablet Take 20 mg by mouth daily. dulaglutide (Trulicity) 1.5 KL/3.2 mL sub-q pen 0.5 mL by SubCUTAneous route every seven (7) days. 6 mL 3    levothyroxine (SYNTHROID) 137 mcg tablet Take 1 Tablet by mouth Daily (before breakfast).  90 Tablet 3    colchicine 0.6 mg tablet TAKE 1/2 (ONE-HALF) TABLET BY MOUTH ONCE DAILY (Patient not taking: Reported on 1/31/2023)      ibuprofen (MOTRIN) 600 mg tablet Take 600 mg by mouth as needed. lansoprazole (PREVACID) 30 mg capsule Take 30 mg by mouth daily. (Patient not taking: Reported on 1/31/2023)      ondansetron (ZOFRAN ODT) 4 mg disintegrating tablet Take 4 mg by mouth as needed. oxyCODONE-acetaminophen (PERCOCET) 5-325 mg per tablet TAKE 1 TABLET BY MOUTH EVERY 4 HOURS AS NEEDED FOR PAIN (Patient not taking: Reported on 1/31/2023)      sucralfate (CARAFATE) 100 mg/mL suspension TAKE 20 ML (CC) BY MOUTH TWICE DAILY (Patient not taking: Reported on 1/31/2023)      BD Veo Insulin Syringe UF 1/2 mL 31 gauge x 15/64\" syrg USE TO INJECT INSULIN THREE TIMES DAILY      Blood-Glucose Sensor (Dexcom G6 Sensor) ryan Use as directed every 10 days. Dx code E11.65 9 Each 3    Blood-Glucose Transmitter (Dexcom G6 Transmitter) ryan Use as directed every 90 days. Dx code E11.65 1 Each 3    HumaLOG KwikPen Insulin 100 unit/mL kwikpen 10-15 Units by SubCUTAneous route Before breakfast, lunch, and dinner. Stop Novolog 50 mL 3    carvediloL (COREG) 12.5 mg tablet Take 12.5 mg by mouth two (2) times a day. ranolazine ER (RANEXA) 500 mg SR tablet Take 500 mg by mouth two (2) times a day. insulin syringe,safetyneedle 0.5 mL 31 gauge x 15/64\" syrg 1 Syringe by Does Not Apply route three (3) times daily. Dx Code: E11.65 300 Each 3    Insulin Needles, Disposable, 32 gauge x 5/32\" ndle Use to inject insulin BID Dx Code: E11.65 200 Pen Needle 3    insulin detemir U-100 (LEVEMIR FLEXTOUCH) 100 unit/mL (3 mL) inpn Inject 30 units in AM and 30  units at bed time 60 mL 3    pantoprazole (Protonix) 40 mg tablet Take 1 Tablet by mouth daily. 30 Tablet 0    Cetirizine (ZyrTEC) 10 mg cap Take 10 mg by mouth daily. bisacodyL (DULCOLAX) 5 mg EC tablet Take 5 mg by mouth daily as needed for Constipation. (Patient not taking: Reported on 1/31/2023)      ticagrelor (BRILINTA) 90 mg tablet Take 90 mg by mouth two (2) times a day. atorvastatin (LIPITOR) 20 mg tablet Take 20 mg by mouth nightly. nitroglycerin (NITROSTAT) 0.4 mg SL tablet 0.4 mg by SubLINGual route every five (5) minutes as needed for Chest Pain. Up to 3 doses. methocarbamoL (ROBAXIN) 750 mg tablet Take 750 mg by mouth two (2) times a day. lisinopriL (PRINIVIL, ZESTRIL) 2.5 mg tablet Take 2.5 mg by mouth daily. empagliflozin (JARDIANCE) 10 mg tablet Take 10 mg by mouth daily. aspirin delayed-release 81 mg tablet Take 1 Tab by mouth daily. 30 Tab 3     No current facility-administered medications on file prior to visit. Allergies   Allergen Reactions    Other Food Swelling     JALIPENO PEPPERS - FACILA SWELLING    Ciprofloxacin Anaphylaxis, Rash and Unknown (comments)    Bumetanide Other (comments) and Unknown (comments)     Hearing loss  Other reaction(s): Other (see comments)    Coconut Other (comments)     Facial swelling    Tree Pollen-Shagbark [place] Rash     HICKORY SMOKE FLAVORING    Voltaren [Diclofenac Sodium] Myalgia and Other (comments)     \"muscle spasms\", LEG SPASMS      Diclofenac Myalgia and Rash     \"muscle spasms\"  Other reaction(s): Myalgias  \"muscle spasms\"      Penicillins Rash, Hives and Unknown (comments)     OCCURRED IN INFANCY NO REPORTED SEVERE IgE MEDIATED REACTIONS  Patient screened for any delayed non-IgE-mediated reaction to PCN. Patient notes the following:    No delayed non-IgE-mediated reaction to PCN          Prochlorperazine Anxiety and Unknown (comments)     HALLUCINATIONS    Vancomycin Other (comments), Rash and Unknown (comments)     Kidneys shut down  Other reaction(s): Other (see comments)  Kidneys shut down  Other reaction(s):  Other (comments)  Kidneys shut down  Kidneys shut down         Review of Systems - History obtained from the patient-negative for:  Constitutional: weight loss, fever, night sweats  HEENT: hearing loss, earache, congestion, snoring, sorethroat  CV: chest pain, palpitations, edema  Resp: cough, shortness of breath, wheezing  Breast: breast lumps, nipple discharge, galactorrhea  GI: change in bowel habits, abdominal pain, black or bloody stools  : frequency, dysuria, hematuria, vaginal discharge  MSK: back pain, joint pain, muscle pain  Skin: itching, rash, hives  Neuro: dizziness, headache, confusion, weakness  Psych: anxiety, depression, change in mood  Heme/lymph: bleeding, bruising, pallor    Objective:    Visit Vitals  /81   Ht 5' 4\" (1.626 m)   Wt 222 lb 6.4 oz (100.9 kg)   LMP 01/25/2023   BMI 38.17 kg/m²          PHYSICAL EXAMINATION    Constitutional  Appearance: well-nourished, well developed, alert, in no acute distress    HENT  Head and Face: appears normal    Skin  General Inspection: no rash, no lesions identified    Neurologic/Psychiatric  Mental Status:  Orientation: grossly oriented to person, place and time  Mood and Affect: mood normal, affect appropriate    Assessment:     ICD-10-CM ICD-9-CM    1. DUB (dysfunctional uterine bleeding)  N93.8 626.8       2. Submucous leiomyoma of uterus  D25.0 218.0       3. Fibroids, intramural  D25.1 218.1           Plan:   Options discussed -- hysteroscopy vs hysterectomy. Proceed with karen bean @pt request.  Risks discussed with patient re damage to bowel bladder, nerves blood vessels, hemorrhage/transfusion, anesthesia risks etc.  Risks are increased due to her poor medical condition.

## 2023-02-19 DIAGNOSIS — Z79.4 TYPE 2 DIABETES MELLITUS WITH HYPERGLYCEMIA, WITH LONG-TERM CURRENT USE OF INSULIN (HCC): ICD-10-CM

## 2023-02-19 DIAGNOSIS — E78.2 MIXED HYPERLIPIDEMIA: ICD-10-CM

## 2023-02-19 DIAGNOSIS — E03.9 ACQUIRED HYPOTHYROIDISM: ICD-10-CM

## 2023-02-19 DIAGNOSIS — I10 PRIMARY HYPERTENSION: ICD-10-CM

## 2023-02-19 DIAGNOSIS — E11.65 TYPE 2 DIABETES MELLITUS WITH HYPERGLYCEMIA, WITH LONG-TERM CURRENT USE OF INSULIN (HCC): ICD-10-CM

## 2023-02-19 DIAGNOSIS — N18.30 STAGE 3 CHRONIC KIDNEY DISEASE, UNSPECIFIED WHETHER STAGE 3A OR 3B CKD (HCC): ICD-10-CM

## 2023-02-20 RX ORDER — LEVOTHYROXINE SODIUM 137 UG/1
137 TABLET ORAL
Qty: 90 TABLET | Refills: 3 | Status: SHIPPED | OUTPATIENT
Start: 2023-02-20

## 2023-02-26 ENCOUNTER — HOSPITAL ENCOUNTER (EMERGENCY)
Age: 38
Discharge: HOME | End: 2023-02-26
Attending: STUDENT IN AN ORGANIZED HEALTH CARE EDUCATION/TRAINING PROGRAM
Payer: MEDICARE

## 2023-02-26 ENCOUNTER — APPOINTMENT (OUTPATIENT)
Dept: GENERAL RADIOLOGY | Age: 38
End: 2023-02-26
Attending: STUDENT IN AN ORGANIZED HEALTH CARE EDUCATION/TRAINING PROGRAM
Payer: MEDICARE

## 2023-02-26 VITALS
WEIGHT: 230 LBS | DIASTOLIC BLOOD PRESSURE: 84 MMHG | TEMPERATURE: 97.9 F | OXYGEN SATURATION: 99 % | HEART RATE: 81 BPM | RESPIRATION RATE: 18 BRPM | HEIGHT: 64 IN | SYSTOLIC BLOOD PRESSURE: 134 MMHG | BODY MASS INDEX: 39.27 KG/M2

## 2023-02-26 DIAGNOSIS — M25.562 ARTHRALGIA OF LEFT LOWER LEG: ICD-10-CM

## 2023-02-26 DIAGNOSIS — R07.9 CHEST PAIN, UNSPECIFIED TYPE: Primary | ICD-10-CM

## 2023-02-26 LAB
ALBUMIN SERPL-MCNC: 3.7 G/DL (ref 3.5–5)
ALBUMIN/GLOB SERPL: 0.9 (ref 1.1–2.2)
ALP SERPL-CCNC: 85 U/L (ref 45–117)
ALT SERPL-CCNC: 45 U/L (ref 12–78)
ANION GAP SERPL CALC-SCNC: 6 MMOL/L (ref 5–15)
AST SERPL W P-5'-P-CCNC: 22 U/L (ref 15–37)
BASOPHILS # BLD: 0.1 K/UL (ref 0–0.1)
BASOPHILS NFR BLD: 1 % (ref 0–1)
BILIRUB SERPL-MCNC: 0.6 MG/DL (ref 0.2–1)
BUN SERPL-MCNC: 20 MG/DL (ref 6–20)
BUN/CREAT SERPL: 17 (ref 12–20)
CA-I BLD-MCNC: 9.6 MG/DL (ref 8.5–10.1)
CHLORIDE SERPL-SCNC: 99 MMOL/L (ref 97–108)
CO2 SERPL-SCNC: 29 MMOL/L (ref 21–32)
CREAT SERPL-MCNC: 1.19 MG/DL (ref 0.55–1.02)
DIFFERENTIAL METHOD BLD: ABNORMAL
EOSINOPHIL # BLD: 0.4 K/UL (ref 0–0.4)
EOSINOPHIL NFR BLD: 4 % (ref 0–7)
ERYTHROCYTE [DISTWIDTH] IN BLOOD BY AUTOMATED COUNT: 12.4 % (ref 11.5–14.5)
GLOBULIN SER CALC-MCNC: 4.3 G/DL (ref 2–4)
GLUCOSE SERPL-MCNC: 151 MG/DL (ref 65–100)
HCT VFR BLD AUTO: 41.9 % (ref 35–47)
HGB BLD-MCNC: 14.2 G/DL (ref 11.5–16)
IMM GRANULOCYTES # BLD AUTO: 0 K/UL (ref 0–0.04)
IMM GRANULOCYTES NFR BLD AUTO: 0 % (ref 0–0.5)
LYMPHOCYTES # BLD: 2.9 K/UL (ref 0.8–3.5)
LYMPHOCYTES NFR BLD: 31 % (ref 12–49)
MCH RBC QN AUTO: 30.3 PG (ref 26–34)
MCHC RBC AUTO-ENTMCNC: 33.9 G/DL (ref 30–36.5)
MCV RBC AUTO: 89.3 FL (ref 80–99)
MONOCYTES # BLD: 0.9 K/UL (ref 0–1)
MONOCYTES NFR BLD: 9 % (ref 5–13)
NEUTS SEG # BLD: 5.1 K/UL (ref 1.8–8)
NEUTS SEG NFR BLD: 55 % (ref 32–75)
NRBC # BLD: 0 K/UL (ref 0–0.01)
NRBC BLD-RTO: 0 PER 100 WBC
PLATELET # BLD AUTO: 462 K/UL (ref 150–400)
PMV BLD AUTO: 9.4 FL (ref 8.9–12.9)
POTASSIUM SERPL-SCNC: 4.2 MMOL/L (ref 3.5–5.1)
PROT SERPL-MCNC: 8 G/DL (ref 6.4–8.2)
RBC # BLD AUTO: 4.69 M/UL (ref 3.8–5.2)
SODIUM SERPL-SCNC: 134 MMOL/L (ref 136–145)
TROPONIN I SERPL HS-MCNC: 4 NG/L (ref 0–51)
WBC # BLD AUTO: 9.3 K/UL (ref 3.6–11)

## 2023-02-26 PROCEDURE — 85025 COMPLETE CBC W/AUTO DIFF WBC: CPT

## 2023-02-26 PROCEDURE — 93005 ELECTROCARDIOGRAM TRACING: CPT

## 2023-02-26 PROCEDURE — 36415 COLL VENOUS BLD VENIPUNCTURE: CPT

## 2023-02-26 PROCEDURE — 74011250636 HC RX REV CODE- 250/636: Performed by: STUDENT IN AN ORGANIZED HEALTH CARE EDUCATION/TRAINING PROGRAM

## 2023-02-26 PROCEDURE — 80053 COMPREHEN METABOLIC PANEL: CPT

## 2023-02-26 PROCEDURE — 84484 ASSAY OF TROPONIN QUANT: CPT

## 2023-02-26 PROCEDURE — 99285 EMERGENCY DEPT VISIT HI MDM: CPT

## 2023-02-26 PROCEDURE — 96374 THER/PROPH/DIAG INJ IV PUSH: CPT

## 2023-02-26 PROCEDURE — 71045 X-RAY EXAM CHEST 1 VIEW: CPT

## 2023-02-26 RX ORDER — ONDANSETRON 2 MG/ML
4 INJECTION INTRAMUSCULAR; INTRAVENOUS
Status: COMPLETED | OUTPATIENT
Start: 2023-02-26 | End: 2023-02-26

## 2023-02-26 RX ADMIN — ONDANSETRON 4 MG: 2 INJECTION INTRAMUSCULAR; INTRAVENOUS at 21:10

## 2023-02-27 LAB
ATRIAL RATE: 78 BPM
CALCULATED P AXIS, ECG09: 40 DEGREES
CALCULATED R AXIS, ECG10: -39 DEGREES
CALCULATED T AXIS, ECG11: 29 DEGREES
DIAGNOSIS, 93000: NORMAL
P-R INTERVAL, ECG05: 192 MS
Q-T INTERVAL, ECG07: 410 MS
QRS DURATION, ECG06: 98 MS
QTC CALCULATION (BEZET), ECG08: 467 MS
VENTRICULAR RATE, ECG03: 78 BPM

## 2023-02-27 NOTE — ED NOTES
Unable to find IV site of patient, stuck per tech x 2 without success, unable to find sufficent vein per this nurse to even attempt IV. Spoke with Eliseo Mosquera, he will attempt IV.

## 2023-02-27 NOTE — ED NOTES
Patient cancelled her trip with transportation and advised that she had another ride. Patient is now requesting that we call them back and set up another trip for her as her ride is no longer able to come.   New trip number is [phone]

## 2023-02-27 NOTE — ED PROVIDER NOTES
[facility] EMERGENCY DEPT  EMERGENCY DEPARTMENT HISTORY AND PHYSICAL EXAM      Date: 2/26/2023  Patient Name: Angel Carr  MRN: [id number]  [place]: 1985  Date of evaluation: 2/26/2023  Provider: Riki Stallings MD   Note Started: 1:02 AM 2/27/23    HISTORY OF PRESENT ILLNESS     Chief Complaint   Patient presents with    Leg Pain    Chest Pain       History Provided By: Patient    HPI: Angel Carr, 44 y.o. female with past history as reported below presenting to the ED for chest pain and left leg pain. Patient reports she has chronic chest pain but comes into the ED today because of left leg pain that she is over the last couple days it feels similar to her prior DVT. She notes her last DVT was in the right leg, she is not on any anticoagulation. She denies any trauma to the leg, she notes pain to the posterior calf when she walks. She reports she feels temperature sensation changes well. She denies any swelling or color change. She reports that she has tried medications at home for the pain but the only thing that works is when she comes to the ED and gets Dilaudid. She reports her chest pain is chronic, located in the center of her chest without any radiation. She denies any associated symptoms including nausea, vomiting, diarrhea, fever, cough, chills, shortness of breath. She reports a prior history of CAD with CABG. She denies any changes to her chronic chest pain but notes that her pain is under control while at home. She denies any numbness or tingling in the arms denies any back pain.     PAST MEDICAL HISTORY   Past Medical History:  Past Medical History:   Diagnosis Date    Abscess     Anemia     Arrhythmia     CAD (coronary artery disease) 2019    CABG X1    CHF (congestive heart failure) ([facility])     Chronic pain     LEFT SCIATIC, STERNAL WOUND    Complicated migraine     with extremity numbness    Diabetes ([facility] [address] 2009 7/21 out of control  1/23 hga1c 7.4    DVT (deep venous thrombosis) ([facility] [address] 2009    right leg    H/O transfusion of packed red blood cells 07/2021 11/22 menorrhagia    Heart attack ([facility] [address] 11/2019    Hx MRSA infection 2009    Hypertension 2009    Hypothyroidism     Irregular menstrual cycle     Obesity (BMI 35.0-39.9 without comorbidity)     Ovarian cyst     Gets frequently    Paraplegia (HCC)     Partial -- \"stroke in spinal cord\" during open heart surgery    PCOS (polycystic ovarian syndrome) 1996    Stroke ([facility] [address] 2019    LOWER BODY WEAKNESS    Uterine fibroid 01/2017    1.7 cm 9/21 Noted at tubal surgery   Anterior fundal 3.0    UTI (urinary tract infection) Child       Past Surgical History:  Past Surgical History:   Procedure Laterality Date    HX APPENDECTOMY      HX BILATERAL SALPINGECTOMY Bilateral 09/07/2021    HX CHOLECYSTECTOMY      HX CORONARY ARTERY BYPASS GRAFT  12/03/2019    HX DILATION AND CURETTAGE      HX HYSTEROSCOPY WITH ENDOMETRIAL ABLATION  07/30/2021    Novasure    HX OTHER SURGICAL      2 TEETH REMOVED     HX TONSIL AND ADENOIDECTOMY  1992    HX TYMPANOSTOMY Bilateral     MULTIPLE    HX WISDOM TEETH EXTRACTION      IL UNLISTED PROCEDURE CARDIAC SURGERY  2019    CABG X 1, CARDIAC CATH STENT X 3       Family History:  Family History   Problem Relation Age of Onset    Hypertension Other         \"all her family\"    Diabetes Other         \"all her family\"    Cancer Mother         cervical    Stroke Mother     Heart Disease Mother     Teri Canner Mother     Diabetes Mother     Hypertension Mother     Elevated Lipids Mother     Stroke Father     Heart Disease Father     Diabetes Father     Heart Attack Father     Elevated Lipids Father     Kidney Disease Father         ESRD    Gall Bladder Disease Father     Gall Bladder Disease Brother     Diabetes Brother     Kidney Disease Brother     Cancer Brother         KIDNEY    Hypertension Brother     Migraines Maternal Aunt     Diabetes Paternal Aunt     Stroke Maternal Grandmother     Diabetes Maternal Grandmother     Cancer Maternal Grandmother         cervical    Heart Attack Maternal Grandmother     Elevated Lipids Maternal Grandmother     Stroke Maternal Grandfather     Diabetes Maternal Grandfather     Elevated Lipids Maternal Grandfather     Cancer Maternal Grandfather     Heart Attack Maternal Grandfather     Anesth Problems Neg Hx        Social History:  Social History     Tobacco Use    Smoking status: Never     Passive exposure: Yes    Smokeless tobacco: Never    Tobacco comments:     Second hand  smokes   Vaping Use    Vaping Use: Never used   Substance Use Topics    Alcohol use: Not Currently     Comment: socially 1-2 times a year     Drug use: No       Allergies: Allergies   Allergen Reactions    Other Food Swelling     JALIPENO PEPPERS - FACILA SWELLING    Ciprofloxacin Anaphylaxis, Rash and Unknown (comments)    Bumetanide Other (comments) and Unknown (comments)     Hearing loss  Other reaction(s): Other (see comments)    Coconut Other (comments)     Facial swelling    Toradol [Ketorolac] Rash    Tree [place] Rash     HICKORY SMOKE FLAVORING    Voltaren [Diclofenac Sodium] Myalgia and Other (comments)     \"muscle spasms\", LEG SPASMS      Diclofenac Myalgia and Rash     \"muscle spasms\"  Other reaction(s): Myalgias  \"muscle spasms\"      Penicillins Rash, Hives and Unknown (comments)     OCCURRED IN INFANCY NO REPORTED SEVERE IgE MEDIATED REACTIONS  Patient screened for any delayed non-IgE-mediated reaction to PCN. Patient notes the following:    No delayed non-IgE-mediated reaction to PCN          Prochlorperazine Anxiety and Unknown (comments)     HALLUCINATIONS    Vancomycin Other (comments), Rash and Unknown (comments)     Kidneys shut down  Other reaction(s): Other (see comments)  Kidneys shut down  Other reaction(s):  Other (comments)  Kidneys shut down  Kidneys shut down         PCP: Arron Mo DO    Current Meds:   Discharge Medication List as of 2/26/2023 10:03 PM        CONTINUE these medications which have NOT CHANGED    Details   levothyroxine (SYNTHROID) 137 mcg tablet Take 1 Tablet by mouth Daily (before breakfast). , Normal, Disp-90 Tablet, R-3      !! pantoprazole (PROTONIX) 20 mg tablet Take 20 mg by mouth daily. , Historical Med      dulaglutide (Trulicity) 1.5 NQ/8.6 mL sub-q pen 0.5 mL by SubCUTAneous route every seven (7) days. , Normal, Disp-6 mL, R-3      colchicine 0.6 mg tablet TAKE 1/2 (ONE-HALF) TABLET BY MOUTH ONCE DAILY, Historical Med      ibuprofen (MOTRIN) 600 mg tablet Take 600 mg by mouth as needed., Historical Med      lansoprazole (PREVACID) 30 mg capsule Take 30 mg by mouth daily. , Historical Med      ondansetron (ZOFRAN ODT) 4 mg disintegrating tablet Take 4 mg by mouth as needed., Historical Med      oxyCODONE-acetaminophen (PERCOCET) 5-325 mg per tablet TAKE 1 TABLET BY MOUTH EVERY 4 HOURS AS NEEDED FOR PAIN, Historical Med      sucralfate (CARAFATE) 100 mg/mL suspension TAKE 20 ML (CC) BY MOUTH TWICE DAILY, Historical Med      BD Veo Insulin Syringe UF 1/2 mL 31 gauge x 15/64\" syrg USE TO INJECT INSULIN THREE TIMES DAILY, Historical Med, FRANNIE      Blood-Glucose Sensor (Dexcom G6 Sensor) ryan Use as directed every 10 days. Dx code E11.65, Normal, Disp-9 Each, R-3      Blood-Glucose Transmitter (Dexcom G6 Transmitter) ryan Use as directed every 90 days. Dx code E11.65, Normal, Disp-1 Each, R-3      HumaLOG KwikPen Insulin 100 unit/mL kwikpen 10-15 Units by SubCUTAneous route Before breakfast, lunch, and dinner. Stop Novolog, Normal, Disp-50 mL, R-3, FRANNIE      carvediloL (COREG) 12.5 mg tablet Take 12.5 mg by mouth two (2) times a day., Historical Med      ranolazine ER (RANEXA) 500 mg SR tablet Take 500 mg by mouth two (2) times a day., Historical Med      insulin syringe,safetyneedle 0.5 mL 31 gauge x 15/64\" syrg 1 Syringe by Does Not Apply route three (3) times daily.  Dx Code: E11.65, Normal, Disp-300 Each, R-3      Insulin Needles, Disposable, 32 gauge x 5/32\" ndle Use to inject insulin BID Dx Code: E11.65, Normal, Disp-200 Pen Needle, R-3      insulin detemir U-100 (LEVEMIR FLEXTOUCH) 100 unit/mL (3 mL) inpn Inject 30 units in AM and 30  units at bed time, Normal, Disp-60 mL, R-3      !! pantoprazole (Protonix) 40 mg tablet Take 1 Tablet by mouth daily. , Normal, Disp-30 Tablet, R-0      Cetirizine (ZyrTEC) 10 mg cap Take 10 mg by mouth daily. , Historical Med      bisacodyL (DULCOLAX) 5 mg EC tablet Take 5 mg by mouth daily as needed for Constipation. , Historical Med      ticagrelor (BRILINTA) 90 mg tablet Take 90 mg by mouth two (2) times a day., Historical Med      atorvastatin (LIPITOR) 20 mg tablet Take 20 mg by mouth nightly., Historical Med      nitroglycerin (NITROSTAT) 0.4 mg SL tablet 0.4 mg by SubLINGual route every five (5) minutes as needed for Chest Pain. Up to 3 doses. , Historical Med      methocarbamoL (ROBAXIN) 750 mg tablet Take 750 mg by mouth two (2) times a day., Historical Med      lisinopriL (PRINIVIL, ZESTRIL) 2.5 mg tablet Take 2.5 mg by mouth daily. , Historical Med      empagliflozin (JARDIANCE) 10 mg tablet Take 10 mg by mouth daily. , Historical Med      aspirin delayed-release 81 mg tablet Take 1 Tab by mouth daily. , Normal, Disp-30 Tab, R-3       !! - Potential duplicate medications found. Please discuss with provider. REVIEW OF SYSTEMS   Review of Systems  Positives and Pertinent negatives as per HPI. PHYSICAL EXAM     ED Triage Vitals [02/26/23 1845]   ED Encounter Vitals Group      /84      Pulse (Heart Rate) 81      Resp Rate 18      Temp 97.9 °F (36.6 °C)      Temp src       O2 Sat (%) 99 %      Weight 230 lb      Height 5' 4\"      Physical Exam  Vitals and nursing note reviewed. Constitutional:       General: She is not in acute distress. Appearance: Normal appearance. HENT:      Head: Normocephalic. Eyes:      Extraocular Movements: Extraocular movements intact.       Pupils: Pupils are equal, round, and reactive to light. Cardiovascular:      Rate and Rhythm: Normal rate. Heart sounds: Normal heart sounds. Pulmonary:      Effort: Pulmonary effort is normal.      Breath sounds: Normal breath sounds. Chest:      Chest wall: No mass, deformity, tenderness, crepitus or edema. Comments: Well-healed surgical scar in the center of the chest.  Abdominal:      General: Abdomen is flat. Musculoskeletal:      Cervical back: Neck supple. Comments: Bilateral lower extremities are normal in appearance and similar in size. There are bilateral dorsalis pedis pulses and bilateral posterior tibial pulses. There is no tenderness to palpation in bilateral calves and the compartments are soft and the patient has full range of motion. There is a negative Homans' sign bilaterally. There is no distended veins or varicose veins bilaterally. Skin:     General: Skin is warm. Neurological:      Mental Status: She is alert and oriented to person, place, and time. ED COURSE and DIFFERENTIAL DIAGNOSIS/MDM   Records Reviewed (source and summary of external notes): Prior medical records    Vitals:    Vitals:    02/26/23 1845   BP: 134/84   Pulse: 81   Resp: 18   Temp: 97.9 °F (36.6 °C)   SpO2: 99%   Weight: 104.3 kg (230 lb)   Height: 5' 4\" (1.626 m)       CC/HPI Summary, DDx, ED Course, and Reassessment: 70-year-old female presenting to the ED for chronic chest pain and left lower leg pain. Left lower leg pain differential diagnosis includes less likely DVT, likely sprain, strain, contusion, possibly malingering as the patient has prior charts documenting this concern, there is no evidence to suggest neuro vascular injury or emergent etiology at this time. We will schedule the patient for a DVT ultrasound in the morning. She has a Wells score of 1 given her previous history of DVT in the right lower extremity.   With her exam being entirely benign I do not feel there is any need to anticoagulate her as I feel a low pretest probability. Patient presents with Chest pain. While the spectrum of DDx includes ACS, Aortic dissection, PNA, PE, PTX, pericarditis, myocarditis, GERD, costochondritis, anxiety, most concerned for ACS given the HPI, Physical exam and Heart Score. The others are less likely. Will obtain labs, CXR, EKG and depending on these results, get Cardiology Consult PRN. Composition of the HEART score for chest pain, angina, NSTEMI in the ED. HEART score criteria             Score  History: Highly suspicious    2    Moderately suspicious   1    Slightly or non-suspicious   0    ECG:  Significant ST depression   2    Nonspecific repolarisation disturbance 1    Normal      0    Age:  > or = 65 years    2    > 45 to < 65 years    1    < Or = to 45 years    0    Risk factors: > or = to 3 risk factors or history of CAD 2    1 or 2 risk factors    1    No risk factors known    0    Troponin: > or = to 3x normal limit   2    > 1 to < 3x normal limit   1    < or = to normal limit    0    Calculated Total : ___2___         0-3: 0.9-1.7% risk of adverse cardiac event. In the HEART Score, these patients were  discharged. 4-6: 12-16.6% risk of adverse cardiac event. In the HEART Score, these patients were  admitted to the hospital.   =7: 50-65% risk of adverse cardiac event. In the HEART Score, these patients were  candidates for early invasive measures. Lab work returned entirely unremarkable. Throughout the stay the patient repeatedly asked for pain medications, namely Dilaudid. Multiple times throughout the stay the patient observed resting in the stretcher comfortably playing on her cell phone and no acute distress. There is a concern for malingering. I offered Zofran and the patient accepted this for her nausea. I offered aspirin as well but the patient states she took 325 prior to arrival.  Her chest x-ray was unremarkable.   Her heart score is a 2 due to her prior history of CAD and CABG. Do not believe there is an acute ischemic cardiac etiology. We will discharge the patient. Patient was given the following medications:  Medications   ondansetron (ZOFRAN) injection 4 mg (4 mg IntraVENous Given 2/26/23 2110)       CONSULTS: (Who and What was discussed)  None     Chronic Conditions: as above  Social Determinants affecting Dx or Tx:   Counseling:      Disposition Considerations (Tests not done, Shared Decision Making, Pt Expectation of Test or Treatment.):     The patient presented with chest pain of uncertain etiology. Based on the patient's historical features and ED evaluation, in addition to the patient's reassuring physical exam, I do not see compelling evidence at this time for a malignant etiology for the patient's chest pain. The patient does not have clinical evidence for pulmonary embolus, malignant cardiac, pulmonary or aortic pathology or the other more malignant etiologies for chest pain. Based on the abovementioned evaluation, a malignant process is unlikely. Understanding was insured that at this time there is no evidence for a more malignant underlying process, but that early in the process of an illness, an emergency department workup can be falsely reassuring. Routine discharge counseling was given including the fact that any worsening, changing or persistent symptoms should prompt an immediate call or follow up with their primary physician or the emergency department. The importance of appropriate follow up was also discussed. More extensive discharge instructions were given in the patient's discharge paperwork. After completion of evaluation and discussion of results and diagnoses, all the questions were answered. If required, all follow up appointments and treatments were discussed and explained. Understanding was insured prior to discharge.        SCREENINGS               No data recorded        LAB, EKG AND DIAGNOSTIC RESULTS Labs:  Recent Results (from the past 12 hour(s))   TROPONIN-HIGH SENSITIVITY    Collection Time: 02/26/23  9:01 PM   Result Value Ref Range    Troponin-High Sensitivity 4 0 - 51 ng/L   CBC WITH AUTOMATED DIFF    Collection Time: 02/26/23  9:01 PM   Result Value Ref Range    WBC 9.3 3.6 - 11.0 K/uL    RBC 4.69 3.80 - 5.20 M/uL    HGB 14.2 11.5 - 16.0 g/dL    HCT 41.9 35.0 - 47.0 %    MCV 89.3 80.0 - 99.0 FL    MCH 30.3 26.0 - 34.0 PG    MCHC 33.9 30.0 - 36.5 g/dL    RDW 12.4 11.5 - 14.5 %    PLATELET 367 (H) 667 - 400 K/uL    MPV 9.4 8.9 - 12.9 FL    NRBC 0.0 0.0  WBC    ABSOLUTE NRBC 0.00 0.00 - 0.01 K/uL    NEUTROPHILS 55 32 - 75 %    LYMPHOCYTES 31 12 - 49 %    MONOCYTES 9 5 - 13 %    EOSINOPHILS 4 0 - 7 %    BASOPHILS 1 0 - 1 %    IMMATURE GRANULOCYTES 0 0 - 0.5 %    ABS. NEUTROPHILS 5.1 1.8 - 8.0 K/UL    ABS. LYMPHOCYTES 2.9 0.8 - 3.5 K/UL    ABS. MONOCYTES 0.9 0.0 - 1.0 K/UL    ABS. EOSINOPHILS 0.4 0.0 - 0.4 K/UL    ABS. BASOPHILS 0.1 0.0 - 0.1 K/UL    ABS. IMM. GRANS. 0.0 0.00 - 0.04 K/UL    DF AUTOMATED     METABOLIC PANEL, COMPREHENSIVE    Collection Time: 02/26/23  9:01 PM   Result Value Ref Range    Sodium 134 (L) 136 - 145 mmol/L    Potassium 4.2 3.5 - 5.1 mmol/L    Chloride 99 97 - 108 mmol/L    CO2 29 21 - 32 mmol/L    Anion gap 6 5 - 15 mmol/L    Glucose 151 (H) 65 - 100 mg/dL    BUN 20 6 - 20 mg/dL    Creatinine 1.19 (H) 0.55 - 1.02 mg/dL    BUN/Creatinine ratio 17 12 - 20      eGFR >60 >60 ml/min/1.73m2    Calcium 9.6 8.5 - 10.1 mg/dL    Bilirubin, total 0.6 0.2 - 1.0 mg/dL    AST (SGOT) 22 15 - 37 U/L    ALT (SGPT) 45 12 - 78 U/L    Alk.  phosphatase 85 45 - 117 U/L    Protein, total 8.0 6.4 - 8.2 g/dL    Albumin 3.7 3.5 - 5.0 g/dL    Globulin 4.3 (H) 2.0 - 4.0 g/dL    A-G Ratio 0.9 (L) 1.1 - 2.2         Radiologic Studies:  Non-plain film images such as CT, Ultrasound and MRI are read by the radiologist. Plain radiographic images are visualized and preliminarily interpreted by the ED Provider with the below findings:    NAD    Interpretation per the Radiologist below, if available at the time of this note:  XR CHEST PORT    Result Date: 2/26/2023  INDICATION:  chest pain Exam: Portable chest 1925. Comparison: 11/4/2022. Findings: Cardiomediastinal silhouette is within normal limits. Pulmonary vasculature is not engorged. There are no focal parenchymal opacities, effusions, or pneumothorax. 1. No acute disease        PROCEDURES   Unless otherwise noted below, none. Performed by: Shante Allen MD   Procedures      CRITICAL CARE TIME       FINAL IMPRESSION     1. Chest pain, unspecified type    2. Arthralgia of left lower leg          DISPOSITION/PLAN   Discharged    Discharge Note: The patient is stable for discharge home. The signs, symptoms, diagnosis, and discharge instructions have been discussed, understanding conveyed, and agreed upon. The patient is to follow up as recommended or return to ER should their symptoms worsen. PATIENT REFERRED TO:  Follow-up Information       Follow up With Specialties Details Why Contact Info    Anastasia Duron DO Family Medicine Call in 1 day  [address] [facility]  [phone]                DISCHARGE MEDICATIONS:  Discharge Medication List as of 2/26/2023 10:03 PM            DISCONTINUED MEDICATIONS:  Discharge Medication List as of 2/26/2023 10:03 PM          I am the Primary Clinician of Record: Shante Allen MD (electronically signed)    (Please note that parts of this dictation were completed with voice recognition software. Quite often unanticipated grammatical, syntax, homophones, and other interpretive errors are inadvertently transcribed by the computer software. Please disregards these errors.  Please excuse any errors that have escaped final proofreading.)

## 2023-02-27 NOTE — ED NOTES
2021       Toro Beranl MD  [address] [place] [address]  Via In Basket      Patient: Keron Fagan   YOB: 1983   Date of Visit: 2021       Dear Dr. Bernal:    Thank you for referring Keron Fagan to me for evaluation. Below are my notes for this visit with him.    If you have questions, please do not hesitate to call me. I look forward to following your patient along with you.      Sincerely,        Piotr Ward MD        CC: No Recipients  Piotr Ward MD  2021  2:41 PM  Signed  OFFICE VISIT      Patient: Keron Fagan Date of Service: 2021    : 1983 MRN: [id number]     SUBJECTIVE:   HISTORY OF PRESENT ILLNESS:  Keron Fagan is a 37 year old male who presents today for   Chief Complaint   Patient presents with   • New Patient     est. care       Mr Tellez is a 36 yo M, here for 4.5 yr followup.     He has pmh of ischemic CVA  likely related to hypercoagulable state MTHFR gene mutation, he had prior work up with PFO. An echo with us confirmed PFO/ASA with right to left shunt with Valsalva.     He has no CV symptoms and is active. A holter was negative too. He denies no other neurological symptoms since . ECG shows iRBBB, that has been also demonstrated in .     His diet is fair, he eats an egg daily.  He wanted to assure there is no change in PFO management.   Father on statin but no cv events    PAST MEDICAL HISTORY:  No past medical history on file.    MEDICATIONS:  Current Outpatient Medications   Medication Sig   • folic acid (FOLATE) 1 MG tablet every 24 hours.   • aspirin 325 MG EC tablet Take 1 tablet by mouth every 24 hours.     No current facility-administered medications for this visit.       ALLERGIES:  ALLERGIES:  No Known Allergies    PAST SURGICAL HISTORY:  No past surgical history on file.    FAMILY HISTORY:  Family History   Problem Relation Age of Onset   • Cancer, Breast Mother 58   • Hypertension Father   Transportation has been arranged for this patient through insurance [phone]. Trip number is [phone].       SOCIAL HISTORY:  Social History     Tobacco Use   • Smoking status: Never Smoker   • Smokeless tobacco: Never Used   Vaping Use   • Vaping Use: never used   Substance Use Topics   • Alcohol use: Yes     Comment: social   • Drug use: No       ROS     All other systems reviewed and are negative.    OBJECTIVE:     Physical Exam    Constitutional: oriented to person, place, and time. appears well-developed and well-nourished. No distress.   HENT:   Head: Normocephalic and atraumatic.   Mouth/Throat: Oropharynx is clear and moist.   Eyes: Conjunctivae and EOM are normal. Pupils are equal, round, and reactive to light.   Neck: Normal range of motion. Neck supple. No JVD present. No thyromegaly present.   Cardiovascular: Normal rate, regular rhythm, normal heart sounds and intact distal pulses.   Pulmonary/Chest: Effort normal and breath sounds normal. no wheezes. no rales.   Abdominal: Soft. Bowel sounds are normal. There is no tenderness. There is no rebound.   Musculoskeletal: Normal range of motion.   Lymphadenopathy:        Right cervical: No superficial cervical adenopathy present.       Left cervical: No superficial cervical adenopathy present.   Neurological: alert and oriented to person, place, and time. normal reflexes.   Skin: Skin is warm, dry and intact.   Psychiatric: normal mood and affect. behavior is normal. Thought content normal.       Visit Vitals  /41 (BP Location: LUE - Left upper extremity, Patient Position: Sitting, Cuff Size: Regular)   Pulse (!) 58   Ht 5' 11\" (1.803 m)   Wt 81.4 kg (179 lb 9 oz)   SpO2 97%   BMI 25.04 kg/m²     Body mass index is 25.04 kg/m².  Wt Readings from Last 4 Encounters:   08/09/21 81.4 kg (179 lb 9 oz)   05/20/21 82.1 kg (181 lb)   01/28/21 77.1 kg (170 lb)   11/04/20 80 kg (176 lb 4.8 oz)       Vital Signs and previous readings were reviewed during today's visit    DIAGNOSTIC STUDIES:   LAB RESULTS:  Recent Labs   Lab 05/20/21  1504   Cholesterol 224*   HDL  53   Triglycerides 89   CALCLDL 153*   Non-HDL Cholesterol 171       Recent Labs   Lab 05/20/21  1504   Sodium 143   Chloride 108*   BUN 15   BUN/ Creatinine Ratio 16   Potassium 4.5   Glucose 97   Creatinine 0.94   Calcium 8.8       Recent Labs   Lab 05/20/21  1504   WBC 4.9   RBC 4.58   HGB 14.4   HCT 43.2   MCV 94.3   MCHC 33.3   RDW-CV 12.8      Lymphocytes, Percent 36       Recent Labs   Lab 05/20/21  1504   GPT 26        No results for input(s): NTPROB in the last 8765 hours.    2016: Echo: normal, except small PFO with ASA and right to left shunt with agitated saline, left to right shunt with color.  2016: Holter: NSR.      Lab Results Reviewed    The ASCVD Risk score (Jenniferleatha LEE Jr., et al., 2013) failed to calculate for the following reasons:    The 2013 ASCVD risk score is only valid for ages 40 to 79    No results found for: HGBA1C.         Patient's chart was reviewed for previous cardiology lab work and testing. Pertinent findings were reviewed with the patient during today's visit.    ASSESSMENT AND PLAN:   This is a 37 year old year-old male who presents with:    1. Abnormal ECG (794.31) (R94.31)   · old iRBBB. followup ECG every 1-2 years.     2. Cerebral infarction, unspecified (434.91) (I63.9)   · single episode in 2009, while on no meds. possible contributions from PFO/ASA and MTHFR gene mutation. no recurrence on aspirin 325 mg daily. Consider Heme eval, consider Neuro eval  Consider repeat brain MRI to see if any additional interim strokes     3. Dyslipidemia (272.4) (E78.5)   · low ASCVD risk. diet and exercise discussed. However, LDL is 153.  Mediterranean diet advised     4. MTHFR mutation (270.4) (E72.12)   · per Hematology.     5. PFO (patent foramen ovale)/ASA (745.5) (Q21.1)   · current guidelines suggest closure of PFO/ASA at time of Stroke if no other risk factors found. He has confounding MTHFR mutation.  May need team consult with Cardio, Neuro and Heme   Consider Transcranial  dopple and Brain MRI  However, I feel that given the fact that he had no recurrence in 12 years, best to watch on aspirin. He agrees      Instructions provided as documented in the AVS.    The patient indicated understanding of the diagnosis and agreed with the plan of care.      Greater than 50% of the visit was spent counseling regarding above issues; total time spent 40 minutes.

## 2023-03-14 ENCOUNTER — PATIENT MESSAGE (OUTPATIENT)
Dept: ENDOCRINOLOGY | Age: 38
End: 2023-03-14

## 2023-03-14 DIAGNOSIS — E11.65 TYPE 2 DIABETES MELLITUS WITH HYPERGLYCEMIA, WITH LONG-TERM CURRENT USE OF INSULIN (HCC): Primary | ICD-10-CM

## 2023-03-14 DIAGNOSIS — Z79.4 TYPE 2 DIABETES MELLITUS WITH HYPERGLYCEMIA, WITH LONG-TERM CURRENT USE OF INSULIN (HCC): Primary | ICD-10-CM

## 2023-03-15 ENCOUNTER — PROCEDURE VISIT (OUTPATIENT)
Dept: ENDOCRINOLOGY | Age: 38
End: 2023-03-15

## 2023-03-15 DIAGNOSIS — E11.65 TYPE 2 DIABETES MELLITUS WITH HYPERGLYCEMIA, WITH LONG-TERM CURRENT USE OF INSULIN (HCC): Primary | ICD-10-CM

## 2023-03-15 DIAGNOSIS — Z79.4 TYPE 2 DIABETES MELLITUS WITH HYPERGLYCEMIA, WITH LONG-TERM CURRENT USE OF INSULIN (HCC): Primary | ICD-10-CM

## 2023-03-16 LAB
EST. AVERAGE GLUCOSE BLD GHB EST-MCNC: 171 MG/DL
HBA1C MFR BLD: 7.6 % (ref 4.8–5.6)

## 2023-03-30 ENCOUNTER — HOSPITAL ENCOUNTER (OUTPATIENT)
Dept: PREADMISSION TESTING | Age: 38
End: 2023-03-30
Payer: MEDICARE

## 2023-03-30 VITALS
RESPIRATION RATE: 12 BRPM | TEMPERATURE: 97.7 F | BODY MASS INDEX: 38.12 KG/M2 | WEIGHT: 223.3 LBS | DIASTOLIC BLOOD PRESSURE: 88 MMHG | HEIGHT: 64 IN | SYSTOLIC BLOOD PRESSURE: 140 MMHG | HEART RATE: 77 BPM

## 2023-03-30 DIAGNOSIS — N93.8 DYSFUNCTIONAL UTERINE BLEEDING: ICD-10-CM

## 2023-03-30 DIAGNOSIS — D25.9 UTERINE LEIOMYOMA, UNSPECIFIED LOCATION: ICD-10-CM

## 2023-03-30 LAB
ABO + RH BLD: NORMAL
ANION GAP SERPL CALC-SCNC: 8 MMOL/L (ref 5–15)
BLOOD GROUP ANTIBODIES SERPL: NORMAL
BUN SERPL-MCNC: 21 MG/DL (ref 6–20)
BUN/CREAT SERPL: 19 (ref 12–20)
CALCIUM SERPL-MCNC: 9.7 MG/DL (ref 8.5–10.1)
CHLORIDE SERPL-SCNC: 105 MMOL/L (ref 97–108)
CO2 SERPL-SCNC: 20 MMOL/L (ref 21–32)
CREAT SERPL-MCNC: 1.09 MG/DL (ref 0.55–1.02)
ERYTHROCYTE [DISTWIDTH] IN BLOOD BY AUTOMATED COUNT: 12.8 % (ref 11.5–14.5)
GLUCOSE SERPL-MCNC: 115 MG/DL (ref 65–100)
HCG SERPL QL: NEGATIVE
HCT VFR BLD AUTO: 44 % (ref 35–47)
HGB BLD-MCNC: 14.7 G/DL (ref 11.5–16)
MCH RBC QN AUTO: 30.2 PG (ref 26–34)
MCHC RBC AUTO-ENTMCNC: 33.4 G/DL (ref 30–36.5)
MCV RBC AUTO: 90.3 FL (ref 80–99)
NRBC # BLD: 0 K/UL (ref 0–0.01)
NRBC BLD-RTO: 0 PER 100 WBC
PLATELET # BLD AUTO: 470 K/UL (ref 150–400)
PMV BLD AUTO: 9.4 FL (ref 8.9–12.9)
POTASSIUM SERPL-SCNC: 4.5 MMOL/L (ref 3.5–5.1)
RBC # BLD AUTO: 4.87 M/UL (ref 3.8–5.2)
SODIUM SERPL-SCNC: 133 MMOL/L (ref 136–145)
SPECIMEN EXP DATE BLD: NORMAL
WBC # BLD AUTO: 14.2 K/UL (ref 3.6–11)

## 2023-03-30 PROCEDURE — 84703 CHORIONIC GONADOTROPIN ASSAY: CPT

## 2023-03-30 PROCEDURE — 85027 COMPLETE CBC AUTOMATED: CPT

## 2023-03-30 PROCEDURE — 36415 COLL VENOUS BLD VENIPUNCTURE: CPT

## 2023-03-30 PROCEDURE — 80048 BASIC METABOLIC PNL TOTAL CA: CPT

## 2023-03-30 PROCEDURE — 86900 BLOOD TYPING SEROLOGIC ABO: CPT

## 2023-03-30 RX ORDER — MULTIVITAMIN: 1 TABLET ORAL DAILY

## 2023-03-30 NOTE — PERIOP NOTES
Preoperative Med plan request for aspirin and brilinta faxed to pt's cardiologist Dr. Joshua Jin, [address] [place] [address] Drive 4/13/023

## 2023-03-30 NOTE — PERIOP NOTES
[address] [address] [facility]   MAIN OR                                  [phone]   MAIN PRE OP                          [phone]                                                                                AMBULATORY PRE OP          [phone]  PRE-ADMISSION TESTING    [phone]     Surgery Date:  Thursday 4/13/2023       Is surgery arrival time given by surgeon? NO  If NO, [address] [facility] staff will call you between 3 and 7pm the day before your surgery with your arrival time. (If your surgery is on a Monday, we will call you the Friday before.)    Call [phone] after 7pm Monday-Friday if you did not receive this call. INSTRUCTIONS BEFORE YOUR SURGERY   When You  Arrive Arrive at the 2nd [address] [facility] on the day of your surgery  Have your insurance card, photo ID, and any copayment (if needed)   Food   and   Drink NO food or drink after midnight the night before surgery    This means NO water, gum, mints, coffee, juice, etc.  No alcohol (beer, wine, liquor) 24 hours before and after surgery   Medications to   TAKE   Morning of Surgery MEDICATIONS TO TAKE THE MORNING OF SURGERY WITH A SIP OF WATER:   Methocarbamol, Ranolazine, cetirizine, levothyroxine, carvedilol, zofran if needed, nitrogylcerine as prescribed    HOLD lisinopril the am of and night before surgery. HOLD Jardiance am of surgery  Discuss antidiabetic medications (insulin, jardiance, trulicity, lemevir) with prescribing provider prior to surgery when Not eating after midnight the night before surgery.   DISCUSS when and if to hold aspirin and Brilinta prior to surgery with your cardiologist/prescribing provider   Medications  To  STOP      7 days before surgery Non-Steroidal anti-inflammatory Drugs (NSAID's): for example, Ibuprofen (Advil, Motrin), Naproxen (Aleve)  Aspirin, if taking for pain   Herbal supplements, vitamins, and fish oil  Other:  (Pain medications not listed above, including Tylenol may be taken)   Blood  Thinners If you take  Aspirin, Plavix, Coumadin, or any blood-thinning or anti-blood clot medicine, talk to the doctor who prescribed the medications for pre-operative instructions. Bathing Clothing  Jewelry  Valuables     If you shower the morning of surgery, please do not apply anything to your skin (lotions, powders, deodorant, or makeup, especially mascara)  Follow Chlorhex[employer] body wash instructions provided to you during PAT appointment. Begin 3 days prior to surgery. Do not shave or trim anywhere 24 hours before surgery  Wear your hair loose or down; no pony-tails, buns, or metal hair clips  Wear loose, comfortable, clean clothes  Wear glasses instead of contacts  Leave money, valuables, and jewelry, including body piercings, at home      Going Home - or Spending the Night SAME-DAY SURGERY: You must have a responsible adult drive you home and stay with you 24 hours after surgery  ADMITS: If your doctor is keeping you in the hospital after surgery, leave personal belongings/luggage in your car until you have a hospital room number. Hospital discharge time is 12 noon  Drivers must be here before 12 noon unless you are told differently   Special Instructions Bring updated med list day of surgery       Follow all instructions so your surgery wont be cancelled. Please, be on time. If a situation occurs and you are delayed the day of surgery, call [phone] or [phone]. If your physical condition changes (like a fever, cold, flu, etc.) call your surgeon. Home medication(s) reviewed and verified verbally with list during PAT appointment. The patient was contacted in person. The patient verbalizes understanding of all instructions and does not need reinforcement.

## 2023-04-13 ENCOUNTER — HOSPITAL ENCOUNTER (OUTPATIENT)
Age: 38
Setting detail: OBSERVATION
Discharge: HOME | End: 2023-04-14
Attending: OBSTETRICS & GYNECOLOGY | Admitting: OBSTETRICS & GYNECOLOGY
Payer: MEDICARE

## 2023-04-13 DIAGNOSIS — G89.18 POSTOPERATIVE PAIN: Primary | ICD-10-CM

## 2023-04-13 DIAGNOSIS — D25.9 UTERINE LEIOMYOMA, UNSPECIFIED LOCATION: ICD-10-CM

## 2023-04-13 DIAGNOSIS — N93.8 DYSFUNCTIONAL UTERINE BLEEDING: ICD-10-CM

## 2023-04-13 LAB
GLUCOSE BLD STRIP.AUTO-MCNC: 145 MG/DL (ref 65–117)
GLUCOSE BLD STRIP.AUTO-MCNC: 206 MG/DL (ref 65–117)
HCG UR QL: NEGATIVE
SERVICE CMNT-IMP: ABNORMAL
SERVICE CMNT-IMP: ABNORMAL

## 2023-04-13 PROCEDURE — 2709999900 HC NON-CHARGEABLE SUPPLY: Performed by: OBSTETRICS & GYNECOLOGY

## 2023-04-13 PROCEDURE — G0378 HOSPITAL OBSERVATION PER HR: HCPCS

## 2023-04-13 PROCEDURE — 77030041306 HC DEV UTE MANIP FORNISEE LSIS -C: Performed by: OBSTETRICS & GYNECOLOGY

## 2023-04-13 PROCEDURE — 74011250636 HC RX REV CODE- 250/636: Performed by: OBSTETRICS & GYNECOLOGY

## 2023-04-13 PROCEDURE — C1765 ADHESION BARRIER: HCPCS | Performed by: OBSTETRICS & GYNECOLOGY

## 2023-04-13 PROCEDURE — 77030031492 HC PRT ACC BLNT AIRSEAL CNMD -B: Performed by: OBSTETRICS & GYNECOLOGY

## 2023-04-13 PROCEDURE — 77030035279 HC SEAL VSL ENDOWR XI INTU -I2: Performed by: OBSTETRICS & GYNECOLOGY

## 2023-04-13 PROCEDURE — 82962 GLUCOSE BLOOD TEST: CPT

## 2023-04-13 PROCEDURE — 81025 URINE PREGNANCY TEST: CPT

## 2023-04-13 PROCEDURE — 77030039147 HC PWDR HEMSTS SURGICEL JNJ -D: Performed by: OBSTETRICS & GYNECOLOGY

## 2023-04-13 PROCEDURE — 74011250636 HC RX REV CODE- 250/636: Performed by: ANESTHESIOLOGY

## 2023-04-13 PROCEDURE — 74011000250 HC RX REV CODE- 250: Performed by: OBSTETRICS & GYNECOLOGY

## 2023-04-13 PROCEDURE — 77030040361 HC SLV COMPR DVT MDII -B

## 2023-04-13 PROCEDURE — 77030020703 HC SEAL CANN DISP INTU -B: Performed by: OBSTETRICS & GYNECOLOGY

## 2023-04-13 PROCEDURE — 74011250637 HC RX REV CODE- 250/637: Performed by: OBSTETRICS & GYNECOLOGY

## 2023-04-13 PROCEDURE — 76010000876 HC OR TIME 2 TO 2.5HR INTENSV - TIER 2: Performed by: OBSTETRICS & GYNECOLOGY

## 2023-04-13 PROCEDURE — 76060000035 HC ANESTHESIA 2 TO 2.5 HR: Performed by: OBSTETRICS & GYNECOLOGY

## 2023-04-13 PROCEDURE — 77030035277 HC OBTRTR BLDELSS DISP INTU -B: Performed by: OBSTETRICS & GYNECOLOGY

## 2023-04-13 PROCEDURE — 77030041236 HC APPL SURG ENDO JNJ -B: Performed by: OBSTETRICS & GYNECOLOGY

## 2023-04-13 PROCEDURE — 77030040922 HC BLNKT HYPOTHRM STRY -A

## 2023-04-13 PROCEDURE — 77030010507 HC ADH SKN DERMBND J&J -B: Performed by: OBSTETRICS & GYNECOLOGY

## 2023-04-13 PROCEDURE — 77030035236 HC SUT PDS STRATFX BARB J&J -B: Performed by: OBSTETRICS & GYNECOLOGY

## 2023-04-13 PROCEDURE — 77030018673: Performed by: OBSTETRICS & GYNECOLOGY

## 2023-04-13 PROCEDURE — 77030037032 HC INSRT SCIS CLICKLLINE DISP STOR -B: Performed by: OBSTETRICS & GYNECOLOGY

## 2023-04-13 PROCEDURE — 88307 TISSUE EXAM BY PATHOLOGIST: CPT

## 2023-04-13 PROCEDURE — 76210000017 HC OR PH I REC 1.5 TO 2 HR: Performed by: OBSTETRICS & GYNECOLOGY

## 2023-04-13 PROCEDURE — 76210000000 HC OR PH I REC 2 TO 2.5 HR: Performed by: OBSTETRICS & GYNECOLOGY

## 2023-04-13 RX ORDER — SODIUM CHLORIDE, SODIUM LACTATE, POTASSIUM CHLORIDE, CALCIUM CHLORIDE 600; 310; 30; 20 MG/100ML; MG/100ML; MG/100ML; MG/100ML
150 INJECTION, SOLUTION INTRAVENOUS CONTINUOUS
Status: DISCONTINUED | OUTPATIENT
Start: 2023-04-13 | End: 2023-04-14 | Stop reason: HOSPADM

## 2023-04-13 RX ORDER — INSULIN LISPRO 100 [IU]/ML
10 INJECTION, SOLUTION INTRAVENOUS; SUBCUTANEOUS
Status: DISCONTINUED | OUTPATIENT
Start: 2023-04-13 | End: 2023-04-14 | Stop reason: HOSPADM

## 2023-04-13 RX ORDER — INSULIN GLARGINE 100 [IU]/ML
40 INJECTION, SOLUTION SUBCUTANEOUS 2 TIMES DAILY
Status: DISCONTINUED | OUTPATIENT
Start: 2023-04-13 | End: 2023-04-14 | Stop reason: HOSPADM

## 2023-04-13 RX ORDER — SODIUM CHLORIDE 0.9 % (FLUSH) 0.9 %
5-40 SYRINGE (ML) INJECTION EVERY 8 HOURS
Status: DISCONTINUED | OUTPATIENT
Start: 2023-04-13 | End: 2023-04-14 | Stop reason: HOSPADM

## 2023-04-13 RX ORDER — LIDOCAINE HYDROCHLORIDE 10 MG/ML
0.1 INJECTION, SOLUTION EPIDURAL; INFILTRATION; INTRACAUDAL; PERINEURAL AS NEEDED
Status: DISCONTINUED | OUTPATIENT
Start: 2023-04-13 | End: 2023-04-13 | Stop reason: HOSPADM

## 2023-04-13 RX ORDER — SODIUM CHLORIDE 0.9 % (FLUSH) 0.9 %
5-40 SYRINGE (ML) INJECTION AS NEEDED
Status: DISCONTINUED | OUTPATIENT
Start: 2023-04-13 | End: 2023-04-14 | Stop reason: HOSPADM

## 2023-04-13 RX ORDER — HYDROMORPHONE HYDROCHLORIDE 1 MG/ML
1 INJECTION, SOLUTION INTRAMUSCULAR; INTRAVENOUS; SUBCUTANEOUS
Status: COMPLETED | OUTPATIENT
Start: 2023-04-13 | End: 2023-04-14

## 2023-04-13 RX ORDER — SODIUM CHLORIDE, SODIUM LACTATE, POTASSIUM CHLORIDE, CALCIUM CHLORIDE 600; 310; 30; 20 MG/100ML; MG/100ML; MG/100ML; MG/100ML
75 INJECTION, SOLUTION INTRAVENOUS CONTINUOUS
Status: DISCONTINUED | OUTPATIENT
Start: 2023-04-13 | End: 2023-04-13 | Stop reason: HOSPADM

## 2023-04-13 RX ORDER — DEXTROSE, SODIUM CHLORIDE, SODIUM LACTATE, POTASSIUM CHLORIDE, AND CALCIUM CHLORIDE 5; .6; .31; .03; .02 G/100ML; G/100ML; G/100ML; G/100ML; G/100ML
150 INJECTION, SOLUTION INTRAVENOUS CONTINUOUS
Status: DISCONTINUED | OUTPATIENT
Start: 2023-04-13 | End: 2023-04-13

## 2023-04-13 RX ORDER — ONDANSETRON 2 MG/ML
4 INJECTION INTRAMUSCULAR; INTRAVENOUS AS NEEDED
Status: DISCONTINUED | OUTPATIENT
Start: 2023-04-13 | End: 2023-04-13 | Stop reason: HOSPADM

## 2023-04-13 RX ORDER — OXYCODONE AND ACETAMINOPHEN 5; 325 MG/1; MG/1
1 TABLET ORAL
Status: DISCONTINUED | OUTPATIENT
Start: 2023-04-13 | End: 2023-04-14 | Stop reason: HOSPADM

## 2023-04-13 RX ORDER — FACIAL-BODY WIPES
10 EACH TOPICAL DAILY
Status: DISCONTINUED | OUTPATIENT
Start: 2023-04-14 | End: 2023-04-14 | Stop reason: HOSPADM

## 2023-04-13 RX ORDER — HYDROMORPHONE HYDROCHLORIDE 1 MG/ML
.25-1 INJECTION, SOLUTION INTRAMUSCULAR; INTRAVENOUS; SUBCUTANEOUS
Status: COMPLETED | OUTPATIENT
Start: 2023-04-13 | End: 2023-04-13

## 2023-04-13 RX ORDER — SODIUM CHLORIDE, SODIUM LACTATE, POTASSIUM CHLORIDE, CALCIUM CHLORIDE 600; 310; 30; 20 MG/100ML; MG/100ML; MG/100ML; MG/100ML
125 INJECTION, SOLUTION INTRAVENOUS CONTINUOUS
Status: DISCONTINUED | OUTPATIENT
Start: 2023-04-13 | End: 2023-04-13 | Stop reason: HOSPADM

## 2023-04-13 RX ORDER — ONDANSETRON 2 MG/ML
4 INJECTION INTRAMUSCULAR; INTRAVENOUS
Status: DISCONTINUED | OUTPATIENT
Start: 2023-04-13 | End: 2023-04-14 | Stop reason: HOSPADM

## 2023-04-13 RX ORDER — DIPHENHYDRAMINE HYDROCHLORIDE 50 MG/ML
12.5 INJECTION, SOLUTION INTRAMUSCULAR; INTRAVENOUS AS NEEDED
Status: DISCONTINUED | OUTPATIENT
Start: 2023-04-13 | End: 2023-04-13 | Stop reason: HOSPADM

## 2023-04-13 RX ORDER — BUPIVACAINE HYDROCHLORIDE AND EPINEPHRINE 5; 5 MG/ML; UG/ML
INJECTION, SOLUTION PERINEURAL AS NEEDED
Status: DISCONTINUED | OUTPATIENT
Start: 2023-04-13 | End: 2023-04-13 | Stop reason: HOSPADM

## 2023-04-13 RX ADMIN — HYDROMORPHONE HYDROCHLORIDE 1 MG: 1 INJECTION, SOLUTION INTRAMUSCULAR; INTRAVENOUS; SUBCUTANEOUS at 15:24

## 2023-04-13 RX ADMIN — HYDROMORPHONE HYDROCHLORIDE 1 MG: 1 INJECTION, SOLUTION INTRAMUSCULAR; INTRAVENOUS; SUBCUTANEOUS at 18:11

## 2023-04-13 RX ADMIN — ONDANSETRON 4 MG: 2 INJECTION INTRAMUSCULAR; INTRAVENOUS at 15:15

## 2023-04-13 RX ADMIN — WATER 2 G: 1 INJECTION INTRAMUSCULAR; INTRAVENOUS; SUBCUTANEOUS at 20:12

## 2023-04-13 RX ADMIN — HYDROMORPHONE HYDROCHLORIDE 1 MG: 1 INJECTION, SOLUTION INTRAMUSCULAR; INTRAVENOUS; SUBCUTANEOUS at 15:10

## 2023-04-13 RX ADMIN — HYDROMORPHONE HYDROCHLORIDE 1 MG: 1 INJECTION, SOLUTION INTRAMUSCULAR; INTRAVENOUS; SUBCUTANEOUS at 16:42

## 2023-04-13 RX ADMIN — SODIUM CHLORIDE, POTASSIUM CHLORIDE, SODIUM LACTATE AND CALCIUM CHLORIDE 150 ML/HR: 600; 310; 30; 20 INJECTION, SOLUTION INTRAVENOUS at 21:53

## 2023-04-13 RX ADMIN — OXYCODONE AND ACETAMINOPHEN 1 TABLET: 5; 325 TABLET ORAL at 19:15

## 2023-04-13 RX ADMIN — HYDROMORPHONE HYDROCHLORIDE 1 MG: 1 INJECTION, SOLUTION INTRAMUSCULAR; INTRAVENOUS; SUBCUTANEOUS at 14:55

## 2023-04-13 RX ADMIN — HYDROMORPHONE HYDROCHLORIDE 1 MG: 1 INJECTION, SOLUTION INTRAMUSCULAR; INTRAVENOUS; SUBCUTANEOUS at 21:51

## 2023-04-13 NOTE — OP NOTES
OPERATIVE REPORT  daVinci HYSTERECTOMY       Name: [address] [place] [address] Road Record Number: [id number]   YOB: 1985  Date of Surgery: 4/13/2023    Preoperative Diagnosis: FIBROIDS, DYSFUCTIONAL UTERINE BLEEDING    Postoperative Diagnosis: FIBROIDS, DYSFUCTIONAL UTERINE BLEEDING    Procedure: Procedure(s):  ROBOTIC ASSISTED TOTAL LAPAROSCOPIC HYSTERECTOMY     Surgeon:  Malik Persaud MD     Assistant:  staff    Anesthesia: General    Findings: see below    Estimated Blood Loss:  less than 50  cc    Drains: coley    Pathology /Specimens:    ID Type Source Tests Collected by Time Destination   1 : Cervix and Uterus Preservative Uterus  Yasmeen Ellis MD 4/13/2023 1358 Pathology       Implants:  surgicel powder, seprafilm    DVT Prophylaxis: SCD Hose    Antibiotic Prophylaxis: Ancef    Complications: None      INDICATIONS    Informed consent was obtained for the above procedure, and the patient stated that she had no further questions. PROCEDURE    After institution of general anesthesia, the patient was prepped and draped in the dorsal lithotomy position for daVinci surgery. A tilt test to -27 degrees was performed with no evidence of patient motion. A surgical time out was performed. A weighted speculum was placed into the vagina. The anterior lip of the cervix was grasped with a single-tooth tenaculum. The uterus sounded to 8 cm with internal scarring presumed due to prior ablation. A 8 cm Fornisee obturator was inserted through the cervix and the arm opened. The cervix was measured and noted to be about 30 mm. The 30 mm Fornisee cup was placed over top of the cervix and clipped into place. A Coley catheter was inserted and the balloon filled with 10 cc of sterile water. All instruments were removed. Gloves were changed. Uterus was mobilized using the [facility] handle and the position on the abdomen was noted.   A midline point about 10 cm superior to this was selected for the first [place] [id number] port. Then an incision was made in the midline and the 8mm DaVinci port was placed without difficulty. The abdominal cavity was insufflated to 15 mmHg of carbon dioxide. The daVinci 8 mm camera port trocar was placed without difficulty. The camera was then used for direct visualization as two additional 8mm daVinci trocars were inserted, one about 10  cm later on the right and similarly one about 10 cm lateral to the midline port on the left. A 4th assistant Air Seal port was placed midway between the midline port and the right lateral port about 4 cm cephalad. The [employer] was instituted. There were no injuries identified. The patient was placed in -27 degrees trendelenburg and the Altamese Spanner was docked on the patient's right. Fenestrated bipolar forceps were placed on the left and a vessel sealer was placed on the right. The pelvis was visualized. The uterus was globular with multiple fibroids visible. The adnexae were normal in appearance, tubes having previously been ligated. The cul-de-sac was normal.  Upper abdominal findings were unremarkable. Firstthe utero-ovarian complex was taken on the right utilizing the vessel sealer. Next the round ligament was taken on the right. Successive bites with the vessel sealer on the right, marching down the broad ligament to the level of the ascending branches of the uterine vessels which were taken at right angles to the uterus. The bladder flap was created by undermining the vesicovaginal peritoneum, extending along towards the left side. Next the left utero-ovarian complex was taken on the left side followed by the round ligament on the left. Next the vessel sealer marching down the broad ligament to the level of the ascending branches of the uterine vessels which were again taken at right angles to the uterus.    The uterine vessels were skeletonized and taken with the vessel sealer progressively downward and small bites as they fell off laterally to reveal the cervical vaginal fold. This was repeated on the opposite side in similar fashion. Next the vessel sealer was removed and the monopolar scissors were inserted into arm 4. Utilizing the light on the [facility] device and mobilizing around circumferentially, the cervicovaginal fold was incised carefully using the monopolar scissors. Minimal bleeding was encountered. Once the specimen had been completely detached it was removed through the vagina with the aid of the [facility] device. The vaginal cuff was hemostatic. Copious irrigation was used and the suction was used to remove blood, clot and fluid from the pelvis. Monopolar scissors were removed and the Fredy needle  was replaced into arm 4. A piece of 2-0 strata fix suture was passed through the assistant port and this was used beginning on the right vaginal angle and locking the suture then running across towards the left side. The cuff was closed in its entirety. A single black suture placed on the left side to prevent slipping of suture. Excess suture and needle removed through the assistant port. The abdomen was inspected and no trauma to any structures was identified. All pedicles appeared to be hemostatic. Surgicel powder was sprayed over the vaginal cuff. Next a piece of Interceed barrier was placed over the vaginal cuff. The procedure was then terminated. The davinci was undocked and the  instruments were removed. The CO2 pneumoperitoneum was released through the empty trochar sleeves and then the trocars were removed from the abdomen. The incisions were closed with 4-0 Monocryl at all incision sites. The incisions were infiltrated with additional 1/2% marcaine with Epi and Derma bond was applied to seal the skin. The patient was awakened and taken to the recovery room in good condition. All counts were correct x2.        Signed By:  Daren Pimentel MD     April 13, 2023

## 2023-04-13 NOTE — PROGRESS NOTES
Bedside shift change report given to Yasmine (oncoming nurse) by Arlin Her and Faye Yanez (offgoing nurse). Report included the following information SBAR, Kardex, OR Summary, Procedure Summary, Intake/Output, MAR, and Recent Results.

## 2023-04-13 NOTE — PROGRESS NOTES
Problem: Diabetes Self-Management  Goal: *Disease process and treatment process  Description: Define diabetes and identify own type of diabetes; list 3 options for treating diabetes. Outcome: Progressing Towards Goal  Goal: *Incorporating nutritional management into lifestyle  Description: Describe effect of type, amount and timing of food on blood glucose; list 3 methods for planning meals. Outcome: Progressing Towards Goal  Goal: *Incorporating physical activity into lifestyle  Description: State effect of exercise on blood glucose levels. Outcome: Progressing Towards Goal  Goal: *Developing strategies to promote health/change behavior  Description: Define the ABC's of diabetes; identify appropriate screenings, schedule and personal plan for screenings. Outcome: Progressing Towards Goal  Goal: *Using medications safely  Description: State effect of diabetes medications on diabetes; name diabetes medication taking, action and side effects. Outcome: Progressing Towards Goal  Goal: *Monitoring blood glucose, interpreting and using results  Description: Identify recommended blood glucose targets  and personal targets. Outcome: Progressing Towards Goal  Goal: *Prevention, detection, treatment of acute complications  Description: List symptoms of hyper- and hypoglycemia; describe how to treat low blood sugar and actions for lowering  high blood glucose level. Outcome: Progressing Towards Goal  Goal: *Prevention, detection and treatment of chronic complications  Description: Define the natural course of diabetes and describe the relationship of blood glucose levels to long term complications of diabetes.   Outcome: Progressing Towards Goal  Goal: *Developing strategies to address psychosocial issues  Description: Describe feelings about living with diabetes; identify support needed and support network  Outcome: Progressing Towards Goal  Goal: *Insulin pump training  Outcome: Progressing Towards Goal  Goal: *Sick day guidelines  Outcome: Progressing Towards Goal  Goal: *Patient Specific Goal (EDIT GOAL, INSERT TEXT)  Outcome: Progressing Towards Goal     Problem: Patient Education: Go to Patient Education Activity  Goal: Patient/Family Education  Outcome: Progressing Towards Goal     Problem: Falls - Risk of  Goal: *Absence of Falls  Description: Document Mukiya Baldwin Fall Risk and appropriate interventions in the flowsheet.   Outcome: Progressing Towards Goal  Note: Fall Risk Interventions:                                Problem: Patient Education: Go to Patient Education Activity  Goal: Patient/Family Education  Outcome: Progressing Towards Goal

## 2023-04-13 NOTE — H&P
Gynecology History and Physical    Name: Dejuan Beltran MRN: [id number] [id number]: [id number]    YOB: 1985  Age: 44 y.o. Sex: female       Subjective:      Chief complaint: Uterine fibroids    Mamadou Carrero is a 44 y.o.   female with a history of uterine myomas. She has symptoms of DUB related to the fibroids. Previous evaluation has been done with US, which revealed intramural and submucosal fibroids. She has had an endometrial ablation previously. Previous treatment has been unsuccessful. She was admitted for profound anemia recently again and was transfused. She is now admitted for outpatient surgery: Procedure(s) (LRB):  ROBOTIC ASSISTED TOTAL LAPAROSCOPIC HYSTERECTOMY    The current method of family planning is bilateral salpingectomy.       Past Medical History:  Patient Active Problem List    Diagnosis    MRSA (methicillin resistant Staphylococcus aureus)    Full incontinence of feces    Gastroparesis    Ischemic heart disease    Submucous leiomyoma of uterus    Fibroids, intramural    DUB (dysfunctional uterine bleeding)    Syncope    ACS (acute coronary syndrome) (HCC)    Tympanic membrane perforation, right    Chest pain    Chronic left mastoiditis    Atherosclerosis of coronary artery     Formatting of this note might be different from the original.  S/p resuscitated VF arrest due to Inferior STEMI 2021 POBA distal Cfx    2021 POBA to distal Cfx into lower OM4 and LPL branches c/b non flow limiting dissection    2019 Primary PCI of RCA with 3 DANG c/b acute cosure    Formatting of this note might be different from the original.  Formatting of this note might be different from the original.  S/p resuscitated VF arrest due to Inferior STEMI 2021 POBA distal Cfx  2021 POBA to distal Cfx into lower OM4 and LPL branches c/b non flow   limiting dissection  2019 Primary PCI of RCA with 3 DANG c/b acute cosure      HLD (hyperlipidemia)    S/P CABG (coronary artery bypass graft)     Formatting of this note might be different from the original.  12/3/2019 LIMA to  LAD complicated by non healing sternal wound requiring debridement and closure with muscle flap 2/25/20    Formatting of this note might be different from the original.  Alex Moreno of this note might be different from the original.  12/3/2019 LIMA to  LAD complicated by non healing sternal wound requiring   debridement and closure with muscle flap 2/25/20      Hypertension     onset 2009      Microalbuminuria due to type 2 diabetes mellitus ([address]    Type 2 diabetes mellitus with nephropathy (HCC)    Obesity, morbid ([address]    Hypothyroidism    Anemia     Past Medical History:   Diagnosis Date    Abscess     multiple, due to sweat ducks (arm pits, breast, groin).  currently (3/2023)has one in left groin, seen by PCP 3/22/23)    Anemia     Arrhythmia     prior to 12/2019    CAD (coronary artery disease) 2019    CABG X1    CHF (congestive heart failure) ([facility])     Chronic pain     LEFT SCIATIC, STERNAL WOUND    Complicated migraine     with extremity numbness    Diabetes ([address] 2009 7/21 out of control  1/23 hga1c 7.4    DVT (deep venous thrombosis) ([address] 2009    right leg    H/O transfusion of packed red blood cells 07/2021 11/22 menorrhagia    Heart attack ([address] 11/2019    Hx MRSA infection 2009    Hypertension 2009    Hypothyroidism     Irregular menstrual cycle     Nausea & vomiting     Obesity (BMI 35.0-39.9 without comorbidity)     Ovarian cyst     Gets frequently    Paraplegia ([address] 2019    Partial -- \"stroke in spinal cord\" during open heart surgery    PCOS (polycystic ovarian syndrome) 1996    Stroke ([address] 2019    LOWER BODY WEAKNESS    Uterine fibroid 01/2017    1.7 cm 9/21 Noted at tubal surgery   Anterior fundal 3.0    UTI (urinary tract infection) Child     Past Surgical History:   Procedure Laterality Date    HX BILATERAL SALPINGECTOMY Bilateral 09/07/2021    HX CHOLECYSTECTOMY      HX CORONARY ARTERY BYPASS GRAFT  2019    HX DILATION AND CURETTAGE      HX HYSTEROSCOPY WITH ENDOMETRIAL ABLATION  2021    Novasure    HX OTHER SURGICAL      2 TEETH REMOVED     HX TONSIL AND ADENOIDECTOMY      HX TYMPANOSTOMY Bilateral     MULTIPLE    HX WISDOM TEETH EXTRACTION      IN UNLISTED PROCEDURE CARDIAC SURGERY  2019    CABG X 1, CARDIAC CATH STENT X 3     OB History    Para Term  AB Living   2 0 0 0 2 0   SAB IAB Ectopic Molar Multiple Live Births   2 0 0 0 0 0      # Outcome Date GA Lbr Juan Carlos/2nd Weight Sex Delivery Anes PTL Lv   2 SAB  19w0d    SAB      1 SAB              Gyn Flowsheet:  No flowsheet data found.   Social History     Socioeconomic History    Marital status:     Number of children: 0   Tobacco Use    Smoking status: Never     Passive exposure: Yes    Smokeless tobacco: Never    Tobacco comments:     Second hand  smokes   Vaping Use    Vaping Use: Never used   Substance and Sexual Activity    Alcohol use: Not Currently     Comment: socially 1-2 times a year     Drug use: No    Sexual activity: Yes     Partners: Male     Birth control/protection: None     Comment: Tubes removed      Family History   Problem Relation Age of Onset    Hypertension Other         \"all her family\"    Diabetes Other         \"all her family\"    Cancer Mother         cervical    Stroke Mother     Heart Disease Mother     Jameson Reeve Disease Mother     Diabetes Mother     Hypertension Mother     Elevated Lipids Mother     Stroke Father     Heart Disease Father     Diabetes Father     Heart Attack Father     Elevated Lipids Father     Kidney Disease Father         ESRD    Gall Bladder Disease Father     Gall Bladder Disease Brother     Diabetes Brother     Kidney Disease Brother     Cancer Brother         KIDNEY    Hypertension Brother     Migraines Maternal Aunt     Diabetes Paternal Aunt     Stroke Maternal Grandmother     Diabetes Maternal Grandmother     Cancer Maternal Grandmother         cervical    Heart Attack Maternal Grandmother     Elevated Lipids Maternal Grandmother     Stroke Maternal Grandfather     Diabetes Maternal Grandfather     Elevated Lipids Maternal Grandfather     Cancer Maternal Grandfather     Heart Attack Maternal Grandfather     Anesth Problems Neg Hx      No current facility-administered medications on file prior to encounter. Current Outpatient Medications on File Prior to Encounter   Medication Sig Dispense Refill    dulaglutide (Trulicity) 1.5 NQ/8.3 mL sub-q pen 0.5 mL by SubCUTAneous route every seven (7) days. 6 mL 3    ondansetron (ZOFRAN ODT) 4 mg disintegrating tablet Take 4 mg by mouth as needed. carvediloL (COREG) 12.5 mg tablet Take 12.5 mg by mouth two (2) times a day. ranolazine ER (RANEXA) 500 mg SR tablet Take 500 mg by mouth two (2) times a day. insulin detemir U-100 (LEVEMIR FLEXTOUCH) 100 unit/mL (3 mL) inpn Inject 30 units in AM and 30  units at bed time 60 mL 3    Cetirizine (ZyrTEC) 10 mg cap Take 10 mg by mouth daily. ticagrelor (BRILINTA) 90 mg tablet Take 90 mg by mouth two (2) times a day. atorvastatin (LIPITOR) 20 mg tablet Take 20 mg by mouth nightly. nitroglycerin (NITROSTAT) 0.4 mg SL tablet 0.4 mg by SubLINGual route every five (5) minutes as needed for Chest Pain. Up to 3 doses. methocarbamoL (ROBAXIN) 750 mg tablet Take 750 mg by mouth two (2) times a day. lisinopriL (PRINIVIL, ZESTRIL) 2.5 mg tablet Take 2.5 mg by mouth Every morning. empagliflozin (JARDIANCE) 10 mg tablet Take 10 mg by mouth daily. aspirin delayed-release 81 mg tablet Take 1 Tab by mouth daily. 30 Tab 3     Allergies   Allergen Reactions    Other Food Swelling     JALIPENO PEPPERS - FACILA SWELLING    Ciprofloxacin Anaphylaxis, Rash and Unknown (comments)    Coconut Other (comments)     Facial swelling mouth    Bumetanide Other (comments) and Unknown (comments)     Hearing loss  Other reaction(s):  Other (see comments)    Prochlorperazine Anxiety and Unknown (comments)     HALLUCINATIONS    Diclofenac Myalgia and Rash     \"muscle spasms\"  Other reaction(s): Myalgias  \"muscle spasms\"      Penicillins Rash, Hives and Unknown (comments)     OCCURRED IN INFANCY NO REPORTED SEVERE IgE MEDIATED REACTIONS  Patient screened for any delayed non-IgE-mediated reaction to PCN. Patient notes the following:    No delayed non-IgE-mediated reaction to PCN          Toradol [Ketorolac] Rash    Tree Pollen-Shagbark Kemp Rash     HICKORY SMOKE FLAVORING    Vancomycin Other (comments), Rash and Unknown (comments)     Kidneys shut down  Other reaction(s): Other (see comments)  Kidneys shut down  Other reaction(s): Other (comments)  Kidneys shut down  Kidneys shut down      Voltaren [Diclofenac Sodium] Myalgia and Other (comments)     \"muscle spasms\", LEG SPASMS       Review of Systems:  History obtained from the patient-negative for:  Constitutional: weight loss, fever, night sweats  HEENT: hearing loss, earache, congestion, snoring, sorethroat  CV: chest pain, palpitations, edema  Resp: cough, shortness of breath, wheezing  Breast: breast lumps, nipple discharge, galactorrhea  GI: change in bowel habits, abdominal pain, black or bloody stools  : frequency, dysuria, hematuria, vaginal discharge  MSK: back pain, joint pain, muscle pain  Skin: itching, rash, hives  Neuro: dizziness, headache, confusion, weakness  Psych: anxiety, depression, change in mood  Heme/lymph: bleeding, bruising, pallor    Objective: There were no vitals filed for this visit.     Physical Exam:  PHYSICAL EXAMINATION    Constitutional  Appearance: well-nourished, well developed, alert, in no acute distress    HENT  Head and Face: appears normal    Neck  Inspection/Palpation: normal appearance, no masses or tenderness  Lymph Nodes: no lymphadenopathy present  Thyroid: gland size normal, nontender, no nodules or masses present on palpation    Chest  Respiratory Effort: breathing unlabored  Auscultation: normal breath sounds    Cardiovascular  Heart: Auscultation: regular rate and rhythm without murmur  Gastrointestinal  Abdominal Examination: abdomen non-tender to palpation, normal bowel sounds, no mass present  Liver and spleen: no hepatomegaly present, spleen not palpable  Hernias: no hernias identified    Skin  General Inspection: no rash, no lesions identified    Neurologic/Psychiatric  Mental Status:  Orientation: grossly oriented to person, place and time  Mood and Affect: mood normal, affect appropriate    Assessment:   Principal Problem:    DUB (dysfunctional uterine bleeding) (2/10/2023)    Active Problems:    Submucous leiomyoma of uterus (2/10/2023)      Fibroids, intramural (2/10/2023)         Plan:     Procedure(s) (LRB):  ROBOTIC ASSISTED TOTAL LAPAROSCOPIC HYSTERECTOMY  Discussed the risks of surgery including the risks of bleeding, infection, deep vein thrombosis, and surgical injuries to internal organs including but not limited to the bowels, bladder, rectum, and female reproductive organs. She understands her medical history puts her at greater risk of cardiovascular complications. The patient understands the risks; any and all questions were answered to the patient's satisfaction.     Signed By:  Bernie Cook MD     April 13, 2023

## 2023-04-13 NOTE — PERIOP NOTES
[facility] TRANSFER - OUT REPORT:    Verbal report given to JOANNE Hill on Candy Lockwood  being transferred to [address] [place] [address] for routine post - op       Report consisted of patients Situation, Background, Assessment and   Recommendations(SBAR). Information from the following report(s) SBAR, Procedure Summary, Intake/Output, and MAR was reviewed with the receiving nurse. Lines:   Peripheral IV 04/13/23 Anterior;Distal;Right Forearm (Active)   Site Assessment Clean, dry, & intact 04/13/23 1452   Phlebitis Assessment 0 04/13/23 1452   Infiltration Assessment 0 04/13/23 1452   Dressing Status Clean, dry, & intact 04/13/23 1452   Dressing Type Tape;Transparent 04/13/23 1452   Hub Color/Line Status Pink 04/13/23 1452   Action Taken Open ports on tubing capped 04/13/23 1452   Alcohol Cap Used Yes 04/13/23 1452       Peripheral IV 04/13/23 Anterior; Left Forearm (Active)   Site Assessment Clean, dry, & intact 04/13/23 1452   Phlebitis Assessment 0 04/13/23 1452   Infiltration Assessment 0 04/13/23 1452   Dressing Status Clean, dry, & intact 04/13/23 1452   Dressing Type Tape;Transparent 04/13/23 1452   Hub Color/Line Status Pink 04/13/23 1452   Action Taken Open ports on tubing capped 04/13/23 1452   Alcohol Cap Used Yes 04/13/23 1452        Opportunity for questions and clarification was provided.       Patient transported with:   O2 @ 2 liters  Registered Nurse

## 2023-04-14 VITALS
RESPIRATION RATE: 16 BRPM | OXYGEN SATURATION: 95 % | DIASTOLIC BLOOD PRESSURE: 92 MMHG | HEIGHT: 64 IN | BODY MASS INDEX: 38.07 KG/M2 | WEIGHT: 223 LBS | HEART RATE: 82 BPM | TEMPERATURE: 97.7 F | SYSTOLIC BLOOD PRESSURE: 148 MMHG

## 2023-04-14 LAB
ERYTHROCYTE [DISTWIDTH] IN BLOOD BY AUTOMATED COUNT: 12.6 % (ref 11.5–14.5)
GLUCOSE BLD STRIP.AUTO-MCNC: 156 MG/DL (ref 65–117)
GLUCOSE BLD STRIP.AUTO-MCNC: 190 MG/DL (ref 65–117)
HCT VFR BLD AUTO: 40 % (ref 35–47)
HGB BLD-MCNC: 13.3 G/DL (ref 11.5–16)
MCH RBC QN AUTO: 30.6 PG (ref 26–34)
MCHC RBC AUTO-ENTMCNC: 33.3 G/DL (ref 30–36.5)
MCV RBC AUTO: 92.2 FL (ref 80–99)
NRBC # BLD: 0 K/UL (ref 0–0.01)
NRBC BLD-RTO: 0 PER 100 WBC
PLATELET # BLD AUTO: 454 K/UL (ref 150–400)
PMV BLD AUTO: 9.4 FL (ref 8.9–12.9)
RBC # BLD AUTO: 4.34 M/UL (ref 3.8–5.2)
SERVICE CMNT-IMP: ABNORMAL
SERVICE CMNT-IMP: ABNORMAL
WBC # BLD AUTO: 15.9 K/UL (ref 3.6–11)

## 2023-04-14 PROCEDURE — 74011250636 HC RX REV CODE- 250/636: Performed by: OBSTETRICS & GYNECOLOGY

## 2023-04-14 PROCEDURE — 74011636637 HC RX REV CODE- 636/637: Performed by: OBSTETRICS & GYNECOLOGY

## 2023-04-14 PROCEDURE — 85027 COMPLETE CBC AUTOMATED: CPT

## 2023-04-14 PROCEDURE — 74011250637 HC RX REV CODE- 250/637: Performed by: OBSTETRICS & GYNECOLOGY

## 2023-04-14 PROCEDURE — 82962 GLUCOSE BLOOD TEST: CPT

## 2023-04-14 PROCEDURE — 74011000250 HC RX REV CODE- 250: Performed by: OBSTETRICS & GYNECOLOGY

## 2023-04-14 PROCEDURE — G0378 HOSPITAL OBSERVATION PER HR: HCPCS

## 2023-04-14 PROCEDURE — 36415 COLL VENOUS BLD VENIPUNCTURE: CPT

## 2023-04-14 RX ORDER — OXYCODONE AND ACETAMINOPHEN 5; 325 MG/1; MG/1
1 TABLET ORAL
Qty: 28 TABLET | Refills: 0 | Status: SHIPPED | OUTPATIENT
Start: 2023-04-14 | End: 2023-04-19 | Stop reason: ALTCHOICE

## 2023-04-14 RX ORDER — ZOLPIDEM TARTRATE 5 MG/1
5 TABLET ORAL
Status: DISCONTINUED | OUTPATIENT
Start: 2023-04-14 | End: 2023-04-14 | Stop reason: HOSPADM

## 2023-04-14 RX ORDER — IBUPROFEN 800 MG/1
800 TABLET ORAL
Qty: 60 TABLET | Refills: 1 | Status: SHIPPED | OUTPATIENT
Start: 2023-04-14 | End: 2023-05-14

## 2023-04-14 RX ADMIN — INSULIN GLARGINE 40 UNITS: 100 INJECTION, SOLUTION SUBCUTANEOUS at 00:22

## 2023-04-14 RX ADMIN — WATER 2 G: 1 INJECTION INTRAMUSCULAR; INTRAVENOUS; SUBCUTANEOUS at 03:29

## 2023-04-14 RX ADMIN — OXYCODONE AND ACETAMINOPHEN 1 TABLET: 5; 325 TABLET ORAL at 06:49

## 2023-04-14 RX ADMIN — SODIUM CHLORIDE, POTASSIUM CHLORIDE, SODIUM LACTATE AND CALCIUM CHLORIDE 150 ML/HR: 600; 310; 30; 20 INJECTION, SOLUTION INTRAVENOUS at 05:31

## 2023-04-14 RX ADMIN — INSULIN LISPRO 10 UNITS: 100 INJECTION, SOLUTION INTRAVENOUS; SUBCUTANEOUS at 00:22

## 2023-04-14 RX ADMIN — SODIUM CHLORIDE, PRESERVATIVE FREE 10 ML: 5 INJECTION INTRAVENOUS at 05:31

## 2023-04-14 RX ADMIN — WATER 2 G: 1 INJECTION INTRAMUSCULAR; INTRAVENOUS; SUBCUTANEOUS at 08:13

## 2023-04-14 RX ADMIN — INSULIN LISPRO 10 UNITS: 100 INJECTION, SOLUTION INTRAVENOUS; SUBCUTANEOUS at 08:13

## 2023-04-14 RX ADMIN — OXYCODONE AND ACETAMINOPHEN 1 TABLET: 5; 325 TABLET ORAL at 11:18

## 2023-04-14 RX ADMIN — HYDROMORPHONE HYDROCHLORIDE 1 MG: 1 INJECTION, SOLUTION INTRAMUSCULAR; INTRAVENOUS; SUBCUTANEOUS at 00:27

## 2023-04-14 RX ADMIN — HYDROMORPHONE HYDROCHLORIDE 1 MG: 1 INJECTION, SOLUTION INTRAMUSCULAR; INTRAVENOUS; SUBCUTANEOUS at 03:35

## 2023-04-14 RX ADMIN — SODIUM CHLORIDE, PRESERVATIVE FREE 10 ML: 5 INJECTION INTRAVENOUS at 00:23

## 2023-04-14 RX ADMIN — INSULIN GLARGINE 40 UNITS: 100 INJECTION, SOLUTION SUBCUTANEOUS at 08:13

## 2023-04-14 NOTE — DISCHARGE SUMMARY
Gynecology Surgical Discharge Summary     Name: Iker Cox MRN: [id number]  SSN: [id number]    YOB: 1985  Age: 44 y.o. Sex: female      Admit date: 4/13/2023    Discharge Date: 4/14/2023      Attending Physician: Monica eCdeno MD     Admission Diagnoses: DUB (dysfunctional uterine bleeding) [N93.8]    Discharge Diagnoses: DUB (dysfunctional uterine bleeding) [N93.8]     Procedures: Procedure(s):  ROBOTIC ASSISTED TOTAL [address] Course: Normal hospital course for this procedure. The patient was released to her home in good condition. Significant Diagnostic Studies:   Recent Results (from the past 24 hour(s))   HCG URINE, QL. - POC    Collection Time: 04/13/23  9:44 AM   Result Value Ref Range    Pregnancy test,urine (POC) Negative NEG     GLUCOSE, POC    Collection Time: 04/13/23  2:57 PM   Result Value Ref Range    Glucose (POC) 145 (H) 65 - 117 mg/dL    Performed by TYLER RITCHIE    GLUCOSE, POC    Collection Time: 04/13/23  8:13 PM   Result Value Ref Range    Glucose (POC) 206 (H) 65 - 117 mg/dL    Performed by Jane Chung    CBC W/O DIFF    Collection Time: 04/14/23  5:43 AM   Result Value Ref Range    WBC 15.9 (H) 3.6 - 11.0 K/uL    RBC 4.34 3.80 - 5.20 M/uL    HGB 13.3 11.5 - 16.0 g/dL    HCT 40.0 35.0 - 47.0 %    MCV 92.2 80.0 - 99.0 FL    MCH 30.6 26.0 - 34.0 PG    MCHC 33.3 30.0 - 36.5 g/dL    RDW 12.6 11.5 - 14.5 %    PLATELET 033 (H) 221 - 400 K/uL    MPV 9.4 8.9 - 12.9 FL    NRBC 0.0 0  WBC    ABSOLUTE NRBC 0.00 0.00 - 0.01 K/uL   GLUCOSE, POC    Collection Time: 04/14/23  7:28 AM   Result Value Ref Range    Glucose (POC) 156 (H) 65 - 117 mg/dL    Performed by Manfred Costa        Patient Instructions:     Diet, activity, wound care: See printed instructions. I spent 10 minutes discharging the patient in face to face contact.     Follow-up Appointments   Procedures    FOLLOW UP VISIT Appointment in: Two Weeks Post op     Post op     Standing Status:   Standing     Number of Occurrences:   1     Order Specific Question:   Appointment in     Answer:    Two Weeks        Signed By:  Elvia Johnson MD     April 14, 2023

## 2023-04-14 NOTE — PROGRESS NOTES
4/14/2023  10:37 AM  Care Management Interventions  Support Systems: Spouse/Significant Other  Discharge Location  Patient Expects to be Discharged to[de-identified] Home with family assistance

## 2023-04-14 NOTE — PROGRESS NOTES
Medicare Outpatient Observation Notice (MOON)/ [place] Outpatient Observation Notice ([place] Vietnamese) provided to patient/representative with verbal explanation of the notice. Time allotted for questions regarding the notice. Patient /representative provided a completed copy of the MOON/VOON notice. Copy placed on bedside chart.   Paula Flores CMS

## 2023-04-14 NOTE — PROGRESS NOTES
Problem: Diabetes Self-Management  Goal: *Disease process and treatment process  Description: Define diabetes and identify own type of diabetes; list 3 options for treating diabetes. Outcome: Progressing Towards Goal  Goal: *Incorporating nutritional management into lifestyle  Description: Describe effect of type, amount and timing of food on blood glucose; list 3 methods for planning meals. Outcome: Progressing Towards Goal  Goal: *Incorporating physical activity into lifestyle  Description: State effect of exercise on blood glucose levels. Outcome: Progressing Towards Goal  Goal: *Developing strategies to promote health/change behavior  Description: Define the ABC's of diabetes; identify appropriate screenings, schedule and personal plan for screenings. Outcome: Progressing Towards Goal  Goal: *Using medications safely  Description: State effect of diabetes medications on diabetes; name diabetes medication taking, action and side effects. Outcome: Progressing Towards Goal  Goal: *Monitoring blood glucose, interpreting and using results  Description: Identify recommended blood glucose targets  and personal targets. Outcome: Progressing Towards Goal  Goal: *Prevention, detection, treatment of acute complications  Description: List symptoms of hyper- and hypoglycemia; describe how to treat low blood sugar and actions for lowering  high blood glucose level. Outcome: Progressing Towards Goal  Goal: *Prevention, detection and treatment of chronic complications  Description: Define the natural course of diabetes and describe the relationship of blood glucose levels to long term complications of diabetes.   Outcome: Progressing Towards Goal  Goal: *Developing strategies to address psychosocial issues  Description: Describe feelings about living with diabetes; identify support needed and support network  Outcome: Progressing Towards Goal  Goal: *Insulin pump training  Outcome: Progressing Towards Goal  Goal: *Sick day guidelines  Outcome: Progressing Towards Goal  Goal: *Patient Specific Goal (EDIT GOAL, INSERT TEXT)  Outcome: Progressing Towards Goal     Problem: Patient Education: Go to Patient Education Activity  Goal: Patient/Family Education  Outcome: Progressing Towards Goal     Problem: Falls - Risk of  Goal: *Absence of Falls  Description: Document Janette Huff Fall Risk and appropriate interventions in the flowsheet.   Outcome: Progressing Towards Goal  Note: Fall Risk Interventions:                                Problem: Patient Education: Go to Patient Education Activity  Goal: Patient/Family Education  Outcome: Progressing Towards Goal

## 2023-04-14 NOTE — PROGRESS NOTES
Gynecology Progress Note    Patient doing well post-op day 1 from Procedure(s):  ROBOTIC ASSISTED TOTAL LAPAROSCOPIC HYSTERECTOMY without significant complaints. Had a rough night with pain but feeling a little better this am.  Didn't sleep much. Tolerating diet. Garcia out this am.  Pt has not voided yet. Patient is passing flatus. Vitals:  Blood pressure 132/85, pulse 82, temperature 97.9 °F (36.6 °C), resp. rate 16, height 5' 4\" (1.626 m), weight 223 lb (101.2 kg), last menstrual period 2023, SpO2 93 %, not currently breastfeeding. Temp (24hrs), Av.8 °F (36.6 °C), Min:97.5 °F (36.4 °C), Max:98.3 °F (36.8 °C)        Exam:  Patient without distress. Abdomen soft,  nontender. Incision dry and clean without erythema. Lower extremities are negative for swelling, cords, or tenderness.   Date 23 - 23 - 04/15/23 0659   Shift 3314-5273 7155-5289 24 Hour Total 4617-6530 0444-4334 24 Hour Total   INTAKE   I.V.(mL/kg/hr) 1000(0.8)  1000(0.4)        Volume (lactated Ringers infusion) 1000  1000      Shift Total(mL/kg) 1000(9.9)  1000(9.9)      OUTPUT   Urine(mL/kg/hr) 300(0.2) 500(0.4) 800(0.3)        Urine Output 300 500 800      Blood 30  30        Estimated Blood Loss 30  30      Shift Total(mL/kg) 330(3.3) 500(4.9) 830(8.2)       -500 170      Weight (kg) 101.2 101.2 101.2 101.2 101.2 101.2       Labs:   Recent Results (from the past 24 hour(s))   HCG URINE, QL. - POC    Collection Time: 23  9:44 AM   Result Value Ref Range    Pregnancy test,urine (POC) Negative NEG     GLUCOSE, POC    Collection Time: 23  2:57 PM   Result Value Ref Range    Glucose (POC) 145 (H) 65 - 117 mg/dL    Performed by Brando Montgomery POC    Collection Time: 23  8:13 PM   Result Value Ref Range    Glucose (POC) 206 (H) 65 - 117 mg/dL    Performed by Sophia Strauss    CBC W/O DIFF    Collection Time: 23  5:43 AM Result Value Ref Range    WBC 15.9 (H) 3.6 - 11.0 K/uL    RBC 4.34 3.80 - 5.20 M/uL    HGB 13.3 11.5 - 16.0 g/dL    HCT 40.0 35.0 - 47.0 %    MCV 92.2 80.0 - 99.0 FL    MCH 30.6 26.0 - 34.0 PG    MCHC 33.3 30.0 - 36.5 g/dL    RDW 12.6 11.5 - 14.5 %    PLATELET 398 (H) 593 - 400 K/uL    MPV 9.4 8.9 - 12.9 FL    NRBC 0.0 0  WBC    ABSOLUTE NRBC 0.00 0.00 - 0.01 K/uL   GLUCOSE, POC    Collection Time: 04/14/23  7:28 AM   Result Value Ref Range    Glucose (POC) 156 (H) 65 - 117 mg/dL    Performed by Malinda Drake and Plan:  Patient appears to be having uncomplicated post Procedure(s):  ROBOTIC ASSISTED TOTAL LAPAROSCOPIC HYSTERECTOMY course. Continue routine post-op care. Will DC home today if able to void.

## 2023-04-18 ENCOUNTER — PATIENT MESSAGE (OUTPATIENT)
Dept: OBGYN CLINIC | Age: 38
End: 2023-04-18

## 2023-04-18 DIAGNOSIS — G89.18 POSTOPERATIVE PAIN: Primary | ICD-10-CM

## 2023-04-19 RX ORDER — ONDANSETRON 4 MG/1
4 TABLET, ORALLY DISINTEGRATING ORAL AS NEEDED
Qty: 30 TABLET | Refills: 0 | Status: SHIPPED | OUTPATIENT
Start: 2023-04-19 | End: 2023-04-19

## 2023-04-19 RX ORDER — ONDANSETRON 4 MG/1
TABLET, ORALLY DISINTEGRATING ORAL
Qty: 30 TABLET | Refills: 0 | Status: SHIPPED | OUTPATIENT
Start: 2023-04-19

## 2023-04-19 RX ORDER — HYDROMORPHONE HYDROCHLORIDE 2 MG/1
2 TABLET ORAL
Qty: 28 TABLET | Refills: 0 | Status: SHIPPED | OUTPATIENT
Start: 2023-04-19 | End: 2023-04-26

## 2023-04-19 NOTE — TELEPHONE ENCOUNTER
Per Dr Babin Poster  signature should be every 6 hours prn nausea    Prescription resent as per Md order to patient confirmed pharmacy      Patient was sent a my chart message

## 2023-04-19 NOTE — TELEPHONE ENCOUNTER
[address] calling to ask for a frequency for the medication  ondansetron (ZOFRAN ODT) 4 mg disintegrating tablet [[id number]]     Order Details  Dose: 4 mg Route: Oral Frequency: AS NEEDED for Nausea or Vomiting   Dispense Quantity: 30 Tablet Refills: 0    Indications of Use: prevention of post-operative nausea and vomiting         Sig: Take 1 Tablet by mouth as needed for Nausea or Vomiting     Every so many hours    Please advise    Thank you

## 2023-04-19 NOTE — TELEPHONE ENCOUNTER
Two patient identifiers used      44year old patient had surgery on 4/13/2023 Procedures: Procedure(s):  ROBOTIC ASSISTED TOTAL LAPAROSCOPIC HYSTERECTOMY       Patient sent my chart message yesterday about needing medication for nausea and stronger pain medication and did not see that the medication has been sent     Pharmacy confirmed    Please advise      Thank you

## 2023-04-22 DIAGNOSIS — K31.84 GASTROPARESIS: ICD-10-CM

## 2023-04-22 DIAGNOSIS — R10.10 UPPER ABDOMINAL PAIN: Primary | ICD-10-CM

## 2023-04-26 ENCOUNTER — TELEPHONE (OUTPATIENT)
Dept: OBGYN CLINIC | Age: 38
End: 2023-04-26

## 2023-04-26 DIAGNOSIS — G89.18 POSTOPERATIVE PAIN: ICD-10-CM

## 2023-04-26 RX ORDER — HYDROMORPHONE HYDROCHLORIDE 2 MG/1
2 TABLET ORAL
Qty: 28 TABLET | Refills: 0 | Status: SHIPPED | OUTPATIENT
Start: 2023-04-26 | End: 2023-05-03

## 2023-04-26 NOTE — TELEPHONE ENCOUNTER
44year old patient had surgery on 4/13/2023  Procedures: Procedure(s):  ROBOTIC ASSISTED TOTAL LAPAROSCOPIC HYSTERECTOMY        [facility]  Phone Number: [phone]     Refills have been requested for the following medications:         HYDROmorphone (DILAUDID) 2 mg tablet Ryan Tee II]       Patient Comment: At night the pain is extremely bad. I get away with ibuprofen only during day.  Around 6/7 it gets painful      Please advise regarding refill request      ? Ov    Thank you

## 2023-04-27 ENCOUNTER — TELEPHONE (OUTPATIENT)
Dept: OBGYN CLINIC | Age: 38
End: 2023-04-27

## 2023-04-27 NOTE — TELEPHONE ENCOUNTER
Ese Richey MD  to [facility] [place]     1:33 PM   Has she tried cleaning with peroxide? Is there actual redness around the incision?      Patient denies redness around the incision and has not tried peroxide    Patient will try the peroxide and then place a pad over the area to keep in try as her breast covers the area    Please advise        Patient does have appointment on Monday 5/1/2023 with Dr Dawn Berry    Thank you

## 2023-04-27 NOTE — TELEPHONE ENCOUNTER
Two patient identifiers used    GM patient       44year old patient that had surgery on 413/2023   Procedures: Procedure(s):  ROBOTIC ASSISTED TOTAL LAPAROSCOPIC HYSTERECTOMY      Patient calling to say that one of the small incisions from the surgery is draining green and yellow , is red and increased pain at 6-9 on the pain scale of 1-10 and is the incision located close to under her breast    Patient was been trying to keep the area clean and dry    Patient reports the other incision sites are doing fine    Patient reports she does not have temperature and she is diabetic     Patient wondering whether she needs antibiotic   ?  Ov today    Please advise  Thank you    Patient is 45 minutes out    Thank you

## 2023-04-27 NOTE — TELEPHONE ENCOUNTER
Patient advised of work MD , Dr Denae Vera , recommendations to clean with the peroxide and to keep the area clean and dry with using a pad under her breast and to keep appointment on Monday with Dr. Jeanette Pavon    Patient verbalized understanding.

## 2023-04-27 NOTE — TELEPHONE ENCOUNTER
Monique Mathew MD  to [facility]    10:42 PM  Refill sent.   No more refills without visit     Prescription refill sent by Md    Patient was sent a my chart message

## 2023-05-01 ENCOUNTER — OFFICE VISIT (OUTPATIENT)
Dept: OBGYN CLINIC | Age: 38
End: 2023-05-01
Payer: MEDICARE

## 2023-05-01 VITALS
DIASTOLIC BLOOD PRESSURE: 78 MMHG | BODY MASS INDEX: 38.14 KG/M2 | SYSTOLIC BLOOD PRESSURE: 118 MMHG | WEIGHT: 223.4 LBS | HEIGHT: 64 IN

## 2023-05-01 DIAGNOSIS — Z09 POSTOP CHECK: Primary | ICD-10-CM

## 2023-05-01 PROCEDURE — 99024 POSTOP FOLLOW-UP VISIT: CPT | Performed by: OBSTETRICS & GYNECOLOGY

## 2023-05-01 NOTE — PROGRESS NOTES
Hysterectomy Postop Evaluation    Candy Lockwood is a 44 y.o. female returns for a routine post-operative follow-up visit following davinci hysterectomy on 4/13/23. Past Surgical History:   Procedure Laterality Date    HX BILATERAL SALPINGECTOMY Bilateral 09/07/2021    HX CHOLECYSTECTOMY      HX CORONARY ARTERY BYPASS GRAFT  12/03/2019    HX DILATION AND CURETTAGE      HX HYSTEROSCOPY WITH ENDOMETRIAL ABLATION  07/30/2021    Novasure    HX OTHER SURGICAL      2 TEETH REMOVED     HX TONSIL AND ADENOIDECTOMY  1992    HX TOTAL LAPAROSCOPIC HYSTERECTOMY  04/13/2023    Davinci    HX TYMPANOSTOMY Bilateral     MULTIPLE    HX WISDOM TEETH EXTRACTION      [place] UNLISTED PROCEDURE CARDIAC SURGERY  2019    CABG X 1, CARDIAC CATH STENT X 3        She had a rough time the first few weeks with pain & diarrhea. We refilled pain med meds x1 she states now she has improved and pain is mainly at night and still taking pain pills at night only. No further bowel complaints. No unusual vaginal discharge or bleeding.       PHYSICAL EXAMINATION    Gastrointestinal  Abdominal Examination: abdomen non-tender to palpation, incisions healing well upper incision on right some mild erythema no cellulitis or calor normal bowel sounds, no masses present  Liver and spleen: no hepatomegaly present, spleen not palpable  Hernias: no hernias identified    Genitourinary  External Genitalia: normal appearance for age, no discharge present, no tenderness present, no inflammatory lesions present, no masses present, no atrophy present  Vagina: normal vaginal vault without central or paravaginal defects, no discharge present, no inflammatory lesions present, no masses present, vaginal cuff in tact  Bladder: non-tender to palpation  Urethra: appears normal  Cervix: absent   Uterus: absent  Adnexa: no adnexal tenderness present, no adnexal masses present  Perineum: perineum within normal limits, no evidence of trauma, no rashes or skin lesions present  Skin  General Inspection: no rash, no lesions identified    Neurologic/Psychiatric  Mental Status:  Orientation: grossly oriented to person, place and time  Mood and Affect: mood normal, affect appropriate    Assessment:    ICD-10-CM ICD-9-CM    1. Postop check  Z09 V67.00           Plan:  Return in about 4 weeks (around 5/29/2023) for PostOp Check. Call if redness worsens at incision site or gets hot. Also can use peroxide & neosporin.

## 2023-05-08 ENCOUNTER — TELEPHONE (OUTPATIENT)
Age: 38
End: 2023-05-08

## 2023-05-08 ENCOUNTER — HOSPITAL ENCOUNTER (EMERGENCY)
Facility: HOSPITAL | Age: 38
Discharge: HOME | End: 2023-05-08
Attending: EMERGENCY MEDICINE | Admitting: EMERGENCY MEDICINE
Payer: MEDICARE

## 2023-05-08 VITALS
SYSTOLIC BLOOD PRESSURE: 130 MMHG | DIASTOLIC BLOOD PRESSURE: 82 MMHG | RESPIRATION RATE: 17 BRPM | TEMPERATURE: 98.3 F | HEART RATE: 94 BPM | HEIGHT: 64 IN | WEIGHT: 230 LBS | OXYGEN SATURATION: 97 % | BODY MASS INDEX: 39.27 KG/M2

## 2023-05-08 DIAGNOSIS — N93.9 VAGINAL BLEEDING: Primary | ICD-10-CM

## 2023-05-08 DIAGNOSIS — N30.01 ACUTE CYSTITIS WITH HEMATURIA: ICD-10-CM

## 2023-05-08 LAB
ALBUMIN SERPL-MCNC: 4 G/DL (ref 3.5–5.2)
ALBUMIN/GLOB SERPL: 1.1 (ref 1.1–2.2)
ALP SERPL-CCNC: 82 U/L (ref 35–104)
ALT SERPL-CCNC: 17 U/L (ref 10–35)
ANION GAP SERPL CALC-SCNC: 13 MMOL/L (ref 5–15)
APPEARANCE UR: ABNORMAL
AST SERPL-CCNC: 31 U/L (ref 10–35)
BACTERIA URNS QL MICRO: ABNORMAL /HPF
BASOPHILS # BLD: 0.1 K/UL (ref 0–1)
BASOPHILS NFR BLD: 0 % (ref 0–1)
BILIRUB SERPL-MCNC: 0.5 MG/DL (ref 0.2–1)
BILIRUB UR QL: NEGATIVE
BUN SERPL-MCNC: 18 MG/DL (ref 6–20)
BUN/CREAT SERPL: 15 (ref 12–20)
CALCIUM SERPL-MCNC: 9.5 MG/DL (ref 8.6–10)
CHLORIDE SERPL-SCNC: 103 MMOL/L (ref 98–107)
CO2 SERPL-SCNC: 22 MMOL/L (ref 22–29)
COLOR UR: ABNORMAL
COMMENT:: NORMAL
CREAT SERPL-MCNC: 1.17 MG/DL (ref 0.5–0.9)
DIFFERENTIAL METHOD BLD: ABNORMAL
EOSINOPHIL # BLD: 0.5 K/UL (ref 0–0.4)
EOSINOPHIL NFR BLD: 3 %
EPITH CASTS URNS QL MICRO: ABNORMAL /LPF
ERYTHROCYTE [DISTWIDTH] IN BLOOD BY AUTOMATED COUNT: 12.9 % (ref 11.5–14.5)
GLOBULIN SER CALC-MCNC: 3.8 G/DL (ref 2–4)
GLUCOSE SERPL-MCNC: 130 MG/DL (ref 65–100)
GLUCOSE UR STRIP.AUTO-MCNC: >1000 MG/DL
HCT VFR BLD AUTO: 41.7 % (ref 35–47)
HGB BLD-MCNC: 14.4 G/DL (ref 11.5–16)
HGB UR QL STRIP: ABNORMAL
IMM GRANULOCYTES # BLD AUTO: 0.1 K/UL (ref 0–0.04)
IMM GRANULOCYTES NFR BLD AUTO: 0 % (ref 0–0.5)
KETONES UR QL STRIP.AUTO: NEGATIVE MG/DL
LEUKOCYTE ESTERASE UR QL STRIP.AUTO: ABNORMAL
LYMPHOCYTES # BLD: 3.2 K/UL (ref 0.8–3.5)
LYMPHOCYTES NFR BLD: 22 % (ref 12–49)
MCH RBC QN AUTO: 30.5 PG (ref 26–34)
MCHC RBC AUTO-ENTMCNC: 34.5 G/DL (ref 30–36.5)
MCV RBC AUTO: 88.3 FL (ref 80–99)
MONOCYTES # BLD: 1.2 K/UL (ref 0–1)
MONOCYTES NFR BLD: 8 % (ref 5–13)
NEUTS SEG # BLD: 9.4 K/UL (ref 1.8–8)
NEUTS SEG NFR BLD: 67 % (ref 32–75)
NITRITE UR QL STRIP.AUTO: NEGATIVE
NRBC # BLD: 0 K/UL (ref 0–0.01)
NRBC BLD-RTO: 0 PER 100 WBC
PH UR STRIP: 5 (ref 5–8)
PLATELET # BLD AUTO: 511 K/UL (ref 150–400)
PMV BLD AUTO: 9.4 FL (ref 8.9–12.9)
POTASSIUM SERPL-SCNC: 4.6 MMOL/L (ref 3.5–5.1)
PROT SERPL-MCNC: 7.8 G/DL (ref 6.4–8.3)
PROT UR STRIP-MCNC: 30 MG/DL
RBC # BLD AUTO: 4.72 M/UL (ref 3.8–5.2)
RBC #/AREA URNS HPF: ABNORMAL /HPF
SODIUM SERPL-SCNC: 138 MMOL/L (ref 136–145)
SP GR UR REFRACTOMETRY: 1.02 (ref 1–1.03)
SPECIMEN HOLD: NORMAL
UROBILINOGEN UR QL STRIP.AUTO: 0.2 EU/DL (ref 0.2–1)
WBC # BLD AUTO: 14.4 K/UL (ref 3.6–11)
WBC URNS QL MICRO: ABNORMAL /HPF (ref 0–4)

## 2023-05-08 PROCEDURE — 85025 COMPLETE CBC W/AUTO DIFF WBC: CPT

## 2023-05-08 PROCEDURE — 87086 URINE CULTURE/COLONY COUNT: CPT

## 2023-05-08 PROCEDURE — 80053 COMPREHEN METABOLIC PANEL: CPT

## 2023-05-08 PROCEDURE — 81001 URINALYSIS AUTO W/SCOPE: CPT

## 2023-05-08 PROCEDURE — 96375 TX/PRO/DX INJ NEW DRUG ADDON: CPT

## 2023-05-08 PROCEDURE — 6360000002 HC RX W HCPCS

## 2023-05-08 PROCEDURE — 99284 EMERGENCY DEPT VISIT MOD MDM: CPT

## 2023-05-08 PROCEDURE — 96374 THER/PROPH/DIAG INJ IV PUSH: CPT

## 2023-05-08 PROCEDURE — 96376 TX/PRO/DX INJ SAME DRUG ADON: CPT

## 2023-05-08 PROCEDURE — 36415 COLL VENOUS BLD VENIPUNCTURE: CPT

## 2023-05-08 RX ORDER — FLUCONAZOLE 150 MG/1
150 TABLET ORAL DAILY
Qty: 1 TABLET | Refills: 0 | Status: SHIPPED | OUTPATIENT
Start: 2023-05-08 | End: 2023-05-09

## 2023-05-08 RX ORDER — CEPHALEXIN 500 MG/1
500 CAPSULE ORAL 4 TIMES DAILY
Qty: 28 CAPSULE | Refills: 0 | Status: SHIPPED | OUTPATIENT
Start: 2023-05-08 | End: 2023-05-15

## 2023-05-08 RX ORDER — ONDANSETRON 2 MG/ML
4 INJECTION INTRAMUSCULAR; INTRAVENOUS
Status: COMPLETED | OUTPATIENT
Start: 2023-05-08 | End: 2023-05-08

## 2023-05-08 RX ADMIN — ONDANSETRON 4 MG: 2 INJECTION INTRAMUSCULAR; INTRAVENOUS at 18:12

## 2023-05-08 RX ADMIN — HYDROMORPHONE HYDROCHLORIDE 0.5 MG: 1 INJECTION, SOLUTION INTRAMUSCULAR; INTRAVENOUS; SUBCUTANEOUS at 19:20

## 2023-05-08 RX ADMIN — HYDROMORPHONE HYDROCHLORIDE 0.5 MG: 1 INJECTION, SOLUTION INTRAMUSCULAR; INTRAVENOUS; SUBCUTANEOUS at 18:12

## 2023-05-08 ASSESSMENT — PAIN DESCRIPTION - ORIENTATION
ORIENTATION: MID;LOWER
ORIENTATION: MID;LOWER

## 2023-05-08 ASSESSMENT — PAIN SCALES - GENERAL
PAINLEVEL_OUTOF10: 7
PAINLEVEL_OUTOF10: 9
PAINLEVEL_OUTOF10: 7

## 2023-05-08 ASSESSMENT — ENCOUNTER SYMPTOMS
CONSTIPATION: 0
VOMITING: 0
NAUSEA: 1
SHORTNESS OF BREATH: 0
DIARRHEA: 0

## 2023-05-08 ASSESSMENT — PAIN - FUNCTIONAL ASSESSMENT: PAIN_FUNCTIONAL_ASSESSMENT: 0-10

## 2023-05-08 ASSESSMENT — PAIN DESCRIPTION - LOCATION
LOCATION: PELVIS
LOCATION: PELVIS

## 2023-05-08 NOTE — ED NOTES
This nurse introduced self to PT. Received report from Vivi Martinez.        Karla Lyles RN  05/08/23 1733

## 2023-05-08 NOTE — TELEPHONE ENCOUNTER
Two patient identifiers used    44year old patient last seen in the office on 5/1/2023 for post op visit    Patient reports she did not have any vaginal bleeding till after there exam on the above date and then it was spotting    Patient reports starting yesterday she is changing her pad every 2-3 hours and has passed a golf ball sized clot and reports her pain is 10  out of 10 and the pain medication is not helping    Patient was advised to go to the er per Dr. Sam Talley    Patient verbalized understanding    Patient sent this my chart message as well   Joanette Habermann  to [address] [place] [address]  Ob-Gyn Clinical Staff (supporting Werner Dias MD)    KG       3:41 PM  I'm bleeding pretty well with a large vlood clot coming out as well as pain not elevated by the Dilaudid like it was. Just started yesterday. Blleding started slightly after your exam and has gotten worse on and add today and yesterday been rhe worse.   I called the office and no answer

## 2023-05-08 NOTE — ED PROVIDER NOTES
[facility] EMERGENCY DEPT  EMERGENCY DEPARTMENT ENCOUNTER      Pt Name: Isidoro Holt  MRN: [id number]  [place] 1985  Date of evaluation: 5/8/2023  Provider: José Antonio Peck PA-C    CHIEF COMPLAINT       Chief Complaint   Patient presents with    Vaginal Bleeding         HISTORY OF PRESENT ILLNESS   (Location/Symptom, Timing/Onset, Context/Setting, Quality, Duration, Modifying Factors, Severity)  Note limiting factors. HPI    Isidoro Holt is a 44 y.o. female with Hx of hysterectomy April 13 who presents ambulatory to [facility] ED with cc of vaginal bleeding since yesterday. Patient had hysterectomy April 13 and did not experience any vaginal bleeding until her postop exam on May 1. Patient noted mild spotting that progressed to heavy bleeding soaking through a pad every hour or 2. She notes 1 episode of golf ball size clot earlier today, as well as persistent vaginal pain. Patient also notes decreased appetite, nausea, blood sugars in the 300s despite not eating. She is also concerned one of her laparoscopic incisions is infected. Denies fever, chills, vomiting, abdominal pain, bowel or bladder changes, any other concerns at this time. She has been taking Dilaudid as prescribed by her surgeon. PCP: Pedro Maldonado,     There are no other complaints, changes or physical findings at this time. Review of External Medical Records:     Nursing Notes were reviewed. REVIEW OF SYSTEMS    (2-9 systems for level 4, 10 or more for level 5)     Review of Systems   Constitutional:  Positive for appetite change. Negative for chills and fever. HENT:  Negative for congestion. Respiratory:  Negative for shortness of breath. Cardiovascular:  Negative for chest pain. Gastrointestinal:  Positive for nausea. Negative for constipation, diarrhea and vomiting. Genitourinary:  Positive for vaginal bleeding and vaginal pain. Negative for dysuria and hematuria. Skin:  Negative for rash.

## 2023-05-08 NOTE — ED NOTES
Verbal shift change report given to [address] [place] [address] (oncoming nurse) by Kori Pino (offgoing nurse). Report included the following information ED Encounter Summary, ED SBAR, MAR, and Recent Results.        Alma Perez RN  05/08/23 6498

## 2023-05-08 NOTE — DISCHARGE INSTRUCTIONS
As discussed, your bloodwork and pelvic exam were unremarkable. You have a UTI on your urine. You are being prescribed antibiotics for this, and diflucan as needed for yeast infection from antibiotics. Follow up with your OBGYN for further management of your vaginal bleeding, and follow-up with your PCP for further management of your diabetes. Return to the ER if you experience severe or worsening symptoms.

## 2023-05-08 NOTE — ED TRIAGE NOTES
Pt to ER with c/o vaginal bleeding with clots that started yesterday. Pt reports having a hysterectomy on 4/13/23 and having a follow up with an exam on 5/1/23. Pt reports spotting that started initially with the follow up exam. Pt reports her sugars have been running in the 300s and she has no appetite.

## 2023-05-09 ENCOUNTER — PATIENT MESSAGE (OUTPATIENT)
Age: 38
End: 2023-05-09

## 2023-05-09 DIAGNOSIS — G89.18 OTHER ACUTE POSTPROCEDURAL PAIN: Primary | ICD-10-CM

## 2023-05-09 LAB
BACTERIA SPEC CULT: NORMAL
SERVICE CMNT-IMP: NORMAL

## 2023-05-09 RX ORDER — LIDOCAINE 50 MG/G
PATCH TOPICAL
COMMUNITY
Start: 2021-06-28

## 2023-05-09 RX ORDER — AMLODIPINE BESYLATE 2.5 MG/1
TABLET ORAL
COMMUNITY
Start: 2021-11-14

## 2023-05-09 RX ORDER — HYDROMORPHONE HYDROCHLORIDE 2 MG/1
2 TABLET ORAL EVERY 6 HOURS PRN
Qty: 20 TABLET | Refills: 0 | Status: SHIPPED | OUTPATIENT
Start: 2023-05-09 | End: 2023-05-19

## 2023-05-09 RX ORDER — ISOSORBIDE MONONITRATE 30 MG/1
30 TABLET, EXTENDED RELEASE ORAL DAILY
COMMUNITY
Start: 2021-02-23

## 2023-05-09 RX ORDER — BLOOD-GLUCOSE SENSOR
EACH MISCELLANEOUS
COMMUNITY
Start: 2023-03-20

## 2023-05-09 RX ORDER — INSULIN ASPART 100 [IU]/ML
15 INJECTION, SOLUTION INTRAVENOUS; SUBCUTANEOUS
COMMUNITY

## 2023-05-09 RX ORDER — HYDROMORPHONE HYDROCHLORIDE 2 MG/1
TABLET ORAL
COMMUNITY
Start: 2023-04-27 | End: 2023-05-09 | Stop reason: SDUPTHER

## 2023-05-09 RX ORDER — BLOOD-GLUCOSE,RECEIVER,CONT
EACH MISCELLANEOUS
COMMUNITY
Start: 2023-03-14

## 2023-05-09 RX ORDER — INSULIN GLARGINE 100 [IU]/ML
20 INJECTION, SOLUTION SUBCUTANEOUS
COMMUNITY

## 2023-05-09 NOTE — TELEPHONE ENCOUNTER
[address] 5/9/2023 12:19 PM EDT    See message. ----- Message -----  From: Zenaida Quinteros  Sent: 5/9/2023 9:27 AM EDT  To: [facility] Ob-Gyn Clinical Staff  Subject: Pain meds     I am nearly out of pain meds. Only needed at night please.

## 2023-05-09 NOTE — ED NOTES
Patient reports symptom improvement or at least no worsening of symptoms since arrival to ED. Pain reassessment 4/10. VSS at time of discharge. I have reviewed discharge instructions with the patient, who verbalized understanding. Patient stable and discharged from ED via [facility]  and with FAMILY/FRIEND.    If applicable, PIV removed yes       Ozzy Baugh RN  05/08/23 2043

## 2023-05-30 ENCOUNTER — OFFICE VISIT (OUTPATIENT)
Age: 38
End: 2023-05-30

## 2023-05-30 VITALS
DIASTOLIC BLOOD PRESSURE: 75 MMHG | SYSTOLIC BLOOD PRESSURE: 108 MMHG | HEIGHT: 64 IN | BODY MASS INDEX: 37.63 KG/M2 | WEIGHT: 220.4 LBS

## 2023-05-30 DIAGNOSIS — Z09 POSTOP CHECK: Primary | ICD-10-CM

## 2023-05-30 PROBLEM — E03.9 HYPOTHYROIDISM: Status: ACTIVE | Noted: 2021-03-12

## 2023-05-30 PROBLEM — I25.10 ATHEROSCLEROSIS OF CORONARY ARTERY: Status: ACTIVE | Noted: 2021-03-12

## 2023-05-30 PROCEDURE — 99024 POSTOP FOLLOW-UP VISIT: CPT | Performed by: OBSTETRICS & GYNECOLOGY

## 2023-05-30 NOTE — PROGRESS NOTES
POSTOP EVALUATION     Eliecer Huddleston is a 44 y.o. female returns for a routine post-operative follow-up visit following Bianka Ramsey hysterectomy. This was done on 4/13/2023. She has been doing better with no current issues with bleeding or discharge. Past Surgical History:   Procedure Laterality Date    CHOLECYSTECTOMY      CORONARY ARTERY BYPASS GRAFT  12/03/2019    DILATION AND CURETTAGE OF UTERUS      ENDOMETRIAL ABLATION  07/30/2021    Novasure    LAPAROSCOPIC HYSTERECTOMY  04/13/2023    Davinci    OTHER SURGICAL HISTORY      2 TEETH REMOVED     [place] UNLISTED PROCEDURE CARDIAC SURGERY  2019    CABG X 1, CARDIAC CATH STENT X 3    SALPINGECTOMY Bilateral 09/07/2021    TONSILLECTOMY AND ADENOIDECTOMY  1992    TYMPANOSTOMY TUBE PLACEMENT Bilateral     MULTIPLE    WISDOM TOOTH EXTRACTION          PHYSICAL EXAMINATION    Gastrointestinal  Abdominal Examination: abdomen non-tender to palpation, incisions healing well, normal bowel sounds, no masses present  Liver and spleen: no hepatomegaly present, spleen not palpable  Hernias: no hernias identified    Genitourinary  External Genitalia: normal appearance for age, no discharge present, no tenderness present, no inflammatory lesions present, no masses present, no atrophy present  Vagina: normal vaginal vault without central or paravaginal defects, no discharge present, no inflammatory lesions present, no masses present, vaginal cuff in tact  Bladder: non-tender to palpation  Urethra: appears normal  Cervix: absent   Uterus: absent  Adnexa: no adnexal tenderness present, no adnexal masses present  Perineum: perineum within normal limits, no evidence of trauma, no rashes or skin lesions present  Skin  General Inspection: no rash, no lesions identified    Neurologic/Psychiatric  Mental Status:  Orientation: grossly oriented to person, place and time  Mood and Affect: mood normal, affect appropriate    Assessment:   Diagnosis Orders   1.  Postop check

## 2023-06-21 ENCOUNTER — NURSE ONLY (OUTPATIENT)
Age: 38
End: 2023-06-21

## 2023-06-21 DIAGNOSIS — Z79.4 TYPE 2 DIABETES MELLITUS WITH HYPERGLYCEMIA, WITH LONG-TERM CURRENT USE OF INSULIN (HCC): ICD-10-CM

## 2023-06-21 DIAGNOSIS — E11.65 TYPE 2 DIABETES MELLITUS WITH HYPERGLYCEMIA, WITH LONG-TERM CURRENT USE OF INSULIN (HCC): ICD-10-CM

## 2023-06-21 DIAGNOSIS — E03.9 HYPOTHYROIDISM, UNSPECIFIED TYPE: ICD-10-CM

## 2023-06-21 DIAGNOSIS — E78.2 MIXED HYPERLIPIDEMIA: ICD-10-CM

## 2023-06-22 LAB
ALBUMIN/CREAT UR: 201 MG/G CREAT (ref 0–29)
BUN SERPL-MCNC: 20 MG/DL (ref 6–20)
BUN/CREAT SERPL: 16 (ref 9–23)
CALCIUM SERPL-MCNC: 10.4 MG/DL (ref 8.7–10.2)
CHLORIDE SERPL-SCNC: 101 MMOL/L (ref 96–106)
CO2 SERPL-SCNC: 17 MMOL/L (ref 20–29)
CREAT SERPL-MCNC: 1.22 MG/DL (ref 0.57–1)
CREAT UR-MCNC: 78.6 MG/DL
EGFRCR SERPLBLD CKD-EPI 2021: 58 ML/MIN/1.73
GLUCOSE SERPL-MCNC: 158 MG/DL (ref 70–99)
HBA1C MFR BLD: 7.1 % (ref 4.8–5.6)
LDLC SERPL DIRECT ASSAY-MCNC: 43 MG/DL (ref 0–99)
MICROALBUMIN UR-MCNC: 157.9 UG/ML
POTASSIUM SERPL-SCNC: 4.6 MMOL/L (ref 3.5–5.2)
REPORT: NORMAL
SODIUM SERPL-SCNC: 136 MMOL/L (ref 134–144)
T4 FREE SERPL-MCNC: 1.72 NG/DL (ref 0.82–1.77)
TSH SERPL DL<=0.005 MIU/L-ACNC: 1.17 UIU/ML (ref 0.45–4.5)

## 2023-06-28 ENCOUNTER — OFFICE VISIT (OUTPATIENT)
Age: 38
End: 2023-06-28
Payer: MEDICARE

## 2023-06-28 VITALS
RESPIRATION RATE: 18 BRPM | SYSTOLIC BLOOD PRESSURE: 119 MMHG | HEIGHT: 64 IN | DIASTOLIC BLOOD PRESSURE: 81 MMHG | TEMPERATURE: 98.5 F | OXYGEN SATURATION: 97 % | WEIGHT: 219 LBS | HEART RATE: 89 BPM | BODY MASS INDEX: 37.39 KG/M2

## 2023-06-28 DIAGNOSIS — E11.65 TYPE 2 DIABETES MELLITUS WITH HYPERGLYCEMIA, UNSPECIFIED WHETHER LONG TERM INSULIN USE (HCC): Primary | ICD-10-CM

## 2023-06-28 DIAGNOSIS — E66.9 NON MORBID OBESITY: ICD-10-CM

## 2023-06-28 DIAGNOSIS — G62.9 POLYNEUROPATHY, UNSPECIFIED: ICD-10-CM

## 2023-06-28 DIAGNOSIS — E78.2 MIXED HYPERLIPIDEMIA: ICD-10-CM

## 2023-06-28 DIAGNOSIS — I10 ESSENTIAL HYPERTENSION: ICD-10-CM

## 2023-06-28 PROCEDURE — 99215 OFFICE O/P EST HI 40 MIN: CPT | Performed by: INTERNAL MEDICINE

## 2023-06-28 PROCEDURE — 3079F DIAST BP 80-89 MM HG: CPT | Performed by: INTERNAL MEDICINE

## 2023-06-28 PROCEDURE — 95251 CONT GLUC MNTR ANALYSIS I&R: CPT | Performed by: INTERNAL MEDICINE

## 2023-06-28 PROCEDURE — 3051F HG A1C>EQUAL 7.0%<8.0%: CPT | Performed by: INTERNAL MEDICINE

## 2023-06-28 PROCEDURE — 3074F SYST BP LT 130 MM HG: CPT | Performed by: INTERNAL MEDICINE

## 2023-06-28 RX ORDER — BLOOD SUGAR DIAGNOSTIC
STRIP MISCELLANEOUS
Qty: 450 EACH | Refills: 3 | Status: SHIPPED | OUTPATIENT
Start: 2023-06-28

## 2023-10-03 ENCOUNTER — OFFICE VISIT (OUTPATIENT)
Age: 38
End: 2023-10-03
Payer: MEDICARE

## 2023-10-03 VITALS
SYSTOLIC BLOOD PRESSURE: 130 MMHG | WEIGHT: 217.8 LBS | BODY MASS INDEX: 37.18 KG/M2 | RESPIRATION RATE: 18 BRPM | TEMPERATURE: 98.8 F | DIASTOLIC BLOOD PRESSURE: 81 MMHG | HEIGHT: 64 IN | HEART RATE: 77 BPM

## 2023-10-03 DIAGNOSIS — Z79.4 LONG TERM (CURRENT) USE OF INSULIN (HCC): ICD-10-CM

## 2023-10-03 DIAGNOSIS — Z79.4 TYPE 2 DIABETES MELLITUS WITH HYPERGLYCEMIA, WITH LONG-TERM CURRENT USE OF INSULIN (HCC): Primary | ICD-10-CM

## 2023-10-03 DIAGNOSIS — I10 ESSENTIAL HYPERTENSION: ICD-10-CM

## 2023-10-03 DIAGNOSIS — E78.2 MIXED HYPERLIPIDEMIA: ICD-10-CM

## 2023-10-03 DIAGNOSIS — E11.65 TYPE 2 DIABETES MELLITUS WITH HYPERGLYCEMIA, WITH LONG-TERM CURRENT USE OF INSULIN (HCC): Primary | ICD-10-CM

## 2023-10-03 DIAGNOSIS — E11.65 TYPE 2 DIABETES MELLITUS WITH HYPERGLYCEMIA, UNSPECIFIED WHETHER LONG TERM INSULIN USE (HCC): ICD-10-CM

## 2023-10-03 PROCEDURE — 99215 OFFICE O/P EST HI 40 MIN: CPT | Performed by: INTERNAL MEDICINE

## 2023-10-03 PROCEDURE — 3079F DIAST BP 80-89 MM HG: CPT | Performed by: INTERNAL MEDICINE

## 2023-10-03 PROCEDURE — 3051F HG A1C>EQUAL 7.0%<8.0%: CPT | Performed by: INTERNAL MEDICINE

## 2023-10-03 PROCEDURE — 3075F SYST BP GE 130 - 139MM HG: CPT | Performed by: INTERNAL MEDICINE

## 2023-10-03 RX ORDER — BLOOD SUGAR DIAGNOSTIC
STRIP MISCELLANEOUS
Qty: 450 EACH | Refills: 3 | Status: SHIPPED | OUTPATIENT
Start: 2023-10-03

## 2023-10-03 RX ORDER — INSULIN ASPART 100 [IU]/ML
15 INJECTION, SOLUTION INTRAVENOUS; SUBCUTANEOUS
Qty: 30 ML | Refills: 3 | Status: SHIPPED | OUTPATIENT
Start: 2023-10-03

## 2023-10-03 NOTE — PATIENT INSTRUCTIONS
Jamari Dick  is the [facility] representative and please contact her for any Dexcom supply questions. [phone]     [email or web address]       Check blood sugars before meals and at bedtime. Low blood glucose is less than 70       Goal of blood glucose before meals 90 - 120 , 2  Hours after meals less than 180       Maintain the log and bring it all your appointments      If the bedtime sugars are less than 100 ,eat a 15 gm snack. Levemir 30 units in AM and 30  units at bed time      Novolog or Humalog or Apidra insulin (fast acting) 6-10 units before breakfast, 6-10 units before lunch and 6-10  units before dinner. If sugars before meals are less than 70 then no insulin       Trulicity weekly       Novolog or Humalog for high sugars       Correction Scale:  3 units for every 50 above 150      Blood sugar  Fast acting insulin             150-200  Extra 3 Units         201-250  Extra 6 Units         251-300  Extra 9 Units         301-350  Extra 12  Units          351-400  Extra 15  Units       Example: My planned insulin dose:    ____ Units for meals    +    ____ Extra Correction Units  if high =  ____ total units to take together as one injection.

## 2023-10-03 NOTE — PROGRESS NOTES
[facility] ENDOCRINOLOGY                 Anca Arauz MD               Patient Information Name : Marlyn Renner  44 y.o.   YOB: 1985           Referred by: Comfort Sommer DO         Chief complaint: Diabetes mellitus follow-up       History of Present Illness: Marlyn Renner is a  44 y.o. female here for follow-up of  Diabetes Mellitus. Diabetes mellitus was diagnosed in 2009 . End organ effects of diabetes: peripheral neuropathy, gastroparesis (unsure of the diagnosis),CKD. History of CAD,CABG,CVA     Type 2 diabetes mellitus: On MDI  She has Dexcom G7, reviewed CGM data on clarity on G7 davina    Has POTS , seeing Cardiology , Neurology   No severe hypoglycemia  On Trulicity, which has helped her control the blood glucose very well,  Alternating diarrhea and constipation,   Multiple comorbidities  Risk of hypoglycemia is high  No chest pain            Prior history   Has Dexcom G6 which she got it after she had several episodes of hypoglycemia according to the history. Wt Readings from Last 3 Encounters:   10/03/23 217 lb 12.8 oz (98.8 kg)   06/28/23 219 lb (99.3 kg)   05/30/23 220 lb 6.4 oz (100 kg)        Past Medical History:   Diagnosis Date    Abscess     multiple, due to sweat ducks (arm pits, breast, groin).  currently (3/2023)has one in left groin, seen by PCP 3/22/23)    Anemia     Arrhythmia     prior to 12/2019    CAD (coronary artery disease) 2019    CABG X1    CHF (congestive heart failure) ([facility])     Chronic pain     LEFT SCIATIC, STERNAL WOUND    Complicated migraine     with extremity numbness    Diabetes ([address]) 2009 7/21 out of control  1/23 hga1c 7.4    DVT (deep venous thrombosis) ([address]) 2009    right leg    H/O transfusion of packed red blood cells 07/2021 11/22 menorrhagia    Heart attack ([address]) 11/2019    Hx MRSA infection 2009    Hypertension 2009    Hypothyroidism     Irregular menstrual cycle     Obesity (BMI 35.0-39.9

## 2023-10-03 NOTE — PROGRESS NOTES
Saul Merlin is a 44 y.o. female here for   Chief Complaint   Patient presents with    Diabetes       1. Have you been to the ER, urgent care clinic since your last visit? Hospitalized since your last visit? - no    2. Have you seen or consulted any other health care providers outside of the [address] [place] [address] Avenue since your last visit?   Include any pap smears or colon screening.- PCP; end of August, Cardiologist; 2 weeks ago

## 2023-10-04 RX ORDER — INSULIN LISPRO 100 [IU]/ML
6-10 INJECTION, SOLUTION INTRAVENOUS; SUBCUTANEOUS
Qty: 75 ML | Refills: 3 | Status: SHIPPED | OUTPATIENT
Start: 2023-10-04

## 2023-12-08 ENCOUNTER — TRANSCRIBE ORDERS (OUTPATIENT)
Facility: HOSPITAL | Age: 38
End: 2023-12-08

## 2023-12-08 DIAGNOSIS — I25.10 CORONARY ARTERY DISEASE, UNSPECIFIED VESSEL OR LESION TYPE, UNSPECIFIED WHETHER ANGINA PRESENT, UNSPECIFIED WHETHER NATIVE OR TRANSPLANTED HEART: Primary | ICD-10-CM

## 2023-12-22 DIAGNOSIS — Z79.4 TYPE 2 DIABETES MELLITUS WITH HYPERGLYCEMIA, WITH LONG-TERM CURRENT USE OF INSULIN (HCC): ICD-10-CM

## 2023-12-22 DIAGNOSIS — E11.65 TYPE 2 DIABETES MELLITUS WITH HYPERGLYCEMIA, WITH LONG-TERM CURRENT USE OF INSULIN (HCC): ICD-10-CM

## 2023-12-22 DIAGNOSIS — Z79.4 LONG TERM (CURRENT) USE OF INSULIN (HCC): ICD-10-CM

## 2024-02-07 ENCOUNTER — NURSE ONLY (OUTPATIENT)
Age: 39
End: 2024-02-07

## 2024-02-07 DIAGNOSIS — I10 ESSENTIAL HYPERTENSION: ICD-10-CM

## 2024-02-07 DIAGNOSIS — E11.65 TYPE 2 DIABETES MELLITUS WITH HYPERGLYCEMIA, UNSPECIFIED WHETHER LONG TERM INSULIN USE (HCC): ICD-10-CM

## 2024-02-07 DIAGNOSIS — E78.2 MIXED HYPERLIPIDEMIA: ICD-10-CM

## 2024-02-08 LAB
BUN SERPL-MCNC: 26 MG/DL (ref 6–20)
BUN/CREAT SERPL: 23 (ref 9–23)
CALCIUM SERPL-MCNC: 9.9 MG/DL (ref 8.7–10.2)
CHLORIDE SERPL-SCNC: 101 MMOL/L (ref 96–106)
CO2 SERPL-SCNC: 14 MMOL/L (ref 20–29)
CREAT SERPL-MCNC: 1.12 MG/DL (ref 0.57–1)
EGFRCR SERPLBLD CKD-EPI 2021: 64 ML/MIN/1.73
GLUCOSE SERPL-MCNC: 171 MG/DL (ref 70–99)
HBA1C MFR BLD: 7.7 % (ref 4.8–5.6)
POTASSIUM SERPL-SCNC: 5.2 MMOL/L (ref 3.5–5.2)
SODIUM SERPL-SCNC: 137 MMOL/L (ref 134–144)

## 2024-02-13 DIAGNOSIS — E11.65 TYPE 2 DIABETES MELLITUS WITH HYPERGLYCEMIA, WITH LONG-TERM CURRENT USE OF INSULIN (HCC): Primary | ICD-10-CM

## 2024-02-13 DIAGNOSIS — Z79.4 TYPE 2 DIABETES MELLITUS WITH HYPERGLYCEMIA, WITH LONG-TERM CURRENT USE OF INSULIN (HCC): Primary | ICD-10-CM

## 2024-02-13 RX ORDER — ACYCLOVIR 400 MG/1
TABLET ORAL
Qty: 9 EACH | Refills: 3 | Status: SHIPPED | OUTPATIENT
Start: 2024-02-13

## 2024-02-14 ENCOUNTER — OFFICE VISIT (OUTPATIENT)
Age: 39
End: 2024-02-14
Payer: MEDICARE

## 2024-02-14 VITALS
WEIGHT: 221.5 LBS | BODY MASS INDEX: 37.81 KG/M2 | SYSTOLIC BLOOD PRESSURE: 109 MMHG | DIASTOLIC BLOOD PRESSURE: 79 MMHG | RESPIRATION RATE: 20 BRPM | TEMPERATURE: 97.7 F | HEIGHT: 64 IN | HEART RATE: 62 BPM | OXYGEN SATURATION: 97 %

## 2024-02-14 DIAGNOSIS — E11.65 TYPE 2 DIABETES MELLITUS WITH HYPERGLYCEMIA, UNSPECIFIED WHETHER LONG TERM INSULIN USE (HCC): Primary | ICD-10-CM

## 2024-02-14 DIAGNOSIS — Z79.4 LONG TERM (CURRENT) USE OF INSULIN (HCC): ICD-10-CM

## 2024-02-14 DIAGNOSIS — E03.9 HYPOTHYROIDISM, UNSPECIFIED TYPE: ICD-10-CM

## 2024-02-14 DIAGNOSIS — E78.2 MIXED HYPERLIPIDEMIA: ICD-10-CM

## 2024-02-14 DIAGNOSIS — G62.9 POLYNEUROPATHY, UNSPECIFIED: ICD-10-CM

## 2024-02-14 PROCEDURE — 3074F SYST BP LT 130 MM HG: CPT | Performed by: INTERNAL MEDICINE

## 2024-02-14 PROCEDURE — 3078F DIAST BP <80 MM HG: CPT | Performed by: INTERNAL MEDICINE

## 2024-02-14 PROCEDURE — 95251 CONT GLUC MNTR ANALYSIS I&R: CPT | Performed by: INTERNAL MEDICINE

## 2024-02-14 PROCEDURE — 3051F HG A1C>EQUAL 7.0%<8.0%: CPT | Performed by: INTERNAL MEDICINE

## 2024-02-14 PROCEDURE — 99215 OFFICE O/P EST HI 40 MIN: CPT | Performed by: INTERNAL MEDICINE

## 2024-02-14 RX ORDER — AMLODIPINE BESYLATE 2.5 MG/1
2.5 TABLET ORAL DAILY
COMMUNITY

## 2024-02-14 RX ORDER — INSULIN ASPART 100 [IU]/ML
INJECTION, SOLUTION INTRAVENOUS; SUBCUTANEOUS
Qty: 10 ML | Refills: 3 | Status: SHIPPED | OUTPATIENT
Start: 2024-02-14

## 2024-02-14 NOTE — PROGRESS NOTES
[facility] DIABETES AND ENDOCRINOLOGY                 Camelia Hughes MD               Patient Information Name : Mandie Dietz  37 y.o.   YOB: 1985           Referred by: Ella Gao DO         Chief complaint: Diabetes mellitus follow-up       History of Present Illness: Mandie Dietz is here for follow-up of  Diabetes Mellitus.      Diabetes mellitus was diagnosed in 2009 . End organ effects of diabetes: peripheral neuropathy, gastroparesis (unsure of the diagnosis),CKD.  History of CAD,CABG,CVA     Type 2 diabetes mellitus: On MDI  She has Dexcom G7, reportedly having hyperglycemia since switching to Humalog,  Has POTS , seeing Cardiology , Neurology   No severe hypoglycemia  On Trulicity, tolerating  Multiple comorbidities  Risk of hypoglycemia is high  No chest pain             Wt Readings from Last 3 Encounters:   02/14/24 100.5 kg (221 lb 8 oz)   10/03/23 98.8 kg (217 lb 12.8 oz)   06/28/23 99.3 kg (219 lb)        Past Medical History:   Diagnosis Date    Abscess     multiple, due to sweat ducks (arm pits, breast, groin). currently (3/2023)has one in left groin, seen by PCP 3/22/23)    Anemia     Arrhythmia     prior to 12/2019    CAD (coronary artery disease) 2019    CABG X1    CHF (congestive heart failure) ([facility])     Chronic pain     LEFT SCIATIC, STERNAL WOUND    Complicated migraine     with extremity numbness    Diabetes ([facility]) 2009 7/21 out of control  1/23 hga1c 7.4    DVT (deep venous thrombosis) ([facility]) 2009    right leg    H/O transfusion of packed red blood cells 07/2021 11/22 menorrhagia    Heart attack ([facility]) 11/2019    Hx MRSA infection 2009    Hypertension 2009    Hypothyroidism     Irregular menstrual cycle     Obesity (BMI 35.0-39.9 without comorbidity)     Ovarian cyst     Gets frequently    Paraplegia ([facility]) 2019    Partial -- \"stroke in spinal cord\" during open heart surgery    PCOS (polycystic ovarian syndrome) 1996    Stroke ([facility]) 2019    LOWER BODY

## 2024-02-14 NOTE — PROGRESS NOTES
Mandie Dietz is a 38 y.o. female here for   Chief Complaint   Patient presents with    Diabetes       1. Have you been to the ER, urgent care clinic since your last visit?  Hospitalized since your last visit? - [facility] Jan 2024 for week for Chest Pain and Infection of the right breast.     2. Have you seen or consulted any other health care providers outside of the [facility] since your last visit?  Include any pap smears or colon screening.- No

## 2024-02-14 NOTE — PATIENT INSTRUCTIONS
Renuka Martines  is the [employer] representative and please contact her for any Dexcom supply questions.    [phone]     [email or web address]       Check blood sugars before meals and at bedtime.      Low blood glucose is less than 70       Goal of blood glucose before meals 90 - 120 , 2  Hours after meals less than 180       Maintain the log and bring it all your appointments      If the bedtime sugars are less than 100 ,eat a 15 gm snack.      Levemir 30 units in AM and 30  units at bed time      Novolog or Humalog or Apidra insulin (fast acting) 6-10 units before breakfast, 6-10 units before lunch and 6-10  units before dinner.    If sugars before meals are less than 70 then no insulin       Trulicity weekly       Novolog or Humalog for high sugars       Correction Scale:  3 units for every 50 above 150      Blood sugar  Fast acting insulin             150-200  Extra 3 Units         201-250  Extra 6 Units         251-300  Extra 9 Units         301-350  Extra 12  Units          351-400  Extra 15  Units       Example:    My planned insulin dose:    ____ Units for meals    +    ____ Extra Correction Units  if high =  ____ total units to take together as one injection.

## 2024-02-26 DIAGNOSIS — E11.65 TYPE 2 DIABETES MELLITUS WITH HYPERGLYCEMIA, WITH LONG-TERM CURRENT USE OF INSULIN (HCC): Primary | ICD-10-CM

## 2024-02-26 DIAGNOSIS — Z79.4 TYPE 2 DIABETES MELLITUS WITH HYPERGLYCEMIA, WITH LONG-TERM CURRENT USE OF INSULIN (HCC): Primary | ICD-10-CM

## 2024-02-26 RX ORDER — DULAGLUTIDE 1.5 MG/.5ML
INJECTION, SOLUTION SUBCUTANEOUS
Qty: 6 ML | Refills: 3 | Status: SHIPPED | OUTPATIENT
Start: 2024-02-26

## 2024-03-04 ENCOUNTER — PATIENT MESSAGE (OUTPATIENT)
Age: 39
End: 2024-03-04

## 2024-03-04 DIAGNOSIS — E11.65 TYPE 2 DIABETES MELLITUS WITH HYPERGLYCEMIA, UNSPECIFIED WHETHER LONG TERM INSULIN USE (HCC): ICD-10-CM

## 2024-03-04 DIAGNOSIS — Z79.4 LONG TERM (CURRENT) USE OF INSULIN (HCC): ICD-10-CM

## 2024-03-06 RX ORDER — INSULIN ASPART 100 [IU]/ML
INJECTION, SOLUTION INTRAVENOUS; SUBCUTANEOUS
Qty: 20 ML | Refills: 11 | Status: SHIPPED | OUTPATIENT
Start: 2024-03-06

## 2024-03-25 ENCOUNTER — PATIENT MESSAGE (OUTPATIENT)
Age: 39
End: 2024-03-25

## 2024-03-25 DIAGNOSIS — E11.65 TYPE 2 DIABETES MELLITUS WITH HYPERGLYCEMIA, UNSPECIFIED WHETHER LONG TERM INSULIN USE (HCC): Primary | ICD-10-CM

## 2024-03-26 ENCOUNTER — PATIENT MESSAGE (OUTPATIENT)
Age: 39
End: 2024-03-26

## 2024-03-26 DIAGNOSIS — E11.65 TYPE 2 DIABETES MELLITUS WITH HYPERGLYCEMIA, UNSPECIFIED WHETHER LONG TERM INSULIN USE (HCC): Primary | ICD-10-CM

## 2024-03-27 RX ORDER — SEMAGLUTIDE 0.68 MG/ML
0.5 INJECTION, SOLUTION SUBCUTANEOUS WEEKLY
Qty: 9 ML | Refills: 1 | Status: ON HOLD | OUTPATIENT
Start: 2024-03-27

## 2024-03-29 ENCOUNTER — APPOINTMENT (OUTPATIENT)
Facility: HOSPITAL | Age: 39
DRG: 103 | End: 2024-03-29
Payer: MEDICARE

## 2024-03-29 ENCOUNTER — HOSPITAL ENCOUNTER (INPATIENT)
Facility: HOSPITAL | Age: 39
LOS: 5 days | Discharge: HOME | DRG: 103 | End: 2024-04-05
Attending: STUDENT IN AN ORGANIZED HEALTH CARE EDUCATION/TRAINING PROGRAM | Admitting: STUDENT IN AN ORGANIZED HEALTH CARE EDUCATION/TRAINING PROGRAM
Payer: MEDICARE

## 2024-03-29 DIAGNOSIS — Z79.4 LONG TERM (CURRENT) USE OF INSULIN (HCC): ICD-10-CM

## 2024-03-29 DIAGNOSIS — G37.9 DEMYELINATING DISEASE (HCC): ICD-10-CM

## 2024-03-29 DIAGNOSIS — R29.90 STROKE-LIKE SYMPTOMS: Primary | ICD-10-CM

## 2024-03-29 DIAGNOSIS — E11.65 TYPE 2 DIABETES MELLITUS WITH HYPERGLYCEMIA, UNSPECIFIED WHETHER LONG TERM INSULIN USE (HCC): ICD-10-CM

## 2024-03-29 DIAGNOSIS — R44.9 SENSORY DEFICIT, RIGHT: ICD-10-CM

## 2024-03-29 PROBLEM — K04.7 DENTAL ABSCESS: Status: ACTIVE | Noted: 2022-05-10

## 2024-03-29 PROBLEM — R55 SYNCOPE AND COLLAPSE: Status: ACTIVE | Noted: 2022-03-27

## 2024-03-29 PROBLEM — E11.8 TYPE 2 DIABETES MELLITUS WITH COMPLICATION, WITHOUT LONG-TERM CURRENT USE OF INSULIN (HCC): Status: ACTIVE | Noted: 2017-12-25

## 2024-03-29 PROBLEM — H72.90 CHRONIC OTITIS MEDIA WITH PERFORATED TYMPANIC MEMBRANE: Status: ACTIVE | Noted: 2018-04-03

## 2024-03-29 PROBLEM — N61.1 BREAST ABSCESS OF FEMALE: Status: ACTIVE | Noted: 2017-07-09

## 2024-03-29 PROBLEM — H66.90 CHRONIC OTITIS MEDIA WITH PERFORATED TYMPANIC MEMBRANE: Status: ACTIVE | Noted: 2018-04-03

## 2024-03-29 PROBLEM — G82.20 PARAPARESIS (HCC): Status: ACTIVE | Noted: 2019-12-27

## 2024-03-29 PROBLEM — R51.9 INTRACTABLE HEADACHE: Status: ACTIVE | Noted: 2024-03-29

## 2024-03-29 PROBLEM — G90.A POSTURAL ORTHOSTATIC TACHYCARDIA SYNDROME (POTS): Status: ACTIVE | Noted: 2023-09-08

## 2024-03-29 PROBLEM — E03.9 ACQUIRED HYPOTHYROIDISM: Status: ACTIVE | Noted: 2024-03-29

## 2024-03-29 PROBLEM — I16.0 HYPERTENSIVE URGENCY: Status: ACTIVE | Noted: 2024-03-29

## 2024-03-29 PROBLEM — L73.2 HIDRADENITIS SUPPURATIVA OF LEFT AXILLA: Status: ACTIVE | Noted: 2017-08-26

## 2024-03-29 PROBLEM — N18.30 CKD (CHRONIC KIDNEY DISEASE) STAGE 3, GFR 30-59 ML/MIN (HCC): Status: ACTIVE | Noted: 2024-03-29

## 2024-03-29 LAB
ALBUMIN SERPL-MCNC: 3.6 G/DL (ref 3.5–5)
ALBUMIN/GLOB SERPL: 1 (ref 1.1–2.2)
ALP SERPL-CCNC: 73 U/L (ref 45–117)
ALT SERPL-CCNC: 41 U/L (ref 12–78)
ANION GAP SERPL CALC-SCNC: 12 MMOL/L (ref 5–15)
AST SERPL W P-5'-P-CCNC: 25 U/L (ref 15–37)
BASOPHILS # BLD: 0.1 K/UL (ref 0–0.1)
BASOPHILS NFR BLD: 1 % (ref 0–1)
BILIRUB SERPL-MCNC: 0.7 MG/DL (ref 0.2–1)
BUN SERPL-MCNC: 23 MG/DL (ref 6–20)
BUN/CREAT SERPL: 19 (ref 12–20)
CA-I BLD-MCNC: 9.9 MG/DL (ref 8.5–10.1)
CHLORIDE SERPL-SCNC: 99 MMOL/L (ref 97–108)
CO2 SERPL-SCNC: 24 MMOL/L (ref 21–32)
CREAT SERPL-MCNC: 1.22 MG/DL (ref 0.55–1.02)
DIFFERENTIAL METHOD BLD: ABNORMAL
EOSINOPHIL # BLD: 0.3 K/UL (ref 0–0.4)
EOSINOPHIL NFR BLD: 4 % (ref 0–7)
ERYTHROCYTE [DISTWIDTH] IN BLOOD BY AUTOMATED COUNT: 12.2 % (ref 11.5–14.5)
GLOBULIN SER CALC-MCNC: 3.7 G/DL (ref 2–4)
GLUCOSE BLD STRIP.AUTO-MCNC: 307 MG/DL (ref 65–100)
GLUCOSE BLD STRIP.AUTO-MCNC: 345 MG/DL (ref 65–100)
GLUCOSE SERPL-MCNC: 349 MG/DL (ref 65–100)
HCT VFR BLD AUTO: 41.9 % (ref 35–47)
HGB BLD-MCNC: 14.4 G/DL (ref 11.5–16)
IMM GRANULOCYTES # BLD AUTO: 0 K/UL (ref 0–0.04)
IMM GRANULOCYTES NFR BLD AUTO: 0 % (ref 0–0.5)
INR PPP: 1 (ref 0.9–1.1)
LYMPHOCYTES # BLD: 2.7 K/UL (ref 0.8–3.5)
LYMPHOCYTES NFR BLD: 28 % (ref 12–49)
MCH RBC QN AUTO: 31.4 PG (ref 26–34)
MCHC RBC AUTO-ENTMCNC: 34.4 G/DL (ref 30–36.5)
MCV RBC AUTO: 91.3 FL (ref 80–99)
MONOCYTES # BLD: 0.6 K/UL (ref 0–1)
MONOCYTES NFR BLD: 6 % (ref 5–13)
NEUTS SEG # BLD: 6 K/UL (ref 1.8–8)
NEUTS SEG NFR BLD: 61 % (ref 32–75)
PERFORMED BY:: ABNORMAL
PERFORMED BY:: ABNORMAL
PLATELET # BLD AUTO: 447 K/UL (ref 150–400)
PMV BLD AUTO: 9.6 FL (ref 8.9–12.9)
POTASSIUM SERPL-SCNC: 4.8 MMOL/L (ref 3.5–5.1)
PROT SERPL-MCNC: 7.3 G/DL (ref 6.4–8.2)
PROTHROMBIN TIME: 9.9 SEC (ref 9–11.1)
RBC # BLD AUTO: 4.59 M/UL (ref 3.8–5.2)
SODIUM SERPL-SCNC: 135 MMOL/L (ref 136–145)
TROPONIN I SERPL HS-MCNC: 5 NG/L (ref 0–51)
WBC # BLD AUTO: 9.8 K/UL (ref 3.6–11)

## 2024-03-29 PROCEDURE — G0378 HOSPITAL OBSERVATION PER HR: HCPCS

## 2024-03-29 PROCEDURE — 96376 TX/PRO/DX INJ SAME DRUG ADON: CPT

## 2024-03-29 PROCEDURE — 6370000000 HC RX 637 (ALT 250 FOR IP): Performed by: STUDENT IN AN ORGANIZED HEALTH CARE EDUCATION/TRAINING PROGRAM

## 2024-03-29 PROCEDURE — 2500000003 HC RX 250 WO HCPCS: Performed by: HOSPITALIST

## 2024-03-29 PROCEDURE — 2500000003 HC RX 250 WO HCPCS: Performed by: STUDENT IN AN ORGANIZED HEALTH CARE EDUCATION/TRAINING PROGRAM

## 2024-03-29 PROCEDURE — 96375 TX/PRO/DX INJ NEW DRUG ADDON: CPT

## 2024-03-29 PROCEDURE — 2580000003 HC RX 258: Performed by: HOSPITALIST

## 2024-03-29 PROCEDURE — 6360000002 HC RX W HCPCS: Performed by: INTERNAL MEDICINE

## 2024-03-29 PROCEDURE — 96365 THER/PROPH/DIAG IV INF INIT: CPT

## 2024-03-29 PROCEDURE — 36415 COLL VENOUS BLD VENIPUNCTURE: CPT

## 2024-03-29 PROCEDURE — 93005 ELECTROCARDIOGRAM TRACING: CPT | Performed by: STUDENT IN AN ORGANIZED HEALTH CARE EDUCATION/TRAINING PROGRAM

## 2024-03-29 PROCEDURE — 99285 EMERGENCY DEPT VISIT HI MDM: CPT

## 2024-03-29 PROCEDURE — 6360000002 HC RX W HCPCS

## 2024-03-29 PROCEDURE — 96367 TX/PROPH/DG ADDL SEQ IV INF: CPT

## 2024-03-29 PROCEDURE — 83036 HEMOGLOBIN GLYCOSYLATED A1C: CPT

## 2024-03-29 PROCEDURE — 85025 COMPLETE CBC W/AUTO DIFF WBC: CPT

## 2024-03-29 PROCEDURE — 6360000002 HC RX W HCPCS: Performed by: STUDENT IN AN ORGANIZED HEALTH CARE EDUCATION/TRAINING PROGRAM

## 2024-03-29 PROCEDURE — 84484 ASSAY OF TROPONIN QUANT: CPT

## 2024-03-29 PROCEDURE — 96374 THER/PROPH/DIAG INJ IV PUSH: CPT

## 2024-03-29 PROCEDURE — 85610 PROTHROMBIN TIME: CPT

## 2024-03-29 PROCEDURE — 2580000003 HC RX 258: Performed by: STUDENT IN AN ORGANIZED HEALTH CARE EDUCATION/TRAINING PROGRAM

## 2024-03-29 PROCEDURE — 96366 THER/PROPH/DIAG IV INF ADDON: CPT

## 2024-03-29 PROCEDURE — 70450 CT HEAD/BRAIN W/O DYE: CPT

## 2024-03-29 PROCEDURE — 80053 COMPREHEN METABOLIC PANEL: CPT

## 2024-03-29 PROCEDURE — 94761 N-INVAS EAR/PLS OXIMETRY MLT: CPT

## 2024-03-29 PROCEDURE — 6370000000 HC RX 637 (ALT 250 FOR IP): Performed by: HOSPITALIST

## 2024-03-29 PROCEDURE — 82962 GLUCOSE BLOOD TEST: CPT

## 2024-03-29 RX ORDER — ACETAMINOPHEN 500 MG
1000 TABLET ORAL
Status: COMPLETED | OUTPATIENT
Start: 2024-03-29 | End: 2024-03-29

## 2024-03-29 RX ORDER — ONDANSETRON 2 MG/ML
4 INJECTION INTRAMUSCULAR; INTRAVENOUS ONCE
Status: COMPLETED | OUTPATIENT
Start: 2024-03-29 | End: 2024-03-29

## 2024-03-29 RX ORDER — DIPHENHYDRAMINE HYDROCHLORIDE 50 MG/ML
25 INJECTION INTRAMUSCULAR; INTRAVENOUS
Status: DISCONTINUED | OUTPATIENT
Start: 2024-03-29 | End: 2024-03-29

## 2024-03-29 RX ORDER — INSULIN LISPRO 100 [IU]/ML
0-8 INJECTION, SOLUTION INTRAVENOUS; SUBCUTANEOUS
Status: DISCONTINUED | OUTPATIENT
Start: 2024-03-29 | End: 2024-03-31

## 2024-03-29 RX ORDER — POLYETHYLENE GLYCOL 3350 17 G/17G
17 POWDER, FOR SOLUTION ORAL DAILY PRN
Status: DISCONTINUED | OUTPATIENT
Start: 2024-03-29 | End: 2024-04-05 | Stop reason: HOSPADM

## 2024-03-29 RX ORDER — DIPHENHYDRAMINE HYDROCHLORIDE 50 MG/ML
25 INJECTION INTRAMUSCULAR; INTRAVENOUS
Status: COMPLETED | OUTPATIENT
Start: 2024-03-29 | End: 2024-03-29

## 2024-03-29 RX ORDER — SODIUM CHLORIDE 0.9 % (FLUSH) 0.9 %
5-40 SYRINGE (ML) INJECTION EVERY 12 HOURS SCHEDULED
Status: DISCONTINUED | OUTPATIENT
Start: 2024-03-29 | End: 2024-04-05 | Stop reason: HOSPADM

## 2024-03-29 RX ORDER — MAGNESIUM SULFATE HEPTAHYDRATE 40 MG/ML
2000 INJECTION, SOLUTION INTRAVENOUS ONCE
Status: COMPLETED | OUTPATIENT
Start: 2024-03-29 | End: 2024-03-29

## 2024-03-29 RX ORDER — INSULIN LISPRO 100 [IU]/ML
0.08 INJECTION, SOLUTION INTRAVENOUS; SUBCUTANEOUS
Status: DISCONTINUED | OUTPATIENT
Start: 2024-03-29 | End: 2024-03-31

## 2024-03-29 RX ORDER — ENOXAPARIN SODIUM 100 MG/ML
30 INJECTION SUBCUTANEOUS 2 TIMES DAILY
Status: DISCONTINUED | OUTPATIENT
Start: 2024-03-29 | End: 2024-04-05 | Stop reason: HOSPADM

## 2024-03-29 RX ORDER — HYDROMORPHONE HYDROCHLORIDE 1 MG/ML
1 INJECTION, SOLUTION INTRAMUSCULAR; INTRAVENOUS; SUBCUTANEOUS ONCE
Status: COMPLETED | OUTPATIENT
Start: 2024-03-29 | End: 2024-03-29

## 2024-03-29 RX ORDER — INSULIN LISPRO 100 [IU]/ML
0-4 INJECTION, SOLUTION INTRAVENOUS; SUBCUTANEOUS NIGHTLY
Status: DISCONTINUED | OUTPATIENT
Start: 2024-03-29 | End: 2024-03-31

## 2024-03-29 RX ORDER — ATORVASTATIN CALCIUM 40 MG/1
80 TABLET, FILM COATED ORAL NIGHTLY
Status: DISCONTINUED | OUTPATIENT
Start: 2024-03-29 | End: 2024-04-05 | Stop reason: HOSPADM

## 2024-03-29 RX ORDER — INSULIN GLARGINE 100 [IU]/ML
0.25 INJECTION, SOLUTION SUBCUTANEOUS NIGHTLY
Status: DISCONTINUED | OUTPATIENT
Start: 2024-03-29 | End: 2024-04-05 | Stop reason: HOSPADM

## 2024-03-29 RX ORDER — ONDANSETRON 2 MG/ML
4 INJECTION INTRAMUSCULAR; INTRAVENOUS EVERY 6 HOURS PRN
Status: DISCONTINUED | OUTPATIENT
Start: 2024-03-29 | End: 2024-04-05 | Stop reason: HOSPADM

## 2024-03-29 RX ORDER — CLOPIDOGREL BISULFATE 75 MG/1
75 TABLET ORAL DAILY
Status: DISCONTINUED | OUTPATIENT
Start: 2024-03-29 | End: 2024-03-29

## 2024-03-29 RX ORDER — 0.9 % SODIUM CHLORIDE 0.9 %
1000 INTRAVENOUS SOLUTION INTRAVENOUS ONCE
Status: COMPLETED | OUTPATIENT
Start: 2024-03-29 | End: 2024-03-29

## 2024-03-29 RX ORDER — SODIUM CHLORIDE 9 MG/ML
INJECTION, SOLUTION INTRAVENOUS PRN
Status: DISCONTINUED | OUTPATIENT
Start: 2024-03-29 | End: 2024-04-05 | Stop reason: HOSPADM

## 2024-03-29 RX ORDER — LABETALOL HYDROCHLORIDE 5 MG/ML
10 INJECTION, SOLUTION INTRAVENOUS EVERY 10 MIN PRN
Status: DISCONTINUED | OUTPATIENT
Start: 2024-03-29 | End: 2024-04-05 | Stop reason: HOSPADM

## 2024-03-29 RX ORDER — GLUCAGON 1 MG/ML
1 KIT INJECTION PRN
Status: DISCONTINUED | OUTPATIENT
Start: 2024-03-29 | End: 2024-04-05 | Stop reason: HOSPADM

## 2024-03-29 RX ORDER — PROCHLORPERAZINE EDISYLATE 5 MG/ML
5 INJECTION INTRAMUSCULAR; INTRAVENOUS
Status: DISCONTINUED | OUTPATIENT
Start: 2024-03-29 | End: 2024-03-29

## 2024-03-29 RX ORDER — SODIUM CHLORIDE 0.9 % (FLUSH) 0.9 %
5-40 SYRINGE (ML) INJECTION PRN
Status: DISCONTINUED | OUTPATIENT
Start: 2024-03-29 | End: 2024-04-05 | Stop reason: HOSPADM

## 2024-03-29 RX ORDER — ONDANSETRON 4 MG/1
4 TABLET, ORALLY DISINTEGRATING ORAL EVERY 8 HOURS PRN
Status: DISCONTINUED | OUTPATIENT
Start: 2024-03-29 | End: 2024-04-05 | Stop reason: HOSPADM

## 2024-03-29 RX ORDER — DEXAMETHASONE SODIUM PHOSPHATE 10 MG/ML
6 INJECTION, SOLUTION INTRAMUSCULAR; INTRAVENOUS ONCE
Status: COMPLETED | OUTPATIENT
Start: 2024-03-29 | End: 2024-03-29

## 2024-03-29 RX ORDER — DEXTROSE MONOHYDRATE 100 MG/ML
INJECTION, SOLUTION INTRAVENOUS CONTINUOUS PRN
Status: DISCONTINUED | OUTPATIENT
Start: 2024-03-29 | End: 2024-04-05 | Stop reason: HOSPADM

## 2024-03-29 RX ADMIN — ONDANSETRON 4 MG: 2 INJECTION INTRAMUSCULAR; INTRAVENOUS at 15:14

## 2024-03-29 RX ADMIN — ACETAMINOPHEN 1000 MG: 500 TABLET ORAL at 15:09

## 2024-03-29 RX ADMIN — SODIUM CHLORIDE 250 MG: 9 INJECTION, SOLUTION INTRAVENOUS at 23:40

## 2024-03-29 RX ADMIN — SODIUM CHLORIDE 1000 ML: 9 INJECTION, SOLUTION INTRAVENOUS at 16:30

## 2024-03-29 RX ADMIN — DEXAMETHASONE SODIUM PHOSPHATE 6 MG: 10 INJECTION, SOLUTION INTRAMUSCULAR; INTRAVENOUS at 15:10

## 2024-03-29 RX ADMIN — ONDANSETRON 4 MG: 2 INJECTION INTRAMUSCULAR; INTRAVENOUS at 20:05

## 2024-03-29 RX ADMIN — ATORVASTATIN CALCIUM 80 MG: 40 TABLET, FILM COATED ORAL at 22:29

## 2024-03-29 RX ADMIN — DIPHENHYDRAMINE HYDROCHLORIDE 25 MG: 50 INJECTION INTRAMUSCULAR; INTRAVENOUS at 16:31

## 2024-03-29 RX ADMIN — INSULIN LISPRO 4 UNITS: 100 INJECTION, SOLUTION INTRAVENOUS; SUBCUTANEOUS at 22:29

## 2024-03-29 RX ADMIN — HYDROMORPHONE HYDROCHLORIDE 1 MG: 1 INJECTION, SOLUTION INTRAMUSCULAR; INTRAVENOUS; SUBCUTANEOUS at 22:30

## 2024-03-29 RX ADMIN — SODIUM CHLORIDE, PRESERVATIVE FREE 10 ML: 5 INJECTION INTRAVENOUS at 22:31

## 2024-03-29 RX ADMIN — INSULIN GLARGINE 26 UNITS: 100 INJECTION, SOLUTION SUBCUTANEOUS at 22:29

## 2024-03-29 RX ADMIN — MAGNESIUM SULFATE HEPTAHYDRATE 2000 MG: 2 INJECTION, SOLUTION INTRAVENOUS at 16:31

## 2024-03-29 ASSESSMENT — PAIN SCALES - GENERAL
PAINLEVEL_OUTOF10: 7
PAINLEVEL_OUTOF10: 7
PAINLEVEL_OUTOF10: 3
PAINLEVEL_OUTOF10: 6
PAINLEVEL_OUTOF10: 0
PAINLEVEL_OUTOF10: 7

## 2024-03-29 ASSESSMENT — PAIN - FUNCTIONAL ASSESSMENT
PAIN_FUNCTIONAL_ASSESSMENT: 0-10
PAIN_FUNCTIONAL_ASSESSMENT: PREVENTS OR INTERFERES SOME ACTIVE ACTIVITIES AND ADLS
PAIN_FUNCTIONAL_ASSESSMENT: 0-10

## 2024-03-29 ASSESSMENT — PAIN DESCRIPTION - DESCRIPTORS: DESCRIPTORS: ACHING

## 2024-03-29 ASSESSMENT — LIFESTYLE VARIABLES
HOW MANY STANDARD DRINKS CONTAINING ALCOHOL DO YOU HAVE ON A TYPICAL DAY: PATIENT DOES NOT DRINK
HOW OFTEN DO YOU HAVE A DRINK CONTAINING ALCOHOL: NEVER

## 2024-03-29 ASSESSMENT — PAIN DESCRIPTION - LOCATION
LOCATION: CHEST;HEAD
LOCATION: CHEST;HEAD
LOCATION: HEAD
LOCATION: HEAD

## 2024-03-29 ASSESSMENT — PAIN DESCRIPTION - ORIENTATION: ORIENTATION: LEFT

## 2024-03-29 NOTE — ED PROVIDER NOTES
[facility] EMERGENCY DEPT  EMERGENCY DEPARTMENT HISTORY AND PHYSICAL EXAM      Date: 3/29/2024  Patient Name: Mandie Dietz  MRN: [id number]  Birthdate 1985  Date of evaluation: 3/29/2024  Provider: Timmy Daniel MD   Note Started: 1:37 PM EDT 3/29/24    HISTORY OF PRESENT ILLNESS     Chief Complaint   Patient presents with    Headache    Numbness       History Provided By: Patient    HPI: Mandie Dietz is a 38 y.o. female including CAD, CHF, cardiac catheterization performed 2 weeks ago, presents with 1 week of headaches and 1 day of neurodeficits.  Patient is tried multiple home medications for her headache, which has gotten worse throughout the week.  She went to sleep last night at 3 AM, approximately 10 hours prior to arrival.  She woke up this morning at 8 AM, approximately 5.5 hours prior to arrival, with right-sided sensory deficits and sensation that she has weakness in her right leg that she has to drag it at times.  She is not taking blood thinners.  She denies any chest pain, dyspnea, fevers.    PAST MEDICAL HISTORY   Past Medical History:  Past Medical History:   Diagnosis Date    Acquired hypothyroidism 03/29/2024    Bacterial vaginosis 01/12/2015    Breast abscess of female 07/09/2017    Chronic otitis media with perforated tympanic membrane 04/03/2018    CKD (chronic kidney disease) stage 3, GFR 30-59 ml/min ([facility])     Complicated migraine     Coronary artery disease involving native coronary artery of native heart without angina pectoris     s/p CABGx1    Dental abscess 05/10/2022    Gastroparesis 02/10/2023    Hidradenitis suppurativa of left axilla 08/26/2017    History of DVT (deep vein thrombosis)     Hypertension 2009    Microalbuminuria due to type 2 diabetes mellitus ([facility]) 12/27/2017    Obesity (BMI 35.0-39.9 without comorbidity)     Paraparesis of bilateral lower extremites ([facility]) 12/27/2019    PCOS (polycystic ovarian syndrome) 1996    Postural orthostatic tachycardia syndrome (POTS)  History:  Social History     Tobacco Use    Smoking status: Never    Smokeless tobacco: Never   Substance Use Topics    Alcohol use: Not Currently    Drug use: No       Allergies:  Allergies   Allergen Reactions    Ciprofloxacin Anaphylaxis and Rash     Other reaction(s): Unknown (comments)    Other Swelling     JALIPENO PEPPERS - FACILA SWELLING    Bumetanide Other (See Comments)     Other reaction(s): Unknown (comments)  Hearing loss  Other reaction(s): Other (see comments)    Coconut Fatty Acids Other (See Comments)     Facial swelling    Diclofenac Sodium Myalgia and Other (See Comments)     \"muscle spasms\", LEG SPASMS    Zolpidem Tartrate     Diclofenac Myalgia and Rash     \"muscle spasms\"  Other reaction(s): Myalgias  \"muscle spasms\"    Ketorolac Rash    Penicillins Hives and Rash     Other reaction(s): Unknown (comments)  OCCURRED IN INFANCY NO REPORTED SEVERE IgE MEDIATED REACTIONS  Patient screened for any delayed non-IgE-mediated reaction to PCN.        Patient notes the following:    No delayed non-IgE-mediated reaction to PCN    Prochlorperazine Anxiety     Other reaction(s): Unknown (comments)  HALLUCINATIONS    [place] Allergy Skin Test Rash     HICKORY SMOKE FLAVORING    Vancomycin Other (See Comments) and Rash     Other reaction(s): Unknown (comments)  Kidneys shut down  Other reaction(s): Other (see comments)  Kidneys shut down  Other reaction(s): Other (comments)  Kidneys shut down  Kidneys shut down       PCP: Domingo Samson, SHANTE - NP    Current Meds:   Current Facility-Administered Medications   Medication Dose Route Frequency Provider Last Rate Last Admin    amLODIPine (NORVASC) tablet 2.5 mg  2.5 mg Oral Daily Leo Driscoll DO   2.5 mg at 03/30/24 0855    aspirin EC tablet 81 mg  81 mg Oral Daily Leo Driscoll DO   81 mg at 03/30/24 0855    cetirizine (ZYRTEC) tablet 10 mg  10 mg Oral Daily Leo Driscoll DO   10 mg at 03/30/24 0855    empagliflozin (JARDIANCE) tablet 10  sodium chloride 0.9 % 50 mL infusion (0 mg IntraVENous Stopped 3/30/24 0042)   ondansetron (ZOFRAN) injection 4 mg (4 mg IntraVENous Given 3/29/24 2005)   HYDROmorphone HCl PF (DILAUDID) injection 1 mg (1 mg IntraVENous Given 3/29/24 2230)   HYDROmorphone HCl PF (DILAUDID) injection 1 mg (1 mg IntraVENous Given 3/30/24 0847)       CONSULTS: See ED Course/MDM for further details.  IP CONSULT TO TELE-NEUROLOGY  IP CONSULT TO NEUROLOGY     Social Determinants affecting Diagnosis/Treatment: None    Smoking Cessation: Not Applicable    PROCEDURES   Unless otherwise noted above, none  Procedures      CRITICAL CARE TIME   Patient does not meet Critical Care Time, 0 minutes    ED IMPRESSION     1. Stroke-like symptoms          DISPOSITION/PLAN   DISPOSITION Admitted 03/29/2024 06:02:58 PM    Admit Note: Pt is being admitted by hospital medicine. The results of their tests and reason(s) for their admission have been discussed with pt and/or available family. They convey agreement and understanding for the need to be admitted and for the admission diagnosis.     PATIENT REFERRED TO:  No follow-up provider specified.      DISCHARGE MEDICATIONS:     Medication List        CONTINUE taking these medications      Dexcom G7  Nery     Dexcom G7 Sensor Misc  USE AS DIRECTED FOR 10 DAYS. Dx code E11.65     Insulin Syringe-Needle U-100 32G X 5/16\" 1 ML Misc  Use to inject insulin 4 times daily. Dx code: E11.65            STOP taking these medications      nitroGLYCERIN 0.4 MG SL tablet  Commonly known as: NITROSTAT     ondansetron 4 MG disintegrating tablet  Commonly known as: ZOFRAN-ODT     Pen Needles 32G X 6 MM Misc            ASK your doctor about these medications      amLODIPine 2.5 MG tablet  Commonly known as: NORVASC     aspirin 81 MG EC tablet     atorvastatin 20 MG tablet  Commonly known as: LIPITOR     empagliflozin 10 MG tablet  Commonly known as: JARDIANCE     insulin detemir 100 UNIT/ML injection pen  Commonly

## 2024-03-29 NOTE — ED TRIAGE NOTES
Patient reporting headache for 8 days. Numbness on right side started this morning when woke up and reports dragging right foot when walking.  Patient was seen by PCP yesterday for headache.     /117 in triage     Level 2 stroke alert.

## 2024-03-29 NOTE — ED NOTES
ED TO INPATIENT SBAR HANDOFF    Patient Name: Mandie Dietz   Preferred Name: Mandie  : 1985  38 y.o.   Family/Caregiver Present: no   Code Status Order: Full Code  PO Status: NPO:No  Telemetry Order: Yes  C-SSRS: Risk of Suicide: No Risk  Restraints:     Sepsis Risk Score Sepsis Risk Score: 0.84    Situation  Chief Complaint   Patient presents with    Headache    Numbness     Brief Description of Patient's Condition: Mandie Dietz is a 38 y.o. female including CAD, CHF, cardiac catheterization performed 2 weeks ago, presents with 1 week of headaches and 1 day of neurodeficits.  Patient is tried multiple home medications for her headache, which has gotten worse throughout the week.  She went to sleep last night at 3 AM, approximately 10 hours prior to arrival.  She woke up this morning at 8 AM, approximately 5.5 hours prior to arrival, with right-sided sensory deficits and sensation that she has weakness in her right leg that she has to drag it at times.  She is not taking blood thinners.  She denies any chest pain, dyspnea, fevers.   Mental Status: oriented, alert, coherent, and logical  Arrived from:Home  Imaging:   CT HEAD WO CONTRAST   Final Result      No evidence for acute intracranial abnormality. Left sinusitis.               MRA neck without contrast    (Results Pending)   MRA head without contrast    (Results Pending)   MRI brain without contrast    (Results Pending)     Abnormal labs:   Abnormal Labs Reviewed   CBC WITH AUTO DIFFERENTIAL - Abnormal; Notable for the following components:       Result Value    Platelets 447 (*)     All other components within normal limits   COMPREHENSIVE METABOLIC PANEL - Abnormal; Notable for the following components:    Sodium 135 (*)     Glucose 349 (*)     BUN 23 (*)     Creatinine 1.22 (*)     Est, Glom Filt Rate 58 (*)     Albumin/Globulin Ratio 1.0 (*)     All other components within normal limits   POCT GLUCOSE - Abnormal; Notable for the following  IntraVENous Given 3/29/24 1631)   magnesium sulfate 2000 mg in water 50 mL IVPB (2,000 mg IntraVENous New Bag 3/29/24 1631)     Last documented pain medication administration: tylenol, no relief  Pertinent or High Risk Medications/Drips: no   If Yes, please provide details  If Yes, please provide details:   Process Protocols/Bundles: hourly neuro checks    Recommendation  Incomplete STAT orders:   Overdue Medications:   Patient Belongings:    Additional Comments:  If any further questions, please call Sending RN at [phone]      Admitting Unit Notification  Name of person notified and time: Wei at 1951      Electronically signed by: Electronically signed by Shaniqua Fitzpatrick RN on 3/29/2024 at 7:41 PM

## 2024-03-30 ENCOUNTER — APPOINTMENT (OUTPATIENT)
Facility: HOSPITAL | Age: 39
DRG: 103 | End: 2024-03-30
Payer: MEDICARE

## 2024-03-30 LAB
CHOLEST SERPL-MCNC: 161 MG/DL
ERYTHROCYTE [DISTWIDTH] IN BLOOD BY AUTOMATED COUNT: 12 % (ref 11.5–14.5)
EST. AVERAGE GLUCOSE BLD GHB EST-MCNC: 192 MG/DL
GLUCOSE BLD STRIP.AUTO-MCNC: 246 MG/DL (ref 65–100)
GLUCOSE BLD STRIP.AUTO-MCNC: 257 MG/DL (ref 65–100)
GLUCOSE BLD STRIP.AUTO-MCNC: 288 MG/DL (ref 65–100)
GLUCOSE BLD STRIP.AUTO-MCNC: 326 MG/DL (ref 65–100)
HBA1C MFR BLD: 8.3 % (ref 4–5.6)
HCT VFR BLD AUTO: 42.2 % (ref 35–47)
HDLC SERPL-MCNC: 59 MG/DL
HDLC SERPL: 2.7 (ref 0–5)
HGB BLD-MCNC: 14.5 G/DL (ref 11.5–16)
LDLC SERPL CALC-MCNC: 57 MG/DL (ref 0–100)
LIPID PANEL: ABNORMAL
MCH RBC QN AUTO: 30.9 PG (ref 26–34)
MCHC RBC AUTO-ENTMCNC: 34.4 G/DL (ref 30–36.5)
MCV RBC AUTO: 89.8 FL (ref 80–99)
NRBC # BLD: 0 K/UL (ref 0–0.01)
NRBC BLD-RTO: 0 PER 100 WBC
PERFORMED BY:: ABNORMAL
PLATELET # BLD AUTO: 494 K/UL (ref 150–400)
PMV BLD AUTO: 9.5 FL (ref 8.9–12.9)
RBC # BLD AUTO: 4.7 M/UL (ref 3.8–5.2)
TRIGL SERPL-MCNC: 225 MG/DL
VLDLC SERPL CALC-MCNC: 45 MG/DL
WBC # BLD AUTO: 18.4 K/UL (ref 3.6–11)

## 2024-03-30 PROCEDURE — 6370000000 HC RX 637 (ALT 250 FOR IP): Performed by: HOSPITALIST

## 2024-03-30 PROCEDURE — 2580000003 HC RX 258: Performed by: HOSPITALIST

## 2024-03-30 PROCEDURE — G0426 INPT/ED TELECONSULT50: HCPCS | Performed by: PSYCHIATRY & NEUROLOGY

## 2024-03-30 PROCEDURE — G0378 HOSPITAL OBSERVATION PER HR: HCPCS

## 2024-03-30 PROCEDURE — 70544 MR ANGIOGRAPHY HEAD W/O DYE: CPT

## 2024-03-30 PROCEDURE — 6360000002 HC RX W HCPCS: Performed by: NURSE PRACTITIONER

## 2024-03-30 PROCEDURE — 96376 TX/PRO/DX INJ SAME DRUG ADON: CPT

## 2024-03-30 PROCEDURE — 6360000002 HC RX W HCPCS: Performed by: PSYCHIATRY & NEUROLOGY

## 2024-03-30 PROCEDURE — 97161 PT EVAL LOW COMPLEX 20 MIN: CPT

## 2024-03-30 PROCEDURE — 36415 COLL VENOUS BLD VENIPUNCTURE: CPT

## 2024-03-30 PROCEDURE — 96366 THER/PROPH/DIAG IV INF ADDON: CPT

## 2024-03-30 PROCEDURE — 6370000000 HC RX 637 (ALT 250 FOR IP): Performed by: NURSE PRACTITIONER

## 2024-03-30 PROCEDURE — 97116 GAIT TRAINING THERAPY: CPT

## 2024-03-30 PROCEDURE — 96375 TX/PRO/DX INJ NEW DRUG ADDON: CPT

## 2024-03-30 PROCEDURE — 80061 LIPID PANEL: CPT

## 2024-03-30 PROCEDURE — 6360000002 HC RX W HCPCS: Performed by: HOSPITALIST

## 2024-03-30 PROCEDURE — 82962 GLUCOSE BLOOD TEST: CPT

## 2024-03-30 PROCEDURE — 85027 COMPLETE CBC AUTOMATED: CPT

## 2024-03-30 PROCEDURE — 2500000003 HC RX 250 WO HCPCS: Performed by: INTERNAL MEDICINE

## 2024-03-30 PROCEDURE — 6370000000 HC RX 637 (ALT 250 FOR IP): Performed by: PSYCHIATRY & NEUROLOGY

## 2024-03-30 PROCEDURE — 96372 THER/PROPH/DIAG INJ SC/IM: CPT

## 2024-03-30 PROCEDURE — 94761 N-INVAS EAR/PLS OXIMETRY MLT: CPT

## 2024-03-30 PROCEDURE — 96367 TX/PROPH/DG ADDL SEQ IV INF: CPT

## 2024-03-30 PROCEDURE — 2580000003 HC RX 258: Performed by: PSYCHIATRY & NEUROLOGY

## 2024-03-30 RX ORDER — METHOCARBAMOL 500 MG/1
750 TABLET, FILM COATED ORAL 2 TIMES DAILY
Status: DISCONTINUED | OUTPATIENT
Start: 2024-03-30 | End: 2024-04-05 | Stop reason: HOSPADM

## 2024-03-30 RX ORDER — LORAZEPAM 1 MG/1
1 TABLET ORAL ONCE
Status: COMPLETED | OUTPATIENT
Start: 2024-03-30 | End: 2024-03-30

## 2024-03-30 RX ORDER — HYDROMORPHONE HYDROCHLORIDE 1 MG/ML
1 INJECTION, SOLUTION INTRAMUSCULAR; INTRAVENOUS; SUBCUTANEOUS ONCE
Status: COMPLETED | OUTPATIENT
Start: 2024-03-30 | End: 2024-03-30

## 2024-03-30 RX ORDER — MORPHINE SULFATE 2 MG/ML
2 INJECTION, SOLUTION INTRAMUSCULAR; INTRAVENOUS EVERY 4 HOURS PRN
Status: DISCONTINUED | OUTPATIENT
Start: 2024-03-30 | End: 2024-04-03

## 2024-03-30 RX ORDER — AMLODIPINE BESYLATE 5 MG/1
2.5 TABLET ORAL DAILY
Status: DISCONTINUED | OUTPATIENT
Start: 2024-03-30 | End: 2024-04-03

## 2024-03-30 RX ORDER — CETIRIZINE HYDROCHLORIDE 10 MG/1
10 TABLET ORAL DAILY
Status: DISCONTINUED | OUTPATIENT
Start: 2024-03-30 | End: 2024-04-05 | Stop reason: HOSPADM

## 2024-03-30 RX ORDER — BUTALBITAL, ACETAMINOPHEN AND CAFFEINE 50; 325; 40 MG/1; MG/1; MG/1
1 TABLET ORAL EVERY 4 HOURS PRN
Status: DISCONTINUED | OUTPATIENT
Start: 2024-03-30 | End: 2024-04-05 | Stop reason: HOSPADM

## 2024-03-30 RX ORDER — MAGNESIUM SULFATE IN WATER 40 MG/ML
2000 INJECTION, SOLUTION INTRAVENOUS ONCE
Status: COMPLETED | OUTPATIENT
Start: 2024-03-30 | End: 2024-03-30

## 2024-03-30 RX ORDER — ASPIRIN 81 MG/1
81 TABLET ORAL DAILY
Status: DISCONTINUED | OUTPATIENT
Start: 2024-03-30 | End: 2024-04-05 | Stop reason: HOSPADM

## 2024-03-30 RX ORDER — RANOLAZINE 500 MG/1
500 TABLET, EXTENDED RELEASE ORAL 2 TIMES DAILY
Status: DISCONTINUED | OUTPATIENT
Start: 2024-03-30 | End: 2024-04-05 | Stop reason: HOSPADM

## 2024-03-30 RX ADMIN — CETIRIZINE HYDROCHLORIDE 10 MG: 10 TABLET, FILM COATED ORAL at 08:55

## 2024-03-30 RX ADMIN — INSULIN GLARGINE 26 UNITS: 100 INJECTION, SOLUTION SUBCUTANEOUS at 21:36

## 2024-03-30 RX ADMIN — SODIUM CHLORIDE, PRESERVATIVE FREE 10 ML: 5 INJECTION INTRAVENOUS at 08:48

## 2024-03-30 RX ADMIN — INSULIN LISPRO 4 UNITS: 100 INJECTION, SOLUTION INTRAVENOUS; SUBCUTANEOUS at 21:36

## 2024-03-30 RX ADMIN — MORPHINE SULFATE 2 MG: 2 INJECTION, SOLUTION INTRAMUSCULAR; INTRAVENOUS at 18:57

## 2024-03-30 RX ADMIN — INSULIN LISPRO 8 UNITS: 100 INJECTION, SOLUTION INTRAVENOUS; SUBCUTANEOUS at 08:56

## 2024-03-30 RX ADMIN — RANOLAZINE 500 MG: 500 TABLET, EXTENDED RELEASE ORAL at 08:56

## 2024-03-30 RX ADMIN — INSULIN LISPRO 4 UNITS: 100 INJECTION, SOLUTION INTRAVENOUS; SUBCUTANEOUS at 17:04

## 2024-03-30 RX ADMIN — MORPHINE SULFATE 2 MG: 2 INJECTION, SOLUTION INTRAMUSCULAR; INTRAVENOUS at 23:10

## 2024-03-30 RX ADMIN — INSULIN LISPRO 8 UNITS: 100 INJECTION, SOLUTION INTRAVENOUS; SUBCUTANEOUS at 17:04

## 2024-03-30 RX ADMIN — MORPHINE SULFATE 2 MG: 2 INJECTION, SOLUTION INTRAMUSCULAR; INTRAVENOUS at 14:51

## 2024-03-30 RX ADMIN — EMPAGLIFLOZIN 10 MG: 10 TABLET, FILM COATED ORAL at 10:30

## 2024-03-30 RX ADMIN — TICAGRELOR 90 MG: 90 TABLET ORAL at 21:36

## 2024-03-30 RX ADMIN — RANOLAZINE 500 MG: 500 TABLET, EXTENDED RELEASE ORAL at 21:36

## 2024-03-30 RX ADMIN — LEVOTHYROXINE SODIUM 137 MCG: 0.03 TABLET ORAL at 08:47

## 2024-03-30 RX ADMIN — TICAGRELOR 90 MG: 90 TABLET ORAL at 08:55

## 2024-03-30 RX ADMIN — LORAZEPAM 1 MG: 1 TABLET ORAL at 15:39

## 2024-03-30 RX ADMIN — METHOCARBAMOL 750 MG: 500 TABLET ORAL at 21:35

## 2024-03-30 RX ADMIN — SODIUM CHLORIDE, PRESERVATIVE FREE 10 ML: 5 INJECTION INTRAVENOUS at 21:45

## 2024-03-30 RX ADMIN — AMLODIPINE BESYLATE 2.5 MG: 5 TABLET ORAL at 08:55

## 2024-03-30 RX ADMIN — METHOCARBAMOL 750 MG: 500 TABLET ORAL at 08:55

## 2024-03-30 RX ADMIN — ATORVASTATIN CALCIUM 80 MG: 40 TABLET, FILM COATED ORAL at 21:35

## 2024-03-30 RX ADMIN — INSULIN LISPRO 4 UNITS: 100 INJECTION, SOLUTION INTRAVENOUS; SUBCUTANEOUS at 09:30

## 2024-03-30 RX ADMIN — ASPIRIN 81 MG: 81 TABLET, COATED ORAL at 08:55

## 2024-03-30 RX ADMIN — MAGNESIUM SULFATE HEPTAHYDRATE 2000 MG: 40 INJECTION, SOLUTION INTRAVENOUS at 12:04

## 2024-03-30 RX ADMIN — METHYLPREDNISOLONE SODIUM SUCCINATE 500 MG: 500 INJECTION INTRAMUSCULAR; INTRAVENOUS at 14:46

## 2024-03-30 RX ADMIN — INSULIN LISPRO 2 UNITS: 100 INJECTION, SOLUTION INTRAVENOUS; SUBCUTANEOUS at 12:12

## 2024-03-30 RX ADMIN — BUTALBITAL, ACETAMINOPHEN, AND CAFFEINE 1 TABLET: 50; 325; 40 TABLET ORAL at 12:12

## 2024-03-30 RX ADMIN — ENOXAPARIN SODIUM 30 MG: 100 INJECTION SUBCUTANEOUS at 21:36

## 2024-03-30 RX ADMIN — ENOXAPARIN SODIUM 30 MG: 100 INJECTION SUBCUTANEOUS at 08:55

## 2024-03-30 RX ADMIN — HYDROMORPHONE HYDROCHLORIDE 1 MG: 1 INJECTION, SOLUTION INTRAMUSCULAR; INTRAVENOUS; SUBCUTANEOUS at 08:47

## 2024-03-30 RX ADMIN — INSULIN LISPRO 8 UNITS: 100 INJECTION, SOLUTION INTRAVENOUS; SUBCUTANEOUS at 12:12

## 2024-03-30 ASSESSMENT — PAIN SCALES - GENERAL
PAINLEVEL_OUTOF10: 8

## 2024-03-30 ASSESSMENT — PAIN - FUNCTIONAL ASSESSMENT
PAIN_FUNCTIONAL_ASSESSMENT: PREVENTS OR INTERFERES SOME ACTIVE ACTIVITIES AND ADLS

## 2024-03-30 ASSESSMENT — PAIN DESCRIPTION - DESCRIPTORS
DESCRIPTORS: ACHING

## 2024-03-30 ASSESSMENT — PAIN DESCRIPTION - LOCATION
LOCATION: HEAD;CHEST
LOCATION: CHEST
LOCATION: CHEST;HEAD
LOCATION: HEAD;CHEST
LOCATION: CHEST;HEAD
LOCATION: HEAD

## 2024-03-30 ASSESSMENT — PAIN DESCRIPTION - ORIENTATION
ORIENTATION: MID
ORIENTATION: MID

## 2024-03-30 NOTE — PROGRESS NOTES
Per neurology order patient received 100% oxygen for 15 min, via non-rebreather mask. Patient reported no decrease in headache pain.

## 2024-03-30 NOTE — PROGRESS NOTES
Hospitalist Progress Note    NAME:   Mandie Dietz   : 1985   MRN: [id number]     Date/Time: 3/30/2024 11:34 AM  Patient PCP: Domingo Samson APRN - NP    Estimated discharge date:   Barriers: Clearance by neurology      Assessment / Plan:  Intractable headache   -Appreciate neurology input recommend MRI of the brain without contrast, MRV head, abortive headache regimen includes phenobarb 25 mg 2 g mag sulfate once baclofen 10 mg, Solu-Medrol 500 mg IV once if no improvement then 100% oxygen can be used  -No acute pathology visualized on CT brain with trial of Depacon follow-up MRI pending        Type 2 diabetes without complication/without long-term current use of insulin  -A1c 7.7 with acute inpatient hyperglycemia  -Continue weight-based Lantus with prandial NovoLog for inpatient management  -Sliding scale insulin glucose ACHS    Hypertensive urgency  -Known history of pots syndrome and positional hypotension with acute SBP elevation  -Etiology for elevation unclear therapy initiated for goal of systolic BP less than 180  -Systolic blood pressures 120s to 130s today.    Chest pain   Relieved with narcotics  Morphine as needed for chest pain    Leukocytosis  WBC of 18 patient afebrile  Likely related to steroid      Medical Decision Making     [] High (any 2)     A. Problems (any 1)  [] Acute/Chronic Illness/injury posing threat to life or bodily function:    [x] Severe exacerbation of chronic illness:    ---------------------------------------------------------------------  B. Risk of Treatment (any 1)   [] Drugs/treatments that require intensive monitoring for toxicity include:    [] IV ABX requiring serial renal monitoring for nephrotoxicity:     [] IV Narcotic analgesia for adverse drug reaction  [] Aggressive IV diuresis requiring serial monitoring for renal impairment and electrolyte derangements  [] Critical electrolyte abnormalities requiring IV replacement and close serial

## 2024-03-30 NOTE — PLAN OF CARE
Problem: Discharge Planning  Goal: Discharge to home or other facility with appropriate resources  Outcome: Progressing  Flowsheets  Taken 3/29/2024 2115  Discharge to home or other facility with appropriate resources: Identify barriers to discharge with patient and caregiver  Taken 3/29/2024 2108  Discharge to home or other facility with appropriate resources: Identify barriers to discharge with patient and caregiver     Problem: Chronic Conditions and Co-morbidities  Goal: Patient's chronic conditions and co-morbidity symptoms are monitored and maintained or improved  Outcome: Progressing  Flowsheets (Taken 3/29/2024 2108)  Care Plan - Patient's Chronic Conditions and Co-Morbidity Symptoms are Monitored and Maintained or Improved: Monitor and assess patient's chronic conditions and comorbid symptoms for stability, deterioration, or improvement     Problem: Pain  Goal: Verbalizes/displays adequate comfort level or baseline comfort level  Outcome: Progressing     Problem: Skin/Tissue Integrity  Goal: Absence of new skin breakdown  Description: 1.  Monitor for areas of redness and/or skin breakdown  2.  Assess vascular access sites hourly  3.  Every 4-6 hours minimum:  Change oxygen saturation probe site  4.  Every 4-6 hours:  If on nasal continuous positive airway pressure, respiratory therapy assess nares and determine need for appliance change or resting period.  Outcome: Progressing     Problem: Safety - Adult  Goal: Free from fall injury  Outcome: Progressing     Problem: ABCDS Injury Assessment  Goal: Absence of physical injury  Outcome: Progressing

## 2024-03-30 NOTE — CONSULTS
Former[facility], [facility] NEUROLOGY CONSULTATION          Chief Complaint/Admission Diagnosis: Stroke-like symptoms [R29.90]     I have been asked to see this 38 y.o. female in neurological consultation by Jarrell Maria MD to render advice and opinion regarding headache    ?     Impression:  38-year-old woman  presents for evaluation of intractable headache, right-sided numbness and weakness. Intractable headache of unclear etiology.    Recommendations:   -MRI brain without contrast  -MRV head  -Abortive headache regimen: phenergan 25 mg, magnesium sulfate 2 g IV once, baclofen 10 mg, Solu-Medrol 500 mg IV once, if no improvement. 100% oxygen can be used if none of the above medications help.         HPI:   History was obtained from a review of the electronic record and from the patient and family.??   38-year-old woman with past medical history of POTS syndrome, type 2 diabetes, chronic kidney disease who presented for evaluation of right-sided numbness, weakness in the right foot.  She also reported severe headache and lasted for approximately 9 days. She reported a bandlike headache most prominent on the left temporal region and has not responded to multiple medications including fioricet, depakote, dilaudid, benadryl, magnesium without relief.  She reports allergy to toradol, and compazine. She denies neck pain  She presented to the ED for further evaluation and was noted to be in hypertensive urgency.  She was admitted for further evaluation.      Laboratory evaluation reviewed, revealed sodium 135, BUN 23, creatinine 1.60, WBC 18.4, A1c 7.7, platelet count 494, urinalysis with  Trace leukocyte esterase, 4+ bacteria.  CT head without contrast 3/20/2024 reveals no evidence of acute intracranial abnormalities.  ?     Review of Symptoms:     A ten system review of constitutional, cardiovascular, respiratory, musculoskeletal, endocrine, skin, HEENT, genitourinary, neurological, and psychiatric systems was  FLAVORING    Vancomycin Other (See Comments) and Rash     Other reaction(s): Unknown (comments)  Kidneys shut down  Other reaction(s): Other (see comments)  Kidneys shut down  Other reaction(s): Other (comments)  Kidneys shut down  Kidneys shut down      FH:   Family History   Problem Relation Age of Onset    Cancer Maternal Grandfather     Heart Attack Maternal Grandfather     Anesth Problems Neg Hx     Stroke Maternal Grandmother     Diabetes Maternal Grandmother     Cancer Maternal Grandmother         cervical    Heart Attack Maternal Grandmother     Elevated Lipids Maternal Grandmother     Stroke Maternal Grandfather     Diabetes Maternal Grandfather     Hypertension Other         \"all her family\"    Diabetes Other         \"all her family\"    Cancer Mother         cervical    Stroke Mother     Heart Disease Mother     Gall Bladder Disease Mother     Diabetes Mother     Hypertension Mother     Elevated Lipids Mother     Stroke Father     Heart Disease Father     Diabetes Father     Heart Attack Father     Elevated Lipids Father     Kidney Disease Father         ESRD    Gall Bladder Disease Father     Gall Bladder Disease Brother     Diabetes Brother     Kidney Disease Brother     Cancer Brother         KIDNEY    Hypertension Brother     Migraines Maternal Aunt     Diabetes Paternal Aunt     Elevated Lipids Maternal Grandfather       SH:   Social History     Tobacco Use    Smoking status: Never    Smokeless tobacco: Never   Substance Use Topics    Alcohol use: Not Currently    Drug use: No      Meds:   Current Facility-Administered Medications   Medication Dose Route Frequency Provider Last Rate Last Admin    amLODIPine (NORVASC) tablet 2.5 mg  2.5 mg Oral Daily Leo Driscoll DO   2.5 mg at 03/30/24 0855    aspirin EC tablet 81 mg  81 mg Oral Daily Leo Driscoll DO   81 mg at 03/30/24 0855    cetirizine (ZYRTEC) tablet 10 mg  10 mg Oral Daily Leo Driscoll DO   10 mg at 03/30/24 0855    empagliflozin      No evidence for acute intracranial abnormality. Left sinusitis.            Video Attestation:             ?I discussed the risks and benefits of a telehealth visit and I obtained the patient's or legally authorized representative's informed verbal consent to conduct this assessment using telehealth tools.? All of the patient’s or legally authorized representative's questions regarding the telehealth interaction were addressed. I provided my name and disclosed to the patient or legally authorized representative my licensure, certification, or registration. ?This consultation was remotely performed at the request of Jarrell Allred MD with the assistance of a trained virtual health liaison working at the originating site.? The consultation used real time licensed and encrypted telehealth equipment which allowed a live video connection between my location and the patient's location.? Aspects of the evaluation that could not be adequately evaluated by virtual assessment were shared with both the patient and the consulting provider.?          A total of 60 minutes of time was spent in direct care and coordination of care with the patient.

## 2024-03-30 NOTE — PLAN OF CARE
PHYSICAL THERAPY EVALUATION  Patient: Mandie Dietz (38 y.o. female)  Date: 3/30/2024  Primary Diagnosis: Stroke-like symptoms [R29.90]       Precautions: Fall Risk                Recommendations for nursing mobility: Out of bed to chair for meals, Amb to bathroom with AD and gait belt, and Assist x1    In place during session:     ASSESSMENT  Pt is a 38 y.o. female admitted on 3/29/2024 for stroke like symptoms, initial head CT neg for acute infarct; currently presents to PT with decreased bed mob, transfers, LE strength, gt, balance, activity tolerance, and overall functional mobility . Pt semi supine in bed upon PT arrival, agreeable to evaluation. Pt A&O x 4.  Pt does have hx of POTS and states her spouse is her main caregiver at home when she needs assist.      Based on the objective data described below, the patient currently presents with impaired functional mobility, impaired strength, decreased activity tolerance, and impaired balance. (See below for objective details and assist levels).     Overall pt tolerated session good today with overall SBA with bed mob and SBA/CGA for OOB transfers.  Pt was able to amb approx 30ft in room without AD and CGAx1, pt amb with slow tosha.  Pt did not report any dizziness during amb, but states she still has 8/10 headache.  Pt will benefit from continued skilled PT to address above deficits and return to PLOF.Current PT DC recommendation Outpatient Therapy  once medically appropriate and pt is agreeable.      GOALS:  FUNCTIONAL STATUS PRIOR TO ADMISSION: pt lives with spouse who is main caregiver for her, has a walker and w/c at home that she is able to use if needed when having POTS symptoms at home      Physical Therapy Goals  Initiated 3/30/2024  Pt stated goal: to go home    I with LE HEP x7 days  Mod I with bed mob x7 days  SBA with all transfers x7 days  Amb 50-100ft with LRAD and SBAx1 x7 days         PLAN :  Recommendations and Planned Interventions: bed  2019    CABG X 1, CARDIAC CATH STENT X 3    SALPINGECTOMY Bilateral 09/07/2021    TONSILLECTOMY AND ADENOIDECTOMY  1992    TYMPANOSTOMY TUBE PLACEMENT Bilateral     MULTIPLE    WISDOM TOOTH EXTRACTION         Home Situation:  Social/Functional History  Lives With: Spouse  Type of Home: Apartment  Home Layout: One level  Home Access: Level entry  Home Equipment: Walker, rolling, Wheelchair-manual  Has the patient had two or more falls in the past year or any fall with injury in the past year?: No  Receives Help From: Family  ADL Assistance: Needs assistance  Ambulation Assistance: Independent  Transfer Assistance: Independent    Cognitive/Behavioral Status:  Orientation  Orientation Level: Oriented X4  Cognition  Overall Cognitive Status: WFL    Skin: intact where exposed    Edema: none noted    Strength:    Strength: Generally decreased, functional    grossly 4-/5 B LE    Range Of Motion:  AROM: Within functional limits    B LE    Tone & Sensation:         Sensation: Impaired (decreased to LT R foot)         Functional Mobility:  Bed Mobility:     Bed Mobility Training  Bed Mobility Training: Yes  Overall Level of Assistance: Stand-by assistance  Supine to Sit: Stand-by assistance  Sit to Supine: Stand-by assistance  Scooting: Stand-by assistance    Transfers:     Transfer Training  Transfer Training: Yes  Overall Level of Assistance: Contact-guard assistance  Sit to Stand: Contact-guard assistance  Stand to Sit: Contact-guard assistance  Stand Pivot Transfers: Contact-guard assistance    Balance:     Balance  Sitting: Intact  Standing: Impaired  Standing - Static: Good  Standing - Dynamic: Fair    Ambulation/Gait Training:     Gait Training: Yes    Gait  Overall Level of Assistance: Contact-guard assistance  Distance (ft): 30 Feet  Assistive Device: Gait belt  Speed/Ann Marie: Slow       Functional Measure:  [facility]-PAC™ “6 Clicks”         Basic Mobility Inpatient Short Form  How much difficulty does the  of care was discussed with: Registered nurse    Patient Education  Education Given To: Patient  Education Provided: Role of Therapy;Plan of Care  Education Method: Verbal  Education Outcome: Verbalized understanding         Thank you for this referral.  Leda Mora, PT  Minutes: 18

## 2024-03-30 NOTE — PLAN OF CARE
Problem: Discharge Planning  Goal: Discharge to home or other facility with appropriate resources  3/30/2024 1942 by Gladis Dubois RN  Outcome: Progressing  Flowsheets  Taken 3/30/2024 1937 by Gladis Dubois RN  Discharge to home or other facility with appropriate resources: Identify barriers to discharge with patient and caregiver  Taken 3/30/2024 0908 by Radha Portillo RN  Discharge to home or other facility with appropriate resources: Identify barriers to discharge with patient and caregiver  3/30/2024 0724 by Radha Portillo RN  Outcome: Progressing     Problem: Chronic Conditions and Co-morbidities  Goal: Patient's chronic conditions and co-morbidity symptoms are monitored and maintained or improved  3/30/2024 1942 by Gladis Dubois RN  Outcome: Progressing  Flowsheets  Taken 3/30/2024 1937 by Gladis Dubois RN  Care Plan - Patient's Chronic Conditions and Co-Morbidity Symptoms are Monitored and Maintained or Improved: Monitor and assess patient's chronic conditions and comorbid symptoms for stability, deterioration, or improvement  Taken 3/30/2024 0908 by Radha Portillo RN  Care Plan - Patient's Chronic Conditions and Co-Morbidity Symptoms are Monitored and Maintained or Improved: Monitor and assess patient's chronic conditions and comorbid symptoms for stability, deterioration, or improvement  3/30/2024 0724 by Radha Portillo RN  Outcome: Progressing     Problem: Pain  Goal: Verbalizes/displays adequate comfort level or baseline comfort level  3/30/2024 1942 by Gladis Dubois RN  Outcome: Progressing  3/30/2024 0724 by Radha Portillo RN  Outcome: Progressing     Problem: Skin/Tissue Integrity  Goal: Absence of new skin breakdown  Description: 1.  Monitor for areas of redness and/or skin breakdown  2.  Assess vascular access sites hourly  3.  Every 4-6 hours minimum:  Change oxygen saturation probe site  4.  Every 4-6 hours:  If on nasal continuous positive airway pressure,  respiratory therapy assess nares and determine need for appliance change or resting period.  3/30/2024 1942 by Gladis Dubois RN  Outcome: Progressing  3/30/2024 0724 by Radha Portillo RN  Outcome: Progressing     Problem: Safety - Adult  Goal: Free from fall injury  3/30/2024 1942 by Gladis Dubois RN  Outcome: Progressing  Flowsheets (Taken 3/30/2024 1941)  Free From Fall Injury: Instruct family/caregiver on patient safety  3/30/2024 0724 by Radha Portillo RN  Outcome: Progressing     Problem: ABCDS Injury Assessment  Goal: Absence of physical injury  3/30/2024 1942 by Gladis Dubois RN  Outcome: Progressing  Flowsheets (Taken 3/30/2024 1941)  Absence of Physical Injury: Implement safety measures based on patient assessment  3/30/2024 0724 by Radha Portillo RN  Outcome: Progressing     Problem: Physical Therapy - Adult  Goal: By Discharge: Performs mobility at highest level of function for planned discharge setting.  See evaluation for individualized goals.  Description: FUNCTIONAL STATUS PRIOR TO ADMISSION: pt lives with spouse who is main caregiver for her, has a walker and w/c at home that she is able to use if needed when having POTS symptoms at home      Physical Therapy Goals  Initiated 3/30/2024  Pt stated goal: to go home    I with LE HEP x7 days  Mod I with bed mob x7 days  SBA with all transfers x7 days  Amb 50-100ft with LRAD and SBAx1 x7 days      3/30/2024 1021 by Leda Mora, PT  Outcome: Progressing

## 2024-03-30 NOTE — PROGRESS NOTES
4 Eyes Skin Assessment     NAME:  Mandie Dietz  YOB: 1985  MEDICAL RECORD NUMBER:  [id number]    The patient is being assessed for  Admission Scar on the sternum noted.    I agree that at least one RN has performed a thorough Head to Toe Skin Assessment on the patient. ALL assessment sites listed below have been assessed.      Areas assessed by both nurses:    Head, Face, Ears, Shoulders, Back, Chest, Arms, Elbows, Hands, Sacrum. Buttock, Coccyx, Ischium, and Legs. Feet and Heels        Does the Patient have a Wound? No noted wound(s)       Tomasz Prevention initiated by RN: Yes  Wound Care Orders initiated by RN: No    Pressure Injury (Stage 3,4, Unstageable, DTI, NWPT, and Complex wounds) if present, place Wound referral order by RN under : No    New Ostomies, if present place, Ostomy referral order under : No     Nurse 1 eSignature: Electronically signed by Gladis Dubois RN on 3/30/24 at 12:15 AM EDT    **SHARE this note so that the co-signing nurse can place an eSignature**    Nurse 2 eSignature: Electronically signed by CECILE GONZALEZ RN on 3/30/24 at 12:16 AM EDT

## 2024-03-30 NOTE — ASSESSMENT & PLAN NOTE
- Baseline GFR 50-55 maintained with underlying DM and renovascular dz the reported etiology  - Avoid NSAID and adjust meds for <CrCl  - Monitor UOP

## 2024-03-30 NOTE — ASSESSMENT & PLAN NOTE
- A1c - 7.7 (2/2024) w/ acute inpt hyperglycemia noted  - Continue Wt based Lantus w/ prandial Novolog for inpt management  - Resume low CHO diet control

## 2024-03-30 NOTE — PROGRESS NOTES
Patient was complaining of left chest pain and headache rated 8/10, vital signs are stable. Message was sent to Dr. Alex prakash for an order of pain medication, awaiting for response.

## 2024-03-30 NOTE — ASSESSMENT & PLAN NOTE
- Persistent frontal/temporal HA (>3wks) without resolution despite OTC and triptan tx and no clear migrainous symptoms reported  - No acute pathology visualized on CT Brain with trial of Depacon ordered and followup MRI brain pending  - Continue neuro checks w/ additional evaluation pending Neuro review

## 2024-03-30 NOTE — H&P
V2.0  History and Physical      Name:  Mandie Dietz /Age/Sex: 1985  (38 y.o. female)   MRN & CSN:  [id number] & [id number] Encounter Date/Time: 3/29/24   Location:  [facility]/ PCP: Domingo Samson APRN - NP       Hospital Day: 1    Assessment and Plan:   Mandie Dietz is a 38 y.o. female with a pmh of POTS syndrome, DM type 2, CKD and  who presents with Intractable headache    Hospital Problems             Last Modified POA    * (Principal) Intractable headache 3/29/2024 Yes    Hypertensive urgency 3/29/2024 Yes    CKD (chronic kidney disease) stage 3, GFR 30-59 ml/min ([facility]) 3/29/2024 Yes    Type 2 diabetes mellitus with complication, without long-term current use of insulin ([facility]) 3/29/2024 Unknown       Plan:  Intractable headache  - Persistent frontal/temporal HA (>3wks) without resolution despite OTC and triptan tx and no clear migrainous symptoms reported  - No acute pathology visualized on CT Brain with trial of Depacon ordered and followup MRI brain pending  - Continue neuro checks w/ additional evaluation pending Neuro review    Type 2 diabetes mellitus with complication, without long-term current use of insulin ([facility])  - A1c - 7.7 (2024) w/ acute inpt hyperglycemia noted  - Continue Wt based Lantus w/ prandial Novolog for inpt management  - Resume low CHO diet control      Hypertensive urgency  - Known hx of POTS syndrome and positional hypotension with acute SBP elevation noted  - Etiology for elevation unclear with PRN therapy initiated for goal SBP<180 until Neuro eval complete  - Urine lytes and UDS ordered  - Continue Neuro checks    CKD (chronic kidney disease) stage 3, GFR 30-59 ml/min ([facility])  - Baseline GFR 50-55 maintained with underlying DM and renovascular dz the reported etiology  - Avoid NSAID and adjust meds for <CrCl  - Monitor UOP      Disposition:   Current Living situation: Home  Expected Disposition: Home  Estimated D/C: TBD    Diet Diet NPO   DVT Prophylaxis [x] Lovenox, []       Neuro: CN intact, MCCOY, BUE strength intact, no tremors, chronic BLE weakness  Cardio:  RRR    Pulm: CTA    Gastro: Soft, NT, ND BS+, No guarding  Extremity: No edema     Past Medical History:   PMHx   Past Medical History:   Diagnosis Date    Acquired hypothyroidism 03/29/2024    Bacterial vaginosis 01/12/2015    Breast abscess of female 07/09/2017    Chronic otitis media with perforated tympanic membrane 04/03/2018    CKD (chronic kidney disease) stage 3, GFR 30-59 ml/min ([facility])     Complicated migraine     Coronary artery disease involving native coronary artery of native heart without angina pectoris     s/p CABGx1    Dental abscess 05/10/2022    Gastroparesis 02/10/2023    Hidradenitis suppurativa of left axilla 08/26/2017    History of DVT (deep vein thrombosis)     Hypertension 2009    Microalbuminuria due to type 2 diabetes mellitus ([facility]) 12/27/2017    Obesity (BMI 35.0-39.9 without comorbidity)     Paraparesis of bilateral lower extremites ([facility]) 12/27/2019    PCOS (polycystic ovarian syndrome) 1996    Postural orthostatic tachycardia syndrome (POTS) 09/08/2023    Submucous leiomyoma of uterus 02/10/2023    Tympanic membrane perforation, right 06/02/2022    Type 2 diabetes mellitus with complication, without long-term current use of insulin ([facility])     UTI (urinary tract infection) Child     PSHX:  has a past surgical history that includes Laparoscopic hysterectomy (04/13/2023); Tonsillectomy and adenoidectomy (1992); Coronary artery bypass graft (12/03/2019); Tympanostomy tube placement (Bilateral); [place] tooth extraction; pr unlisted procedure cardiac surgery (2019); Cholecystectomy; other surgical history; salpingectomy (Bilateral, 09/07/2021); Dilation and curettage of uterus; and Endometrial ablation (07/30/2021).  Allergies:   Allergies   Allergen Reactions    Ciprofloxacin Anaphylaxis and Rash     Other reaction(s): Unknown (comments)    Other Swelling     JALIPENO PEPPERS - FACILA SWELLING

## 2024-03-30 NOTE — PROGRESS NOTES
Patient refusing to attempt MRI. Patient insisting on additional medication. Provider made aware. Headache pain still 8 of 10 despite intervention. Patient resting in bed.

## 2024-03-30 NOTE — PROGRESS NOTES
2030 Admitted patient from [facility] for stroke work up. Patient is conscious and oriented to 4 spheres. Educated on fall precautions, proper usage of the call bell and stroke book, understood and verbalized understanding.  Patient refused to wear socks.   2108 4eyes assessment done with GUALBERTO Jacobson, noted old scar on the sternum due to previous surgery.  2210 Patient was complaining of left chest pain and headache but vital signs were stable, as per her this is not a new onset since she has been experiencing this pain 2 weeks back due to her PCI  and her doctor was aware about it. Called and informed hospitalist oncall Dr. Johnson and he agreed to give Dilaudid 1mg IV once as requested by the patient.

## 2024-03-30 NOTE — ASSESSMENT & PLAN NOTE
- Known hx of POTS syndrome and positional hypotension with acute SBP elevation noted  - Etiology for elevation unclear with PRN therapy initiated for goal SBP<180 until Neuro eval complete  - Urine lytes and UDS ordered  - Continue Neuro checks

## 2024-03-31 ENCOUNTER — APPOINTMENT (OUTPATIENT)
Facility: HOSPITAL | Age: 39
DRG: 103 | End: 2024-03-31
Payer: MEDICARE

## 2024-03-31 PROBLEM — G44.001 INTRACTABLE CLUSTER HEADACHE: Status: ACTIVE | Noted: 2024-03-31

## 2024-03-31 LAB
CRP SERPL-MCNC: 0.41 MG/DL (ref 0–0.3)
ERYTHROCYTE [SEDIMENTATION RATE] IN BLOOD: 17 MM/HR (ref 0–20)
GLUCOSE BLD STRIP.AUTO-MCNC: 296 MG/DL (ref 65–100)
GLUCOSE BLD STRIP.AUTO-MCNC: 304 MG/DL (ref 65–100)
GLUCOSE BLD STRIP.AUTO-MCNC: 305 MG/DL (ref 65–100)
GLUCOSE BLD STRIP.AUTO-MCNC: 307 MG/DL (ref 65–100)
PERFORMED BY:: ABNORMAL
TROPONIN I SERPL HS-MCNC: 7 NG/L (ref 0–51)

## 2024-03-31 PROCEDURE — 84443 ASSAY THYROID STIM HORMONE: CPT

## 2024-03-31 PROCEDURE — 6360000002 HC RX W HCPCS: Performed by: HOSPITALIST

## 2024-03-31 PROCEDURE — 6370000000 HC RX 637 (ALT 250 FOR IP): Performed by: PSYCHIATRY & NEUROLOGY

## 2024-03-31 PROCEDURE — 2580000003 HC RX 258: Performed by: HOSPITALIST

## 2024-03-31 PROCEDURE — 85652 RBC SED RATE AUTOMATED: CPT

## 2024-03-31 PROCEDURE — 84484 ASSAY OF TROPONIN QUANT: CPT

## 2024-03-31 PROCEDURE — 96366 THER/PROPH/DIAG IV INF ADDON: CPT

## 2024-03-31 PROCEDURE — 94761 N-INVAS EAR/PLS OXIMETRY MLT: CPT

## 2024-03-31 PROCEDURE — 96372 THER/PROPH/DIAG INJ SC/IM: CPT

## 2024-03-31 PROCEDURE — 1100000000 HC RM PRIVATE

## 2024-03-31 PROCEDURE — 2500000003 HC RX 250 WO HCPCS: Performed by: PSYCHIATRY & NEUROLOGY

## 2024-03-31 PROCEDURE — 6360000002 HC RX W HCPCS: Performed by: NURSE PRACTITIONER

## 2024-03-31 PROCEDURE — 6370000000 HC RX 637 (ALT 250 FOR IP): Performed by: PHYSICIAN ASSISTANT

## 2024-03-31 PROCEDURE — 05H933Z INSERTION OF INFUSION DEVICE INTO RIGHT BRACHIAL VEIN, PERCUTANEOUS APPROACH: ICD-10-PCS | Performed by: STUDENT IN AN ORGANIZED HEALTH CARE EDUCATION/TRAINING PROGRAM

## 2024-03-31 PROCEDURE — 71045 X-RAY EXAM CHEST 1 VIEW: CPT

## 2024-03-31 PROCEDURE — 36410 VNPNXR 3YR/> PHY/QHP DX/THER: CPT

## 2024-03-31 PROCEDURE — G0378 HOSPITAL OBSERVATION PER HR: HCPCS

## 2024-03-31 PROCEDURE — 6370000000 HC RX 637 (ALT 250 FOR IP): Performed by: HOSPITALIST

## 2024-03-31 PROCEDURE — 2580000003 HC RX 258: Performed by: PSYCHIATRY & NEUROLOGY

## 2024-03-31 PROCEDURE — 82962 GLUCOSE BLOOD TEST: CPT

## 2024-03-31 PROCEDURE — 83036 HEMOGLOBIN GLYCOSYLATED A1C: CPT

## 2024-03-31 PROCEDURE — G0408 INPT/TELE FOLLOW UP 35: HCPCS | Performed by: PSYCHIATRY & NEUROLOGY

## 2024-03-31 PROCEDURE — 96376 TX/PRO/DX INJ SAME DRUG ADON: CPT

## 2024-03-31 PROCEDURE — 84439 ASSAY OF FREE THYROXINE: CPT

## 2024-03-31 PROCEDURE — 86140 C-REACTIVE PROTEIN: CPT

## 2024-03-31 PROCEDURE — 36415 COLL VENOUS BLD VENIPUNCTURE: CPT

## 2024-03-31 RX ORDER — DIVALPROEX SODIUM 250 MG/1
250 TABLET, DELAYED RELEASE ORAL EVERY 12 HOURS SCHEDULED
Status: DISCONTINUED | OUTPATIENT
Start: 2024-03-31 | End: 2024-04-05 | Stop reason: HOSPADM

## 2024-03-31 RX ORDER — INSULIN LISPRO 100 [IU]/ML
0-16 INJECTION, SOLUTION INTRAVENOUS; SUBCUTANEOUS
Status: DISCONTINUED | OUTPATIENT
Start: 2024-03-31 | End: 2024-04-05 | Stop reason: HOSPADM

## 2024-03-31 RX ORDER — INSULIN LISPRO 100 [IU]/ML
0-4 INJECTION, SOLUTION INTRAVENOUS; SUBCUTANEOUS NIGHTLY
Status: DISCONTINUED | OUTPATIENT
Start: 2024-03-31 | End: 2024-04-05 | Stop reason: HOSPADM

## 2024-03-31 RX ORDER — HYDRALAZINE HYDROCHLORIDE 20 MG/ML
10 INJECTION INTRAMUSCULAR; INTRAVENOUS EVERY 6 HOURS PRN
Status: DISCONTINUED | OUTPATIENT
Start: 2024-03-31 | End: 2024-04-04

## 2024-03-31 RX ORDER — GLUCAGON 1 MG/ML
1 KIT INJECTION PRN
Status: DISCONTINUED | OUTPATIENT
Start: 2024-03-31 | End: 2024-04-05 | Stop reason: HOSPADM

## 2024-03-31 RX ORDER — DEXTROSE MONOHYDRATE 100 MG/ML
INJECTION, SOLUTION INTRAVENOUS CONTINUOUS PRN
Status: DISCONTINUED | OUTPATIENT
Start: 2024-03-31 | End: 2024-04-05 | Stop reason: HOSPADM

## 2024-03-31 RX ADMIN — ENOXAPARIN SODIUM 30 MG: 100 INJECTION SUBCUTANEOUS at 20:53

## 2024-03-31 RX ADMIN — MORPHINE SULFATE 2 MG: 2 INJECTION, SOLUTION INTRAMUSCULAR; INTRAVENOUS at 20:54

## 2024-03-31 RX ADMIN — BUTALBITAL, ACETAMINOPHEN, AND CAFFEINE 1 TABLET: 50; 325; 40 TABLET ORAL at 15:19

## 2024-03-31 RX ADMIN — SODIUM CHLORIDE, PRESERVATIVE FREE 10 ML: 5 INJECTION INTRAVENOUS at 20:55

## 2024-03-31 RX ADMIN — CETIRIZINE HYDROCHLORIDE 10 MG: 10 TABLET, FILM COATED ORAL at 09:54

## 2024-03-31 RX ADMIN — INSULIN GLARGINE 26 UNITS: 100 INJECTION, SOLUTION SUBCUTANEOUS at 20:54

## 2024-03-31 RX ADMIN — EMPAGLIFLOZIN 10 MG: 10 TABLET, FILM COATED ORAL at 09:54

## 2024-03-31 RX ADMIN — RANOLAZINE 500 MG: 500 TABLET, EXTENDED RELEASE ORAL at 09:54

## 2024-03-31 RX ADMIN — TICAGRELOR 90 MG: 90 TABLET ORAL at 20:52

## 2024-03-31 RX ADMIN — METHOCARBAMOL 750 MG: 500 TABLET ORAL at 09:53

## 2024-03-31 RX ADMIN — ASPIRIN 81 MG: 81 TABLET, COATED ORAL at 09:54

## 2024-03-31 RX ADMIN — INSULIN LISPRO 8 UNITS: 100 INJECTION, SOLUTION INTRAVENOUS; SUBCUTANEOUS at 11:04

## 2024-03-31 RX ADMIN — INSULIN LISPRO 4 UNITS: 100 INJECTION, SOLUTION INTRAVENOUS; SUBCUTANEOUS at 20:54

## 2024-03-31 RX ADMIN — RANOLAZINE 500 MG: 500 TABLET, EXTENDED RELEASE ORAL at 20:53

## 2024-03-31 RX ADMIN — ENOXAPARIN SODIUM 30 MG: 100 INJECTION SUBCUTANEOUS at 09:53

## 2024-03-31 RX ADMIN — INSULIN LISPRO 6 UNITS: 100 INJECTION, SOLUTION INTRAVENOUS; SUBCUTANEOUS at 11:05

## 2024-03-31 RX ADMIN — ATORVASTATIN CALCIUM 80 MG: 40 TABLET, FILM COATED ORAL at 20:53

## 2024-03-31 RX ADMIN — LEVOTHYROXINE SODIUM 137 MCG: 0.03 TABLET ORAL at 05:38

## 2024-03-31 RX ADMIN — INSULIN LISPRO 6 UNITS: 100 INJECTION, SOLUTION INTRAVENOUS; SUBCUTANEOUS at 07:44

## 2024-03-31 RX ADMIN — INSULIN LISPRO 8 UNITS: 100 INJECTION, SOLUTION INTRAVENOUS; SUBCUTANEOUS at 07:43

## 2024-03-31 RX ADMIN — SODIUM CHLORIDE, PRESERVATIVE FREE 10 ML: 5 INJECTION INTRAVENOUS at 09:54

## 2024-03-31 RX ADMIN — DIVALPROEX SODIUM 250 MG: 250 TABLET, DELAYED RELEASE ORAL at 20:53

## 2024-03-31 RX ADMIN — INSULIN LISPRO 8 UNITS: 100 INJECTION, SOLUTION INTRAVENOUS; SUBCUTANEOUS at 16:35

## 2024-03-31 RX ADMIN — MORPHINE SULFATE 2 MG: 2 INJECTION, SOLUTION INTRAMUSCULAR; INTRAVENOUS at 16:35

## 2024-03-31 RX ADMIN — MORPHINE SULFATE 2 MG: 2 INJECTION, SOLUTION INTRAMUSCULAR; INTRAVENOUS at 03:11

## 2024-03-31 RX ADMIN — SODIUM CHLORIDE 500 MG: 9 INJECTION, SOLUTION INTRAVENOUS at 15:20

## 2024-03-31 RX ADMIN — MORPHINE SULFATE 2 MG: 2 INJECTION, SOLUTION INTRAMUSCULAR; INTRAVENOUS at 12:19

## 2024-03-31 RX ADMIN — METHOCARBAMOL 750 MG: 500 TABLET ORAL at 20:53

## 2024-03-31 RX ADMIN — MORPHINE SULFATE 2 MG: 2 INJECTION, SOLUTION INTRAMUSCULAR; INTRAVENOUS at 07:44

## 2024-03-31 RX ADMIN — AMLODIPINE BESYLATE 2.5 MG: 5 TABLET ORAL at 09:54

## 2024-03-31 RX ADMIN — TICAGRELOR 90 MG: 90 TABLET ORAL at 09:54

## 2024-03-31 ASSESSMENT — PAIN - FUNCTIONAL ASSESSMENT
PAIN_FUNCTIONAL_ASSESSMENT: PREVENTS OR INTERFERES SOME ACTIVE ACTIVITIES AND ADLS

## 2024-03-31 ASSESSMENT — PAIN SCALES - GENERAL
PAINLEVEL_OUTOF10: 7
PAINLEVEL_OUTOF10: 9
PAINLEVEL_OUTOF10: 0
PAINLEVEL_OUTOF10: 8
PAINLEVEL_OUTOF10: 8
PAINLEVEL_OUTOF10: 9
PAINLEVEL_OUTOF10: 0
PAINLEVEL_OUTOF10: 9
PAINLEVEL_OUTOF10: 0
PAINLEVEL_OUTOF10: 8
PAINLEVEL_OUTOF10: 8

## 2024-03-31 ASSESSMENT — PAIN DESCRIPTION - LOCATION
LOCATION: CHEST;BACK
LOCATION: HEAD;CHEST
LOCATION: CHEST;HEAD
LOCATION: CHEST;HEAD
LOCATION: HEAD
LOCATION: HEAD

## 2024-03-31 ASSESSMENT — PAIN DESCRIPTION - DESCRIPTORS
DESCRIPTORS: ACHING

## 2024-03-31 ASSESSMENT — PAIN DESCRIPTION - ORIENTATION
ORIENTATION: MID
ORIENTATION: MID

## 2024-03-31 NOTE — PROGRESS NOTES
Hospitalist Progress Note    NAME:   Mandie Dietz   : 1985   MRN: [id number]     Date/Time: 3/31/2024 2:37 PM  Patient PCP: Domingo Samson APRN - NP    Estimated discharge date:   Barriers: Cardiac evaluation for ongoing chest pain with a history of CABG, neurology clearance, MRI of the C-spine, thoracic spine MRI of the head with and without contrast due to signal change along with MRA of the head and neck.  Further stroke workup and cardiac workup.  Infectious workup with UA and urine culture pending    History of present illness:    Is a 38-year-old female with past medical history of POTS syndrome, type 2 diabetes mellitus, chronic kidney disease, history of cardiac arrest status post CABG with postoperative complications resulting in paraplegia which has resolved who presented to the emergency department for admission on 3/29/2024 with a 10-day headache that felt bandlike with neurological symptoms to include right leg weakness and sensation changes.  She subsequently failed a conservative treatment of Fioricet Depakote, Dilaudid, Benadryl, magnesium.  She was given steroids with no resolution.  MRV of the brain no evidence of dural venous thrombus although abnormal signal present bilaterally in the cerebellum.  MRI of the brain with and without contrast pending.  Patient was also found to have hypertensive urgency which has improved.  Weaning and waxing chest pain.  Troponin was normal.  Chest x-ray pending.  EKG and cardiac consultation.  Appreciate neurology consultation.  Elevated white count most likely due to steroids on 3/30/2024.    Assessment / Plan:  Intractable headache   Neurology consultation  No success with Phenergan magnesium and steroids  Attempting Depakote with 1 IV infusion of Depacon  MRV revealed No evidence of dural venous thrombus although there was nonspecific signal change in the cerebellum demonstrated bilaterally which were not present on MRI in  outlined below:    Review of Systems   Constitutional:  Positive for activity change and fatigue.   HENT: Negative.     Eyes: Negative.    Respiratory:  Negative for chest tightness and shortness of breath.    Cardiovascular:  Positive for chest pain.   Gastrointestinal:  Negative for abdominal pain and constipation.   Endocrine: Negative.    Genitourinary: Negative.    Musculoskeletal: Negative.    Neurological:  Positive for headaches. Negative for dizziness, speech difficulty and numbness.   Hematological: Negative.         PHYSICAL EXAM:  Physical Exam  HENT:      Head: Normocephalic.      Mouth/Throat:      Mouth: Mucous membranes are moist.   Eyes:      Pupils: Pupils are equal, round, and reactive to light.   Cardiovascular:      Rate and Rhythm: Normal rate and regular rhythm.      Heart sounds: Normal heart sounds.   Pulmonary:      Effort: Pulmonary effort is normal.      Breath sounds: Normal breath sounds. No wheezing.   Abdominal:      General: Abdomen is flat. Bowel sounds are normal.      Palpations: Abdomen is soft.      Tenderness: There is no abdominal tenderness. There is no guarding.   Musculoskeletal:         General: Normal range of motion.      Cervical back: Normal range of motion and neck supple.   Skin:     General: Skin is warm and dry.   Neurological:      Mental Status: She is alert and oriented to person, place, and time.          Reviewed most current lab test results and cultures  YES  Reviewed most current radiology test results   YES  Review and summation of old records today    NO  Reviewed patient's current orders and MAR    YES  PMH/SH reviewed - no change compared to H&P  ________________________________________________________________________  Care Plan discussed with:    Comments   Patient x    Family      RN x    Care Manager     Consultant                        Multidiciplinary team rounds were held today with , nursing, pharmacist and clinical coordinator.   Patient's plan of care was discussed; medications were reviewed and discharge planning was addressed.     ________________________________________________________________________  Total NON critical care TIME:  35  Minutes    Total CRITICAL CARE TIME Spent:   Minutes non procedure based      Comments   >50% of visit spent in counseling and coordination of care     ________________________________________________________________________  Larry Soares PA-C     Procedures: see electronic medical records for all procedures/Xrays and details which were not copied into this note but were reviewed prior to creation of Plan.      LABS:  I reviewed today's most current labs and imaging studies.  Pertinent labs include:  Recent Labs     03/29/24  1455 03/30/24  0749   WBC 9.8 18.4*   HGB 14.4 14.5   HCT 41.9 42.2   * 494*       Recent Labs     03/29/24  1455   *   K 4.8   CL 99   CO2 24   BUN 23*   ALT 41   INR 1.0         Signed: Larry Soares PA-C

## 2024-03-31 NOTE — PROGRESS NOTES
[facility] NEUROLOGY CONSULTATION          Chief Complaint/Admission Diagnosis: Stroke-like symptoms [R29.90]     I have been asked to see this 38 y.o. female in neurological consultation by Jarrell Maria MD to render advice and opinion regarding Headache     ?     Impression/Recommendations:   38-year-old woman  presents for evaluation of intractable headache, right-sided numbness and weakness. Intractable headache of unclear etiology.     Recommendations:   -MRI brain without and with contrast  -MRV head showed no evidence of thrombosis  -Abortive headache regimen: phenergan 25 mg, magnesium sulfate 2 g IV once, baclofen 10 mg, Solu-Medrol 500 mg IV once, if no improvement. 100% oxygen can be used if none of the above medications help.     HPI:   History was obtained from a review of the electronic record and from the patient and family.?? She reports that her headache is stable 7/10.  She reports that workup for coagulating disorder was ruled out by hematologist.  ?     Review of Symptoms:     A ten system review of constitutional, cardiovascular, respiratory, musculoskeletal, endocrine, skin, HEENT, genitourinary, neurological, and psychiatric systems was obtained and is negative except as noted above in HPI.       Exam:   /89   Pulse 87   Temp 98.1 °F (36.7 °C) (Oral)   Resp 14   Ht 1.626 m (5' 4\")   Wt 104.3 kg (230 lb)   SpO2 95%   BMI 39.48 kg/m²    Assisted by Latricia BURCIAGA.   Head/Neck: NCAT. No meningismus. No tender arteries or lost pulses noted. No rash of head or neck.   Skin: No rashes observed.   Eyes: non icteric, no redness.   Patient was A & O x three. Affect was attentive. Speech was fluent and articulate. Mental status exam was grossly within normal limits.   Cranial nerves: Pupils were equally reactive. EOMI, no nystagmus. Visual fields full. There was  decreased sensation on the right side of face. Hearing was intact. There was a good shrug. Tongue protruded on the  100 mg/dL    Performed by: Portillo Stasil ([place])    POCT Glucose    Collection Time: 03/30/24  7:57 PM   Result Value Ref Range    POC Glucose 326 (H) 65 - 100 mg/dL    Performed by: BINDU CHOW    POCT Glucose    Collection Time: 03/31/24  7:40 AM   Result Value Ref Range    POC Glucose 304 (H) 65 - 100 mg/dL    Performed by: Portillo Stasil (Float [place])    POCT Glucose    Collection Time: 03/31/24 10:59 AM   Result Value Ref Range    POC Glucose 305 (H) 65 - 100 mg/dL    Performed by: NoDaysOff ([place])            Imaging:       MRV HEAD WO CONTRAST    Result Date: 3/30/2024  INDICATION: severe headache COMPARISON: CT 3/29/2024 MRI 5/25/2017. EXAM: Time-of-flight MR venography of the head with three-dimensional reformations. In addition, sagittal T1, axial T2, axial three-dimensional gradient echo and axial diffusion-weighted MR images of the brain are obtained. FINDINGS: There is normal appearing venous flow throughout the dural sinuses without demonstration of occlusion or thrombosis. No diffusion abnormality is shown. Arterial flow voids at the base the brain are normal in conspicuity. No intra-axial or extra-axial mass effect is noted. Nonspecific foci of signal are noted in the periventricular regions bilaterally which can be further assessed with complete MRI of the brain. These findings were not shown on previous MR images of 5/25/2017. Ventricles and cortical sulci are normal in conspicuity. Arterial flow voids at the base of the brain are normal in conspicuity. There is diffuse signal alteration in the left maxillary sinus, anterior left ethmoid and left frontal sinuses. There is mild mucosal thickening in the inferior right frontal sinus     1. No evidence for dural venous thrombosis. 2. There are nonspecific signal changes in the cerebrum demonstrated bilaterally which were not evident on the previous MRI of 2017. Further evaluation with complete MR imaging of the brain is

## 2024-03-31 NOTE — PROCEDURES
MIDLINE PLACEMENT NOTE    Midline catheters are NOT central lines.  Approved midline products are peripheral infusion devices with the tip terminating in the arm at or below the axillary vein, distal to the shoulder.  The tip DOES NOT ENTER THE CENTRAL VASCULATURE.         Recent Labs     03/29/24  1455 03/30/24  0749   BUN 23*  --    * 494*   INR 1.0  --    WBC 9.8 18.4*     Allergies: Ciprofloxacin, Other, Bumetanide, Coconut fatty acids, Diclofenac sodium, Zolpidem tartrate, Diclofenac, Ketorolac, Penicillins, Prochlorperazine, [place] allergy skin test, and Vancomycin    Date Placed: 3/31/2025   Internal Catheter Total Length: 12 (cm)   External catheter length: 0 (cm)  Vein Selection for Midline:    Rt brachial      Line is okay to use:    Isaak Duncan RN

## 2024-03-31 NOTE — PLAN OF CARE
Problem: Discharge Planning  Goal: Discharge to home or other facility with appropriate resources  3/31/2024 1941 by Gladis Dubois RN  Outcome: Progressing  Flowsheets (Taken 3/31/2024 1931)  Discharge to home or other facility with appropriate resources: Identify barriers to discharge with patient and caregiver  3/31/2024 1014 by Radha Portillo RN  Outcome: Progressing  Flowsheets (Taken 3/31/2024 0745)  Discharge to home or other facility with appropriate resources: Identify barriers to discharge with patient and caregiver     Problem: Chronic Conditions and Co-morbidities  Goal: Patient's chronic conditions and co-morbidity symptoms are monitored and maintained or improved  3/31/2024 1941 by Gladis Dubois RN  Outcome: Progressing  Flowsheets (Taken 3/31/2024 1931)  Care Plan - Patient's Chronic Conditions and Co-Morbidity Symptoms are Monitored and Maintained or Improved: Monitor and assess patient's chronic conditions and comorbid symptoms for stability, deterioration, or improvement  3/31/2024 1014 by Radha Portillo RN  Outcome: Progressing  Flowsheets (Taken 3/31/2024 0745)  Care Plan - Patient's Chronic Conditions and Co-Morbidity Symptoms are Monitored and Maintained or Improved: Monitor and assess patient's chronic conditions and comorbid symptoms for stability, deterioration, or improvement     Problem: Pain  Goal: Verbalizes/displays adequate comfort level or baseline comfort level  3/31/2024 1941 by Gladis Dubois RN  Outcome: Progressing  3/31/2024 1014 by Radha Portillo RN  Outcome: Progressing     Problem: Skin/Tissue Integrity  Goal: Absence of new skin breakdown  Description: 1.  Monitor for areas of redness and/or skin breakdown  2.  Assess vascular access sites hourly  3.  Every 4-6 hours minimum:  Change oxygen saturation probe site  4.  Every 4-6 hours:  If on nasal continuous positive airway pressure, respiratory therapy assess nares and determine need for appliance change or  resting period.  3/31/2024 1941 by Gladis Dubois RN  Outcome: Progressing  3/31/2024 1014 by Radha Portillo RN  Outcome: Progressing     Problem: Safety - Adult  Goal: Free from fall injury  3/31/2024 1941 by Gladis Dubois RN  Outcome: Progressing  Flowsheets (Taken 3/31/2024 1935)  Free From Fall Injury: Instruct family/caregiver on patient safety  3/31/2024 1014 by Radha Portillo RN  Outcome: Progressing     Problem: ABCDS Injury Assessment  Goal: Absence of physical injury  3/31/2024 1941 by Gladis Dubois RN  Outcome: Progressing  Flowsheets (Taken 3/31/2024 1935)  Absence of Physical Injury: Implement safety measures based on patient assessment  3/31/2024 1014 by Radha Portillo RN  Outcome: Progressing

## 2024-04-01 ENCOUNTER — APPOINTMENT (OUTPATIENT)
Facility: HOSPITAL | Age: 39
DRG: 103 | End: 2024-04-01
Payer: MEDICARE

## 2024-04-01 ENCOUNTER — APPOINTMENT (OUTPATIENT)
Facility: HOSPITAL | Age: 39
DRG: 103 | End: 2024-04-01
Attending: HOSPITALIST
Payer: MEDICARE

## 2024-04-01 LAB
ALBUMIN SERPL-MCNC: 3.4 G/DL (ref 3.5–5)
ALBUMIN/GLOB SERPL: 0.9 (ref 1.1–2.2)
ALP SERPL-CCNC: 67 U/L (ref 45–117)
ALT SERPL-CCNC: 29 U/L (ref 12–78)
ANION GAP SERPL CALC-SCNC: 11 MMOL/L (ref 5–15)
AST SERPL W P-5'-P-CCNC: 10 U/L (ref 15–37)
BASOPHILS # BLD: 0 K/UL (ref 0–0.1)
BASOPHILS NFR BLD: 0 % (ref 0–1)
BILIRUB SERPL-MCNC: 0.4 MG/DL (ref 0.2–1)
BUN SERPL-MCNC: 35 MG/DL (ref 6–20)
BUN/CREAT SERPL: 26 (ref 12–20)
CA-I BLD-MCNC: 9.1 MG/DL (ref 8.5–10.1)
CHLORIDE SERPL-SCNC: 100 MMOL/L (ref 97–108)
CO2 SERPL-SCNC: 22 MMOL/L (ref 21–32)
CREAT SERPL-MCNC: 1.35 MG/DL (ref 0.55–1.02)
DIFFERENTIAL METHOD BLD: ABNORMAL
ECHO AO ASC DIAM: 3.4 CM
ECHO AO ASCENDING AORTA INDEX: 1.63 CM/M2
ECHO AO ROOT DIAM: 3.1 CM
ECHO AO ROOT INDEX: 1.49 CM/M2
ECHO AV AREA PEAK VELOCITY: 1.9 CM2
ECHO AV AREA VTI: 1.7 CM2
ECHO AV AREA/BSA PEAK VELOCITY: 0.9 CM2/M2
ECHO AV AREA/BSA VTI: 0.8 CM2/M2
ECHO AV MEAN GRADIENT: 6 MMHG
ECHO AV MEAN VELOCITY: 1.2 M/S
ECHO AV PEAK GRADIENT: 10 MMHG
ECHO AV PEAK VELOCITY: 1.6 M/S
ECHO AV VELOCITY RATIO: 0.56
ECHO AV VTI: 35 CM
ECHO BSA: 2.17 M2
ECHO LA DIAMETER INDEX: 1.73 CM/M2
ECHO LA DIAMETER: 3.6 CM
ECHO LA TO AORTIC ROOT RATIO: 1.16
ECHO LV E' LATERAL VELOCITY: 9 CM/S
ECHO LV E' SEPTAL VELOCITY: 5 CM/S
ECHO LV EDV A2C: 20 ML
ECHO LV EDV A4C: 62 ML
ECHO LV EDV INDEX A4C: 30 ML/M2
ECHO LV EDV NDEX A2C: 10 ML/M2
ECHO LV FRACTIONAL SHORTENING: 28 % (ref 28–44)
ECHO LV INTERNAL DIMENSION DIASTOLE INDEX: 2.4 CM/M2
ECHO LV INTERNAL DIMENSION DIASTOLIC: 5 CM (ref 3.9–5.3)
ECHO LV INTERNAL DIMENSION SYSTOLIC INDEX: 1.73 CM/M2
ECHO LV INTERNAL DIMENSION SYSTOLIC: 3.6 CM
ECHO LV IVSD: 1.1 CM (ref 0.6–0.9)
ECHO LV MASS 2D: 233.7 G (ref 67–162)
ECHO LV MASS INDEX 2D: 112.4 G/M2 (ref 43–95)
ECHO LV POSTERIOR WALL DIASTOLIC: 1.3 CM (ref 0.6–0.9)
ECHO LV RELATIVE WALL THICKNESS RATIO: 0.52
ECHO LVOT AREA: 3.5 CM2
ECHO LVOT AV VTI INDEX: 0.49
ECHO LVOT DIAM: 2.1 CM
ECHO LVOT MEAN GRADIENT: 2 MMHG
ECHO LVOT PEAK GRADIENT: 3 MMHG
ECHO LVOT PEAK VELOCITY: 0.9 M/S
ECHO LVOT STROKE VOLUME INDEX: 28.6 ML/M2
ECHO LVOT SV: 59.5 ML
ECHO LVOT VTI: 17.2 CM
ECHO MV A VELOCITY: 0.83 M/S
ECHO MV E VELOCITY: 0.84 M/S
ECHO MV E/A RATIO: 1.01
ECHO MV E/E' LATERAL: 9.33
ECHO MV E/E' RATIO (AVERAGED): 13.07
ECHO MV REGURGITANT PEAK GRADIENT: 7 MMHG
ECHO MV REGURGITANT PEAK VELOCITY: 1.3 M/S
ECHO MV REGURGITANT VTIA: 26.9 CM
ECHO PULMONARY ARTERY END DIASTOLIC PRESSURE: 5 MMHG
ECHO PV MAX VELOCITY: 0.8 M/S
ECHO PV MEAN GRADIENT: 2 MMHG
ECHO PV MEAN VELOCITY: 0.6 M/S
ECHO PV PEAK GRADIENT: 3 MMHG
ECHO PV REGURGITANT MAX VELOCITY: 1.1 M/S
ECHO PV VTI: 16.3 CM
ECHO RA AREA 4C: 9.2 CM2
ECHO RA END SYSTOLIC VOLUME APICAL 4 CHAMBER INDEX BSA: 9 ML/M2
ECHO RA VOLUME: 18 ML
ECHO RV BASAL DIMENSION: 3.1 CM
ECHO RV MID DIMENSION: 2.7 CM
ECHO TV REGURGITANT MAX VELOCITY: 1.54 M/S
ECHO TV REGURGITANT PEAK GRADIENT: 9 MMHG
EKG ATRIAL RATE: 78 BPM
EKG DIAGNOSIS: NORMAL
EKG P AXIS: 36 DEGREES
EKG P-R INTERVAL: 112 MS
EKG Q-T INTERVAL: 458 MS
EKG QRS DURATION: 134 MS
EKG QTC CALCULATION (BAZETT): 522 MS
EKG R AXIS: -41 DEGREES
EKG T AXIS: 17 DEGREES
EKG VENTRICULAR RATE: 78 BPM
EOSINOPHIL # BLD: 0 K/UL (ref 0–0.4)
EOSINOPHIL NFR BLD: 0 % (ref 0–7)
ERYTHROCYTE [DISTWIDTH] IN BLOOD BY AUTOMATED COUNT: 12 % (ref 11.5–14.5)
EST. AVERAGE GLUCOSE BLD GHB EST-MCNC: 183 MG/DL
GLOBULIN SER CALC-MCNC: 3.9 G/DL (ref 2–4)
GLUCOSE BLD STRIP.AUTO-MCNC: 192 MG/DL (ref 65–100)
GLUCOSE BLD STRIP.AUTO-MCNC: 223 MG/DL (ref 65–100)
GLUCOSE BLD STRIP.AUTO-MCNC: 306 MG/DL (ref 65–100)
GLUCOSE BLD STRIP.AUTO-MCNC: 344 MG/DL (ref 65–100)
GLUCOSE SERPL-MCNC: 299 MG/DL (ref 65–100)
HBA1C MFR BLD: 8 % (ref 4–5.6)
HCT VFR BLD AUTO: 40.9 % (ref 35–47)
HGB BLD-MCNC: 14 G/DL (ref 11.5–16)
IMM GRANULOCYTES # BLD AUTO: 0.1 K/UL (ref 0–0.04)
IMM GRANULOCYTES NFR BLD AUTO: 1 % (ref 0–0.5)
LYMPHOCYTES # BLD: 1.6 K/UL (ref 0.8–3.5)
LYMPHOCYTES NFR BLD: 12 % (ref 12–49)
MCH RBC QN AUTO: 30.7 PG (ref 26–34)
MCHC RBC AUTO-ENTMCNC: 34.2 G/DL (ref 30–36.5)
MCV RBC AUTO: 89.7 FL (ref 80–99)
MONOCYTES # BLD: 0.4 K/UL (ref 0–1)
MONOCYTES NFR BLD: 3 % (ref 5–13)
NEUTS SEG # BLD: 11.6 K/UL (ref 1.8–8)
NEUTS SEG NFR BLD: 84 % (ref 32–75)
NRBC # BLD: 0 K/UL (ref 0–0.01)
NRBC BLD-RTO: 0 PER 100 WBC
PERFORMED BY:: ABNORMAL
PLATELET # BLD AUTO: 445 K/UL (ref 150–400)
PMV BLD AUTO: 9.7 FL (ref 8.9–12.9)
POTASSIUM SERPL-SCNC: 4.2 MMOL/L (ref 3.5–5.1)
PROT SERPL-MCNC: 7.3 G/DL (ref 6.4–8.2)
RBC # BLD AUTO: 4.56 M/UL (ref 3.8–5.2)
SODIUM SERPL-SCNC: 133 MMOL/L (ref 136–145)
T4 FREE SERPL-MCNC: 1.2 NG/DL (ref 0.8–1.5)
TROPONIN I SERPL HS-MCNC: 9 NG/L (ref 0–51)
TSH SERPL DL<=0.05 MIU/L-ACNC: 2.91 UIU/ML (ref 0.36–3.74)
WBC # BLD AUTO: 13.8 K/UL (ref 3.6–11)

## 2024-04-01 PROCEDURE — 6360000002 HC RX W HCPCS: Performed by: HOSPITALIST

## 2024-04-01 PROCEDURE — 84484 ASSAY OF TROPONIN QUANT: CPT

## 2024-04-01 PROCEDURE — 2580000003 HC RX 258: Performed by: HOSPITALIST

## 2024-04-01 PROCEDURE — 6370000000 HC RX 637 (ALT 250 FOR IP): Performed by: PSYCHIATRY & NEUROLOGY

## 2024-04-01 PROCEDURE — 6360000004 HC RX CONTRAST MEDICATION: Performed by: PHYSICIAN ASSISTANT

## 2024-04-01 PROCEDURE — 97165 OT EVAL LOW COMPLEX 30 MIN: CPT

## 2024-04-01 PROCEDURE — 80053 COMPREHEN METABOLIC PANEL: CPT

## 2024-04-01 PROCEDURE — 6370000000 HC RX 637 (ALT 250 FOR IP): Performed by: PHYSICIAN ASSISTANT

## 2024-04-01 PROCEDURE — 6370000000 HC RX 637 (ALT 250 FOR IP): Performed by: HOSPITALIST

## 2024-04-01 PROCEDURE — 70553 MRI BRAIN STEM W/O & W/DYE: CPT

## 2024-04-01 PROCEDURE — 72146 MRI CHEST SPINE W/O DYE: CPT

## 2024-04-01 PROCEDURE — A9577 INJ MULTIHANCE: HCPCS | Performed by: PHYSICIAN ASSISTANT

## 2024-04-01 PROCEDURE — 82962 GLUCOSE BLOOD TEST: CPT

## 2024-04-01 PROCEDURE — 36415 COLL VENOUS BLD VENIPUNCTURE: CPT

## 2024-04-01 PROCEDURE — 6360000002 HC RX W HCPCS: Performed by: NURSE PRACTITIONER

## 2024-04-01 PROCEDURE — G0408 INPT/TELE FOLLOW UP 35: HCPCS | Performed by: PSYCHIATRY & NEUROLOGY

## 2024-04-01 PROCEDURE — 85025 COMPLETE CBC W/AUTO DIFF WBC: CPT

## 2024-04-01 PROCEDURE — 1100000000 HC RM PRIVATE

## 2024-04-01 PROCEDURE — 72141 MRI NECK SPINE W/O DYE: CPT

## 2024-04-01 PROCEDURE — 93306 TTE W/DOPPLER COMPLETE: CPT

## 2024-04-01 PROCEDURE — 94761 N-INVAS EAR/PLS OXIMETRY MLT: CPT

## 2024-04-01 RX ORDER — LORAZEPAM 2 MG/ML
0.5 INJECTION INTRAMUSCULAR ONCE
Status: DISCONTINUED | OUTPATIENT
Start: 2024-04-01 | End: 2024-04-01

## 2024-04-01 RX ORDER — LOPERAMIDE HYDROCHLORIDE 2 MG/1
2 CAPSULE ORAL 4 TIMES DAILY PRN
Status: DISCONTINUED | OUTPATIENT
Start: 2024-04-01 | End: 2024-04-05 | Stop reason: HOSPADM

## 2024-04-01 RX ORDER — LORAZEPAM 0.5 MG/1
0.5 TABLET ORAL ONCE
Status: COMPLETED | OUTPATIENT
Start: 2024-04-01 | End: 2024-04-01

## 2024-04-01 RX ORDER — INSULIN GLARGINE 100 [IU]/ML
10 INJECTION, SOLUTION SUBCUTANEOUS DAILY
Status: DISCONTINUED | OUTPATIENT
Start: 2024-04-01 | End: 2024-04-02

## 2024-04-01 RX ADMIN — ASPIRIN 81 MG: 81 TABLET, COATED ORAL at 09:23

## 2024-04-01 RX ADMIN — DIVALPROEX SODIUM 250 MG: 250 TABLET, DELAYED RELEASE ORAL at 09:23

## 2024-04-01 RX ADMIN — LORAZEPAM 0.5 MG: 0.5 TABLET ORAL at 10:45

## 2024-04-01 RX ADMIN — LOPERAMIDE HYDROCHLORIDE 2 MG: 2 CAPSULE ORAL at 09:23

## 2024-04-01 RX ADMIN — LOPERAMIDE HYDROCHLORIDE 2 MG: 2 CAPSULE ORAL at 15:46

## 2024-04-01 RX ADMIN — INSULIN GLARGINE 10 UNITS: 100 INJECTION, SOLUTION SUBCUTANEOUS at 09:24

## 2024-04-01 RX ADMIN — INSULIN GLARGINE 26 UNITS: 100 INJECTION, SOLUTION SUBCUTANEOUS at 20:27

## 2024-04-01 RX ADMIN — LEVOTHYROXINE SODIUM 137 MCG: 0.03 TABLET ORAL at 05:05

## 2024-04-01 RX ADMIN — AMLODIPINE BESYLATE 2.5 MG: 5 TABLET ORAL at 09:23

## 2024-04-01 RX ADMIN — INSULIN LISPRO 4 UNITS: 100 INJECTION, SOLUTION INTRAVENOUS; SUBCUTANEOUS at 13:39

## 2024-04-01 RX ADMIN — MORPHINE SULFATE 2 MG: 2 INJECTION, SOLUTION INTRAMUSCULAR; INTRAVENOUS at 21:58

## 2024-04-01 RX ADMIN — CETIRIZINE HYDROCHLORIDE 10 MG: 10 TABLET, FILM COATED ORAL at 09:26

## 2024-04-01 RX ADMIN — INSULIN LISPRO 16 UNITS: 100 INJECTION, SOLUTION INTRAVENOUS; SUBCUTANEOUS at 09:15

## 2024-04-01 RX ADMIN — ENOXAPARIN SODIUM 30 MG: 100 INJECTION SUBCUTANEOUS at 20:27

## 2024-04-01 RX ADMIN — METHOCARBAMOL 750 MG: 500 TABLET ORAL at 09:23

## 2024-04-01 RX ADMIN — MORPHINE SULFATE 2 MG: 2 INJECTION, SOLUTION INTRAMUSCULAR; INTRAVENOUS at 01:00

## 2024-04-01 RX ADMIN — EMPAGLIFLOZIN 10 MG: 10 TABLET, FILM COATED ORAL at 13:09

## 2024-04-01 RX ADMIN — INSULIN LISPRO 4 UNITS: 100 INJECTION, SOLUTION INTRAVENOUS; SUBCUTANEOUS at 20:27

## 2024-04-01 RX ADMIN — DIVALPROEX SODIUM 250 MG: 250 TABLET, DELAYED RELEASE ORAL at 20:26

## 2024-04-01 RX ADMIN — ENOXAPARIN SODIUM 30 MG: 100 INJECTION SUBCUTANEOUS at 09:24

## 2024-04-01 RX ADMIN — TICAGRELOR 90 MG: 90 TABLET ORAL at 20:27

## 2024-04-01 RX ADMIN — METHOCARBAMOL 750 MG: 500 TABLET ORAL at 20:26

## 2024-04-01 RX ADMIN — MORPHINE SULFATE 2 MG: 2 INJECTION, SOLUTION INTRAMUSCULAR; INTRAVENOUS at 05:04

## 2024-04-01 RX ADMIN — RANOLAZINE 500 MG: 500 TABLET, EXTENDED RELEASE ORAL at 20:26

## 2024-04-01 RX ADMIN — SODIUM CHLORIDE, PRESERVATIVE FREE 10 ML: 5 INJECTION INTRAVENOUS at 10:59

## 2024-04-01 RX ADMIN — GADOBENATE DIMEGLUMINE 20 ML: 529 INJECTION, SOLUTION INTRAVENOUS at 12:35

## 2024-04-01 RX ADMIN — RANOLAZINE 500 MG: 500 TABLET, EXTENDED RELEASE ORAL at 09:23

## 2024-04-01 RX ADMIN — MORPHINE SULFATE 2 MG: 2 INJECTION, SOLUTION INTRAMUSCULAR; INTRAVENOUS at 09:24

## 2024-04-01 RX ADMIN — ATORVASTATIN CALCIUM 80 MG: 40 TABLET, FILM COATED ORAL at 20:26

## 2024-04-01 RX ADMIN — TICAGRELOR 90 MG: 90 TABLET ORAL at 09:23

## 2024-04-01 RX ADMIN — MORPHINE SULFATE 2 MG: 2 INJECTION, SOLUTION INTRAMUSCULAR; INTRAVENOUS at 13:39

## 2024-04-01 RX ADMIN — MORPHINE SULFATE 2 MG: 2 INJECTION, SOLUTION INTRAMUSCULAR; INTRAVENOUS at 18:03

## 2024-04-01 RX ADMIN — SODIUM CHLORIDE, PRESERVATIVE FREE 10 ML: 5 INJECTION INTRAVENOUS at 20:28

## 2024-04-01 ASSESSMENT — PAIN SCALES - GENERAL
PAINLEVEL_OUTOF10: 8
PAINLEVEL_OUTOF10: 8
PAINLEVEL_OUTOF10: 9
PAINLEVEL_OUTOF10: 7
PAINLEVEL_OUTOF10: 9
PAINLEVEL_OUTOF10: 7
PAINLEVEL_OUTOF10: 8
PAINLEVEL_OUTOF10: 9

## 2024-04-01 ASSESSMENT — ENCOUNTER SYMPTOMS
EYES NEGATIVE: 1
ABDOMINAL PAIN: 0
CONSTIPATION: 0
CHEST TIGHTNESS: 0

## 2024-04-01 ASSESSMENT — PAIN DESCRIPTION - ORIENTATION
ORIENTATION: LEFT
ORIENTATION: LEFT

## 2024-04-01 ASSESSMENT — PAIN DESCRIPTION - DESCRIPTORS
DESCRIPTORS: ACHING

## 2024-04-01 ASSESSMENT — PAIN - FUNCTIONAL ASSESSMENT: PAIN_FUNCTIONAL_ASSESSMENT: PREVENTS OR INTERFERES SOME ACTIVE ACTIVITIES AND ADLS

## 2024-04-01 ASSESSMENT — PAIN DESCRIPTION - LOCATION
LOCATION: HEAD;CHEST
LOCATION: CHEST
LOCATION: HEAD
LOCATION: CHEST
LOCATION: CHEST
LOCATION: CHEST;HEAD

## 2024-04-01 NOTE — PLAN OF CARE
Problem: Discharge Planning  Goal: Discharge to home or other facility with appropriate resources  Outcome: Progressing  Flowsheets (Taken 4/1/2024 0915)  Discharge to home or other facility with appropriate resources: Identify barriers to discharge with patient and caregiver     Problem: Chronic Conditions and Co-morbidities  Goal: Patient's chronic conditions and co-morbidity symptoms are monitored and maintained or improved  Outcome: Progressing  Flowsheets (Taken 4/1/2024 0915)  Care Plan - Patient's Chronic Conditions and Co-Morbidity Symptoms are Monitored and Maintained or Improved: Monitor and assess patient's chronic conditions and comorbid symptoms for stability, deterioration, or improvement     Problem: Pain  Goal: Verbalizes/displays adequate comfort level or baseline comfort level  Outcome: Progressing     Problem: Skin/Tissue Integrity  Goal: Absence of new skin breakdown  Description: 1.  Monitor for areas of redness and/or skin breakdown  2.  Assess vascular access sites hourly  3.  Every 4-6 hours minimum:  Change oxygen saturation probe site  4.  Every 4-6 hours:  If on nasal continuous positive airway pressure, respiratory therapy assess nares and determine need for appliance change or resting period.  Outcome: Progressing     Problem: Safety - Adult  Goal: Free from fall injury  Outcome: Progressing     Problem: ABCDS Injury Assessment  Goal: Absence of physical injury  Outcome: Progressing

## 2024-04-01 NOTE — PROGRESS NOTES
Patient for her cardiac problems is closely followed by Don with cardiology.  For continuity of care, I will change consult to [facility].

## 2024-04-01 NOTE — PROGRESS NOTES
Progress Note:      [facility] NEUROLOGY PROGRESS NOTE          Impression/Recommendations:   38 years old lady with hx of hypothyroidism, DVT, CAD, HTN, obesity, PCOS, DM is being evaluated for intractable headache. MRV brain is negative. T2 and DWI done as well. Shows microvascular ischemic changes.   I don't think she needs an MRI brain but patient insists on getting one.    I don't have any further recommendations for her.  She is not a candidate for D[facility] Melida protocol.  The only plausible solution of her headaches is getting occipital nerve block.      ADDENDUM:   MRI brain w and w/o contrast: showed an enhancing lesion!   Patient needs an LP to evaluate the enhancing lesion with MS/ mass etc.  LP ordered with corresponding CSF labs.    ?     Keturah Sorto MD  Teleneurologist    SUBJECTIVE   Mandie Dietz is a 38 y.o. female being evaluated for intractable headaches.     ?     OBJECTIVE   ROS, PMH, FH, SH were all reviewed and are unchanged.     ?       Current Facility-Administered Medications   Medication Dose Route Frequency Provider Last Rate Last Admin    insulin glargine (LANTUS) injection vial 10 Units  10 Units SubCUTAneous Daily Julien Leger PA-C   10 Units at 04/01/24 0924    loperamide (IMODIUM) capsule 2 mg  2 mg Oral 4x Daily PRN Julien Leger PA-C   2 mg at 04/01/24 0923    LORazepam (ATIVAN) tablet 0.5 mg  0.5 mg Oral Once Julien Leger PA-C        divalproex (DEPAKOTE) DR tablet 250 mg  250 mg Oral 2 times per day Shahla Crane MD   250 mg at 04/01/24 0923    glucose chewable tablet 16 g  4 tablet Oral PRN Larry Soares PA-C        dextrose bolus 10% 125 mL  125 mL IntraVENous PRN Larry Soares PA-C        Or    dextrose bolus 10% 250 mL  250 mL IntraVENous PRN Larry Soares PA-C        glucagon injection 1 mg  1 mg SubCUTAneous PRN Larry Soares PA-C        dextrose 10 % infusion   IntraVENous Continuous PRN Larry Soares PA-C      was spent in direct care and coordination of care with the patient.

## 2024-04-01 NOTE — PROGRESS NOTES
OCCUPATIONAL THERAPY EVALUATION AND DISCHARGE  Patient: Mandie Dietz (38 y.o. female)  Date: 4/1/2024  Primary Diagnosis: Stroke-like symptoms [R29.90]  Intractable cluster headache [G44.001]       Precautions: Fall Risk                Recommendations for nursing mobility: Out of bed to chair for meals, Encourage HEP in prep for ADLs/mobility; see handout for details, AD and gt belt for bed to chair , Amb to bathroom with AD and gait belt, and Amb in hallway    In place during session:EKG/telemetry   ASSESSMENT  Pt is a 38 y.o. female presenting to [facility] with pmh of POTS syndrome, DM type 2, CKD and  who presents with Intractable headache , admitted 3/29/24 and currently being treated for Htn, headache, type 2 dm. Head CT negative for acute pathology. Pt received semi-supine in bed upon arrival, AXO x4, and agreeable to OT evaluation.     Based on the objective data described below pt currently present at baseline mod I with RW for ADLs and function mobility/transfers at this time. (See below for objective details and assist levels).     Overall pt tolerated session fair today with c/o of diarrhea, rn informed. Pt mod I in room with RW, reports multiple bouts of diarrhea for the past 2 days. Pt completed transfer to bathroom with sba for toiletting in bathroom. Teaching and education for safety with RW and navigating urgency to use bathroom. Pt completed cate care with supervision, mobilized back to bed side. Pt left with all needs met, no acute distress. Pt has no skilled acute OT needs at this time either noted by OT or reported by pt, will DC skilled OT following evaluation; pt verbalized understanding and agreement. Potential barriers for safe discharge: none Current OT DC recommendation Home with Family Care once medically appropriate.         PLAN :  Recommendations and Planned Interventions: DC from skilled OT services following evaluation.     Rationale for discharge: Patient currently at functional baseline  for transfers/mobility, no further skilled therapy required at this time    Frequency/Duration: DC from OT services following evaluation due to Patient currently at functional baseline for transfers/mobility, no further skilled therapy required at this time; no skilled therapy required during admission, please reorder if needed     Recommendation for discharge: (in order for the patient to meet his/her long term goals)  Home with Family Care    IF patient discharges home will need the following DME:  patient owns DME required for discharge     SUBJECTIVE:   Patient stated “I don't think I need OT.”    OBJECTIVE DATA SUMMARY:     Past Medical History:   Diagnosis Date    Acquired hypothyroidism 03/29/2024    Bacterial vaginosis 01/12/2015    Breast abscess of female 07/09/2017    Chronic otitis media with perforated tympanic membrane 04/03/2018    CKD (chronic kidney disease) stage 3, GFR 30-59 ml/min ([facility])     Complicated migraine     Coronary artery disease involving native coronary artery of native heart without angina pectoris     s/p CABGx1    Dental abscess 05/10/2022    Gastroparesis 02/10/2023    Hidradenitis suppurativa of left axilla 08/26/2017    History of DVT (deep vein thrombosis)     Hypertension 2009    Microalbuminuria due to type 2 diabetes mellitus ([facility]) 12/27/2017    Obesity (BMI 35.0-39.9 without comorbidity)     Paraparesis of bilateral lower extremites ([facility]) 12/27/2019    PCOS (polycystic ovarian syndrome) 1996    Postural orthostatic tachycardia syndrome (POTS) 09/08/2023    Submucous leiomyoma of uterus 02/10/2023    Tympanic membrane perforation, right 06/02/2022    Type 2 diabetes mellitus with complication, without long-term current use of insulin ([facility])     UTI (urinary tract infection) Child     Past Surgical History:   Procedure Laterality Date    CHOLECYSTECTOMY      CORONARY ARTERY BYPASS GRAFT  12/03/2019    DILATION AND CURETTAGE OF UTERUS      ENDOMETRIAL ABLATION  07/30/2021

## 2024-04-01 NOTE — CARE COORDINATION
04/01/24 1440   Service Assessment   Patient Orientation Alert and Oriented   Cognition Alert   History Provided By Patient   Primary Caregiver Self   Accompanied By/Relationship alone   Support Systems Spouse/Significant Other   Patient's Healthcare Decision Maker is: Named in Scanned ACP Document   PCP Verified by CM Yes  (Estela Samson last seen on Thursday)   Last Visit to PCP Within last 3 months   Prior Functional Level Assistance with the following:;Bathing;Dressing;Toileting;Cooking;Housework;Shopping;Mobility   Current Functional Level Assistance with the following:;Bathing;Dressing;Toileting;Cooking;Housework;Shopping;Mobility   Can patient return to prior living arrangement Yes   Ability to make needs known: Good   Family able to assist with home care needs: Yes   Would you like for me to discuss the discharge plan with any other family members/significant others, and if so, who? Yes  ()   Financial Resources Medicaid;Medicare   Community Resources Transportation   CM/SW Referral Other (see comment)  (Discharge planning)   Social/Functional History   Lives With Spouse   Home Equipment Walker, rolling;Wheelchair-manual   Discharge Planning   Patient expects to be discharged to: Apartment   Services At/After Discharge   Confirm Follow Up Transport Family     CM met with pt at bedside to discuss dispo and verify demographics. Pt from home with spouse who is her primary caregiver. DME: wc, rw, shower chair. Hx of HH with [facility], but declines HH at this time. Hx fo IPR at  Rehab, but declines IRF/SNF at this time. Pt requesting outpt PT/OT when cleared for dc. Pt reports  or medicaid cab can transport when cleared for dc.     Rx: [facility] [address]  Meds to Bed declined    Advance Care Planning     General Advance Care Planning (ACP) Conversation    Date of Conversation: 4/1/2024  Conducted with: Patient with Decision Making Capacity    Healthcare Decision Maker:    Primary Decision Maker:

## 2024-04-01 NOTE — PROGRESS NOTES
Hospitalist Progress Note    NAME:   Mandie Dietz   : 1985   MRN: [id number]     Date/Time: 2024 2:01 PM  Patient PCP: Domingo Samson APRN - NP    Estimated discharge date: 24  Barriers: Cardiac evaluation for ongoing chest pain with a history of CABG, echo    History of present illness:    Is a 38-year-old female with past medical history of POTS syndrome, type 2 diabetes mellitus, chronic kidney disease, history of cardiac arrest status post CABG with postoperative complications resulting in paraplegia which has resolved who presented to the emergency department for admission on 3/29/2024 with a 10-day headache that felt bandlike with neurological symptoms to include right leg weakness and sensation changes.  She subsequently failed a conservative treatment of Fioricet Depakote, Dilaudid, Benadryl, magnesium.  She was given steroids with no resolution.  MRV of the brain no evidence of dural venous thrombus although abnormal signal present bilaterally in the cerebellum.  MRI of the brain with and without contrast pending.  Patient was also found to have hypertensive urgency which has improved.  Weaning and waxing chest pain.  Troponin was normal.  Chest x-ray pending.  EKG and cardiac consultation.  Appreciate neurology consultation.  Elevated white count most likely due to steroids on 3/30/2024.  Neurology did only possible solution is occipital nerve block, okay to DC after MRI.  Final discharge is pending cardiology consult for ongoing CP as well as echo.    Assessment / Plan:  Intractable headache   No success with Phenergan magnesium and steroids  Attempting Depakote with 1 IV infusion of Depacon  MRV revealed No evidence of dural venous thrombus although there was nonspecific signal change in the cerebellum demonstrated bilaterally which were not present on MRI in 2017  Recommendations for MRI with and without contrast of the brain, pending    Right leg numbness  MRI of the brain,  below:    Review of Systems   Constitutional:  Positive for activity change and fatigue.   HENT: Negative.     Eyes: Negative.    Respiratory:  Negative for chest tightness and shortness of breath.    Cardiovascular:  Positive for chest pain.   Gastrointestinal:  Negative for abdominal pain and constipation.   Endocrine: Negative.    Genitourinary: Negative.    Musculoskeletal: Negative.    Neurological:  Positive for headaches. Negative for dizziness, speech difficulty and numbness.   Hematological: Negative.         PHYSICAL EXAM:  Physical Exam  HENT:      Head: Normocephalic.      Mouth/Throat:      Mouth: Mucous membranes are moist.   Eyes:      Pupils: Pupils are equal, round, and reactive to light.   Cardiovascular:      Rate and Rhythm: Normal rate and regular rhythm.      Heart sounds: Normal heart sounds.   Pulmonary:      Effort: Pulmonary effort is normal.      Breath sounds: Normal breath sounds. No wheezing.   Abdominal:      General: Abdomen is flat. Bowel sounds are normal.      Palpations: Abdomen is soft.      Tenderness: There is no abdominal tenderness. There is no guarding.   Musculoskeletal:         General: Normal range of motion.      Cervical back: Normal range of motion and neck supple.   Skin:     General: Skin is warm and dry.   Neurological:      Mental Status: She is alert and oriented to person, place, and time.          Reviewed most current lab test results and cultures  YES  Reviewed most current radiology test results   YES  Review and summation of old records today    NO  Reviewed patient's current orders and MAR    YES  PMH/SH reviewed - no change compared to H&P  ________________________________________________________________________  Care Plan discussed with:    Comments   Patient x    Family      RN x    Care Manager     Consultant                        Multidiciplinary team rounds were held today with , nursing, pharmacist and clinical coordinator.  Patient's

## 2024-04-01 NOTE — PLAN OF CARE
Problem: Discharge Planning  Goal: Discharge to home or other facility with appropriate resources  4/1/2024 1956 by Raymond Hickman RN  Outcome: Progressing  4/1/2024 1616 by Viktor Bryan RN  Outcome: Progressing  Flowsheets (Taken 4/1/2024 0915)  Discharge to home or other facility with appropriate resources: Identify barriers to discharge with patient and caregiver     Problem: Chronic Conditions and Co-morbidities  Goal: Patient's chronic conditions and co-morbidity symptoms are monitored and maintained or improved  4/1/2024 1956 by Raymond Hickman RN  Outcome: Progressing  4/1/2024 1616 by Viktor Bryan RN  Outcome: Progressing  Flowsheets (Taken 4/1/2024 0915)  Care Plan - Patient's Chronic Conditions and Co-Morbidity Symptoms are Monitored and Maintained or Improved: Monitor and assess patient's chronic conditions and comorbid symptoms for stability, deterioration, or improvement     Problem: Pain  Goal: Verbalizes/displays adequate comfort level or baseline comfort level  4/1/2024 1956 by Raymond Hickman RN  Outcome: Progressing  4/1/2024 1616 by Viktor Bryan RN  Outcome: Progressing     Problem: Skin/Tissue Integrity  Goal: Absence of new skin breakdown  Description: 1.  Monitor for areas of redness and/or skin breakdown  2.  Assess vascular access sites hourly  3.  Every 4-6 hours minimum:  Change oxygen saturation probe site  4.  Every 4-6 hours:  If on nasal continuous positive airway pressure, respiratory therapy assess nares and determine need for appliance change or resting period.  4/1/2024 1956 by Raymond Hickman RN  Outcome: Progressing  4/1/2024 1616 by Viktor Bryan RN  Outcome: Progressing     Problem: Safety - Adult  Goal: Free from fall injury  4/1/2024 1956 by Raymond Hickman RN  Outcome: Progressing  4/1/2024 1616 by Viktor Bryan RN  Outcome: Progressing     Problem: ABCDS Injury Assessment  Goal: Absence of physical injury  4/1/2024 1956 by Raymond Hickman RN  Outcome:  Progressing  4/1/2024 1616 by Viktor Bryan, RN  Outcome: Progressing

## 2024-04-02 PROBLEM — I16.0 HYPERTENSIVE URGENCY: Status: RESOLVED | Noted: 2024-03-29 | Resolved: 2024-04-02

## 2024-04-02 PROBLEM — G44.001 INTRACTABLE CLUSTER HEADACHE: Status: RESOLVED | Noted: 2024-03-31 | Resolved: 2024-04-02

## 2024-04-02 LAB
ALBUMIN SERPL-MCNC: 3 G/DL (ref 3.5–5)
ALBUMIN/GLOB SERPL: 0.8 (ref 1.1–2.2)
ALP SERPL-CCNC: 59 U/L (ref 45–117)
ALT SERPL-CCNC: 27 U/L (ref 12–78)
ANION GAP SERPL CALC-SCNC: 5 MMOL/L (ref 5–15)
AST SERPL W P-5'-P-CCNC: 16 U/L (ref 15–37)
BILIRUB SERPL-MCNC: 0.4 MG/DL (ref 0.2–1)
BUN SERPL-MCNC: 31 MG/DL (ref 6–20)
BUN/CREAT SERPL: 26 (ref 12–20)
CA-I BLD-MCNC: 8.9 MG/DL (ref 8.5–10.1)
CHLORIDE SERPL-SCNC: 103 MMOL/L (ref 97–108)
CO2 SERPL-SCNC: 24 MMOL/L (ref 21–32)
CREAT SERPL-MCNC: 1.18 MG/DL (ref 0.55–1.02)
ERYTHROCYTE [DISTWIDTH] IN BLOOD BY AUTOMATED COUNT: 11.9 % (ref 11.5–14.5)
GLOBULIN SER CALC-MCNC: 3.9 G/DL (ref 2–4)
GLUCOSE BLD STRIP.AUTO-MCNC: 146 MG/DL (ref 65–100)
GLUCOSE BLD STRIP.AUTO-MCNC: 165 MG/DL (ref 65–100)
GLUCOSE BLD STRIP.AUTO-MCNC: 177 MG/DL (ref 65–100)
GLUCOSE BLD STRIP.AUTO-MCNC: 323 MG/DL (ref 65–100)
GLUCOSE SERPL-MCNC: 213 MG/DL (ref 65–100)
HCT VFR BLD AUTO: 43.6 % (ref 35–47)
HGB BLD-MCNC: 15.2 G/DL (ref 11.5–16)
MCH RBC QN AUTO: 31.5 PG (ref 26–34)
MCHC RBC AUTO-ENTMCNC: 34.9 G/DL (ref 30–36.5)
MCV RBC AUTO: 90.5 FL (ref 80–99)
NRBC # BLD: 0 K/UL (ref 0–0.01)
NRBC BLD-RTO: 0 PER 100 WBC
PERFORMED BY:: ABNORMAL
PLATELET # BLD AUTO: 488 K/UL (ref 150–400)
PMV BLD AUTO: 9.3 FL (ref 8.9–12.9)
POTASSIUM SERPL-SCNC: 4.2 MMOL/L (ref 3.5–5.1)
PROT SERPL-MCNC: 6.9 G/DL (ref 6.4–8.2)
RBC # BLD AUTO: 4.82 M/UL (ref 3.8–5.2)
SODIUM SERPL-SCNC: 132 MMOL/L (ref 136–145)
WBC # BLD AUTO: 14.6 K/UL (ref 3.6–11)

## 2024-04-02 PROCEDURE — 6360000002 HC RX W HCPCS: Performed by: HOSPITALIST

## 2024-04-02 PROCEDURE — 6370000000 HC RX 637 (ALT 250 FOR IP): Performed by: PHYSICIAN ASSISTANT

## 2024-04-02 PROCEDURE — 92610 EVALUATE SWALLOWING FUNCTION: CPT

## 2024-04-02 PROCEDURE — 36415 COLL VENOUS BLD VENIPUNCTURE: CPT

## 2024-04-02 PROCEDURE — 6360000002 HC RX W HCPCS: Performed by: NURSE PRACTITIONER

## 2024-04-02 PROCEDURE — 1100000000 HC RM PRIVATE

## 2024-04-02 PROCEDURE — 6370000000 HC RX 637 (ALT 250 FOR IP): Performed by: HOSPITALIST

## 2024-04-02 PROCEDURE — 94761 N-INVAS EAR/PLS OXIMETRY MLT: CPT

## 2024-04-02 PROCEDURE — 6370000000 HC RX 637 (ALT 250 FOR IP): Performed by: PSYCHIATRY & NEUROLOGY

## 2024-04-02 PROCEDURE — 82962 GLUCOSE BLOOD TEST: CPT

## 2024-04-02 PROCEDURE — 80053 COMPREHEN METABOLIC PANEL: CPT

## 2024-04-02 PROCEDURE — 85027 COMPLETE CBC AUTOMATED: CPT

## 2024-04-02 PROCEDURE — 2580000003 HC RX 258: Performed by: HOSPITALIST

## 2024-04-02 RX ORDER — INSULIN GLARGINE 100 [IU]/ML
20 INJECTION, SOLUTION SUBCUTANEOUS DAILY
Status: DISCONTINUED | OUTPATIENT
Start: 2024-04-02 | End: 2024-04-05 | Stop reason: HOSPADM

## 2024-04-02 RX ORDER — SODIUM CHLORIDE 9 MG/ML
INJECTION, SOLUTION INTRAVENOUS CONTINUOUS
Status: DISCONTINUED | OUTPATIENT
Start: 2024-04-02 | End: 2024-04-02

## 2024-04-02 RX ORDER — DIVALPROEX SODIUM 250 MG/1
250 TABLET, DELAYED RELEASE ORAL EVERY 12 HOURS SCHEDULED
Qty: 14 TABLET | Refills: 0 | Status: SHIPPED | OUTPATIENT
Start: 2024-04-02 | End: 2024-04-09

## 2024-04-02 RX ADMIN — METHOCARBAMOL 750 MG: 500 TABLET ORAL at 20:08

## 2024-04-02 RX ADMIN — MORPHINE SULFATE 2 MG: 2 INJECTION, SOLUTION INTRAMUSCULAR; INTRAVENOUS at 18:26

## 2024-04-02 RX ADMIN — MORPHINE SULFATE 2 MG: 2 INJECTION, SOLUTION INTRAMUSCULAR; INTRAVENOUS at 22:05

## 2024-04-02 RX ADMIN — MORPHINE SULFATE 2 MG: 2 INJECTION, SOLUTION INTRAMUSCULAR; INTRAVENOUS at 02:04

## 2024-04-02 RX ADMIN — RANOLAZINE 500 MG: 500 TABLET, EXTENDED RELEASE ORAL at 08:55

## 2024-04-02 RX ADMIN — MORPHINE SULFATE 2 MG: 2 INJECTION, SOLUTION INTRAMUSCULAR; INTRAVENOUS at 09:54

## 2024-04-02 RX ADMIN — MORPHINE SULFATE 2 MG: 2 INJECTION, SOLUTION INTRAMUSCULAR; INTRAVENOUS at 05:53

## 2024-04-02 RX ADMIN — LEVOTHYROXINE SODIUM 137 MCG: 0.03 TABLET ORAL at 05:53

## 2024-04-02 RX ADMIN — INSULIN LISPRO 4 UNITS: 100 INJECTION, SOLUTION INTRAVENOUS; SUBCUTANEOUS at 20:22

## 2024-04-02 RX ADMIN — EMPAGLIFLOZIN 10 MG: 10 TABLET, FILM COATED ORAL at 09:53

## 2024-04-02 RX ADMIN — DIVALPROEX SODIUM 250 MG: 250 TABLET, DELAYED RELEASE ORAL at 20:07

## 2024-04-02 RX ADMIN — ASPIRIN 81 MG: 81 TABLET, COATED ORAL at 08:55

## 2024-04-02 RX ADMIN — ENOXAPARIN SODIUM 30 MG: 100 INJECTION SUBCUTANEOUS at 08:54

## 2024-04-02 RX ADMIN — SODIUM CHLORIDE, PRESERVATIVE FREE 10 ML: 5 INJECTION INTRAVENOUS at 09:55

## 2024-04-02 RX ADMIN — MORPHINE SULFATE 2 MG: 2 INJECTION, SOLUTION INTRAMUSCULAR; INTRAVENOUS at 14:25

## 2024-04-02 RX ADMIN — ATORVASTATIN CALCIUM 80 MG: 40 TABLET, FILM COATED ORAL at 20:07

## 2024-04-02 RX ADMIN — SODIUM CHLORIDE, PRESERVATIVE FREE 10 ML: 5 INJECTION INTRAVENOUS at 20:24

## 2024-04-02 RX ADMIN — INSULIN GLARGINE 26 UNITS: 100 INJECTION, SOLUTION SUBCUTANEOUS at 20:07

## 2024-04-02 RX ADMIN — INSULIN GLARGINE 20 UNITS: 100 INJECTION, SOLUTION SUBCUTANEOUS at 08:54

## 2024-04-02 RX ADMIN — METHOCARBAMOL 750 MG: 500 TABLET ORAL at 08:55

## 2024-04-02 RX ADMIN — TICAGRELOR 90 MG: 90 TABLET ORAL at 08:55

## 2024-04-02 RX ADMIN — AMLODIPINE BESYLATE 2.5 MG: 5 TABLET ORAL at 08:55

## 2024-04-02 RX ADMIN — RANOLAZINE 500 MG: 500 TABLET, EXTENDED RELEASE ORAL at 20:08

## 2024-04-02 RX ADMIN — ENOXAPARIN SODIUM 30 MG: 100 INJECTION SUBCUTANEOUS at 22:05

## 2024-04-02 RX ADMIN — CETIRIZINE HYDROCHLORIDE 10 MG: 10 TABLET, FILM COATED ORAL at 08:55

## 2024-04-02 RX ADMIN — DIVALPROEX SODIUM 250 MG: 250 TABLET, DELAYED RELEASE ORAL at 08:55

## 2024-04-02 ASSESSMENT — PAIN SCALES - GENERAL
PAINLEVEL_OUTOF10: 3
PAINLEVEL_OUTOF10: 9
PAINLEVEL_OUTOF10: 10
PAINLEVEL_OUTOF10: 9

## 2024-04-02 ASSESSMENT — PAIN DESCRIPTION - LOCATION
LOCATION: CHEST
LOCATION: HEAD

## 2024-04-02 ASSESSMENT — ENCOUNTER SYMPTOMS
ABDOMINAL PAIN: 0
SHORTNESS OF BREATH: 0
EYES NEGATIVE: 1
CONSTIPATION: 0

## 2024-04-02 ASSESSMENT — PAIN DESCRIPTION - DESCRIPTORS
DESCRIPTORS: ACHING

## 2024-04-02 ASSESSMENT — PAIN - FUNCTIONAL ASSESSMENT: PAIN_FUNCTIONAL_ASSESSMENT: ACTIVITIES ARE NOT PREVENTED

## 2024-04-02 NOTE — PROGRESS NOTES
Hospitalist Progress Note    NAME:   Mandie Dietz   : 1985   MRN: [id number]     Date/Time: 2024 10:39 AM  Patient PCP: Domingo Samson APRN - NP    Estimated discharge date:   Barriers: LP, CSF analysis    History of present illness:    Is a 38-year-old female with past medical history of POTS syndrome, type 2 diabetes mellitus, chronic kidney disease, history of cardiac arrest status post CABG with postoperative complications resulting in paraplegia which has resolved who presented to the emergency department for admission on 3/29/2024 with a 10-day headache that felt bandlike with neurological symptoms to include right leg weakness and sensation changes.  She subsequently failed a conservative treatment of Fioricet Depakote, Dilaudid, Benadryl, magnesium.  She was given steroids with no resolution.  MRV of the brain no evidence of dural venous thrombus although abnormal signal present bilaterally in the cerebellum.  MRI of the brain with and without contrast pending.  Patient was also found to have hypertensive urgency which has improved.  Weaning and waxing chest pain.  Troponin was normal.  Chest x-ray pending.  EKG and cardiac consultation.  Appreciate neurology consultation.  Elevated white count most likely due to steroids on 3/30/2024.  Echo revealed EF 55 to 60%, normal left ventricular systolic function, mildly increased wall thickness, abnormal diastolic function.  MRI brain with and without contrast revealed new white matter disease with 1 small focus of enhancement, demyelination is questioned among other possible considerations, no acute infarct.  Neurology recommended LP, scheduled for 4/3.  A1c 8.0%.    Assessment / Plan:  Intractable headache   No success with Phenergan magnesium and steroids  MRI brain with and without contrast revealed new white matter disease with 1 small focus of enhancement, questionable demyelination among other considerations  Neurology recommended    ________________________________________________________________________  Total NON critical care TIME:  35  Minutes    Total CRITICAL CARE TIME Spent:   Minutes non procedure based      Comments   >50% of visit spent in counseling and coordination of care     ________________________________________________________________________  Julien Leger PA-C     Procedures: see electronic medical records for all procedures/Xrays and details which were not copied into this note but were reviewed prior to creation of Plan.      LABS:  I reviewed today's most current labs and imaging studies.  Pertinent labs include:  Recent Labs     04/01/24 0346   WBC 13.8*   HGB 14.0   HCT 40.9   *       Recent Labs     04/01/24 0346 04/02/24  0714   * 132*   K 4.2 4.2    103   CO2 22 24   BUN 35* 31*   ALT 29 27         Signed: Julien Leger PA-C

## 2024-04-02 NOTE — CARE COORDINATION
CM has reviewed pt chart    DCP: home with  who is pt's caregiver; pt declines HH and prefers outpt PT/OT    1431: CM met with pt at bedside and provided her with second IMM and DND. Pt reports she completed appeal process online. CM explained she may need to complete via the instructions provided.     1455: BREE received call from CM  reporting Rikki fax received.     1616: BREE uploaded all documents to [employer] Case Control ID: [id number]

## 2024-04-02 NOTE — PLAN OF CARE
Problem: Discharge Planning  Goal: Discharge to home or other facility with appropriate resources  Outcome: Progressing     Problem: Chronic Conditions and Co-morbidities  Goal: Patient's chronic conditions and co-morbidity symptoms are monitored and maintained or improved  4/2/2024 1934 by Raymond Hickman RN  Outcome: Progressing  4/2/2024 1032 by Shy Grant RN  Outcome: Progressing     Problem: Pain  Goal: Verbalizes/displays adequate comfort level or baseline comfort level  Outcome: Progressing     Problem: Skin/Tissue Integrity  Goal: Absence of new skin breakdown  Description: 1.  Monitor for areas of redness and/or skin breakdown  2.  Assess vascular access sites hourly  3.  Every 4-6 hours minimum:  Change oxygen saturation probe site  4.  Every 4-6 hours:  If on nasal continuous positive airway pressure, respiratory therapy assess nares and determine need for appliance change or resting period.  4/2/2024 1934 by Raymond Hickman RN  Outcome: Progressing  4/2/2024 1032 by Shy Grant RN  Outcome: Progressing     Problem: Safety - Adult  Goal: Free from fall injury  4/2/2024 1934 by Raymond Hickman RN  Outcome: Progressing  4/2/2024 1032 by Shy Grant RN  Outcome: Progressing     Problem: ABCDS Injury Assessment  Goal: Absence of physical injury  4/2/2024 1934 by Raymond Hickman RN  Outcome: Progressing  4/2/2024 1032 by Shy Grant RN  Outcome: Progressing

## 2024-04-02 NOTE — CONSULTS
CARDIOLOGY CONSULTATION    REASON FOR CONSULT: Chest pain, Hx CAD    REQUESTING PROVIDER: Dr. May     CHIEF COMPLAINT:  Headache     HISTORY OF PRESENT ILLNESS:  Mandie Dietz is a 38 y.o. year-old female with past medical history significant for POTS syndrome, type 2 diabetes mellitus, chronic kidney disease, history of cardiac arrest status post CABG with postoperative complications resulting in paraplegia who was evaluated today due to chest pain. Patient initially presented to the ED with complaints of headache and right leg weakness. Patient has been following with neurology, plans for outpatient lumbar puncture. Reports some chest pain throughout admission. Patient has chronic chest pain. Well known patient to our practice. Most recent testing as below.     -Cardiac catheterization (03/15/2024): severe mid LAD disease and RCA . Patent LIMA graft to LIMA-LAD, faint collateral circulation filling the distal RCA . Nonobstructive disease elsewhere. Normal filling pressures. Common overall stable coronary anatomy, with no new obstructive disease to explain the patient's chest discomfort symptoms.     Records from hospital admission course thus far reviewed.      Telemetry reviewed.      INPATIENT MEDICATIONS:  Home medications reviewed.    Current Facility-Administered Medications:     insulin glargine (LANTUS) injection vial 20 Units, 20 Units, SubCUTAneous, Daily, Julien Leger PA-C, 20 Units at 04/02/24 0854    loperamide (IMODIUM) capsule 2 mg, 2 mg, Oral, 4x Daily PRN, Julien Leger PA-C, 2 mg at 04/01/24 1546    divalproex (DEPAKOTE) DR tablet 250 mg, 250 mg, Oral, 2 times per day, Shahla Crane MD, 250 mg at 04/02/24 0855    glucose chewable tablet 16 g, 4 tablet, Oral, PRN, Larry Soares PA-C    dextrose bolus 10% 125 mL, 125 mL, IntraVENous, PRN **OR** dextrose bolus 10% 250 mL, 250 mL, IntraVENous, PRN, Larry Soares PA-C    glucagon injection 1 mg, 1 mg,  SubCUTAneous, PRN, Larry Soares PA-C    dextrose 10 % infusion, , IntraVENous, Continuous PRN, Larry Soares PA-C    insulin lispro (HUMALOG) injection vial 0-16 Units, 0-16 Units, SubCUTAneous, TID WC, Larry Soares PA-C, 4 Units at 04/01/24 1339    insulin lispro (HUMALOG) injection vial 0-4 Units, 0-4 Units, SubCUTAneous, Nightly, Larry Soares PA-C, 4 Units at 04/01/24 2027    hydrALAZINE (APRESOLINE) injection 10 mg, 10 mg, IntraVENous, Q6H PRN, Larry Soares PA-C    amLODIPine (NORVASC) tablet 2.5 mg, 2.5 mg, Oral, Daily, Yamila, Leo A, DO, 2.5 mg at 04/02/24 0855    aspirin EC tablet 81 mg, 81 mg, Oral, Daily, Yamila Leo A, DO, 81 mg at 04/02/24 0855    cetirizine (ZYRTEC) tablet 10 mg, 10 mg, Oral, Daily, Yamila, Leo A, DO, 10 mg at 04/02/24 0855    empagliflozin (JARDIANCE) tablet 10 mg, 10 mg, Oral, Daily, Yamila, Leo A, DO, 10 mg at 04/02/24 0953    levothyroxine (SYNTHROID) tablet 137 mcg, 137 mcg, Oral, QAM AC, Yamila, Leo A, DO, 137 mcg at 04/02/24 0553    methocarbamol (ROBAXIN) tablet 750 mg, 750 mg, Oral, BID, Yamila, Leo A, DO, 750 mg at 04/02/24 0855    ranolazine (RANEXA) extended release tablet 500 mg, 500 mg, Oral, BID, Yamila, Leo A, DO, 500 mg at 04/02/24 0855    ticagrelor (BRILINTA) tablet 90 mg, 90 mg, Oral, BID, Yamila, Leo A, DO, 90 mg at 04/02/24 0855    butalbital-acetaminophen-caffeine (FIORICET, ESGIC) per tablet 1 tablet, 1 tablet, Oral, Q4H PRN, Shahla Crane MD, 1 tablet at 03/31/24 1519    morphine (PF) injection 2 mg, 2 mg, IntraVENous, Q4H PRN, Luther, Che A, APRN - NP, 2 mg at 04/02/24 0954    glucose chewable tablet 16 g, 4 tablet, Oral, PRN, Leo Driscoll, DO    dextrose bolus 10% 125 mL, 125 mL, IntraVENous, PRN **OR** dextrose bolus 10% 250 mL, 250 mL, IntraVENous, PRN, Leo Driscoll, DO    glucagon injection 1 mg, 1 mg, SubCUTAneous, PRN, Leo Driscoll, DO    dextrose 10 % infusion, , IntraVENous,

## 2024-04-02 NOTE — PROGRESS NOTES
Speech LAnguage Pathology Dysphagia EVALUATION/DISCHARGE    Patient: Mandie Dietz (38 y.o. female)  Date: 4/2/2024  Primary Diagnosis: Stroke-like symptoms [R29.90]  Intractable cluster headache [G44.001]       Precautions:  Fall Risk                DIET RECOMMENDATIONS: Regular and thin liquids, meds as tolerated,    SWALLOW SAFETY PRECAUTIONS: general aspiration precautions    ASSESSMENT :  Based on the objective data described below, the patient presents with wfl oropharyngeal dysphagia.  No facial droop or dysarthria. Informally, speech language is wfl.  Oral phase is wfl. Pharyngeal phase c/b timely swallow and HLE is wfl upon palpation.   No overt s/s of pen/asp observed.   Patient will be discharged from skilled speech-language pathology services at this time.       PLAN :  Recommendations and Planned Interventions:  DIET RECOMMENDATIONS: Regular and thin liquids, meds as tolerated,    SWALLOW SAFETY PRECAUTIONS: general aspiration precautions     Acute SLP Services: No, patient will be discharged from acute skilled speech-language pathology at this time.    Discharge Recommendations: None     SUBJECTIVE:   Denies dysphagia or speech language deficits.    OBJECTIVE:   Patient admitted w/ headache.   MRI BRAIN W WO CONTRAST   Final Result   New white matter disease with one small focus of enhancement.   Demyelination is questioned among other possible considerations. No diffusion   restriction/acute infarct.      MRI CERVICAL SPINE WO CONTRAST   Final Result   No cord compression.   Mild degenerative changes   No evidence for demyelinating disease.      MRI THORACIC SPINE WO CONTRAST   Final Result      Mild central canal stenosis and cord displacement T8-9 secondary to right   paracentral disc extrusion      XR CHEST PORTABLE   Final Result      No acute process on portable chest.         MRV HEAD WO CONTRAST   Final Result   1. No evidence for dural venous thrombosis.   2. There are nonspecific signal     TONSILLECTOMY AND ADENOIDECTOMY  1992    TYMPANOSTOMY TUBE PLACEMENT Bilateral     MULTIPLE    WISDOM TOOTH EXTRACTION       Prior Level of Function/Home Situation:   Social/Functional History  Lives With: Spouse  Type of Home: Apartment  Home Layout: One level  Home Access: Level entry  Home Equipment: Walker, rolling, Wheelchair-manual  Has the patient had two or more falls in the past year or any fall with injury in the past year?: No  Receives Help From: Family  ADL Assistance: Needs assistance  Ambulation Assistance: Independent  Transfer Assistance: Independent    Cognitive and Communication Status:  Neurologic State: Alert  Orientation Level: Oriented x4  Cognition: Follows commands    Baseline Assessment:  Current Diet : Regular  Current Liquid Diet : Thin  Prior Dysphagia History: denies     Hearing: wfl     General:         O2 Device: None (Room air)     Current Diet : Regular  Current Liquid Diet : Thin  Dentition: Adequate  Patient Positioning: Upright in bed    Dysphagia:  Oral Assessment:  Oral Motor   Labial: No impairment  Dentition: Intact  Oral Hygiene: Moist  Lingual: No impairment  Velum: No Impairment  P.O. Trials:  Consistencies Administered: Thin - straw;Thin - cup;Pureed;Regular      Voice:  wfl    After Treatment:  Patient left in no apparent distress in bed, Call bell left within reach, and Nursing notified     Pain:  VAS (numerical) 10    COMMUNICATION/EDUCATION:   BE FAST acronym for signs/symptoms of CVA and TIA, Swallow safety precautions, Aspiration precautions, GERD precautions, and Diet recommendations education provided to Patient and Nurse via explanation and teach back, all questions/concerns addressed. Patient verbalizes understanding.    The patient's plan of care including recommendations, planned interventions, and recommended diet changes were discussed with: Registered nurse.    Thank you,  Janice Allison M.S., M.Ed., CCC-SLP  Minutes: 8

## 2024-04-02 NOTE — DISCHARGE SUMMARY
Discharge Summary    Name: Mandie Dietz  [id number]  YOB: 1985 (Age: 38 y.o.)   Date of Admission: 3/29/2024  Date of Discharge: 4/2/2024  Attending Physician: Sal May MD    Discharge Diagnosis:   Principal Problem:    Intractable headache  Active Problems:    Type 2 diabetes mellitus with complication, without long-term current use of insulin ([facility])    CKD (chronic kidney disease) stage 3, GFR 30-59 ml/min ([facility])  Resolved Problems:    Hypertensive urgency    Intractable cluster headache       Consultations:  IP CONSULT TO TELE-NEUROLOGY  IP CONSULT TO NEUROLOGY  IP CONSULT TO CARDIOLOGY  IP CONSULT TO CARDIOLOGY    Brief Hospital Course by Main Problems:   Ms. Dietz is a 38-year-old female with past medical history of POTS syndrome, type 2 diabetes mellitus, chronic kidney disease, history of cardiac arrest status post CABG with postoperative complications resulting in paraplegia which has resolved who presented to the emergency department for admission on 3/29/2024 with a 10-day headache that felt bandlike with neurological symptoms to include right leg weakness and sensation changes.  She subsequently failed a conservative treatment of Fioricet Depakote, Dilaudid, Benadryl, magnesium.  She was given steroids with no resolution.  MRV of the brain no evidence of dural venous thrombus although abnormal signal present bilaterally in the cerebellum.  Patient was also found to have hypertensive urgency which has improved.  Weaning and waxing chest pain.  Troponin was normal.  Cardiology neurology consultation replaced.  Elevated white count most likely due to steroids on 3/30/2024.  Echo revealed EF 55 to 60%, normal left ventricular systolic function, mildly increased wall thickness, abnormal diastolic function. MRI brain with and without contrast revealed new white matter disease with 1 small focus of enhancement, demyelination is questioned among  [facility]  [address]  [facility] [address]  [phone]    Follow up in 1 month(s)  Follow-up for continued outpatient cardiac care    Domingo Samson APRN - NP    Follow up in 1 week(s)  Follow-up for recent hospital admission.  Patient will need a recheck of CBC, BMP within 1 week.    [facility] EMERGENCY DEP  [address] [place] [id number] [phone]  Follow up  As needed, If symptoms worsen          Total time in minutes spent coordinating this discharge (includes going over instructions, follow-up, prescriptions, and preparing report for sign off to her PCP) :  35 minutes

## 2024-04-02 NOTE — PROGRESS NOTES
Discharge paperwork starte. Just put in next dose due date. Print and give to patient when patient is ready to leave.

## 2024-04-03 LAB
ALBUMIN SERPL-MCNC: 3.1 G/DL (ref 3.5–5)
ALBUMIN/GLOB SERPL: 0.9 (ref 1.1–2.2)
ALP SERPL-CCNC: 63 U/L (ref 45–117)
ALT SERPL-CCNC: 34 U/L (ref 12–78)
ANION GAP SERPL CALC-SCNC: 5 MMOL/L (ref 5–15)
AST SERPL W P-5'-P-CCNC: 20 U/L (ref 15–37)
BASOPHILS # BLD: 0 K/UL (ref 0–0.1)
BASOPHILS NFR BLD: 0 % (ref 0–1)
BILIRUB SERPL-MCNC: 0.3 MG/DL (ref 0.2–1)
BUN SERPL-MCNC: 23 MG/DL (ref 6–20)
BUN/CREAT SERPL: 22 (ref 12–20)
CA-I BLD-MCNC: 8.8 MG/DL (ref 8.5–10.1)
CHLORIDE SERPL-SCNC: 101 MMOL/L (ref 97–108)
CO2 SERPL-SCNC: 28 MMOL/L (ref 21–32)
CREAT SERPL-MCNC: 1.05 MG/DL (ref 0.55–1.02)
DIFFERENTIAL METHOD BLD: ABNORMAL
EOSINOPHIL # BLD: 0.3 K/UL (ref 0–0.4)
EOSINOPHIL NFR BLD: 3 % (ref 0–7)
ERYTHROCYTE [DISTWIDTH] IN BLOOD BY AUTOMATED COUNT: 11.9 % (ref 11.5–14.5)
GLOBULIN SER CALC-MCNC: 3.5 G/DL (ref 2–4)
GLUCOSE BLD STRIP.AUTO-MCNC: 134 MG/DL (ref 65–100)
GLUCOSE BLD STRIP.AUTO-MCNC: 152 MG/DL (ref 65–100)
GLUCOSE BLD STRIP.AUTO-MCNC: 200 MG/DL (ref 65–100)
GLUCOSE BLD STRIP.AUTO-MCNC: 257 MG/DL (ref 65–100)
GLUCOSE SERPL-MCNC: 146 MG/DL (ref 65–100)
HCT VFR BLD AUTO: 44 % (ref 35–47)
HGB BLD-MCNC: 15.2 G/DL (ref 11.5–16)
IMM GRANULOCYTES # BLD AUTO: 0.1 K/UL (ref 0–0.04)
IMM GRANULOCYTES NFR BLD AUTO: 1 % (ref 0–0.5)
LYMPHOCYTES # BLD: 3.3 K/UL (ref 0.8–3.5)
LYMPHOCYTES NFR BLD: 26 % (ref 12–49)
MCH RBC QN AUTO: 31 PG (ref 26–34)
MCHC RBC AUTO-ENTMCNC: 34.5 G/DL (ref 30–36.5)
MCV RBC AUTO: 89.8 FL (ref 80–99)
MONOCYTES # BLD: 1.2 K/UL (ref 0–1)
MONOCYTES NFR BLD: 10 % (ref 5–13)
NEUTS SEG # BLD: 7.7 K/UL (ref 1.8–8)
NEUTS SEG NFR BLD: 60 % (ref 32–75)
NRBC # BLD: 0 K/UL (ref 0–0.01)
NRBC BLD-RTO: 0 PER 100 WBC
PERFORMED BY:: ABNORMAL
PLATELET # BLD AUTO: 372 K/UL (ref 150–400)
PMV BLD AUTO: 9.4 FL (ref 8.9–12.9)
POTASSIUM SERPL-SCNC: 4.1 MMOL/L (ref 3.5–5.1)
PROT SERPL-MCNC: 6.6 G/DL (ref 6.4–8.2)
RBC # BLD AUTO: 4.9 M/UL (ref 3.8–5.2)
SODIUM SERPL-SCNC: 134 MMOL/L (ref 136–145)
WBC # BLD AUTO: 12.6 K/UL (ref 3.6–11)

## 2024-04-03 PROCEDURE — 6360000002 HC RX W HCPCS: Performed by: HOSPITALIST

## 2024-04-03 PROCEDURE — 6370000000 HC RX 637 (ALT 250 FOR IP): Performed by: PHYSICIAN ASSISTANT

## 2024-04-03 PROCEDURE — 82962 GLUCOSE BLOOD TEST: CPT

## 2024-04-03 PROCEDURE — 36410 VNPNXR 3YR/> PHY/QHP DX/THER: CPT

## 2024-04-03 PROCEDURE — 2580000003 HC RX 258: Performed by: HOSPITALIST

## 2024-04-03 PROCEDURE — 6360000002 HC RX W HCPCS: Performed by: NURSE PRACTITIONER

## 2024-04-03 PROCEDURE — 85025 COMPLETE CBC W/AUTO DIFF WBC: CPT

## 2024-04-03 PROCEDURE — 1100000000 HC RM PRIVATE

## 2024-04-03 PROCEDURE — 6370000000 HC RX 637 (ALT 250 FOR IP): Performed by: PSYCHIATRY & NEUROLOGY

## 2024-04-03 PROCEDURE — 80053 COMPREHEN METABOLIC PANEL: CPT

## 2024-04-03 PROCEDURE — 6370000000 HC RX 637 (ALT 250 FOR IP): Performed by: HOSPITALIST

## 2024-04-03 PROCEDURE — 36415 COLL VENOUS BLD VENIPUNCTURE: CPT

## 2024-04-03 PROCEDURE — 6360000002 HC RX W HCPCS: Performed by: PHYSICIAN ASSISTANT

## 2024-04-03 RX ORDER — MORPHINE SULFATE 2 MG/ML
1 INJECTION, SOLUTION INTRAMUSCULAR; INTRAVENOUS EVERY 4 HOURS PRN
Status: DISCONTINUED | OUTPATIENT
Start: 2024-04-03 | End: 2024-04-05

## 2024-04-03 RX ORDER — AMLODIPINE BESYLATE 5 MG/1
2.5 TABLET ORAL ONCE
Status: COMPLETED | OUTPATIENT
Start: 2024-04-03 | End: 2024-04-03

## 2024-04-03 RX ORDER — AMLODIPINE BESYLATE 5 MG/1
5 TABLET ORAL DAILY
Status: DISCONTINUED | OUTPATIENT
Start: 2024-04-04 | End: 2024-04-05 | Stop reason: HOSPADM

## 2024-04-03 RX ORDER — MORPHINE SULFATE 2 MG/ML
1 INJECTION, SOLUTION INTRAMUSCULAR; INTRAVENOUS ONCE
Status: COMPLETED | OUTPATIENT
Start: 2024-04-03 | End: 2024-04-03

## 2024-04-03 RX ADMIN — AMLODIPINE BESYLATE 2.5 MG: 5 TABLET ORAL at 10:09

## 2024-04-03 RX ADMIN — INSULIN GLARGINE 20 UNITS: 100 INJECTION, SOLUTION SUBCUTANEOUS at 10:08

## 2024-04-03 RX ADMIN — RANOLAZINE 500 MG: 500 TABLET, EXTENDED RELEASE ORAL at 10:09

## 2024-04-03 RX ADMIN — MORPHINE SULFATE 1 MG: 2 INJECTION, SOLUTION INTRAMUSCULAR; INTRAVENOUS at 14:05

## 2024-04-03 RX ADMIN — METHOCARBAMOL 750 MG: 500 TABLET ORAL at 10:09

## 2024-04-03 RX ADMIN — ONDANSETRON 4 MG: 2 INJECTION INTRAMUSCULAR; INTRAVENOUS at 12:35

## 2024-04-03 RX ADMIN — ATORVASTATIN CALCIUM 80 MG: 40 TABLET, FILM COATED ORAL at 20:48

## 2024-04-03 RX ADMIN — METHOCARBAMOL 750 MG: 500 TABLET ORAL at 20:47

## 2024-04-03 RX ADMIN — MORPHINE SULFATE 1 MG: 2 INJECTION, SOLUTION INTRAMUSCULAR; INTRAVENOUS at 21:53

## 2024-04-03 RX ADMIN — SODIUM CHLORIDE, PRESERVATIVE FREE 10 ML: 5 INJECTION INTRAVENOUS at 21:56

## 2024-04-03 RX ADMIN — MORPHINE SULFATE 2 MG: 2 INJECTION, SOLUTION INTRAMUSCULAR; INTRAVENOUS at 02:09

## 2024-04-03 RX ADMIN — DIVALPROEX SODIUM 250 MG: 250 TABLET, DELAYED RELEASE ORAL at 20:47

## 2024-04-03 RX ADMIN — CETIRIZINE HYDROCHLORIDE 10 MG: 10 TABLET, FILM COATED ORAL at 10:09

## 2024-04-03 RX ADMIN — MORPHINE SULFATE 2 MG: 2 INJECTION, SOLUTION INTRAMUSCULAR; INTRAVENOUS at 05:59

## 2024-04-03 RX ADMIN — EMPAGLIFLOZIN 10 MG: 10 TABLET, FILM COATED ORAL at 11:45

## 2024-04-03 RX ADMIN — ENOXAPARIN SODIUM 30 MG: 100 INJECTION SUBCUTANEOUS at 20:46

## 2024-04-03 RX ADMIN — MORPHINE SULFATE 1 MG: 2 INJECTION, SOLUTION INTRAMUSCULAR; INTRAVENOUS at 18:35

## 2024-04-03 RX ADMIN — LEVOTHYROXINE SODIUM 137 MCG: 0.03 TABLET ORAL at 05:58

## 2024-04-03 RX ADMIN — RANOLAZINE 500 MG: 500 TABLET, EXTENDED RELEASE ORAL at 20:47

## 2024-04-03 RX ADMIN — BUTALBITAL, ACETAMINOPHEN, AND CAFFEINE 1 TABLET: 50; 325; 40 TABLET ORAL at 17:50

## 2024-04-03 RX ADMIN — INSULIN GLARGINE 26 UNITS: 100 INJECTION, SOLUTION SUBCUTANEOUS at 20:49

## 2024-04-03 RX ADMIN — INSULIN LISPRO 4 UNITS: 100 INJECTION, SOLUTION INTRAVENOUS; SUBCUTANEOUS at 17:50

## 2024-04-03 RX ADMIN — ENOXAPARIN SODIUM 30 MG: 100 INJECTION SUBCUTANEOUS at 10:08

## 2024-04-03 RX ADMIN — ASPIRIN 81 MG: 81 TABLET, COATED ORAL at 10:09

## 2024-04-03 RX ADMIN — DIVALPROEX SODIUM 250 MG: 250 TABLET, DELAYED RELEASE ORAL at 10:09

## 2024-04-03 RX ADMIN — MORPHINE SULFATE 1 MG: 2 INJECTION, SOLUTION INTRAMUSCULAR; INTRAVENOUS at 10:08

## 2024-04-03 RX ADMIN — INSULIN LISPRO 8 UNITS: 100 INJECTION, SOLUTION INTRAVENOUS; SUBCUTANEOUS at 11:45

## 2024-04-03 RX ADMIN — SODIUM CHLORIDE, PRESERVATIVE FREE 10 ML: 5 INJECTION INTRAVENOUS at 11:35

## 2024-04-03 RX ADMIN — AMLODIPINE BESYLATE 2.5 MG: 5 TABLET ORAL at 14:05

## 2024-04-03 ASSESSMENT — PAIN SCALES - WONG BAKER
WONGBAKER_NUMERICALRESPONSE: HURTS A LITTLE BIT

## 2024-04-03 ASSESSMENT — PAIN SCALES - GENERAL
PAINLEVEL_OUTOF10: 8
PAINLEVEL_OUTOF10: 9
PAINLEVEL_OUTOF10: 8
PAINLEVEL_OUTOF10: 9
PAINLEVEL_OUTOF10: 7
PAINLEVEL_OUTOF10: 10
PAINLEVEL_OUTOF10: 8
PAINLEVEL_OUTOF10: 10
PAINLEVEL_OUTOF10: 8
PAINLEVEL_OUTOF10: 8
PAINLEVEL_OUTOF10: 7
PAINLEVEL_OUTOF10: 10
PAINLEVEL_OUTOF10: 9

## 2024-04-03 ASSESSMENT — PAIN DESCRIPTION - LOCATION
LOCATION: HEAD
LOCATION: CHEST
LOCATION: HEAD
LOCATION: CHEST

## 2024-04-03 ASSESSMENT — PAIN DESCRIPTION - DESCRIPTORS
DESCRIPTORS: ACHING
DESCRIPTORS: ACHING

## 2024-04-03 ASSESSMENT — PAIN DESCRIPTION - ORIENTATION: ORIENTATION: RIGHT

## 2024-04-03 NOTE — PROGRESS NOTES
4 Eyes Skin Assessment     NAME:  Mandie Dietz  YOB: 1985  MEDICAL RECORD NUMBER:  [id number]    The patient is being assessed for  Per protocol every Wednesday    I agree that at least one RN has performed a thorough Head to Toe Skin Assessment on the patient. ALL assessment sites listed below have been assessed.      Areas assessed by both nurses:    Head, Face, Ears, Shoulders, Back, Chest, Arms, Elbows, Hands, Sacrum. Buttock, Coccyx, Ischium, Legs. Feet and Heels, and Under Medical Devices         Does the Patient have a Wound? No noted wound(s)       Tomasz Prevention initiated by RN: Yes  Wound Care Orders initiated by RN: No    Pressure Injury (Stage 3,4, Unstageable, DTI, NWPT, and Complex wounds) if present, place Wound referral order by RN under : No    New Ostomies, if present place, Ostomy referral order under : No     Nurse 1 eSignature: Electronically signed by Raymond Charles RN on 4/3/24 at 12:28 AM EDT    **SHARE this note so that the co-signing nurse can place an eSignature**    Nurse 2 eSignature: Electronically signed by Gladis Dubois RN on 4/3/24 at 12:36 AM EDT

## 2024-04-03 NOTE — PROGRESS NOTES
Inquired with Dr. Langley if okay to give Lovenox dose for tonight considering that she is gonna have a spinal tap on Monday and brilinta has been held for the procedure; Dr. Langley said okay to give dose for now.

## 2024-04-03 NOTE — DISCHARGE SUMMARY
Discharge Summary    Name: Mandie Dietz  [id number]  YOB: 1985 (Age: 38 y.o.)   Date of Admission: 3/29/2024  Date of Discharge: 4/3/2024  Attending Physician: Seipp, James, DO    Discharge Diagnosis:   Principal Problem:    Intractable headache  Active Problems:    Type 2 diabetes mellitus with complication, without long-term current use of insulin ([facility])    CKD (chronic kidney disease) stage 3, GFR 30-59 ml/min ([facility])  Resolved Problems:    Hypertensive urgency    Intractable cluster headache       Consultations:  IP CONSULT TO TELE-NEUROLOGY  IP CONSULT TO NEUROLOGY  IP CONSULT TO CARDIOLOGY  IP CONSULT TO CARDIOLOGY    Brief Hospital Course by Main Problems:   Ms. Dietz is a 38-year-old female with past medical history of POTS syndrome, type 2 diabetes mellitus, chronic kidney disease, history of cardiac arrest status post CABG with postoperative complications resulting in paraplegia which has resolved who presented to the emergency department for admission on 3/29/2024 with a 10-day headache that felt bandlike with neurological symptoms to include right leg weakness and sensation changes.  She subsequently failed a conservative treatment of Fioricet Depakote, Dilaudid, Benadryl, magnesium.  She was given steroids with no resolution.  MRV of the brain no evidence of dural venous thrombus although abnormal signal present bilaterally in the cerebellum.  Patient was also found to have hypertensive urgency which has improved.  Weaning and waxing chest pain.  Troponin was normal.  Cardiology neurology consultation replaced.  Elevated white count most likely due to steroids on 3/30/2024.  Echo revealed EF 55 to 60%, normal left ventricular systolic function, mildly increased wall thickness, abnormal diastolic function. MRI brain with and without contrast revealed new white matter disease with 1 small focus of enhancement, demyelination is questioned among other

## 2024-04-03 NOTE — CARE COORDINATION
CM reviewed chart. Patient appealed discharge. Per previous , documents upload to [facility] for review.      Case ID [id number]    Check status. [facility] awaiting documents to be uploaded. Case manger uploading documents now for [facility] review for appeal.

## 2024-04-03 NOTE — PROGRESS NOTES
PT attempt made this morning and pt declined requesting therapy at a later time. Will follow up later as time permits.

## 2024-04-04 ENCOUNTER — APPOINTMENT (OUTPATIENT)
Facility: HOSPITAL | Age: 39
DRG: 103 | End: 2024-04-04
Payer: MEDICARE

## 2024-04-04 LAB
GLUCOSE BLD STRIP.AUTO-MCNC: 117 MG/DL (ref 65–100)
GLUCOSE BLD STRIP.AUTO-MCNC: 157 MG/DL (ref 65–100)
GLUCOSE BLD STRIP.AUTO-MCNC: 200 MG/DL (ref 65–100)
GLUCOSE BLD STRIP.AUTO-MCNC: 202 MG/DL (ref 65–100)
PERFORMED BY:: ABNORMAL

## 2024-04-04 PROCEDURE — 70450 CT HEAD/BRAIN W/O DYE: CPT

## 2024-04-04 PROCEDURE — 6370000000 HC RX 637 (ALT 250 FOR IP): Performed by: PSYCHIATRY & NEUROLOGY

## 2024-04-04 PROCEDURE — 6370000000 HC RX 637 (ALT 250 FOR IP): Performed by: HOSPITALIST

## 2024-04-04 PROCEDURE — 2580000003 HC RX 258: Performed by: HOSPITALIST

## 2024-04-04 PROCEDURE — 6370000000 HC RX 637 (ALT 250 FOR IP): Performed by: PHYSICIAN ASSISTANT

## 2024-04-04 PROCEDURE — 1100000000 HC RM PRIVATE

## 2024-04-04 PROCEDURE — 6360000002 HC RX W HCPCS: Performed by: PHYSICIAN ASSISTANT

## 2024-04-04 PROCEDURE — 82962 GLUCOSE BLOOD TEST: CPT

## 2024-04-04 PROCEDURE — 6360000002 HC RX W HCPCS: Performed by: HOSPITALIST

## 2024-04-04 RX ORDER — HYDRALAZINE HYDROCHLORIDE 20 MG/ML
10 INJECTION INTRAMUSCULAR; INTRAVENOUS EVERY 6 HOURS PRN
Status: DISCONTINUED | OUTPATIENT
Start: 2024-04-04 | End: 2024-04-05 | Stop reason: HOSPADM

## 2024-04-04 RX ADMIN — METHOCARBAMOL 750 MG: 500 TABLET ORAL at 20:35

## 2024-04-04 RX ADMIN — ENOXAPARIN SODIUM 30 MG: 100 INJECTION SUBCUTANEOUS at 20:34

## 2024-04-04 RX ADMIN — ONDANSETRON 4 MG: 2 INJECTION INTRAMUSCULAR; INTRAVENOUS at 12:56

## 2024-04-04 RX ADMIN — ATORVASTATIN CALCIUM 80 MG: 40 TABLET, FILM COATED ORAL at 20:35

## 2024-04-04 RX ADMIN — INSULIN GLARGINE 26 UNITS: 100 INJECTION, SOLUTION SUBCUTANEOUS at 20:35

## 2024-04-04 RX ADMIN — RANOLAZINE 500 MG: 500 TABLET, EXTENDED RELEASE ORAL at 09:15

## 2024-04-04 RX ADMIN — MORPHINE SULFATE 1 MG: 2 INJECTION, SOLUTION INTRAMUSCULAR; INTRAVENOUS at 18:25

## 2024-04-04 RX ADMIN — MORPHINE SULFATE 1 MG: 2 INJECTION, SOLUTION INTRAMUSCULAR; INTRAVENOUS at 14:11

## 2024-04-04 RX ADMIN — METHOCARBAMOL 750 MG: 500 TABLET ORAL at 09:14

## 2024-04-04 RX ADMIN — SODIUM CHLORIDE, PRESERVATIVE FREE 10 ML: 5 INJECTION INTRAVENOUS at 20:36

## 2024-04-04 RX ADMIN — DIVALPROEX SODIUM 250 MG: 250 TABLET, DELAYED RELEASE ORAL at 20:35

## 2024-04-04 RX ADMIN — MORPHINE SULFATE 1 MG: 2 INJECTION, SOLUTION INTRAMUSCULAR; INTRAVENOUS at 23:03

## 2024-04-04 RX ADMIN — LEVOTHYROXINE SODIUM 137 MCG: 0.03 TABLET ORAL at 06:02

## 2024-04-04 RX ADMIN — SODIUM CHLORIDE, PRESERVATIVE FREE 10 ML: 5 INJECTION INTRAVENOUS at 10:10

## 2024-04-04 RX ADMIN — DIVALPROEX SODIUM 250 MG: 250 TABLET, DELAYED RELEASE ORAL at 09:15

## 2024-04-04 RX ADMIN — MORPHINE SULFATE 1 MG: 2 INJECTION, SOLUTION INTRAMUSCULAR; INTRAVENOUS at 10:10

## 2024-04-04 RX ADMIN — INSULIN LISPRO 4 UNITS: 100 INJECTION, SOLUTION INTRAVENOUS; SUBCUTANEOUS at 16:43

## 2024-04-04 RX ADMIN — AMLODIPINE BESYLATE 5 MG: 5 TABLET ORAL at 09:15

## 2024-04-04 RX ADMIN — ENOXAPARIN SODIUM 30 MG: 100 INJECTION SUBCUTANEOUS at 09:14

## 2024-04-04 RX ADMIN — MORPHINE SULFATE 1 MG: 2 INJECTION, SOLUTION INTRAMUSCULAR; INTRAVENOUS at 05:58

## 2024-04-04 RX ADMIN — CETIRIZINE HYDROCHLORIDE 10 MG: 10 TABLET, FILM COATED ORAL at 09:15

## 2024-04-04 RX ADMIN — EMPAGLIFLOZIN 10 MG: 10 TABLET, FILM COATED ORAL at 10:10

## 2024-04-04 RX ADMIN — RANOLAZINE 500 MG: 500 TABLET, EXTENDED RELEASE ORAL at 20:35

## 2024-04-04 RX ADMIN — ASPIRIN 81 MG: 81 TABLET, COATED ORAL at 09:15

## 2024-04-04 ASSESSMENT — PAIN DESCRIPTION - DESCRIPTORS
DESCRIPTORS: THROBBING;ACHING
DESCRIPTORS: ACHING;THROBBING

## 2024-04-04 ASSESSMENT — PAIN SCALES - GENERAL
PAINLEVEL_OUTOF10: 9
PAINLEVEL_OUTOF10: 7
PAINLEVEL_OUTOF10: 6
PAINLEVEL_OUTOF10: 10
PAINLEVEL_OUTOF10: 8
PAINLEVEL_OUTOF10: 9
PAINLEVEL_OUTOF10: 6
PAINLEVEL_OUTOF10: 9

## 2024-04-04 ASSESSMENT — PAIN DESCRIPTION - LOCATION
LOCATION: CHEST
LOCATION: CHEST;HEAD
LOCATION: ABDOMEN
LOCATION: CHEST;HEAD
LOCATION: HEAD;CHEST

## 2024-04-04 NOTE — PLAN OF CARE
Problem: Discharge Planning  Goal: Discharge to home or other facility with appropriate resources  4/3/2024 2322 by Yamilet Cortez RN  Outcome: Progressing  Flowsheets (Taken 4/3/2024 2322)  Discharge to home or other facility with appropriate resources:   Identify barriers to discharge with patient and caregiver   Identify discharge learning needs (meds, wound care, etc)  4/3/2024 1151 by Shy Grant RN  Outcome: Progressing     Problem: Chronic Conditions and Co-morbidities  Goal: Patient's chronic conditions and co-morbidity symptoms are monitored and maintained or improved  4/3/2024 2322 by Yamilet Cortez RN  Outcome: Progressing  Flowsheets (Taken 4/3/2024 2322)  Care Plan - Patient's Chronic Conditions and Co-Morbidity Symptoms are Monitored and Maintained or Improved:   Monitor and assess patient's chronic conditions and comorbid symptoms for stability, deterioration, or improvement   Update acute care plan with appropriate goals if chronic or comorbid symptoms are exacerbated and prevent overall improvement and discharge  4/3/2024 1151 by Shy Grant RN  Outcome: Progressing     Problem: Pain  Goal: Verbalizes/displays adequate comfort level or baseline comfort level  Outcome: Progressing  Flowsheets (Taken 4/3/2024 2322)  Verbalizes/displays adequate comfort level or baseline comfort level:   Encourage patient to monitor pain and request assistance   Assess pain using appropriate pain scale   Administer analgesics based on type and severity of pain and evaluate response   Implement non-pharmacological measures as appropriate and evaluate response   Consider cultural and social influences on pain and pain management   Notify Licensed Independent Practitioner if interventions unsuccessful or patient reports new pain     Problem: Skin/Tissue Integrity  Goal: Absence of new skin breakdown  Description: 1.  Monitor for areas of redness and/or skin breakdown  2.  Assess vascular access sites

## 2024-04-04 NOTE — PROGRESS NOTES
Attempted 1453 however pt on phone call and requesting writer to return at a later time. Will continue to follow and attempt when appropriate. Thank you.

## 2024-04-04 NOTE — DISCHARGE SUMMARY
Discharge Summary    Name: Mandie Dietz  [id number]  YOB: 1985 (Age: 38 y.o.)   Date of Admission: 3/29/2024  Date of Discharge: 4/4/2024  Attending Physician: Jaelyn Jorge MD    Discharge Diagnosis:   Principal Problem:    Intractable headache  Active Problems:    Type 2 diabetes mellitus with complication, without long-term current use of insulin ([facility])    CKD (chronic kidney disease) stage 3, GFR 30-59 ml/min ([facility])  Resolved Problems:    Hypertensive urgency    Intractable cluster headache       Consultations:  IP CONSULT TO TELE-NEUROLOGY  IP CONSULT TO NEUROLOGY  IP CONSULT TO CARDIOLOGY  IP CONSULT TO CARDIOLOGY    Brief Hospital Course by Main Problems:   Ms. Dietz is a 38-year-old female with past medical history of POTS syndrome, type 2 diabetes mellitus, chronic kidney disease, history of cardiac arrest status post CABG with postoperative complications resulting in paraplegia which has resolved who presented to the emergency department for admission on 3/29/2024 with a 10-day headache that felt bandlike with neurological symptoms to include right leg weakness and sensation changes.  She subsequently failed a conservative treatment of Fioricet Depakote, Dilaudid, Benadryl, magnesium.  She was given steroids with no resolution.  MRV of the brain no evidence of dural venous thrombus although abnormal signal present bilaterally in the cerebellum.  Patient was also found to have hypertensive urgency which has improved.  Weaning and waxing chest pain.  Troponin was normal.  Cardiology neurology consultation replaced.  Elevated white count most likely due to steroids on 3/30/2024.  Echo revealed EF 55 to 60%, normal left ventricular systolic function, mildly increased wall thickness, abnormal diastolic function. MRI brain with and without contrast revealed new white matter disease with 1 small focus of enhancement, demyelination is questioned among  other possible considerations, no acute infarct.  A1c 8%.  Cardiology evaluated patient and recommended no inpatient cardiac testing needed, patient recently completed catheterization procedure at external facility with no findings.  Neurology recommended LP, scheduled for 4/3 however was informed by IR that for a lumbar puncture patient would need to be off of Brilinta for 5 days.  Discussed with neurology who recommended discharge and follow-up as an outpatient with neurology once lumbar puncture results completed.  IR scheduled lumbar puncture as an outpatient on 4/8 at 11 AM, will have patient discharge and not take Brilinta for 5 days prior to procedure.  Patient is stable symptomatically at this time and is medically cleared for discharge.  Patient will follow-up with below listed PCP, cardiology, newly referred neurology, and interventional radiology.  She will need to discuss her diabetes regimen with her PCP as A1c was 8%.  Final discharge is pending decision on appeal.    Discharge Exam:  Patient seen and examined by me on discharge day.  She is eating and drinking appropriately and has had a bowel movement recently.    Pertinent Findings:  Patient Vitals for the past 24 hrs:   BP Temp Temp src Pulse Resp SpO2   04/04/24 0757 122/74 97.6 °F (36.4 °C) Oral 70 16 97 %   04/04/24 0628 -- -- -- -- 18 --   04/04/24 0400 121/86 98.2 °F (36.8 °C) Oral 78 18 94 %   04/03/24 2251 125/84 98 °F (36.7 °C) Oral 78 18 100 %   04/03/24 2223 -- -- -- -- 18 --   04/03/24 2159 125/88 -- -- 87 -- 99 %   04/03/24 1938 (!) 147/98 97.8 °F (36.6 °C) Oral 84 13 98 %   04/03/24 1905 -- -- -- -- 16 --   04/03/24 1835 -- -- -- -- 17 --   04/03/24 1615 125/77 98.1 °F (36.7 °C) Oral 83 17 100 %   04/03/24 1405 -- -- -- -- 19 --   04/03/24 1400 (!) 134/93 -- -- -- -- --   04/03/24 1245 (!) 145/102 -- -- -- -- --   04/03/24 1239 (!) 165/108 -- -- -- -- --   04/03/24 1234 (!) 165/119 -- -- -- -- --   04/03/24 1000 (!) 127/100 97.4 °F

## 2024-04-04 NOTE — CARE COORDINATION
DCP: home with  as caregiver, pt prefers outpt therapy; appeal pending    0828: CM checked [employer] website for decision, states calls to be made.    1044: CM checked [employer] website for decision, continues to be in decision calls to be made    1443: CM met with pt at bedside to ask about appeal information. Pt showed me email she received about appeal and it was from [employer]. CM noticed IMM on pt's bedside table and asked if she contacted [employer], and she stated she did not. CM explained that [employer] would need to be contacted. Pt says she will contact them.     1508: BREE met with pt at bedside again per pt's request, and she gave me case number [id number] for her appeal. CM will upload the documents for the appeal.

## 2024-04-04 NOTE — PROGRESS NOTES
Notified Arsen of the pts BP of 164/104, he stated to administer hydralazine if systolic BP greater than 160. BP Recheck 122/83. Notifeid Arsen. No further orders.    Raisa Fernandez RN

## 2024-04-04 NOTE — PROGRESS NOTES
Spiritual Care Assessment/Progress Note  [facility]    Name: Mandie Dietz MRN: [id number]    Age: 38 y.o.     Sex: female   Language: English     Date: 4/4/2024            Total Time Calculated: 10 min              Spiritual Assessment begun in [address] MED/SURG  Service Provided For:: Patient  Referral/Consult From:: Rounding  Encounter Overview/Reason : Initial Encounter, Spiritual/Emotional Needs    Spiritual beliefs:      [x] Involved in a ani tradition/spiritual practice: Jehovah's witness     [x] Supported by a ani community:      [] Claims no spiritual orientation:      [] Seeking spiritual identity:           [] Adheres to an individual form of spirituality:      [] Not able to assess:                Identified resources for coping and support system:   Support System: Family members       [x] Prayer                  [] Devotional reading               [] Music                  [] Guided Imagery     [] Pet visits                                        [] Other: (COMMENT)     Specific area/focus of visit   Encounter:    Crisis:    Spiritual/Emotional needs: Type: Emotional Distress, Spiritual Support  Ritual, Rites and Sacraments:    Grief, Loss, and Adjustments:    Ethics/Mediation:    Behavioral Health:    Palliative Care:    Advance Care Planning:      Plan/Referrals: Other (Comment) ( available as needed.)    Narrative:   Visited patient in room #564 in [facility]. Mrs. Dietz was present during the visit. She was sitting up in the bed. She shared about her medical condition. She is concerned about her health and thinking about a test is that she is scheduled to have on Monday. She feels stressed out and wonders what kind of what results it will produce. She mentioned that she has been having conversations with her  who has been praying for her. She is thankful to have great family support with visits from her  and phone calls with her mother. Provided patient with support of

## 2024-04-04 NOTE — PLAN OF CARE
Problem: Discharge Planning  Goal: Discharge to home or other facility with appropriate resources  4/4/2024 0940 by Yulissa Boykin RN  Outcome: Progressing  Flowsheets (Taken 4/4/2024 0933)  Discharge to home or other facility with appropriate resources:   Identify barriers to discharge with patient and caregiver   Arrange for needed discharge resources and transportation as appropriate   Identify discharge learning needs (meds, wound care, etc)  4/4/2024 0940 by Yulissa Boykin RN  Outcome: Progressing  Flowsheets (Taken 4/4/2024 0933)  Discharge to home or other facility with appropriate resources:   Identify barriers to discharge with patient and caregiver   Arrange for needed discharge resources and transportation as appropriate   Identify discharge learning needs (meds, wound care, etc)  4/3/2024 2322 by Yamilet Cortez RN  Outcome: Progressing  Flowsheets (Taken 4/3/2024 2322)  Discharge to home or other facility with appropriate resources:   Identify barriers to discharge with patient and caregiver   Identify discharge learning needs (meds, wound care, etc)     Problem: Chronic Conditions and Co-morbidities  Goal: Patient's chronic conditions and co-morbidity symptoms are monitored and maintained or improved  4/4/2024 0940 by Yulissa Boykin RN  Outcome: Progressing  Flowsheets  Taken 4/4/2024 0933 by Yulissa Boykin RN  Care Plan - Patient's Chronic Conditions and Co-Morbidity Symptoms are Monitored and Maintained or Improved:   Monitor and assess patient's chronic conditions and comorbid symptoms for stability, deterioration, or improvement   Collaborate with multidisciplinary team to address chronic and comorbid conditions and prevent exacerbation or deterioration  Taken 4/3/2024 2329 by Yamilet Cortez RN  Care Plan - Patient's Chronic Conditions and Co-Morbidity Symptoms are Monitored and Maintained or Improved:   Monitor and assess patient's chronic conditions and comorbid symptoms for

## 2024-04-05 VITALS
TEMPERATURE: 97.8 F | SYSTOLIC BLOOD PRESSURE: 128 MMHG | HEART RATE: 79 BPM | WEIGHT: 230 LBS | BODY MASS INDEX: 39.27 KG/M2 | HEIGHT: 64 IN | DIASTOLIC BLOOD PRESSURE: 83 MMHG | OXYGEN SATURATION: 96 % | RESPIRATION RATE: 16 BRPM

## 2024-04-05 LAB
ALBUMIN SERPL-MCNC: 2.9 G/DL (ref 3.5–5)
ALBUMIN/GLOB SERPL: 0.8 (ref 1.1–2.2)
ALP SERPL-CCNC: 65 U/L (ref 45–117)
ALT SERPL-CCNC: 26 U/L (ref 12–78)
ANION GAP SERPL CALC-SCNC: 2 MMOL/L (ref 5–15)
AST SERPL W P-5'-P-CCNC: 15 U/L (ref 15–37)
BASOPHILS # BLD: 0 K/UL (ref 0–0.1)
BASOPHILS NFR BLD: 0 % (ref 0–1)
BILIRUB SERPL-MCNC: 0.5 MG/DL (ref 0.2–1)
BUN SERPL-MCNC: 17 MG/DL (ref 6–20)
BUN/CREAT SERPL: 17 (ref 12–20)
CA-I BLD-MCNC: 8.7 MG/DL (ref 8.5–10.1)
CHLORIDE SERPL-SCNC: 102 MMOL/L (ref 97–108)
CO2 SERPL-SCNC: 30 MMOL/L (ref 21–32)
CREAT SERPL-MCNC: 1 MG/DL (ref 0.55–1.02)
DIFFERENTIAL METHOD BLD: ABNORMAL
EOSINOPHIL # BLD: 0.5 K/UL (ref 0–0.4)
EOSINOPHIL NFR BLD: 4 % (ref 0–7)
ERYTHROCYTE [DISTWIDTH] IN BLOOD BY AUTOMATED COUNT: 12.1 % (ref 11.5–14.5)
GLOBULIN SER CALC-MCNC: 3.7 G/DL (ref 2–4)
GLUCOSE BLD STRIP.AUTO-MCNC: 128 MG/DL (ref 65–100)
GLUCOSE BLD STRIP.AUTO-MCNC: 162 MG/DL (ref 65–100)
GLUCOSE SERPL-MCNC: 146 MG/DL (ref 65–100)
HCT VFR BLD AUTO: 41.7 % (ref 35–47)
HGB BLD-MCNC: 14.3 G/DL (ref 11.5–16)
IMM GRANULOCYTES # BLD AUTO: 0.1 K/UL (ref 0–0.04)
IMM GRANULOCYTES NFR BLD AUTO: 1 % (ref 0–0.5)
LYMPHOCYTES # BLD: 3 K/UL (ref 0.8–3.5)
LYMPHOCYTES NFR BLD: 22 % (ref 12–49)
MCH RBC QN AUTO: 31.2 PG (ref 26–34)
MCHC RBC AUTO-ENTMCNC: 34.3 G/DL (ref 30–36.5)
MCV RBC AUTO: 90.8 FL (ref 80–99)
MONOCYTES # BLD: 0.9 K/UL (ref 0–1)
MONOCYTES NFR BLD: 7 % (ref 5–13)
NEUTS SEG # BLD: 9.2 K/UL (ref 1.8–8)
NEUTS SEG NFR BLD: 66 % (ref 32–75)
NRBC # BLD: 0 K/UL (ref 0–0.01)
NRBC BLD-RTO: 0 PER 100 WBC
PERFORMED BY:: ABNORMAL
PERFORMED BY:: ABNORMAL
PLATELET # BLD AUTO: 319 K/UL (ref 150–400)
PMV BLD AUTO: 9.5 FL (ref 8.9–12.9)
POTASSIUM SERPL-SCNC: 4.1 MMOL/L (ref 3.5–5.1)
PROT SERPL-MCNC: 6.6 G/DL (ref 6.4–8.2)
RBC # BLD AUTO: 4.59 M/UL (ref 3.8–5.2)
SODIUM SERPL-SCNC: 134 MMOL/L (ref 136–145)
WBC # BLD AUTO: 13.7 K/UL (ref 3.6–11)

## 2024-04-05 PROCEDURE — 36415 COLL VENOUS BLD VENIPUNCTURE: CPT

## 2024-04-05 PROCEDURE — 6360000002 HC RX W HCPCS: Performed by: PHYSICIAN ASSISTANT

## 2024-04-05 PROCEDURE — 80053 COMPREHEN METABOLIC PANEL: CPT

## 2024-04-05 PROCEDURE — 82962 GLUCOSE BLOOD TEST: CPT

## 2024-04-05 PROCEDURE — 6360000002 HC RX W HCPCS: Performed by: HOSPITALIST

## 2024-04-05 PROCEDURE — 6370000000 HC RX 637 (ALT 250 FOR IP): Performed by: PSYCHIATRY & NEUROLOGY

## 2024-04-05 PROCEDURE — 2580000003 HC RX 258: Performed by: HOSPITALIST

## 2024-04-05 PROCEDURE — 05HA33Z INSERTION OF INFUSION DEVICE INTO LEFT BRACHIAL VEIN, PERCUTANEOUS APPROACH: ICD-10-PCS | Performed by: STUDENT IN AN ORGANIZED HEALTH CARE EDUCATION/TRAINING PROGRAM

## 2024-04-05 PROCEDURE — 6370000000 HC RX 637 (ALT 250 FOR IP): Performed by: HOSPITALIST

## 2024-04-05 PROCEDURE — 6370000000 HC RX 637 (ALT 250 FOR IP): Performed by: PHYSICIAN ASSISTANT

## 2024-04-05 PROCEDURE — 85025 COMPLETE CBC W/AUTO DIFF WBC: CPT

## 2024-04-05 RX ORDER — OXYCODONE HYDROCHLORIDE 5 MG/1
5 TABLET ORAL EVERY 4 HOURS PRN
Status: DISCONTINUED | OUTPATIENT
Start: 2024-04-05 | End: 2024-04-05 | Stop reason: HOSPADM

## 2024-04-05 RX ADMIN — AMLODIPINE BESYLATE 5 MG: 5 TABLET ORAL at 09:06

## 2024-04-05 RX ADMIN — INSULIN GLARGINE 20 UNITS: 100 INJECTION, SOLUTION SUBCUTANEOUS at 09:06

## 2024-04-05 RX ADMIN — LEVOTHYROXINE SODIUM 137 MCG: 0.03 TABLET ORAL at 06:13

## 2024-04-05 RX ADMIN — METHOCARBAMOL 750 MG: 500 TABLET ORAL at 09:06

## 2024-04-05 RX ADMIN — ASPIRIN 81 MG: 81 TABLET, COATED ORAL at 09:06

## 2024-04-05 RX ADMIN — MORPHINE SULFATE 1 MG: 2 INJECTION, SOLUTION INTRAMUSCULAR; INTRAVENOUS at 07:27

## 2024-04-05 RX ADMIN — CETIRIZINE HYDROCHLORIDE 10 MG: 10 TABLET, FILM COATED ORAL at 09:06

## 2024-04-05 RX ADMIN — SODIUM CHLORIDE, PRESERVATIVE FREE 10 ML: 5 INJECTION INTRAVENOUS at 09:07

## 2024-04-05 RX ADMIN — ENOXAPARIN SODIUM 30 MG: 100 INJECTION SUBCUTANEOUS at 09:05

## 2024-04-05 RX ADMIN — DIVALPROEX SODIUM 250 MG: 250 TABLET, DELAYED RELEASE ORAL at 09:06

## 2024-04-05 RX ADMIN — MORPHINE SULFATE 1 MG: 2 INJECTION, SOLUTION INTRAMUSCULAR; INTRAVENOUS at 03:02

## 2024-04-05 RX ADMIN — EMPAGLIFLOZIN 10 MG: 10 TABLET, FILM COATED ORAL at 09:08

## 2024-04-05 RX ADMIN — RANOLAZINE 500 MG: 500 TABLET, EXTENDED RELEASE ORAL at 09:06

## 2024-04-05 ASSESSMENT — PAIN SCALES - GENERAL
PAINLEVEL_OUTOF10: 6
PAINLEVEL_OUTOF10: 9

## 2024-04-05 NOTE — DISCHARGE SUMMARY
Discharge Summary    Name: Mandie Diezt  [id number]  YOB: 1985 (Age: 38 y.o.)   Date of Admission: 3/29/2024  Date of Discharge: 4/5/2024  Attending Physician: Jaelyn Jorge MD    Discharge Diagnosis:   Principal Problem:    Intractable headache  Active Problems:    Type 2 diabetes mellitus with complication, without long-term current use of insulin ([facility])    CKD (chronic kidney disease) stage 3, GFR 30-59 ml/min ([facility])  Resolved Problems:    Hypertensive urgency    Intractable cluster headache       Consultations:  IP CONSULT TO TELE-NEUROLOGY  IP CONSULT TO NEUROLOGY  IP CONSULT TO CARDIOLOGY  IP CONSULT TO CARDIOLOGY    Brief Hospital Course by Main Problems:   Ms. Dietz is a 38-year-old female with past medical history of POTS syndrome, type 2 diabetes mellitus, chronic kidney disease, history of cardiac arrest status post CABG with postoperative complications resulting in paraplegia which has resolved who presented to the emergency department for admission on 3/29/2024 with a 10-day headache that felt bandlike with neurological symptoms to include right leg weakness and sensation changes.  She subsequently failed a conservative treatment of Fioricet Depakote, Dilaudid, Benadryl, magnesium.  She was given steroids with no resolution.  MRV of the brain no evidence of dural venous thrombus although abnormal signal present bilaterally in the cerebellum.  Patient was also found to have hypertensive urgency which has improved.  Weaning and waxing chest pain.  Troponin was normal.  Cardiology neurology consultation replaced.  Elevated white count most likely due to steroids.  Echo revealed EF 55 to 60%, normal left ventricular systolic function, mildly increased wall thickness, abnormal diastolic function. MRI brain with and without contrast revealed new white matter disease with 1 small focus of enhancement, demyelination is questioned among other possible  [facility] [address] [phone]  Follow up  As needed, If symptoms worsen    Александр Sorto MD  [address]  [address]  [facility] [address] [phone]    Follow up in 1 week(s)  New patient referral for outpatient neurology follow-up.  Patient recently admitted and MRI brain revealed new white matter disease with 1 small focus of enhancement, lumbar puncture scheduled for 4/9.          Total time in minutes spent coordinating this discharge (includes going over instructions, follow-up, prescriptions, and preparing report for sign off to her PCP) :  35 minutes

## 2024-04-05 NOTE — CARE COORDINATION
DCP: home with  as caregiver; prefers outpt therapy    0822: CM checked [employer] for appeal results, awaiting outcome notifications    1027: CM checked [employer] for appeal results, awaiting outcome notifications    1117: CM received message that pt appeal has been denied. CM to speak with pt and inform that she has until 4/6/24 at 1200 to dc.     1133: CM met with pt at bedside to discuss denial and pt reports she will dc today, but concerned about her neurology f/u being in August 2024 and requested to see if anything can be scheduled for sooner.     1144: CM asked unit secretary about neuro f/u appts and she says it is backed up for months out.     1159: CM spoke with attending and informed him pt requesting a sooner neurology appt be set up. Attending states will work on trying for a closer neurology appt.       Transition of Care Plan:    RUR: 10%  Prior Level of Functioning: home with  as caregiver  Disposition: home with  as caregiver  If SNF or IPR: Date FOC offered: n/a  Date FOC received: n/a  Accepting facility: n/a  Date authorization started with reference number: n/a  Date authorization received and expires: n/a  Follow up appointments: unit secretary to setup as needed  DME needed: none  Transportation at discharge: family arranged by pt for 1500 pickup time  IM/IMM Medicare/ letter given: yes  Is patient a [place] and connected with [facility]? no  If yes, was  transfer form completed and VA notified? N/a  Caregiver Contact: n/a  Discharge Caregiver contacted prior to discharge? N/a  Care Conference needed? no  Barriers to discharge: none

## 2024-04-08 ENCOUNTER — HOSPITAL ENCOUNTER (OUTPATIENT)
Facility: HOSPITAL | Age: 39
Discharge: HOME | End: 2024-04-11
Attending: PHYSICIAN ASSISTANT
Payer: MEDICARE

## 2024-04-08 VITALS
TEMPERATURE: 97.8 F | HEIGHT: 64 IN | BODY MASS INDEX: 39.27 KG/M2 | WEIGHT: 230 LBS | OXYGEN SATURATION: 97 % | HEART RATE: 74 BPM | DIASTOLIC BLOOD PRESSURE: 91 MMHG | SYSTOLIC BLOOD PRESSURE: 143 MMHG | RESPIRATION RATE: 17 BRPM

## 2024-04-08 LAB
APPEARANCE CSF: CLEAR
COLOR CSF: CLEAR
COLOR SPUN CSF: CLEAR
GLUCOSE BLD STRIP.AUTO-MCNC: 193 MG/DL (ref 65–100)
GLUCOSE CSF-MCNC: 130 MG/DL (ref 40–70)
PERFORMED BY:: ABNORMAL
PROT CSF-MCNC: 42 MG/DL (ref 15–45)
RBC # CSF: 2 /CU MM
TUBE # CSF: 3
TUBE # CSF: ABNORMAL
TUBE # CSF: NORMAL
WBC # CSF: 1 /CU MM (ref 0–5)

## 2024-04-08 PROCEDURE — 83916 OLIGOCLONAL BANDS: CPT

## 2024-04-08 PROCEDURE — 84157 ASSAY OF PROTEIN OTHER: CPT

## 2024-04-08 PROCEDURE — 89050 BODY FLUID CELL COUNT: CPT

## 2024-04-08 PROCEDURE — 86617 LYME DISEASE ANTIBODY: CPT

## 2024-04-08 PROCEDURE — 7100000011 HC PHASE II RECOVERY - ADDTL 15 MIN

## 2024-04-08 PROCEDURE — 82962 GLUCOSE BLOOD TEST: CPT

## 2024-04-08 PROCEDURE — 82945 GLUCOSE OTHER FLUID: CPT

## 2024-04-08 PROCEDURE — 87205 SMEAR GRAM STAIN: CPT

## 2024-04-08 PROCEDURE — 87255 GENET VIRUS ISOLATE HSV: CPT

## 2024-04-08 PROCEDURE — 88108 CYTOPATH CONCENTRATE TECH: CPT

## 2024-04-08 PROCEDURE — 86592 SYPHILIS TEST NON-TREP QUAL: CPT

## 2024-04-08 PROCEDURE — 7100000010 HC PHASE II RECOVERY - FIRST 15 MIN

## 2024-04-08 PROCEDURE — 62328 DX LMBR SPI PNXR W/FLUOR/CT: CPT

## 2024-04-08 ASSESSMENT — PAIN - FUNCTIONAL ASSESSMENT
PAIN_FUNCTIONAL_ASSESSMENT: NONE - DENIES PAIN
PAIN_FUNCTIONAL_ASSESSMENT: 0-10
PAIN_FUNCTIONAL_ASSESSMENT: 0-10

## 2024-04-08 ASSESSMENT — PAIN DESCRIPTION - DESCRIPTORS
DESCRIPTORS: ACHING
DESCRIPTORS: SORE

## 2024-04-08 NOTE — PERIOP NOTE
1350: Pt transported via stretcher to same day with transport. Pt stable and alert. No complaints at this time. Pt belongings returned to pt. Pts  Jamie called for update, states to call when she can be discharged. Report from [facility] in XRAY states pt must lay flat for one hour before discharging. Discharge order placed for 1500 per paper order in pts chart.   1421: Pt lying flat. Pt tolerating starry. Pt stable and alert. VS taken. Discharge instructions and medications reviewed/given. Patient verbalizes understanding. All questions answered. No distress noted, patient stable. Patient confirmed f/u appt.   1455: Pt sat up slowly. Pt bandaid is CDI on back. Pt able to ambulated to restroom without difficulty. Pt getting dressed independently.   1500: Pt discharged via wheelchair to private vehicle with .

## 2024-04-08 NOTE — PROGRESS NOTES
Gricel from [facility] called an asked if this nurse could place lab orders and procedure orders per Domingo Samson NP. Orders placed in STAT.

## 2024-04-10 LAB
BACTERIA SPEC CULT: NORMAL
CYTOLOGY-NON GYN: NORMAL
GRAM STN SPEC: NORMAL
GRAM STN SPEC: NORMAL
Lab: NORMAL
REAGIN AB CSF QL: NON REACTIVE

## 2024-04-12 LAB
B BURGDOR IGG PATRN CSF IB-IMP: NEGATIVE
B BURGDOR IGM PATRN CSF IB-IMP: NEGATIVE
B BURGDOR18KD IGG CSF QL IB: NORMAL
B BURGDOR23KD IGG CSF QL IB: NORMAL
B BURGDOR23KD IGM CSF QL IB: NORMAL
B BURGDOR28KD IGG CSF QL IB: NORMAL
B BURGDOR30KD IGG CSF QL IB: NORMAL
B BURGDOR39KD IGG CSF QL IB: NORMAL
B BURGDOR39KD IGM CSF QL IB: NORMAL
B BURGDOR41KD IGG CSF QL IB: NORMAL
B BURGDOR41KD IGM CSF QL IB: NORMAL
B BURGDOR45KD IGG CSF QL IB: NORMAL
B BURGDOR58KD IGG CSF QL IB: NORMAL
B BURGDOR66KD IGG CSF QL IB: NORMAL
B BURGDOR93KD IGG CSF QL IB: NORMAL
HSV SPEC CULT: NEGATIVE
SPECIMEN SOURCE: NORMAL

## 2024-04-16 LAB
BACTERIA SPEC CULT: NORMAL
BACTERIA SPEC CULT: NORMAL
GRAM STN SPEC: NORMAL
GRAM STN SPEC: NORMAL
Lab: NORMAL

## 2024-04-28 LAB
Lab: NORMAL
Lab: NORMAL
REFERENCE LAB: NORMAL

## 2024-05-04 DIAGNOSIS — E11.65 TYPE 2 DIABETES MELLITUS WITH HYPERGLYCEMIA, UNSPECIFIED WHETHER LONG TERM INSULIN USE (HCC): ICD-10-CM

## 2024-05-04 DIAGNOSIS — Z79.4 LONG TERM (CURRENT) USE OF INSULIN (HCC): ICD-10-CM

## 2024-05-06 DIAGNOSIS — E03.9 HYPOTHYROIDISM, UNSPECIFIED TYPE: Primary | ICD-10-CM

## 2024-05-06 RX ORDER — LEVOTHYROXINE SODIUM 137 UG/1
137 TABLET ORAL
Qty: 90 TABLET | Refills: 3 | Status: SHIPPED | OUTPATIENT
Start: 2024-05-06

## 2024-05-07 DIAGNOSIS — E78.2 MIXED HYPERLIPIDEMIA: ICD-10-CM

## 2024-05-07 DIAGNOSIS — E03.9 HYPOTHYROIDISM, UNSPECIFIED TYPE: Primary | ICD-10-CM

## 2024-05-07 DIAGNOSIS — E11.65 TYPE 2 DIABETES MELLITUS WITH HYPERGLYCEMIA, UNSPECIFIED WHETHER LONG TERM INSULIN USE (HCC): ICD-10-CM

## 2024-05-09 LAB
BUN SERPL-MCNC: 18 MG/DL (ref 6–20)
BUN/CREAT SERPL: 18 (ref 9–23)
CALCIUM SERPL-MCNC: 9.7 MG/DL (ref 8.7–10.2)
CHLORIDE SERPL-SCNC: 97 MMOL/L (ref 96–106)
CO2 SERPL-SCNC: 18 MMOL/L (ref 20–29)
CREAT SERPL-MCNC: 1 MG/DL (ref 0.57–1)
EGFRCR SERPLBLD CKD-EPI 2021: 73 ML/MIN/1.73
GLUCOSE SERPL-MCNC: 191 MG/DL (ref 70–99)
HBA1C MFR BLD: 8.6 % (ref 4.8–5.6)
POTASSIUM SERPL-SCNC: 4.6 MMOL/L (ref 3.5–5.2)
SODIUM SERPL-SCNC: 135 MMOL/L (ref 134–144)
TSH SERPL DL<=0.005 MIU/L-ACNC: 19.3 UIU/ML (ref 0.45–4.5)

## 2024-06-03 ENCOUNTER — OFFICE VISIT (OUTPATIENT)
Age: 39
End: 2024-06-03
Payer: MEDICARE

## 2024-06-03 VITALS
HEIGHT: 64 IN | BODY MASS INDEX: 41.44 KG/M2 | WEIGHT: 242.7 LBS | DIASTOLIC BLOOD PRESSURE: 81 MMHG | HEART RATE: 77 BPM | SYSTOLIC BLOOD PRESSURE: 127 MMHG | OXYGEN SATURATION: 99 % | TEMPERATURE: 98 F

## 2024-06-03 DIAGNOSIS — E11.8 TYPE 2 DIABETES MELLITUS WITH COMPLICATION, WITHOUT LONG-TERM CURRENT USE OF INSULIN (HCC): Primary | ICD-10-CM

## 2024-06-03 DIAGNOSIS — E03.9 PRIMARY HYPOTHYROIDISM: ICD-10-CM

## 2024-06-03 DIAGNOSIS — E78.2 MIXED HYPERLIPIDEMIA: ICD-10-CM

## 2024-06-03 DIAGNOSIS — I10 ESSENTIAL HYPERTENSION: ICD-10-CM

## 2024-06-03 PROCEDURE — 95251 CONT GLUC MNTR ANALYSIS I&R: CPT | Performed by: INTERNAL MEDICINE

## 2024-06-03 PROCEDURE — 3074F SYST BP LT 130 MM HG: CPT | Performed by: INTERNAL MEDICINE

## 2024-06-03 PROCEDURE — 3052F HG A1C>EQUAL 8.0%<EQUAL 9.0%: CPT | Performed by: INTERNAL MEDICINE

## 2024-06-03 PROCEDURE — 99215 OFFICE O/P EST HI 40 MIN: CPT | Performed by: INTERNAL MEDICINE

## 2024-06-03 PROCEDURE — 3079F DIAST BP 80-89 MM HG: CPT | Performed by: INTERNAL MEDICINE

## 2024-06-03 RX ORDER — BLOOD-GLUCOSE METER
EACH MISCELLANEOUS
Qty: 1 KIT | Refills: 0 | Status: SHIPPED | OUTPATIENT
Start: 2024-06-03

## 2024-06-03 RX ORDER — DULAGLUTIDE 1.5 MG/.5ML
1.5 INJECTION, SOLUTION SUBCUTANEOUS WEEKLY
COMMUNITY

## 2024-06-03 RX ORDER — LANCETS 33 GAUGE
EACH MISCELLANEOUS
Qty: 100 EACH | Refills: 3 | Status: SHIPPED | OUTPATIENT
Start: 2024-06-03

## 2024-06-03 RX ORDER — BLOOD SUGAR DIAGNOSTIC
STRIP MISCELLANEOUS
Qty: 100 EACH | Refills: 3 | Status: SHIPPED | OUTPATIENT
Start: 2024-06-03

## 2024-06-03 NOTE — PROGRESS NOTES
[facility] DIABETES AND ENDOCRINOLOGY                 Camelia Hughes MD               Patient Information Name : Mandie Dietz  37 y.o.   YOB: 1985           Referred by: Ella Gao DO         Chief complaint: Diabetes mellitus follow-up       History of Present Illness: Mandie Dietz is here for follow-up of  Diabetes Mellitus.      Diabetes mellitus was diagnosed in 2009 . End organ effects of diabetes: peripheral neuropathy, gastroparesis (unsure of the diagnosis),CKD.  History of CAD,CABG,CVA     Type 2 diabetes mellitus: On MDI  She has Dexcom G7, reportedly having hyperglycemia since switching to Humalog,  Has POTS , seeing Cardiology , Neurology   [facility], tried Ozempic 0.5 mg, had diarrhea, blood glucose was higher, back on Trulicity  Multiple comorbidities  Risk of hypoglycemia is high  No chest pain    MS -diagnosed in 2024, was on steroids, in and out of the hospital for 4 weeks, blood glucose was running higher, in the hospital she was getting less insulin    Since coming off steroids, lately noted improvement in the blood glucose        Wt Readings from Last 3 Encounters:   06/03/24 110.1 kg (242 lb 11.2 oz)   04/08/24 104.3 kg (230 lb)   03/29/24 104.3 kg (230 lb)        Past Medical History:   Diagnosis Date    Acquired hypothyroidism 03/29/2024    Bacterial vaginosis 01/12/2015    Breast abscess of female 07/09/2017    Chronic otitis media with perforated tympanic membrane 04/03/2018    CKD (chronic kidney disease) stage 3, GFR 30-59 ml/min ([facility])     Complicated migraine     Coronary artery disease involving native coronary artery of native heart without angina pectoris     s/p CABGx1    Dental abscess 05/10/2022    Gastroparesis 02/10/2023    Hidradenitis suppurativa of left axilla 08/26/2017    History of DVT (deep vein thrombosis)     Hypertension 2009    Microalbuminuria due to type 2 diabetes mellitus (HCC) 12/27/2017    MS (multiple

## 2024-06-03 NOTE — PATIENT INSTRUCTIONS
Check blood sugars before meals and at bedtime.      Low blood glucose is less than 70       Goal of blood glucose before meals 90 - 120 , 2  Hours after meals less than 180       Maintain the log and bring it all your appointments      If the bedtime sugars are less than 100 ,eat a 15 gm snack.      Levemir 30 units in AM and 30  units at bed time      Novolog or Humalog or Apidra insulin (fast acting) 10 units before breakfast, 10  units before lunch and 10  units before dinner.    If sugars before meals are less than 70 then no insulin         Trulicity weekly       Novolog or Humalog for high sugars       Correction Scale:  3 units for every 50 above 150      Blood sugar  Fast acting insulin             150-200  Extra 3 Units         201-250  Extra 6 Units         251-300  Extra 9 Units         301-350  Extra 12  Units          351-400  Extra 15  Units       Example:    My planned insulin dose:    ____ Units for meals    +    ____ Extra Correction Units  if high =  ____ total units to take together as one injection.

## 2024-06-25 PROBLEM — G35 MULTIPLE SCLEROSIS, PRIMARY CHRONIC PROGRESSIVE (HCC): Status: ACTIVE | Noted: 2024-05-16

## 2024-06-26 ENCOUNTER — HOSPITAL ENCOUNTER (OUTPATIENT)
Facility: HOSPITAL | Age: 39
Setting detail: RECURRING SERIES
Discharge: HOME | End: 2024-06-29
Payer: MEDICARE

## 2024-06-26 PROCEDURE — 97166 OT EVAL MOD COMPLEX 45 MIN: CPT

## 2024-06-26 NOTE — THERAPY EVALUATION
[facility]  [address], [address]  [place] [address]  Phone: [phone]    Fax: [phone]         OCCUPATIONAL THERAPY - MEDICARE EVALUATION/PLAN OF CARE NOTE (updated 3/23)      Date: 2024          Patient Name:  Mandie Dietz :  1985   Medical   Diagnosis:  Weakness [R53.1] Treatment Diagnosis:  M62.81  GENERAL MUSCLE WEAKNESS and R20.2  Paresthesia of skin    Referral Source:  Sill, Nazia, NP-C Provider #:  [id number]                Insurance: Payor: [facility] MEDICARE / Plan: [facility] HEALTHKEEPERS MEDIBLUE PLUS / Product Type: *No Product type* /      Patient  verified yes     Visit #   Current  / Total 1 20   Time   In / Out 340 422   Total Treatment Time 42   Total Timed Codes 0   1:1 Treatment Time 0      [facility] Totals Reminder:  bill using total billable   min of TIMED therapeutic procedures and modalities.   8-22 min = 1 unit; 23-37 min = 2 units; 38-52 min = 3 units;  53-67 min = 4 units; 68-82 min = 5 units           SUBJECTIVE  Pain Level (0-10 scale): 8-back  [x]constant []intermittent []improving []worsening []no change since onset    Any medication changes, allergies to medications, adverse drug reactions, diagnosis change, or new procedure performed?: [x] No    [] Yes (see summary sheet for update)  Medications: Verified on Patient Summary List    Subjective functional status/changes:     Patient states she  had symptoms for ~ 4 years. Symptoms: weakness, limited ability to walk, forgetfulness, dropping things, decreased sensation on the right side of the body.  She was being followed by neurology.   The  last episode (flare up)  occurred  in 2024.  Patient was  seen by neurology --tests completed. MS diagnosed in May 2024.  Patient states there are days when she can't get out of the bed due to fatigue.  Patient reports she falls weekly with last  fall yesterday (24).   Patient states the falls occur

## 2024-07-01 ENCOUNTER — HOSPITAL ENCOUNTER (OUTPATIENT)
Facility: HOSPITAL | Age: 39
Setting detail: RECURRING SERIES
Discharge: HOME | End: 2024-07-04
Payer: MEDICARE

## 2024-07-01 PROCEDURE — 97530 THERAPEUTIC ACTIVITIES: CPT

## 2024-07-01 PROCEDURE — 97535 SELF CARE MNGMENT TRAINING: CPT

## 2024-07-01 PROCEDURE — 97110 THERAPEUTIC EXERCISES: CPT

## 2024-07-01 NOTE — PROGRESS NOTES
OCCUPATIONAL THERAPY - MEDICARE DAILY TREATMENT NOTE (updated 3/23)      Date: 2024          Patient Name:  Mandie Dietz :  1985   Medical   Diagnosis:  Weakness [R53.1] Treatment Diagnosis:  M62.81  GENERAL MUSCLE WEAKNESS and R20.2  Paresthesia of skin    Referral Source:  Nazia Lyons NP-C Insurance:   Payor: [employer] MEDICARE / Plan: [facility] HEALTHKEEPERS MEDIBLUE PLUS / Product Type: *No Product type* /                     Patient  verified yes     Visit #   Current  / Total 2 20   Time   In / Out 230 315   Total Treatment Time 45   Total Timed Codes 45   1:1 Treatment Time 45      [facility] Totals Reminder:  bill using total billable   min of TIMED therapeutic procedures and modalities.   8-22 min = 1 unit; 23-37 min = 2 units; 38-52 min = 3 units; 53-67 min = 4 units; 68-82 min = 5 units         SUBJECTIVE    Pain Level (0-10 scale): 6-back    Any medication changes, allergies to medications, adverse drug reactions, diagnosis change, or new procedure performed?: [x] No    [] Yes (see summary sheet for update)  Medications: Verified on Patient Summary List    Subjective functional status/changes:     --patient states only 5 OT visits were approved.  Patient agreeable to once a week.  States afternoons are good as that is when she has the most energy    --patient states results came back; she is not having seizures    OBJECTIVE      Therapeutic Procedures:  Tx Min Billable or 1:1 Min (if diff from Tx Min) Procedure, Rationale, Specifics   20 20 80258 Therapeutic Exercise (timed):  increase ROM, strength, coordination, balance, and proprioception to improve patient's ability to progress to PLOF and address remaining functional goals. (see flow sheet as applicable)    Details if applicable:  Delaney UE ROM ex   10 10 95661 Therapeutic Activity (timed):  use of dynamic activities replicating functional movements to increase ROM, strength, coordination, balance, and proprioception in order to

## 2024-07-03 ENCOUNTER — HOSPITAL ENCOUNTER (OUTPATIENT)
Facility: HOSPITAL | Age: 39
Setting detail: RECURRING SERIES
End: 2024-07-03
Payer: MEDICARE

## 2024-07-08 ENCOUNTER — APPOINTMENT (OUTPATIENT)
Facility: HOSPITAL | Age: 39
End: 2024-07-08
Payer: MEDICARE

## 2024-07-10 ENCOUNTER — HOSPITAL ENCOUNTER (OUTPATIENT)
Facility: HOSPITAL | Age: 39
Setting detail: RECURRING SERIES
Discharge: HOME | End: 2024-07-13
Payer: MEDICARE

## 2024-07-10 ENCOUNTER — LAB (OUTPATIENT)
Age: 39
End: 2024-07-10

## 2024-07-10 DIAGNOSIS — E11.8 TYPE 2 DIABETES MELLITUS WITH COMPLICATION, WITHOUT LONG-TERM CURRENT USE OF INSULIN (HCC): ICD-10-CM

## 2024-07-10 PROCEDURE — 97535 SELF CARE MNGMENT TRAINING: CPT

## 2024-07-10 PROCEDURE — 97112 NEUROMUSCULAR REEDUCATION: CPT

## 2024-07-10 NOTE — PROGRESS NOTES
sheet as applicable)    Details if applicable:  cognitive tasks   09 19 69129 Self Care/Home Management (timed):  improve patient knowledge and understanding of positioning, activity modification, and diagnosis/prognosis  to improve patient's ability to progress to PLOF and address remaining functional goals.  (see flow sheet as applicable)    Details if applicable: adaptive equipment, memory strategies   15 15     57860 Neuromuscular Re-Education (timed):  improve balance, coordination, kinesthetic sense, posture, core stability and proprioception to improve patient's ability to develop conscious control of individual muscles and awareness of position of extremities in order to progress to PLOF and address remaining functional goals. (see flow sheet as applicable)    Details if applicable:   sensory re-education/compensatory techniques--hands         Details if applicable:          Total Total           [x]  Patient Education billed concurrently with other procedures   [x] Review HEP    [x] Progressed/Changed HEP, detail:  sensory  education strategies  [x] Other detail: adaptive equipment (elastic shoelaces, shoelace options; universal cuff options      Other Objective/Functional Measures  Monofilament testing, R volar hand;  2.83 (normal):Small finger  3.61 (diminished light touch): ring,middle, index finger  4.31 (diminished protective sensation): thumb    Pain Level at end of session (0-10 scale): 7-back      Assessment   Right volar hand-impaired sensation, radial side > ulnar side of hand: contributes to patient's risk of dropping items.  Patient currently uses some compensatory strategies such as testing water temperature with the left hand vs right.  Patient also uses some AE for eating such as a high sided dish, uses bowls.  Provided education to patient on additional items/AE that may > her independence with ADL tasks  Patient will continue to benefit from skilled PT / OT services to modify and progress

## 2024-07-12 LAB
T4 FREE SERPL-MCNC: 1.42 NG/DL (ref 0.82–1.77)
TSH SERPL DL<=0.005 MIU/L-ACNC: 29.2 UIU/ML (ref 0.45–4.5)

## 2024-07-15 ENCOUNTER — HOSPITAL ENCOUNTER (OUTPATIENT)
Facility: HOSPITAL | Age: 39
Setting detail: RECURRING SERIES
Discharge: HOME | End: 2024-07-18
Payer: MEDICARE

## 2024-07-15 PROCEDURE — 97535 SELF CARE MNGMENT TRAINING: CPT

## 2024-07-15 PROCEDURE — 97110 THERAPEUTIC EXERCISES: CPT

## 2024-07-15 NOTE — PROGRESS NOTES
OCCUPATIONAL THERAPY - MEDICARE DAILY TREATMENT NOTE (updated 3/23)      Date: 7/15/2024          Patient Name:  Mandie Dietz :  1985   Medical   Diagnosis:  Weakness [R53.1] Treatment Diagnosis:  M62.81  GENERAL MUSCLE WEAKNESS and R20.2  Paresthesia of skin    Referral Source:  Nazia Lyons NP-C Insurance:   Payor: [employer] MEDICARE / Plan: [facility] HEALTHKEEPERS MEDIBLUE PLUS / Product Type: *No Product type* /                     Patient  verified yes     Visit #   Current  / Total 4; 4 20; 5   Time   In / Out 306 350   Total Treatment Time 44   Total Timed Codes 44   1:1 Treatment Time 44      [facility] Totals Reminder:  bill using total billable   min of TIMED therapeutic procedures and modalities.   8-22 min = 1 unit; 23-37 min = 2 units; 38-52 min = 3 units; 53-67 min = 4 units; 68-82 min = 5 units         SUBJECTIVE    Pain Level (0-10 scale): 6-back      Any medication changes, allergies to medications, adverse drug reactions, diagnosis change, or new procedure performed?: [x] No    [] Yes (see summary sheet for update)  Medications: Verified on Patient Summary List    Subjective functional status/changes:     --patient states she went to the ER over the weekend due to a spider bite on the forehead (right side).  Her forehead/R eye became swollen.   Patient received a steroid injection.   --patient purchased universal  --using them on her cell phone and TV remote; also purchased shoe clips to assist with tying shoes    OBJECTIVE      Therapeutic Procedures:  Tx Min Billable or 1:1 Min (if diff from Tx Min) Procedure, Rationale, Specifics   14 14 85375 Therapeutic Exercise (timed):  increase ROM, strength, coordination, balance, and proprioception to improve patient's ability to progress to PLOF and address remaining functional goals. (see flow sheet as applicable)    Details if applicable:  Delaney UE ROM ex     60523 Therapeutic Activity (timed):  use of dynamic activities replicating

## 2024-07-17 ENCOUNTER — OFFICE VISIT (OUTPATIENT)
Age: 39
End: 2024-07-17
Payer: MEDICARE

## 2024-07-17 VITALS
HEIGHT: 64 IN | HEART RATE: 88 BPM | TEMPERATURE: 97.9 F | WEIGHT: 240.1 LBS | OXYGEN SATURATION: 96 % | SYSTOLIC BLOOD PRESSURE: 130 MMHG | DIASTOLIC BLOOD PRESSURE: 82 MMHG | BODY MASS INDEX: 40.99 KG/M2

## 2024-07-17 DIAGNOSIS — E03.9 PRIMARY HYPOTHYROIDISM: ICD-10-CM

## 2024-07-17 DIAGNOSIS — E11.8 TYPE 2 DIABETES MELLITUS WITH COMPLICATION, WITHOUT LONG-TERM CURRENT USE OF INSULIN (HCC): Primary | ICD-10-CM

## 2024-07-17 DIAGNOSIS — N18.30 STAGE 3 CHRONIC KIDNEY DISEASE, UNSPECIFIED WHETHER STAGE 3A OR 3B CKD (HCC): ICD-10-CM

## 2024-07-17 PROCEDURE — 3052F HG A1C>EQUAL 8.0%<EQUAL 9.0%: CPT | Performed by: INTERNAL MEDICINE

## 2024-07-17 PROCEDURE — 99214 OFFICE O/P EST MOD 30 MIN: CPT | Performed by: INTERNAL MEDICINE

## 2024-07-17 PROCEDURE — 95251 CONT GLUC MNTR ANALYSIS I&R: CPT | Performed by: INTERNAL MEDICINE

## 2024-07-17 RX ORDER — CARVEDILOL 6.25 MG/1
6.25 TABLET ORAL 2 TIMES DAILY WITH MEALS
COMMUNITY
Start: 2024-04-30

## 2024-07-17 RX ORDER — LEVOTHYROXINE SODIUM 175 UG/1
175 TABLET ORAL DAILY
Qty: 90 TABLET | Refills: 3 | Status: SHIPPED | OUTPATIENT
Start: 2024-07-17

## 2024-07-17 RX ORDER — DOXYCYCLINE HYCLATE 100 MG
100 TABLET ORAL 2 TIMES DAILY
COMMUNITY
Start: 2024-07-11

## 2024-07-17 NOTE — PROGRESS NOTES
[facility] DIABETES AND ENDOCRINOLOGY                 Camelia Hughes MD               Patient Information Name : Mandie Dietz  37 y.o.   YOB: 1985           Referred by: Ella Gao DO         Chief complaint: Diabetes mellitus follow-up       History of Present Illness: Mandie Dietz is here for follow-up of  Diabetes Mellitus.      Diabetes mellitus was diagnosed in 2009 . End organ effects of diabetes: peripheral neuropathy, gastroparesis (unsure of the diagnosis),CKD.  History of CAD,CABG,CVA     Type 2 diabetes mellitus: On MDI  Blood glucose improved, not at goal  Insomnia, daytime hyperglycemia  Has POTS , seeing Cardiology , Neurology   On [facility], tolerating, tried Ozempic 0.5 mg, had diarrhea, blood glucose was higher, back on Trulicity  Multiple comorbidities  Risk of hypoglycemia is high  No chest pain    MS -diagnosed in 2024, was on steroids, in and out of the hospital for 4 weeks, blood glucose was running higher, in the hospital she was getting less insulin    Taking levothyroxine consistently        Wt Readings from Last 3 Encounters:   07/17/24 108.9 kg (240 lb 1.6 oz)   06/03/24 110.1 kg (242 lb 11.2 oz)   04/08/24 104.3 kg (230 lb)        Past Medical History:   Diagnosis Date    Acquired hypothyroidism 03/29/2024    Bacterial vaginosis 01/12/2015    Breast abscess of female 07/09/2017    Chronic otitis media with perforated tympanic membrane 04/03/2018    CKD (chronic kidney disease) stage 3, GFR 30-59 ml/min ([facility])     Complicated migraine     Coronary artery disease involving native coronary artery of native heart without angina pectoris     s/p CABGx1    Dental abscess 05/10/2022    Gastroparesis 02/10/2023    Hidradenitis suppurativa of left axilla 08/26/2017    History of DVT (deep vein thrombosis)     Hypertension 2009    Microalbuminuria due to type 2 diabetes mellitus ([facility]) 12/27/2017    MS (multiple sclerosis) ([facility]) 05/2024    Obesity (BMI 35.0-39.9

## 2024-07-17 NOTE — PATIENT INSTRUCTIONS
Check blood sugars before meals and at bedtime.      Low blood glucose is less than 70       Goal of blood glucose before meals 90 - 120 , 2  Hours after meals less than 180       Maintain the log and bring it all your appointments      If the bedtime sugars are less than 100 ,eat a 15 gm snack.      Levemir 36 units in AM and 30  units at bed time      Novolog or Humalog or Apidra insulin (fast acting) 10 units before breakfast, 10  units before lunch and 10  units before dinner.    If sugars before meals are less than 70 then no insulin         Trulicity weekly       Novolog or Humalog for high sugars       Correction Scale:  3 units for every 50 above 150      Blood sugar  Fast acting insulin             150-200  Extra 3 Units         201-250  Extra 6 Units         251-300  Extra 9 Units         301-350  Extra 12  Units          351-400  Extra 15  Units       Example:    My planned insulin dose:    ____ Units for meals    +    ____ Extra Correction Units  if high =  ____ total units to take together as one injection.            Treisba or Toujeo or Lantus or Levemir

## 2024-07-25 ENCOUNTER — HOSPITAL ENCOUNTER (OUTPATIENT)
Facility: HOSPITAL | Age: 39
Setting detail: RECURRING SERIES
Discharge: HOME | End: 2024-07-28
Payer: MEDICARE

## 2024-07-25 PROCEDURE — 97161 PT EVAL LOW COMPLEX 20 MIN: CPT

## 2024-07-25 NOTE — THERAPY EVALUATION
[facility]  [address], [address]  [place] [address]  Phone: [phone]    Fax: [phone]            PHYSICAL THERAPY - EVALUATION/PLAN OF CARE NOTE (updated 3/23)      Date: 2024          Patient Name:  Mandie Dietz :  1985   Medical   Diagnosis:  Weakness [R53.1] Treatment Diagnosis:  R26.89   Abnormalities of gait and mobility    Referral Source:  Sill, Nazia, NP-C Provider #:  [id number]                Insurance: Payor: [facility] MEDICARE / Plan: [facility] HEALTHKEEPERS MEDIBLUE PLUS / Product Type: *No Product type* /      Patient  verified yes     Visit #   Current  / Total 1 20   Time   In / Out 130 230   Total Treatment Time 60   Total Timed Codes 0         SUBJECTIVE  Pain Level (0-10 scale): 8  []constant []intermittent []improving []worsening []no change since onset    Any medication changes, allergies to medications, adverse drug reactions, diagnosis change, or new procedure performed?: [x] No    [] Yes (see summary sheet for update)  Medications: Verified on Patient Summary List    Subjective functional status/changes:     Very long medical history; problems began in 2019; MI, cabg, sternum infection, DM, thyroid; htn; migraines; bulging;  discs thoracic back: PT for sciatica; chronic ear infections  Finally finally MS diagnosed May 2024, but obviously going on since 2019  I one time I couldn't walk for 8 months.    Start of Care: 2024  Onset Date: May 2024 MS relapse  Current symptoms/Complaints: I am on the improving part of the cycle now.  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

## 2024-07-31 ENCOUNTER — HOSPITAL ENCOUNTER (OUTPATIENT)
Facility: HOSPITAL | Age: 39
Setting detail: RECURRING SERIES
Discharge: HOME | End: 2024-08-03
Payer: MEDICARE

## 2024-07-31 PROCEDURE — 97535 SELF CARE MNGMENT TRAINING: CPT

## 2024-07-31 PROCEDURE — 97530 THERAPEUTIC ACTIVITIES: CPT

## 2024-07-31 PROCEDURE — 97110 THERAPEUTIC EXERCISES: CPT

## 2024-07-31 NOTE — PROGRESS NOTES
[facility]  [address], [address]  [place] [address]  Phone: [phone]    Fax: [phone]    OCCUPATIONAL THERAPY PROGRESS NOTE  Patient Name:  Mandie Dietz :  1985   Treatment/Medical Diagnosis:  Weakness [R53.1]   Referral Source:  Nazia Lyons NP-C     Date of Initial Visit:  24 Attended Visits:  5 Missed Visits:  1 cx     SUMMARY OF TREATMENT/ASSESSMENT:  Patient seen for OT evaluation plus 4 treatment visits.  Patient has received education for DME/adaptive equipment for ADL tasks.  Additional education has been provided for energy conservation and memory/attention skills.  Patient has met 4 of 7 goals.  Patient seen once a week for OT services.  Feel patient will benefit from additional services to address remaining goals, promote maximal level of function, improve QOL.    CURRENT STATUS/GOALS    Modified Fatigue Impact Scale: 14 (70 %) 24                                                      15/20 (75 %) 24                            Measurements: Taken with Tigre Dynamometer, in lbs   Level 2 DATE  24 DATE  24   Right 55  45   Left 45  45      Pinch Measurements: Taken with Pinch Gauge, in lbs     Date  24 Date  24    3 pt       Right  10 11   Left  10 11   Lateral: Right 13 14   Left 11 11      Fine Motor:   9 HOLE PEG TEST: (in seconds)       DATE  24    Date  24   Right 26 23   Left 25 22        SLUMS:  24: 23/30  24: 22/30     (21-26 = mild cognitive impairment)        Ampac:49.87 %  (initial;  32.04 %)  Short Term Goals: To be accomplished in 6-8 treatments.   Patient will be mod I with HEP to promote maximal gains between sessions  [x] Met [] Not met [] Partially met  Date: 24  2.  Patient will verbalize 2 - 4 energy conservation techniques she can use to promote > timeliness completing ADL tasks               [] Met [] Not met [x] Partially met  Date: 
OT       7/31/2024       3:46 PM

## 2024-07-31 NOTE — PROGRESS NOTES
PHYSICAL THERAPY - DAILY TREATMENT NOTE (updated 3/23)      Date: 2024          Patient Name:  Mandie Dietz :  1985   Medical   Diagnosis:  Weakness [R53.1] Treatment Diagnosis:  Weakness [R53.1]   Referral Source:  Nazia Lyons NP-C Insurance:   Payor: [facility] MEDICARE / Plan: [facility] [employer] MEDIBLUE PLUS / Product Type: *No Product type* /                     Patient  verified yes     Visit #   Current  / Total 2 20   Time   In / Out 230 315   Total Treatment Time 45   Total Timed Codes 32         SUBJECTIVE    Pain Level (0-10 scale): 8 in lower lumbar region    Any medication changes, allergies to medications, adverse drug reactions, diagnosis change, or new procedure performed?: [x] No    [] Yes (see summary sheet for update)  Medications: Verified on Patient Summary List    Subjective functional status/changes:     Pt came in with no AD today. Pt states that she requires a AD depending on the day, and that she's been having one of her better days today. Pt reports falling in the mornings and experiencing muscle spasms, dizziness, and increased pain.    OBJECTIVE      Therapeutic Procedures:  Tx Min Billable or 1:1 Min (if diff from Tx Min) Procedure, Rationale, Specifics   30  28884 Therapeutic Exercise (timed):  increase ROM, strength, coordination, balance, and proprioception to improve patient's ability to progress to PLOF and address remaining functional goals. (see flow sheet as applicable)     Details if applicable:       02527 Neuromuscular Re-Education (timed):  improve balance, coordination, kinesthetic sense, posture, core stability and proprioception to improve patient's ability to develop conscious control of individual muscles and awareness of position of extremities in order to progress to PLOF and address remaining functional goals. (see flow sheet as applicable)     Details if applicable:           Details if applicable:           Details if applicable:

## 2024-08-03 LAB — HBA1C MFR BLD HPLC: 7.3 %

## 2024-08-07 ENCOUNTER — APPOINTMENT (OUTPATIENT)
Facility: HOSPITAL | Age: 39
End: 2024-08-07
Payer: MEDICARE

## 2024-08-09 ENCOUNTER — APPOINTMENT (OUTPATIENT)
Facility: HOSPITAL | Age: 39
End: 2024-08-09
Payer: MEDICARE

## 2024-08-13 ENCOUNTER — HOSPITAL ENCOUNTER (OUTPATIENT)
Facility: HOSPITAL | Age: 39
Setting detail: RECURRING SERIES
Discharge: HOME | End: 2024-08-16
Payer: MEDICARE

## 2024-08-13 PROCEDURE — 97110 THERAPEUTIC EXERCISES: CPT

## 2024-08-13 NOTE — PROGRESS NOTES
PHYSICAL THERAPY - DAILY TREATMENT NOTE (updated 3/23)      Date: 2024          Patient Name:  Mandie Dietz :  1985   Medical   Diagnosis:  Weakness [R53.1] Treatment Diagnosis:  Weakness [R53.1]   Referral Source:  Nazia Lyons NP-C Insurance:   Payor: [facility] MEDICARE / Plan: [facility] [employer] MEDIBLUE PLUS / Product Type: *No Product type* /                     Patient  verified yes     Visit #   Current  / Total 3 20   Time   In / Out 231 300   Total Treatment Time 29   Total Timed Codes 26         SUBJECTIVE    Pain Level (0-10 scale): 7 in thoracic region    Any medication changes, allergies to medications, adverse drug reactions, diagnosis change, or new procedure performed?: [x] No    [] Yes (see summary sheet for update)  Medications: Verified on Patient Summary List    Subjective functional status/changes:     Pt reports feeling better than she has in months. Pt reports falling in the mornings and experiencing muscle spasms, dizziness, and increased pain. Pt reports that her doctor mentioned that she might not have MS, but she might have \"tiny strokes\".     OBJECTIVE      Therapeutic Procedures:  Tx Min Billable or 1:1 Min (if diff from Tx Min) Procedure, Rationale, Specifics   26  65347 Therapeutic Exercise (timed):  increase ROM, strength, coordination, balance, and proprioception to improve patient's ability to progress to PLOF and address remaining functional goals. (see flow sheet as applicable)     Details if applicable:       54479 Neuromuscular Re-Education (timed):  improve balance, coordination, kinesthetic sense, posture, core stability and proprioception to improve patient's ability to develop conscious control of individual muscles and awareness of position of extremities in order to progress to PLOF and address remaining functional goals. (see flow sheet as applicable)     Details if applicable:           Details if applicable:           Details if applicable:

## 2024-08-15 ENCOUNTER — HOSPITAL ENCOUNTER (OUTPATIENT)
Facility: HOSPITAL | Age: 39
Setting detail: RECURRING SERIES
Discharge: HOME | End: 2024-08-18
Payer: MEDICARE

## 2024-08-15 PROCEDURE — 97110 THERAPEUTIC EXERCISES: CPT

## 2024-08-15 NOTE — PROGRESS NOTES
PHYSICAL THERAPY - DAILY TREATMENT NOTE (updated 3/23)      Date: 8/15/2024          Patient Name:  Mandie Dietz :  1985   Medical   Diagnosis:  Weakness [R53.1] Treatment Diagnosis:  Weakness [R53.1]   Referral Source:  Nazia Lyons NP-C Insurance:   Payor: [facility] MEDICARE / Plan: [facility] [employer]EEPERS MEDIBLUE PLUS / Product Type: *No Product type* /                     Patient  verified yes     Visit #   Current  / Total 4 20   Time   In / Out 147 230   Total Treatment Time 43   Total Timed Codes 38         SUBJECTIVE    Pain Level (0-10 scale): 8     Any medication changes, allergies to medications, adverse drug reactions, diagnosis change, or new procedure performed?: [x] No    [] Yes (see summary sheet for update)  Medications: Verified on Patient Summary List    Subjective functional status/changes:     Pt reports that she fell yesterday due to dizziness, and that her  caught her before she hit the ground.     OBJECTIVE      Therapeutic Procedures:  Tx Min Billable or 1:1 Min (if diff from Tx Min) Procedure, Rationale, Specifics   38  91991 Therapeutic Exercise (timed):  increase ROM, strength, coordination, balance, and proprioception to improve patient's ability to progress to PLOF and address remaining functional goals. (see flow sheet as applicable)     Details if applicable:       07620 Neuromuscular Re-Education (timed):  improve balance, coordination, kinesthetic sense, posture, core stability and proprioception to improve patient's ability to develop conscious control of individual muscles and awareness of position of extremities in order to progress to PLOF and address remaining functional goals. (see flow sheet as applicable)     Details if applicable:           Details if applicable:           Details if applicable:            Details if applicable:     38     Total Total           [x]  Patient Education billed concurrently with other procedures   [x] Review HEP

## 2024-08-20 ENCOUNTER — APPOINTMENT (OUTPATIENT)
Facility: HOSPITAL | Age: 39
End: 2024-08-20
Payer: MEDICARE

## 2024-08-21 ENCOUNTER — APPOINTMENT (OUTPATIENT)
Facility: HOSPITAL | Age: 39
End: 2024-08-21
Payer: MEDICARE

## 2024-08-22 ENCOUNTER — APPOINTMENT (OUTPATIENT)
Facility: HOSPITAL | Age: 39
End: 2024-08-22
Payer: MEDICARE

## 2024-08-29 ENCOUNTER — HOSPITAL ENCOUNTER (OUTPATIENT)
Facility: HOSPITAL | Age: 39
Setting detail: RECURRING SERIES
End: 2024-08-29
Payer: MEDICARE

## 2024-08-29 PROCEDURE — 97110 THERAPEUTIC EXERCISES: CPT

## 2024-08-29 NOTE — PROGRESS NOTES
[facility]  [address], [address]  [place] [address]  Phone: [phone]    Fax: [phone]    PHYSICAL THERAPY PROGRESS NOTE  Patient Name:  Mandie Dietz :  1985   Treatment/Medical Diagnosis: Weakness [R53.1]   Referral Source:  Nazia Lyons NP-C     Date of Initial Visit:  24 Attended Visits:  5 Missed Visits:  3     SUMMARY OF TREATMENT/ASSESSMENT:  Mrs. Dietz continues to make progress towards her goals. Per patient she has noticed an improvement within her balance and feels more steady on her feet. She remains compliant with her HEP, and has increased her activity level at home. She declines any recent stumbles or falls over the last two weeks. Chief complaint of muscle stiffness and increased unsteadiness at night. Pt continues to have difficulty with ambulation over all surfaces, and lacks foot clearance over obstacles; increasing chance of fall.     CURRENT STATUS/GOALS    Short Term Goals: To be accomplished in 10 treatments.  Improve hand  to 70 lb bilat.  Regressing 24 (As of 24 55 lb right, 45 left.)(22 Left  26 Right 24)  Long Term Goals: To be accomplished in 20 treatments.  Restore to community ambulator  Walk speed 2 mph  progressing 24 (.93m/s)   Walk endurance 10 min nonstop    progressing 24  (248ft no AD)      RECOMMENDATIONS FOR SKILLED THERAPY  Continue POC initiated by PT until all goals are met.          LETICIA OSHEA JR, PTA       2024       11:47 AM    If you have any questions/comments please contact us directly at [phone].   Thank you for allowing us to assist in the care of your patient.

## 2024-08-29 NOTE — PROGRESS NOTES
PHYSICAL THERAPY - DAILY TREATMENT NOTE (updated 3/23)      Date: 2024          Patient Name:  Mandie Dietz :  1985   Medical   Diagnosis:  Weakness [R53.1] Treatment Diagnosis:  Weakness [R53.1]   Referral Source:  Nazia Lyons NP-C Insurance:   Payor: [facility] MEDICARE / Plan: [facility] [employer] MEDIBLUE PLUS / Product Type: *No Product type* /                     Patient  verified yes     Visit #   Current  / Total 5 20   Time   In / Out 1145 1230   Total Treatment Time 45   Total Timed Codes 38         SUBJECTIVE    Pain Level (0-10 scale): 6     Any medication changes, allergies to medications, adverse drug reactions, diagnosis change, or new procedure performed?: [x] No    [] Yes (see summary sheet for update)  Medications: Verified on Patient Summary List    Subjective functional status/changes:     Pt reports that she fell yesterday due to dizziness, and that her  caught her before she hit the ground.     OBJECTIVE      Therapeutic Procedures:  Tx Min Billable or 1:1 Min (if diff from Tx Min) Procedure, Rationale, Specifics   38  54034 Therapeutic Exercise (timed):  increase ROM, strength, coordination, balance, and proprioception to improve patient's ability to progress to PLOF and address remaining functional goals. (see flow sheet as applicable)     Details if applicable:       42388 Neuromuscular Re-Education (timed):  improve balance, coordination, kinesthetic sense, posture, core stability and proprioception to improve patient's ability to develop conscious control of individual muscles and awareness of position of extremities in order to progress to PLOF and address remaining functional goals. (see flow sheet as applicable)     Details if applicable:           Details if applicable:           Details if applicable:            Details if applicable:     38     Total Total           [x]  Patient Education billed concurrently with other procedures   [x] Review HEP

## 2024-09-03 ENCOUNTER — APPOINTMENT (OUTPATIENT)
Facility: HOSPITAL | Age: 39
End: 2024-09-03
Payer: MEDICARE

## 2024-09-05 ENCOUNTER — HOSPITAL ENCOUNTER (OUTPATIENT)
Facility: HOSPITAL | Age: 39
Setting detail: RECURRING SERIES
Discharge: HOME | End: 2024-09-08
Payer: MEDICARE

## 2024-09-05 PROCEDURE — 97110 THERAPEUTIC EXERCISES: CPT

## 2024-09-17 ENCOUNTER — OFFICE VISIT (OUTPATIENT)
Age: 39
End: 2024-09-17
Payer: MEDICARE

## 2024-09-17 VITALS
OXYGEN SATURATION: 96 % | DIASTOLIC BLOOD PRESSURE: 100 MMHG | HEART RATE: 80 BPM | TEMPERATURE: 98 F | WEIGHT: 238.6 LBS | HEIGHT: 64 IN | BODY MASS INDEX: 40.74 KG/M2 | SYSTOLIC BLOOD PRESSURE: 150 MMHG

## 2024-09-17 DIAGNOSIS — E11.8 TYPE 2 DIABETES MELLITUS WITH COMPLICATION, WITHOUT LONG-TERM CURRENT USE OF INSULIN (HCC): ICD-10-CM

## 2024-09-17 DIAGNOSIS — E11.8 TYPE 2 DIABETES MELLITUS WITH COMPLICATION, WITHOUT LONG-TERM CURRENT USE OF INSULIN (HCC): Primary | ICD-10-CM

## 2024-09-17 DIAGNOSIS — E03.9 PRIMARY HYPOTHYROIDISM: ICD-10-CM

## 2024-09-17 DIAGNOSIS — N18.30 STAGE 3 CHRONIC KIDNEY DISEASE, UNSPECIFIED WHETHER STAGE 3A OR 3B CKD (HCC): ICD-10-CM

## 2024-09-17 PROCEDURE — 95251 CONT GLUC MNTR ANALYSIS I&R: CPT | Performed by: INTERNAL MEDICINE

## 2024-09-17 PROCEDURE — 3052F HG A1C>EQUAL 8.0%<EQUAL 9.0%: CPT | Performed by: INTERNAL MEDICINE

## 2024-09-17 PROCEDURE — 99215 OFFICE O/P EST HI 40 MIN: CPT | Performed by: INTERNAL MEDICINE

## 2024-09-17 RX ORDER — LEVOTHYROXINE SODIUM 175 UG/1
175 TABLET ORAL DAILY
Qty: 90 TABLET | Refills: 3 | Status: SHIPPED | OUTPATIENT
Start: 2024-09-17

## 2024-09-17 RX ORDER — LEVOTHYROXINE SODIUM 175 UG/1
175 TABLET ORAL DAILY
Qty: 90 TABLET | Refills: 3 | Status: SHIPPED | OUTPATIENT
Start: 2024-09-17 | End: 2024-09-17 | Stop reason: SDUPTHER

## 2024-10-12 ENCOUNTER — HOSPITAL ENCOUNTER (EMERGENCY)
Facility: HOSPITAL | Age: 39
Discharge: LEFT BEFORE DISCHARGE | End: 2024-10-12
Attending: EMERGENCY MEDICINE

## 2024-10-12 VITALS
HEIGHT: 64 IN | RESPIRATION RATE: 18 BRPM | OXYGEN SATURATION: 96 % | HEART RATE: 85 BPM | SYSTOLIC BLOOD PRESSURE: 160 MMHG | BODY MASS INDEX: 40.12 KG/M2 | DIASTOLIC BLOOD PRESSURE: 112 MMHG | TEMPERATURE: 97.9 F | WEIGHT: 235 LBS

## 2024-10-12 PROCEDURE — 4500000002 HC ER NO CHARGE

## 2024-10-12 ASSESSMENT — PAIN SCALES - GENERAL: PAINLEVEL_OUTOF10: 6

## 2024-10-12 ASSESSMENT — PAIN DESCRIPTION - LOCATION: LOCATION: EAR;FACE

## 2024-10-12 ASSESSMENT — PAIN DESCRIPTION - ORIENTATION: ORIENTATION: LEFT

## 2024-10-12 ASSESSMENT — PAIN DESCRIPTION - DESCRIPTORS: DESCRIPTORS: SHARP;SHOOTING

## 2024-10-12 ASSESSMENT — PAIN - FUNCTIONAL ASSESSMENT: PAIN_FUNCTIONAL_ASSESSMENT: 0-10

## 2024-10-12 NOTE — ED TRIAGE NOTES
Ear pain left x 1.5wks  Went to [facility] 10 days ago, given Augmentin and has finished 7 day prescription  Draining green thick,  Hx of ear infections  Left side of face swollen,   No dental pain.  Ibuprofen 0900 today didn't help.

## 2024-10-12 NOTE — ED NOTES
Patient left without being seen by provider. Patient not in room upon transfer of care at shift [facility]

## 2024-11-28 ENCOUNTER — HOSPITAL ENCOUNTER (INPATIENT)
Facility: HOSPITAL | Age: 39
LOS: 3 days | Discharge: ANOTHER HOSPITAL | DRG: 300 | End: 2024-12-01
Attending: FAMILY MEDICINE | Admitting: HOSPITALIST
Payer: MEDICARE

## 2024-11-28 ENCOUNTER — APPOINTMENT (OUTPATIENT)
Facility: HOSPITAL | Age: 39
DRG: 300 | End: 2024-11-28
Payer: MEDICARE

## 2024-11-28 DIAGNOSIS — R07.9 CHEST PAIN, UNSPECIFIED TYPE: Primary | ICD-10-CM

## 2024-11-28 LAB
ANION GAP SERPL CALC-SCNC: 12 MMOL/L (ref 2–12)
BASOPHILS # BLD: 0.1 K/UL (ref 0–0.1)
BASOPHILS NFR BLD: 1 % (ref 0–1)
BUN SERPL-MCNC: 21 MG/DL (ref 6–20)
BUN/CREAT SERPL: 14 (ref 12–20)
CA-I BLD-MCNC: 9.8 MG/DL (ref 8.5–10.1)
CHLORIDE SERPL-SCNC: 99 MMOL/L (ref 97–108)
CO2 SERPL-SCNC: 22 MMOL/L (ref 21–32)
CREAT SERPL-MCNC: 1.52 MG/DL (ref 0.55–1.02)
D DIMER PPP FEU-MCNC: 0.26 MG/L FEU (ref 0.19–0.5)
DIFFERENTIAL METHOD BLD: ABNORMAL
EOSINOPHIL # BLD: 0.2 K/UL (ref 0–0.4)
EOSINOPHIL NFR BLD: 2 % (ref 0–7)
ERYTHROCYTE [DISTWIDTH] IN BLOOD BY AUTOMATED COUNT: 12.7 % (ref 11.5–14.5)
GLUCOSE BLD STRIP.AUTO-MCNC: 148 MG/DL (ref 65–100)
GLUCOSE SERPL-MCNC: 223 MG/DL (ref 65–100)
HCT VFR BLD AUTO: 45.4 % (ref 35–47)
HGB BLD-MCNC: 15.5 G/DL (ref 11.5–16)
IMM GRANULOCYTES # BLD AUTO: 0 K/UL (ref 0–0.04)
IMM GRANULOCYTES NFR BLD AUTO: 0 % (ref 0–0.5)
LYMPHOCYTES # BLD: 2.8 K/UL (ref 0.8–3.5)
LYMPHOCYTES NFR BLD: 26 % (ref 12–49)
MCH RBC QN AUTO: 30.6 PG (ref 26–34)
MCHC RBC AUTO-ENTMCNC: 34.1 G/DL (ref 30–36.5)
MCV RBC AUTO: 89.5 FL (ref 80–99)
MONOCYTES # BLD: 0.8 K/UL (ref 0–1)
MONOCYTES NFR BLD: 8 % (ref 5–13)
NEUTS SEG # BLD: 6.8 K/UL (ref 1.8–8)
NEUTS SEG NFR BLD: 63 % (ref 32–75)
PERFORMED BY:: ABNORMAL
PLATELET # BLD AUTO: 496 K/UL (ref 150–400)
PMV BLD AUTO: 9 FL (ref 8.9–12.9)
POTASSIUM SERPL-SCNC: 3.9 MMOL/L (ref 3.5–5.1)
RBC # BLD AUTO: 5.07 M/UL (ref 3.8–5.2)
SODIUM SERPL-SCNC: 133 MMOL/L (ref 136–145)
TROPONIN I SERPL HS-MCNC: 7 NG/L (ref 0–51)
TROPONIN I SERPL HS-MCNC: 7 NG/L (ref 0–51)
WBC # BLD AUTO: 10.7 K/UL (ref 3.6–11)

## 2024-11-28 PROCEDURE — 36415 COLL VENOUS BLD VENIPUNCTURE: CPT

## 2024-11-28 PROCEDURE — 6360000002 HC RX W HCPCS: Performed by: STUDENT IN AN ORGANIZED HEALTH CARE EDUCATION/TRAINING PROGRAM

## 2024-11-28 PROCEDURE — 6360000002 HC RX W HCPCS: Performed by: FAMILY MEDICINE

## 2024-11-28 PROCEDURE — 85025 COMPLETE CBC W/AUTO DIFF WBC: CPT

## 2024-11-28 PROCEDURE — 80048 BASIC METABOLIC PNL TOTAL CA: CPT

## 2024-11-28 PROCEDURE — 71045 X-RAY EXAM CHEST 1 VIEW: CPT

## 2024-11-28 PROCEDURE — 2580000003 HC RX 258: Performed by: FAMILY MEDICINE

## 2024-11-28 PROCEDURE — 6370000000 HC RX 637 (ALT 250 FOR IP)

## 2024-11-28 PROCEDURE — 82962 GLUCOSE BLOOD TEST: CPT

## 2024-11-28 PROCEDURE — 85379 FIBRIN DEGRADATION QUANT: CPT

## 2024-11-28 PROCEDURE — 6370000000 HC RX 637 (ALT 250 FOR IP): Performed by: HOSPITALIST

## 2024-11-28 PROCEDURE — 2060000000 HC ICU INTERMEDIATE R&B

## 2024-11-28 PROCEDURE — 99285 EMERGENCY DEPT VISIT HI MDM: CPT

## 2024-11-28 PROCEDURE — 96374 THER/PROPH/DIAG INJ IV PUSH: CPT

## 2024-11-28 PROCEDURE — 84484 ASSAY OF TROPONIN QUANT: CPT

## 2024-11-28 PROCEDURE — 93005 ELECTROCARDIOGRAM TRACING: CPT | Performed by: FAMILY MEDICINE

## 2024-11-28 PROCEDURE — 2580000003 HC RX 258: Performed by: HOSPITALIST

## 2024-11-28 RX ORDER — SODIUM CHLORIDE 0.9 % (FLUSH) 0.9 %
5-40 SYRINGE (ML) INJECTION PRN
Status: DISCONTINUED | OUTPATIENT
Start: 2024-11-28 | End: 2024-12-01 | Stop reason: HOSPADM

## 2024-11-28 RX ORDER — METHOCARBAMOL 500 MG/1
750 TABLET, FILM COATED ORAL 2 TIMES DAILY
Status: DISCONTINUED | OUTPATIENT
Start: 2024-11-28 | End: 2024-12-01 | Stop reason: HOSPADM

## 2024-11-28 RX ORDER — RANOLAZINE 500 MG/1
500 TABLET, EXTENDED RELEASE ORAL 2 TIMES DAILY
Status: DISCONTINUED | OUTPATIENT
Start: 2024-11-28 | End: 2024-11-30

## 2024-11-28 RX ORDER — POLYETHYLENE GLYCOL 3350 17 G/17G
17 POWDER, FOR SOLUTION ORAL DAILY PRN
Status: DISCONTINUED | OUTPATIENT
Start: 2024-11-28 | End: 2024-12-01 | Stop reason: HOSPADM

## 2024-11-28 RX ORDER — INSULIN LISPRO 100 [IU]/ML
12 INJECTION, SOLUTION INTRAVENOUS; SUBCUTANEOUS
Status: DISCONTINUED | OUTPATIENT
Start: 2024-11-28 | End: 2024-12-01 | Stop reason: HOSPADM

## 2024-11-28 RX ORDER — DEXTROSE MONOHYDRATE 100 MG/ML
INJECTION, SOLUTION INTRAVENOUS CONTINUOUS PRN
Status: DISCONTINUED | OUTPATIENT
Start: 2024-11-28 | End: 2024-12-01 | Stop reason: HOSPADM

## 2024-11-28 RX ORDER — AMLODIPINE BESYLATE 5 MG/1
5 TABLET ORAL DAILY
Status: DISCONTINUED | OUTPATIENT
Start: 2024-11-28 | End: 2024-12-01 | Stop reason: HOSPADM

## 2024-11-28 RX ORDER — ENOXAPARIN SODIUM 100 MG/ML
40 INJECTION SUBCUTANEOUS DAILY
Status: DISCONTINUED | OUTPATIENT
Start: 2024-11-29 | End: 2024-12-01 | Stop reason: HOSPADM

## 2024-11-28 RX ORDER — ASPIRIN 81 MG/1
81 TABLET, CHEWABLE ORAL DAILY
Status: DISCONTINUED | OUTPATIENT
Start: 2024-11-29 | End: 2024-12-01 | Stop reason: HOSPADM

## 2024-11-28 RX ORDER — ONDANSETRON 4 MG/1
4 TABLET, ORALLY DISINTEGRATING ORAL EVERY 8 HOURS PRN
Status: DISCONTINUED | OUTPATIENT
Start: 2024-11-28 | End: 2024-12-01 | Stop reason: HOSPADM

## 2024-11-28 RX ORDER — POTASSIUM CHLORIDE 7.45 MG/ML
10 INJECTION INTRAVENOUS PRN
Status: DISCONTINUED | OUTPATIENT
Start: 2024-11-28 | End: 2024-12-01 | Stop reason: HOSPADM

## 2024-11-28 RX ORDER — 0.9 % SODIUM CHLORIDE 0.9 %
500 INTRAVENOUS SOLUTION INTRAVENOUS
Status: COMPLETED | OUTPATIENT
Start: 2024-11-28 | End: 2024-11-28

## 2024-11-28 RX ORDER — ONDANSETRON 2 MG/ML
4 INJECTION INTRAMUSCULAR; INTRAVENOUS
Status: COMPLETED | OUTPATIENT
Start: 2024-11-28 | End: 2024-11-28

## 2024-11-28 RX ORDER — POTASSIUM CHLORIDE 1500 MG/1
40 TABLET, EXTENDED RELEASE ORAL PRN
Status: DISCONTINUED | OUTPATIENT
Start: 2024-11-28 | End: 2024-12-01 | Stop reason: HOSPADM

## 2024-11-28 RX ORDER — SODIUM CHLORIDE 9 MG/ML
INJECTION, SOLUTION INTRAVENOUS PRN
Status: DISCONTINUED | OUTPATIENT
Start: 2024-11-28 | End: 2024-12-01 | Stop reason: HOSPADM

## 2024-11-28 RX ORDER — ACETAMINOPHEN 325 MG/1
650 TABLET ORAL EVERY 6 HOURS PRN
Status: DISCONTINUED | OUTPATIENT
Start: 2024-11-28 | End: 2024-12-01 | Stop reason: HOSPADM

## 2024-11-28 RX ORDER — SODIUM CHLORIDE 0.9 % (FLUSH) 0.9 %
5-40 SYRINGE (ML) INJECTION EVERY 12 HOURS SCHEDULED
Status: DISCONTINUED | OUTPATIENT
Start: 2024-11-28 | End: 2024-12-01 | Stop reason: HOSPADM

## 2024-11-28 RX ORDER — INSULIN LISPRO 100 [IU]/ML
0-8 INJECTION, SOLUTION INTRAVENOUS; SUBCUTANEOUS
Status: DISCONTINUED | OUTPATIENT
Start: 2024-11-28 | End: 2024-12-01 | Stop reason: HOSPADM

## 2024-11-28 RX ORDER — ATORVASTATIN CALCIUM 40 MG/1
40 TABLET, FILM COATED ORAL NIGHTLY
Status: DISCONTINUED | OUTPATIENT
Start: 2024-11-28 | End: 2024-12-01 | Stop reason: HOSPADM

## 2024-11-28 RX ORDER — MORPHINE SULFATE 2 MG/ML
2 INJECTION, SOLUTION INTRAMUSCULAR; INTRAVENOUS ONCE
Status: COMPLETED | OUTPATIENT
Start: 2024-11-28 | End: 2024-11-28

## 2024-11-28 RX ORDER — ACETAMINOPHEN 650 MG/1
650 SUPPOSITORY RECTAL EVERY 6 HOURS PRN
Status: DISCONTINUED | OUTPATIENT
Start: 2024-11-28 | End: 2024-12-01 | Stop reason: HOSPADM

## 2024-11-28 RX ORDER — SULFAMETHOXAZOLE AND TRIMETHOPRIM 800; 160 MG/1; MG/1
1 TABLET ORAL 2 TIMES DAILY
Status: DISCONTINUED | OUTPATIENT
Start: 2024-11-28 | End: 2024-12-01 | Stop reason: HOSPADM

## 2024-11-28 RX ORDER — INSULIN GLARGINE 100 [IU]/ML
48 INJECTION, SOLUTION SUBCUTANEOUS EVERY MORNING
Status: DISCONTINUED | OUTPATIENT
Start: 2024-11-29 | End: 2024-12-01 | Stop reason: HOSPADM

## 2024-11-28 RX ORDER — MAGNESIUM SULFATE IN WATER 40 MG/ML
2000 INJECTION, SOLUTION INTRAVENOUS PRN
Status: DISCONTINUED | OUTPATIENT
Start: 2024-11-28 | End: 2024-12-01 | Stop reason: HOSPADM

## 2024-11-28 RX ORDER — GLUCAGON 1 MG/ML
1 KIT INJECTION PRN
Status: DISCONTINUED | OUTPATIENT
Start: 2024-11-28 | End: 2024-12-01 | Stop reason: HOSPADM

## 2024-11-28 RX ORDER — ONDANSETRON 2 MG/ML
4 INJECTION INTRAMUSCULAR; INTRAVENOUS EVERY 6 HOURS PRN
Status: DISCONTINUED | OUTPATIENT
Start: 2024-11-28 | End: 2024-12-01 | Stop reason: HOSPADM

## 2024-11-28 RX ORDER — SULFAMETHOXAZOLE AND TRIMETHOPRIM 800; 160 MG/1; MG/1
1 TABLET ORAL 2 TIMES DAILY
COMMUNITY
Start: 2024-11-26

## 2024-11-28 RX ORDER — CARVEDILOL 3.12 MG/1
6.25 TABLET ORAL 2 TIMES DAILY WITH MEALS
Status: DISCONTINUED | OUTPATIENT
Start: 2024-11-28 | End: 2024-12-01 | Stop reason: HOSPADM

## 2024-11-28 RX ADMIN — SULFAMETHOXAZOLE AND TRIMETHOPRIM 1 TABLET: 800; 160 TABLET ORAL at 21:18

## 2024-11-28 RX ADMIN — TICAGRELOR 90 MG: 90 TABLET ORAL at 21:18

## 2024-11-28 RX ADMIN — SODIUM CHLORIDE, PRESERVATIVE FREE 10 ML: 5 INJECTION INTRAVENOUS at 21:18

## 2024-11-28 RX ADMIN — ONDANSETRON 4 MG: 2 INJECTION INTRAMUSCULAR; INTRAVENOUS at 17:00

## 2024-11-28 RX ADMIN — METHOCARBAMOL TABLETS 750 MG: 500 TABLET, COATED ORAL at 21:18

## 2024-11-28 RX ADMIN — ONDANSETRON 4 MG: 4 TABLET, ORALLY DISINTEGRATING ORAL at 21:40

## 2024-11-28 RX ADMIN — NITROGLYCERIN 1 INCH: 20 OINTMENT TOPICAL at 19:55

## 2024-11-28 RX ADMIN — MORPHINE SULFATE 2 MG: 2 INJECTION, SOLUTION INTRAMUSCULAR; INTRAVENOUS at 17:12

## 2024-11-28 RX ADMIN — SODIUM CHLORIDE 500 ML: 9 INJECTION, SOLUTION INTRAVENOUS at 17:25

## 2024-11-28 RX ADMIN — ATORVASTATIN CALCIUM 40 MG: 40 TABLET, FILM COATED ORAL at 21:18

## 2024-11-28 RX ADMIN — MORPHINE SULFATE 2 MG: 2 INJECTION, SOLUTION INTRAMUSCULAR; INTRAVENOUS at 22:01

## 2024-11-28 RX ADMIN — RANOLAZINE 500 MG: 500 TABLET, EXTENDED RELEASE ORAL at 21:18

## 2024-11-28 ASSESSMENT — PAIN DESCRIPTION - DESCRIPTORS
DESCRIPTORS: ACHING;DISCOMFORT
DESCRIPTORS: ACHING;DISCOMFORT

## 2024-11-28 ASSESSMENT — PAIN DESCRIPTION - ORIENTATION
ORIENTATION: MID;LOWER
ORIENTATION: LEFT

## 2024-11-28 ASSESSMENT — PAIN - FUNCTIONAL ASSESSMENT: PAIN_FUNCTIONAL_ASSESSMENT: 0-10

## 2024-11-28 ASSESSMENT — PAIN SCALES - GENERAL
PAINLEVEL_OUTOF10: 5
PAINLEVEL_OUTOF10: 5
PAINLEVEL_OUTOF10: 8
PAINLEVEL_OUTOF10: 8

## 2024-11-28 ASSESSMENT — PAIN DESCRIPTION - LOCATION
LOCATION: CHEST
LOCATION: BACK

## 2024-11-28 ASSESSMENT — HEART SCORE: ECG: NORMAL

## 2024-11-28 NOTE — ED TRIAGE NOTES
Midsternal chest pain that radiates into left shoulder that started x4 days ago. States she's been unable to eat due to nausea

## 2024-11-28 NOTE — ED PROVIDER NOTES
examination the patient is nontoxic.  Vitals were reviewed per above.    EKG was interpreted by myself normal sinus rhythm, left axis deviation, heart rate 86, no STEMI.  High-sensitivity troponin within normal range after more than 6 hours of symptoms making ACS less likely however she does have significant cardiac history including MI, cardiac arrest, CABG.  No pleuritic symptoms, calf pain/swelling nor hypoxia to suggest PE.  Furthermore D-dimer negative.  Decision to admit for further cardiac workup.      Composition of the HEART score for chest pain, angina, NSTEMI in the ED.    HEART score criteria             Score  History: Highly suspicious    2    Moderately suspicious   1    Slightly or non-suspicious   0    ECG:  Significant ST depression   2    Nonspecific repolarisation disturbance 1    Normal      0    Age:  > or = 65 years    2    > 45 to < 65 years    1    < Or = to 45 years    0    Risk factors: > or = to 3 risk factors or history of CAD 2    1 or 2 risk factors    1    No risk factors known    0    Troponin: > or = to 3x normal limit   2    > 1 to < 3x normal limit   1    < or = to normal limit    0    Calculated Total : ___4___    History: 2  EC  Patient Age: 1  Risk Factors: 2  Troponin: 0  Heart Score Total: 4      0-3: 0.9-1.7% risk of adverse cardiac event. In the HEART Score, these patients were  discharged.   4-6: 12-16.6% risk of adverse cardiac event. In the HEART Score, these patients were  admitted to the hospital.   =7: 50-65% risk of adverse cardiac event. In the HEART Score, these patients were  candidates for early invasive measures.                   Procedures   Medical Decision Makingedical Decision Making  Performed by: Isaak Santiago DO  Procedures  None       Disposition   Disposition:   Admission      Diagnosis     Clinical Impression:    1. Chest pain, unspecified type        Attestations:    Isaak Santiago DO    Please note that this dictation was completed with

## 2024-11-28 NOTE — ED NOTES
ED TO INPATIENT SBAR HANDOFF    Patient Name: Mandie Dietz   Preferred Name: Mandie  : 1985  39 y.o.   Family/Caregiver Present: no   Code Status Order: Prior  PO Status: NPO:No  Telemetry Order: Yes    Situation  Chief Complaint   Patient presents with    Chest Pain    Nausea     Brief Description of Patient's Condition: Mandie Dietz, is a very pleasant 39 y.o. female presenting to the ED with a chief complaint of chest pain and nausea.  Symptoms started 4 days ago.  Not getting better nor worse.  Pain is achy on the left side of the chest and radiates to the left shoulder.  History of MI and cardiac arrest as well as DVT.  No lightheadedness or syncope.  No shortness of breath.  No pleuritic symptoms.  No calf pain or swelling.  She took a nitro tablet 3 times.  Experience relief for 20 minutes each time however with return of pain.   Mental Status: oriented, alert, coherent, logical, and thought processes intact  Arrived from:Home  Imaging:   XR CHEST PORTABLE   Final Result   No acute cardiopulmonary process.         Electronically signed by Erickson Wolfe        Abnormal labs:   Abnormal Labs Reviewed   CBC WITH AUTO DIFFERENTIAL - Abnormal; Notable for the following components:       Result Value    Platelets 496 (*)     All other components within normal limits   BASIC METABOLIC PANEL - Abnormal; Notable for the following components:    Sodium 133 (*)     Glucose 223 (*)     BUN 21 (*)     Creatinine 1.52 (*)     Est, Glom Filt Rate 44 (*)     All other components within normal limits       Background  Allergies:   Allergies   Allergen Reactions    Ciprofloxacin Anaphylaxis and Rash     Other reaction(s): Unknown (comments)    Other Swelling     JALIPENO PEPPERS - FACILA SWELLING    Bumetanide Other (See Comments)     Other reaction(s): Unknown (comments)  Hearing loss  Other reaction(s): Other (see comments)    Coconut Fatty Acid Other (See Comments)     Facial swelling    Diclofenac Sodium

## 2024-11-29 ENCOUNTER — HOSPITAL ENCOUNTER (INPATIENT)
Facility: HOSPITAL | Age: 39
DRG: 300 | End: 2024-11-29
Payer: MEDICARE

## 2024-11-29 LAB
CHOLEST SERPL-MCNC: 112 MG/DL
ERYTHROCYTE [DISTWIDTH] IN BLOOD BY AUTOMATED COUNT: 12.8 % (ref 11.5–14.5)
ERYTHROCYTE [SEDIMENTATION RATE] IN BLOOD: 15 MM/HR (ref 0–20)
EST. AVERAGE GLUCOSE BLD GHB EST-MCNC: 151 MG/DL
GLUCOSE BLD STRIP.AUTO-MCNC: 115 MG/DL (ref 65–100)
GLUCOSE BLD STRIP.AUTO-MCNC: 142 MG/DL (ref 65–100)
GLUCOSE BLD STRIP.AUTO-MCNC: 175 MG/DL (ref 65–100)
GLUCOSE BLD STRIP.AUTO-MCNC: 205 MG/DL (ref 65–100)
HBA1C MFR BLD: 6.9 % (ref 4–5.6)
HCT VFR BLD AUTO: 40.3 % (ref 35–47)
HDLC SERPL-MCNC: 41 MG/DL
HDLC SERPL: 2.7 (ref 0–5)
HGB BLD-MCNC: 13.9 G/DL (ref 11.5–16)
LDLC SERPL CALC-MCNC: 26.2 MG/DL (ref 0–100)
LIPID PANEL: ABNORMAL
MCH RBC QN AUTO: 31.2 PG (ref 26–34)
MCHC RBC AUTO-ENTMCNC: 34.5 G/DL (ref 30–36.5)
MCV RBC AUTO: 90.4 FL (ref 80–99)
NRBC # BLD: 0 K/UL (ref 0–0.01)
NRBC BLD-RTO: 0 PER 100 WBC
PERFORMED BY:: ABNORMAL
PLATELET # BLD AUTO: 453 K/UL (ref 150–400)
PMV BLD AUTO: 9.6 FL (ref 8.9–12.9)
RBC # BLD AUTO: 4.46 M/UL (ref 3.8–5.2)
TRIGL SERPL-MCNC: 224 MG/DL
TROPONIN I SERPL HS-MCNC: 6 NG/L (ref 0–51)
VLDLC SERPL CALC-MCNC: 44.8 MG/DL
WBC # BLD AUTO: 11.7 K/UL (ref 3.6–11)

## 2024-11-29 PROCEDURE — 80061 LIPID PANEL: CPT

## 2024-11-29 PROCEDURE — 85652 RBC SED RATE AUTOMATED: CPT

## 2024-11-29 PROCEDURE — 86235 NUCLEAR ANTIGEN ANTIBODY: CPT

## 2024-11-29 PROCEDURE — 83516 IMMUNOASSAY NONANTIBODY: CPT

## 2024-11-29 PROCEDURE — 6360000002 HC RX W HCPCS: Performed by: HOSPITALIST

## 2024-11-29 PROCEDURE — 2060000000 HC ICU INTERMEDIATE R&B

## 2024-11-29 PROCEDURE — 82962 GLUCOSE BLOOD TEST: CPT

## 2024-11-29 PROCEDURE — 84484 ASSAY OF TROPONIN QUANT: CPT

## 2024-11-29 PROCEDURE — 85027 COMPLETE CBC AUTOMATED: CPT

## 2024-11-29 PROCEDURE — 2580000003 HC RX 258: Performed by: HOSPITALIST

## 2024-11-29 PROCEDURE — 2580000003 HC RX 258: Performed by: INTERNAL MEDICINE

## 2024-11-29 PROCEDURE — 6370000000 HC RX 637 (ALT 250 FOR IP): Performed by: HOSPITALIST

## 2024-11-29 PROCEDURE — 80048 BASIC METABOLIC PNL TOTAL CA: CPT

## 2024-11-29 PROCEDURE — 36415 COLL VENOUS BLD VENIPUNCTURE: CPT

## 2024-11-29 PROCEDURE — 86225 DNA ANTIBODY NATIVE: CPT

## 2024-11-29 PROCEDURE — 6360000002 HC RX W HCPCS: Performed by: INTERNAL MEDICINE

## 2024-11-29 PROCEDURE — 83036 HEMOGLOBIN GLYCOSYLATED A1C: CPT

## 2024-11-29 PROCEDURE — 86141 C-REACTIVE PROTEIN HS: CPT

## 2024-11-29 PROCEDURE — 6370000000 HC RX 637 (ALT 250 FOR IP): Performed by: INTERNAL MEDICINE

## 2024-11-29 RX ORDER — MORPHINE SULFATE 2 MG/ML
2 INJECTION, SOLUTION INTRAMUSCULAR; INTRAVENOUS EVERY 4 HOURS PRN
Status: DISCONTINUED | OUTPATIENT
Start: 2024-11-29 | End: 2024-12-01 | Stop reason: HOSPADM

## 2024-11-29 RX ORDER — PANTOPRAZOLE SODIUM 40 MG/10ML
40 INJECTION, POWDER, LYOPHILIZED, FOR SOLUTION INTRAVENOUS DAILY
Status: DISCONTINUED | OUTPATIENT
Start: 2024-11-30 | End: 2024-12-01 | Stop reason: HOSPADM

## 2024-11-29 RX ORDER — 0.9 % SODIUM CHLORIDE 0.9 %
1000 INTRAVENOUS SOLUTION INTRAVENOUS ONCE
Status: COMPLETED | OUTPATIENT
Start: 2024-11-29 | End: 2024-11-30

## 2024-11-29 RX ORDER — PANTOPRAZOLE SODIUM 40 MG/1
40 TABLET, DELAYED RELEASE ORAL
Status: DISCONTINUED | OUTPATIENT
Start: 2024-11-30 | End: 2024-12-01 | Stop reason: HOSPADM

## 2024-11-29 RX ORDER — NITROGLYCERIN 40 MG/1
1 PATCH TRANSDERMAL DAILY
Status: DISCONTINUED | OUTPATIENT
Start: 2024-11-30 | End: 2024-12-01 | Stop reason: HOSPADM

## 2024-11-29 RX ORDER — HYDROCODONE BITARTRATE AND ACETAMINOPHEN 5; 325 MG/1; MG/1
1 TABLET ORAL EVERY 4 HOURS PRN
Status: DISCONTINUED | OUTPATIENT
Start: 2024-11-29 | End: 2024-11-30

## 2024-11-29 RX ADMIN — SULFAMETHOXAZOLE AND TRIMETHOPRIM 1 TABLET: 800; 160 TABLET ORAL at 21:57

## 2024-11-29 RX ADMIN — ATORVASTATIN CALCIUM 40 MG: 40 TABLET, FILM COATED ORAL at 21:58

## 2024-11-29 RX ADMIN — ONDANSETRON 4 MG: 4 TABLET, ORALLY DISINTEGRATING ORAL at 05:14

## 2024-11-29 RX ADMIN — MORPHINE SULFATE 2 MG: 2 INJECTION, SOLUTION INTRAMUSCULAR; INTRAVENOUS at 21:50

## 2024-11-29 RX ADMIN — CARVEDILOL 6.25 MG: 3.12 TABLET, FILM COATED ORAL at 09:58

## 2024-11-29 RX ADMIN — MORPHINE SULFATE 2 MG: 2 INJECTION, SOLUTION INTRAMUSCULAR; INTRAVENOUS at 17:19

## 2024-11-29 RX ADMIN — LEVOTHYROXINE SODIUM 175 MCG: 0.1 TABLET ORAL at 05:14

## 2024-11-29 RX ADMIN — TICAGRELOR 90 MG: 90 TABLET ORAL at 09:57

## 2024-11-29 RX ADMIN — SODIUM CHLORIDE 1000 ML: 9 INJECTION, SOLUTION INTRAVENOUS at 17:29

## 2024-11-29 RX ADMIN — SODIUM CHLORIDE, PRESERVATIVE FREE 10 ML: 5 INJECTION INTRAVENOUS at 09:59

## 2024-11-29 RX ADMIN — METHOCARBAMOL TABLETS 750 MG: 500 TABLET, COATED ORAL at 09:57

## 2024-11-29 RX ADMIN — ASPIRIN 81 MG: 81 TABLET, CHEWABLE ORAL at 09:58

## 2024-11-29 RX ADMIN — ALUMINUM HYDROXIDE, MAGNESIUM HYDROXIDE, AND SIMETHICONE: 1200; 120; 1200 SUSPENSION ORAL at 23:18

## 2024-11-29 RX ADMIN — INSULIN LISPRO 2 UNITS: 100 INJECTION, SOLUTION INTRAVENOUS; SUBCUTANEOUS at 22:04

## 2024-11-29 RX ADMIN — SODIUM CHLORIDE, PRESERVATIVE FREE 10 ML: 5 INJECTION INTRAVENOUS at 22:08

## 2024-11-29 RX ADMIN — ENOXAPARIN SODIUM 40 MG: 100 INJECTION SUBCUTANEOUS at 09:59

## 2024-11-29 RX ADMIN — TICAGRELOR 90 MG: 90 TABLET ORAL at 21:58

## 2024-11-29 RX ADMIN — CARVEDILOL 6.25 MG: 3.12 TABLET, FILM COATED ORAL at 17:18

## 2024-11-29 RX ADMIN — METHOCARBAMOL TABLETS 750 MG: 500 TABLET, COATED ORAL at 21:57

## 2024-11-29 RX ADMIN — SULFAMETHOXAZOLE AND TRIMETHOPRIM 1 TABLET: 800; 160 TABLET ORAL at 09:57

## 2024-11-29 RX ADMIN — RANOLAZINE 500 MG: 500 TABLET, EXTENDED RELEASE ORAL at 09:58

## 2024-11-29 RX ADMIN — RANOLAZINE 500 MG: 500 TABLET, EXTENDED RELEASE ORAL at 21:57

## 2024-11-29 RX ADMIN — AMLODIPINE BESYLATE 5 MG: 5 TABLET ORAL at 09:58

## 2024-11-29 ASSESSMENT — PAIN - FUNCTIONAL ASSESSMENT
PAIN_FUNCTIONAL_ASSESSMENT: ACTIVITIES ARE NOT PREVENTED
PAIN_FUNCTIONAL_ASSESSMENT: ACTIVITIES ARE NOT PREVENTED

## 2024-11-29 ASSESSMENT — PAIN SCALES - GENERAL
PAINLEVEL_OUTOF10: 8
PAINLEVEL_OUTOF10: 4
PAINLEVEL_OUTOF10: 8
PAINLEVEL_OUTOF10: 8

## 2024-11-29 ASSESSMENT — PAIN DESCRIPTION - LOCATION
LOCATION: CHEST
LOCATION: CHEST
LOCATION: CHEST;SHOULDER

## 2024-11-29 ASSESSMENT — PAIN DESCRIPTION - ORIENTATION
ORIENTATION: LEFT
ORIENTATION: MID
ORIENTATION: LEFT

## 2024-11-29 ASSESSMENT — PAIN DESCRIPTION - DESCRIPTORS: DESCRIPTORS: ACHING;DISCOMFORT

## 2024-11-29 NOTE — H&P
Hospitalist Admission Note    NAME:   Mandie Dietz   : 1985   MRN: [id number]     Date/Time: 2024 7:44 PM    Patient PCP: Domingo Samson APRN - NP    ______________________________________________________________________  Given the patient's current clinical presentation, I have a high level of concern for decompensation if discharged from the emergency department.  Complex decision making was performed, which includes reviewing the patient's available past medical records, laboratory results, and x-ray films.       My assessment of this patient's clinical condition and my plan of care is as follows.    Assessment / Plan:    39 y.o.  female with PMHx significant for coronary artery disease status post CABG, previous cardiac arrest, insulin-dependent diabetes mellitus type 2, hypertension and hyperlipidemia who presents to the emergency department complaining of central chest pain.  Workup unremarkable in the emergency department.  Upon record review she had a cardiac catheterization done in 2024 which showed no acute causes of her chest pain at that time.  She will be placed on the hospital service with plans to commute continue home medications, trend troponins, applied nitro and cardiology consultation in the morning.     Chest Pain: History of coronary artery disease status post CABG in 2019.  Of note she had a cardiac catheterization done in 2024 which was reportedly unremarkable.  -Nitroglycerin paste  -Trend troponins  -Continue home medications  -Check lipid panel in the morning  -N.p.o.  -Cardiology consultation    2.  Insulin-dependent diabetes mellitus type 2:   -Continue home Tresiba   -Continue home insulin with meals  -Sliding scale insulin usual sensitivity with Accu-Cheks    3.  Hypertension:   -Continue home medications    4.  Hyperlipidemia:  -Continue home Lipitor    5.  Hypothyroidism   -Continue home medications    Mandie Dietz, was evaluated through a

## 2024-11-29 NOTE — PROGRESS NOTES
Hospitalist Progress Note               Daily Progress Note: 11/29/2024      Hospital Day: 2     Chief complaint:   Chief Complaint   Patient presents with    Chest Pain    Nausea        Brief HPI/ Hospital course to date:    39 y.o.  female with PMHx significant for coronary artery disease status post CABG, previous cardiac arrest, insulin-dependent diabetes mellitus type 2, hypertension and hyperlipidemia who presents to the emergency department complaining of central chest pain.  Workup unremarkable in the emergency department.  Upon record review she had a cardiac catheterization done in March 2024 which showed no acute causes of her chest pain at that time. Troponin negative x2.  EKG no ST elevation.  Echo on 4/2024 showed EF of 55 to 60%, normal wall motion. Cardiology consulted.     --------  Patient is seen today for follow-up. Reports still having chest pain, but not in distress. Does gets chest pain and shortness of breath with exertion, radiating to left shoulder. Reports last cath was in 2023. On chart review, appears had a LHC on 03/15/2024: severe mid LAD disease and RCA . Patent LIMA graft to LIMA-LAD, faint collateral circulation filling the distal RCA . Nonobstructive disease elsewhere. Normal filling pressures. Common overall stable coronary anatomy, with no new obstructive disease.     Pt's DELFINO Score =     DELFINO Score Calculation (1 point for each):  - Age >= 65  - Aspirin use in the last 7 days   - At least 2 angina episodes within the last 24hrs  - ST changes of at least 0.5mm on admission EKG  - Elevated serum cardiac biomarkers  - Known Coronary Artery Disease (CAD) (coronary stenosis >= 50%)  - At least 3 risk factors for CAD:  Hypertension -> 140/90 or on antihypertensives, Cigarette smoking, HDL < 40, Diabetes, Family history of premature CAD (CAD in male first-degree relative, or father less than 55, or female first-degree relative or mother less than 65).    Score

## 2024-11-29 NOTE — CARE COORDINATION
11/29/24 1447   Service Assessment   Patient Orientation Alert and Oriented   Cognition Alert   History Provided By Patient   Primary Caregiver Spouse   Support Systems Spouse/Significant Other   Patient's Healthcare Decision Maker is: Legal Next of Kin   PCP Verified by CM Yes   Last Visit to PCP Within last 6 months   Prior Functional Level Assistance with the following:;Mobility;Shopping;Housework;Cooking;Bathing   Current Functional Level Mobility;Assistance with the following:;Cooking;Housework;Shopping;Bathing   Can patient return to prior living arrangement Yes   Ability to make needs known: Good   Family able to assist with home care needs: Yes   Would you like for me to discuss the discharge plan with any other family members/significant others, and if so, who? Yes   Financial Resources Medicare;Medicaid   Social/Functional History   Lives With Significant other   Type of Home Apartment   Home Layout One level   Home Access Level entry   Receives Help From Family   Active  No   Patient's  Info spouse   Mode of Transportation Car       Patient currently lives with spouse in a first floor level apartment, address on chart confirmed.    Patient states that she has a rolling walker, wheelchair, and shower chair, requires assistance with ADL's and IADL's,  assists with all needs.    Patient is current with listed PCP, uses [facility] in [place] for pharmacy, no reported issues with obtaining medications.    CM and patient discussed home health, patient declined.    CM continues to follow and monitor for needs.    Advance Care Planning     General Advance Care Planning (ACP) Conversation    Date of Conversation: 11/29/2024  Conducted with: Patient with Decision Making Capacity  Other persons present: None    Healthcare Decision Maker:   Primary Decision Maker: DENZEL JOSE - Spouse - [phone]       Content/Action Overview:  Has ACP document(s) on file - reflects the patient's care

## 2024-11-29 NOTE — ED NOTES
Report to [facility] provider for transport to [facility]. Crew aware of pt continued request for additional pain meds and that NTG paste was applied for her chest pain. VSS. Pt in NAD at this time.

## 2024-11-29 NOTE — ED NOTES
NTG paste applied as ordered for c/o CP, pt states \"it doesn't fucking work but ok. It just gives me a big fucking headache\". Education provided.

## 2024-11-29 NOTE — ED NOTES
Pt reported that CP is worsening, admitting telehospitalist perfectserved with request for order stronger than tylenol. Awaiting further orders.

## 2024-11-29 NOTE — PLAN OF CARE
Problem: Chronic Conditions and Co-morbidities  Goal: Patient's chronic conditions and co-morbidity symptoms are monitored and maintained or improved  11/29/2024 1051 by Gricel Lyons RN  Outcome: Progressing  Flowsheets (Taken 11/29/2024 0809)  Care Plan - Patient's Chronic Conditions and Co-Morbidity Symptoms are Monitored and Maintained or Improved:   Monitor and assess patient's chronic conditions and comorbid symptoms for stability, deterioration, or improvement   Collaborate with multidisciplinary team to address chronic and comorbid conditions and prevent exacerbation or deterioration   Update acute care plan with appropriate goals if chronic or comorbid symptoms are exacerbated and prevent overall improvement and discharge  11/28/2024 2238 by Shu Walsh RN  Outcome: Progressing  Flowsheets (Taken 11/28/2024 2100)  Care Plan - Patient's Chronic Conditions and Co-Morbidity Symptoms are Monitored and Maintained or Improved: Monitor and assess patient's chronic conditions and comorbid symptoms for stability, deterioration, or improvement     Problem: Discharge Planning  Goal: Discharge to home or other facility with appropriate resources  11/29/2024 1051 by Gricel Lyons RN  Outcome: Progressing  Flowsheets (Taken 11/29/2024 0809)  Discharge to home or other facility with appropriate resources:   Identify barriers to discharge with patient and caregiver   Arrange for needed discharge resources and transportation as appropriate   Identify discharge learning needs (meds, wound care, etc)   Refer to discharge planning if patient needs post-hospital services based on physician order or complex needs related to functional status, cognitive ability or social support system  11/28/2024 2238 by Shu Walsh, RN  Outcome: Progressing  Flowsheets (Taken 11/28/2024 2100)  Discharge to home or other facility with appropriate resources: Identify barriers to discharge with patient and caregiver

## 2024-11-29 NOTE — PROGRESS NOTES
Patient c/o chest pain 8/10. Patient refused Tylenol. Nitro paste ordered, patient refused. Luis stated \"it just gives me a headache.\"   MD notified, awaiting orders.    Patient is stable at this time.

## 2024-11-29 NOTE — PROGRESS NOTES
4 Eyes Skin Assessment     NAME:  Mandie Dietz  YOB: 1985  MEDICAL RECORD NUMBER:  [id number]    The patient is being assessed for  Admission    I agree that at least one RN has performed a thorough Head to Toe Skin Assessment on the patient. ALL assessment sites listed below have been assessed.      Areas assessed by both nurses:    Head, Face, Ears, Shoulders, Back, Chest, Arms, Elbows, Hands, Sacrum. Buttock, Coccyx, Ischium, Legs. Feet and Heels, and Under Medical Devices         Does the Patient have a Wound? No noted wound(s)       Tomasz Prevention initiated by RN: Yes  Wound Care Orders initiated by RN: No    Pressure Injury (Stage 3,4, Unstageable, DTI, NWPT, and Complex wounds) if present, place Wound referral order by RN under : No    New Ostomies, if present place, Ostomy referral order under : No     Nurse 1 eSignature: Electronically signed by Shu Walsh RN on 11/28/24 at 11:42 PM EST    **SHARE this note so that the co-signing nurse can place an eSignature**    Nurse 2 eSignature: Electronically signed by Svetlana Alvarado RN on 11/29/24 at 6:16 AM EST

## 2024-11-29 NOTE — PROGRESS NOTES
Received Order for Telemetry     Mandie Dietz   1985   [id number]   Chest pain [R07.9]  Chest pain, unspecified type [R07.9]   No att. providers found     Tele Box # [id number] placed on patient at  2108 pm  ER Room # DIR ADMT  Admitting to Room 483  Verified with Primary Nurse RL RN at  2108 pm

## 2024-11-30 ENCOUNTER — APPOINTMENT (OUTPATIENT)
Facility: HOSPITAL | Age: 39
DRG: 300 | End: 2024-11-30
Payer: MEDICARE

## 2024-11-30 LAB
ALBUMIN SERPL-MCNC: 3.4 G/DL (ref 3.5–5)
ALBUMIN/GLOB SERPL: 1 (ref 1.1–2.2)
ALP SERPL-CCNC: 153 U/L (ref 45–117)
ALT SERPL-CCNC: 168 U/L (ref 12–78)
ANION GAP SERPL CALC-SCNC: 3 MMOL/L (ref 2–12)
AST SERPL W P-5'-P-CCNC: 127 U/L (ref 15–37)
BASOPHILS # BLD: 0 K/UL (ref 0–0.1)
BASOPHILS NFR BLD: 1 % (ref 0–1)
BILIRUB DIRECT SERPL-MCNC: 0.2 MG/DL (ref 0–0.2)
BILIRUB SERPL-MCNC: 0.8 MG/DL (ref 0.2–1)
BUN SERPL-MCNC: 17 MG/DL (ref 6–20)
BUN/CREAT SERPL: 15 (ref 12–20)
CA-I BLD-MCNC: 8.6 MG/DL (ref 8.5–10.1)
CHLORIDE SERPL-SCNC: 110 MMOL/L (ref 97–108)
CO2 SERPL-SCNC: 23 MMOL/L (ref 21–32)
CREAT SERPL-MCNC: 1.13 MG/DL (ref 0.55–1.02)
CRP SERPL HS-MCNC: 3.3 MG/L
DIFFERENTIAL METHOD BLD: ABNORMAL
EOSINOPHIL # BLD: 0.2 K/UL (ref 0–0.4)
EOSINOPHIL NFR BLD: 2 % (ref 0–7)
ERYTHROCYTE [DISTWIDTH] IN BLOOD BY AUTOMATED COUNT: 13.1 % (ref 11.5–14.5)
EST. AVERAGE GLUCOSE BLD GHB EST-MCNC: 146 MG/DL
GLOBULIN SER CALC-MCNC: 3.5 G/DL (ref 2–4)
GLUCOSE BLD STRIP.AUTO-MCNC: 111 MG/DL (ref 65–100)
GLUCOSE BLD STRIP.AUTO-MCNC: 157 MG/DL (ref 65–100)
GLUCOSE BLD STRIP.AUTO-MCNC: 158 MG/DL (ref 65–100)
GLUCOSE BLD STRIP.AUTO-MCNC: 263 MG/DL (ref 65–100)
GLUCOSE SERPL-MCNC: 154 MG/DL (ref 65–100)
HBA1C MFR BLD: 6.7 % (ref 4–5.6)
HCT VFR BLD AUTO: 42.2 % (ref 35–47)
HGB BLD-MCNC: 14.3 G/DL (ref 11.5–16)
IMM GRANULOCYTES # BLD AUTO: 0 K/UL (ref 0–0.04)
IMM GRANULOCYTES NFR BLD AUTO: 1 % (ref 0–0.5)
LYMPHOCYTES # BLD: 1.8 K/UL (ref 0.8–3.5)
LYMPHOCYTES NFR BLD: 21 % (ref 12–49)
MCH RBC QN AUTO: 30.8 PG (ref 26–34)
MCHC RBC AUTO-ENTMCNC: 33.9 G/DL (ref 30–36.5)
MCV RBC AUTO: 90.8 FL (ref 80–99)
MONOCYTES # BLD: 0.8 K/UL (ref 0–1)
MONOCYTES NFR BLD: 10 % (ref 5–13)
NEUTS SEG # BLD: 5.5 K/UL (ref 1.8–8)
NEUTS SEG NFR BLD: 65 % (ref 32–75)
NRBC # BLD: 0 K/UL (ref 0–0.01)
NRBC BLD-RTO: 0 PER 100 WBC
PERFORMED BY:: ABNORMAL
PLATELET # BLD AUTO: 446 K/UL (ref 150–400)
PMV BLD AUTO: 9.4 FL (ref 8.9–12.9)
POTASSIUM SERPL-SCNC: 4.3 MMOL/L (ref 3.5–5.1)
PROT SERPL-MCNC: 6.9 G/DL (ref 6.4–8.2)
RBC # BLD AUTO: 4.65 M/UL (ref 3.8–5.2)
SODIUM SERPL-SCNC: 136 MMOL/L (ref 136–145)
WBC # BLD AUTO: 8.3 K/UL (ref 3.6–11)

## 2024-11-30 PROCEDURE — 2580000003 HC RX 258: Performed by: HOSPITALIST

## 2024-11-30 PROCEDURE — 2060000000 HC ICU INTERMEDIATE R&B

## 2024-11-30 PROCEDURE — 85025 COMPLETE CBC W/AUTO DIFF WBC: CPT

## 2024-11-30 PROCEDURE — 82962 GLUCOSE BLOOD TEST: CPT

## 2024-11-30 PROCEDURE — 6360000004 HC RX CONTRAST MEDICATION: Performed by: INTERNAL MEDICINE

## 2024-11-30 PROCEDURE — 6370000000 HC RX 637 (ALT 250 FOR IP): Performed by: INTERNAL MEDICINE

## 2024-11-30 PROCEDURE — 6360000002 HC RX W HCPCS: Performed by: INTERNAL MEDICINE

## 2024-11-30 PROCEDURE — 83036 HEMOGLOBIN GLYCOSYLATED A1C: CPT

## 2024-11-30 PROCEDURE — 36415 COLL VENOUS BLD VENIPUNCTURE: CPT

## 2024-11-30 PROCEDURE — 6370000000 HC RX 637 (ALT 250 FOR IP): Performed by: HOSPITALIST

## 2024-11-30 PROCEDURE — 80076 HEPATIC FUNCTION PANEL: CPT

## 2024-11-30 PROCEDURE — 71275 CT ANGIOGRAPHY CHEST: CPT

## 2024-11-30 PROCEDURE — 6360000002 HC RX W HCPCS: Performed by: HOSPITALIST

## 2024-11-30 PROCEDURE — 80048 BASIC METABOLIC PNL TOTAL CA: CPT

## 2024-11-30 RX ORDER — COLCHICINE 0.6 MG/1
0.6 TABLET ORAL DAILY
Status: DISCONTINUED | OUTPATIENT
Start: 2024-12-01 | End: 2024-12-01 | Stop reason: HOSPADM

## 2024-11-30 RX ORDER — IOPAMIDOL 755 MG/ML
100 INJECTION, SOLUTION INTRAVASCULAR
Status: COMPLETED | OUTPATIENT
Start: 2024-11-30 | End: 2024-11-30

## 2024-11-30 RX ORDER — HYDROCODONE BITARTRATE AND ACETAMINOPHEN 5; 325 MG/1; MG/1
1 TABLET ORAL EVERY 4 HOURS PRN
Status: DISCONTINUED | OUTPATIENT
Start: 2024-11-30 | End: 2024-12-01 | Stop reason: HOSPADM

## 2024-11-30 RX ORDER — RANOLAZINE 500 MG/1
1000 TABLET, EXTENDED RELEASE ORAL 2 TIMES DAILY
Status: DISCONTINUED | OUTPATIENT
Start: 2024-11-30 | End: 2024-11-30

## 2024-11-30 RX ORDER — RANOLAZINE 500 MG/1
1000 TABLET, EXTENDED RELEASE ORAL 2 TIMES DAILY
Status: DISCONTINUED | OUTPATIENT
Start: 2024-11-30 | End: 2024-12-01 | Stop reason: HOSPADM

## 2024-11-30 RX ORDER — COLCHICINE 0.6 MG/1
1.2 TABLET ORAL ONCE
Status: DISCONTINUED | OUTPATIENT
Start: 2024-11-30 | End: 2024-12-01 | Stop reason: HOSPADM

## 2024-11-30 RX ADMIN — MORPHINE SULFATE 2 MG: 2 INJECTION, SOLUTION INTRAMUSCULAR; INTRAVENOUS at 03:02

## 2024-11-30 RX ADMIN — MORPHINE SULFATE 2 MG: 2 INJECTION, SOLUTION INTRAMUSCULAR; INTRAVENOUS at 11:35

## 2024-11-30 RX ADMIN — INSULIN LISPRO 12 UNITS: 100 INJECTION, SOLUTION INTRAVENOUS; SUBCUTANEOUS at 10:27

## 2024-11-30 RX ADMIN — METHOCARBAMOL TABLETS 750 MG: 500 TABLET, COATED ORAL at 10:23

## 2024-11-30 RX ADMIN — SODIUM CHLORIDE, PRESERVATIVE FREE 10 ML: 5 INJECTION INTRAVENOUS at 20:30

## 2024-11-30 RX ADMIN — INSULIN LISPRO 12 UNITS: 100 INJECTION, SOLUTION INTRAVENOUS; SUBCUTANEOUS at 17:40

## 2024-11-30 RX ADMIN — CARVEDILOL 6.25 MG: 3.12 TABLET, FILM COATED ORAL at 17:41

## 2024-11-30 RX ADMIN — CARVEDILOL 6.25 MG: 3.12 TABLET, FILM COATED ORAL at 10:53

## 2024-11-30 RX ADMIN — ONDANSETRON 4 MG: 2 INJECTION INTRAMUSCULAR; INTRAVENOUS at 20:24

## 2024-11-30 RX ADMIN — ATORVASTATIN CALCIUM 40 MG: 40 TABLET, FILM COATED ORAL at 20:27

## 2024-11-30 RX ADMIN — AMLODIPINE BESYLATE 5 MG: 5 TABLET ORAL at 10:25

## 2024-11-30 RX ADMIN — RANOLAZINE 1000 MG: 500 TABLET, EXTENDED RELEASE ORAL at 11:34

## 2024-11-30 RX ADMIN — MORPHINE SULFATE 2 MG: 2 INJECTION, SOLUTION INTRAMUSCULAR; INTRAVENOUS at 15:43

## 2024-11-30 RX ADMIN — IOPAMIDOL 100 ML: 755 INJECTION, SOLUTION INTRAVENOUS at 14:56

## 2024-11-30 RX ADMIN — ASPIRIN 81 MG: 81 TABLET, CHEWABLE ORAL at 10:25

## 2024-11-30 RX ADMIN — LEVOTHYROXINE SODIUM 175 MCG: 0.1 TABLET ORAL at 06:15

## 2024-11-30 RX ADMIN — RANOLAZINE 1000 MG: 500 TABLET, EXTENDED RELEASE ORAL at 20:27

## 2024-11-30 RX ADMIN — METHOCARBAMOL TABLETS 750 MG: 500 TABLET, COATED ORAL at 20:28

## 2024-11-30 RX ADMIN — TICAGRELOR 90 MG: 90 TABLET ORAL at 10:25

## 2024-11-30 RX ADMIN — ENOXAPARIN SODIUM 40 MG: 100 INJECTION SUBCUTANEOUS at 10:30

## 2024-11-30 RX ADMIN — PANTOPRAZOLE SODIUM 40 MG: 40 TABLET, DELAYED RELEASE ORAL at 06:15

## 2024-11-30 RX ADMIN — SULFAMETHOXAZOLE AND TRIMETHOPRIM 1 TABLET: 800; 160 TABLET ORAL at 10:53

## 2024-11-30 RX ADMIN — INSULIN LISPRO 4 UNITS: 100 INJECTION, SOLUTION INTRAVENOUS; SUBCUTANEOUS at 17:41

## 2024-11-30 RX ADMIN — SULFAMETHOXAZOLE AND TRIMETHOPRIM 1 TABLET: 800; 160 TABLET ORAL at 20:27

## 2024-11-30 RX ADMIN — SODIUM CHLORIDE, PRESERVATIVE FREE 10 ML: 5 INJECTION INTRAVENOUS at 03:03

## 2024-11-30 RX ADMIN — ONDANSETRON 4 MG: 2 INJECTION INTRAMUSCULAR; INTRAVENOUS at 03:13

## 2024-11-30 RX ADMIN — INSULIN GLARGINE 48 UNITS: 100 INJECTION, SOLUTION SUBCUTANEOUS at 10:28

## 2024-11-30 RX ADMIN — TICAGRELOR 90 MG: 90 TABLET ORAL at 20:27

## 2024-11-30 RX ADMIN — MORPHINE SULFATE 2 MG: 2 INJECTION, SOLUTION INTRAMUSCULAR; INTRAVENOUS at 07:35

## 2024-11-30 RX ADMIN — PANTOPRAZOLE SODIUM 40 MG: 40 INJECTION, POWDER, FOR SOLUTION INTRAVENOUS at 10:30

## 2024-11-30 RX ADMIN — MORPHINE SULFATE 2 MG: 2 INJECTION, SOLUTION INTRAMUSCULAR; INTRAVENOUS at 20:21

## 2024-11-30 ASSESSMENT — PAIN DESCRIPTION - LOCATION
LOCATION: CHEST;SHOULDER
LOCATION: CHEST
LOCATION: CHEST;SHOULDER
LOCATION: CHEST;SHOULDER

## 2024-11-30 ASSESSMENT — PAIN SCALES - GENERAL
PAINLEVEL_OUTOF10: 4
PAINLEVEL_OUTOF10: 8
PAINLEVEL_OUTOF10: 0
PAINLEVEL_OUTOF10: 9
PAINLEVEL_OUTOF10: 7
PAINLEVEL_OUTOF10: 7
PAINLEVEL_OUTOF10: 9

## 2024-11-30 ASSESSMENT — PAIN - FUNCTIONAL ASSESSMENT
PAIN_FUNCTIONAL_ASSESSMENT: ACTIVITIES ARE NOT PREVENTED
PAIN_FUNCTIONAL_ASSESSMENT: PREVENTS OR INTERFERES SOME ACTIVE ACTIVITIES AND ADLS
PAIN_FUNCTIONAL_ASSESSMENT: PREVENTS OR INTERFERES SOME ACTIVE ACTIVITIES AND ADLS

## 2024-11-30 ASSESSMENT — PAIN DESCRIPTION - ORIENTATION
ORIENTATION: LEFT;ANTERIOR
ORIENTATION: RIGHT
ORIENTATION: LEFT
ORIENTATION: LEFT

## 2024-11-30 ASSESSMENT — PAIN DESCRIPTION - DESCRIPTORS
DESCRIPTORS: ACHING;GNAWING;NAGGING
DESCRIPTORS: SHARP
DESCRIPTORS: SHOOTING
DESCRIPTORS: ACHING;PRESSURE;NAGGING
DESCRIPTORS: STABBING

## 2024-11-30 ASSESSMENT — PAIN SCALES - WONG BAKER: WONGBAKER_NUMERICALRESPONSE: NO HURT

## 2024-11-30 NOTE — PROGRESS NOTES
Spiritual Health History and Assessment/Progress Note  [facility]    Initial Encounter,  ,  ,      Name: Mandie Dietz MRN: [id number]    Age: 39 y.o.     Sex: female   Language: English   Adventism: Hindu   Chest pain     Date: 11/30/2024            Total Time Calculated: 24 min              Spiritual Assessment began in [facility] [place] CARDIAC TELEMETRY        Referral/Consult From: Nurse   Encounter Overview/Reason: Initial Encounter  Service Provided For: Patient not available    Elida, Belief, Meaning:   Patient unable to assess at this time  Family/Friends No family/friends present      Importance and Influence:  Patient unable to assess at this time  Family/Friends: unable to assess at this time    Community:  Patient : unable to assess at this time  Family/Friends No family/friends present    Assessment and Plan of Care:     Patient Interventions include: Visit declined  Family/Friends Interventions include: No family/friends present    Patient Plan of Care: Spiritual Care available upon further referral  Family/Friends Plan of Care: Spiritual Care available upon further referral    Electronically signed by ISAAC Moctezuma on 11/30/2024 at 5:09 AM

## 2024-11-30 NOTE — CONSULTS
[facility] Cardiology: Princess      Requesting/referring provider:   Reason for Consult:    HPI: Mandie Dietz, a 39 y.o. year-old who presents with PMHx significant for coronary artery disease status post CABG, previous cardiac arrest, insulin-dependent diabetes mellitus type 2, hypertension and hyperlipidemia   Now c/o chest pain x 4 days. No pain for the last 6 months then all of a sudden severe substernal chest pain. Not like her prior MI/ cardiac pain. Has had rheum magallon and prior egd ut not recently.   Last cath 6 mos ago.   EF earlier this year was ok with normalized ef.   Needs further eval for cad, esophagitis, pleuritic/rheum/inflammatory disease.   Some response form ntg sl x 20 min then pin returns at home, hasn't been able to take paste.   Maybe patch? Will trial    Assessment/Plan:  CAD severe, early onset with [facility] on 03/15/2024: severe mid LAD disease and RCA . Patent LIMA graft to LIMA-LAD, faint collateral circulation filling the distal RCA .   -brilinta, aspirin, statin  Chest pain- unclear cause- trop neg send other labs, gi cause eval etc.   DM home meds  LIZ ivf and recheck  Elev wbc- recheck  Htn optimize control  Hypothyroid- replete  Morbid obesity Body mass index is 41.13 kg/m².      She  has a past medical history of Acquired hypothyroidism, Bacterial vaginosis, Breast abscess of female, Chronic otitis media with perforated tympanic membrane, CKD (chronic kidney disease) stage 3, GFR 30-59 ml/min ([facility]), Complicated migraine, Coronary artery disease involving native coronary artery of native heart without angina pectoris, Dental abscess, Gastroparesis, Hidradenitis suppurativa of left axilla, History of DVT (deep vein thrombosis), Hypertension, Microalbuminuria due to type 2 diabetes mellitus ([facility]), MS (multiple sclerosis) ([facility]), Obesity (BMI 35.0-39.9 without comorbidity), Paraparesis of bilateral lower extremites ([facility]), PCOS (polycystic ovarian syndrome), Postural

## 2024-11-30 NOTE — PROGRESS NOTES
Hospitalist Progress Note               Daily Progress Note: 2024      Hospital Day: 3     Chief complaint:   Chief Complaint   Patient presents with    Chest Pain    Nausea        Brief HPI/ Hospital course to date:    39 y.o.  female with PMHx significant for coronary artery disease status post CABG, previous cardiac arrest, insulin-dependent diabetes mellitus type 2, hypertension and hyperlipidemia who presents to the emergency department complaining of central chest pain.  Workup unremarkable in the emergency department.  Upon record review she had a cardiac catheterization done in 2024 which showed no acute causes of her chest pain at that time. Troponin negative x2.  EKG no ST elevation.  Echo on 2024 showed EF of 55 to 60%, normal wall motion. Cardiology consulted.     --------  Patient is seen today for follow-up. Reports still having chest pain, worsening on mild exertion.     All ROS negative otherwise mentioned above.      /80   Pulse 83   Temp 97.5 °F (36.4 °C) (Oral)   Resp 20   Ht 1.626 m (5' 4\")   Wt 113 kg (249 lb 1.9 oz)   SpO2 97%   BMI 42.76 kg/m²    O2 Device: None (Room air)    Temp (24hrs), Av °F (36.7 °C), Min:97.5 °F (36.4 °C), Max:98.6 °F (37 °C)    No intake/output data recorded.    1901 -  0700  In: 500 [P.O.:500]  Out: -     Physical Exam:  General:  Awake and alert   Lungs:   Clear to auscultation bilaterally.   Heart:  Regular rate and rhythm, S1, S2 normal, no murmur. No click, rub or gallop.   Abdomen:   Soft, non-tender. Bowel sounds normal. No masses,  No organomegaly.   Extremities: No pitting  edema. Extremities normal, atraumatic, no cyanosis.    Pulses: 2+ and symmetric all extremities.   Skin: Skin color, texture, turgor normal. No rashes or lesions   Neurologic: CNII-XII intact.  No gross focal deficits       Assessment and Plan:  # Chest pain   # CAD  - DELFINO 4  - IV morphine PRN   - ranexa dose increased   - cardiology recomended

## 2024-11-30 NOTE — PLAN OF CARE
Problem: Chronic Conditions and Co-morbidities  Goal: Patient's chronic conditions and co-morbidity symptoms are monitored and maintained or improved  11/29/2024 2330 by Star Norris RN  Outcome: Progressing  11/29/2024 1051 by Gricel Lyons RN  Outcome: Progressing  Flowsheets (Taken 11/29/2024 0809)  Care Plan - Patient's Chronic Conditions and Co-Morbidity Symptoms are Monitored and Maintained or Improved:   Monitor and assess patient's chronic conditions and comorbid symptoms for stability, deterioration, or improvement   Collaborate with multidisciplinary team to address chronic and comorbid conditions and prevent exacerbation or deterioration   Update acute care plan with appropriate goals if chronic or comorbid symptoms are exacerbated and prevent overall improvement and discharge     Problem: Discharge Planning  Goal: Discharge to home or other facility with appropriate resources  11/29/2024 2330 by Star Norris RN  Outcome: Progressing  11/29/2024 1051 by Gricel Lyons RN  Outcome: Progressing  Flowsheets (Taken 11/29/2024 0809)  Discharge to home or other facility with appropriate resources:   Identify barriers to discharge with patient and caregiver   Arrange for needed discharge resources and transportation as appropriate   Identify discharge learning needs (meds, wound care, etc)   Refer to discharge planning if patient needs post-hospital services based on physician order or complex needs related to functional status, cognitive ability or social support system     Problem: Safety - Adult  Goal: Free from fall injury  11/29/2024 2330 by Stra Norris RN  Outcome: Progressing  11/29/2024 1051 by Gricel Lyons RN  Outcome: Progressing     Problem: Pain  Goal: Verbalizes/displays adequate comfort level or baseline comfort level  11/29/2024 2330 by Star Norris RN  Outcome: Progressing  11/29/2024 1051 by

## 2024-11-30 NOTE — PROGRESS NOTES
Pt with aortic aneurysm and diverticulum with ongoing chest pain. Recommend Cardiothoracic surgery consult and transfer to [place] if urgent needs arise, but stable at present.   CT with severe athero as expected, 5.4cm ao aneurysm with aortic diverticulum.     Non emergent, reviewed case with Dr. Anguiano, he will review the films and decide how to proceed.     Unclear if the CT findings are related to her chest pain, she remains hemodynamically stable.   Troponin negative, etc.   Cont supportive care.

## 2024-11-30 NOTE — CONSULTS
[facility] Cardiology: Princess      Requesting/referring provider:   Reason for Consult:    HPI: Mandie Dietz, a 39 y.o. year-old who presents with PMHx significant for coronary artery disease status post CABG, previous cardiac arrest, insulin-dependent diabetes mellitus type 2, hypertension and hyperlipidemia   Now c/o chest pain x 4 days. No pain for the last 6 months then all of a sudden severe substernal chest pain. Not like her prior MI/ cardiac pain. Has had rheum magallon and prior egd ut not recently.   Last cath 6 mos ago.   EF earlier this year was ok with normalized ef.   Needs further eval for cad, esophagitis, pleuritic/rheum/inflammatory disease.   Some response form ntg sl x 20 min then pin returns at home, hasn't been able to take paste.   Maybe patch? Will trial    11/30 no response to gi cocktail  Agreeable to nitropatch  Responsive to morphine   Toradol allergy  Increase ranolazine  CT chest and ruq us pending, s/p campbell but will check for duct stones  Autoimmune labs pending  Can trial colchicine      Assessment/Plan:  CAD severe, early onset with [facility] on 03/15/2024: severe mid LAD disease and RCA . Patent LIMA graft to LIMA-LAD, faint collateral circulation filling the distal RCA .   -brilinta, aspirin, statin  Chest pain- unclear cause- trop neg send other labs, gi cause eval etc.   DM home meds  LIZ ivf and recheck  Elev wbc- recheck  Htn optimize control  Hypothyroid- replete  Morbid obesity Body mass index is 42.76 kg/m².      She  has a past medical history of Acquired hypothyroidism, Bacterial vaginosis, Breast abscess of female, Chronic otitis media with perforated tympanic membrane, CKD (chronic kidney disease) stage 3, GFR 30-59 ml/min ([facility]), Complicated migraine, Coronary artery disease involving native coronary artery of native heart without angina pectoris, Dental abscess, Gastroparesis, Hidradenitis suppurativa of left axilla, History of DVT (deep vein thrombosis),

## 2024-11-30 NOTE — PROGRESS NOTES
Primary RN notified of CT findings.  Both Berny Stinson and Princess notified of findings.  Dr. Sánchez to reach out to other hospital to likely initiate transfer.  Awaiting MD to the floor and further instructions.  Suri Rose RN 3:28 PM

## 2024-12-01 ENCOUNTER — APPOINTMENT (OUTPATIENT)
Facility: HOSPITAL | Age: 39
DRG: 300 | End: 2024-12-01
Payer: MEDICARE

## 2024-12-01 VITALS
DIASTOLIC BLOOD PRESSURE: 85 MMHG | RESPIRATION RATE: 16 BRPM | WEIGHT: 249.34 LBS | TEMPERATURE: 97.9 F | HEIGHT: 64 IN | HEART RATE: 84 BPM | OXYGEN SATURATION: 98 % | BODY MASS INDEX: 42.57 KG/M2 | SYSTOLIC BLOOD PRESSURE: 143 MMHG

## 2024-12-01 LAB
ECHO AO ASC DIAM: 3.5 CM
ECHO AO ASCENDING AORTA INDEX: 1.63 CM/M2
ECHO AO ROOT DIAM: 3.1 CM
ECHO AO ROOT INDEX: 1.44 CM/M2
ECHO AV AREA PEAK VELOCITY: 1.7 CM2
ECHO AV AREA VTI: 1.9 CM2
ECHO AV AREA/BSA PEAK VELOCITY: 0.8 CM2/M2
ECHO AV AREA/BSA VTI: 0.9 CM2/M2
ECHO AV MEAN GRADIENT: 3 MMHG
ECHO AV MEAN VELOCITY: 0.8 M/S
ECHO AV PEAK GRADIENT: 6 MMHG
ECHO AV PEAK VELOCITY: 1.2 M/S
ECHO AV VELOCITY RATIO: 0.75
ECHO AV VTI: 23.7 CM
ECHO BSA: 2.22 M2
ECHO EST RA PRESSURE: 3 MMHG
ECHO IVC EXP: 1.2 CM
ECHO IVC INSP: 0 CM
ECHO LA AREA 4C: 16.3 CM2
ECHO LA DIAMETER INDEX: 1.26 CM/M2
ECHO LA DIAMETER: 2.7 CM
ECHO LA MAJOR AXIS: 5.6 CM
ECHO LA TO AORTIC ROOT RATIO: 0.87
ECHO LA VOL MOD A4C: 39 ML (ref 22–52)
ECHO LA VOLUME INDEX MOD A4C: 18 ML/M2 (ref 16–34)
ECHO LV E' LATERAL VELOCITY: 9.36 CM/S
ECHO LV E' SEPTAL VELOCITY: 5.98 CM/S
ECHO LV EDV A4C: 83 ML
ECHO LV EDV INDEX A4C: 39 ML/M2
ECHO LV EJECTION FRACTION A4C: 62 %
ECHO LV EJECTION FRACTION BIPLANE: 62 % (ref 55–100)
ECHO LV ESV A4C: 32 ML
ECHO LV ESV INDEX A4C: 15 ML/M2
ECHO LV FRACTIONAL SHORTENING: 33 % (ref 28–44)
ECHO LV INTERNAL DIMENSION DIASTOLE INDEX: 2.14 CM/M2
ECHO LV INTERNAL DIMENSION DIASTOLIC: 4.6 CM (ref 3.9–5.3)
ECHO LV INTERNAL DIMENSION SYSTOLIC INDEX: 1.44 CM/M2
ECHO LV INTERNAL DIMENSION SYSTOLIC: 3.1 CM
ECHO LV IVSD: 1 CM (ref 0.6–0.9)
ECHO LV MASS 2D: 181.2 G (ref 67–162)
ECHO LV MASS INDEX 2D: 84.3 G/M2 (ref 43–95)
ECHO LV POSTERIOR WALL DIASTOLIC: 1.2 CM (ref 0.6–0.9)
ECHO LV RELATIVE WALL THICKNESS RATIO: 0.52
ECHO LVOT AREA: 2.3 CM2
ECHO LVOT AV VTI INDEX: 0.85
ECHO LVOT DIAM: 1.7 CM
ECHO LVOT MEAN GRADIENT: 2 MMHG
ECHO LVOT PEAK GRADIENT: 3 MMHG
ECHO LVOT PEAK VELOCITY: 0.9 M/S
ECHO LVOT STROKE VOLUME INDEX: 21.2 ML/M2
ECHO LVOT SV: 45.6 ML
ECHO LVOT VTI: 20.1 CM
ECHO MV A VELOCITY: 0.91 M/S
ECHO MV AREA VTI: 1.7 CM2
ECHO MV E DECELERATION TIME (DT): 169 MS
ECHO MV E VELOCITY: 1.02 M/S
ECHO MV E/A RATIO: 1.12
ECHO MV E/E' LATERAL: 10.9
ECHO MV E/E' RATIO (AVERAGED): 13.98
ECHO MV E/E' SEPTAL: 17.06
ECHO MV LVOT VTI INDEX: 1.31
ECHO MV MAX VELOCITY: 1.2 M/S
ECHO MV MEAN GRADIENT: 3 MMHG
ECHO MV MEAN VELOCITY: 0.9 M/S
ECHO MV PEAK GRADIENT: 6 MMHG
ECHO MV REGURGITANT PEAK GRADIENT: 13 MMHG
ECHO MV REGURGITANT PEAK VELOCITY: 1.8 M/S
ECHO MV VTI: 26.3 CM
ECHO PV ACCELERATION TIME (AT): 86 MS
ECHO PV MAX VELOCITY: 0.9 M/S
ECHO PV PEAK GRADIENT: 3 MMHG
ECHO RIGHT VENTRICULAR SYSTOLIC PRESSURE (RVSP): 13 MMHG
ECHO RV FREE WALL PEAK S': 5.4 CM/S
ECHO RV TAPSE: 0.9 CM (ref 1.7–?)
ECHO TV REGURGITANT MAX VELOCITY: 1.57 M/S
ECHO TV REGURGITANT PEAK GRADIENT: 10 MMHG
EKG ATRIAL RATE: 86 BPM
EKG DIAGNOSIS: NORMAL
EKG P AXIS: 36 DEGREES
EKG P-R INTERVAL: 186 MS
EKG Q-T INTERVAL: 390 MS
EKG QRS DURATION: 98 MS
EKG QTC CALCULATION (BAZETT): 466 MS
EKG R AXIS: -54 DEGREES
EKG T AXIS: 29 DEGREES
EKG VENTRICULAR RATE: 86 BPM
GLUCOSE BLD STRIP.AUTO-MCNC: 136 MG/DL (ref 65–100)
GLUCOSE BLD STRIP.AUTO-MCNC: 184 MG/DL (ref 65–100)
PERFORMED BY:: ABNORMAL
PERFORMED BY:: ABNORMAL

## 2024-12-01 PROCEDURE — C8929 TTE W OR WO FOL WCON,DOPPLER: HCPCS

## 2024-12-01 PROCEDURE — 6360000002 HC RX W HCPCS: Performed by: INTERNAL MEDICINE

## 2024-12-01 PROCEDURE — 6370000000 HC RX 637 (ALT 250 FOR IP): Performed by: INTERNAL MEDICINE

## 2024-12-01 PROCEDURE — 05HY33Z INSERTION OF INFUSION DEVICE INTO UPPER VEIN, PERCUTANEOUS APPROACH: ICD-10-PCS | Performed by: INTERNAL MEDICINE

## 2024-12-01 PROCEDURE — 36410 VNPNXR 3YR/> PHY/QHP DX/THER: CPT

## 2024-12-01 PROCEDURE — 6370000000 HC RX 637 (ALT 250 FOR IP): Performed by: HOSPITALIST

## 2024-12-01 PROCEDURE — 6360000002 HC RX W HCPCS: Performed by: HOSPITALIST

## 2024-12-01 PROCEDURE — 6360000004 HC RX CONTRAST MEDICATION

## 2024-12-01 PROCEDURE — 82962 GLUCOSE BLOOD TEST: CPT

## 2024-12-01 RX ADMIN — ONDANSETRON 4 MG: 2 INJECTION INTRAMUSCULAR; INTRAVENOUS at 15:12

## 2024-12-01 RX ADMIN — CARVEDILOL 6.25 MG: 3.12 TABLET, FILM COATED ORAL at 09:49

## 2024-12-01 RX ADMIN — ASPIRIN 81 MG: 81 TABLET, CHEWABLE ORAL at 09:51

## 2024-12-01 RX ADMIN — AMLODIPINE BESYLATE 5 MG: 5 TABLET ORAL at 09:50

## 2024-12-01 RX ADMIN — METHOCARBAMOL TABLETS 750 MG: 500 TABLET, COATED ORAL at 09:49

## 2024-12-01 RX ADMIN — INSULIN LISPRO 2 UNITS: 100 INJECTION, SOLUTION INTRAVENOUS; SUBCUTANEOUS at 13:21

## 2024-12-01 RX ADMIN — INSULIN GLARGINE 48 UNITS: 100 INJECTION, SOLUTION SUBCUTANEOUS at 09:51

## 2024-12-01 RX ADMIN — LEVOTHYROXINE SODIUM 175 MCG: 0.1 TABLET ORAL at 05:44

## 2024-12-01 RX ADMIN — ENOXAPARIN SODIUM 40 MG: 100 INJECTION SUBCUTANEOUS at 09:51

## 2024-12-01 RX ADMIN — PANTOPRAZOLE SODIUM 40 MG: 40 TABLET, DELAYED RELEASE ORAL at 05:45

## 2024-12-01 RX ADMIN — SULFAMETHOXAZOLE AND TRIMETHOPRIM 1 TABLET: 800; 160 TABLET ORAL at 09:48

## 2024-12-01 RX ADMIN — PERFLUTREN 2 ML: 6.52 INJECTION, SUSPENSION INTRAVENOUS at 08:51

## 2024-12-01 RX ADMIN — RANOLAZINE 1000 MG: 500 TABLET, EXTENDED RELEASE ORAL at 09:50

## 2024-12-01 RX ADMIN — MORPHINE SULFATE 2 MG: 2 INJECTION, SOLUTION INTRAMUSCULAR; INTRAVENOUS at 04:20

## 2024-12-01 RX ADMIN — TICAGRELOR 90 MG: 90 TABLET ORAL at 09:50

## 2024-12-01 RX ADMIN — ONDANSETRON 4 MG: 2 INJECTION INTRAMUSCULAR; INTRAVENOUS at 04:16

## 2024-12-01 RX ADMIN — MORPHINE SULFATE 2 MG: 2 INJECTION, SOLUTION INTRAMUSCULAR; INTRAVENOUS at 00:20

## 2024-12-01 RX ADMIN — MORPHINE SULFATE 2 MG: 2 INJECTION, SOLUTION INTRAMUSCULAR; INTRAVENOUS at 15:10

## 2024-12-01 RX ADMIN — INSULIN LISPRO 12 UNITS: 100 INJECTION, SOLUTION INTRAVENOUS; SUBCUTANEOUS at 13:20

## 2024-12-01 ASSESSMENT — PAIN - FUNCTIONAL ASSESSMENT
PAIN_FUNCTIONAL_ASSESSMENT: PREVENTS OR INTERFERES SOME ACTIVE ACTIVITIES AND ADLS
PAIN_FUNCTIONAL_ASSESSMENT: PREVENTS OR INTERFERES SOME ACTIVE ACTIVITIES AND ADLS
PAIN_FUNCTIONAL_ASSESSMENT: ACTIVITIES ARE NOT PREVENTED
PAIN_FUNCTIONAL_ASSESSMENT: ACTIVITIES ARE NOT PREVENTED

## 2024-12-01 ASSESSMENT — PAIN DESCRIPTION - LOCATION
LOCATION: CHEST;SHOULDER

## 2024-12-01 ASSESSMENT — PAIN DESCRIPTION - ORIENTATION
ORIENTATION: LEFT;ANTERIOR
ORIENTATION: LEFT;ANTERIOR

## 2024-12-01 ASSESSMENT — PAIN SCALES - WONG BAKER
WONGBAKER_NUMERICALRESPONSE: HURTS A LITTLE BIT
WONGBAKER_NUMERICALRESPONSE: NO HURT

## 2024-12-01 ASSESSMENT — PAIN DESCRIPTION - DESCRIPTORS
DESCRIPTORS: ACHING;SHARP
DESCRIPTORS: ACHING;NAGGING;THROBBING
DESCRIPTORS: ACHING;SHARP

## 2024-12-01 ASSESSMENT — PAIN SCALES - GENERAL
PAINLEVEL_OUTOF10: 9
PAINLEVEL_OUTOF10: 10
PAINLEVEL_OUTOF10: 2
PAINLEVEL_OUTOF10: 0
PAINLEVEL_OUTOF10: 5
PAINLEVEL_OUTOF10: 8

## 2024-12-01 NOTE — PLAN OF CARE
Problem: Chronic Conditions and Co-morbidities  Goal: Patient's chronic conditions and co-morbidity symptoms are monitored and maintained or improved  11/30/2024 2204 by Kellie Saini RN  Outcome: Progressing  Flowsheets (Taken 11/30/2024 2030)  Care Plan - Patient's Chronic Conditions and Co-Morbidity Symptoms are Monitored and Maintained or Improved: Monitor and assess patient's chronic conditions and comorbid symptoms for stability, deterioration, or improvement  11/30/2024 1304 by Raina Collins RN  Outcome: Progressing     Problem: Discharge Planning  Goal: Discharge to home or other facility with appropriate resources  11/30/2024 2204 by Kellie Saini RN  Outcome: Progressing  Flowsheets (Taken 11/30/2024 2030)  Discharge to home or other facility with appropriate resources: Identify barriers to discharge with patient and caregiver  11/30/2024 1304 by Raina Collins RN  Outcome: Progressing     Problem: Pain  Goal: Verbalizes/displays adequate comfort level or baseline comfort level  11/30/2024 2204 by Kellie Saini RN  Outcome: Progressing  11/30/2024 1304 by Raina Collins RN  Outcome: Progressing     Problem: Safety - Adult  Goal: Free from fall injury  11/30/2024 2204 by Kellie Saini RN  Outcome: Progressing  11/30/2024 1304 by Raina Collins RN  Outcome: Progressing

## 2024-12-01 NOTE — CONSULTS
[facility]: Princess      Requesting/referring provider:   Reason for Consult:    HPI: Mandie Dietz, a 39 y.o. year-old who presents with PMHx significant for coronary artery disease status post CABG, previous cardiac arrest, insulin-dependent diabetes mellitus type 2, hypertension and hyperlipidemia   Now c/o chest pain x 4 days. No pain for the last 6 months then all of a sudden severe substernal chest pain. Not like her prior MI/ cardiac pain. Has had rheum magallon and prior egd ut not recently.   Last cath 6 mos ago.   EF earlier this year was ok with normalized ef.   Needs further eval for cad, esophagitis, pleuritic/rheum/inflammatory disease.   Some response form ntg sl x 20 min then pin returns at home, hasn't been able to take paste.   Maybe patch? Will trial    11/30 no response to gi cocktail  Agreeable to nitropatch  Responsive to morphine   Toradol allergy    12/1  Increase ranolazine- no effect cholchicine refused due to gi effects  Chest ct w 5.4cm aorta and small diverticulum, changed from prior,, pt with ongoing chest pain and wants to be transferred to [facility] for evaluation by CTS, reviewed that this was non-emergent and unlikely to result in operation, but she and her family really wish to see the CT surgeon given her ongoing pain- Spoke to Dr. Francis and transfer center will facilitate.   He gives the additional history of multiple sternal wound infections etc.   Autoimmune labs pending      Assessment/Plan:  CAD severe, early onset with [facility] on 03/15/2024: severe mid LAD disease and RCA . Patent LIMA graft to LIMA-LAD, faint collateral circulation filling the distal RCA .   -brilinta, aspirin, statin  Chest pain- unclear cause- trop neg send other labs, gi cause eval etc.   CT findings as above, ao aneurysm  DM home meds  LIZ ivf and recheck  Elev wbc- recheck  Htn optimize control  Hypothyroid- replete  Morbid obesity Body mass index is 42.8 kg/m².      She  has a past

## 2024-12-01 NOTE — DISCHARGE SUMMARY
Discharge Summary    Name: Mandie Dietz  [id number]  YOB: 1985 (Age: 39 y.o.)   Date of Admission: 11/28/2024  Date of Discharge: 12/1/2024  Attending Physician: Mike Stinson MD    Discharge Diagnosis:   Chest pain secondary to     # History of CAD s/p CABG   # diabetes  # LIZ, improving  # Leukocytosis, reactive. Resolved  Hypertension   HLP  Hypothyroidism       Consultations:  IP CONSULT TO SPIRITUAL SERVICES  IP CONSULT TO CARDIOLOGY  IP CONSULT TO VASCULAR ACCESS TEAM    Brief Hospital Course by Main Problems:   39 y.o. female with PMHx significant for coronary artery disease status post CABG, previous cardiac arrest, insulin-dependent diabetes mellitus type 2, hypertension and hyperlipidemia who presents to the emergency department complaining of central chest pain. Workup unremarkable in the emergency department. Upon record review she had a cardiac catheterization done in March 2024 which showed no acute causes of her chest pain at that time. Troponin negative x2. EKG no ST elevation. Echo on 4/2024 showed EF of 55 to 60%, normal wall motion. Cardiology consulted. CTA performed, showing ectatic ascending aortic root, severe coronary artery calcification and 5 mm diverticulum from the anterior superior ascending aorta. No pulmonary embolism. Patient initially has leukocytosis, resolved w/o antibiotics, likely reactive. Patient known to CT surgery in [facility], which did her CABG. Case discussed CT surgeon and hospitalist Dr. Holder accepted the patient.     Discharge Exam:  Patient seen and examined by me on discharge day. Complain of continued to have chest pain. Will transfer to [facility].     General: Alert, mild distress with chest pain   Head & neck: Normocephalic, without obvious abnormality, atraumatic. Neck supple, symmetrical, trachea midline.   Eyes: Conjunctivae/corneas clear. PERRL, EOMs intact.  Oral: Lips, mucosa, and

## 2024-12-01 NOTE — PROGRESS NOTES
Physician Progress Note      PATIENT:               SEAN JOSE  [facility] #:                  [id number]  :                       1985  ADMIT DATE:       2024 4:12 PM  DISCH DATE:  RESPONDING  PROVIDER #:        Mike MCELROY Cha, MD          QUERY TEXT:    Good day  Pt admitted with chest pain.  Pt noted to have history of CAD, aortic aneurysm.    If possible, please document in progress notes and discharge summary if you   are evaluating and/or treating any of the following:    The medical record reflects the following:  Risk Factors: CAD, aortic aneurysm, Hx MI, HLD  Clinical Indicators: Patient presents with persistent chest pain and nausea.   Troponins negative   PN \"Pt with aortic aneurysm and diverticulum with ongoing chest pain\"  CT- 5.4cm ao aneurysm with aortic diverticulum  Treatment: daily labs, cardiology consult, cardiothoracic surgery consult,   EKG, GI cocktail, CT, aspirin, morphine    Thank you  Bethany Payne RN CDI  [phone]  Options provided:  -- Chest pain due to CAD with unstable angina  -- Chest pain due to GERD  -- Chest pain due to pleural effusion  -- Chest pain due to aortic aneurysm  -- Other - I will add my own diagnosis  -- Disagree - Not applicable / Not valid  -- Disagree - Clinically unable to determine / Unknown  -- Refer to Clinical Documentation Reviewer    PROVIDER RESPONSE TEXT:    Provider is clinically unable to determine a response to this query.    Query created by: Bethany Payne on 2024 10:17 PM      Electronically signed by:  Mike MCELROY Cha, MD 2024 9:04 AM

## 2024-12-01 NOTE — PROGRESS NOTES
[facility] arrived to hospital at 1500 to transport pt to [facility] she will be admitted to room 247. Writer called report to Elizabeth Roman [phone] . Receiving Md is Dr. Eloy Holder . Attending MD here Dr. Stinson. Right upper arm midline placed before transportation arrival . EMTALA forms completed and copy provided to [facility] and  one for our records. Vitals obtained at 1502 bp 143/85 p 84 resp 16 pulse ox 98 % on room air . Writer administered  prn IV Morphine and Zofran per pt's request . Pt left hospital stable via stretcher accompanied by transportation at 15:25 pm to [facility]

## 2024-12-01 NOTE — DISCHARGE INSTR - COC
Continuity of Care Form    Patient Name: Mandie Dietz   :  1985  MRN:  [id number]    Admit date:  2024  Discharge date:  2024    Code Status Order: Full Code   Advance Directives:   Advance Care Flowsheet Documentation             Admitting Physician:  Morris Langley MD  PCP: Domingo Samson APRN - NP    Discharging Nurse: Raina Collins RN  Discharging Hospital Unit/Room#: 483/01  Discharging Unit Phone Number: [phone]    Emergency Contact:   Extended Emergency Contact Information  Primary Emergency Contact: DENZEL DIETZ  Address: Same as patient           [address]  Home Phone: [phone]  Relation: Spouse    Past Surgical History:  Past Surgical History:   Procedure Laterality Date    CHOLECYSTECTOMY      CORONARY ARTERY BYPASS GRAFT  2019    DILATION AND CURETTAGE OF UTERUS      ENDOMETRIAL ABLATION  2021    Novasure    LAPAROSCOPIC HYSTERECTOMY  2023    Davinci    OTHER SURGICAL HISTORY      2 TEETH REMOVED     VA UNLISTED PROCEDURE CARDIAC SURGERY      CABG X 1, CARDIAC CATH STENT X 3    SALPINGECTOMY Bilateral 2021    TONSILLECTOMY AND ADENOIDECTOMY  1992    TYMPANOSTOMY TUBE PLACEMENT Bilateral     MULTIPLE    WISDOM TOOTH EXTRACTION         Immunization History:   Immunization History   Administered Date(s) Administered    TDaP, ADACEL (age 10y-64y), BOOSTRIX (age 10y+), IM, 0.5mL 2011       Active Problems:  Patient Active Problem List   Diagnosis Code    Type 2 diabetes mellitus with complication, without long-term current use of insulin ([facility]) E11.8    Intractable headache R51.9    CKD (chronic kidney disease) stage 3, GFR 30-59 ml/min ([facility]) N18.30    Multiple sclerosis, primary chronic progressive ([facility]) G35    Chest pain R07.9       Isolation/Infection:   Isolation            No Isolation          Patient Infection Status       None to display            Nurse Assessment:  Last Vital Signs: BP

## 2024-12-01 NOTE — PROGRESS NOTES
Hospitalist Progress Note               Daily Progress Note: 2024      Hospital Day: 4     Chief complaint:   Chief Complaint   Patient presents with    Chest Pain    Nausea        Brief HPI/ Hospital course to date:    39 y.o.  female with PMHx significant for coronary artery disease status post CABG, previous cardiac arrest, insulin-dependent diabetes mellitus type 2, hypertension and hyperlipidemia who presents to the emergency department complaining of central chest pain.  Workup unremarkable in the emergency department.  Upon record review she had a cardiac catheterization done in 2024 which showed no acute causes of her chest pain at that time. Troponin negative x2.  EKG no ST elevation.  Echo on 2024 showed EF of 55 to 60%, normal wall motion. Cardiology consulted.     --------  Patient is seen today for follow-up. Reports still having chest pain. CTA showed aortic aneurysm and diverticulum. Case dicussed with Dr. Anguiano, recommended outpatient follow-up. However patient still having chest pain, wants to be transfer to [facility] now. Will try to transfer.     All ROS negative otherwise mentioned above.      /81   Pulse 79   Temp 98.8 °F (37.1 °C) (Oral)   Resp 18   Ht 1.626 m (5' 4\")   Wt 113.1 kg (249 lb 5.4 oz)   SpO2 95%   BMI 42.80 kg/m²    O2 Device: None (Room air)    Temp (24hrs), Av.9 °F (36.6 °C), Min:97.5 °F (36.4 °C), Max:98.8 °F (37.1 °C)    No intake/output data recorded.   1901 -  0700  In: 710 [P.O.:700; I.V.:10]  Out: -     Physical Exam:  General:  Awake and alert   Lungs:   Clear to auscultation bilaterally.   Heart:  Regular rate and rhythm, S1, S2 normal, no murmur. No click, rub or gallop.   Abdomen:   Soft, non-tender. Bowel sounds normal. No masses,  No organomegaly.   Extremities: No pitting  edema. Extremities normal, atraumatic, no cyanosis.    Pulses: 2+ and symmetric all extremities.   Skin: Skin color, texture, turgor normal. No rashes

## 2024-12-02 LAB
CENTROMERE B AB SER-ACNC: <0.2 AI (ref 0–0.9)
CHROMATIN AB SERPL-ACNC: <0.2 AI (ref 0–0.9)
DSDNA AB SER-ACNC: <1 IU/ML (ref 0–9)
ENA JO1 AB SER-ACNC: <0.2 AI (ref 0–0.9)
ENA RNP AB SER-ACNC: <0.2 AI (ref 0–0.9)
ENA SCL70 AB SER-ACNC: <0.2 AI (ref 0–0.9)
ENA SM AB SER-ACNC: <0.2 AI (ref 0–0.9)
ENA SM+RNP AB SER-ACNC: <0.2 AI (ref 0–0.9)
ENA SS-A AB SER-ACNC: <0.2 AI (ref 0–0.9)
ENA SS-B AB SER-ACNC: <0.2 AI (ref 0–0.9)
RIBOSOMAL P AB SER-ACNC: <0.2 AI (ref 0–0.9)
SEE BELOW:, 164879: NORMAL

## 2024-12-05 PROBLEM — E11.3392 MODERATE NONPROLIFERATIVE DIABETIC RETINOPATHY OF LEFT EYE WITHOUT MACULAR EDEMA ASSOCIATED WITH TYPE 2 DIABETES MELLITUS (HCC): Status: ACTIVE | Noted: 2024-12-05

## 2024-12-05 PROBLEM — N83.209 CYST OF OVARY: Status: ACTIVE | Noted: 2018-05-07

## 2024-12-05 PROBLEM — G35 MULTIPLE SCLEROSIS (HCC): Status: ACTIVE | Noted: 2024-05-16

## 2024-12-05 PROBLEM — R20.2 PARESTHESIA: Status: ACTIVE | Noted: 2024-05-14

## 2024-12-05 PROBLEM — H66.90 CHRONIC OTITIS MEDIA: Status: ACTIVE | Noted: 2018-04-03

## 2024-12-05 PROBLEM — Z95.1 HISTORY OF CORONARY ARTERY BYPASS SURGERY: Status: ACTIVE | Noted: 2024-12-05

## 2024-12-05 PROBLEM — E78.5 HYPERLIPIDEMIA: Status: ACTIVE | Noted: 2024-12-05

## 2024-12-05 PROBLEM — J81.1 CHRONIC PULMONARY EDEMA: Status: ACTIVE | Noted: 2024-12-05

## 2024-12-05 PROBLEM — G81.91 RIGHT HEMIPARESIS (HCC): Status: ACTIVE | Noted: 2022-04-13

## 2024-12-05 PROBLEM — I21.9 MYOCARDIAL INFARCTION (HCC): Status: ACTIVE | Noted: 2019-11-25

## 2024-12-05 PROBLEM — G45.9 TRANSIENT ISCHEMIC ATTACK: Status: ACTIVE | Noted: 2017-04-13

## 2024-12-10 ENCOUNTER — LAB (OUTPATIENT)
Age: 39
End: 2024-12-10

## 2024-12-10 DIAGNOSIS — N18.30 STAGE 3 CHRONIC KIDNEY DISEASE, UNSPECIFIED WHETHER STAGE 3A OR 3B CKD (HCC): ICD-10-CM

## 2024-12-10 DIAGNOSIS — E03.9 PRIMARY HYPOTHYROIDISM: ICD-10-CM

## 2024-12-10 DIAGNOSIS — E11.8 TYPE 2 DIABETES MELLITUS WITH COMPLICATION, WITHOUT LONG-TERM CURRENT USE OF INSULIN (HCC): ICD-10-CM

## 2024-12-11 LAB
HBA1C MFR BLD: 7.6 % (ref 4.8–5.6)
T4 FREE SERPL-MCNC: 1.94 NG/DL (ref 0.82–1.77)
TSH SERPL DL<=0.005 MIU/L-ACNC: 0.48 UIU/ML (ref 0.45–4.5)

## 2024-12-17 ENCOUNTER — OFFICE VISIT (OUTPATIENT)
Age: 39
End: 2024-12-17
Payer: MEDICARE

## 2024-12-17 VITALS
RESPIRATION RATE: 22 BRPM | HEART RATE: 82 BPM | WEIGHT: 248 LBS | DIASTOLIC BLOOD PRESSURE: 77 MMHG | SYSTOLIC BLOOD PRESSURE: 136 MMHG | OXYGEN SATURATION: 98 % | HEIGHT: 64 IN | BODY MASS INDEX: 42.34 KG/M2 | TEMPERATURE: 98.3 F

## 2024-12-17 DIAGNOSIS — E11.8 TYPE 2 DIABETES MELLITUS WITH COMPLICATION, WITHOUT LONG-TERM CURRENT USE OF INSULIN (HCC): Primary | ICD-10-CM

## 2024-12-17 DIAGNOSIS — E78.2 MIXED HYPERLIPIDEMIA: ICD-10-CM

## 2024-12-17 DIAGNOSIS — I10 PRIMARY HYPERTENSION: ICD-10-CM

## 2024-12-17 DIAGNOSIS — N18.30 STAGE 3 CHRONIC KIDNEY DISEASE, UNSPECIFIED WHETHER STAGE 3A OR 3B CKD (HCC): ICD-10-CM

## 2024-12-17 PROCEDURE — 3051F HG A1C>EQUAL 7.0%<8.0%: CPT | Performed by: INTERNAL MEDICINE

## 2024-12-17 PROCEDURE — 3078F DIAST BP <80 MM HG: CPT | Performed by: INTERNAL MEDICINE

## 2024-12-17 PROCEDURE — 95251 CONT GLUC MNTR ANALYSIS I&R: CPT | Performed by: INTERNAL MEDICINE

## 2024-12-17 PROCEDURE — 99215 OFFICE O/P EST HI 40 MIN: CPT | Performed by: INTERNAL MEDICINE

## 2024-12-17 PROCEDURE — 3075F SYST BP GE 130 - 139MM HG: CPT | Performed by: INTERNAL MEDICINE

## 2024-12-17 NOTE — PROGRESS NOTES
[facility] DIABETES AND ENDOCRINOLOGY                 Camelia Hughes MD               Patient Information Name : Mandie Dietz    YOB: 1985           Referred by: Ella Gao DO         Chief complaint: Diabetes mellitus follow-up      Mandie Dietz is here for follow-up of  Diabetes Mellitus.    Diabetes mellitus was diagnosed in 2009 . End organ effects of diabetes: peripheral neuropathy, gastroparesis (unsure of the diagnosis),CKD.  History of CAD,CABG,CVA     History of Present Illness    Hospitalized for chest pain, initially suspected aortic aneurysm, later ruled out. Scheduled for Holter monitor placement tomorrow for a week to investigate potential bradycardia;     A1c was 6.7 on 11/30/2024, increased to 7.6 by 12/10/2024 after hospitalization from [facility] until 12/06/2024, attributed to inconsistent blood sugar management. No steroids administered. Received 2 units of fast-acting insulin in the hospital, no Tresiba or Trulicity. Blood glucose levels around 200 during hospital stay.      On Tresiba 60 units in the morning, NovoLog 10 units before each meal, and Trulicity, well-tolerated.      Experiencing ear infection for 6 weeks, prescribed antibiotics.              Prior history    MS -diagnosed in 2024, was on steroids, in and out of the hospital for 4 weeks, blood glucose was running higher, in the hospital she was getting less insulin    Taking levothyroxine consistently        Wt Readings from Last 3 Encounters:   12/17/24 112.5 kg (248 lb)   12/01/24 113.1 kg (249 lb 5.4 oz)   10/12/24 106.6 kg (235 lb)        Past Medical History:   Diagnosis Date    Acquired hypothyroidism 03/29/2024    Bacterial vaginosis 01/12/2015    Breast abscess of female 07/09/2017    Chronic otitis media with perforated tympanic membrane 04/03/2018    CKD (chronic kidney disease) stage 3, GFR 30-59 ml/min ([facility])     Complicated migraine     Coronary artery disease involving native

## 2024-12-17 NOTE — PROGRESS NOTES
Mandie Dietz is a 39 y.o. female here for   Chief Complaint   Patient presents with    Diabetes       1. Have you been to the ER, urgent care clinic since your last visit?  Hospitalized since your last visit? -[facility] 11/28/24 for chest pain - moved to [facility]    2. Have you seen or consulted any other health care providers outside of the [facility] since your last visit?  Include any pap smears or colon screening.-no

## 2025-01-13 NOTE — ED TRIAGE NOTES
Rt breast abscess drained 2 days ago here, taking AB. Abscess has enlarged and more painful. [phone]

## 2025-03-11 DIAGNOSIS — I10 PRIMARY HYPERTENSION: ICD-10-CM

## 2025-03-11 DIAGNOSIS — E78.2 MIXED HYPERLIPIDEMIA: ICD-10-CM

## 2025-03-11 DIAGNOSIS — E11.8 TYPE 2 DIABETES MELLITUS WITH COMPLICATION, WITHOUT LONG-TERM CURRENT USE OF INSULIN (HCC): ICD-10-CM

## 2025-03-12 ENCOUNTER — LAB (OUTPATIENT)
Age: 40
End: 2025-03-12

## 2025-03-13 LAB
ALBUMIN/CREAT UR: 2443 MG/G CREAT (ref 0–29)
CREAT UR-MCNC: 52.1 MG/DL
HBA1C MFR BLD: 9.4 % (ref 4.8–5.6)
MICROALBUMIN UR-MCNC: 1272.6 UG/ML

## 2025-03-14 LAB
BUN SERPL-MCNC: 15 MG/DL (ref 6–24)
BUN/CREAT SERPL: 18 (ref 9–23)
CALCIUM SERPL-MCNC: 10.5 MG/DL (ref 8.7–10.2)
CHLORIDE SERPL-SCNC: 98 MMOL/L (ref 96–106)
CHOLEST SERPL-MCNC: 171 MG/DL (ref 100–199)
CO2 SERPL-SCNC: 15 MMOL/L (ref 20–29)
CREAT SERPL-MCNC: 0.83 MG/DL (ref 0.57–1)
EGFRCR SERPLBLD CKD-EPI 2021: 91 ML/MIN/1.73
GLUCOSE SERPL-MCNC: 212 MG/DL (ref 70–99)
HDLC SERPL-MCNC: 52 MG/DL
IMP & REVIEW OF LAB RESULTS: NORMAL
LDLC SERPL CALC-MCNC: 48 MG/DL (ref 0–99)
Lab: NORMAL
POTASSIUM SERPL-SCNC: 5.1 MMOL/L (ref 3.5–5.2)
SODIUM SERPL-SCNC: 137 MMOL/L (ref 134–144)
TRIGL SERPL-MCNC: 486 MG/DL (ref 0–149)
VLDLC SERPL CALC-MCNC: 71 MG/DL (ref 5–40)

## 2025-03-19 ENCOUNTER — OFFICE VISIT (OUTPATIENT)
Age: 40
End: 2025-03-19
Payer: MEDICARE

## 2025-03-19 VITALS
HEIGHT: 64 IN | RESPIRATION RATE: 16 BRPM | TEMPERATURE: 97.5 F | WEIGHT: 241.2 LBS | OXYGEN SATURATION: 92 % | SYSTOLIC BLOOD PRESSURE: 145 MMHG | HEART RATE: 89 BPM | DIASTOLIC BLOOD PRESSURE: 98 MMHG | BODY MASS INDEX: 41.18 KG/M2

## 2025-03-19 DIAGNOSIS — E78.2 MIXED HYPERLIPIDEMIA: ICD-10-CM

## 2025-03-19 DIAGNOSIS — I10 PRIMARY HYPERTENSION: ICD-10-CM

## 2025-03-19 DIAGNOSIS — N18.30 STAGE 3 CHRONIC KIDNEY DISEASE, UNSPECIFIED WHETHER STAGE 3A OR 3B CKD (HCC): ICD-10-CM

## 2025-03-19 DIAGNOSIS — E11.8 TYPE 2 DIABETES MELLITUS WITH COMPLICATION, WITHOUT LONG-TERM CURRENT USE OF INSULIN (HCC): Primary | ICD-10-CM

## 2025-03-19 PROCEDURE — 3080F DIAST BP >= 90 MM HG: CPT | Performed by: INTERNAL MEDICINE

## 2025-03-19 PROCEDURE — 3077F SYST BP >= 140 MM HG: CPT | Performed by: INTERNAL MEDICINE

## 2025-03-19 PROCEDURE — 95251 CONT GLUC MNTR ANALYSIS I&R: CPT | Performed by: INTERNAL MEDICINE

## 2025-03-19 PROCEDURE — 99215 OFFICE O/P EST HI 40 MIN: CPT | Performed by: INTERNAL MEDICINE

## 2025-03-19 PROCEDURE — 3046F HEMOGLOBIN A1C LEVEL >9.0%: CPT | Performed by: INTERNAL MEDICINE

## 2025-03-19 NOTE — PATIENT INSTRUCTIONS
Check blood sugars before meals and at bedtime.      Low blood glucose is less than 70       Goal of blood glucose before meals 90 - 120 , 2  Hours after meals less than 180       Maintain the log and bring it all your appointments      If the bedtime sugars are less than 100 ,eat a 15 gm snack.     Tresiba 70 units in AM       Novolog or Humalog or Apidra insulin (fast acting) 10 units before breakfast, 10  units before lunch and 10  units before dinner.    If sugars before meals are less than 70 then no insulin         Trulicity weekly       Novolog or Humalog for high sugars       Correction Scale:  3 units for every 50 above 150      Blood sugar  Fast acting insulin             150-200  Extra 3 Units         201-250  Extra 6 Units         251-300  Extra 9 Units         301-350  Extra 12  Units          351-400  Extra 15  Units       Example:    My planned insulin dose:    ____ Units for meals    +    ____ Extra Correction Units  if high =  ____ total units to take together as one injection.

## 2025-03-19 NOTE — PROGRESS NOTES
[facility] DIABETES AND ENDOCRINOLOGY                 Camleia Hughes MD               Patient Information Name : Mandie Dietz    YOB: 1985           Referred by: Ella Gao DO         Chief complaint: Diabetes mellitus follow-up      Mandie Dietz is here for follow-up of  Diabetes Mellitus.    Diabetes mellitus was diagnosed in  . End organ effects of diabetes: peripheral neuropathy, gastroparesis (unsure of the diagnosis),CKD.  History of CAD,CABG,CVA     History of Present Illness    She had a severe cough on Wednesday, requiring an ER visit and a steroid taper. Prior to this, she had an MS episode needing IV steroids. Over the past 3 months, she has been on 2 different steroids, contributing to her elevated A1c of 9. Her blood glucose remains unstable despite no dietary changes. She increased NovoLog to 25 units per meal during steroid treatment, but it was ineffective. She completed the steroid course at the end of February. Previously, 10-15 units of NovoLog managed her glucose.       She has vomiting episodes 3 times a week for 6 years, attributed to gastroparesis, and takes sublingual medication for nausea 3 times a day. She experiences dehydration, evidenced by prolonged sleep up to 18 hours.      FAMILY HISTORY  - Grandmother had dementia and  in January    MEDICATIONS  Current: NovoLog, Tresiba, Trulicity, Jardiance        Prior history    MS -diagnosed in , was on steroids, in and out of the hospital for 4 weeks, blood glucose was running higher, in the hospital she was getting less insulin    Taking levothyroxine consistently        Wt Readings from Last 3 Encounters:   25 109.4 kg (241 lb 3.2 oz)   24 112.5 kg (248 lb)   24 113.1 kg (249 lb 5.4 oz)        Past Medical History:   Diagnosis Date    Acquired hypothyroidism 2024    Bacterial vaginosis 2015    Breast abscess of female 2017    Chronic otitis media with

## 2025-03-19 NOTE — PROGRESS NOTES
Mandie Dietz is a 39 y.o. female    Chief Complaint   Patient presents with    Diabetes       BP (!) 145/98   Pulse 89   Temp 97.5 °F (36.4 °C)   Resp 16   Ht 1.626 m (5' 4\")   Wt 109.4 kg (241 lb 3.2 oz)   SpO2 92%   BMI 41.40 kg/m²         1. Have you been to the ER, urgent care clinic since your last visit?  Hospitalized since your last visit? No    2. Have you seen or consulted any other health care providers outside of the [facility] since your last visit?  Include any pap smears or colon screening. No    Learning Assessment:       No data to display                Fall Risk Assessment:      8/11/2022     4:55 PM 6/28/2022     8:24 AM 6/28/2022     4:00 AM 6/27/2022     8:00 AM 6/26/2022     8:57 PM 6/2/2022    10:25 PM 6/2/2022     7:23 PM   Amb Fall Risk Assessment and TUG Test   Total Score 3 2 3 3 3 3 3       Abuse Screening:       No data to display                ADL Screening:       No data to display

## 2025-04-02 PROBLEM — Z79.4 TYPE 2 DIABETES MELLITUS WITH HYPERGLYCEMIA, WITH LONG-TERM CURRENT USE OF INSULIN (HCC): Status: ACTIVE | Noted: 2017-12-25

## 2025-04-02 PROBLEM — E11.65 TYPE 2 DIABETES MELLITUS WITH HYPERGLYCEMIA, WITH LONG-TERM CURRENT USE OF INSULIN (HCC): Status: ACTIVE | Noted: 2017-12-25

## 2025-04-14 DIAGNOSIS — E11.65 TYPE 2 DIABETES MELLITUS WITH HYPERGLYCEMIA, WITH LONG-TERM CURRENT USE OF INSULIN (HCC): ICD-10-CM

## 2025-04-14 DIAGNOSIS — Z79.4 TYPE 2 DIABETES MELLITUS WITH HYPERGLYCEMIA, WITH LONG-TERM CURRENT USE OF INSULIN (HCC): ICD-10-CM

## 2025-04-14 RX ORDER — DULAGLUTIDE 1.5 MG/.5ML
INJECTION, SOLUTION SUBCUTANEOUS
Qty: 6 ML | Refills: 3 | Status: SHIPPED | OUTPATIENT
Start: 2025-04-14

## 2025-04-23 ENCOUNTER — OFFICE VISIT (OUTPATIENT)
Age: 40
End: 2025-04-23

## 2025-04-23 VITALS
WEIGHT: 254 LBS | TEMPERATURE: 97.9 F | DIASTOLIC BLOOD PRESSURE: 86 MMHG | HEIGHT: 64 IN | BODY MASS INDEX: 43.36 KG/M2 | SYSTOLIC BLOOD PRESSURE: 133 MMHG | OXYGEN SATURATION: 96 % | HEART RATE: 85 BPM

## 2025-04-23 DIAGNOSIS — I10 PRIMARY HYPERTENSION: ICD-10-CM

## 2025-04-23 DIAGNOSIS — E11.8 TYPE 2 DIABETES MELLITUS WITH COMPLICATION, WITHOUT LONG-TERM CURRENT USE OF INSULIN (HCC): ICD-10-CM

## 2025-04-23 DIAGNOSIS — E11.65 TYPE 2 DIABETES MELLITUS WITH HYPERGLYCEMIA, UNSPECIFIED WHETHER LONG TERM INSULIN USE (HCC): ICD-10-CM

## 2025-04-23 DIAGNOSIS — E11.65 TYPE 2 DIABETES MELLITUS WITH HYPERGLYCEMIA, WITH LONG-TERM CURRENT USE OF INSULIN (HCC): Primary | ICD-10-CM

## 2025-04-23 DIAGNOSIS — N18.30 STAGE 3 CHRONIC KIDNEY DISEASE, UNSPECIFIED WHETHER STAGE 3A OR 3B CKD (HCC): ICD-10-CM

## 2025-04-23 DIAGNOSIS — Z79.4 LONG TERM (CURRENT) USE OF INSULIN (HCC): ICD-10-CM

## 2025-04-23 DIAGNOSIS — Z79.4 TYPE 2 DIABETES MELLITUS WITH HYPERGLYCEMIA, WITH LONG-TERM CURRENT USE OF INSULIN (HCC): Primary | ICD-10-CM

## 2025-04-23 DIAGNOSIS — E78.2 MIXED HYPERLIPIDEMIA: ICD-10-CM

## 2025-04-23 RX ORDER — ONDANSETRON 4 MG/1
4 TABLET, FILM COATED ORAL PRN
COMMUNITY

## 2025-04-23 RX ORDER — INSULIN ASPART 100 [IU]/ML
INJECTION, SOLUTION INTRAVENOUS; SUBCUTANEOUS
Qty: 100 ML | Refills: 11 | Status: SHIPPED | OUTPATIENT
Start: 2025-04-23

## 2025-04-23 RX ORDER — EPINEPHRINE 0.3 MG/.3ML
0.3 INJECTION SUBCUTANEOUS ONCE
COMMUNITY
Start: 2024-12-17

## 2025-04-23 RX ORDER — INSULIN DEGLUDEC 200 U/ML
80 INJECTION, SOLUTION SUBCUTANEOUS NIGHTLY
Qty: 27 ML | Refills: 3 | Status: SHIPPED | OUTPATIENT
Start: 2025-04-23

## 2025-04-23 NOTE — PROGRESS NOTES
Mandie Dietz is a 39 y.o. female here for No chief complaint on file.      1. Have you been to the ER, urgent care clinic since your last visit?  Hospitalized since your last visit? - no    2. Have you seen or consulted any other health care providers outside of the [facility] since your last visit?  Include any pap smears or colon screening.- Cardiologist & PCP    
time daily. Dx code E11.65    ticagrelor (BRILINTA) 90 MG TABS tablet Take 1 tablet by mouth 2 times daily    amLODIPine (NORVASC) 5 MG tablet Take 1 tablet by mouth daily    Continuous Blood Gluc Sensor (DEXCOM G7 SENSOR) MISC USE AS DIRECTED FOR 10 DAYS. Dx code E11.65    Continuous Blood Gluc  (DEXCOM G7 ) MARIELA USE AS DIRECTED E11.69    aspirin 81 MG EC tablet Take 1 tablet by mouth daily    atorvastatin (LIPITOR) 20 MG tablet Take 1 tablet by mouth nightly    empagliflozin (JARDIANCE) 10 MG tablet Take 1 tablet by mouth daily    methocarbamol (ROBAXIN) 750 MG tablet Take 1 tablet by mouth 2 times daily    ranolazine (RANEXA) 500 MG extended release tablet Take 1 tablet by mouth 2 times daily    Cetirizine HCl (ZYRTEC ALLERGY) 10 MG CAPS Take 10 mg by mouth daily (Patient not taking: Reported on 4/23/2025)     No current facility-administered medications for this visit.            Review of Systems:  Per HPI      Physical Examination:        General: pleasant, no distress, good eye contact    HEENT: no pallor, no periorbital edema, EOMI    Neck: supple,    Cardiovascular: regular,  normal S1 and S2,     Respiratory: clear to auscultation bilaterally      Musculoskeletal: no edema    Neurological: alert and oriented                   Data Reviewed:           [x] Reviewed labs         Lab Results   Component Value Date    GFRAA >60 09/23/2022        Lab Results   Component Value Date    LABA1C 9.4 (H) 03/12/2025    LABA1C 7.6 (H) 12/10/2024    LABA1C 6.7 (H) 11/30/2024         Lab Results   Component Value Date    TRIG 486 (H) 03/12/2025    HDL 52 03/12/2025     03/12/2025    K 5.1 03/12/2025    CL 98 03/12/2025    CO2 15 (L) 03/12/2025    BUN 15 03/12/2025    CREATININE 0.83 03/12/2025    GFRAA >60 09/23/2022    GLUCOSE 212 (H) 03/12/2025    CALCIUM 10.5 (H) 03/12/2025          Aug 2023 A1C 7.1 TSH 2.9                1. Type 2 Diabetes Mellitus      Lab Results   Component Value Date

## 2025-05-02 DIAGNOSIS — Z79.4 TYPE 2 DIABETES MELLITUS WITH HYPERGLYCEMIA, WITH LONG-TERM CURRENT USE OF INSULIN (HCC): Primary | ICD-10-CM

## 2025-05-02 DIAGNOSIS — E11.65 TYPE 2 DIABETES MELLITUS WITH HYPERGLYCEMIA, WITH LONG-TERM CURRENT USE OF INSULIN (HCC): ICD-10-CM

## 2025-05-02 DIAGNOSIS — Z79.4 TYPE 2 DIABETES MELLITUS WITH HYPERGLYCEMIA, WITH LONG-TERM CURRENT USE OF INSULIN (HCC): ICD-10-CM

## 2025-05-02 DIAGNOSIS — E11.65 TYPE 2 DIABETES MELLITUS WITH HYPERGLYCEMIA, WITH LONG-TERM CURRENT USE OF INSULIN (HCC): Primary | ICD-10-CM

## 2025-05-02 RX ORDER — ACYCLOVIR 400 MG/1
TABLET ORAL
Qty: 9 EACH | Refills: 3 | Status: SHIPPED | OUTPATIENT
Start: 2025-05-02

## 2025-05-02 RX ORDER — SYRINGE-NEEDLE,INSULIN,0.5 ML 27GX1/2"
SYRINGE, EMPTY DISPOSABLE MISCELLANEOUS
Qty: 300 EACH | Refills: 3 | Status: SHIPPED | OUTPATIENT
Start: 2025-05-02

## 2025-05-28 DIAGNOSIS — E11.65 TYPE 2 DIABETES MELLITUS WITH HYPERGLYCEMIA, UNSPECIFIED WHETHER LONG TERM INSULIN USE (HCC): ICD-10-CM

## 2025-05-28 DIAGNOSIS — Z79.4 TYPE 2 DIABETES MELLITUS WITH HYPERGLYCEMIA, WITH LONG-TERM CURRENT USE OF INSULIN (HCC): ICD-10-CM

## 2025-05-28 DIAGNOSIS — E11.65 TYPE 2 DIABETES MELLITUS WITH HYPERGLYCEMIA, WITH LONG-TERM CURRENT USE OF INSULIN (HCC): ICD-10-CM

## 2025-06-11 ENCOUNTER — LAB (OUTPATIENT)
Age: 40
End: 2025-06-11

## 2025-06-12 LAB
BUN SERPL-MCNC: 19 MG/DL (ref 6–24)
BUN/CREAT SERPL: 20 (ref 9–23)
CALCIUM SERPL-MCNC: 9.5 MG/DL (ref 8.7–10.2)
CHLORIDE SERPL-SCNC: 102 MMOL/L (ref 96–106)
CO2 SERPL-SCNC: 17 MMOL/L (ref 20–29)
CREAT SERPL-MCNC: 0.93 MG/DL (ref 0.57–1)
EGFRCR SERPLBLD CKD-EPI 2021: 80 ML/MIN/1.73
GLUCOSE SERPL-MCNC: 124 MG/DL (ref 70–99)
HBA1C MFR BLD: 7.3 % (ref 4.8–5.6)
LDLC SERPL DIRECT ASSAY-MCNC: 65 MG/DL (ref 0–99)
POTASSIUM SERPL-SCNC: 4.4 MMOL/L (ref 3.5–5.2)
SODIUM SERPL-SCNC: 138 MMOL/L (ref 134–144)

## 2025-06-13 LAB
ALBUMIN/CREAT UR: 2144 MG/G CREAT (ref 0–29)
CREAT UR-MCNC: 82 MG/DL
MICROALBUMIN UR-MCNC: 1758 UG/ML

## 2025-06-17 ENCOUNTER — OFFICE VISIT (OUTPATIENT)
Age: 40
End: 2025-06-17
Payer: MEDICARE

## 2025-06-17 VITALS
HEIGHT: 64 IN | SYSTOLIC BLOOD PRESSURE: 144 MMHG | RESPIRATION RATE: 20 BRPM | TEMPERATURE: 98.8 F | BODY MASS INDEX: 43.81 KG/M2 | DIASTOLIC BLOOD PRESSURE: 96 MMHG | OXYGEN SATURATION: 97 % | HEART RATE: 84 BPM | WEIGHT: 256.6 LBS

## 2025-06-17 DIAGNOSIS — I10 PRIMARY HYPERTENSION: ICD-10-CM

## 2025-06-17 DIAGNOSIS — E11.65 TYPE 2 DIABETES MELLITUS WITH HYPERGLYCEMIA, WITH LONG-TERM CURRENT USE OF INSULIN (HCC): Primary | ICD-10-CM

## 2025-06-17 DIAGNOSIS — Z79.4 TYPE 2 DIABETES MELLITUS WITH HYPERGLYCEMIA, WITH LONG-TERM CURRENT USE OF INSULIN (HCC): Primary | ICD-10-CM

## 2025-06-17 DIAGNOSIS — E78.2 MIXED HYPERLIPIDEMIA: ICD-10-CM

## 2025-06-17 DIAGNOSIS — N18.30 STAGE 3 CHRONIC KIDNEY DISEASE, UNSPECIFIED WHETHER STAGE 3A OR 3B CKD (HCC): ICD-10-CM

## 2025-06-17 PROCEDURE — 3051F HG A1C>EQUAL 7.0%<8.0%: CPT | Performed by: INTERNAL MEDICINE

## 2025-06-17 PROCEDURE — 95251 CONT GLUC MNTR ANALYSIS I&R: CPT | Performed by: INTERNAL MEDICINE

## 2025-06-17 PROCEDURE — 3077F SYST BP >= 140 MM HG: CPT | Performed by: INTERNAL MEDICINE

## 2025-06-17 PROCEDURE — 99215 OFFICE O/P EST HI 40 MIN: CPT | Performed by: INTERNAL MEDICINE

## 2025-06-17 PROCEDURE — 3080F DIAST BP >= 90 MM HG: CPT | Performed by: INTERNAL MEDICINE

## 2025-06-17 NOTE — PROGRESS NOTES
Chief Complaint   Patient presents with    Diabetes           Vitals:    06/17/25 1336   BP: (!) 144/96   Pulse:    Resp:    Temp:    SpO2:             1. Have you been to the ER, urgent care clinic since your last visit?  Hospitalized since your last visit?  No    2. Have you seen or consulted any other health care providers outside of the [facility] since your last visit?  Include any pap smears or colon screening. No    
09/23/2022    GLUCOSE 124 (H) 06/11/2025    CALCIUM 9.5 06/11/2025          Aug 2023 A1C 7.1 TSH 2.9                1. Type 2 Diabetes Mellitus      Lab Results   Component Value Date    LABA1C 7.3 (H) 06/11/2025       A1c goal 7.5 or below given multiple comorbidities   Could not tolerate Metformin.     Tolerating Trulicity, alternating diarrhea with constipation reportedly some of the symptoms were there even before starting Trulicity.  Ozempic 0.5 mg, caused more diarrhea,  She has benefited a lot from GLP-1 agonist, blood glucose has improved and has lost weight understands the risks ,however since the benefits are more she would like to continue  May have underlying gastroparesis,, GLP-1 agonist like Trulicity has to be discontinued for 4  weeks before doing gastric emptying study    Tresiba 80 units   NovoLog or Humalog 25 units before each meal   Trulicity 1.5 mg weekly, Jardiance     Continuous glucose monitor data downloaded for 2 weeks and reviewed with the patient         Results    Labs: A1c 7.3%. Urine protein leakage 2000 g. CGM: Time in Range: 41%. Time Above Range (TAR): 43%. Time Below Range (TBR): 0%. Very High: 16%.         2.  HTN :  Was on losartan , cardiology d/steven changed to  amlodipine as well as Coreg, intermittently has hypotension POTS      3. Hyperlipidemia : Statin      4.Obesity per medical criteria :Body mass index is 43.28 kg/m².   Discussed about the importance of activity and carbohydrate portion control.      5.  CAD/CABG      6.  CKD: with proteinuria _ had UTI both times when collected MAC > 2000      7.  Peripheral neuropathy      8.  Diabetic retinopathy: Followed by ophthalmology       9.  Hypothyroid-on levothyroxine  Unithroid 175 mcg  8/2024 TSH 2.5      10. POTS - Dr Burkett ,Yamil, Dr wyman    11 MS - was on steroids , Dr Ruiz , at [facility]      BMP shows metabolic acidosis, euglycemic acidosis is common with Jardiance, when she is sick asked her to hold off Jardiance

## 2025-06-17 NOTE — PATIENT INSTRUCTIONS
Check blood sugars before meals and at bedtime.      Low blood glucose is less than 70       Goal of blood glucose before meals 90 - 120 , 2  Hours after meals less than 180       Maintain the log and bring it all your appointments      If the bedtime sugars are less than 100 ,eat a 15 gm snack.     Tresiba 80 units in AM       Novolog or Humalog or Apidra insulin (fast acting) 10 units before breakfast, 10  units before lunch and 10  units before dinner.    If sugars before meals are less than 70 then no insulin         Trulicity weekly       Novolog or Humalog for high sugars       Correction Scale:  3 units for every 50 above 150      Blood sugar  Fast acting insulin             150-200  Extra 3 Units         201-250  Extra 6 Units         251-300  Extra 9 Units         301-350  Extra 12  Units          351-400  Extra 15  Units       Example:    My planned insulin dose:    ____ Units for meals    +    ____ Extra Correction Units  if high =  ____ total units to take together as one injection.

## 2025-07-13 DIAGNOSIS — E11.8 TYPE 2 DIABETES MELLITUS WITH COMPLICATION, WITHOUT LONG-TERM CURRENT USE OF INSULIN (HCC): ICD-10-CM

## 2025-07-14 RX ORDER — INSULIN DEGLUDEC 200 U/ML
80 INJECTION, SOLUTION SUBCUTANEOUS DAILY
Qty: 36 ML | Refills: 3 | Status: SHIPPED | OUTPATIENT
Start: 2025-07-14

## (undated) DEVICE — GARMENT,MEDLINE,DVT,INT,CALF,MED, GEN2: Brand: MEDLINE

## (undated) DEVICE — GOWN,SIRUS,NONRNF,SETINSLV,2XL,18/CS: Brand: MEDLINE

## (undated) DEVICE — WIPE 400300 MEROCEL 20PK INSTRUMENT: Brand: MEROCEL®

## (undated) DEVICE — BASIN ST MAJOR-NO CAUTERY: Brand: MEDLINE INDUSTRIES, INC.

## (undated) DEVICE — SMARTSLEEVE SURGICAL GOWN, 2XL: Brand: CONVERTORS

## (undated) DEVICE — SYR LR LCK 1ML GRAD NSAF 30ML --

## (undated) DEVICE — BLADE OPHTH MINI BEAV SHRP --

## (undated) DEVICE — SYRINGE,EAR/ULCER, 2 OZ, STERILE: Brand: MEDLINE

## (undated) DEVICE — ADHESIVE SKIN CLSR 0.7ML TOP DERMBND ADV

## (undated) DEVICE — TAPE,CLOTH/SILK,CURAD,3"X10YD,LF,40/CS: Brand: CURAD

## (undated) DEVICE — TRI-LUMEN FILTERED TUBE SET WITH ACTIVATED CHARCOAL FILTER: Brand: AIRSEAL

## (undated) DEVICE — SUTURE PLN GUT SZ 5-0 L18IN ABSRB YELLOWISH TAN L13MM PC-1 1915G

## (undated) DEVICE — ROCKER SWITCH PENCIL BLADE ELECTRODE, HOLSTER: Brand: EDGE

## (undated) DEVICE — SOLUTION IRRIG 1000ML 0.9% SOD CHL USP POUR PLAS BTL

## (undated) DEVICE — PACK,EENT,TURBAN DRAPE,PK II: Brand: MEDLINE

## (undated) DEVICE — DRAPE MICSCP W46XL120IN FOR ZEISS MD

## (undated) DEVICE — NEEDLE HYPO 27GA L1.25IN GRY POLYPR HUB S STL REG BVL STR

## (undated) DEVICE — SOLUTION IRRIG 1000ML STRL H2O USP PLAS POUR BTL

## (undated) DEVICE — CLICKLINE SCISSORS INSERT: Brand: CLICKLINE

## (undated) DEVICE — AIRSEAL 8 MM ACCESS PORT AND LOW PROFILE OBTURATOR WITH BLADELESS OPTICAL TIP, 120 MM LENGTH: Brand: AIRSEAL

## (undated) DEVICE — DRESSING 470530 SINUSSTENT 20PK PR KNNDY: Brand: MEROCEL®

## (undated) DEVICE — SUTURE VCRL SZ 5-0 L18IN ABSRB UD P-3 L13MM 3/8 CIR PRIM J493H

## (undated) DEVICE — Device

## (undated) DEVICE — BLADE TYMPLSTY W2.5MM 60DEG SHRP ALL ARND BVL DN

## (undated) DEVICE — SURGIFOAM SPNG SZ 12-7

## (undated) DEVICE — SUT SLK 5-0 18IN P3 BLK --

## (undated) DEVICE — SYR 5ML 1/5 GRAD LL NSAF LF --

## (undated) DEVICE — BLADE SAW PTFE CART MICTOM

## (undated) DEVICE — DRESSING EAR AD 5.5IN GLSCOCK

## (undated) DEVICE — #1020 STERI DRAPE 41MM X 41MM SMALL: Brand: STERI-DRAPE™

## (undated) DEVICE — AGENT HEMSTAT 3GM OXIDIZED REGENERATED CELOS ABSRB FOR CONT (ORDER MULTIPLES OF 5EA)

## (undated) DEVICE — PREMIUM WET SKIN PREP TRAY: Brand: MEDLINE INDUSTRIES, INC.

## (undated) DEVICE — BLADE MYR 45DEG OFFSET S STL LANC TIP NAR SHFT DISP BEAV

## (undated) DEVICE — TOWEL,OR,DSP,ST,BLUE,STD,2/PK,40PK/CS: Brand: MEDLINE

## (undated) DEVICE — Z DUPLICATE USE 2246581 COVER MPLR TIP CRV SCIS ACC DA VINCI

## (undated) DEVICE — SURGICEL ENDOSCP APPL

## (undated) DEVICE — WAX SURG 2.5GM HEMSTAT BNE BEESWAX PARAFFIN ISO PALMITATE

## (undated) DEVICE — BARRIER TISS ADH ABSRB 3X4IN -- GYNECARE INTERCEED

## (undated) DEVICE — PAD POSITIONING WNG STD KIT W/BODY STRP LF DISP

## (undated) DEVICE — INSULATED NEEDLE ELECTRODE: Brand: EDGE

## (undated) DEVICE — ELECTRO LUBE IS A SINGLE PATIENT USE DEVICE THAT IS INTENDED TO BE USED ON ELECTROSURGICAL ELECTRODES TO REDUCE STICKING.: Brand: KEY SURGICAL ELECTRO LUBE

## (undated) DEVICE — DISPOSABLE IRRIGATION CASSETTE: Brand: CORE

## (undated) DEVICE — GLOVE ORANGE PI 8   MSG9080

## (undated) DEVICE — GLOVE ORANGE PI 8 1/2   MSG9085

## (undated) DEVICE — VESSEL SEALER XI EXTENDED DISP -- VESSEL

## (undated) DEVICE — 40418 TRENDELENBURG ONE-STEP ARM PROTECTORS LARGE (1 PAIR): Brand: 40418 TRENDELENBURG ONE-STEP ARM PROTECTORS LARGE (1 PAIR)

## (undated) DEVICE — SUTURE STRATAFIX SPRL PDS + SZ 2-0 L6IN ABSRB VLT L36MM SXPP1B409

## (undated) DEVICE — TUBING, SUCTION, 1/4" X 12', STRAIGHT: Brand: MEDLINE

## (undated) DEVICE — INTENDED FOR TISSUE SEPARATION, AND OTHER PROCEDURES THAT REQUIRE A SHARP SURGICAL BLADE TO PUNCTURE OR CUT.: Brand: BARD-PARKER ® CARBON RIB-BACK BLADES

## (undated) DEVICE — OBTRTR BLDELSS OPT 8MM DISP -- DA VINICI XI - SNGL USE

## (undated) DEVICE — REM POLYHESIVE ADULT PATIENT RETURN ELECTRODE: Brand: VALLEYLAB

## (undated) DEVICE — TRAY PREP DRY W/ PREM GLV 2 APPL 6 SPNG 2 UNDPD 1 OVERWRAP

## (undated) DEVICE — 3M™ IOBAN™ 2 ANTIMICROBIAL INCISE DRAPE 6650EZ: Brand: IOBAN™ 2

## (undated) DEVICE — ARM DRAPE

## (undated) DEVICE — ELECTRODE 8227410 PAIRED 2 CH SET ROHS

## (undated) DEVICE — ENT-SMH: Brand: MEDLINE INDUSTRIES, INC.

## (undated) DEVICE — ROBOTIC GYN-SFMC: Brand: MEDLINE INDUSTRIES, INC.

## (undated) DEVICE — DRAPE,REIN 53X77,STERILE: Brand: MEDLINE

## (undated) DEVICE — SMOKE EVACUATION PENCIL: Brand: VALLEYLAB

## (undated) DEVICE — FS-30 DEVICE - (30MM CERVICAL CUP): Brand: MCCARUS-VOLKER FORNISEE® SYSTEM

## (undated) DEVICE — SEAL UNIV 5-8MM DISP BX/10 -- DA VINCI XI - SNGL USE